# Patient Record
Sex: MALE | Race: WHITE | NOT HISPANIC OR LATINO | Employment: UNEMPLOYED | URBAN - METROPOLITAN AREA
[De-identification: names, ages, dates, MRNs, and addresses within clinical notes are randomized per-mention and may not be internally consistent; named-entity substitution may affect disease eponyms.]

---

## 2017-11-12 ENCOUNTER — HOSPITAL ENCOUNTER (INPATIENT)
Facility: MEDICAL CENTER | Age: 67
LOS: 1 days | DRG: 711 | End: 2017-11-13
Attending: INTERNAL MEDICINE | Admitting: INTERNAL MEDICINE
Payer: COMMERCIAL

## 2017-11-12 ENCOUNTER — HOSPITAL ENCOUNTER (OUTPATIENT)
Facility: MEDICAL CENTER | Age: 67
End: 2017-11-12

## 2017-11-12 ENCOUNTER — RESOLUTE PROFESSIONAL BILLING HOSPITAL PROF FEE (OUTPATIENT)
Dept: HOSPITALIST | Facility: MEDICAL CENTER | Age: 67
End: 2017-11-12
Payer: COMMERCIAL

## 2017-11-12 ENCOUNTER — APPOINTMENT (OUTPATIENT)
Dept: RADIOLOGY | Facility: MEDICAL CENTER | Age: 67
DRG: 711 | End: 2017-11-12
Attending: UROLOGY
Payer: COMMERCIAL

## 2017-11-12 DIAGNOSIS — N44.00 TESTICULAR TORSION: ICD-10-CM

## 2017-11-12 PROBLEM — N45.1 ACUTE EPIDIDYMITIS: Status: ACTIVE | Noted: 2017-11-12

## 2017-11-12 PROBLEM — E11.9 DM TYPE 2 (DIABETES MELLITUS, TYPE 2) (HCC): Status: ACTIVE | Noted: 2017-11-12

## 2017-11-12 PROBLEM — K50.90 CROHN DISEASE (HCC): Status: ACTIVE | Noted: 2017-11-12

## 2017-11-12 LAB
ABO GROUP BLD: NORMAL
ABO GROUP BLD: NORMAL
APPEARANCE UR: CLEAR
BACTERIA #/AREA URNS HPF: NEGATIVE /HPF
BILIRUB UR QL STRIP.AUTO: NEGATIVE
BLD GP AB SCN SERPL QL: NORMAL
COLOR UR: YELLOW
EPI CELLS #/AREA URNS HPF: ABNORMAL /HPF
GLUCOSE BLD-MCNC: 150 MG/DL (ref 65–99)
GLUCOSE BLD-MCNC: 206 MG/DL (ref 65–99)
GLUCOSE BLD-MCNC: 224 MG/DL (ref 65–99)
GLUCOSE UR STRIP.AUTO-MCNC: 100 MG/DL
HYALINE CASTS #/AREA URNS LPF: ABNORMAL /LPF
KETONES UR STRIP.AUTO-MCNC: ABNORMAL MG/DL
LEUKOCYTE ESTERASE UR QL STRIP.AUTO: NEGATIVE
MICRO URNS: ABNORMAL
NITRITE UR QL STRIP.AUTO: NEGATIVE
PH UR STRIP.AUTO: 5.5 [PH]
PROT UR QL STRIP: 30 MG/DL
RBC # URNS HPF: ABNORMAL /HPF
RBC UR QL AUTO: NEGATIVE
RH BLD: NORMAL
SP GR UR STRIP.AUTO: 1.02
UROBILINOGEN UR STRIP.AUTO-MCNC: 0.2 MG/DL
WBC #/AREA URNS HPF: ABNORMAL /HPF

## 2017-11-12 PROCEDURE — 88305 TISSUE EXAM BY PATHOLOGIST: CPT

## 2017-11-12 PROCEDURE — 160039 HCHG SURGERY MINUTES - EA ADDL 1 MIN LEVEL 3: Performed by: UROLOGY

## 2017-11-12 PROCEDURE — 82962 GLUCOSE BLOOD TEST: CPT | Mod: 91

## 2017-11-12 PROCEDURE — A6403 STERILE GAUZE>16 <= 48 SQ IN: HCPCS | Performed by: UROLOGY

## 2017-11-12 PROCEDURE — 0VBJ0ZZ EXCISION OF RIGHT EPIDIDYMIS, OPEN APPROACH: ICD-10-PCS | Performed by: UROLOGY

## 2017-11-12 PROCEDURE — 700111 HCHG RX REV CODE 636 W/ 250 OVERRIDE (IP): Performed by: STUDENT IN AN ORGANIZED HEALTH CARE EDUCATION/TRAINING PROGRAM

## 2017-11-12 PROCEDURE — A9270 NON-COVERED ITEM OR SERVICE: HCPCS

## 2017-11-12 PROCEDURE — 160009 HCHG ANES TIME/MIN: Performed by: UROLOGY

## 2017-11-12 PROCEDURE — 160036 HCHG PACU - EA ADDL 30 MINS PHASE I: Performed by: UROLOGY

## 2017-11-12 PROCEDURE — 700111 HCHG RX REV CODE 636 W/ 250 OVERRIDE (IP): Performed by: UROLOGY

## 2017-11-12 PROCEDURE — 86850 RBC ANTIBODY SCREEN: CPT

## 2017-11-12 PROCEDURE — 160002 HCHG RECOVERY MINUTES (STAT): Performed by: UROLOGY

## 2017-11-12 PROCEDURE — 501838 HCHG SUTURE GENERAL: Performed by: UROLOGY

## 2017-11-12 PROCEDURE — 86900 BLOOD TYPING SEROLOGIC ABO: CPT

## 2017-11-12 PROCEDURE — 0VTB0ZZ RESECTION OF LEFT TESTIS, OPEN APPROACH: ICD-10-PCS | Performed by: UROLOGY

## 2017-11-12 PROCEDURE — 700102 HCHG RX REV CODE 250 W/ 637 OVERRIDE(OP): Performed by: INTERNAL MEDICINE

## 2017-11-12 PROCEDURE — 76870 US EXAM SCROTUM: CPT

## 2017-11-12 PROCEDURE — A9270 NON-COVERED ITEM OR SERVICE: HCPCS | Performed by: STUDENT IN AN ORGANIZED HEALTH CARE EDUCATION/TRAINING PROGRAM

## 2017-11-12 PROCEDURE — 700102 HCHG RX REV CODE 250 W/ 637 OVERRIDE(OP): Performed by: STUDENT IN AN ORGANIZED HEALTH CARE EDUCATION/TRAINING PROGRAM

## 2017-11-12 PROCEDURE — 99223 1ST HOSP IP/OBS HIGH 75: CPT | Mod: GC | Performed by: INTERNAL MEDICINE

## 2017-11-12 PROCEDURE — 160035 HCHG PACU - 1ST 60 MINS PHASE I: Performed by: UROLOGY

## 2017-11-12 PROCEDURE — 700105 HCHG RX REV CODE 258: Performed by: INTERNAL MEDICINE

## 2017-11-12 PROCEDURE — 500380 HCHG DRAIN, PENROSE 1/4X12: Performed by: UROLOGY

## 2017-11-12 PROCEDURE — 700101 HCHG RX REV CODE 250

## 2017-11-12 PROCEDURE — 160048 HCHG OR STATISTICAL LEVEL 1-5: Performed by: UROLOGY

## 2017-11-12 PROCEDURE — 501445 HCHG STAPLER, SKIN DISP: Performed by: UROLOGY

## 2017-11-12 PROCEDURE — 770006 HCHG ROOM/CARE - MED/SURG/GYN SEMI*

## 2017-11-12 PROCEDURE — 86901 BLOOD TYPING SEROLOGIC RH(D): CPT

## 2017-11-12 PROCEDURE — 0VS90ZZ REPOSITION RIGHT TESTIS, OPEN APPROACH: ICD-10-PCS | Performed by: UROLOGY

## 2017-11-12 PROCEDURE — 700111 HCHG RX REV CODE 636 W/ 250 OVERRIDE (IP): Performed by: INTERNAL MEDICINE

## 2017-11-12 PROCEDURE — 81001 URINALYSIS AUTO W/SCOPE: CPT

## 2017-11-12 PROCEDURE — 700102 HCHG RX REV CODE 250 W/ 637 OVERRIDE(OP)

## 2017-11-12 PROCEDURE — 700111 HCHG RX REV CODE 636 W/ 250 OVERRIDE (IP)

## 2017-11-12 PROCEDURE — 36415 COLL VENOUS BLD VENIPUNCTURE: CPT

## 2017-11-12 PROCEDURE — 160028 HCHG SURGERY MINUTES - 1ST 30 MINS LEVEL 3: Performed by: UROLOGY

## 2017-11-12 RX ORDER — MESALAMINE 400 MG/1
400 CAPSULE, DELAYED RELEASE ORAL 3 TIMES DAILY
Status: DISCONTINUED | OUTPATIENT
Start: 2017-11-12 | End: 2017-11-13 | Stop reason: HOSPADM

## 2017-11-12 RX ORDER — DEXTROSE MONOHYDRATE 25 G/50ML
25 INJECTION, SOLUTION INTRAVENOUS
Status: DISCONTINUED | OUTPATIENT
Start: 2017-11-12 | End: 2017-11-13 | Stop reason: HOSPADM

## 2017-11-12 RX ORDER — OXYCODONE HYDROCHLORIDE 5 MG/1
5 TABLET ORAL EVERY 4 HOURS PRN
Status: DISCONTINUED | OUTPATIENT
Start: 2017-11-12 | End: 2017-11-13

## 2017-11-12 RX ORDER — MESALAMINE 1000 MG/1
1000 SUPPOSITORY RECTAL
Status: DISCONTINUED | OUTPATIENT
Start: 2017-11-12 | End: 2017-11-13 | Stop reason: HOSPADM

## 2017-11-12 RX ORDER — MORPHINE SULFATE 4 MG/ML
2 INJECTION, SOLUTION INTRAMUSCULAR; INTRAVENOUS
Status: DISCONTINUED | OUTPATIENT
Start: 2017-11-12 | End: 2017-11-13

## 2017-11-12 RX ORDER — AMOXICILLIN 250 MG
2 CAPSULE ORAL 2 TIMES DAILY
Status: DISCONTINUED | OUTPATIENT
Start: 2017-11-12 | End: 2017-11-13 | Stop reason: HOSPADM

## 2017-11-12 RX ORDER — POLYETHYLENE GLYCOL 3350 17 G/17G
1 POWDER, FOR SOLUTION ORAL
Status: DISCONTINUED | OUTPATIENT
Start: 2017-11-12 | End: 2017-11-13 | Stop reason: HOSPADM

## 2017-11-12 RX ORDER — TAMSULOSIN HYDROCHLORIDE 0.4 MG/1
0.4 CAPSULE ORAL
Status: DISCONTINUED | OUTPATIENT
Start: 2017-11-13 | End: 2017-11-13 | Stop reason: HOSPADM

## 2017-11-12 RX ORDER — LEVOFLOXACIN 5 MG/ML
750 INJECTION, SOLUTION INTRAVENOUS EVERY 24 HOURS
Status: DISCONTINUED | OUTPATIENT
Start: 2017-11-12 | End: 2017-11-13

## 2017-11-12 RX ORDER — OXYCODONE HYDROCHLORIDE 10 MG/1
10 TABLET ORAL EVERY 4 HOURS PRN
Status: DISCONTINUED | OUTPATIENT
Start: 2017-11-12 | End: 2017-11-13

## 2017-11-12 RX ORDER — ACETAMINOPHEN 500 MG
500 TABLET ORAL EVERY 6 HOURS PRN
Status: DISCONTINUED | OUTPATIENT
Start: 2017-11-12 | End: 2017-11-13 | Stop reason: HOSPADM

## 2017-11-12 RX ORDER — PSEUDOEPHEDRINE HYDROCHLORIDE 60 MG/1
60 TABLET, FILM COATED ORAL
Status: DISCONTINUED | OUTPATIENT
Start: 2017-11-12 | End: 2017-11-13 | Stop reason: HOSPADM

## 2017-11-12 RX ORDER — MERCAPTOPURINE 50 MG/1
50 TABLET ORAL DAILY
Status: DISCONTINUED | OUTPATIENT
Start: 2017-11-13 | End: 2017-11-13 | Stop reason: HOSPADM

## 2017-11-12 RX ORDER — MORPHINE SULFATE 4 MG/ML
2 INJECTION, SOLUTION INTRAMUSCULAR; INTRAVENOUS ONCE
Status: COMPLETED | OUTPATIENT
Start: 2017-11-12 | End: 2017-11-12

## 2017-11-12 RX ORDER — MORPHINE SULFATE 4 MG/ML
2 INJECTION, SOLUTION INTRAMUSCULAR; INTRAVENOUS EVERY 6 HOURS PRN
Status: DISCONTINUED | OUTPATIENT
Start: 2017-11-12 | End: 2017-11-12

## 2017-11-12 RX ORDER — BUPIVACAINE HYDROCHLORIDE 2.5 MG/ML
INJECTION, SOLUTION EPIDURAL; INFILTRATION; INTRACAUDAL
Status: DISCONTINUED | OUTPATIENT
Start: 2017-11-12 | End: 2017-11-12 | Stop reason: HOSPADM

## 2017-11-12 RX ORDER — SODIUM CHLORIDE 9 MG/ML
INJECTION, SOLUTION INTRAVENOUS CONTINUOUS
Status: DISCONTINUED | OUTPATIENT
Start: 2017-11-12 | End: 2017-11-13 | Stop reason: HOSPADM

## 2017-11-12 RX ORDER — BISACODYL 10 MG
10 SUPPOSITORY, RECTAL RECTAL
Status: DISCONTINUED | OUTPATIENT
Start: 2017-11-12 | End: 2017-11-13 | Stop reason: HOSPADM

## 2017-11-12 RX ORDER — DEXTROSE MONOHYDRATE 25 G/50ML
25 INJECTION, SOLUTION INTRAVENOUS
Status: DISCONTINUED | OUTPATIENT
Start: 2017-11-12 | End: 2017-11-12

## 2017-11-12 RX ORDER — CHOLECALCIFEROL (VITAMIN D3) 125 MCG
1000 CAPSULE ORAL DAILY
Status: DISCONTINUED | OUTPATIENT
Start: 2017-11-13 | End: 2017-11-13 | Stop reason: HOSPADM

## 2017-11-12 RX ORDER — MORPHINE SULFATE 4 MG/ML
2 INJECTION, SOLUTION INTRAMUSCULAR; INTRAVENOUS EVERY 4 HOURS PRN
Status: DISCONTINUED | OUTPATIENT
Start: 2017-11-12 | End: 2017-11-12

## 2017-11-12 RX ADMIN — SODIUM CHLORIDE: 9 INJECTION, SOLUTION INTRAVENOUS at 18:10

## 2017-11-12 RX ADMIN — OXYCODONE HYDROCHLORIDE 10 MG: 10 TABLET ORAL at 18:03

## 2017-11-12 RX ADMIN — MORPHINE SULFATE 2 MG: 4 INJECTION INTRAVENOUS at 20:32

## 2017-11-12 RX ADMIN — INSULIN HUMAN 2 UNITS: 100 INJECTION, SOLUTION PARENTERAL at 18:16

## 2017-11-12 RX ADMIN — MORPHINE SULFATE 2 MG: 4 INJECTION INTRAVENOUS at 19:30

## 2017-11-12 RX ADMIN — LEVOFLOXACIN 750 MG: 5 INJECTION, SOLUTION INTRAVENOUS at 18:10

## 2017-11-12 RX ADMIN — OXYCODONE HYDROCHLORIDE 10 MG: 10 TABLET ORAL at 22:03

## 2017-11-12 ASSESSMENT — PATIENT HEALTH QUESTIONNAIRE - PHQ9
SUM OF ALL RESPONSES TO PHQ9 QUESTIONS 1 AND 2: 0
SUM OF ALL RESPONSES TO PHQ QUESTIONS 1-9: 0
2. FEELING DOWN, DEPRESSED, IRRITABLE, OR HOPELESS: NOT AT ALL
1. LITTLE INTEREST OR PLEASURE IN DOING THINGS: NOT AT ALL
2. FEELING DOWN, DEPRESSED, IRRITABLE, OR HOPELESS: NOT AT ALL
SUM OF ALL RESPONSES TO PHQ QUESTIONS 1-9: 0
SUM OF ALL RESPONSES TO PHQ9 QUESTIONS 1 AND 2: 0
1. LITTLE INTEREST OR PLEASURE IN DOING THINGS: NOT AT ALL

## 2017-11-12 ASSESSMENT — COPD QUESTIONNAIRES
DO YOU EVER COUGH UP ANY MUCUS OR PHLEGM?: NO/ONLY WITH OCCASIONAL COLDS OR INFECTIONS
COPD SCREENING SCORE: 2
DURING THE PAST 4 WEEKS HOW MUCH DID YOU FEEL SHORT OF BREATH: NONE/LITTLE OF THE TIME
HAVE YOU SMOKED AT LEAST 100 CIGARETTES IN YOUR ENTIRE LIFE: NO/DON'T KNOW

## 2017-11-12 ASSESSMENT — LIFESTYLE VARIABLES
ALCOHOL_USE: NO
EVER_SMOKED: NEVER
DO YOU DRINK ALCOHOL: NO
EVER_SMOKED: NEVER

## 2017-11-12 ASSESSMENT — PAIN SCALES - GENERAL
PAINLEVEL_OUTOF10: 8
PAINLEVEL_OUTOF10: 3
PAINLEVEL_OUTOF10: 7
PAINLEVEL_OUTOF10: 3
PAINLEVEL_OUTOF10: 0
PAINLEVEL_OUTOF10: 5
PAINLEVEL_OUTOF10: 7

## 2017-11-12 ASSESSMENT — ENCOUNTER SYMPTOMS
HEARTBURN: 0
FEVER: 0
VOMITING: 0
CONSTIPATION: 1
DIARRHEA: 0
NAUSEA: 1
COUGH: 0
ABDOMINAL PAIN: 0
PALPITATIONS: 0
SHORTNESS OF BREATH: 0
HEADACHES: 0
DIZZINESS: 0
CHILLS: 0
MYALGIAS: 0
BLURRED VISION: 0
SORE THROAT: 0
DEPRESSION: 0

## 2017-11-12 NOTE — PROGRESS NOTES
Skin check complete. Scrotum red and swollen. Venous stasis ulcers noted to right ankle and right calf, [facility], present on admit. No s/s breakdown over bony prominences.

## 2017-11-12 NOTE — PROGRESS NOTES
Pt arrived to T411-1. VSS. Assessment complete. Urology surgeon at bedside, new orders received for testicular ultrasound. Possible surgery pending results, pt kept NPO since 0730 this AM.   Dr. Reynolds with [facility] team notified of pt's arrival and will round on patient.

## 2017-11-12 NOTE — PROGRESS NOTES
Patient is a direct admit from the [facility], patient of [facility]'s for a testicular torsion.  Dr. Reynolds and Dr. Gomez with [facility] are accepting the patient.  Telephone ADT order received and entered into epic on 11/12 at 1159.  Held orders in epic to be released upon patients arrival to unit.

## 2017-11-12 NOTE — PROGRESS NOTES
Urology Pre-op Note:  67 yo M transferred from [facility] with possible L testicular torsion.  He has been treated there for epididymitis for 2 days, but worsening pain and exam prompted repeat YELITZA which showed decreased flow to the L testicle and heterogeneous appearance of that testicle.    Exam shows normal R testis  severe scrotal edema on the L  - testis not palpable due to the scrotal edema    Repeat YELITZA here showed decreased flow and heterogeneous L testicle, normal R side.    Plan:  To OR for scrotal exploration, possible left orchiectomy, possible bilateral testicular fixation.

## 2017-11-13 VITALS
DIASTOLIC BLOOD PRESSURE: 71 MMHG | HEART RATE: 87 BPM | WEIGHT: 195.33 LBS | OXYGEN SATURATION: 97 % | TEMPERATURE: 98.9 F | SYSTOLIC BLOOD PRESSURE: 126 MMHG | RESPIRATION RATE: 18 BRPM

## 2017-11-13 PROBLEM — I26.99 RECURRENT PULMONARY EMBOLISM (HCC): Status: ACTIVE | Noted: 2017-11-13

## 2017-11-13 LAB
ALBUMIN SERPL BCP-MCNC: 2.8 G/DL (ref 3.2–4.9)
ALBUMIN/GLOB SERPL: 1 G/DL
ALP SERPL-CCNC: 56 U/L (ref 30–99)
ALT SERPL-CCNC: 12 U/L (ref 2–50)
ANION GAP SERPL CALC-SCNC: 3 MMOL/L (ref 0–11.9)
ANISOCYTOSIS BLD QL SMEAR: ABNORMAL
AST SERPL-CCNC: 13 U/L (ref 12–45)
BASOPHILS # BLD AUTO: 0.6 % (ref 0–1.8)
BASOPHILS # BLD: 0.03 K/UL (ref 0–0.12)
BILIRUB SERPL-MCNC: 1.1 MG/DL (ref 0.1–1.5)
BUN SERPL-MCNC: 8 MG/DL (ref 8–22)
CALCIUM SERPL-MCNC: 7.9 MG/DL (ref 8.5–10.5)
CHLORIDE SERPL-SCNC: 104 MMOL/L (ref 96–112)
CO2 SERPL-SCNC: 28 MMOL/L (ref 20–33)
COMMENT 1642: NORMAL
CREAT SERPL-MCNC: 0.46 MG/DL (ref 0.5–1.4)
EOSINOPHIL # BLD AUTO: 0.03 K/UL (ref 0–0.51)
EOSINOPHIL NFR BLD: 0.6 % (ref 0–6.9)
ERYTHROCYTE [DISTWIDTH] IN BLOOD BY AUTOMATED COUNT: 66.4 FL (ref 35.9–50)
FOLATE SERPL-MCNC: >23.2 NG/ML
GFR SERPL CREATININE-BSD FRML MDRD: >60 ML/MIN/1.73 M 2
GLOBULIN SER CALC-MCNC: 2.8 G/DL (ref 1.9–3.5)
GLUCOSE BLD-MCNC: 125 MG/DL (ref 65–99)
GLUCOSE BLD-MCNC: 149 MG/DL (ref 65–99)
GLUCOSE BLD-MCNC: 176 MG/DL (ref 65–99)
GLUCOSE SERPL-MCNC: 131 MG/DL (ref 65–99)
HCT VFR BLD AUTO: 32.3 % (ref 42–52)
HGB BLD-MCNC: 11 G/DL (ref 14–18)
IMM GRANULOCYTES # BLD AUTO: 0.03 K/UL (ref 0–0.11)
IMM GRANULOCYTES NFR BLD AUTO: 0.6 % (ref 0–0.9)
LYMPHOCYTES # BLD AUTO: 0.52 K/UL (ref 1–4.8)
LYMPHOCYTES NFR BLD: 10.8 % (ref 22–41)
MACROCYTES BLD QL SMEAR: ABNORMAL
MCH RBC QN AUTO: 36.3 PG (ref 27–33)
MCHC RBC AUTO-ENTMCNC: 34.1 G/DL (ref 33.7–35.3)
MCV RBC AUTO: 106.6 FL (ref 81.4–97.8)
MONOCYTES # BLD AUTO: 0.51 K/UL (ref 0–0.85)
MONOCYTES NFR BLD AUTO: 10.6 % (ref 0–13.4)
MORPHOLOGY BLD-IMP: NORMAL
NEUTROPHILS # BLD AUTO: 3.71 K/UL (ref 1.82–7.42)
NEUTROPHILS NFR BLD: 76.8 % (ref 44–72)
NRBC # BLD AUTO: 0 K/UL
NRBC BLD AUTO-RTO: 0 /100 WBC
OVALOCYTES BLD QL SMEAR: NORMAL
PLATELET # BLD AUTO: 139 K/UL (ref 164–446)
PLATELET BLD QL SMEAR: NORMAL
PMV BLD AUTO: 9.4 FL (ref 9–12.9)
POIKILOCYTOSIS BLD QL SMEAR: NORMAL
POTASSIUM SERPL-SCNC: 4 MMOL/L (ref 3.6–5.5)
PROT SERPL-MCNC: 5.6 G/DL (ref 6–8.2)
RBC # BLD AUTO: 3.03 M/UL (ref 4.7–6.1)
RBC BLD AUTO: PRESENT
SODIUM SERPL-SCNC: 135 MMOL/L (ref 135–145)
VIT B12 SERPL-MCNC: 464 PG/ML (ref 211–911)
WBC # BLD AUTO: 4.8 K/UL (ref 4.8–10.8)

## 2017-11-13 PROCEDURE — 700111 HCHG RX REV CODE 636 W/ 250 OVERRIDE (IP): Performed by: INTERNAL MEDICINE

## 2017-11-13 PROCEDURE — 36415 COLL VENOUS BLD VENIPUNCTURE: CPT

## 2017-11-13 PROCEDURE — 700102 HCHG RX REV CODE 250 W/ 637 OVERRIDE(OP): Performed by: INTERNAL MEDICINE

## 2017-11-13 PROCEDURE — 85025 COMPLETE CBC W/AUTO DIFF WBC: CPT

## 2017-11-13 PROCEDURE — 700102 HCHG RX REV CODE 250 W/ 637 OVERRIDE(OP): Performed by: STUDENT IN AN ORGANIZED HEALTH CARE EDUCATION/TRAINING PROGRAM

## 2017-11-13 PROCEDURE — 700111 HCHG RX REV CODE 636 W/ 250 OVERRIDE (IP): Performed by: UROLOGY

## 2017-11-13 PROCEDURE — 82746 ASSAY OF FOLIC ACID SERUM: CPT

## 2017-11-13 PROCEDURE — 82962 GLUCOSE BLOOD TEST: CPT | Mod: 91

## 2017-11-13 PROCEDURE — 99239 HOSP IP/OBS DSCHRG MGMT >30: CPT | Mod: GC | Performed by: INTERNAL MEDICINE

## 2017-11-13 PROCEDURE — 82607 VITAMIN B-12: CPT

## 2017-11-13 PROCEDURE — 80053 COMPREHEN METABOLIC PANEL: CPT

## 2017-11-13 PROCEDURE — 700105 HCHG RX REV CODE 258: Performed by: INTERNAL MEDICINE

## 2017-11-13 PROCEDURE — A9270 NON-COVERED ITEM OR SERVICE: HCPCS | Performed by: STUDENT IN AN ORGANIZED HEALTH CARE EDUCATION/TRAINING PROGRAM

## 2017-11-13 PROCEDURE — A9270 NON-COVERED ITEM OR SERVICE: HCPCS | Performed by: INTERNAL MEDICINE

## 2017-11-13 RX ORDER — OXYCODONE HYDROCHLORIDE 5 MG/1
5 TABLET ORAL EVERY 4 HOURS PRN
Qty: 20 TAB | Refills: 0
Start: 2017-11-13 | End: 2018-01-02

## 2017-11-13 RX ORDER — POLYETHYLENE GLYCOL 3350 17 G/17G
1 POWDER, FOR SOLUTION ORAL DAILY
Status: DISCONTINUED | OUTPATIENT
Start: 2017-11-13 | End: 2017-11-13 | Stop reason: HOSPADM

## 2017-11-13 RX ORDER — MESALAMINE 0.38 G/1
3 CAPSULE, EXTENDED RELEASE ORAL DAILY
Status: ON HOLD | COMMUNITY
End: 2018-01-27

## 2017-11-13 RX ORDER — DOXYCYCLINE HYCLATE 100 MG
100 TABLET ORAL 2 TIMES DAILY
Status: ON HOLD | COMMUNITY
End: 2017-11-13

## 2017-11-13 RX ORDER — LEVOFLOXACIN 500 MG/1
500 TABLET, FILM COATED ORAL DAILY
Status: DISCONTINUED | OUTPATIENT
Start: 2017-11-14 | End: 2017-11-13

## 2017-11-13 RX ORDER — GLIPIZIDE 5 MG/1
5 TABLET ORAL 2 TIMES DAILY
COMMUNITY
End: 2018-01-02

## 2017-11-13 RX ORDER — MERCAPTOPURINE 50 MG/1
150 TABLET ORAL DAILY
COMMUNITY

## 2017-11-13 RX ORDER — OXYCODONE HYDROCHLORIDE 10 MG/1
10 TABLET ORAL EVERY 4 HOURS PRN
Status: DISCONTINUED | OUTPATIENT
Start: 2017-11-13 | End: 2017-11-13 | Stop reason: HOSPADM

## 2017-11-13 RX ORDER — WARFARIN SODIUM 10 MG/1
5 TABLET ORAL DAILY
Qty: 30 TAB | Refills: 3 | Status: ON HOLD
Start: 2017-11-14 | End: 2017-12-29

## 2017-11-13 RX ORDER — OXYCODONE HYDROCHLORIDE 5 MG/1
5 TABLET ORAL EVERY 4 HOURS PRN
Status: DISCONTINUED | OUTPATIENT
Start: 2017-11-13 | End: 2017-11-13 | Stop reason: HOSPADM

## 2017-11-13 RX ORDER — MESALAMINE 1000 MG/1
1000 SUPPOSITORY RECTAL
Qty: 30 SUPPOSITORY | Refills: 0 | Status: ON HOLD
Start: 2017-11-13 | End: 2017-12-29

## 2017-11-13 RX ORDER — TAMSULOSIN HYDROCHLORIDE 0.4 MG/1
0.8 CAPSULE ORAL DAILY
COMMUNITY

## 2017-11-13 RX ORDER — WARFARIN SODIUM 10 MG/1
15 TABLET ORAL DAILY
Status: ON HOLD | COMMUNITY
End: 2017-11-13

## 2017-11-13 RX ADMIN — MAGNESIUM HYDROXIDE 30 ML: 400 SUSPENSION ORAL at 08:37

## 2017-11-13 RX ADMIN — MORPHINE SULFATE 2 MG: 4 INJECTION INTRAVENOUS at 08:36

## 2017-11-13 RX ADMIN — MESALAMINE 400 MG: 400 CAPSULE, DELAYED RELEASE ORAL at 14:05

## 2017-11-13 RX ADMIN — MESALAMINE 400 MG: 400 CAPSULE, DELAYED RELEASE ORAL at 08:40

## 2017-11-13 RX ADMIN — OXYCODONE HYDROCHLORIDE 10 MG: 10 TABLET ORAL at 17:59

## 2017-11-13 RX ADMIN — LEVOFLOXACIN 750 MG: 5 INJECTION, SOLUTION INTRAVENOUS at 08:40

## 2017-11-13 RX ADMIN — MORPHINE SULFATE 2 MG: 4 INJECTION INTRAVENOUS at 05:09

## 2017-11-13 RX ADMIN — OXYCODONE HYDROCHLORIDE 10 MG: 10 TABLET ORAL at 09:45

## 2017-11-13 RX ADMIN — MORPHINE SULFATE 2 MG: 4 INJECTION INTRAVENOUS at 01:22

## 2017-11-13 RX ADMIN — TAMSULOSIN HYDROCHLORIDE 0.4 MG: 0.4 CAPSULE ORAL at 08:38

## 2017-11-13 RX ADMIN — OXYCODONE HYDROCHLORIDE 10 MG: 10 TABLET ORAL at 14:05

## 2017-11-13 RX ADMIN — STANDARDIZED SENNA CONCENTRATE AND DOCUSATE SODIUM 2 TABLET: 8.6; 5 TABLET, FILM COATED ORAL at 08:38

## 2017-11-13 RX ADMIN — SODIUM CHLORIDE: 9 INJECTION, SOLUTION INTRAVENOUS at 04:27

## 2017-11-13 RX ADMIN — OXYCODONE HYDROCHLORIDE 10 MG: 10 TABLET ORAL at 04:25

## 2017-11-13 RX ADMIN — MERCAPTOPURINE 50 MG: 50 TABLET ORAL at 08:40

## 2017-11-13 RX ADMIN — POLYETHYLENE GLYCOL 3350 1 PACKET: 17 POWDER, FOR SOLUTION ORAL at 08:38

## 2017-11-13 RX ADMIN — CYANOCOBALAMIN TAB 500 MCG 1000 MCG: 500 TAB at 08:40

## 2017-11-13 ASSESSMENT — PAIN SCALES - GENERAL
PAINLEVEL_OUTOF10: 9
PAINLEVEL_OUTOF10: 7
PAINLEVEL_OUTOF10: 8
PAINLEVEL_OUTOF10: 7

## 2017-11-13 NOTE — PROGRESS NOTES
VSS, pain controlled with morphine and oxycodone, tolerating ambulation, kaur placed for retention, anxiety and agitation improved but endured most of the night, some edema to penis

## 2017-11-13 NOTE — PROGRESS NOTES
"Patient rang call light after sleeping for a few hours after receiving oxycodone and stated angrily \"I am loving the pain\". He has threaten that he will nicholas his MD since they did not place a catheter during surgery. He has been very agitated but is alert and oriented X4.   "

## 2017-11-13 NOTE — PROGRESS NOTES
Patient has been in a lot of pain since start of shift. Resident rounded in person on patient and prescribed morphine Q6. Dr. Mosqueda was then called about retained urine per bladder scan, pressure from retention, and prolonged erection. Dr. Mosqueda ordered a kaur catheter, sudafed if erection does not subside, increased frequency of morphine, and a one time dose of morphine.

## 2017-11-13 NOTE — SENIOR ADMIT NOTE
SENIOR ADMIT NOTE    HPI:   Pt is a 67y/o male with hx of crohn's disease, recurrent DVT,, PE,  on coumadin presents ,DM type 2  [facility] 1.5 weeks of testicular pain, Pt was initially given doxycycline to complete 5 day course which did not resolve his symptoms completely. USG srotum at that time showed adequate bllod flow to testis with no evidence of testicular torsion.  [facility] and as per rec's med management was initiated , initially with bactrim , later switched to ceftriaxone because of increase in  INR. As symptoms were not improving with ceftriaxone pt was switched to levofloxacin.  UA has been negative, UCX neg to date. A stat USG performed this morning because of persistent pain showed partial vs total testicular torsion.  As [facility] unaviable at [facility] over [facility] pt transferred emergently to [facility] for surgery.     urologist consulted who took the pt to OR immediately upon arrival after a Repeat Scrotal USG showed decreased flow and heterogeneous L testicle, normal R side    On exam;Genitourinary :scrotal edema,Indurated , swollen, tender testicles,left greater than right Erythema noted. Cresmasteric reflex absent on Left    Assessment/Plan  Acute epididymitis with testicular torsion  Afebrile,Labs reviewed from [facility], no leukocytosis  Received doxycycline, bactrim, ceftriaxone at [facility]  Currently on levofloxacin    Urology on board, was taken to surgery immediately upon arrival underwent Scrotal exploration, left orchiectomy, and right testicular fixation.     wiill follow urology rec's  IV fluids  Levofloxacin 750mg IV daily  Will resume home meds except coumadin

## 2017-11-13 NOTE — DISCHARGE INSTRUCTIONS
Discharge Instructions    Discharged to home by car with relative. Discharged via wheelchair, hospital escort: Yes.  Special equipment needed: Not Applicable    Be sure to schedule a follow-up appointment with your primary care doctor or any specialists as instructed.     Discharge Plan:   Diet Plan: Discussed  Activity Level: Discussed  Confirmed Follow up Appointment: Patient to Call and Schedule Appointment  Confirmed Symptoms Management: Discussed  Medication Reconciliation Updated: Yes  Influenza Vaccine Indication: Not indicated: Previously immunized this influenza season and > 8 years of age    I understand that a diet low in cholesterol, fat, and sodium is recommended for good health. Unless I have been given specific instructions below for another diet, I accept this instruction as my diet prescription.   Other diet: Regular    Special Instructions: None    · Is patient discharged on Warfarin / Coumadin?   No     · Is patient Post Blood Transfusion?  No    Depression / Suicide Risk    As you are discharged from this [facility] facility, it is important to learn how to keep safe from harming yourself.    Recognize the warning signs:  · Abrupt changes in personality, positive or negative- including increase in energy   · Giving away possessions  · Change in eating patterns- significant weight changes-  positive or negative  · Change in sleeping patterns- unable to sleep or sleeping all the time   · Unwillingness or inability to communicate  · Depression  · Unusual sadness, discouragement and loneliness  · Talk of wanting to die  · Neglect of personal appearance   · Rebelliousness- reckless behavior  · Withdrawal from people/activities they love  · Confusion- inability to concentrate     If you or a loved one observes any of these behaviors or has concerns about self-harm, here's what you can do:  · Talk about it- your feelings and reasons for harming yourself  · Remove any means that you might use to hurt  yourself (examples: pills, rope, extension cords, firearm)  · Get professional help from the community (Mental Health, Substance Abuse, psychological counseling)  · Do not be alone:Call your Safe Contact- someone whom you trust who will be there for you.  · Call your local CRISIS HOTLINE 941-7733 or 196-809-7343  · Call your local Children's Mobile Crisis Response Team [place] (497) 162-3910 or www.[employer]  · Call the toll free National Suicide Prevention Hotlines   · National Suicide Prevention Lifeline 823-772-FUUE (1515)  · [employer] Hope Line Network 800-SUICIDE (869-1931)        Testicular Torsion  Testicular torsion is a twisting of the spermatic cord, artery, and vein that go to the testicle. This twisting cuts off the blood supply to everything in the sac that contains the testes, blood vessels, and part of the spermatic cord (scrotum). Testicular torsion is most commonly seen in  and adolescent males. It can also occur before birth.  Testicular torsion requires emergency treatment. The testicle usually can be saved if the torsion is treated within 6 hours of onset. If the torsion is left untreated for too long, the testicle will die and have to be removed.   CAUSES   Torsion can be caused by a hit on the scrotum or by certain movements during exercise. In some males, testicular torsion is more common because the connection of their testicle to a specific tissue in their scrotum developed in the wrong place, allowing the testicle to rotate and the cord to get twisted.   SIGNS AND SYMPTOMS   The main symptom of testicular torsion is pain in your testicle. The scrotum may be swollen, red, hard, and very tender. There will be excess fluid in the tissue (edema). The testicle may be higher than normal in the scrotum. The skin of the scrotum may be stuck to the testicle. You may have nausea, vomiting, and a fever.  DIAGNOSIS   Often testicular torsion is diagnosed through a physical exam.  Sometimes imaging exams and tests to measure blood flow may be done.  TREATMENT   A manual untwisting of the testicle may be done when the testicle is still mobile and the maneuver is not too painful. However, surgery usually is necessary and should be done as soon as possible after torsion occurs. During surgery, the testicle is untwisted and evaluated and possibly removed.      This information is not intended to replace advice given to you by your health care provider. Make sure you discuss any questions you have with your health care provider.     Document Released: 12/18/2006 Document Revised: 12/23/2014 Document Reviewed: 06/09/2014  [employer] Interactive Patient Education ©2016 [employer]      Indwelling Urinary Catheter Care  You have been given a flexible tube (catheter) used to drain the bladder. Catheters are often used when a person has difficulty urinating due to blockage, bleeding, infection, or inability to control bladder or bowel movements (incontinence). A catheter requires daily care to prevent infection and blockage.  HOME CARE INSTRUCTIONS   Do the following to reduce the risk of infection. Antibiotic medicines cannot prevent infections.  Limit the number of bacteria entering your bladder  · Wash your hands for 2 minutes with soapy water before and after handling the catheter.  · Wash your bottom and the entire catheter twice daily, as well as after each bowel movement. Wash the tip of the penis or just above the vaginal opening with soap and warm water, rinse, and then wash the rectal area. Always wash from front to back.  · When changing from the leg bag to overnight bag or from the overnight bag to leg bag, thoroughly clean the end of the catheter where it connects to the tubing with an alcohol wipe.  · Clean the leg bag and overnight bag daily after use. Replace your drainage bags weekly.  · Always keep the tubing and bag below the level of your bladder. This allows your urine to drain  properly. Lifting the bag or tubing above the level of your bladder will cause dirty urine to flow back into your bladder. If you must briefly lift the bag higher than your bladder, pinch the catheter or tubing to prevent backflow.  · Drink enough water and fluids to keep your urine clear or pale yellow, or as directed by your caregiver. This will flush bacteria out of the bladder.  Protect tissues from injury  · Attach the catheter to your leg so there is no tension on the catheter. Use adhesive tape or a leg strap. If you are using adhesive tape, remove any sticky residue left behind by the previous tape you used.  · Place your leg bag on your lower leg. Fasten the straps securely and comfortably.  · Do not remove the catheter yourself unless you have been instructed how to do so.  Keep the urinary pathway open  · Check throughout the day to be sure your catheter is working and urine is draining freely. Make sure the tubing does not become kinked.  · Do not let the drainage bag overfill.  SEEK IMMEDIATE MEDICAL CARE IF:   · The catheter becomes blocked. Urine is not draining.  · Urine is leaking.  · You have any pain.  · You have a fever.  Document Released: 12/18/2006 Document Revised: 12/04/2013 Document Reviewed: 05/19/2011  [employer]® Patient Information ©2014 [employer].

## 2017-11-13 NOTE — ASSESSMENT & PLAN NOTE
-[facility] with Testicular Torsion  -US stat showed partial vs total testicular torsion.   -Dr. Mosqueda was consulted & as soon as pt arrived to [facility], pt was taken to ER & underwent Scrotal exploration, left orchiectomy, and right testicular fixation.  Plan:  - admitted to Sx floor  -s/p Scrotal exploration, left orchiectomy, and right testicular fixation on 11/12/2017  - IVF  -started levofloxacin

## 2017-11-13 NOTE — PROGRESS NOTES
"Assumed care of patient. Patient stating pain is a 9/10 in his penis. Medicated per MAR. Scrotum with gauze in sling. Gauze is clean, dry and intact. Kaur to down drain for urinary retention. Patient upset about kaur experience. Apologized and explained procedure for kaur catheters. Patient thanked this RN and stated nursing staff has been \"great\". No other needs at this time. Call light within reach.   "

## 2017-11-13 NOTE — OP REPORT
DATE OF SERVICE:  11/12/2017    PREOPERATIVE DIAGNOSIS:  Left testicular torsion.    POSTOPERATIVE DIAGNOSIS:  Left testicular torsion.    PROCEDURE PERFORMED:  Scrotal exploration, left orchiectomy, and right   testicular fixation.    SURGEON:  José Antonio Mosqueda MD    ANESTHESIOLOGIST:  Leo Mar DO    ANESTHESIA:  General.    INDICATIONS FOR PROCEDURE:  The patient is a 66-year-old male    transferred from the [facility] to [facility] for left testicular torsion.    After discussing treatment options, he is elected for a scrotal exploration   and possible left orchiectomy and possible bilateral testicular fixation if   the testicle is viable.    DESCRIPTION OF PROCEDURE:  After obtaining informed consent, the patient was   brought to the operating room.  After the induction of general anesthesia, he   was placed in supine position and his genitalia prepped and draped in sterile   fashion.  He was on antibiotics already for epididymitis and was adequately   covered for this procedure.  An incision was made along the midline raphae of   the scrotum approximately 4 cm in length, after which dissection was continued   down into the left hemiscrotum.  There was a very thick edematous scrotal   tissue on the left hand side.  I was able to get into the left hemiscrotum.    Moderate amount of dark bloody fluid drained from around the testicle and the   testicle was brought up through the incision.  The testicle was   black-appearing, definitely nonviable.  There were no definite twists in the   cord; however, the testicle and spermatic cords were about 4 cm in length, all   very dark purple nonviable-appearing.  The spermatic cord was then clamped   with 2 separate clamps and the cord divided between clamps.  The testicle was   then sent off as specimen.  A cord block was performed in the spermatic cord   above the area where it had been transected and the cord tied off in 3   separate locations with 0  Vicryl, after which the left hemiscrotum was   irrigated out copiously.  There was a moderate amount of scrotal edema, but   otherwise, the scrotum appeared normal.  The right hemiscrotum was then   entered and the testicle was examined, appeared healthy.  Appendix epididymis   and appendix testis were both excised using Bovie cautery, after which the   testicle was fixed to the scrotal wall laterally, medially, and inferiorly   using 3-0 Prolene.  The area was then irrigated out.  The dartos layer was   then closed using 3-0 Vicryl in running fashion, after which the skin was   closed using 3-0 chromic in running vertical mattress fashion.  Local was   injected into the skin and the area was then cleaned and dried.  Antibiotic   ointment, fluffs, and a scrotal support were placed.  Patient was then   awokened from anesthesia and taken to the recovery room in stable condition.    COMPLICATIONS:  None.    ESTIMATED BLOOD LOSS:  50 mL    SPECIMEN:  Left testicle.    DRAIN:  None.       ____________________________________     MD REAL Olivares / AVILA    DD:  11/12/2017 16:41:33  DT:  11/12/2017 17:16:35    D#:  [id number]  Job#:  [id number]

## 2017-11-13 NOTE — PROGRESS NOTES
[facility] rec complete [facility] and [facility]. Pharmacist aware. Allergies verified and updated.

## 2017-11-13 NOTE — PROGRESS NOTES
Urology Progress Note    Post op Day # 1    Overnight Events: None    S: No fevers, chills. Kaur catheter placed for urinary retention. Very agitated. Feels he should have been sedated for kaur catheter placement. Wants to leave.    O:   Blood pressure 126/71, pulse 87, temperature 37.2 °C (98.9 °F), resp. rate 18, weight 88.6 kg (195 lb 5.2 oz), SpO2 97 %.  Recent Labs      11/13/17 0224   SODIUM  135   POTASSIUM  4.0   CHLORIDE  104   CO2  28   GLUCOSE  131*   BUN  8   CREATININE  0.46*   CALCIUM  7.9*     Recent Labs      11/13/17 0224   WBC  4.8   RBC  3.03*   HEMOGLOBIN  11.0*   HEMATOCRIT  32.3*   MCV  106.6*   MCH  36.3*   MCHC  34.1   RDW  66.4*   PLATELETCT  139*   MPV  9.4         Intake/Output Summary (Last 24 hours) at 11/13/17 1051  Last data filed at 11/13/17 0827   Gross per 24 hour   Intake             2740 ml   Output             1875 ml   Net              865 ml       Exam:  Abdomen soft, benign.  Incisions clean, dry, intact. Minimal penile edema   Urine: clear      A/P:    Active Hospital Problems    Diagnosis   • Acute epididymitis [N45.1]     Priority: High   • Testicular torsion [N44.00]     Priority: High   • Crohn disease (CMS-[facility]) [K50.90]   • DM type 2 (diabetes mellitus, type 2) (CMS-[facility]) [E11.9]       Urology ok with DC home  [facility] check or with Dr Mosqueda

## 2017-11-13 NOTE — CONSULTS
DATE OF SERVICE:  11/12/2017    UROLOGY CONSULTATION    REQUESTING PHYSICIAN:  Dr. Gomez.    CONSULTING PHYSICIAN:  José Antonio Mosqueda MD    CONSULTING SERVICE:  Urology.    REASON FOR CONSULTATION:  Left testicular torsion.    HISTORY OF PRESENT ILLNESS:  The patient is a 66-year-old male  who has   been treated at the [facility] over last 2 days for possible left   epididymitis.  His pain and swelling have gotten worse.  A new ultrasound   today showed decreased blood flow in heterogeneous left testicle, very   suspicious for left testicular torsion.  [facility] due to inability to care [facility] the [facility] at the [facility] due to no   operating room is being available.  Patient denies any fevers, chills, chest   pain, shortness of breath, nausea, or vomiting, just pain in his left groin   and left testicle.  The patient denies ever having this kind of pain before   and denies any pain in the right testicle.    PAST MEDICAL HISTORY:  Significant for DVT, diabetes, Crohn's disease.    PAST SURGICAL HISTORY:  Bowel resection, IVC filter placement.    ALLERGIES:  COMPAZINE.    MEDICATIONS ON ADMISSION:  See list.    REVIEW OF SYSTEMS:  See HPI, otherwise complete review of systems is negative.    PHYSICAL EXAMINATION:  VITAL SIGNS:  Show a temperature of 38.6, pulse 80, blood pressure 106/55,   respiratory rate 21, and saturation is 98% on 4 liters nasal cannula.  GENERAL:  Patient is alert, somewhat uncomfortable appearing.  Breathing is   nonlabored.  Pulse is regular.  ABDOMEN:  Soft, nontender, nondistended.  GENITOURINARY:  Shows a circumcised phallus with no lesions.  Right testicle   feels normal.  Left testicle is not palpable due to a large amount of scrotal   edema and ecchymosis around the left hemiscrotum.  EXTREMITIES:  Patient moves all extremities normally.  NEUROLOGICAL:  Grossly intact.    IMAGING RESULTS:  The patient had a scrotal ultrasound repeated here  at   [facility], which showed normal right testicle and decreased blood flow and   heterogeneous tissue in the left testicle concerning for testicular torsion.    ASSESSMENT AND PLAN:  This is a 66-year-old male with likely left testicular   torsion based on imaging and physical exam.  Plan is to go to the operating   room for scrotal exploration and possible left orchiectomy and possible   testicular fixation.       ____________________________________     MD REAL Olivares / AVILA    DD:  11/12/2017 16:37:57  DT:  11/12/2017 18:41:20    D#:  [id number]  Job#:  [id number]

## 2017-11-13 NOTE — PROGRESS NOTES
Patient has edema to penis but erection subsided after catheter insertion earlier in the evening.

## 2017-11-13 NOTE — H&P
Internal Medicine Admitting History and Physical    Note Author: Anjelica Juan M.D.       Name Niels Lima     1950   Age/Sex 66 y.o. male   MRN [id number]   Code Status FULL      After 5PM or if no immediate response to page, please call for cross-coverage  Attending/Team: Dr. Gomez / Fco  See Patient List for primary contact information  Call [phone] to page    1st Call - Day Intern (R1):   Dr. Juan 2nd Call - Day Sr. Resident (R2/R3):   Dr. 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 [facility], d/c from ED with doxycycline for 5 days. [facility]17 with 10/10 testicular pain, urology ( Dr. Peter ) was consulted & treated with Bactrim & then switched to  Ceftriaxone & levofloxacin but no improvement. On 2017, pain was acutely worsened overnight. US stat showed partial vs total testicular torsion. Per urology recommendation, surgical procedure needed & [facility] not available on weekends [facility], pt was transferred to [facility]. Dr. Mosqueda was consulted & as soon as pt arrived to [facility], pt was taken to ER & underwent Scrotal exploration, left orchiectomy, and right testicular fixation.    No significant family h/o.   Social h/o: no smoking, no illicit drug use, ETOH occasionally.     Warfarin was hold due to surgery . Last INR was 1.9 on 2017.         Review of Systems   Constitutional: Negative for chills and fever.   HENT: Negative for sore throat.    Eyes: Negative for blurred vision.   Respiratory: Negative for cough and shortness of breath.    Cardiovascular: Negative for chest pain, palpitations and leg swelling.   Gastrointestinal: Positive for constipation and nausea. Negative for  "abdominal pain, diarrhea, heartburn and vomiting.   Genitourinary: Negative for dysuria.   Musculoskeletal: Negative for myalgias.   Neurological: Negative for dizziness and headaches.   Psychiatric/Behavioral: Negative for depression.             Past Medical History:   Past Medical History:   Diagnosis Date   • Crohn's disease (CMS-HCC) 01/01/2001   • Diabetes (CMS-HCC)     Type 2   • DVT (deep venous thrombosis) (CMS-HCC) 01/01/2009    IVC filter in place   • Presence of IVC filter 01/01/2009       Past Surgical History:  Past Surgical History:   Procedure Laterality Date   • BOWEL RESECTION  01/01/1986       Current Outpatient Medications:  Home Medications     Reviewed by Gloria Marvin R.N. (Registered Nurse) on 11/12/17 at 1437  Med List Status: Not Addressed   Medication Last Dose Status   bisacodyl (DULCOLAX) suppository 10 mg  Active   dextrose 50% (D50W) injection 25 mL  Active   glucose 4 g chewable tablet 16 g  Active   insulin regular (HUMULIN R) injection 1-6 Units  Active   levofloxacin in D5W (LEVAQUIN) IVPB 750 mg  Active   magnesium hydroxide (MILK OF MAGNESIA) suspension 30 mL  Active   NS infusion  Active   oxycodone immediate release (ROXICODONE) tablet 10 mg  Active   oxycodone immediate release (ROXICODONE) tablet 5 mg  Active   pneumococcal vaccine (PNEUMOVAX-23) injection 25 mcg  Active   polyethylene glycol/lytes (MIRALAX) PACKET 1 Packet  Active   Respiratory Care per Protocol  Active   senna-docusate (PERICOLACE or SENOKOT S) 8.6-50 MG per tablet 2 Tab  Active                Medication Allergy/Sensitivities:  Allergies   Allergen Reactions   • Compazine Unspecified     Pt states \"face froze up\"         Family History:  History reviewed. No pertinent family history.    Social History:  Social History     Social History   • Marital status: N/A     Spouse name: N/A   • Number of children: N/A   • Years of education: N/A     Occupational History   • Not on file.     Social History Main Topics "   • Smoking status: Never Smoker   • Smokeless tobacco: Never Used   • Alcohol use No   • Drug use: No   • Sexual activity: Not on file     Other Topics Concern   • Not on file     Social History Narrative   • No narrative on file     Living situation: Lives with wife  [facility] : [facility]      Physical Exam     Vitals:    11/12/17 1700 11/12/17 1715 11/12/17 1731 11/12/17 1800   BP:    128/71   Pulse: 91 92 93 89   Resp: 19 (!) 24 15 16   Temp:   37.8 °C (100.1 °F) 36.8 °C (98.2 °F)   SpO2: 99% 98% 94% 90%   Weight:         There is no height or weight on file to calculate BMI.  /71   Pulse 89   Temp 36.8 °C (98.2 °F)   Resp 16   Wt 88.6 kg (195 lb 5.2 oz)   SpO2 90%   O2 therapy: Pulse Oximetry: 90 %, O2 (LPM): 2, O2 Delivery: None (Room Air)    Physical Exam   Constitutional: He is oriented to person, place, and time and well-developed, well-nourished, and in no distress.   Pulmonary/Chest: Breath sounds normal. He is in respiratory distress.   Abdominal: Soft. Bowel sounds are normal.   Genitourinary:   Genitourinary Comments: Indurated , swollen, tender Left testicles, posterior > anterior. Erythema noted. Cresmasteric reflex absent on Left, No palpable inguinal hernia.    Musculoskeletal:   R leg ulcers on ankle & lower aspect of medial leg, 1 cm in size , no discharge/ non tender.   L leg posterior thigh nodules , 1 cm in size. Non tender, no erythema noted.    Neurological: He is alert and oriented to person, place, and time.   Skin: Skin is warm.   Psychiatric: Affect normal.             Data Review       Old Records Request:   Completed  Current Records review and summary: Completed    Lab Data Review:  Recent Results (from the past 24 hour(s))   ACCU-CHEK GLUCOSE    Collection Time: 11/12/17  3:02 PM   Result Value Ref Range    Glucose - Accu-Ck 150 (H) 65 - 99 mg/dL   COD (ADULT)    Collection Time: 11/12/17  3:27 PM   Result Value Ref Range    ABO Grouping Only A     Rh Grouping Only POS      Antibody Screen-Cod NEG    ABO CONFIRMATION    Collection Time: 11/12/17  5:57 PM   Result Value Ref Range    Second ABO Typing With Cod A    ACCU-CHEK GLUCOSE    Collection Time: 11/12/17  6:15 PM   Result Value Ref Range    Glucose - Accu-Ck 206 (H) 65 - 99 mg/dL       Imaging/Procedures Review:    ndependant Imaging Review: Completed  [id number]   Final Result      1.  Heterogeneous edematous appearance of the left testis with no demonstrable venous flow and minimal internal demonstrable arterial flow. This is consistent with incomplete torsion.      2.  No evidence of right testicular torsion.      3.  Normal appearance of each epididymis.      Dr. BURT FRAZIER was aware of these findings according to the ultrasound technologist on 11/12/2017 2:30 PM.                        Assessment/Plan     Testicular torsion   Assessment & Plan    [facility] with Testicular Torsion  -US stat showed partial vs total testicular torsion.   -Dr. Frazier was consulted & as soon as pt arrived to [facility], pt was taken to ER & underwent Scrotal exploration, left orchiectomy, and right testicular fixation.  Plan:  - admitted to  floor  -s/p Scrotal exploration, left orchiectomy, and right testicular fixation on 11/12/2017  - IVF  -started levofloxacin          Acute epididymitis   Assessment & Plan    -[facility] Epidydimitis with Testicular Torsion  -US stat showed partial vs total testicular torsion.   -Dr. Frazier was consulted & as soon as pt arrived to [facility], pt was taken to ER & underwent Scrotal exploration, left orchiectomy, and right testicular fixation.  Plan:  - admitted to Sx floor  -s/p Scrotal exploration, left orchiectomy, and right testicular fixation on 11/12/2017  - IVF  -started levofloxacin        DM type 2 (diabetes mellitus, type 2) (CMS-[facility])   Assessment & Plan    - d/c metformin & glipizide  - on sliding scale with POC glucose checks        Crohn  disease (CMS-HCC)   Assessment & Plan    -stable on home meds  -Continue home meds: mesalamine & 6MP  - dosage adjusted with pharmacy             Anticipated Hospital stay:  >2 midnights        Quality Measures    Reviewed items::  Labs reviewed, Radiology images reviewed, EKG reviewed and Medications reviewed  DVT prophylaxis - mechanical:  SCDs  Antibiotics:  Treating active infection/contamination beyond 24 hours perioperative coverage

## 2017-11-13 NOTE — OR SURGEON
Immediate Post OP Note    PreOp Diagnosis: L testicular torsion    PostOp Diagnosis: same    Procedure(s):  SCROTAL EXPLORATION -  left orchiectomy, right testicular fixation - Wound Class: Clean    Surgeon(s):  José Antonio Mosqueda M.D.    Anesthesiologist/Type of Anesthesia:  Anesthesiologist: Leo Mar D.O./General    Surgical Staff:  Circulator: Gloria Marvin R.N.  Scrub Person: Giovanna Patiño    Specimens:  L testicle    Estimated Blood Loss: 50ml    Findings: dead left testicle    Complications: none    Drain:  none    11/12/2017 4:32 PM José Antonio Mosqueda

## 2017-11-13 NOTE — CARE PLAN
Problem: Infection  Goal: Will remain free from infection  Outcome: MET Date Met: 11/13/17      Problem: Bowel/Gastric:  Goal: Normal bowel function is maintained or improved  Outcome: MET Date Met: 11/13/17

## 2017-11-14 NOTE — DISCHARGE PLANNING
[facility]amante [facility]ed [facility] at [phone]. SW given RX late in the day and [facility] closes at 4:00 P.M. Pt given hard copies of Rx. Lovenxo and Warfarin RX to start tomorrow 11/14. Pt PCP is Nurse Practitioner Law. N.P Law aware of pt needing outpatient anticoagulation clinic.Pt to see N.PWayne Law tomorrow 11/14 to get RX rewritten and [facility] to [facility]. Dr. Weaver notified and okay with DC plan for pt. Pt wife coming to  pt tonight. Pt reported what natasha pulled but to have pain meds given before. Bedside RN notified of DC plan and Charge notified of DC plan.

## 2017-11-14 NOTE — ASSESSMENT & PLAN NOTE
2002, 2003 and 2009, IVC placed 2009  On warfarin for anticoagulation  Held for [facility]raupetic [facility] at [facility]  Last INR 1.9 on 11/12/2017  Urology ok to restart anticoagulation from 11/14/2017  Prescribed lovenox 80mg bid with warfarin and social work spoke with [facility] for [facility] to be seen tmrw. Pres[facility] to [facility]

## 2017-11-14 NOTE — PROGRESS NOTES
Pacheco pulled per patient request. Patient has been very vocal and upset throughout the day. [facility] team aware and placed order. Patient to be discharged once urinated.

## 2017-11-14 NOTE — PROGRESS NOTES
Patient able to void 75 ml status post kaur removal. Wife at bedside who is an RN. Patient requesting to be discharged. Paperwork discussed and signed. All questions answered. Patient and wife verbalized understanding of worsening symptoms and to report to ER if unable to void. Patient escorted out in wheelchair by staff.

## 2017-11-14 NOTE — DISCHARGE SUMMARY
Internal Medicine Discharge Summary  Note Author: Nohemi Reynolds M.D.       Admit Date:  11/12/2017       Discharge Date:  11/13/2017    Service:   R Internal Medicine orange team Team  Attending Physician(s):        Senior Resident(s):    Geovani Resident(s):         Primary Diagnosis:   Testicular torsion    Secondary Diagnoses:                Active Problems:    Acute epididymitis POA: Unknown    Testicular torsion POA: yes    Crohn disease (CMS-HCC) POA: yes    DM type 2 (diabetes mellitus, type 2) (CMS-HCC) POA: yes        Hospital Summary (Brief Narrative):       Pt is a 65y/o male with hx of crohn's disease, recurrent DVT,, PE,  on coumadin presents ,DM type 2 [facility] with c/o 1.5 weeks of testicular pain, Pt was initially given doxycycline to complete 5 day course suspecting epididymitis which did not resolve his symptoms completely. USG scrotum at that time showed adequate blood flow to testis with no evidence of testicular torsion.[facility] at [facility] and as per rec's med management was initiated , initially with bactrim , later switched to ceftriaxone because of increase in  INR. As symptoms were not improving with ceftriaxone pt was switched to levofloxacin.  UA has been negative, UCX neg to date. A stat USG performed at [facility]  because of persistent pain showed partial vs total testicular torsion.As OR kana viable at [facility] over weekend pt transferred emergently to [facility] for surgery.     urologist consulted who took the pt to OR immediately with consent upon arrival after a Repeat Scrotal USG showed decreased flow and heterogeneous L testicle, normal R side,he underwent Scrotal exploration, left orchiectomy, and right testicular fixation.  Urology cleared pt for DC and advised to start anticoagulation for his HX of PE on 11/14/2017.   Pt had urinary retention s/p surgery and a kaur was placed.Pt was advised to leave the kaur in and f/u with PCP or urology  to have the catheter removed.Patient has been very vocal and upset throughout the day Kaur pulled per patient request.  Patient able to void 75 ml status post kaur removal. Wife at bedside who is an RN.Patient and wife verbalized understanding of worsening symptoms and to report to ER if unable to void.  Pt prescribed Lovenox warfarin bridge for his  Hx of PE.to start from 11/14/2017.  Sw faxed Lovenox and [facility]codone [facility] to [facility] at [phone].  Pt given hard copies of Rx. Lovenox and Warfarin RX to start tomorrow 11/14. Pt PCP is Nurse Practitioner Law. N.P Law aware of pt needing outpatient anticoagulation clinic.Pt to see N.P. Law tomorrow 11/14 to get RX rewritten and [facility] to take over.    Pt discharged home with his home med's and follow up scheduled with his PCP and advised follow up with [facility]rology or  in 2 weeks for wound check s recommended by urology.  Resumed all home meds on discharge.       Patient /Hospital Summary (Details -- Problem Oriented) :          Testicular torsion   Assessment & Plan    [facility] Epididymitis with Testicular Torsion  -US stat showed partial vs total testicular torsion.   -Dr. Mosqueda was consulted & as soon as pt arrived to [facility], pt was taken to ER & underwent Scrotal exploration, left orchiectomy, and right testicular fixation.  Urology cleared him for DC and advised to F[facility]U with the [facility] in 2 weeks for wound check or with Dr Mosqueda  Oxycodone 5mg q4 hrs prn for pain        Acute epididymitis   Assessment & Plan    USG shows no signs o epididymitis  - dcd abx        DM type 2 (diabetes mellitus, type 2) (CMS-HCC)   Assessment & Plan    -cont home metformin and glipizide      Crohn disease (CMS-HCC)   Assessment & Plan    -stable on home med's  -Continue home med's: mesalamine & 6MP  -        Recurrent Pulmonary emboli  2002, 2003 and 2009, IVC placed 2009  On warfarin for anticoagulation  Held for supra therapeutic INR at [facility]  Last INR 1.9 on  11/12/2017  Urology ok to restart anticoagulation from 11/14/2017  Prescribed Lovenox 80 mg bid with warfarin and social work spoke with PCP to set up anticoagulation clinic appt at [facility] for the pt to be seen tmrw. [facility] to [facility]  Consultants:     Urology    Procedures:        underwent Scrotal exploration, left orchiectomy, and right testicular fixation.      Imaging/ Testing:      [id number]   Final Result      1.  Heterogeneous edematous appearance of the left testis with no demonstrable venous flow and minimal internal demonstrable arterial flow. This is consistent with incomplete torsion.      2.  No evidence of right testicular torsion.      3.  Normal appearance of each epididymis.      Dr. BURT FRAZIER was aware of these findings according to the ultrasound technologist on 11/12/2017 2:30 PM.            Discharge Medications:         Medication Reconciliation: Completed  lovenox 80mg BID     Medication List      START taking these medications      Instructions   oxycodone immediate-release 5 MG Tabs  Commonly known as:  ROXICODONE   Take 1 Tab by mouth every four hours as needed.  Dose:  5 mg        CHANGE how you take these medications      Instructions   * Mesalamine 0.375 GM Cp24  What changed:  Another medication with the same name was added. Make sure you understand how and when to take each.   Take 1 Cap by mouth 3 times a day.  Dose:  1 Cap     * mesalamine 1000 MG Supp  What changed:  You were already taking a medication with the same name, and this prescription was added. Make sure you understand how and when to take each.  Commonly known as:  ROWASA   Doctor's comments:  Its a home med will resume  Insert 1 Suppository in rectum every bedtime.  Dose:  1000 mg     warfarin 10 MG Tabs  Start taking on:  11/14/2017  What changed:  how much to take  Commonly known as:  COUMADIN   Take 0.5 Tabs by mouth every day.  Dose:  5 mg        * This list has 2 medication(s) that are the same  as other medications prescribed for you. Read the directions carefully, and ask your doctor or other care provider to review them with you.            CONTINUE taking these medications      Instructions   glipiZIDE 5 MG Tabs  Commonly known as:  GLUCOTROL   Take 5 mg by mouth 2 times a day.  Dose:  5 mg     mercaptopurine 50 MG Tabs  Commonly known as:  PURINETHOL   Take 150 mg by mouth every day.  Dose:  150 mg     metformin 1000 MG tablet  Commonly known as:  GLUCOPHAGE   Take 1,000 mg by mouth 2 times a day, with meals.  Dose:  1000 mg     tamsulosin 0.4 MG capsule  Commonly known as:  FLOMAX   Take 0.4 mg by mouth every day.  Dose:  0.4 mg        STOP taking these medications    doxycycline 100 MG Tabs  Commonly known as:  VIBRAMYCIN            Can use .DISCHARGEMEDSLIST if going to another facility         Disposition: home  Diet:   Carbohydrate consistent diet    Activity:   As tolerated    Instructions:      F/u with PCP, anticoagulation clinic   F/u with urology in 2weeks  If urinary retention please return to ED  The patient was instructed to return to the ER in the event of worsening symptoms. I have counseled the patient on the importance of compliance and the patient has agreed to proceed with all medical recommendations and follow up plan indicated above.   The patient understands that all medications come with benefits and risks. Risks may include permanent injury or death and these risks can be minimized with close reassessment and monitoring.        Primary Care Provider:    Mónica Ambrosio  Discharge summary faxed to primary care provider:  Completed  Copy of discharge summary given to the patient: Completed      Follow up appointment details :      [facility]  [facility]  Urology fu in 2wks    Pending Studies:        Path pending    Time spent on discharge day patient visit, preparing discharge paperwork and arranging for patient follow up.    Summary of follow up issues:   Testicular  torsion s/p Scrotal exploration, left orchiectomy, and right testicular fixation.      Discharge Time (Minutes) :    60  Hospital Course Type: Inpatient Stay < 2 midnights, patient recovered more rapidly than anticipated      Condition on Discharge  stable  ______________________________________________________________________    Interval history/exam for day of discharge:    Pt was very upset and vocal through out the day out the day. Kaur placed for urinary retention.  Urology cleared for DC. Advised to leave kaur in and f/u with PCP or urology for voiding trial. Pt wanted the kaur removed, removed kaur prior to dc passed 75ml of urine.  OK to start anticoagulation from 11/14/2017 per urology, prescribed Lovenox coumadin bridge from 11/14/2017    Vitals:    11/12/17 1942 11/12/17 2300 11/13/17 0342 11/13/17 0827   BP: 130/75 111/65 116/58 126/71   Pulse: 89 72 69 87   Resp: 16 16 17 18   Temp: 36.8 °C (98.2 °F) 37.2 °C (99 °F) 36.9 °C (98.4 °F) 37.2 °C (98.9 °F)   SpO2: 90% 92% 92% 97%   Weight:         Weight/BMI: There is no height or weight on file to calculate BMI.  Pulse Oximetry: 97 %, O2 (LPM): 0, O2 Delivery: Silicone Nasal Cannula    General:NAD  CVS: RRR  PULM: CTAB  Abdominal: Soft. Bowel sounds are normal.   Genitourinary: Incisions clean, dry, intact. Minimal penile edema  Neurological: He is alert and oriented to person, place, and time.   Skin: Skin is warm.   Psychiatric: Affect normal.         Most Recent Labs:    Lab Results   Component Value Date/Time    WBC 4.8 11/13/2017 02:24 AM    RBC 3.03 (L) 11/13/2017 02:24 AM    HEMOGLOBIN 11.0 (L) 11/13/2017 02:24 AM    HEMATOCRIT 32.3 (L) 11/13/2017 02:24 AM    .6 (H) 11/13/2017 02:24 AM    MCH 36.3 (H) 11/13/2017 02:24 AM    MCHC 34.1 11/13/2017 02:24 AM    MPV 9.4 11/13/2017 02:24 AM    NEUTSPOLYS 76.80 (H) 11/13/2017 02:24 AM    LYMPHOCYTES 10.80 (L) 11/13/2017 02:24 AM    MONOCYTES 10.60 11/13/2017 02:24 AM    EOSINOPHILS 0.60 11/13/2017  02:24 AM    BASOPHILS 0.60 11/13/2017 02:24 AM    ANISOCYTOSIS 1+ 11/13/2017 02:24 AM      Lab Results   Component Value Date/Time    SODIUM 135 11/13/2017 02:24 AM    POTASSIUM 4.0 11/13/2017 02:24 AM    CHLORIDE 104 11/13/2017 02:24 AM    CO2 28 11/13/2017 02:24 AM    GLUCOSE 131 (H) 11/13/2017 02:24 AM    BUN 8 11/13/2017 02:24 AM    CREATININE 0.46 (L) 11/13/2017 02:24 AM      Lab Results   Component Value Date/Time    ALTSGPT 12 11/13/2017 02:24 AM    ASTSGOT 13 11/13/2017 02:24 AM    ALKPHOSPHAT 56 11/13/2017 02:24 AM    TBILIRUBIN 1.1 11/13/2017 02:24 AM    ALBUMIN 2.8 (L) 11/13/2017 02:24 AM    GLOBULIN 2.8 11/13/2017 02:24 AM    MACROCYTOSIS 1+ 11/13/2017 02:24 AM     No results found for: PROTHROMBTM, INR

## 2017-12-10 ENCOUNTER — APPOINTMENT (OUTPATIENT)
Dept: RADIOLOGY | Facility: MEDICAL CENTER | Age: 67
DRG: 668 | End: 2017-12-10
Attending: UROLOGY
Payer: COMMERCIAL

## 2017-12-10 ENCOUNTER — APPOINTMENT (OUTPATIENT)
Dept: RADIOLOGY | Facility: MEDICAL CENTER | Age: 67
DRG: 668 | End: 2017-12-10
Attending: HOSPITALIST
Payer: COMMERCIAL

## 2017-12-10 ENCOUNTER — HOSPITAL ENCOUNTER (OUTPATIENT)
Facility: MEDICAL CENTER | Age: 67
DRG: 668 | End: 2017-12-10
Payer: COMMERCIAL

## 2017-12-10 ENCOUNTER — HOSPITAL ENCOUNTER (INPATIENT)
Facility: MEDICAL CENTER | Age: 67
LOS: 19 days | DRG: 668 | End: 2017-12-29
Attending: HOSPITALIST | Admitting: INTERNAL MEDICINE
Payer: COMMERCIAL

## 2017-12-10 ENCOUNTER — APPOINTMENT (OUTPATIENT)
Dept: RADIOLOGY | Facility: MEDICAL CENTER | Age: 67
DRG: 668 | End: 2017-12-10
Attending: NURSE PRACTITIONER
Payer: COMMERCIAL

## 2017-12-10 ENCOUNTER — RESOLUTE PROFESSIONAL BILLING HOSPITAL PROF FEE (OUTPATIENT)
Dept: MEDSURG UNIT | Facility: MEDICAL CENTER | Age: 67
End: 2017-12-10
Payer: COMMERCIAL

## 2017-12-10 DIAGNOSIS — S71.102A OPEN WOUND OF LEFT THIGH, INITIAL ENCOUNTER: ICD-10-CM

## 2017-12-10 DIAGNOSIS — N32.89 EXTRAPERITONEAL RUPTURE OF BLADDER: ICD-10-CM

## 2017-12-10 DIAGNOSIS — Z93.6 NEPHROSTOMY STATUS (HCC): ICD-10-CM

## 2017-12-10 DIAGNOSIS — S81.802A WOUND OF LEFT LOWER EXTREMITY, INITIAL ENCOUNTER: ICD-10-CM

## 2017-12-10 DIAGNOSIS — F41.9 ANXIETY: ICD-10-CM

## 2017-12-10 DIAGNOSIS — N13.30 HYDRONEPHROSIS, UNSPECIFIED HYDRONEPHROSIS TYPE: ICD-10-CM

## 2017-12-10 PROBLEM — N17.9 ACUTE KIDNEY INJURY (HCC): Status: ACTIVE | Noted: 2017-12-10

## 2017-12-10 PROBLEM — Z86.718 HISTORY OF DVT (DEEP VEIN THROMBOSIS): Status: ACTIVE | Noted: 2017-12-10

## 2017-12-10 LAB
ABO GROUP BLD: NORMAL
ALBUMIN SERPL BCP-MCNC: 2.5 G/DL (ref 3.2–4.9)
ALBUMIN/GLOB SERPL: 0.9 G/DL
ALP SERPL-CCNC: 51 U/L (ref 30–99)
ALT SERPL-CCNC: 7 U/L (ref 2–50)
ANION GAP SERPL CALC-SCNC: 12 MMOL/L (ref 0–11.9)
APTT PPP: 33.6 SEC (ref 24.7–36)
AST SERPL-CCNC: 16 U/L (ref 12–45)
BASOPHILS # BLD AUTO: 0.1 % (ref 0–1.8)
BASOPHILS # BLD: 0.01 K/UL (ref 0–0.12)
BILIRUB SERPL-MCNC: 0.4 MG/DL (ref 0.1–1.5)
BLD GP AB SCN SERPL QL: NORMAL
BUN SERPL-MCNC: 40 MG/DL (ref 8–22)
CALCIUM SERPL-MCNC: 8.8 MG/DL (ref 8.5–10.5)
CFT BLD TEG: 4 MIN (ref 5–10)
CHLORIDE SERPL-SCNC: 106 MMOL/L (ref 96–112)
CLOT ANGLE BLD TEG: 67.9 DEGREES (ref 53–72)
CLOT LYSIS 30M P MA LENFR BLD TEG: 0 % (ref 0–8)
CO2 SERPL-SCNC: 19 MMOL/L (ref 20–33)
CREAT SERPL-MCNC: 3.27 MG/DL (ref 0.5–1.4)
CT.EXTRINSIC BLD ROTEM: 1.5 MIN (ref 1–3)
EOSINOPHIL # BLD AUTO: 0.04 K/UL (ref 0–0.51)
EOSINOPHIL NFR BLD: 0.5 % (ref 0–6.9)
ERYTHROCYTE [DISTWIDTH] IN BLOOD BY AUTOMATED COUNT: 59.7 FL (ref 35.9–50)
GFR SERPL CREATININE-BSD FRML MDRD: 19 ML/MIN/1.73 M 2
GLOBULIN SER CALC-MCNC: 2.7 G/DL (ref 1.9–3.5)
GLUCOSE SERPL-MCNC: 204 MG/DL (ref 65–99)
HCT VFR BLD AUTO: 27 % (ref 42–52)
HGB BLD-MCNC: 8.9 G/DL (ref 14–18)
IMM GRANULOCYTES # BLD AUTO: 0.05 K/UL (ref 0–0.11)
IMM GRANULOCYTES NFR BLD AUTO: 0.6 % (ref 0–0.9)
INR PPP: 1.31 (ref 0.87–1.13)
LACTATE BLD-SCNC: 1.2 MMOL/L (ref 0.5–2)
LYMPHOCYTES # BLD AUTO: 0.33 K/UL (ref 1–4.8)
LYMPHOCYTES NFR BLD: 4 % (ref 22–41)
MCF BLD TEG: 63.3 MM (ref 50–70)
MCH RBC QN AUTO: 34.5 PG (ref 27–33)
MCHC RBC AUTO-ENTMCNC: 33 G/DL (ref 33.7–35.3)
MCV RBC AUTO: 104.7 FL (ref 81.4–97.8)
MONOCYTES # BLD AUTO: 0.43 K/UL (ref 0–0.85)
MONOCYTES NFR BLD AUTO: 5.2 % (ref 0–13.4)
NEUTROPHILS # BLD AUTO: 7.35 K/UL (ref 1.82–7.42)
NEUTROPHILS NFR BLD: 89.6 % (ref 44–72)
NRBC # BLD AUTO: 0 K/UL
NRBC BLD AUTO-RTO: 0 /100 WBC
PLATELET # BLD AUTO: 142 K/UL (ref 164–446)
PMV BLD AUTO: 10 FL (ref 9–12.9)
POTASSIUM SERPL-SCNC: 4.6 MMOL/L (ref 3.6–5.5)
PROT SERPL-MCNC: 5.2 G/DL (ref 6–8.2)
PROTHROMBIN TIME: 16 SEC (ref 12–14.6)
RBC # BLD AUTO: 2.58 M/UL (ref 4.7–6.1)
RH BLD: NORMAL
SODIUM SERPL-SCNC: 137 MMOL/L (ref 135–145)
TEG ALGORITHM TGALG: ABNORMAL
WBC # BLD AUTO: 8.2 K/UL (ref 4.8–10.8)

## 2017-12-10 PROCEDURE — 700111 HCHG RX REV CODE 636 W/ 250 OVERRIDE (IP): Performed by: STUDENT IN AN ORGANIZED HEALTH CARE EDUCATION/TRAINING PROGRAM

## 2017-12-10 PROCEDURE — 0TBB8ZX EXCISION OF BLADDER, VIA NATURAL OR ARTIFICIAL OPENING ENDOSCOPIC, DIAGNOSTIC: ICD-10-PCS | Performed by: UROLOGY

## 2017-12-10 PROCEDURE — 76775 US EXAM ABDO BACK WALL LIM: CPT

## 2017-12-10 PROCEDURE — 160048 HCHG OR STATISTICAL LEVEL 1-5: Performed by: UROLOGY

## 2017-12-10 PROCEDURE — 700102 HCHG RX REV CODE 250 W/ 637 OVERRIDE(OP)

## 2017-12-10 PROCEDURE — A9270 NON-COVERED ITEM OR SERVICE: HCPCS | Performed by: STUDENT IN AN ORGANIZED HEALTH CARE EDUCATION/TRAINING PROGRAM

## 2017-12-10 PROCEDURE — 160039 HCHG SURGERY MINUTES - EA ADDL 1 MIN LEVEL 3: Performed by: UROLOGY

## 2017-12-10 PROCEDURE — 85347 COAGULATION TIME ACTIVATED: CPT

## 2017-12-10 PROCEDURE — 500698 HCHG HEMOCLIP, MEDIUM: Performed by: UROLOGY

## 2017-12-10 PROCEDURE — 85384 FIBRINOGEN ACTIVITY: CPT

## 2017-12-10 PROCEDURE — 501329 HCHG SET, CYSTO IRRIG Y TUR: Performed by: UROLOGY

## 2017-12-10 PROCEDURE — A9270 NON-COVERED ITEM OR SERVICE: HCPCS | Performed by: INTERNAL MEDICINE

## 2017-12-10 PROCEDURE — 500509 HCHG ELECTRODE, ROLLER: Performed by: UROLOGY

## 2017-12-10 PROCEDURE — 770006 HCHG ROOM/CARE - MED/SURG/GYN SEMI*

## 2017-12-10 PROCEDURE — 160009 HCHG ANES TIME/MIN: Performed by: UROLOGY

## 2017-12-10 PROCEDURE — 500042 HCHG BAG, URINARY DRAINAGE (CLOSED): Performed by: UROLOGY

## 2017-12-10 PROCEDURE — 86901 BLOOD TYPING SEROLOGIC RH(D): CPT

## 2017-12-10 PROCEDURE — 85610 PROTHROMBIN TIME: CPT

## 2017-12-10 PROCEDURE — 83605 ASSAY OF LACTIC ACID: CPT

## 2017-12-10 PROCEDURE — 86900 BLOOD TYPING SEROLOGIC ABO: CPT

## 2017-12-10 PROCEDURE — 85576 BLOOD PLATELET AGGREGATION: CPT

## 2017-12-10 PROCEDURE — A4346 CATH INDW FOLEY 3 WAY: HCPCS | Performed by: UROLOGY

## 2017-12-10 PROCEDURE — 700111 HCHG RX REV CODE 636 W/ 250 OVERRIDE (IP)

## 2017-12-10 PROCEDURE — 80053 COMPREHEN METABOLIC PANEL: CPT

## 2017-12-10 PROCEDURE — 0T5B8ZZ DESTRUCTION OF BLADDER, VIA NATURAL OR ARTIFICIAL OPENING ENDOSCOPIC: ICD-10-PCS | Performed by: UROLOGY

## 2017-12-10 PROCEDURE — 85025 COMPLETE CBC W/AUTO DIFF WBC: CPT

## 2017-12-10 PROCEDURE — 86850 RBC ANTIBODY SCREEN: CPT

## 2017-12-10 PROCEDURE — 700101 HCHG RX REV CODE 250

## 2017-12-10 PROCEDURE — 88305 TISSUE EXAM BY PATHOLOGIST: CPT

## 2017-12-10 PROCEDURE — 500879 HCHG PACK, CYSTO: Performed by: UROLOGY

## 2017-12-10 PROCEDURE — 160002 HCHG RECOVERY MINUTES (STAT): Performed by: UROLOGY

## 2017-12-10 PROCEDURE — 85730 THROMBOPLASTIN TIME PARTIAL: CPT

## 2017-12-10 PROCEDURE — 500697 HCHG HEMOCLIP, LARGE (ORANGE): Performed by: UROLOGY

## 2017-12-10 PROCEDURE — 700102 HCHG RX REV CODE 250 W/ 637 OVERRIDE(OP): Performed by: STUDENT IN AN ORGANIZED HEALTH CARE EDUCATION/TRAINING PROGRAM

## 2017-12-10 PROCEDURE — 501838 HCHG SUTURE GENERAL: Performed by: UROLOGY

## 2017-12-10 PROCEDURE — 700111 HCHG RX REV CODE 636 W/ 250 OVERRIDE (IP): Performed by: INTERNAL MEDICINE

## 2017-12-10 PROCEDURE — 700102 HCHG RX REV CODE 250 W/ 637 OVERRIDE(OP): Performed by: INTERNAL MEDICINE

## 2017-12-10 PROCEDURE — 0TCB8ZZ EXTIRPATION OF MATTER FROM BLADDER, VIA NATURAL OR ARTIFICIAL OPENING ENDOSCOPIC: ICD-10-PCS | Performed by: UROLOGY

## 2017-12-10 PROCEDURE — 36415 COLL VENOUS BLD VENIPUNCTURE: CPT

## 2017-12-10 PROCEDURE — 501445 HCHG STAPLER, SKIN DISP: Performed by: UROLOGY

## 2017-12-10 PROCEDURE — BT10ZZZ FLUOROSCOPY OF BLADDER: ICD-10-PCS | Performed by: UROLOGY

## 2017-12-10 PROCEDURE — C1769 GUIDE WIRE: HCPCS | Performed by: UROLOGY

## 2017-12-10 PROCEDURE — 700105 HCHG RX REV CODE 258: Performed by: STUDENT IN AN ORGANIZED HEALTH CARE EDUCATION/TRAINING PROGRAM

## 2017-12-10 PROCEDURE — 94760 N-INVAS EAR/PLS OXIMETRY 1: CPT

## 2017-12-10 PROCEDURE — 72192 CT PELVIS W/O DYE: CPT

## 2017-12-10 PROCEDURE — 160028 HCHG SURGERY MINUTES - 1ST 30 MINS LEVEL 3: Performed by: UROLOGY

## 2017-12-10 PROCEDURE — 160035 HCHG PACU - 1ST 60 MINS PHASE I: Performed by: UROLOGY

## 2017-12-10 PROCEDURE — 160036 HCHG PACU - EA ADDL 30 MINS PHASE I: Performed by: UROLOGY

## 2017-12-10 PROCEDURE — A9270 NON-COVERED ITEM OR SERVICE: HCPCS

## 2017-12-10 RX ORDER — ONDANSETRON 2 MG/ML
4 INJECTION INTRAMUSCULAR; INTRAVENOUS EVERY 4 HOURS PRN
Status: DISCONTINUED | OUTPATIENT
Start: 2017-12-10 | End: 2017-12-29 | Stop reason: HOSPADM

## 2017-12-10 RX ORDER — OXYCODONE AND ACETAMINOPHEN 10; 325 MG/1; MG/1
1 TABLET ORAL EVERY 6 HOURS PRN
Status: DISCONTINUED | OUTPATIENT
Start: 2017-12-10 | End: 2017-12-17

## 2017-12-10 RX ORDER — ONDANSETRON 4 MG/1
4 TABLET, ORALLY DISINTEGRATING ORAL EVERY 4 HOURS PRN
Status: DISCONTINUED | OUTPATIENT
Start: 2017-12-10 | End: 2017-12-29 | Stop reason: HOSPADM

## 2017-12-10 RX ORDER — OXYCODONE HYDROCHLORIDE 5 MG/1
5 TABLET ORAL EVERY 4 HOURS PRN
Status: DISCONTINUED | OUTPATIENT
Start: 2017-12-10 | End: 2017-12-17

## 2017-12-10 RX ORDER — HYDROMORPHONE HYDROCHLORIDE 2 MG/ML
INJECTION, SOLUTION INTRAMUSCULAR; INTRAVENOUS; SUBCUTANEOUS
Status: COMPLETED
Start: 2017-12-10 | End: 2017-12-10

## 2017-12-10 RX ORDER — ONDANSETRON 2 MG/ML
4 INJECTION INTRAMUSCULAR; INTRAVENOUS EVERY 4 HOURS PRN
Status: CANCELLED | OUTPATIENT
Start: 2017-12-10

## 2017-12-10 RX ORDER — MORPHINE SULFATE 4 MG/ML
2 INJECTION, SOLUTION INTRAMUSCULAR; INTRAVENOUS EVERY 4 HOURS PRN
Status: DISCONTINUED | OUTPATIENT
Start: 2017-12-10 | End: 2017-12-17

## 2017-12-10 RX ORDER — OXYCODONE HCL 5 MG/5 ML
SOLUTION, ORAL ORAL
Status: COMPLETED
Start: 2017-12-10 | End: 2017-12-10

## 2017-12-10 RX ORDER — ONDANSETRON 4 MG/1
4 TABLET, ORALLY DISINTEGRATING ORAL EVERY 4 HOURS PRN
Status: CANCELLED | OUTPATIENT
Start: 2017-12-10

## 2017-12-10 RX ORDER — ALBUTEROL SULFATE 90 UG/1
2 AEROSOL, METERED RESPIRATORY (INHALATION) EVERY 4 HOURS PRN
Status: DISCONTINUED | OUTPATIENT
Start: 2017-12-10 | End: 2017-12-21

## 2017-12-10 RX ORDER — MORPHINE SULFATE 4 MG/ML
2 INJECTION, SOLUTION INTRAMUSCULAR; INTRAVENOUS EVERY 6 HOURS PRN
Status: DISCONTINUED | OUTPATIENT
Start: 2017-12-10 | End: 2017-12-10

## 2017-12-10 RX ORDER — ACETAMINOPHEN 325 MG/1
650 TABLET ORAL EVERY 6 HOURS PRN
Status: DISCONTINUED | OUTPATIENT
Start: 2017-12-10 | End: 2017-12-29 | Stop reason: HOSPADM

## 2017-12-10 RX ORDER — SODIUM CHLORIDE 9 MG/ML
INJECTION, SOLUTION INTRAVENOUS CONTINUOUS
Status: DISCONTINUED | OUTPATIENT
Start: 2017-12-10 | End: 2017-12-14

## 2017-12-10 RX ORDER — IODIXANOL 270 MG/ML
INJECTION, SOLUTION INTRAVASCULAR
Status: DISCONTINUED | OUTPATIENT
Start: 2017-12-10 | End: 2017-12-10 | Stop reason: HOSPADM

## 2017-12-10 RX ADMIN — ATROPA BELLADONNA AND OPIUM 30 MG: 16.2; 3 SUPPOSITORY RECTAL at 22:00

## 2017-12-10 RX ADMIN — OXYCODONE HYDROCHLORIDE AND ACETAMINOPHEN 1 TABLET: 10; 325 TABLET ORAL at 16:52

## 2017-12-10 RX ADMIN — HYDROMORPHONE HYDROCHLORIDE 0.5 MG: 2 INJECTION INTRAMUSCULAR; INTRAVENOUS; SUBCUTANEOUS at 22:17

## 2017-12-10 RX ADMIN — SODIUM CHLORIDE: 9 INJECTION, SOLUTION INTRAVENOUS at 13:43

## 2017-12-10 RX ADMIN — OXYCODONE HYDROCHLORIDE 5 MG: 5 TABLET ORAL at 13:27

## 2017-12-10 RX ADMIN — OXYCODONE HYDROCHLORIDE 10 MG: 5 SOLUTION ORAL at 22:05

## 2017-12-10 RX ADMIN — FENTANYL CITRATE 50 MCG: 50 INJECTION, SOLUTION INTRAMUSCULAR; INTRAVENOUS at 21:45

## 2017-12-10 RX ADMIN — OXYCODONE HYDROCHLORIDE AND ACETAMINOPHEN 1 TABLET: 10; 325 TABLET ORAL at 23:48

## 2017-12-10 RX ADMIN — CEFTRIAXONE 2 G: 2 INJECTION, POWDER, FOR SOLUTION INTRAMUSCULAR; INTRAVENOUS at 15:50

## 2017-12-10 RX ADMIN — FENTANYL CITRATE 50 MCG: 50 INJECTION, SOLUTION INTRAMUSCULAR; INTRAVENOUS at 21:50

## 2017-12-10 RX ADMIN — MORPHINE SULFATE 2 MG: 4 INJECTION INTRAVENOUS at 15:29

## 2017-12-10 RX ADMIN — MORPHINE SULFATE 2 MG: 4 INJECTION INTRAVENOUS at 13:46

## 2017-12-10 RX ADMIN — MORPHINE SULFATE 2 MG: 4 INJECTION INTRAVENOUS at 19:29

## 2017-12-10 RX ADMIN — FENTANYL CITRATE 50 MCG: 50 INJECTION, SOLUTION INTRAMUSCULAR; INTRAVENOUS at 21:55

## 2017-12-10 RX ADMIN — OXYCODONE HYDROCHLORIDE 5 MG: 5 TABLET ORAL at 17:27

## 2017-12-10 RX ADMIN — FENTANYL CITRATE 50 MCG: 50 INJECTION, SOLUTION INTRAMUSCULAR; INTRAVENOUS at 22:00

## 2017-12-10 ASSESSMENT — LIFESTYLE VARIABLES
DO YOU DRINK ALCOHOL: NO
EVER_SMOKED: NEVER
DO YOU DRINK ALCOHOL: NO
SUBSTANCE_ABUSE: 0
EVER_SMOKED: NEVER
EVER_SMOKED: NEVER
DO YOU DRINK ALCOHOL: NO

## 2017-12-10 ASSESSMENT — ENCOUNTER SYMPTOMS
ABDOMINAL PAIN: 1
SENSORY CHANGE: 0
ORTHOPNEA: 0
CONSTIPATION: 0
HEMOPTYSIS: 0
DIARRHEA: 0
VOMITING: 1
DOUBLE VISION: 0
FEVER: 0
DIZZINESS: 0
SORE THROAT: 0
CHILLS: 0
BACK PAIN: 0
DEPRESSION: 0
BRUISES/BLEEDS EASILY: 0
HEADACHES: 0
HEARTBURN: 0
PND: 0
DIAPHORESIS: 1
NECK PAIN: 0
SPUTUM PRODUCTION: 0
STRIDOR: 0
SPEECH CHANGE: 0
COUGH: 0
NAUSEA: 1
FLANK PAIN: 1
HALLUCINATIONS: 0
PALPITATIONS: 0
BLURRED VISION: 0

## 2017-12-10 ASSESSMENT — PAIN SCALES - GENERAL
PAINLEVEL_OUTOF10: 7
PAINLEVEL_OUTOF10: 8
PAINLEVEL_OUTOF10: 7
PAINLEVEL_OUTOF10: 9
PAINLEVEL_OUTOF10: 9
PAINLEVEL_OUTOF10: 4
PAINLEVEL_OUTOF10: 7
PAINLEVEL_OUTOF10: 7
PAINLEVEL_OUTOF10: 6
PAINLEVEL_OUTOF10: 7
PAINLEVEL_OUTOF10: 6

## 2017-12-10 ASSESSMENT — COPD QUESTIONNAIRES
DURING THE PAST 4 WEEKS HOW MUCH DID YOU FEEL SHORT OF BREATH: NONE/LITTLE OF THE TIME
COPD SCREENING SCORE: 2
HAVE YOU SMOKED AT LEAST 100 CIGARETTES IN YOUR ENTIRE LIFE: NO/DON'T KNOW
DO YOU EVER COUGH UP ANY MUCUS OR PHLEGM?: NO/ONLY WITH OCCASIONAL COLDS OR INFECTIONS
HAVE YOU SMOKED AT LEAST 100 CIGARETTES IN YOUR ENTIRE LIFE: NO/DON'T KNOW
DO YOU EVER COUGH UP ANY MUCUS OR PHLEGM?: NO/ONLY WITH OCCASIONAL COLDS OR INFECTIONS
COPD SCREENING SCORE: 2
DURING THE PAST 4 WEEKS HOW MUCH DID YOU FEEL SHORT OF BREATH: NONE/LITTLE OF THE TIME

## 2017-12-10 ASSESSMENT — PATIENT HEALTH QUESTIONNAIRE - PHQ9
SUM OF ALL RESPONSES TO PHQ9 QUESTIONS 1 AND 2: 0
1. LITTLE INTEREST OR PLEASURE IN DOING THINGS: NOT AT ALL
2. FEELING DOWN, DEPRESSED, IRRITABLE, OR HOPELESS: NOT AT ALL
SUM OF ALL RESPONSES TO PHQ QUESTIONS 1-9: 0

## 2017-12-10 NOTE — PROGRESS NOTES
Direct admit from [facility]. Accepted by [facility] Dorsey / Zoey for renal failure, ruptured bladder.  ADT signed & held, needs to be released upon pt arrival.  No written orders received.  Pt coming by ground.

## 2017-12-10 NOTE — PROGRESS NOTES
Bladder scan done per Md, 8mL from bladder scan completed. APN from urology @ bedside to assist. Won notified through APN

## 2017-12-10 NOTE — ASSESSMENT & PLAN NOTE
S/p rigid cystoscopy with evacuation of 100 ml of blood clot, fulguration of bladder perforation sites and biopsy of posterior bladder wall on 12/11. Pathology shows necrotic tissue. No tumor  B/L nephrostomy tube placement on 12/13   Pacheco catheter in place- draining clear urine, no blood seen  Hb stable at 9.5  Plan:  Continue B&O suppository for bladder spasms  To f/u with urology in a week after discharge

## 2017-12-10 NOTE — ASSESSMENT & PLAN NOTE
Past h/o DVT- as per pt 3 episodes in the past, s/p IVC filter in 2009  Was on coumadin and [facility] H/o DVT 3 times in the past.  LE venous doppler 12/13  showed B/L extensive DVT in both extremities. Acute and chronic.  Pacheco and nephrostomy tubes draining clear urine  As per urology, ok to start dabigatran  Plan:  Started dabigatran 150mg bid- his home dose  No bleeding episodes  Continue home dose  To be monitored

## 2017-12-10 NOTE — ASSESSMENT & PLAN NOTE
Home meds include metformin and glipizide- conitnue after discharge  A1C at 7  POCs between 200s .  Plan:  Monitor POCs  To be f/u by PCP

## 2017-12-10 NOTE — SENIOR ADMIT NOTE
"Senior Admit Note    Niels Lima 67 y.o. male with PMHx of Crohn's disease, DVT, PE and recent left orchiectomy (11/12/17) transferred from the [facility] for further management. [facility] with complaint of hematuria, nausea and vomiting. He underwent CT scan and showed possible extra peritoneal bladder rupture and bilateral hydronephrosis urology was consulted and CT cystogram was done which showed tears/lacerations of the urinary bladder with extraperitoneal rupture but extravasated air and contrast contained close to the urinary bladder wall by surrounding hematoma. Today patient transferred to [facility] for further care due to worsening creatinine of 2.6 and it was 1.0 yesterday. [facility] which showed bilateral hydronephrosis.   On evaluation he reported mild abdominal pain and also worried that he does not want to be on hemodialysis.  Patient wife at the bedside.      Physical Exam:    General: Not in acute distress  HEENT: NCAT  Resp: Clear to auscultation B/L with no wheeze  CVS: Regular rate and rhythm   Abdomen: Distended, + generalized mild tenderness, hypoactive bowel sounds  Neuro: No focal deficit, CN II-XII grossly intact      Vitals:    12/10/17 1130 12/10/17 1154   BP: 141/71    Pulse: 84    Resp: 18    Temp: 37 °C (98.6 °F)    SpO2: 96%    Weight:  88.6 kg (195 lb 5.2 oz)   Height:  1.86 m (6' 1.23\")     US-RENAL    (Results Pending)         Assessment and plan:    Patient is here with worsening kidney functions and distended abdomen. [facility] and he reported that he dicussed patient condition with on call general surgeon (Dr. Machuca). I spoke with Dr. Machuca he expressed that patient does not required any intervention from Gen. surgery stand point. Consulted urology Dr. Upton and discussed patient condition. He recommended to obtain [facility] and also recommended U/S " renal. At this time U/S renal is pending. Will call urologist again and update when results are available.    Continue ceftriaxone for possible UTI.     Patient chem panel showed further worsening of his renal functions. H/H is above 8. He is NPO for possible surgical intervention. Morphine 2 mg IV PRN XQ4H for severe pain. Should be careful about pain medication due to possible ileus.  H/HX Q12H.    For DVT and PE patient was on anticoagulation and he is not no any anticoagulation since 12/08/17 and per patient and his wife he underwent IVC filter placement in 2008 and still he has filter.    For diabetes will hold insulin sliding scale as patient is NPO and will check Q12H finger stick.    Crohn's disease will held his medication at this time.    Code status: Full    DVT Prophylaxis: Will hold pharmacological DVT prophylaxis at this time due to possible hemorrhage.

## 2017-12-10 NOTE — PROGRESS NOTES
PT received to room #413 bed 2. Pt not in any acute distress, kaur in place; Bloody @ site, 10 cc of bloody drainage in bag, pt c/o of pain @ right flank & bladder. VSS, wife @ bedside, [employer] paged about pt with a return phone call. MD on the way to examine pt

## 2017-12-10 NOTE — H&P
Internal Medicine Admitting History and Physical    Note Author: Lilo Hernandes M.D.       Name Niels Lima     1950   Age/Sex 67 y.o. male   MRN [id number]   Code Status Full code     After 5PM or if no immediate response to page, please call for cross-coverage  Attending/Team: Dr. Gomez/ Fco See Patient List for primary contact information  Call [phone] to page    1st Call - Day Intern (R1):   Dr. Hernandes 2nd Call - Day Sr. Resident (R2/R3):   Dr. 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 H/H was stable at 12.4/36.4. UA showed > 100 RBCs, few WBCs and bacteria . He was given ceftriaxone and zosyn and 4L NS in the ED. CT abdomen without contrast showed possible extraperitoneal bladder rupture and bilateral hydronephrosis. Urology was consulted, CT cystogram was done which showed ' tears/ lacerations of the urinary bladder with extraperitoneal rupture but extravasated air and contrast contained close to the urinary bladder wall by surrounding hematoma'. Patient was admitted for observation, ceftriaxone continued, dabigatran was held . Pacheco was placed, but no CBI. Patient however continued to have abdominal pain . Kidney function began to worsen. Creatinine increased to 2.6 from 1.0 on the previous day, baseline creat 0.7. Ultrasound showed worsening hydronephrosis. Patient has also been developing worsening abdominal distension with right flank pain. Patient was transferred here for further management.     When  examined at bedside, patient is diaphoretic, about 20 ml of bright red urine is noted in the bag      Review of Systems   Constitutional: Positive for diaphoresis. Negative for chills and fever.   HENT: Negative for congestion, hearing loss and sore throat.    Eyes: Negative for blurred vision and double vision.   Respiratory: Negative for cough, hemoptysis, sputum production and stridor.    Cardiovascular: Negative for chest pain, palpitations, orthopnea and PND.   Gastrointestinal: Positive for abdominal pain, nausea and vomiting. Negative for constipation, diarrhea and heartburn.   Genitourinary: Positive for flank pain and hematuria.        Right flank pain   Musculoskeletal: Negative for back pain, joint pain and neck pain.   Skin: Positive for rash. Negative for itching.        Left posterior thigh redness   Neurological: Negative for dizziness, sensory change, speech change and headaches.   Endo/Heme/Allergies: Negative for environmental allergies. Does not bruise/bleed easily.   Psychiatric/Behavioral: Negative for depression, hallucinations and substance abuse.             Past Medical History:   Past Medical History:   Diagnosis Date   • Crohn's disease (CMS-HCC) 01/01/2001   • Diabetes (CMS-HCC)     Type 2   • DVT (deep venous thrombosis) (CMS-HCC) 01/01/2009    IVC filter in place   • Presence of IVC filter 01/01/2009       Past Surgical History:  Past Surgical History:   Procedure Laterality Date   • SCROTAL EXPLORATION Bilateral 11/12/2017    Procedure: SCROTAL EXPLORATION -  left orchiectomy, right testicular fixation;  Surgeon: José Antonio Mosqueda M.D.;  Location: SURGERY [facility];  Service: Urology   • BOWEL RESECTION  01/01/1986       Current Outpatient Medications:  Home Medications     Reviewed by Mauricio Hoffman R.N. (Registered Nurse) on 12/10/17 at 1155  Med List Status: <None>   Medication Last Dose Status   glipiZIDE (GLUCOTROL) 5 MG Tab 11/10/2017 Active   mercaptopurine (PURINETHOL)  "50 MG Tab 11/10/2017 Active   mesalamine (ROWASA) 1000 MG Suppos  Active   Mesalamine 0.375 GM CAPSULE SR 24 HR 11/10/2017 Active   metformin (GLUCOPHAGE) 1000 MG tablet 11/10/2017 Active   oxycodone immediate-release (ROXICODONE) 5 MG Tab  Active   tamsulosin (FLOMAX) 0.4 MG capsule 11/10/2017 Active   warfarin (COUMADIN) 10 MG Tab  Active                Medication Allergy/Sensitivities:  Allergies   Allergen Reactions   • Compazine Unspecified     Pt states \"face froze up\"         Family History:  History reviewed. No pertinent family history.    Social History:  Social History     Social History   • Marital status: Unknown     Spouse name: N/A   • Number of children: N/A   • Years of education: N/A     Occupational History   • Not on file.     Social History Main Topics   • Smoking status: Never Smoker   • Smokeless tobacco: Never Used   • Alcohol use No   • Drug use: No   • Sexual activity: Not on file     Other Topics Concern   • Not on file     Social History Narrative   • No narrative on file     Living situation: With wife  PCP : [facility]        Physical Exam     Vitals:    12/10/17 1130 12/10/17 1154   BP: 141/71    Pulse: 84    Resp: 18    Temp: 37 °C (98.6 °F)    SpO2: 96%    Weight:  88.6 kg (195 lb 5.2 oz)   Height:  1.86 m (6' 1.23\")     Body mass index is 25.61 kg/m².  /71   Pulse 84   Temp 37 °C (98.6 °F)   Resp 18   Ht 1.86 m (6' 1.23\")   Wt 88.6 kg (195 lb 5.2 oz)   SpO2 96%   BMI 25.61 kg/m²   O2 therapy: Pulse Oximetry: 96 %, O2 (LPM): 3, O2 Delivery: Silicone Nasal Cannula    Physical Exam   Constitutional: He is oriented to person, place, and time. He appears distressed.   Well developed, well nourished in moderate distress   HENT:   Head: Normocephalic and atraumatic.   Eyes: EOM are normal. Pupils are equal, round, and reactive to light. No scleral icterus.   Neck: Normal range of motion. Neck supple. No JVD present.   Cardiovascular: Normal rate, regular rhythm and normal heart " sounds.    No murmur heard.  Pulmonary/Chest: Effort normal and breath sounds normal. No respiratory distress. He has no wheezes.   Abdominal: He exhibits distension. There is tenderness.   Hypoactive bowel sounds   Genitourinary:   Genitourinary Comments: Pacheco catheter in place. About 20 ml bright red urine noted.   Musculoskeletal: He exhibits no edema.   Neurological: He is alert and oriented to person, place, and time. No cranial nerve deficit.   Skin: He is diaphoretic.   Left lateral thigh- About 15 x 10 cm ulcerated skin lesion   Psychiatric: Mood, memory, affect and judgment normal.             Data Review       Old Records Request:   Completed  Current Records review and summary: Completed    Lab Data Review:  Recent Results (from the past 24 hour(s))   CBC WITH DIFFERENTIAL    Collection Time: 12/10/17  1:28 PM   Result Value Ref Range    WBC 8.2 4.8 - 10.8 K/uL    RBC 2.58 (L) 4.70 - 6.10 M/uL    Hemoglobin 8.9 (L) 14.0 - 18.0 g/dL    Hematocrit 27.0 (L) 42.0 - 52.0 %    .7 (H) 81.4 - 97.8 fL    MCH 34.5 (H) 27.0 - 33.0 pg    MCHC 33.0 (L) 33.7 - 35.3 g/dL    RDW 59.7 (H) 35.9 - 50.0 fL    Platelet Count 142 (L) 164 - 446 K/uL    MPV 10.0 9.0 - 12.9 fL    Neutrophils-Polys 89.60 (H) 44.00 - 72.00 %    Lymphocytes 4.00 (L) 22.00 - 41.00 %    Monocytes 5.20 0.00 - 13.40 %    Eosinophils 0.50 0.00 - 6.90 %    Basophils 0.10 0.00 - 1.80 %    Immature Granulocytes 0.60 0.00 - 0.90 %    Nucleated RBC 0.00 /100 WBC    Neutrophils (Absolute) 7.35 1.82 - 7.42 K/uL    Lymphs (Absolute) 0.33 (L) 1.00 - 4.80 K/uL    Monos (Absolute) 0.43 0.00 - 0.85 K/uL    Eos (Absolute) 0.04 0.00 - 0.51 K/uL    Baso (Absolute) 0.01 0.00 - 0.12 K/uL    Immature Granulocytes (abs) 0.05 0.00 - 0.11 K/uL    NRBC (Absolute) 0.00 K/uL   APTT    Collection Time: 12/10/17  1:28 PM   Result Value Ref Range    APTT 33.6 24.7 - 36.0 sec   PROTHROMBIN TIME    Collection Time: 12/10/17  1:28 PM   Result Value Ref Range    PT 16.0 (H)  12.0 - 14.6 sec    INR 1.31 (H) 0.87 - 1.13   LACTIC ACID    Collection Time: 12/10/17  1:28 PM   Result Value Ref Range    Lactic Acid 1.2 0.5 - 2.0 mmol/L   COMP METABOLIC PANEL    Collection Time: 12/10/17  1:28 PM   Result Value Ref Range    Sodium 137 135 - 145 mmol/L    Potassium 4.6 3.6 - 5.5 mmol/L    Chloride 106 96 - 112 mmol/L    Co2 19 (L) 20 - 33 mmol/L    Anion Gap 12.0 (H) 0.0 - 11.9    Glucose 204 (H) 65 - 99 mg/dL    Bun 40 (H) 8 - 22 mg/dL    Creatinine 3.27 (H) 0.50 - 1.40 mg/dL    Calcium 8.8 8.5 - 10.5 mg/dL    AST(SGOT) 16 12 - 45 U/L    ALT(SGPT) 7 2 - 50 U/L    Alkaline Phosphatase 51 30 - 99 U/L    Total Bilirubin 0.4 0.1 - 1.5 mg/dL    Albumin 2.5 (L) 3.2 - 4.9 g/dL    Total Protein 5.2 (L) 6.0 - 8.2 g/dL    Globulin 2.7 1.9 - 3.5 g/dL    A-G Ratio 0.9 g/dL   ESTIMATED GFR    Collection Time: 12/10/17  1:28 PM   Result Value Ref Range    GFR If  23 (A) >60 mL/min/1.73 m 2    GFR If Non  19 (A) >60 mL/min/1.73 m 2       Imaging/Procedures Review:    ndependant Imaging Review: Deferred  No orders to display            EKG:   EKG Independant Review: Deferred      Records reviewed and summarized in current documentation             Assessment/Plan     * Extraperitoneal rupture of bladder   Assessment & Plan    CT abdomen without contrast on 12/8 showed suspected extraperitoneal bladder rupture. CT cystogram- tears/ lacerations of the urinary bladder with extraperitoneal rupture but extravasated air and contrast contained close to the urinary bladder wall by surrounding hematoma  - Patient has gross hematuria  - Hb/ Hct -8.9/27  Plan:  - NPO  - Urology consult  - continue kaur catheter  - Hb/hct Q12 H, transfuse for hb <7 for active bleeding  - Renal and bladder ultrasound  - Hold anticoagulation  - Continue ceftriaxone for possible UTI  - Morphine and roxicodone PRN for pain control          Hydronephrosis   Assessment & Plan    - CT abdomen/pelvis from 12/08  shows bilateral hydronephrosis R>L likely related to changes in the bladder. Bilateral nephrolithiasis and prostate enlargement.  - US prior to transfer showed worsening hydronephrosis  - BUN/ creat increased to 34/2.6 today from 1.0 yesterday , baseline creat 0.7  Plan:  - stat CMP, CBC, PT/INR  - Urology consult  - Renal ultrasound  - Continue kaur catheter        Acute kidney injury (CMS-HCC)   Assessment & Plan    Likely secondary to bilateral hydronephrosis  Baseline creat 0.7, creat today increased to 2.6 from 1.0 yesterday.  Continue kaur  Urology consult        History of DVT (deep vein thrombosis)   Assessment & Plan    H/o DVT 3 times in the past. Was on coumadin previously, currently on dabigatran. He also has an IVC filter in place from 2400-9515  - Patient had a recent admission for new left leg DVT on warfarin secondary to missed bridging with lovenox. He was discharged on dabigatran  - will hold anticoagulation            DM type 2 (diabetes mellitus, type 2) (CMS-HCC)- (present on admission)   Assessment & Plan    Patient on metformin and glipizide as home medication  - Hold home medication  - Patient is NPO currently.  - Will monitor            Anticipated Hospital stay:  >2 midnights        Quality Measures    Reviewed items::  Labs reviewed and Medications reviewed  Kaur catheter::  Gross Hematuria with Clots  DVT prophylaxis pharmacological::  Contraindicated - High bleeding risk  DVT prophylaxis - mechanical:  SCDs

## 2017-12-11 ENCOUNTER — APPOINTMENT (OUTPATIENT)
Dept: RADIOLOGY | Facility: MEDICAL CENTER | Age: 67
DRG: 668 | End: 2017-12-11
Attending: STUDENT IN AN ORGANIZED HEALTH CARE EDUCATION/TRAINING PROGRAM
Payer: COMMERCIAL

## 2017-12-11 PROBLEM — N32.89: Status: ACTIVE | Noted: 2017-12-11

## 2017-12-11 PROBLEM — N19 RENAL FAILURE: Status: ACTIVE | Noted: 2017-12-11

## 2017-12-11 PROBLEM — E87.5 HYPERKALEMIA: Status: ACTIVE | Noted: 2017-12-11

## 2017-12-11 LAB
ANION GAP SERPL CALC-SCNC: 10 MMOL/L (ref 0–11.9)
ANION GAP SERPL CALC-SCNC: 11 MMOL/L (ref 0–11.9)
ANION GAP SERPL CALC-SCNC: 12 MMOL/L (ref 0–11.9)
ANION GAP SERPL CALC-SCNC: 13 MMOL/L (ref 0–11.9)
ANION GAP SERPL CALC-SCNC: 13 MMOL/L (ref 0–11.9)
BASOPHILS # BLD AUTO: 0.1 % (ref 0–1.8)
BASOPHILS # BLD: 0.01 K/UL (ref 0–0.12)
BUN SERPL-MCNC: 57 MG/DL (ref 8–22)
BUN SERPL-MCNC: 57 MG/DL (ref 8–22)
BUN SERPL-MCNC: 58 MG/DL (ref 8–22)
BUN SERPL-MCNC: 58 MG/DL (ref 8–22)
BUN SERPL-MCNC: 63 MG/DL (ref 8–22)
CALCIUM SERPL-MCNC: 8.4 MG/DL (ref 8.5–10.5)
CALCIUM SERPL-MCNC: 8.7 MG/DL (ref 8.5–10.5)
CALCIUM SERPL-MCNC: 8.9 MG/DL (ref 8.5–10.5)
CHLORIDE SERPL-SCNC: 105 MMOL/L (ref 96–112)
CHLORIDE SERPL-SCNC: 105 MMOL/L (ref 96–112)
CHLORIDE SERPL-SCNC: 107 MMOL/L (ref 96–112)
CHLORIDE SERPL-SCNC: 107 MMOL/L (ref 96–112)
CHLORIDE SERPL-SCNC: 108 MMOL/L (ref 96–112)
CHLORIDE UR-SCNC: 31 MMOL/L
CO2 SERPL-SCNC: 18 MMOL/L (ref 20–33)
CO2 SERPL-SCNC: 19 MMOL/L (ref 20–33)
CO2 SERPL-SCNC: 20 MMOL/L (ref 20–33)
CO2 SERPL-SCNC: 21 MMOL/L (ref 20–33)
CO2 SERPL-SCNC: 21 MMOL/L (ref 20–33)
CREAT SERPL-MCNC: 4.39 MG/DL (ref 0.5–1.4)
CREAT SERPL-MCNC: 4.91 MG/DL (ref 0.5–1.4)
CREAT SERPL-MCNC: 5.07 MG/DL (ref 0.5–1.4)
CREAT SERPL-MCNC: 5.14 MG/DL (ref 0.5–1.4)
CREAT SERPL-MCNC: 5.15 MG/DL (ref 0.5–1.4)
CREAT UR-MCNC: 64.4 MG/DL
EOSINOPHIL # BLD AUTO: 0.01 K/UL (ref 0–0.51)
EOSINOPHIL NFR BLD: 0.1 % (ref 0–6.9)
ERYTHROCYTE [DISTWIDTH] IN BLOOD BY AUTOMATED COUNT: 59.7 FL (ref 35.9–50)
GFR SERPL CREATININE-BSD FRML MDRD: 11 ML/MIN/1.73 M 2
GFR SERPL CREATININE-BSD FRML MDRD: 11 ML/MIN/1.73 M 2
GFR SERPL CREATININE-BSD FRML MDRD: 12 ML/MIN/1.73 M 2
GFR SERPL CREATININE-BSD FRML MDRD: 12 ML/MIN/1.73 M 2
GFR SERPL CREATININE-BSD FRML MDRD: 14 ML/MIN/1.73 M 2
GLUCOSE BLD-MCNC: 212 MG/DL (ref 65–99)
GLUCOSE BLD-MCNC: 213 MG/DL (ref 65–99)
GLUCOSE BLD-MCNC: 219 MG/DL (ref 65–99)
GLUCOSE BLD-MCNC: 238 MG/DL (ref 65–99)
GLUCOSE SERPL-MCNC: 197 MG/DL (ref 65–99)
GLUCOSE SERPL-MCNC: 234 MG/DL (ref 65–99)
GLUCOSE SERPL-MCNC: 238 MG/DL (ref 65–99)
GLUCOSE SERPL-MCNC: 246 MG/DL (ref 65–99)
GLUCOSE SERPL-MCNC: 255 MG/DL (ref 65–99)
HCT VFR BLD AUTO: 26.2 % (ref 42–52)
HCT VFR BLD AUTO: 26.9 % (ref 42–52)
HGB BLD-MCNC: 8.6 G/DL (ref 14–18)
HGB BLD-MCNC: 8.7 G/DL (ref 14–18)
IMM GRANULOCYTES # BLD AUTO: 0.13 K/UL (ref 0–0.11)
IMM GRANULOCYTES NFR BLD AUTO: 1.5 % (ref 0–0.9)
LYMPHOCYTES # BLD AUTO: 0.19 K/UL (ref 1–4.8)
LYMPHOCYTES NFR BLD: 2.1 % (ref 22–41)
MAGNESIUM SERPL-MCNC: 2.2 MG/DL (ref 1.5–2.5)
MCH RBC QN AUTO: 34.1 PG (ref 27–33)
MCHC RBC AUTO-ENTMCNC: 32.3 G/DL (ref 33.7–35.3)
MCV RBC AUTO: 105.5 FL (ref 81.4–97.8)
MONOCYTES # BLD AUTO: 0.15 K/UL (ref 0–0.85)
MONOCYTES NFR BLD AUTO: 1.7 % (ref 0–13.4)
NEUTROPHILS # BLD AUTO: 8.35 K/UL (ref 1.82–7.42)
NEUTROPHILS NFR BLD: 94.5 % (ref 44–72)
NRBC # BLD AUTO: 0 K/UL
NRBC BLD AUTO-RTO: 0 /100 WBC
PHOSPHATE SERPL-MCNC: 8 MG/DL (ref 2.5–4.5)
PLATELET # BLD AUTO: 145 K/UL (ref 164–446)
PMV BLD AUTO: 9.3 FL (ref 9–12.9)
POTASSIUM SERPL-SCNC: 4.7 MMOL/L (ref 3.6–5.5)
POTASSIUM SERPL-SCNC: 5.4 MMOL/L (ref 3.6–5.5)
POTASSIUM SERPL-SCNC: 5.6 MMOL/L (ref 3.6–5.5)
POTASSIUM SERPL-SCNC: 5.6 MMOL/L (ref 3.6–5.5)
POTASSIUM SERPL-SCNC: 6 MMOL/L (ref 3.6–5.5)
POTASSIUM UR-SCNC: 19.5 MMOL/L
PROT UR-MCNC: 96.2 MG/DL (ref 0–15)
RBC # BLD AUTO: 2.55 M/UL (ref 4.7–6.1)
SODIUM SERPL-SCNC: 136 MMOL/L (ref 135–145)
SODIUM SERPL-SCNC: 137 MMOL/L (ref 135–145)
SODIUM SERPL-SCNC: 138 MMOL/L (ref 135–145)
SODIUM SERPL-SCNC: 138 MMOL/L (ref 135–145)
SODIUM SERPL-SCNC: 141 MMOL/L (ref 135–145)
SODIUM UR-SCNC: 44 MMOL/L
WBC # BLD AUTO: 8.8 K/UL (ref 4.8–10.8)

## 2017-12-11 PROCEDURE — 82962 GLUCOSE BLOOD TEST: CPT | Mod: 91

## 2017-12-11 PROCEDURE — A9270 NON-COVERED ITEM OR SERVICE: HCPCS | Performed by: INTERNAL MEDICINE

## 2017-12-11 PROCEDURE — 700111 HCHG RX REV CODE 636 W/ 250 OVERRIDE (IP): Performed by: STUDENT IN AN ORGANIZED HEALTH CARE EDUCATION/TRAINING PROGRAM

## 2017-12-11 PROCEDURE — 85014 HEMATOCRIT: CPT

## 2017-12-11 PROCEDURE — 84100 ASSAY OF PHOSPHORUS: CPT

## 2017-12-11 PROCEDURE — 700102 HCHG RX REV CODE 250 W/ 637 OVERRIDE(OP): Performed by: INTERNAL MEDICINE

## 2017-12-11 PROCEDURE — 85025 COMPLETE CBC W/AUTO DIFF WBC: CPT

## 2017-12-11 PROCEDURE — 84156 ASSAY OF PROTEIN URINE: CPT

## 2017-12-11 PROCEDURE — 82570 ASSAY OF URINE CREATININE: CPT

## 2017-12-11 PROCEDURE — 83735 ASSAY OF MAGNESIUM: CPT

## 2017-12-11 PROCEDURE — A9270 NON-COVERED ITEM OR SERVICE: HCPCS | Performed by: UROLOGY

## 2017-12-11 PROCEDURE — 36415 COLL VENOUS BLD VENIPUNCTURE: CPT

## 2017-12-11 PROCEDURE — 76705 ECHO EXAM OF ABDOMEN: CPT

## 2017-12-11 PROCEDURE — 84300 ASSAY OF URINE SODIUM: CPT

## 2017-12-11 PROCEDURE — 700101 HCHG RX REV CODE 250: Performed by: INTERNAL MEDICINE

## 2017-12-11 PROCEDURE — 700105 HCHG RX REV CODE 258: Performed by: UROLOGY

## 2017-12-11 PROCEDURE — 83550 IRON BINDING TEST: CPT

## 2017-12-11 PROCEDURE — 700111 HCHG RX REV CODE 636 W/ 250 OVERRIDE (IP): Performed by: INTERNAL MEDICINE

## 2017-12-11 PROCEDURE — 76882 US LMTD JT/FCL EVL NVASC XTR: CPT | Mod: LT

## 2017-12-11 PROCEDURE — 700102 HCHG RX REV CODE 250 W/ 637 OVERRIDE(OP): Performed by: STUDENT IN AN ORGANIZED HEALTH CARE EDUCATION/TRAINING PROGRAM

## 2017-12-11 PROCEDURE — 85046 RETICYTE/HGB CONCENTRATE: CPT

## 2017-12-11 PROCEDURE — 700105 HCHG RX REV CODE 258: Performed by: INTERNAL MEDICINE

## 2017-12-11 PROCEDURE — 83540 ASSAY OF IRON: CPT

## 2017-12-11 PROCEDURE — 85018 HEMOGLOBIN: CPT

## 2017-12-11 PROCEDURE — 700102 HCHG RX REV CODE 250 W/ 637 OVERRIDE(OP): Performed by: UROLOGY

## 2017-12-11 PROCEDURE — 84133 ASSAY OF URINE POTASSIUM: CPT

## 2017-12-11 PROCEDURE — 82436 ASSAY OF URINE CHLORIDE: CPT

## 2017-12-11 PROCEDURE — 83036 HEMOGLOBIN GLYCOSYLATED A1C: CPT

## 2017-12-11 PROCEDURE — 80048 BASIC METABOLIC PNL TOTAL CA: CPT

## 2017-12-11 PROCEDURE — 85027 COMPLETE CBC AUTOMATED: CPT

## 2017-12-11 PROCEDURE — A9270 NON-COVERED ITEM OR SERVICE: HCPCS | Performed by: STUDENT IN AN ORGANIZED HEALTH CARE EDUCATION/TRAINING PROGRAM

## 2017-12-11 PROCEDURE — 770020 HCHG ROOM/CARE - TELE (206)

## 2017-12-11 RX ORDER — BISACODYL 10 MG
10 SUPPOSITORY, RECTAL RECTAL DAILY
Status: DISCONTINUED | OUTPATIENT
Start: 2017-12-11 | End: 2017-12-12

## 2017-12-11 RX ORDER — CALCIUM CHLORIDE 100 MG/ML
1 INJECTION INTRAVENOUS; INTRAVENTRICULAR ONCE
Status: DISCONTINUED | OUTPATIENT
Start: 2017-12-11 | End: 2017-12-11

## 2017-12-11 RX ORDER — AMOXICILLIN 250 MG
2 CAPSULE ORAL 2 TIMES DAILY
Status: DISCONTINUED | OUTPATIENT
Start: 2017-12-11 | End: 2017-12-12

## 2017-12-11 RX ORDER — CALCIUM GLUCONATE 94 MG/ML
1 INJECTION, SOLUTION INTRAVENOUS ONCE
Status: DISCONTINUED | OUTPATIENT
Start: 2017-12-11 | End: 2017-12-11

## 2017-12-11 RX ORDER — POLYETHYLENE GLYCOL 3350 17 G/17G
1 POWDER, FOR SOLUTION ORAL
Status: DISCONTINUED | OUTPATIENT
Start: 2017-12-11 | End: 2017-12-18

## 2017-12-11 RX ORDER — DEXTROSE MONOHYDRATE 25 G/50ML
25 INJECTION, SOLUTION INTRAVENOUS
Status: DISCONTINUED | OUTPATIENT
Start: 2017-12-11 | End: 2017-12-29 | Stop reason: HOSPADM

## 2017-12-11 RX ORDER — DEXTROSE MONOHYDRATE 25 G/50ML
25 INJECTION, SOLUTION INTRAVENOUS ONCE
Status: COMPLETED | OUTPATIENT
Start: 2017-12-11 | End: 2017-12-11

## 2017-12-11 RX ORDER — SODIUM CHLORIDE 9 MG/ML
2000 INJECTION, SOLUTION INTRAVENOUS ONCE
Status: COMPLETED | OUTPATIENT
Start: 2017-12-11 | End: 2017-12-11

## 2017-12-11 RX ADMIN — CEFTRIAXONE 2 G: 2 INJECTION, POWDER, FOR SOLUTION INTRAMUSCULAR; INTRAVENOUS at 08:16

## 2017-12-11 RX ADMIN — ATROPA BELLADONNA AND OPIUM 30 MG: 16.2; 3 SUPPOSITORY RECTAL at 06:01

## 2017-12-11 RX ADMIN — OXYCODONE HYDROCHLORIDE 5 MG: 5 TABLET ORAL at 23:45

## 2017-12-11 RX ADMIN — OXYCODONE HYDROCHLORIDE AND ACETAMINOPHEN 1 TABLET: 10; 325 TABLET ORAL at 08:33

## 2017-12-11 RX ADMIN — ATROPA BELLADONNA AND OPIUM 30 MG: 16.2; 3 SUPPOSITORY RECTAL at 18:31

## 2017-12-11 RX ADMIN — OXYCODONE HYDROCHLORIDE AND ACETAMINOPHEN 1 TABLET: 10; 325 TABLET ORAL at 20:47

## 2017-12-11 RX ADMIN — STANDARDIZED SENNA CONCENTRATE AND DOCUSATE SODIUM 2 TABLET: 8.6; 5 TABLET, FILM COATED ORAL at 14:44

## 2017-12-11 RX ADMIN — INSULIN HUMAN 2 UNITS: 100 INJECTION, SOLUTION PARENTERAL at 17:04

## 2017-12-11 RX ADMIN — MORPHINE SULFATE 2 MG: 4 INJECTION INTRAVENOUS at 04:44

## 2017-12-11 RX ADMIN — CALCIUM GLUCONATE 1000 MG: 94 INJECTION, SOLUTION INTRAVENOUS at 12:48

## 2017-12-11 RX ADMIN — SODIUM CHLORIDE 2000 ML: 9 INJECTION, SOLUTION INTRAVENOUS at 14:51

## 2017-12-11 RX ADMIN — INSULIN HUMAN 10 UNITS: 100 INJECTION, SOLUTION PARENTERAL at 14:40

## 2017-12-11 RX ADMIN — SODIUM CHLORIDE: 9 INJECTION, SOLUTION INTRAVENOUS at 23:35

## 2017-12-11 RX ADMIN — DEXTROSE MONOHYDRATE 50 ML: 25 INJECTION, SOLUTION INTRAVENOUS at 14:42

## 2017-12-11 RX ADMIN — SODIUM CHLORIDE: 9 INJECTION, SOLUTION INTRAVENOUS at 03:06

## 2017-12-11 RX ADMIN — INSULIN HUMAN 2 UNITS: 100 INJECTION, SOLUTION PARENTERAL at 20:47

## 2017-12-11 RX ADMIN — SODIUM CHLORIDE: 9 INJECTION, SOLUTION INTRAVENOUS at 12:47

## 2017-12-11 RX ADMIN — MAGNESIUM HYDROXIDE 30 ML: 400 SUSPENSION ORAL at 23:45

## 2017-12-11 RX ADMIN — OXYCODONE HYDROCHLORIDE 5 MG: 5 TABLET ORAL at 01:33

## 2017-12-11 RX ADMIN — OXYCODONE HYDROCHLORIDE AND ACETAMINOPHEN 1 TABLET: 10; 325 TABLET ORAL at 14:44

## 2017-12-11 RX ADMIN — OXYCODONE HYDROCHLORIDE 5 MG: 5 TABLET ORAL at 17:02

## 2017-12-11 RX ADMIN — OXYCODONE HYDROCHLORIDE 5 MG: 5 TABLET ORAL at 10:58

## 2017-12-11 ASSESSMENT — ENCOUNTER SYMPTOMS
NAUSEA: 0
COUGH: 0
ORTHOPNEA: 0
SENSORY CHANGE: 0
WEAKNESS: 0
ABDOMINAL PAIN: 1
EYE DISCHARGE: 0
NECK PAIN: 0
HEADACHES: 0
BLURRED VISION: 0
BACK PAIN: 0
SINUS PAIN: 0
CONSTIPATION: 1
SPEECH CHANGE: 0
STRIDOR: 0
PALPITATIONS: 0
DEPRESSION: 0
CLAUDICATION: 0
DOUBLE VISION: 0
VOMITING: 0
EYE PAIN: 0
CHILLS: 0
DIZZINESS: 0
HEARTBURN: 0
SPUTUM PRODUCTION: 0
SORE THROAT: 0
BRUISES/BLEEDS EASILY: 1
MYALGIAS: 0
SHORTNESS OF BREATH: 0
FEVER: 0

## 2017-12-11 ASSESSMENT — PAIN SCALES - GENERAL
PAINLEVEL_OUTOF10: 8
PAINLEVEL_OUTOF10: 9
PAINLEVEL_OUTOF10: 7
PAINLEVEL_OUTOF10: 5
PAINLEVEL_OUTOF10: 4
PAINLEVEL_OUTOF10: 5
PAINLEVEL_OUTOF10: 5
PAINLEVEL_OUTOF10: 3
PAINLEVEL_OUTOF10: 7
PAINLEVEL_OUTOF10: 8

## 2017-12-11 ASSESSMENT — PATIENT HEALTH QUESTIONNAIRE - PHQ9
2. FEELING DOWN, DEPRESSED, IRRITABLE, OR HOPELESS: NOT AT ALL
SUM OF ALL RESPONSES TO PHQ9 QUESTIONS 1 AND 2: 0
1. LITTLE INTEREST OR PLEASURE IN DOING THINGS: NOT AT ALL
SUM OF ALL RESPONSES TO PHQ QUESTIONS 1-9: 0

## 2017-12-11 ASSESSMENT — COPD QUESTIONNAIRES
DURING THE PAST 4 WEEKS HOW MUCH DID YOU FEEL SHORT OF BREATH: NONE/LITTLE OF THE TIME
DO YOU EVER COUGH UP ANY MUCUS OR PHLEGM?: NO/ONLY WITH OCCASIONAL COLDS OR INFECTIONS
HAVE YOU SMOKED AT LEAST 100 CIGARETTES IN YOUR ENTIRE LIFE: NO/DON'T KNOW
COPD SCREENING SCORE: 2

## 2017-12-11 ASSESSMENT — LIFESTYLE VARIABLES
SUBSTANCE_ABUSE: 0
DO YOU DRINK ALCOHOL: NO

## 2017-12-11 NOTE — PROGRESS NOTES
Assumed care of patient from day RN. A&Ox 4. SBA. 2 L oxygen. Moves all extremities, pain with movement, denies numbness or tingling. Pacheco catheter in place draining to gravity minimal dark bloody urine, hypoactive BS, + flatus, LBM 12/8. Strict NPO diet, denies nausea/ vomiting. Wound(s): right toe ulcer, left posterior thigh hematoma. Patient reports 7 out of 10 pain exacerbated by movement, previously medicated per MAR. IV pole is on the same side as the bathroom, lower bedrails are down. Pt calls appropriatly. Discussed POC, all questions answered at this time. Bed is locked and in the lowest position, call light within reach, Q 2 hour rounding in place.

## 2017-12-11 NOTE — OR NURSING
Pt to the floor via bed, CBI bags marked prior to leaving PACU.  Side rails up x 2, pt on oxygen 3 lpm via n/c.  Pt awake, alert, and orietned.  Pt denies nausea, states spasms are decreasing, tolerable at this time.

## 2017-12-11 NOTE — PROGRESS NOTES
"Urology Progress Note    Post op Day # 1    Overnight Events: None    S: No fevers, chills, nausea or vomiting.  + flatus.  Feeling much better today. Creatinine slowly trending downward however it is still elevated.    O:   Blood pressure 131/68, pulse 77, temperature 36.4 °C (97.5 °F), resp. rate 20, height 1.86 m (6' 1.23\"), weight 88.6 kg (195 lb 5.2 oz), SpO2 97 %.  Recent Labs      12/10/17   1328  12/11/17   0317  12/11/17   0817   SODIUM  137  136  141   POTASSIUM  4.6  5.6*  6.0*   CHLORIDE  106  105  107   CO2  19*  18*  21   GLUCOSE  204*  246*  255*   BUN  40*  57*  57*   CREATININE  3.27*  5.15*  5.07*   CALCIUM  8.8  8.7  8.9     Recent Labs      12/10/17   1328  12/11/17   0317   WBC  8.2  8.8   RBC  2.58*  2.55*   HEMOGLOBIN  8.9*  8.7*   HEMATOCRIT  27.0*  26.9*   MCV  104.7*  105.5*   MCH  34.5*  34.1*   MCHC  33.0*  32.3*   RDW  59.7*  59.7*   PLATELETCT  142*  145*   MPV  10.0  9.3         Intake/Output Summary (Last 24 hours) at 12/11/17 0923  Last data filed at 12/11/17 0720   Gross per 24 hour   Intake             3500 ml   Output             1045 ml   Net             2455 ml       Exam:  Abdomen soft, benign.    Urine: clear.   Positive BS.    A/P:    Active Hospital Problems    Diagnosis   • Extraperitoneal rupture of bladder [N32.89]     Priority: High   • Hydronephrosis [N13.30]     Priority: High   • Renal failure [N19]   • Ruptured bladder [N32.89]   • History of DVT (deep vein thrombosis) [Z86.718]   • Acute kidney injury (CMS-HCC) [N17.9]   • DM type 2 (diabetes mellitus, type 2) (CMS-HCC) [E11.9]       Stable.   Ambulate, IS.  Advanced diet to clear  Wean off CBI  "

## 2017-12-11 NOTE — CONSULTS
UROLOGY Consult Note:    Sobeida Diane  Date & Time note created:    12/10/2017   4:29 PM     Referring MD:  Dr. Greer [facility]    Patient ID:   Name:             Niels Lima   YOB: 1950  Age:                 67 y.o.  male   MRN:               [id number]                                                             Reason for Consult:  Intraperitoneal bladder rupture VS extraperitoneal bladder rupture, renal and bladder calculi, hydronephrosis with worsening renal function    History of Present Illness:    Patient  67 year old male with a complex  medical history including crohn's disease, type 2 diabetes, recurrent DVTs (dabigatran)  crohn's disease, type 2 diabetes mellitus, BPH and a  recent orchiectomy ( 11/11/2017- [facility]) due to epididymitis and testicular torsion.  The pt also has a large black wound to his left lower hamstring.   [facility] complaints of silvia blood in the urine, difficulty urinating , abdominal pain, nausea and vomiting. H/H had been dsbhra02.4/36.4. Uranalysis  showed > 100 RBCs, with  few WBCs and bacteria . He was placed on ABX.  CT abdomen without contrast showed possible extraperitoneal bladder rupture and bilateral hydronephrosis. CT cystogram  showed ' tears/ lacerations of the urinary bladder with extraperitoneal rupture but extravasated air and contrast contained close to the urinary bladder wall by surrounding hematoma'. Patient was admitted anticoags held ABX continued and Pacheco was placed without CBI. Patient  continued to experience abdominal pain and his renal  function began to worsen. Creatinine increased climbing from 0.7 to 1.0 to today  2.6  Ultrasound showed worsening hydronephrosis. Patient has also been developing worsening abdominal distension with right flank pain. Patient was transferred here for further management.  [facility] team [place] consulted           Review of Systems:      Constitutional: + fevers and chills     Eyes: Denies changes in vision, no eye pain  Ears/Nose/Throat/Mouth: Denies nasal congestion or sore throat   Cardiovascular: no chest pain, no palpitations   Respiratory: no shortness of breath , Denies cough  Gastrointestinal/Hepatic: Diffuse abdominal pain, + nausea,  +vomiting,no GI bleeding   Genitourinary: + hematuria, dysuria. Abdominal pain scrotal pain  Musculoskeletal/Rheum: Denies  joint pain and swelling, no edema  Skin : large black wound approximately 12 X 9 cm to his left hamstring that pt states is from blister.   Neurological: Denies headache, confusion, memory loss or focal weakness/parasthesias  Psychiatric: denies mood disorder   Endocrine: Jennifer thyroid problems  Heme/Oncology/Lymph Nodes: Denies enlarged lymph nodes history of anticoags for recurrent DVT's,  All other systems were reviewed and are negative (AMA/CMS criteria)                Past Medical History:   Past Medical History:   Diagnosis Date   • Crohn's disease (CMS-HCC) 01/01/2001   • Diabetes (CMS-HCC)     Type 2   • DVT (deep venous thrombosis) (CMS-HCC) 01/01/2009    IVC filter in place   • Presence of IVC filter 01/01/2009     Active Hospital Problems    Diagnosis   • Extraperitoneal rupture of bladder [N32.89]     Priority: High   • Hydronephrosis [N13.30]     Priority: High   • History of DVT (deep vein thrombosis) [Z86.718]   • Acute kidney injury (CMS-HCC) [N17.9]   • DM type 2 (diabetes mellitus, type 2) (CMS-HCC) [E11.9]       Past Surgical History:  Past Surgical History:   Procedure Laterality Date   • SCROTAL EXPLORATION Bilateral 11/12/2017    Procedure: SCROTAL EXPLORATION -  left orchiectomy, right testicular fixation;  Surgeon: José Antonio Mosqueda M.D.;  Location: SURGERY [facility];  Service: Urology   • BOWEL RESECTION  01/01/1986       Hospital Medications:    Current Facility-Administered Medications:   •  Respiratory Care per Protocol, , Nebulization, Continuous RT, Lilo Hernandes M.D.  •  NS infusion, ,  "Intravenous, Continuous, Lilo Hernandes M.D., Last Rate: 75 mL/hr at 12/10/17 1343  •  ondansetron (ZOFRAN) syringe/vial injection 4 mg, 4 mg, Intravenous, Q4HRS PRN, Lilo Hernandes M.D.  •  ondansetron (ZOFRAN ODT) dispertab 4 mg, 4 mg, Oral, Q4HRS PRN, Lilo Hernandes M.D.  •  acetaminophen (TYLENOL) tablet 650 mg, 650 mg, Oral, Q6HRS PRN, Lilo Hernandes M.D.  •  oxycodone immediate-release (ROXICODONE) tablet 5 mg, 5 mg, Oral, Q4HRS PRN, Lilo Hernandes M.D., 5 mg at 12/10/17 1327  •  cefTRIAXone (ROCEPHIN) syringe 2 g, 2 g, Intravenous, Q24HRS, Lilo Hernandes M.D., 2 g at 12/10/17 1550  •  morphine (pf) 4 mg/ml injection 2 mg, 2 mg, Intravenous, Q4HRS PRN, Lilo Hernandes M.D., 2 mg at 12/10/17 1529  •  oxycodone-acetaminophen (PERCOCET-10)  MG per tablet 1 Tab, 1 Tab, Oral, Q6HRS PRN, Sandra Greer M.D.  •  albuterol inhaler 2 Puff, 2 Puff, Inhalation, Q4HRS PRN, Lilo Hernandes M.D.    Current Outpatient Medications:  Prescriptions Prior to Admission   Medication Sig Dispense Refill Last Dose   • Mesalamine 0.375 GM CAPSULE SR 24 HR Take 1 Cap by mouth 3 times a day.   11/10/2017 at 0900   • mercaptopurine (PURINETHOL) 50 MG Tab Take 150 mg by mouth every day.   11/10/2017 at Unknown time   • metformin (GLUCOPHAGE) 1000 MG tablet Take 1,000 mg by mouth 2 times a day, with meals.   11/10/2017 at 0900   • glipiZIDE (GLUCOTROL) 5 MG Tab Take 5 mg by mouth 2 times a day.   11/10/2017 at 0900   • tamsulosin (FLOMAX) 0.4 MG capsule Take 0.4 mg by mouth every day.   11/10/2017 at Unknown time   • oxycodone immediate-release (ROXICODONE) 5 MG Tab Take 1 Tab by mouth every four hours as needed. 20 Tab 0    • warfarin (COUMADIN) 10 MG Tab Take 0.5 Tabs by mouth every day. 30 Tab 3    • mesalamine (ROWASA) 1000 MG Suppos Insert 1 Suppository in rectum every bedtime. 30 Suppository 0        Medication Allergy:  Allergies   Allergen Reactions   • Compazine Unspecified     Pt states \"face froze up\" " "      Family History:  History reviewed. No pertinent family history.    Social History:  Social History     Social History   • Marital status: Unknown     Spouse name: N/A   • Number of children: N/A   • Years of education: N/A     Occupational History   • Not on file.     Social History Main Topics   • Smoking status: Never Smoker   • Smokeless tobacco: Never Used   • Alcohol use No   • Drug use: No   • Sexual activity: Not on file     Other Topics Concern   • Not on file     Social History Narrative   • No narrative on file         Physical Exam:  Vitals/ General Appearance:   Weight/BMI: Body mass index is 25.61 kg/m².  Blood pressure 135/76, pulse 95, temperature 36.8 °C (98.2 °F), resp. rate 18, height 1.86 m (6' 1.23\"), weight 88.6 kg (195 lb 5.2 oz), SpO2 96 %.  Vitals:    12/10/17 1130 12/10/17 1154 12/10/17 1500 12/10/17 1550   BP: 141/71   135/76   Pulse: 84  80 95   Resp: 18  18 18   Temp: 37 °C (98.6 °F)   36.8 °C (98.2 °F)   SpO2: 96%  95% 96%   Weight:  88.6 kg (195 lb 5.2 oz)     Height:  1.86 m (6' 1.23\")       Oxygen Therapy:  Pulse Oximetry: 96 %, O2 (LPM): 3, O2 Delivery: Silicone Nasal Cannula    Constitutional:   Well developed, Well nourished   HENMT:  Normocephalic, Atraumatic, Oropharynx moist mucous membranes, No oral exudates, Nose normal.  No thyromegaly.  Eyes:  EOMI, Conjunctiva normal, No discharge.  Neck:  Normal range of motion, No cervical tenderness,  no JVD.  Cardiovascular:  Normal heart rate, Normal rhythm, No murmurs, No rubs, No gallops.   Extremitites with intact distal pulses, no cyanosis, or edema.  Lungs:  Normal breath sounds, breath sounds clear to auscultation bilaterally,  no crackles, no wheezing, lung sounds decreased at bases bilat  Abdomen: Bowel sounds auscultated.  Abdomen distended without discoloration, +  guarding,  + tenderness upon palpation. No masses, .  : Scrotum painful to touch with pain radiating into lower abdomen. . single testicle palp. Bloody " drainage noted to kaur 20cc - Bladder scan 8cc  Skin: Warm, Dry, No erythema, No rash, no induration.  Neurologic: Alert & oriented x 3, No focal deficits noted, cranial nerves II through X are grossly intact.  Psychiatric: He is in pain, Judgment normal, Mood normal.      MDM (Data Review):     Records reviewed and summarized in current documentation    Lab Data Review:  Recent Results (from the past 24 hour(s))   COD (ADULT)    Collection Time: 12/10/17  1:17 PM   Result Value Ref Range    ABO Grouping Only A     Rh Grouping Only POS     Antibody Screen-Cod NEG    BASIC TEG    Collection Time: 12/10/17  1:28 PM   Result Value Ref Range    Reaction Time Initial-R 4.0 (L) 5.0 - 10.0 min    Clot Kinetics-K 1.5 1.0 - 3.0 min    Clot Angle-Angle 67.9 53.0 - 72.0 degrees    Maximum Clot Strength-MA 63.3 50.0 - 70.0 mm    Lysis 30 minutes-LY30 0.0 0.0 - 8.0 %    TEG Algorithm Link Algorithm    CBC WITH DIFFERENTIAL    Collection Time: 12/10/17  1:28 PM   Result Value Ref Range    WBC 8.2 4.8 - 10.8 K/uL    RBC 2.58 (L) 4.70 - 6.10 M/uL    Hemoglobin 8.9 (L) 14.0 - 18.0 g/dL    Hematocrit 27.0 (L) 42.0 - 52.0 %    .7 (H) 81.4 - 97.8 fL    MCH 34.5 (H) 27.0 - 33.0 pg    MCHC 33.0 (L) 33.7 - 35.3 g/dL    RDW 59.7 (H) 35.9 - 50.0 fL    Platelet Count 142 (L) 164 - 446 K/uL    MPV 10.0 9.0 - 12.9 fL    Neutrophils-Polys 89.60 (H) 44.00 - 72.00 %    Lymphocytes 4.00 (L) 22.00 - 41.00 %    Monocytes 5.20 0.00 - 13.40 %    Eosinophils 0.50 0.00 - 6.90 %    Basophils 0.10 0.00 - 1.80 %    Immature Granulocytes 0.60 0.00 - 0.90 %    Nucleated RBC 0.00 /100 WBC    Neutrophils (Absolute) 7.35 1.82 - 7.42 K/uL    Lymphs (Absolute) 0.33 (L) 1.00 - 4.80 K/uL    Monos (Absolute) 0.43 0.00 - 0.85 K/uL    Eos (Absolute) 0.04 0.00 - 0.51 K/uL    Baso (Absolute) 0.01 0.00 - 0.12 K/uL    Immature Granulocytes (abs) 0.05 0.00 - 0.11 K/uL    NRBC (Absolute) 0.00 K/uL   APTT    Collection Time: 12/10/17  1:28 PM   Result Value Ref Range     APTT 33.6 24.7 - 36.0 sec   PROTHROMBIN TIME    Collection Time: 12/10/17  1:28 PM   Result Value Ref Range    PT 16.0 (H) 12.0 - 14.6 sec    INR 1.31 (H) 0.87 - 1.13   LACTIC ACID    Collection Time: 12/10/17  1:28 PM   Result Value Ref Range    Lactic Acid 1.2 0.5 - 2.0 mmol/L   COMP METABOLIC PANEL    Collection Time: 12/10/17  1:28 PM   Result Value Ref Range    Sodium 137 135 - 145 mmol/L    Potassium 4.6 3.6 - 5.5 mmol/L    Chloride 106 96 - 112 mmol/L    Co2 19 (L) 20 - 33 mmol/L    Anion Gap 12.0 (H) 0.0 - 11.9    Glucose 204 (H) 65 - 99 mg/dL    Bun 40 (H) 8 - 22 mg/dL    Creatinine 3.27 (H) 0.50 - 1.40 mg/dL    Calcium 8.8 8.5 - 10.5 mg/dL    AST(SGOT) 16 12 - 45 U/L    ALT(SGPT) 7 2 - 50 U/L    Alkaline Phosphatase 51 30 - 99 U/L    Total Bilirubin 0.4 0.1 - 1.5 mg/dL    Albumin 2.5 (L) 3.2 - 4.9 g/dL    Total Protein 5.2 (L) 6.0 - 8.2 g/dL    Globulin 2.7 1.9 - 3.5 g/dL    A-G Ratio 0.9 g/dL   ESTIMATED GFR    Collection Time: 12/10/17  1:28 PM   Result Value Ref Range    GFR If  23 (A) >60 mL/min/1.73 m 2    GFR If Non  19 (A) >60 mL/min/1.73 m 2       Imaging/Procedures Review:    Reviewed    MDM (Assessment and Plan):     Active Hospital Problems    Diagnosis   • Extraperitoneal rupture of bladder [N32.89]     Priority: High   • Hydronephrosis [N13.30]     Priority: High   • History of DVT (deep vein thrombosis) [Z86.718]   • Acute kidney injury (CMS-HCC) [N17.9]   • DM type 2 (diabetes mellitus, type 2) (CMS-HCC) [E11.9]     Plan    Type and Cross for OR prep  CT pelvis abdomen ordered - radiology managing secondary to renal failure  Cystoscopy with evacuation of bladder clots. Exploratory lap. with closure of bladder perforation and placement of suprapubic catheter.

## 2017-12-11 NOTE — NON-PROVIDER
Internal Medicine Admitting History and Physical    Note Author: Niels Figueroa, Student       Name Niels Lima     1950   Age/Sex 67 y.o. male   MRN [id number]   Code Status Full     After 5PM or if no immediate response to page, please call for cross-coverage  Attending/Team: Patricia/Fco See Patient List for primary contact information  Call [phone] to page    1st Call - Day Intern (R1):   Cecilio 2nd Call - Day Sr. Resident (R2/R3):   Zoey       Chief [facility]  [facility] and bilateral hydronephrosis    HPI:   Patient is a 67 year old male with a PMH of Crohn's disease, DMII, DVT s/p IVC filter in  and a PSH of orchiectomy (17) and bowel resection who presented to the [facility] on  with hematuria, abdominal pain, nausea, and vomiting. [facility] that showed possible extraperitoneal bladder rupture and an US showed bilateral hydronephrosis. Urology was consulted and a CT cystogram ws done which showed tears of the urinary bladder with extraperitoneal rupture but extravasated air and contrast contained close to the urinary bladder wall by surrounding hematoma.    The patient was transferred to [facility] on 12.10.17 for further management of worsening JENNIFFER . The patient had diffuse abdominal pain and was distressed; he does not want to be on dialysis.   This morning, I saw the patient at the bedside. Per nurse, he was in significantly less pain this morning than yesterday, and his kaur catheter was not clogged; he is making urine. The patient did state he was still getting bladder spasms, and wanted to know if we could increase the frequency of his opium suppository. He was NPO this morning, but his diet was advanced to clear liquids; he has not had a BM since being in this hospital.   He has a very large hematoma on his left leg that resembles an eschar, there is no erythema or pus, however his leg is tender to palpation  around the lesion.   Patient was transferred to telemetry this morning for hyperkalemia, given insulin, dextrose, and NS.     ROS   General: no unexplained weight loss, fever, or chills  HEENT: no headaches, vision changes, hearing changes, URI symptoms  RESP: no SOB or cough  CV: no palpitations or leg swelling  GI: positive for constipation, negative for diarrhea, hematochezia, melena  : negative for hematuria, dysuria   Neuro: negative for dizziness, vertigo, or stroke like symptoms            Past Medical History:   Past Medical History:   Diagnosis Date   • Crohn's disease (CMS-HCC) 01/01/2001   • Diabetes (CMS-HCC)     Type 2   • DVT (deep venous thrombosis) (CMS-HCC) 01/01/2009    IVC filter in place   • Presence of IVC filter 01/01/2009       Past Surgical History:  Past Surgical History:   Procedure Laterality Date   • CYSTOSCOPY  12/10/2017    Procedure: cystoscopy, evacuation of clots, transurethral biopsy, ffulguration of perforation, cystogram;  Surgeon: Amador Upton M.D.;  Location: SURGERY [facility];  Service: Urology   • SCROTAL EXPLORATION Bilateral 11/12/2017    Procedure: SCROTAL EXPLORATION -  left orchiectomy, right testicular fixation;  Surgeon: José Antonio Mosqueda M.D.;  Location: SURGERY [facility];  Service: Urology   • BOWEL RESECTION  01/01/1986       Current Outpatient Medications:  Home Medications     Reviewed by Mauricio Hoffman R.N. (Registered Nurse) on 12/10/17 at 1155  Med List Status: <None>   Medication Last Dose Status   glipiZIDE (GLUCOTROL) 5 MG Tab 11/10/2017 Active   mercaptopurine (PURINETHOL) 50 MG Tab 11/10/2017 Active   mesalamine (ROWASA) 1000 MG Suppos  Active   Mesalamine 0.375 GM CAPSULE SR 24 HR 11/10/2017 Active   metformin (GLUCOPHAGE) 1000 MG tablet 11/10/2017 Active   oxycodone immediate-release (ROXICODONE) 5 MG Tab  Active   tamsulosin (FLOMAX) 0.4 MG capsule 11/10/2017 Active   warfarin (COUMADIN) 10 MG Tab  Active                Medication  "Allergy/Sensitivities:  Allergies   Allergen Reactions   • Compazine Unspecified     Pt states \"face froze up\"         Family History:  History reviewed. No pertinent family history.    Social History:  Social History     Social History   • Marital status: Unknown     Spouse name: N/A   • Number of children: N/A   • Years of education: N/A     Occupational History   • Not on file.     Social History Main Topics   • Smoking status: Never Smoker   • Smokeless tobacco: Never Used   • Alcohol use No   • Drug use: No   • Sexual activity: Not on file     Other Topics Concern   • Not on file     Social History Narrative   • No narrative on file     Living situation: Lives at home with wife, who is a nurse  PCP : Pcp Not In Computer[facility]        Physical Exam     Vitals:    12/11/17 0720 12/11/17 0738 12/11/17 1028 12/11/17 1117   BP: 131/68  137/74 139/69   Pulse: 77  81 85   Resp: 20  18 18   Temp: 36.4 °C (97.5 °F)  36.9 °C (98.4 °F) 37.4 °C (99.3 °F)   SpO2: 96% 97% 95% 97%   Weight:   93.4 kg (205 lb 14.6 oz)    Height:   1.854 m (6' 1\")      Body mass index is 27.17 kg/m².  /69   Pulse 85   Temp 37.4 °C (99.3 °F)   Resp 18   Ht 1.854 m (6' 1\")   Wt 93.4 kg (205 lb 14.6 oz)   SpO2 97%   BMI 27.17 kg/m²   O2 therapy: Pulse Oximetry: 97 %, O2 (LPM): 1.5, O2 Delivery: Silicone Nasal Cannula    Physical Exam  General: Not in acute distress, hungry  HEENT: NCAT, EOMI  Resp: Clear to auscultation B/L with no wheeze, rhonchi, rales. Dorsal pedal pulses are 2+ and symmetric bilaterally, radial pulses are 2+ and symmetric bilaterally  CVS: Regular rate and rhythm, no murmurs, rubs or gallops  Abdomen: Distended, bowel sounds normoactive, no TTP, HSM, pulsatile masses or abdominal bruits  Neuro: No focal deficits  Skin: large hematoma on left, posterior thigh. No erythema, pus, but warm, hard,  and tender to palpation.        Data Review       Old Records Request:   Completed  Current Records review and " summary: Completed    Lab Data Review:  Recent Results (from the past 24 hour(s))   CBC WITH DIFFERENTIAL    Collection Time: 12/11/17  3:17 AM   Result Value Ref Range    WBC 8.8 4.8 - 10.8 K/uL    RBC 2.55 (L) 4.70 - 6.10 M/uL    Hemoglobin 8.7 (L) 14.0 - 18.0 g/dL    Hematocrit 26.9 (L) 42.0 - 52.0 %    .5 (H) 81.4 - 97.8 fL    MCH 34.1 (H) 27.0 - 33.0 pg    MCHC 32.3 (L) 33.7 - 35.3 g/dL    RDW 59.7 (H) 35.9 - 50.0 fL    Platelet Count 145 (L) 164 - 446 K/uL    MPV 9.3 9.0 - 12.9 fL    Neutrophils-Polys 94.50 (H) 44.00 - 72.00 %    Lymphocytes 2.10 (L) 22.00 - 41.00 %    Monocytes 1.70 0.00 - 13.40 %    Eosinophils 0.10 0.00 - 6.90 %    Basophils 0.10 0.00 - 1.80 %    Immature Granulocytes 1.50 (H) 0.00 - 0.90 %    Nucleated RBC 0.00 /100 WBC    Neutrophils (Absolute) 8.35 (H) 1.82 - 7.42 K/uL    Lymphs (Absolute) 0.19 (L) 1.00 - 4.80 K/uL    Monos (Absolute) 0.15 0.00 - 0.85 K/uL    Eos (Absolute) 0.01 0.00 - 0.51 K/uL    Baso (Absolute) 0.01 0.00 - 0.12 K/uL    Immature Granulocytes (abs) 0.13 (H) 0.00 - 0.11 K/uL    NRBC (Absolute) 0.00 K/uL   BASIC METABOLIC PANEL    Collection Time: 12/11/17  3:17 AM   Result Value Ref Range    Sodium 136 135 - 145 mmol/L    Potassium 5.6 (H) 3.6 - 5.5 mmol/L    Chloride 105 96 - 112 mmol/L    Co2 18 (L) 20 - 33 mmol/L    Glucose 246 (H) 65 - 99 mg/dL    Bun 57 (H) 8 - 22 mg/dL    Creatinine 5.15 (HH) 0.50 - 1.40 mg/dL    Calcium 8.7 8.5 - 10.5 mg/dL    Anion Gap 13.0 (H) 0.0 - 11.9   ESTIMATED GFR    Collection Time: 12/11/17  3:17 AM   Result Value Ref Range    GFR If  14 (A) >60 mL/min/1.73 m 2    GFR If Non African American 11 (A) >60 mL/min/1.73 m 2   BASIC METABOLIC PANEL    Collection Time: 12/11/17  8:17 AM   Result Value Ref Range    Sodium 141 135 - 145 mmol/L    Potassium 6.0 (H) 3.6 - 5.5 mmol/L    Chloride 107 96 - 112 mmol/L    Co2 21 20 - 33 mmol/L    Glucose 255 (H) 65 - 99 mg/dL    Bun 57 (H) 8 - 22 mg/dL    Creatinine 5.07 (H) 0.50 -  1.40 mg/dL    Calcium 8.9 8.5 - 10.5 mg/dL    Anion Gap 13.0 (H) 0.0 - 11.9   ESTIMATED GFR    Collection Time: 12/11/17  8:17 AM   Result Value Ref Range    GFR If  14 (A) >60 mL/min/1.73 m 2    GFR If Non  12 (A) >60 mL/min/1.73 m 2   BASIC METABOLIC PANEL    Collection Time: 12/11/17 10:13 AM   Result Value Ref Range    Sodium 138 135 - 145 mmol/L    Potassium 5.6 (H) 3.6 - 5.5 mmol/L    Chloride 107 96 - 112 mmol/L    Co2 21 20 - 33 mmol/L    Glucose 234 (H) 65 - 99 mg/dL    Bun 58 (H) 8 - 22 mg/dL    Creatinine 4.91 (H) 0.50 - 1.40 mg/dL    Calcium 8.7 8.5 - 10.5 mg/dL    Anion Gap 10.0 0.0 - 11.9   MAGNESIUM    Collection Time: 12/11/17 10:13 AM   Result Value Ref Range    Magnesium 2.2 1.5 - 2.5 mg/dL   PHOSPHORUS    Collection Time: 12/11/17 10:13 AM   Result Value Ref Range    Phosphorus 8.0 (H) 2.5 - 4.5 mg/dL   ESTIMATED GFR    Collection Time: 12/11/17 10:13 AM   Result Value Ref Range    GFR If  14 (A) >60 mL/min/1.73 m 2    GFR If Non  12 (A) >60 mL/min/1.73 m 2   ACCU-CHEK GLUCOSE    Collection Time: 12/11/17 10:14 AM   Result Value Ref Range    Glucose - Accu-Ck 238 (H) 65 - 99 mg/dL   ACCU-CHEK GLUCOSE    Collection Time: 12/11/17 12:12 PM   Result Value Ref Range    Glucose - Accu-Ck 212 (H) 65 - 99 mg/dL   URINE SODIUM RANDOM    Collection Time: 12/11/17  1:34 PM   Result Value Ref Range    Sodium, Urine -per volume 44 mmol/L   URINE POTASSIUM RANDOM    Collection Time: 12/11/17  1:34 PM   Result Value Ref Range    Potassium 19.5 mmol/L   URINE CHLORIDE RANDOM    Collection Time: 12/11/17  1:34 PM   Result Value Ref Range    Chloride, Urine-per volume 31 mmol/L   URINE CREATININE RANDOM    Collection Time: 12/11/17  1:34 PM   Result Value Ref Range    Creatinine, Random Urine 64.40 mg/dL   URINE PROTEIN RANDOM    Collection Time: 12/11/17  1:34 PM   Result Value Ref Range    Total Protein, Urine 96.2 (H) 0.0 - 15.0 mg/dL   HEMOGLOBIN  AND HEMATOCRIT    Collection Time: 12/11/17  2:33 PM   Result Value Ref Range    Hemoglobin 8.6 (L) 14.0 - 18.0 g/dL    Hematocrit 26.2 (L) 42.0 - 52.0 %       Imaging/Procedures Review:    ndependant Imaging Review: Completed  DX-CYSTO FLUORO > 1 HOUR   Final Result         Portable cystoscopy utilized for 1.4 minutes.      CT-PELVIS W/O   Final Result      1.  Limited CT cystogram because of patient tolerance factors. Only approximately 5 mL of contrast was injected. There is extraperitoneal abnormal density indicating there is some extra peritoneal spread      2.  There is also abnormal density layering in the pelvis but no contrast extravasates here. Because the contrast challenge was not robust, false-negative for extravasation could not be excluded. This density in the pelvis is most likely not from    intraperitoneal extravasation which is further supported by lack of free air and some abnormal contour to the bladder anterosuperiorly which would support extraperitoneal primary injury      3.  Right nephrolith and cyst      US-RENAL   Final Result      Moderate right, mild left hydronephrosis      9 mm left nonobstructive nephrolith      Echogenic bladder material is compatible with hemorrhage or infectious exudate.      US-ABDOMEN LIMITED    (Results Pending)   US-EXTREMITY NON VASCULAR UNILATERAL LEFT    (Results Pending)            EKG:   EKG Independant Review: Deferred  QTc:, HR:, Normal Sinus Rhythm, no ST/T changes     Records reviewed and summarized in current documentation             Assessment/Plan     Assessment & plan notes cannot be loaded without a specified hospital service.    # Extraperitoneal bladder rupture   Stable, urology performed cystoscopy and bladder repair yesterday, and removed hematoma   Patient is making clear urine, kaur is not clogged, no dysuria or hematuria at this time.  Plan   -Hb/Hct q 12 hours  -Continue kaur catheter  -H/H q 12 hours; transfuse if hb <7 for active  bleeding  -Continue C3  -Repeat UA  -Anemia workup to set baseline to monitor blood loss      #Hydronephrosis  -BUN/CR 63/5.14 from 40/3.27 yesterday  -Continue kaur  -Give NS fluid bolus 2L    #Hyperkalemia  -K of 6 this morning  -Patient transferred to telemetry  -BMP q 4 hours with Mg  -CMP, Mg, Phos q day  -Insulin, dextrose, and fluids given  -urine electrolytes    #DMII  -patient on metformin and glipizide as home medication  -hold home medications  -Sliding scale insulin with hypoglycemia protocol  -repeat HbA1c    #DVT Hx  - History of DVT three times in past; was on warfarin previously, currently on dabigatran; holding anticoagulation for now because of bleeding risk.  - IVC filter in place from 1433-4702  - US of thorax IVC to look for clots around filter--will call radiology today to order appropriate US  Anticipated Hospital stay:  >2 midnights        Quality Measures  Quality-Core Measures

## 2017-12-11 NOTE — OR SURGEON
Immediate Post OP Note    PreOp Diagnosis: Bladder Perforation                                 Acute Renal Failure                                 Hematuria    PostOp Diagnosis: Extraperitoneal Bladder Rupture                                  Acute Renal Failure                                  Clot retention    Procedure(s):  Rigid cystoscopy, evacuation of blood clots, transurethral biopsy of posterior bladder wall, fulguration of bladder perforation sites with cystogram and drainage films Wound Class: Clean Contaminated    Surgeon(s):  Amador Upton M.D.    Anesthesiologist/Type of Anesthesia:  Anesthesiologist: Gabbi rCuz M.D./General ETT    Surgical Staff:  Circulator: Odette Sanches R.N.  Scrub Person: Leopold von C Garcia  Count [place]: Booker Pearce R.N.    Specimens:A: Bladder wall biopsy    Estimated Blood Loss: 25-50 cc    Findings: 100 cc of old clot removed.  Very trabeculated bladder with posterior bladder wall bleeding.  No evidence of intraperitoneal bladder perforation with 350ml of contrast     Complications: None  Drains: [address] kaur with CBI connected but not flowing        12/10/2017 9:35 PM Amador Upton

## 2017-12-11 NOTE — OR NURSING
Pt arrived to PACU 3B via bed.  Patient connected to monitors, oxygen 3 lpm via n/c.  Pt restless and in pain, moaning, medicated per anesthesia orders.  Bedside report and care received from Dr. Cruz. Not from [address] [facility], on arrival to PACU. CBI not running at this time, no order, will wait for Dr. Upton's order.  Urine clear. Pink tinged.

## 2017-12-11 NOTE — CARE PLAN
Problem: Safety  Goal: Will remain free from falls  Outcome: PROGRESSING AS EXPECTED  Elaine Lindsay Fall Risk Assessment:     Last Known Fall: No falls  Mobility: Immobilized/requires assist of one person  Medications: Cardiovascular or central nervous system meds  Mental Status/LOC/Awareness: Awake, alert, and oriented to date, place, and person  Toileting Needs: Use of catheters or diversion devices  Volume/Electrolyte Status: Low blood sugar/electrolyte imbalances  Communication/Sensory: Visual (Glasses)/hearing deficit  Behavior: Appropriate behavior  Elaine Lindsay Fall Risk Total: 12  Fall Risk Level: MODERATE RISK    Universal Fall Precautions:  call light/belongings in reach, bed in low position and locked, wheelchairs and assistive devices out of sight, siderails up x 2, adequate lighting, clutter free and spill free environment, educate on level of risk, educate to call for assistance    Fall Risk Level Interventions:    TRIAL (TELE 8, NEURO, MED DANYELL 5) Moderate Fall Risk Interventions  Place yellow fall risk ID band on patient: refused  Provide patient/family education based on risk assessment : verified  Educate patient/family to call staff for assistance when getting out of bed: verified  Place fall precaution signage outside patient door: verified  Utilize bed/chair fall alarm: verified     Patient Specific Interventions:     Medication: review medications with patient and family, assess for medications that can be discontinued or dosage decreased and limit combination of prn medications  Mental Status/LOC/Awareness: reinforce falls education, encourage family to stay with patient, check on patient hourly, reinforce the use of call light and provide activity  Toileting: provide frquent toileting, monitor intake and output/use of appropriate interventions, instruct male patients prone to dizziness to void while sitting, instruct patient/family on the use of grab bars and instruct patient/family on the  need to call for assistance when toileting  Volume/Electrolyte Status: ensure patient remains hydrated, advance diet as tolerated, administer medications as ordered for nausea and vomiting and monitor abnormal lab values  Communication/Sensory: update plan of care on whiteboard and ensure proper positioning when transferrng/ambulating  Behavioral: encourage patient to voice feelings, engage patient in daily activities, administer medication as ordered and instruct/reinforce fall program rationale  Mobility: provide comfort measures during transport, dangle prior to standing, utilize bed/chair fall alarm, ensure bed is locked and in lowest position, provide appropriate assistive device and instruct patient to exit bed on their strongest side    Problem: Knowledge Deficit  Goal: Knowledge of disease process/condition, treatment plan, diagnostic tests, and medications will improve  Outcome: PROGRESSING AS EXPECTED  Discussed POC and daily goals with pt. Pt verbalized understanding.

## 2017-12-11 NOTE — PROGRESS NOTES
Discussed with Mr. Lima in the presence of his wife the concern of both and intraperitoneal and extraperitoneal perforation.  He had rebound tenderness in the distribution of the umbilicus with CT cystogram confirming an extraperitoneal perforation but no obvious extravasation around the bowel.    There is fluid that may be urine in the pelvis and I recommended emergent cystoscopy with clot evacuation, possible fulguration of bleeding vessels and a cystogram with exploratory laparotomy if evidence of intraperitoneal bladder perforation at cystoscopy or on the cystogram.    He is seriously ill with acute renal failure with moderate bilateral hydronephrosis due to a kaur which has not been draining likely due to clot retention.    We discussed the risks of the procedure including, but not limited to, wound infection, risk of bleeding with the high likelihood of transfusion and the risk associated with transfusion including hepatitis B,C or HIV, the possible risk of injury to the bowel as he has had prior abdominal surgery due to Crohn's disease, the risk of need for reoperation, risk of abscess in the pelvis or abdomen and the possible need for addition surgery. He is aware that a suprapubic catheter will be placed as well.    We also discussed the perioperative risk of DVT, PE, aspiration pneumonia, MI,CVA and death.    Informed consent has been given to me to proceed by Mr. Lima.

## 2017-12-11 NOTE — OR NURSING
Dr. pina here to see patient, catheter secured to left leg.  Dr. Pina irrigating bladder with CBI.  States he wants it at a slow trickle to keep urine clear.  Pt having bladder spasms, okay per Dr. Pina to give floor order B/O suppository now.  Pt still c/o 7/10 pain, medicated per anesthesia order.

## 2017-12-11 NOTE — PROGRESS NOTES
2 RN skin check done with KATHY Barrett  Heels dry and calloused   Scab on R inner ankle  Hematoma on left posterior thigh, picture taken and uploaded.  All other skin is intact

## 2017-12-11 NOTE — PROGRESS NOTES
Pt transferred to room T831 bed #1, report called to RN with all questions / concerns addressed. CBI had 1000mL output with 1100 input. Transferred for pt K+ level @ 6.0 after recheck, requires monitoring.

## 2017-12-11 NOTE — PROGRESS NOTES
Comfort, wife, called about pt moving to telemetry unit; Wife did not , message was left to call with any questions. Called @ 1006. Wife is a nurse @ [employer] & works night shift

## 2017-12-11 NOTE — PROGRESS NOTES
Pt @ CT now, awaiting results pending OR with Hald in urology. Pt in extreme pain, especially to touch. PRN pain medication increased throughout day. No new urine output from kaur, the previous 10-30mL of bloody urine present. Md aware of condition

## 2017-12-11 NOTE — OP REPORT
DATE OF SERVICE:  12/10/2017    PREOPERATIVE DIAGNOSES:  1.  Bladder perforation.  2.  Acute renal failure.  3.  Gross hematuria.    OPERATION AND PROCEDURE PERFORMED:  1.  Rigid cystourethroscopy.  2.  Evacuation of old blood clots.  3.  Transurethral biopsy of posterior bladder wall.  4.  Fulguration of bladder perforation, on posterior bladder floor.  5.  Cystogram with drainage films.    SURGEON:  Amador Upton MD.    ANESTHESIA:  General endotracheal.    ANESTHESIOLOGIST:  Gabbi Cruz MD    POSTOPERATIVE DIAGNOSES:  1.  Extraperitoneal bladder rupture.  2.  Acute renal failure.  3.  Clot retention.    SPECIMENS:  Bladder wall biopsy to pathology for permanent section.    ESTIMATED BLOOD LOSS:  50 mL    INTRAOPERATIVE FINDINGS:  Patient had approximately 100 mL of old clot removed   from the bladder.  He has a very trabeculated bladder with cellules and small   diverticula noted at the dome.  There was no evidence of extravasation of the   fluid during cystoscopy and the cystogram revealed a trabeculated bladder   with containment of all of the fluid with 350 mL of contrast instilled into   the bladder with no evidence of intraperitoneal bladder rupture.    DRAINS:  A 24-Burkinan 3-way Pacheco to continuous bladder irrigation, but not   following.    COMPLICATIONS:  None.    INDICATIONS:  The patient is a 67-year-old gentleman with a history of   diabetes.  He has a history of urinary retention and recently developed   urgency, frequency.  He was seen in the emergency room at the [facility] on Friday, December 8th, at which time he was discharged subsequently   about 3 hours later developed acute hematuria, presented back to the [facility] where he is admitted, subsequent workup revealed an   extraperitoneal bladder rupture, catheter was positioned, and irrigation was   not instituted due to the patient being on anticoagulation therapy and the   bladder perforation.  Unfortunately, the patient  developed oliguria and then   anuria with a rising creatinine of 3.6 and is transferred to [facility] to the medical service where urology then is consulted.  In   consultation, the patient clearly has bilateral mild-to-moderate   hydronephrosis, acute renal failure with hematuria and an ultrasound was   performed confirming debris in the bladder.  The patient really did not have   any urine output and had anemia with hemoglobin of 8.9.  Subsequently, I   recommended a CT cystogram which was performed and unfortunately the patient   can tolerate filling the bladder, there was evidence of extravasation in the   extraperitoneal space, but none in the intraperitoneal space, although the CAT   scan shows evidence of some fluid in this region.    At this point in time, I explained to the patient and his wife that I would   recommend surgical cystoscopy, evacuation of clots, fulguration of bleeding   vessels, cystogram under anesthesia with high pressure and if there is an   intraperitoneal bladder rupture, this would need to be explored with an   exploratory laparotomy, closure of the bladder, and suprapubic tube.  All   risks and benefits were discussed and documented in my initial consultation.    DESCRIPTION IN DETAIL:  Informed consent was obtained by the patient and given   to Dr. Upton, he was brought to the operating room and placed in supine.    Bilateral sequential compression devices are in place and operational general   endotracheal anesthetic was administered in balanced fashion, received   intravenous Ancef weight based and then is positioned in modified lithotomy.    The operative area was Betadine prepped and draped in usual sterile fashion.    Surgical time-out was called and all members of the operative team agree as to   the patient's name, procedure to be performed with no objections, attention   was directed to the procedure.    I began the procedure by passing a 25-[place]ian  generously lubricated cystoscope   with 30-degree lens per urethra.  Anterior urethra is normal.  The prosthetic   fossa measured 4.5-5 cm in length.  He has bilobar inclusive hypertrophy with   elevated median bar.  Upon entering the bladder, the patient has significant   amount of old clot, it is difficult to see the trigone.  I evacuated the clot   and it was clear that some of this was organized so I withdrew the cystoscope   and passed a resectoscope 25.6 Tongan with the visualizing obturator and then   resected the clot, was able to mobilize about 100 mL of old clot.  Once the   clot was removed, I could identify the lateral bladder walls and I could angle   the bladder up anteriorly and was able to see the dome of the bladder where   there was an air bubble, he had 2 diverticula, but there is no bladder   perforation on dome visualized; however, the posterior floor, had significant   areas of bleeding consistent with an extraperitoneal bladder rupture.  I   subsequently biopsied the bladder where there was one area of erythema to make   sure this is not malignancy.  The biopsy was obtained in the base was   fulgurated, I then switched to rollerball and ____ the edges of the bleeding   sites where the bladder had perforated posteriorly.  After getting control of   the bleeding and hemostasis now being much improved, I withdrew the scope to   get a good strong stream of the normal saline and I passed a 24-Tongan 3-way   Pacheco, inflated the balloon to 15 mL and then irrigated him with irrigation   and was able to put 250 mL in fairly easily, it drained clear.  At this   juncture in time, I performed a gravity cystogram using half dilute   Cysto-Conray 250 mL were initially instilled.  This showed no extravasation   during filling.  The patient had a highly trabeculated bladder with   diverticula noted at the dome, but no obvious perforation.  I subsequently   injected another 100 mL of saline to over compress  and distend the bladder.    Again, no extravasation was seen at this point, then I did a drainage films.    The drainage films once they were performed showed evidence of slight   extravasation perhaps posteriorly, but there was no intraperitoneal   perforation or extravasation.  At this point in time, I left the Pacheco in   place, I placed another 10 mL of sterile water in to the balloon and then with   25 mL in the balloon, left this to gravity drainage.  I did connect to [facility],   not running to observe this.  At the end of the case, the patient has   tolerated the procedure and went well without complication, was extubated in   the operating room and transferred to recovery room where he arrived in stable   condition.       ____________________________________     MD CAMILLA SCHNEIDER / AVILA    DD:  12/10/2017 22:00:06  DT:  12/10/2017 22:35:34    D#:  [id number]  Job#:  [id number]

## 2017-12-12 ENCOUNTER — APPOINTMENT (OUTPATIENT)
Dept: RADIOLOGY | Facility: MEDICAL CENTER | Age: 67
DRG: 668 | End: 2017-12-12
Attending: STUDENT IN AN ORGANIZED HEALTH CARE EDUCATION/TRAINING PROGRAM
Payer: COMMERCIAL

## 2017-12-12 ENCOUNTER — APPOINTMENT (OUTPATIENT)
Dept: RADIOLOGY | Facility: MEDICAL CENTER | Age: 67
DRG: 668 | End: 2017-12-12
Attending: INTERNAL MEDICINE
Payer: COMMERCIAL

## 2017-12-12 ENCOUNTER — APPOINTMENT (OUTPATIENT)
Dept: RADIOLOGY | Facility: MEDICAL CENTER | Age: 67
DRG: 668 | End: 2017-12-12
Attending: HOSPITALIST
Payer: COMMERCIAL

## 2017-12-12 PROBLEM — R14.0 ABDOMINAL DISTENSION: Status: ACTIVE | Noted: 2017-12-12

## 2017-12-12 PROBLEM — D64.9 ANEMIA: Status: ACTIVE | Noted: 2017-12-12

## 2017-12-12 LAB
ALBUMIN SERPL BCP-MCNC: 2.4 G/DL (ref 3.2–4.9)
ALBUMIN/GLOB SERPL: 0.8 G/DL
ALP SERPL-CCNC: 57 U/L (ref 30–99)
ALT SERPL-CCNC: 6 U/L (ref 2–50)
ANION GAP SERPL CALC-SCNC: 11 MMOL/L (ref 0–11.9)
ANION GAP SERPL CALC-SCNC: 12 MMOL/L (ref 0–11.9)
ANION GAP SERPL CALC-SCNC: 14 MMOL/L (ref 0–11.9)
APPEARANCE UR: ABNORMAL
AST SERPL-CCNC: 9 U/L (ref 12–45)
BACTERIA #/AREA URNS HPF: NEGATIVE /HPF
BILIRUB SERPL-MCNC: 0.4 MG/DL (ref 0.1–1.5)
BILIRUB UR QL STRIP.AUTO: NEGATIVE
BUN SERPL-MCNC: 58 MG/DL (ref 8–22)
BUN SERPL-MCNC: 62 MG/DL (ref 8–22)
BUN SERPL-MCNC: 71 MG/DL (ref 8–22)
CALCIUM SERPL-MCNC: 8.2 MG/DL (ref 8.5–10.5)
CALCIUM SERPL-MCNC: 8.6 MG/DL (ref 8.5–10.5)
CALCIUM SERPL-MCNC: 8.6 MG/DL (ref 8.5–10.5)
CHLORIDE SERPL-SCNC: 107 MMOL/L (ref 96–112)
CHLORIDE SERPL-SCNC: 108 MMOL/L (ref 96–112)
CHLORIDE SERPL-SCNC: 110 MMOL/L (ref 96–112)
CO2 SERPL-SCNC: 18 MMOL/L (ref 20–33)
CO2 SERPL-SCNC: 18 MMOL/L (ref 20–33)
CO2 SERPL-SCNC: 19 MMOL/L (ref 20–33)
COLOR UR: YELLOW
CREAT SERPL-MCNC: 4.59 MG/DL (ref 0.5–1.4)
CREAT SERPL-MCNC: 5.13 MG/DL (ref 0.5–1.4)
CREAT SERPL-MCNC: 5.77 MG/DL (ref 0.5–1.4)
EPI CELLS #/AREA URNS HPF: NEGATIVE /HPF
ERYTHROCYTE [DISTWIDTH] IN BLOOD BY AUTOMATED COUNT: 60.1 FL (ref 35.9–50)
EST. AVERAGE GLUCOSE BLD GHB EST-MCNC: 154 MG/DL
FOLATE SERPL-MCNC: 19.7 NG/ML
GFR SERPL CREATININE-BSD FRML MDRD: 10 ML/MIN/1.73 M 2
GFR SERPL CREATININE-BSD FRML MDRD: 11 ML/MIN/1.73 M 2
GFR SERPL CREATININE-BSD FRML MDRD: 13 ML/MIN/1.73 M 2
GLOBULIN SER CALC-MCNC: 3.1 G/DL (ref 1.9–3.5)
GLUCOSE BLD-MCNC: 202 MG/DL (ref 65–99)
GLUCOSE BLD-MCNC: 218 MG/DL (ref 65–99)
GLUCOSE BLD-MCNC: 226 MG/DL (ref 65–99)
GLUCOSE BLD-MCNC: 234 MG/DL (ref 65–99)
GLUCOSE SERPL-MCNC: 193 MG/DL (ref 65–99)
GLUCOSE SERPL-MCNC: 214 MG/DL (ref 65–99)
GLUCOSE SERPL-MCNC: 215 MG/DL (ref 65–99)
GLUCOSE UR STRIP.AUTO-MCNC: NEGATIVE MG/DL
HBA1C MFR BLD: 7 % (ref 0–5.6)
HCT VFR BLD AUTO: 26.3 % (ref 42–52)
HCT VFR BLD AUTO: 30 % (ref 42–52)
HGB BLD-MCNC: 8.6 G/DL (ref 14–18)
HGB BLD-MCNC: 9.9 G/DL (ref 14–18)
HGB RETIC QN AUTO: 31.3 PG/CELL (ref 29–35)
HYALINE CASTS #/AREA URNS LPF: ABNORMAL /LPF
IMM RETICS NFR: 20.9 % (ref 9.3–17.4)
INR PPP: 1.44 (ref 0.87–1.13)
IRON SATN MFR SERPL: ABNORMAL % (ref 15–55)
IRON SERPL-MCNC: <10 UG/DL (ref 50–180)
KETONES UR STRIP.AUTO-MCNC: NEGATIVE MG/DL
LACTATE BLD-SCNC: 1.7 MMOL/L (ref 0.5–2)
LEUKOCYTE ESTERASE UR QL STRIP.AUTO: ABNORMAL
MAGNESIUM SERPL-MCNC: 2.1 MG/DL (ref 1.5–2.5)
MCH RBC QN AUTO: 34.1 PG (ref 27–33)
MCHC RBC AUTO-ENTMCNC: 32.7 G/DL (ref 33.7–35.3)
MCV RBC AUTO: 104.4 FL (ref 81.4–97.8)
MICRO URNS: ABNORMAL
NITRITE UR QL STRIP.AUTO: NEGATIVE
PH UR STRIP.AUTO: 5 [PH]
PHOSPHATE SERPL-MCNC: 6 MG/DL (ref 2.5–4.5)
PLATELET # BLD AUTO: 166 K/UL (ref 164–446)
PMV BLD AUTO: 9.7 FL (ref 9–12.9)
POTASSIUM SERPL-SCNC: 4.6 MMOL/L (ref 3.6–5.5)
POTASSIUM SERPL-SCNC: 4.8 MMOL/L (ref 3.6–5.5)
POTASSIUM SERPL-SCNC: 4.9 MMOL/L (ref 3.6–5.5)
PROT SERPL-MCNC: 5.5 G/DL (ref 6–8.2)
PROT UR QL STRIP: 30 MG/DL
PROTHROMBIN TIME: 17.2 SEC (ref 12–14.6)
RBC # BLD AUTO: 2.52 M/UL (ref 4.7–6.1)
RBC # URNS HPF: ABNORMAL /HPF
RBC UR QL AUTO: ABNORMAL
RETICS # AUTO: 0.06 M/UL (ref 0.04–0.06)
RETICS/RBC NFR: 2.5 % (ref 0.8–2.1)
SODIUM SERPL-SCNC: 136 MMOL/L (ref 135–145)
SODIUM SERPL-SCNC: 139 MMOL/L (ref 135–145)
SODIUM SERPL-SCNC: 142 MMOL/L (ref 135–145)
SP GR UR STRIP.AUTO: 1.01
TIBC SERPL-MCNC: 227 UG/DL (ref 250–450)
UROBILINOGEN UR STRIP.AUTO-MCNC: 0.2 MG/DL
VIT B12 SERPL-MCNC: 331 PG/ML (ref 211–911)
WBC # BLD AUTO: 8 K/UL (ref 4.8–10.8)
WBC #/AREA URNS HPF: ABNORMAL /HPF

## 2017-12-12 PROCEDURE — A9270 NON-COVERED ITEM OR SERVICE: HCPCS | Performed by: INTERNAL MEDICINE

## 2017-12-12 PROCEDURE — 81001 URINALYSIS AUTO W/SCOPE: CPT

## 2017-12-12 PROCEDURE — A9270 NON-COVERED ITEM OR SERVICE: HCPCS | Performed by: UROLOGY

## 2017-12-12 PROCEDURE — 84100 ASSAY OF PHOSPHORUS: CPT | Mod: 91

## 2017-12-12 PROCEDURE — 85576 BLOOD PLATELET AGGREGATION: CPT

## 2017-12-12 PROCEDURE — 80053 COMPREHEN METABOLIC PANEL: CPT | Mod: 91

## 2017-12-12 PROCEDURE — C9113 INJ PANTOPRAZOLE SODIUM, VIA: HCPCS | Performed by: INTERNAL MEDICINE

## 2017-12-12 PROCEDURE — 85018 HEMOGLOBIN: CPT

## 2017-12-12 PROCEDURE — 85610 PROTHROMBIN TIME: CPT | Mod: 91

## 2017-12-12 PROCEDURE — 83605 ASSAY OF LACTIC ACID: CPT

## 2017-12-12 PROCEDURE — 770020 HCHG ROOM/CARE - TELE (206)

## 2017-12-12 PROCEDURE — 700111 HCHG RX REV CODE 636 W/ 250 OVERRIDE (IP): Performed by: INTERNAL MEDICINE

## 2017-12-12 PROCEDURE — 80048 BASIC METABOLIC PNL TOTAL CA: CPT

## 2017-12-12 PROCEDURE — 700102 HCHG RX REV CODE 250 W/ 637 OVERRIDE(OP): Performed by: STUDENT IN AN ORGANIZED HEALTH CARE EDUCATION/TRAINING PROGRAM

## 2017-12-12 PROCEDURE — 82746 ASSAY OF FOLIC ACID SERUM: CPT

## 2017-12-12 PROCEDURE — 700111 HCHG RX REV CODE 636 W/ 250 OVERRIDE (IP): Performed by: STUDENT IN AN ORGANIZED HEALTH CARE EDUCATION/TRAINING PROGRAM

## 2017-12-12 PROCEDURE — 84439 ASSAY OF FREE THYROXINE: CPT

## 2017-12-12 PROCEDURE — 700102 HCHG RX REV CODE 250 W/ 637 OVERRIDE(OP): Performed by: UROLOGY

## 2017-12-12 PROCEDURE — 82962 GLUCOSE BLOOD TEST: CPT | Mod: 91

## 2017-12-12 PROCEDURE — 84443 ASSAY THYROID STIM HORMONE: CPT

## 2017-12-12 PROCEDURE — 82607 VITAMIN B-12: CPT

## 2017-12-12 PROCEDURE — 83735 ASSAY OF MAGNESIUM: CPT | Mod: 91

## 2017-12-12 PROCEDURE — 85347 COAGULATION TIME ACTIVATED: CPT

## 2017-12-12 PROCEDURE — 74000 DX-ABDOMEN-1 VIEW: CPT

## 2017-12-12 PROCEDURE — 700105 HCHG RX REV CODE 258: Performed by: UROLOGY

## 2017-12-12 PROCEDURE — 82728 ASSAY OF FERRITIN: CPT

## 2017-12-12 PROCEDURE — 99233 SBSQ HOSP IP/OBS HIGH 50: CPT | Mod: GC | Performed by: INTERNAL MEDICINE

## 2017-12-12 PROCEDURE — A9270 NON-COVERED ITEM OR SERVICE: HCPCS | Performed by: STUDENT IN AN ORGANIZED HEALTH CARE EDUCATION/TRAINING PROGRAM

## 2017-12-12 PROCEDURE — 36415 COLL VENOUS BLD VENIPUNCTURE: CPT

## 2017-12-12 PROCEDURE — 74176 CT ABD & PELVIS W/O CONTRAST: CPT

## 2017-12-12 PROCEDURE — 700102 HCHG RX REV CODE 250 W/ 637 OVERRIDE(OP): Performed by: INTERNAL MEDICINE

## 2017-12-12 PROCEDURE — 85025 COMPLETE CBC W/AUTO DIFF WBC: CPT

## 2017-12-12 PROCEDURE — 700105 HCHG RX REV CODE 258: Performed by: INTERNAL MEDICINE

## 2017-12-12 PROCEDURE — 85014 HEMATOCRIT: CPT

## 2017-12-12 PROCEDURE — 85384 FIBRINOGEN ACTIVITY: CPT

## 2017-12-12 RX ORDER — PANTOPRAZOLE SODIUM 40 MG/10ML
40 INJECTION, POWDER, LYOPHILIZED, FOR SOLUTION INTRAVENOUS ONCE
Status: DISCONTINUED | OUTPATIENT
Start: 2017-12-12 | End: 2017-12-12

## 2017-12-12 RX ORDER — CYANOCOBALAMIN 1000 UG/ML
1000 INJECTION, SOLUTION INTRAMUSCULAR; SUBCUTANEOUS
Status: DISCONTINUED | OUTPATIENT
Start: 2017-12-12 | End: 2017-12-16

## 2017-12-12 RX ORDER — SODIUM CHLORIDE 9 MG/ML
2000 INJECTION, SOLUTION INTRAVENOUS ONCE
Status: COMPLETED | OUTPATIENT
Start: 2017-12-12 | End: 2017-12-12

## 2017-12-12 RX ORDER — HYDRALAZINE HYDROCHLORIDE 20 MG/ML
10 INJECTION INTRAMUSCULAR; INTRAVENOUS EVERY 6 HOURS PRN
Status: DISCONTINUED | OUTPATIENT
Start: 2017-12-12 | End: 2017-12-29 | Stop reason: HOSPADM

## 2017-12-12 RX ORDER — INSULIN GLARGINE 100 [IU]/ML
5 INJECTION, SOLUTION SUBCUTANEOUS EVERY EVENING
Status: DISCONTINUED | OUTPATIENT
Start: 2017-12-12 | End: 2017-12-12

## 2017-12-12 RX ORDER — PANTOPRAZOLE SODIUM 40 MG/10ML
40 INJECTION, POWDER, LYOPHILIZED, FOR SOLUTION INTRAVENOUS 2 TIMES DAILY
Status: DISCONTINUED | OUTPATIENT
Start: 2017-12-12 | End: 2017-12-13

## 2017-12-12 RX ORDER — CYANOCOBALAMIN 1000 UG/ML
1000 INJECTION, SOLUTION INTRAMUSCULAR; SUBCUTANEOUS ONCE
Status: CANCELLED | OUTPATIENT
Start: 2017-12-12 | End: 2017-12-12

## 2017-12-12 RX ORDER — OMEPRAZOLE 20 MG/1
20 CAPSULE, DELAYED RELEASE ORAL DAILY
Status: DISCONTINUED | OUTPATIENT
Start: 2017-12-12 | End: 2017-12-12

## 2017-12-12 RX ADMIN — ATROPA BELLADONNA AND OPIUM 30 MG: 16.2; 3 SUPPOSITORY RECTAL at 14:32

## 2017-12-12 RX ADMIN — INSULIN HUMAN 2 UNITS: 100 INJECTION, SOLUTION PARENTERAL at 21:00

## 2017-12-12 RX ADMIN — OXYCODONE HYDROCHLORIDE AND ACETAMINOPHEN 1 TABLET: 10; 325 TABLET ORAL at 03:12

## 2017-12-12 RX ADMIN — ONDANSETRON 4 MG: 2 INJECTION INTRAMUSCULAR; INTRAVENOUS at 16:40

## 2017-12-12 RX ADMIN — PANTOPRAZOLE SODIUM 40 MG: 40 INJECTION, POWDER, FOR SOLUTION INTRAVENOUS at 18:08

## 2017-12-12 RX ADMIN — INSULIN GLARGINE 5 UNITS: 100 INJECTION, SOLUTION SUBCUTANEOUS at 21:22

## 2017-12-12 RX ADMIN — INSULIN HUMAN 2 UNITS: 100 INJECTION, SOLUTION PARENTERAL at 05:54

## 2017-12-12 RX ADMIN — OXYCODONE HYDROCHLORIDE 5 MG: 5 TABLET ORAL at 22:26

## 2017-12-12 RX ADMIN — CYANOCOBALAMIN 1000 MCG: 1000 INJECTION, SOLUTION INTRAMUSCULAR; SUBCUTANEOUS at 12:18

## 2017-12-12 RX ADMIN — ATROPA BELLADONNA AND OPIUM 30 MG: 16.2; 3 SUPPOSITORY RECTAL at 23:31

## 2017-12-12 RX ADMIN — CEFTRIAXONE 2 G: 2 INJECTION, POWDER, FOR SOLUTION INTRAMUSCULAR; INTRAVENOUS at 08:36

## 2017-12-12 RX ADMIN — SODIUM CHLORIDE 2000 ML: 9 INJECTION, SOLUTION INTRAVENOUS at 09:49

## 2017-12-12 RX ADMIN — SODIUM CHLORIDE: 9 INJECTION, SOLUTION INTRAVENOUS at 05:51

## 2017-12-12 RX ADMIN — BENZOCAINE: 200 SPRAY DENTAL; ORAL; PERIODONTAL at 18:08

## 2017-12-12 RX ADMIN — SODIUM CHLORIDE: 9 INJECTION, SOLUTION INTRAVENOUS at 23:55

## 2017-12-12 RX ADMIN — SODIUM CHLORIDE: 9 INJECTION, SOLUTION INTRAVENOUS at 16:41

## 2017-12-12 RX ADMIN — STANDARDIZED SENNA CONCENTRATE AND DOCUSATE SODIUM 2 TABLET: 8.6; 5 TABLET, FILM COATED ORAL at 08:38

## 2017-12-12 RX ADMIN — OXYCODONE HYDROCHLORIDE AND ACETAMINOPHEN 1 TABLET: 10; 325 TABLET ORAL at 17:16

## 2017-12-12 RX ADMIN — OXYCODONE HYDROCHLORIDE AND ACETAMINOPHEN 1 TABLET: 10; 325 TABLET ORAL at 09:49

## 2017-12-12 ASSESSMENT — ENCOUNTER SYMPTOMS
ABDOMINAL PAIN: 1
DIZZINESS: 0
SHORTNESS OF BREATH: 0
DEPRESSION: 0
BRUISES/BLEEDS EASILY: 1
SENSORY CHANGE: 0
SPEECH CHANGE: 0
WEAKNESS: 0
STRIDOR: 0
NECK PAIN: 0
NAUSEA: 1
PALPITATIONS: 0
FEVER: 0
SPUTUM PRODUCTION: 0
EYE PAIN: 0
BLURRED VISION: 0
SORE THROAT: 0
ORTHOPNEA: 0
EYE DISCHARGE: 0
HEARTBURN: 0
CONSTIPATION: 1
DOUBLE VISION: 0
VOMITING: 1
SINUS PAIN: 0
ROS GI COMMENTS: ABDOMINAL DISTENSION
CHILLS: 0
BACK PAIN: 0
HEADACHES: 0
CLAUDICATION: 0
COUGH: 0
MYALGIAS: 0

## 2017-12-12 ASSESSMENT — LIFESTYLE VARIABLES: SUBSTANCE_ABUSE: 0

## 2017-12-12 ASSESSMENT — PAIN SCALES - GENERAL
PAINLEVEL_OUTOF10: 8
PAINLEVEL_OUTOF10: 8
PAINLEVEL_OUTOF10: 2
PAINLEVEL_OUTOF10: 0

## 2017-12-12 NOTE — DISCHARGE PLANNING
NARINDER received pt's spouse's FMLA paperwork from RN. NARINDER faxed paperwork to Geovanna ([id number]).

## 2017-12-12 NOTE — CARE PLAN
Problem: Safety  Goal: Will remain free from falls  Outcome: PROGRESSING AS EXPECTED  Elaine Lindsay Fall Risk Assessment:     Last Known Fall: No falls  Mobility: Immobilized/requires assist of one person  Medications: Cardiovascular or central nervous system meds  Mental Status/LOC/Awareness: Awake, alert, and oriented to date, place, and person  Toileting Needs: Use of catheters or diversion devices  Volume/Electrolyte Status: Use of IV fluids/tube feeds  Communication/Sensory: Visual (Glasses)/hearing deficit  Behavior: Appropriate behavior  Elaine Lindsay Fall Risk Total: 10  Fall Risk Level: LOW RISK    Universal Fall Precautions:  call light/belongings in reach, bed in low position and locked, adequate lighting, educate on level of risk, clutter free and spill free environment, educate to call for assistance    Fall Risk Level Interventions:   TRIAL (Proenza Schouer, NEURO, MED DANYELL 5) Low Fall Risk Interventions  Place yellow fall risk ID band on patient: verified  Provide patient/family education based on risk assessment: verified  Educate patient/family to call staff for assistance when getting out of bed: verified  Place fall precaution signage outside patient door: verifiedTRIAL (Proenza Schouer, NEURO, MED DANYELL 5) Moderate Fall Risk Interventions  Place yellow fall risk ID band on patient: refused  Provide patient/family education based on risk assessment : verified  Educate patient/family to call staff for assistance when getting out of bed: verified  Place fall precaution signage outside patient door: verified  Utilize bed/chair fall alarm: verified     Patient Specific Interventions:     Medication: review medications with patient and family, assess for medications that can be discontinued or dosage decreased and limit combination of prn medications  Mental Status/LOC/Awareness: reinforce falls education, encourage family to stay with patient, check on patient hourly, utilize bed/chair fall alarm and reinforce the use  of call light  Toileting: provide frquent toileting and monitor intake and output/use of appropriate interventions  Volume/Electrolyte Status: ensure patient remains hydrated, monitor blood sugars and maintain appropriate blood sugar levels if diabetic, advance diet as tolerated, administer medications as ordered for nausea and vomiting, monitor abnormal lab values and ensure IV fluids are appropriate  Communication/Sensory: update plan of care on whiteboard, ensure proper positioning when transferrng/ambulating, ensure patient has glasses/contacts and hearing aids/dentures and for visually impaired patients orient to their room surrounding and do not change their surroundings  Behavioral: encourage patient to voice feelings, engage patient in daily activities, administer medication as ordered and instruct/reinforce fall program rationale  Mobility: schedule physical activity throughout the day, dangle prior to standing, utilize bed/chair fall alarm, ensure bed is locked and in lowest position, provide appropriate assistive device and instruct patient to exit bed on their strongest side    Problem: Venous Thromboembolism (VTW)/Deep Vein Thrombosis (DVT) Prevention:  Goal: Patient will participate in Venous Thrombosis (VTE)/Deep Vein Thrombosis (DVT)Prevention Measures  Outcome: PROGRESSING AS EXPECTED  Discussed SCDs, pt is participating in VTE prevention.

## 2017-12-12 NOTE — PROGRESS NOTES
"Urology Progress Note    Post op Day # 2    Overnight Events: None    S: Resting. Wife concerned about edema. Repeat CT scan continues to show hydronephrosis. Some improvement with creatinine level. Good UOP.    O:   Blood pressure (!) 161/86, pulse 85, temperature 37.2 °C (99 °F), resp. rate 19, height 1.854 m (6' 1\"), weight 92.5 kg (203 lb 14.8 oz), SpO2 95 %.  Recent Labs      12/11/17   2002  12/11/17   2359  12/12/17   0338   SODIUM  138  136  139   POTASSIUM  4.7  4.6  4.8   CHLORIDE  108  107  108   CO2  19*  18*  19*   GLUCOSE  197*  193*  215*   BUN  58*  62*  58*   CREATININE  4.39*  5.13*  4.59*   CALCIUM  8.4*  8.6  8.2*     Recent Labs      12/10/17   1328  12/11/17   0317  12/11/17   1433  12/11/17   2359   WBC  8.2  8.8   --   8.0   RBC  2.58*  2.55*   --   2.52*   HEMOGLOBIN  8.9*  8.7*  8.6*  8.6*   HEMATOCRIT  27.0*  26.9*  26.2*  26.3*   MCV  104.7*  105.5*   --   104.4*   MCH  34.5*  34.1*   --   34.1*   MCHC  33.0*  32.3*   --   32.7*   RDW  59.7*  59.7*   --   60.1*   PLATELETCT  142*  145*   --   166   MPV  10.0  9.3   --   9.7         Intake/Output Summary (Last 24 hours) at 12/12/17 1321  Last data filed at 12/12/17 1319   Gross per 24 hour   Intake             6800 ml   Output            36137 ml   Net            -8375 ml       Exam:  Abdomen distended   Urine: clear      A/P:    Active Hospital Problems    Diagnosis   • Extraperitoneal rupture of bladder [N32.89]     Priority: High   • Hydronephrosis [N13.30]     Priority: High   • Renal failure [N19]   • Ruptured bladder [N32.89]   • Hyperkalemia [E87.5]   • History of DVT (deep vein thrombosis) [Z86.718]   • Acute kidney injury (CMS-HCC) [N17.9]   • DM type 2 (diabetes mellitus, type 2) (CMS-HCC) [E11.9]       DC CBI  Check creatinine in AM  If not decreasing will order bilateral nephrostomy tubes  "

## 2017-12-12 NOTE — DOCUMENTATION QUERY
DOCUMENTATION QUERY    PROVIDERS: Please select “Cosign w/ note”to reply to query.    To better represent the severity of illness of your patient, please review the following information and exercise your independent professional judgment in responding to this query.     Anemia with hemoglobin of 8.9  is documented in the Operative Report. Based upon the clinical findings, risk factors, and treatment, can this diagnosis of anemia be further specified?    • Acute blood loss anemia  • Chronic blood loss anemia  • Other type of anemia (please specify type)  • Other explanation of clinical findings  • Unable to determine     The medical record reflects the following:   Clinical Findings Bladder rupture;   gross hematuria;  100 ml of  blood clots removed during cystourethroscopy;  H&H-  11/13- 11.0 & 32.3;  12/10- 8.9 & 27.0   Treatment s/p bladder fulguration; removal of blood clots via cystourethroscopy; CBI; serial H&H;  order to transfuse if hemoglobin drops below 7; complete anemia workup    Risk Factors Coumadin, bladder rupture   Location within medical record History and Physical, Progress Notes and Lab Results      Thank you,  Emani Salazar RN, CCDS   Sr. Clinical    ph- [phone]

## 2017-12-12 NOTE — PROGRESS NOTES
Internal Medicine Interval Note  Note Author: Lilo Hernandes M.D.     Name Niels Lima     1950   Age/Sex 67 y.o. male   MRN [id number]   Code Status Full code     After 5PM or if no immediate response to page, please call for cross-coverage  Attending/Team: Dr. Gomez/ Fco See Patient List for primary contact information  Call [phone] to page    1st Call - Day Intern (R1):   Dr. Hernandes 2nd Call - Day Sr. Resident (R2/R3):   Dr. Greer         Reason for interval visit  (Principal Problem)   Extraperitoneal rupture of bladder    Interval Problem Daily Status Update  (24 hours)   Patient underwent cystoscopy with evacuation of blood clots yesterday. He also had fulguration of bladder perforation on posterior bladder floor.  Pacheco catheter in place, now draining dylan urine. Wean off CBI as per urology  Hyperkalemia- Potassium of 6 this morning. Insulin with dextrose administered. Follow up potassium-5.4. Will monitor BMP Q4H  Worsening JENNIFFER in spite of patient making urine now. 2 L bolus NS ordered and will continue maintenance fluids      Review of Systems   Constitutional: Negative for chills, fever and malaise/fatigue.   HENT: Negative for sinus pain and sore throat.    Eyes: Negative for blurred vision, double vision, pain and discharge.   Respiratory: Negative for cough, sputum production, shortness of breath and stridor.    Cardiovascular: Negative for chest pain, palpitations, orthopnea, claudication and leg swelling.   Gastrointestinal: Positive for abdominal pain and constipation. Negative for heartburn, nausea and vomiting.   Genitourinary: Negative for dysuria, frequency, hematuria and urgency.   Musculoskeletal: Negative for back pain, myalgias and neck pain.   Skin: Negative for itching and rash.   Neurological: Negative for dizziness, sensory change, speech change, weakness and headaches.   Endo/Heme/Allergies: Bruises/bleeds easily.        Reports hematoma left thigh few weeks  "back   Psychiatric/Behavioral: Negative for depression, substance abuse and suicidal ideas.       Consultants/Specialty  Urology    Disposition  inpatient    Quality Measures    Reviewed items::  Labs reviewed and Medications reviewed  Pacheco catheter::  Urinary Tract Retention or Urinary Tract Obstruction  DVT prophylaxis pharmacological::  Contraindicated - High bleeding risk  DVT prophylaxis - mechanical:  SCDs  Ulcer Prophylaxis::  Not indicated          Physical Exam       Vitals:    12/11/17 0738 12/11/17 1028 12/11/17 1117 12/11/17 1550   BP:  137/74 139/69 138/69   Pulse:  81 85 79   Resp:  18 18 18   Temp:  36.9 °C (98.4 °F) 37.4 °C (99.3 °F) 37.3 °C (99.2 °F)   SpO2: 97% 95% 97% 96%   Weight:  93.4 kg (205 lb 14.6 oz)     Height:  1.854 m (6' 1\")       Body mass index is 27.17 kg/m². Weight: 93.4 kg (205 lb 14.6 oz)  Oxygen Therapy:  Pulse Oximetry: 96 %, O2 (LPM): 1.5, O2 Delivery: Silicone Nasal Cannula    Physical Exam   Constitutional: He is oriented to person, place, and time and well-developed, well-nourished, and in no distress. No distress.   HENT:   Head: Normocephalic and atraumatic.   Mouth/Throat: No oropharyngeal exudate.   Eyes: EOM are normal. Pupils are equal, round, and reactive to light. No scleral icterus.   Neck: Normal range of motion. Neck supple. No JVD present.   Cardiovascular: Normal rate and regular rhythm.    No murmur heard.  Pulmonary/Chest: Effort normal and breath sounds normal. No respiratory distress. He has no wheezes.   Abdominal: He exhibits distension. There is tenderness.   Hypoactive bowel sounds   Genitourinary:   Genitourinary Comments: Pacheco catheter present   Musculoskeletal: He exhibits no edema.   Neurological: He is alert and oriented to person, place, and time. No cranial nerve deficit.   Skin:   Left posterolateral thigh- 15 x 10cm blackish red discoloration noted. Tenderness present   Psychiatric: Mood, memory, affect and judgment normal.         Lab Data " Review:         12/11/2017  5:55 PM    Recent Labs      12/11/17   0817  12/11/17   1013  12/11/17   1433   SODIUM  141  138  137   POTASSIUM  6.0*  5.6*  5.4   CHLORIDE  107  107  105   CO2  21  21  20   BUN  57*  58*  63*   CREATININE  5.07*  4.91*  5.14*   MAGNESIUM   --   2.2   --    PHOSPHORUS   --   8.0*   --    CALCIUM  8.9  8.7  8.7       Recent Labs      12/10/17   1328   12/11/17   0817  12/11/17   1013  12/11/17   1433   ALTSGPT  7   --    --    --    --    ASTSGOT  16   --    --    --    --    ALKPHOSPHAT  51   --    --    --    --    TBILIRUBIN  0.4   --    --    --    --    GLUCOSE  204*   < >  255*  234*  238*    < > = values in this interval not displayed.       Recent Labs      12/10/17   1328  12/11/17   0317  12/11/17   1433   RBC  2.58*  2.55*   --    HEMOGLOBIN  8.9*  8.7*  8.6*   HEMATOCRIT  27.0*  26.9*  26.2*   PLATELETCT  142*  145*   --    PROTHROMBTM  16.0*   --    --    APTT  33.6   --    --    INR  1.31*   --    --        Recent Labs      12/10/17   1328  12/11/17   0317   WBC  8.2  8.8   NEUTSPOLYS  89.60*  94.50*   LYMPHOCYTES  4.00*  2.10*   MONOCYTES  5.20  1.70   EOSINOPHILS  0.50  0.10   BASOPHILS  0.10  0.10   ASTSGOT  16   --    ALTSGPT  7   --    ALKPHOSPHAT  51   --    TBILIRUBIN  0.4   --            Assessment/Plan     * Extraperitoneal rupture of bladder   Assessment & Plan    [facility] for suspected extra[facility] bladder rupture and bilateral hydronephrosis.   - Urology on board  - CT cystogram on 12/10 showed some extraperitoneal spread however the study was limited  - He underwent rigid cystoscopy with evacuation of 100 ml of blood clot, fulguration of bladder perforation sites and biopsy of posterior bladder wall.  - He has a kaur catheter in place with CBI. Lynnette colored urine noted in the bag.  - Hemoglobin has remained stable   Plan:  - Advance diet as tolerated  - Urology following, appreciate recommendations  - Wean off CBI as per urology  -  continue to hold anticoagulation  - B&O suppository for bladder spasms        Hydronephrosis   Assessment & Plan    - CT abdomen/pelvis from 12/08 shows bilateral hydronephrosis R>L likely related to changes in the bladder. Bilateral nephrolithiasis and prostate enlargement.  - US prior to transfer showed worsening hydronephrosis  - Most likely secondary to obstruction from clot  - Baseline creat 0.7. Increased to 3.27 on admission yesterday. Continues to be elevated today-5.1  Plan:  - Continue kaur cather  - Will monitor        Hyperkalemia   Assessment & Plan    - potassium of 6 this morning  - Likely secondary to JENNIFFER   - Transferred to telemetry  - insulin with dextrose given  - Repeat potassium-5.4  - Will monitor BMP Q4H        Acute kidney injury (CMS-HCC)   Assessment & Plan    Likely secondary to bilateral hydronephrosis  Baseline creat 0.7, increased to 3.27 on admission  Continues to increase- 5.1 today .Patient draining urine through kaur catheter  Patient given 2 L bolus for pre renal JENNIFFER  Avoid nephrotoxins        History of DVT (deep vein thrombosis)   Assessment & Plan    H/o DVT 3 times in the past. Was on coumadin previously, currently on dabigatran. He also has an IVC filter in place from [id number]  - Patient had a recent admission for new left leg DVT on warfarin secondary to missed bridging with lovenox. He was on heaprin drip for 7 days and discharged on dabigatran  - ultrasound done to evaluate for thrombus in the filter- no thrombus was seen, however exam was limited  - will hold anticoagulation for now . Patient on SCDs            DM type 2 (diabetes mellitus, type 2) (CMS-HCC)- (present on admission)   Assessment & Plan    Patient on metformin and glipizide as home medication  - Hold home medication  - Patient started on SSI and hypoglycemia protocol  - HbA1c pending

## 2017-12-12 NOTE — CONSULTS
Full note dictated.  JENNIFFER with persistence B/L hydronephrosis, await  input, consider B/L nephrostomy tubes.  Gentle IVF

## 2017-12-12 NOTE — NON-PROVIDER
Internal Medicine Interval Note  Note Author: Niels Figueroa, Student     Name Niels Lima     1950   Age/Sex 67 y.o. male   MRN [id number]   Code Status Full     After 5PM or if no immediate response to page, please call for cross-coverage  Attending/Team: Patricia/Fco See Patient List for primary contact information  Call [phone] to page    1st Call - Day Intern (R1):   Cecilio 2nd Call - Day Sr. Resident (R2/R3):   Zoey         Reason for interval visit  (Principal Problem)   Extraperitoneal rupture of bladder    Interval Problem Daily Status Update  (24 hours)   Patient became nauseous overnight. He is eating ok, voiding well, and had a bowel movement last night that was loose. He states his abdomen is much more distended this morning than yesterday. His bladder spasms are less frequent and less severe    ROS  General: no unexplained weight loss, fever, or chills  HEENT: no headaches, vision changes, hearing changes, URI symptoms  RESP: no SOB or cough  CV: no palpitations or leg swelling  GI: positive for constipation and abdominal distension, negative for diarrhea, hematochezia, melena  : negative for hematuria, dysuria   Neuro: negative for dizziness, vertigo, or stroke like symptoms        Consultants/Specialty  Najjar/Neurology    Disposition  Inpatient    Quality Measures    Reviewed items::  Labs reviewed, Medications reviewed and Radiology images reviewed  Pacheco catheter::  Gross Hematuria with Clots and One or Two Days Post Surgery (Day of Surgery being Day 0)  Central line in place:  Need for access  DVT prophylaxis - mechanical:  SCDs  Ulcer Prophylaxis::  No          Physical Exam       Vitals:    17 0451 17 0800 17 0939 17 1200   BP: 142/73 (!) 171/86 (!) 165/84 (!) 161/86   Pulse: 89 87  85   Resp: 20 19  19   Temp: 37.3 °C (99.2 °F) 36.9 °C (98.4 °F)  37.2 °C (99 °F)   SpO2: 90% 95%  95%   Weight:       Height:         Body mass index is 26.9  kg/m². Weight: 92.5 kg (203 lb 14.8 oz)  Oxygen Therapy:  Pulse Oximetry: 95 %, O2 (LPM): 2, O2 Delivery: Silicone Nasal Cannula    Physical Exam  General: Not in acute distress  HEENT: NCAT, EOMI  Resp: Clear to auscultation B/L with no wheeze, rhonchi, rales. Dorsal pedal pulses are 2+ and symmetric bilaterally, radial pulses are 2+ and symmetric bilaterally  CVS: Regular rate and rhythm, no murmurs, rubs or gallops  Abdomen: Distended, bowel sounds hyperactive, right sided TTP with rebound; no HSM, pulsatile masses, shifting dullness, tympanic percussion, fluid waves or abdominal bruits  Neuro: No focal deficits  Skin: large eschar like lesion left, posterior thigh. No erythema, pus, but warm, hard, and tender to palpation.    Lab Data Review:         12/12/2017  2:32 PM    Recent Labs      12/11/17   1013   12/11/17 2002 12/11/17 2359 12/12/17   0338   SODIUM  138   < >  138  136  139   POTASSIUM  5.6*   < >  4.7  4.6  4.8   CHLORIDE  107   < >  108  107  108   CO2  21   < >  19*  18*  19*   BUN  58*   < >  58*  62*  58*   CREATININE  4.91*   < >  4.39*  5.13*  4.59*   MAGNESIUM  2.2   --    --    --   2.1   PHOSPHORUS  8.0*   --    --    --   6.0*   CALCIUM  8.7   < >  8.4*  8.6  8.2*    < > = values in this interval not displayed.       Recent Labs      12/10/17   1328   12/11/17 2002 12/11/17 2359 12/12/17   0338   ALTSGPT  7   --    --    --    --    ASTSGOT  16   --    --    --    --    ALKPHOSPHAT  51   --    --    --    --    TBILIRUBIN  0.4   --    --    --    --    GLUCOSE  204*   < >  197*  193*  215*    < > = values in this interval not displayed.       Recent Labs      12/10/17   1328  12/11/17   0317  12/11/17   1433  12/11/17 2359 12/12/17   0000   RBC  2.58*  2.55*   --   2.52*   --    HEMOGLOBIN  8.9*  8.7*  8.6*  8.6*   --    HEMATOCRIT  27.0*  26.9*  26.2*  26.3*   --    PLATELETCT  142*  145*   --   166   --    PROTHROMBTM  16.0*   --    --    --   17.2*   APTT  33.6   --    --     --    --    INR  1.31*   --    --    --   1.44*   IRON   --    --    --   <10*   --    TOTIRONBC   --    --    --   227*   --        Recent Labs      12/10/17   1328  12/11/17   0317  12/11/17   2359   WBC  8.2  8.8  8.0   NEUTSPOLYS  89.60*  94.50*   --    LYMPHOCYTES  4.00*  2.10*   --    MONOCYTES  5.20  1.70   --    EOSINOPHILS  0.50  0.10   --    BASOPHILS  0.10  0.10   --    ASTSGOT  16   --    --    ALTSGPT  7   --    --    ALKPHOSPHAT  51   --    --    TBILIRUBIN  0.4   --    --      CT-ABDOMEN-PELVIS W/O   Final Result      1.  Increased small bowel and colonic gas, most suggestive of ileus.  Developing small bowel obstruction is not excluded.  Clinical follow-up recommended.   2.  No evidence for bowel perforation.   3.  Small volume ascites present.   4.  Distended gallbladder, of uncertain clinical significance.   5.  Bilateral moderate hydronephrosis suggesting urinary retention.   6.  Collapsed bladder with Pacheco catheter present.  Potential bladder wall appears thickened which may indicate diverticulum.   7.  Dependent hyperdense fluid in the pelvis may be secondary to prior CT cystogram and is again concerning for bladder rupture.      [id number] VIEW   Final Result         Diffuse gaseous distention of the small and large bowel, concerning for ileus.      US-EXTREMITY NON VASCULAR UNILATERAL LEFT   Final Result      No fluid collection demonstrated within the posterior LEFT thigh at site of discoloration to indicate hematoma.      US-ABDOMEN LIMITED   Final Result      No thrombus identified in the visualized IVC. However, the IVC is not evaluated in its entirety, limiting evaluation. Further evaluation can be performed with contrast-enhanced CT.      DX-CYSTO FLUORO > 1 HOUR   Final Result         Portable cystoscopy utilized for 1.4 minutes.      CT-PELVIS W/O   Final Result      1.  Limited CT cystogram because of patient tolerance factors. Only approximately 5 mL of contrast was injected.  There is extraperitoneal abnormal density indicating there is some extra peritoneal spread      2.  There is also abnormal density layering in the pelvis but no contrast extravasates here. Because the contrast challenge was not robust, false-negative for extravasation could not be excluded. This density in the pelvis is most likely not from    intraperitoneal extravasation which is further supported by lack of free air and some abnormal contour to the bladder anterosuperiorly which would support extraperitoneal primary injury      3.  Right nephrolith and cyst      US-RENAL   Final Result      Moderate right, mild left hydronephrosis      9 mm left nonobstructive nephrolith      Echogenic bladder material is compatible with hemorrhage or infectious exudate.      LE VENOUS DUPLEX (DVT)    (Results Pending)           Assessment/Plan     Assessment & plan notes cannot be loaded without a specified hospital service.  # Extraperitoneal bladder rupture              Stable, urology performed cystoscopy and bladder repair yesterday, and removed hematoma              Patient is making clear urine, kaur is not clogged, no dysuria or hematuria at this time.  Plan       -Hb/Hct q 12 hours  -Continue kaur catheter  -H/H q 12 hours; transfuse if hb <7 for active bleeding  -Continue C3  -Repeat UA  -Anemia workup to set baseline to monitor blood loss; ferritin pending; B12 is low as it is at an inappropriately normal level, should be elevated since it's an acute phase reactant.  -give B12        #Hydronephrosis  -BUN/CR 63/5.14 from 40/3.27 yesterday  -Continue kaur  -Give NS fluid bolus 2L  -nephrology consulted: awaiting  results, considering bilateral nephrostomy tubes     #Hyperkalemia- resolved  -K of 4.8 this morning  -Mg 2.1, Phos 6    Plan  -Patient transferred to telemetry  -BMP q 4 hours with Mg  -CMP, Mg, Phos q day  -Insulin, dextrose, and fluids given  -urine electrolytes pending, CIB stopped today.     #DM  II  -patient on metformin and glipizide as home medication  -hold home medications  -Sliding scale insulin with hypoglycemia protocol  -repeat HbA1c     #DVT Hx  - History of DVT three times in past; was on warfarin previously, currently on dabigatran; holding anticoagulation for now because of bleeding risk.  - IVC filter in place from [id number]  - US of thorax IVC did not show IVC filter in place, also did not show clots  -CT abdomen on 12.12 showed IVC filter in place      Anticipated Hospital stay:  >2 midnights

## 2017-12-13 ENCOUNTER — APPOINTMENT (OUTPATIENT)
Dept: RADIOLOGY | Facility: MEDICAL CENTER | Age: 67
DRG: 668 | End: 2017-12-13
Attending: STUDENT IN AN ORGANIZED HEALTH CARE EDUCATION/TRAINING PROGRAM
Payer: COMMERCIAL

## 2017-12-13 ENCOUNTER — APPOINTMENT (OUTPATIENT)
Dept: RADIOLOGY | Facility: MEDICAL CENTER | Age: 67
DRG: 668 | End: 2017-12-13
Attending: HOSPITALIST
Payer: COMMERCIAL

## 2017-12-13 PROBLEM — K56.7 ILEUS (HCC): Status: ACTIVE | Noted: 2017-12-13

## 2017-12-13 LAB
ALBUMIN SERPL BCP-MCNC: 2.4 G/DL (ref 3.2–4.9)
ALBUMIN/GLOB SERPL: 0.8 G/DL
ALP SERPL-CCNC: 55 U/L (ref 30–99)
ALT SERPL-CCNC: 6 U/L (ref 2–50)
ANION GAP SERPL CALC-SCNC: 14 MMOL/L (ref 0–11.9)
APPEARANCE FLD: CLEAR
APPEARANCE FLD: CLEAR
AST SERPL-CCNC: 10 U/L (ref 12–45)
BASOPHILS # BLD AUTO: 0.1 % (ref 0–1.8)
BASOPHILS # BLD: 0.01 K/UL (ref 0–0.12)
BILIRUB SERPL-MCNC: 0.3 MG/DL (ref 0.1–1.5)
BODY FLD TYPE: NORMAL
BODY FLD TYPE: NORMAL
BUN SERPL-MCNC: 77 MG/DL (ref 8–22)
CALCIUM SERPL-MCNC: 8.6 MG/DL (ref 8.5–10.5)
CFT BLD TEG: 5.5 MIN (ref 5–10)
CHLORIDE SERPL-SCNC: 109 MMOL/L (ref 96–112)
CLOT ANGLE BLD TEG: 68.9 DEGREES (ref 53–72)
CLOT LYSIS 30M P MA LENFR BLD TEG: 0.3 % (ref 0–8)
CO2 SERPL-SCNC: 18 MMOL/L (ref 20–33)
COLOR FLD: YELLOW
COLOR FLD: YELLOW
CREAT SERPL-MCNC: 6.53 MG/DL (ref 0.5–1.4)
CSF COMMENTS 1658: NORMAL
CSF COMMENTS 1658: NORMAL
CT.EXTRINSIC BLD ROTEM: 1.5 MIN (ref 1–3)
EOSINOPHIL # BLD AUTO: 0.08 K/UL (ref 0–0.51)
EOSINOPHIL NFR BLD: 1.1 % (ref 0–6.9)
ERYTHROCYTE [DISTWIDTH] IN BLOOD BY AUTOMATED COUNT: 59.5 FL (ref 35.9–50)
FERRITIN SERPL-MCNC: 143.3 NG/ML (ref 22–322)
GFR SERPL CREATININE-BSD FRML MDRD: 9 ML/MIN/1.73 M 2
GLOBULIN SER CALC-MCNC: 2.9 G/DL (ref 1.9–3.5)
GLUCOSE BLD-MCNC: 196 MG/DL (ref 65–99)
GLUCOSE BLD-MCNC: 218 MG/DL (ref 65–99)
GLUCOSE BLD-MCNC: 221 MG/DL (ref 65–99)
GLUCOSE SERPL-MCNC: 221 MG/DL (ref 65–99)
GRAM STN SPEC: NORMAL
GRAM STN SPEC: NORMAL
HCT VFR BLD AUTO: 26.3 % (ref 42–52)
HCT VFR BLD AUTO: 26.8 % (ref 42–52)
HCT VFR BLD AUTO: 27.9 % (ref 42–52)
HCT VFR BLD AUTO: 27.9 % (ref 42–52)
HGB BLD-MCNC: 8.5 G/DL (ref 14–18)
HGB BLD-MCNC: 8.8 G/DL (ref 14–18)
HGB BLD-MCNC: 9 G/DL (ref 14–18)
HGB BLD-MCNC: 9.1 G/DL (ref 14–18)
IMM GRANULOCYTES # BLD AUTO: 0.03 K/UL (ref 0–0.11)
IMM GRANULOCYTES NFR BLD AUTO: 0.4 % (ref 0–0.9)
INR PPP: 1.69 (ref 0.87–1.13)
LACTATE BLD-SCNC: 1.4 MMOL/L (ref 0.5–2)
LYMPHOCYTES # BLD AUTO: 0.3 K/UL (ref 1–4.8)
LYMPHOCYTES NFR BLD: 4 % (ref 22–41)
MAGNESIUM SERPL-MCNC: 2.4 MG/DL (ref 1.5–2.5)
MCF BLD TEG: 65 MM (ref 50–70)
MCH RBC QN AUTO: 34 PG (ref 27–33)
MCHC RBC AUTO-ENTMCNC: 32.8 G/DL (ref 33.7–35.3)
MCV RBC AUTO: 103.5 FL (ref 81.4–97.8)
MONOCYTES # BLD AUTO: 0.48 K/UL (ref 0–0.85)
MONOCYTES NFR BLD AUTO: 6.5 % (ref 0–13.4)
NEUTROPHILS # BLD AUTO: 6.52 K/UL (ref 1.82–7.42)
NEUTROPHILS NFR BLD: 87.9 % (ref 44–72)
NRBC # BLD AUTO: 0 K/UL
NRBC BLD AUTO-RTO: 0 /100 WBC
PHOSPHATE SERPL-MCNC: 6.8 MG/DL (ref 2.5–4.5)
PLATELET # BLD AUTO: 201 K/UL (ref 164–446)
PMV BLD AUTO: 9.6 FL (ref 9–12.9)
POTASSIUM SERPL-SCNC: 5 MMOL/L (ref 3.6–5.5)
PROT SERPL-MCNC: 5.3 G/DL (ref 6–8.2)
PROTHROMBIN TIME: 19.6 SEC (ref 12–14.6)
RBC # BLD AUTO: 2.59 M/UL (ref 4.7–6.1)
RBC # FLD: 544 CELLS/UL
RBC # FLD: NORMAL CELLS/UL
SIGNIFICANT IND 70042: NORMAL
SIGNIFICANT IND 70042: NORMAL
SITE SITE: NORMAL
SITE SITE: NORMAL
SODIUM SERPL-SCNC: 141 MMOL/L (ref 135–145)
SOURCE SOURCE: NORMAL
SOURCE SOURCE: NORMAL
T4 FREE SERPL-MCNC: 1.38 NG/DL (ref 0.53–1.43)
TEG ALGORITHM TGALG: NORMAL
TSH SERPL DL<=0.005 MIU/L-ACNC: 0.2 UIU/ML (ref 0.3–3.7)
WBC # BLD AUTO: 7.4 K/UL (ref 4.8–10.8)
WBC # FLD: 39 CELLS/UL
WBC # FLD: NORMAL CELLS/UL

## 2017-12-13 PROCEDURE — A9270 NON-COVERED ITEM OR SERVICE: HCPCS | Performed by: UROLOGY

## 2017-12-13 PROCEDURE — 82962 GLUCOSE BLOOD TEST: CPT | Mod: 91

## 2017-12-13 PROCEDURE — A9270 NON-COVERED ITEM OR SERVICE: HCPCS | Performed by: INTERNAL MEDICINE

## 2017-12-13 PROCEDURE — A9270 NON-COVERED ITEM OR SERVICE: HCPCS | Performed by: STUDENT IN AN ORGANIZED HEALTH CARE EDUCATION/TRAINING PROGRAM

## 2017-12-13 PROCEDURE — 700102 HCHG RX REV CODE 250 W/ 637 OVERRIDE(OP): Performed by: UROLOGY

## 2017-12-13 PROCEDURE — 85018 HEMOGLOBIN: CPT | Mod: 91

## 2017-12-13 PROCEDURE — 700111 HCHG RX REV CODE 636 W/ 250 OVERRIDE (IP): Performed by: STUDENT IN AN ORGANIZED HEALTH CARE EDUCATION/TRAINING PROGRAM

## 2017-12-13 PROCEDURE — 0T9330Z DRAINAGE OF RIGHT KIDNEY PELVIS WITH DRAINAGE DEVICE, PERCUTANEOUS APPROACH: ICD-10-PCS | Performed by: RADIOLOGY

## 2017-12-13 PROCEDURE — 700105 HCHG RX REV CODE 258: Performed by: UROLOGY

## 2017-12-13 PROCEDURE — 85014 HEMATOCRIT: CPT | Mod: 91

## 2017-12-13 PROCEDURE — 700111 HCHG RX REV CODE 636 W/ 250 OVERRIDE (IP): Performed by: INTERNAL MEDICINE

## 2017-12-13 PROCEDURE — 0T9430Z DRAINAGE OF LEFT KIDNEY PELVIS WITH DRAINAGE DEVICE, PERCUTANEOUS APPROACH: ICD-10-PCS | Performed by: RADIOLOGY

## 2017-12-13 PROCEDURE — 700102 HCHG RX REV CODE 250 W/ 637 OVERRIDE(OP): Performed by: STUDENT IN AN ORGANIZED HEALTH CARE EDUCATION/TRAINING PROGRAM

## 2017-12-13 PROCEDURE — 83605 ASSAY OF LACTIC ACID: CPT

## 2017-12-13 PROCEDURE — 76705 ECHO EXAM OF ABDOMEN: CPT

## 2017-12-13 PROCEDURE — 700102 HCHG RX REV CODE 250 W/ 637 OVERRIDE(OP): Performed by: INTERNAL MEDICINE

## 2017-12-13 PROCEDURE — 99153 MOD SED SAME PHYS/QHP EA: CPT

## 2017-12-13 PROCEDURE — 87070 CULTURE OTHR SPECIMN AEROBIC: CPT | Mod: 91

## 2017-12-13 PROCEDURE — 770020 HCHG ROOM/CARE - TELE (206)

## 2017-12-13 PROCEDURE — 89051 BODY FLUID CELL COUNT: CPT | Mod: 91

## 2017-12-13 PROCEDURE — 700111 HCHG RX REV CODE 636 W/ 250 OVERRIDE (IP)

## 2017-12-13 PROCEDURE — 87205 SMEAR GRAM STAIN: CPT | Mod: 91

## 2017-12-13 PROCEDURE — C9113 INJ PANTOPRAZOLE SODIUM, VIA: HCPCS | Performed by: INTERNAL MEDICINE

## 2017-12-13 PROCEDURE — 36415 COLL VENOUS BLD VENIPUNCTURE: CPT

## 2017-12-13 PROCEDURE — 700111 HCHG RX REV CODE 636 W/ 250 OVERRIDE (IP): Performed by: RADIOLOGY

## 2017-12-13 PROCEDURE — 93970 EXTREMITY STUDY: CPT | Mod: 26 | Performed by: SURGERY

## 2017-12-13 PROCEDURE — 99233 SBSQ HOSP IP/OBS HIGH 50: CPT | Mod: GC | Performed by: INTERNAL MEDICINE

## 2017-12-13 PROCEDURE — 93970 EXTREMITY STUDY: CPT

## 2017-12-13 RX ORDER — MIDAZOLAM HYDROCHLORIDE 1 MG/ML
.5-2 INJECTION INTRAMUSCULAR; INTRAVENOUS PRN
Status: ACTIVE | OUTPATIENT
Start: 2017-12-13 | End: 2017-12-13

## 2017-12-13 RX ORDER — SODIUM CHLORIDE 9 MG/ML
500 INJECTION, SOLUTION INTRAVENOUS
Status: ACTIVE | OUTPATIENT
Start: 2017-12-13 | End: 2017-12-13

## 2017-12-13 RX ORDER — PANTOPRAZOLE SODIUM 40 MG/10ML
40 INJECTION, POWDER, LYOPHILIZED, FOR SOLUTION INTRAVENOUS DAILY
Status: DISCONTINUED | OUTPATIENT
Start: 2017-12-14 | End: 2017-12-14

## 2017-12-13 RX ORDER — MIDAZOLAM HYDROCHLORIDE 1 MG/ML
INJECTION INTRAMUSCULAR; INTRAVENOUS
Status: COMPLETED
Start: 2017-12-13 | End: 2017-12-13

## 2017-12-13 RX ADMIN — FENTANYL CITRATE 50 MCG: 50 INJECTION, SOLUTION INTRAMUSCULAR; INTRAVENOUS at 11:46

## 2017-12-13 RX ADMIN — SODIUM CHLORIDE: 9 INJECTION, SOLUTION INTRAVENOUS at 18:22

## 2017-12-13 RX ADMIN — OXYCODONE HYDROCHLORIDE 5 MG: 5 TABLET ORAL at 04:01

## 2017-12-13 RX ADMIN — FENTANYL CITRATE 50 MCG: 50 INJECTION, SOLUTION INTRAMUSCULAR; INTRAVENOUS at 11:38

## 2017-12-13 RX ADMIN — MIDAZOLAM 1 MG: 1 INJECTION INTRAMUSCULAR; INTRAVENOUS at 11:46

## 2017-12-13 RX ADMIN — OXYCODONE HYDROCHLORIDE AND ACETAMINOPHEN 1 TABLET: 10; 325 TABLET ORAL at 09:25

## 2017-12-13 RX ADMIN — ATROPA BELLADONNA AND OPIUM 30 MG: 16.2; 3 SUPPOSITORY RECTAL at 08:17

## 2017-12-13 RX ADMIN — SODIUM CHLORIDE: 9 INJECTION, SOLUTION INTRAVENOUS at 09:32

## 2017-12-13 RX ADMIN — PANTOPRAZOLE SODIUM 40 MG: 40 INJECTION, POWDER, FOR SOLUTION INTRAVENOUS at 10:17

## 2017-12-13 RX ADMIN — FENTANYL CITRATE 25 MCG: 50 INJECTION, SOLUTION INTRAMUSCULAR; INTRAVENOUS at 12:05

## 2017-12-13 RX ADMIN — OXYCODONE HYDROCHLORIDE AND ACETAMINOPHEN 1 TABLET: 10; 325 TABLET ORAL at 01:55

## 2017-12-13 RX ADMIN — INSULIN HUMAN 2 UNITS: 100 INJECTION, SOLUTION PARENTERAL at 06:37

## 2017-12-13 RX ADMIN — MIDAZOLAM 1 MG: 1 INJECTION INTRAMUSCULAR; INTRAVENOUS at 12:05

## 2017-12-13 RX ADMIN — INSULIN HUMAN 2 UNITS: 100 INJECTION, SOLUTION PARENTERAL at 21:55

## 2017-12-13 RX ADMIN — INSULIN HUMAN 1 UNITS: 100 INJECTION, SOLUTION PARENTERAL at 16:43

## 2017-12-13 RX ADMIN — OXYCODONE HYDROCHLORIDE 5 MG: 5 TABLET ORAL at 23:36

## 2017-12-13 RX ADMIN — CEFTRIAXONE 2 G: 2 INJECTION, POWDER, FOR SOLUTION INTRAMUSCULAR; INTRAVENOUS at 08:17

## 2017-12-13 RX ADMIN — MIDAZOLAM 2 MG: 1 INJECTION INTRAMUSCULAR; INTRAVENOUS at 11:38

## 2017-12-13 RX ADMIN — MIDAZOLAM 1 MG: 1 INJECTION INTRAMUSCULAR; INTRAVENOUS at 11:53

## 2017-12-13 RX ADMIN — OXYCODONE HYDROCHLORIDE AND ACETAMINOPHEN 1 TABLET: 10; 325 TABLET ORAL at 20:40

## 2017-12-13 RX ADMIN — OXYCODONE HYDROCHLORIDE 5 MG: 5 TABLET ORAL at 16:05

## 2017-12-13 RX ADMIN — ATROPA BELLADONNA AND OPIUM 30 MG: 16.2; 3 SUPPOSITORY RECTAL at 16:44

## 2017-12-13 RX ADMIN — FENTANYL CITRATE 25 MCG: 50 INJECTION, SOLUTION INTRAMUSCULAR; INTRAVENOUS at 11:54

## 2017-12-13 RX ADMIN — SODIUM CHLORIDE: 9 INJECTION, SOLUTION INTRAVENOUS at 23:40

## 2017-12-13 ASSESSMENT — ENCOUNTER SYMPTOMS
HEADACHES: 0
SORE THROAT: 0
COUGH: 0
NAUSEA: 1
NECK PAIN: 0
ABDOMINAL PAIN: 1
CONSTIPATION: 1
WEAKNESS: 1
PALPITATIONS: 0
DIZZINESS: 0
BRUISES/BLEEDS EASILY: 1
DOUBLE VISION: 0
BACK PAIN: 0
ORTHOPNEA: 0
HEARTBURN: 0
SPUTUM PRODUCTION: 0
CLAUDICATION: 0
MYALGIAS: 0
SPEECH CHANGE: 0
BLURRED VISION: 0
NERVOUS/ANXIOUS: 1
ROS GI COMMENTS: ABDOMINAL DISTENSION
FEVER: 0
EYE PAIN: 0
WEAKNESS: 0
VOMITING: 1
DEPRESSION: 0
SENSORY CHANGE: 0
SINUS PAIN: 0
EYE DISCHARGE: 0
SHORTNESS OF BREATH: 0
STRIDOR: 0
CHILLS: 0

## 2017-12-13 ASSESSMENT — PAIN SCALES - GENERAL
PAINLEVEL_OUTOF10: 9
PAINLEVEL_OUTOF10: 10
PAINLEVEL_OUTOF10: 0
PAINLEVEL_OUTOF10: 9
PAINLEVEL_OUTOF10: 8
PAINLEVEL_OUTOF10: 9

## 2017-12-13 ASSESSMENT — LIFESTYLE VARIABLES: SUBSTANCE_ABUSE: 0

## 2017-12-13 NOTE — CARE PLAN
Problem: Safety  Goal: Will remain free from falls  Outcome: PROGRESSING AS EXPECTED  Elaine Lindsay Fall Risk Assessment:     Last Known Fall: No falls  Mobility: Immobilized/requires assist of one person  Medications: Cardiovascular or central nervous system meds  Mental Status/LOC/Awareness: Awake, alert, and oriented to date, place, and person  Toileting Needs: Use of catheters or diversion devices  Volume/Electrolyte Status: Use of IV fluids/tube feeds  Communication/Sensory: Visual (Glasses)/hearing deficit  Behavior: Depression/anxiety  Elaine Lindsay Fall Risk Total: 11  Fall Risk Level: MODERATE RISK    Universal Fall Precautions:  call light/belongings in reach, bed in low position and locked, wheelchairs and assistive devices out of sight, siderails up x 2, use non-slip footwear, adequate lighting, educate to call for assistance, clutter free and spill free environment, educate on level of risk    Fall Risk Level Interventions:   TRIAL (TELE 8, NEURO, MED DANYELL 5) Low Fall Risk Interventions  Place yellow fall risk ID band on patient: verified  Provide patient/family education based on risk assessment: verified  Educate patient/family to call staff for assistance when getting out of bed: verified  Place fall precaution signage outside patient door: verifiedTRIAL (TELE 8, NEURO, MED DANYELL 5) Moderate Fall Risk Interventions  Place yellow fall risk ID band on patient: refused  Provide patient/family education based on risk assessment : verified  Educate patient/family to call staff for assistance when getting out of bed: verified  Place fall precaution signage outside patient door: verified  Utilize bed/chair fall alarm: verified     Patient Specific Interventions:     Medication: review medications with patient and family, assess for medications that can be discontinued or dosage decreased and limit combination of prn medications  Mental Status/LOC/Awareness: reinforce falls education, encourage family to  stay with patient, check on patient hourly, utilize bed/chair fall alarm and reinforce the use of call light  Toileting: consider obtaining elevated toilet seat or bedside commode (BSC), provide frquent toileting, monitor intake and output/use of appropriate interventions, instruct male patients prone to dizziness to void while sitting, instruct patient/family on the use of grab bars and instruct patient/family on the need to call for assistance when toileting  Volume/Electrolyte Status: ensure patient remains hydrated, monitor blood sugars and maintain appropriate blood sugar levels if diabetic, administer medications as ordered for nausea and vomiting and monitor abnormal lab values  Communication/Sensory: update plan of care on whiteboard, ensure proper positioning when transferrng/ambulating, ensure patient has glasses/contacts and hearing aids/dentures and for visually impaired patients orient to their room surrounding and do not change their surroundings  Behavioral: encourage patient to voice feelings, engage patient in daily activities, administer medication as ordered and instruct/reinforce fall program rationale  Mobility: provide comfort measures during transport, utilize bed/chair fall alarm, ensure bed is locked and in lowest position, provide appropriate assistive device and instruct patient to exit bed on their strongest side    Problem: Venous Thromboembolism (VTW)/Deep Vein Thrombosis (DVT) Prevention:  Goal: Patient will participate in Venous Thrombosis (VTE)/Deep Vein Thrombosis (DVT)Prevention Measures  Outcome: PROGRESSING AS EXPECTED  Discussed SCDs with pt, pt is participating in VTE prevention.

## 2017-12-13 NOTE — OR SURGEON
Immediate Post- Operative Note    PostOp Diagnosis: BILAT HYDRONEPHROSIS, ARF, CR 6.5      Procedure(s): BILATERAL PERCUTANEOUS NEPHROSTOMY PLACEMENT AND NEPHROSTOGRAM WITH U/S AND FLUOROSCOPIC GUIDANCE    BILATERAL 10F LOCKING PERC NEPH CATHETERS PLACED, GRAVITY DRAINAGE.       Estimated Blood Loss: <10CC        Complications: NONE          12/13/2017     12:48 PM     Tin Rose

## 2017-12-13 NOTE — PROGRESS NOTES
"IR Procedure Note:    Dr. Rose performed bilateral nephrostomy tube placement with aspirate from right and left tubes to microbiology.   The pt tolerated the procedure well; ETCo2 baseline 32, with consistent waveform during the procedure.  Sutures, metapore tape and gauze applied to right and left nephrostomy sites on back, CDI and soft. Pt alert, anxious and verbally appropriate  Demanding staff \"move me up!\" in the bed post procedure with complaints of pain between naps. Vital signs stable during procedure and transport, see flow sheet for vital signs.  Report given to KATHY Ambrosio.  RN transported pt to T834 with Sao2 monitor = 97 %.    "

## 2017-12-13 NOTE — ASSESSMENT & PLAN NOTE
Iron deficiency anemia  2/2 blood loss from hematuria  Low iron and TIBC, Ferritin wnl  Vitamin B12 low, got 5 does of IM B12 injections  Plan:  Continue oral iron  Continue IM b12 weekly for 1 month

## 2017-12-13 NOTE — PROGRESS NOTES
Hospital Medicine Progress Note, Adult, Complex               Author: Fadi Najjar Date & Time created: 12/13/2017  1:24 PM     Interval History:  Pt presented with ruptured bladder, JENNIFFER, developed ileus, had hematemesis yesterday, plan for B/L nephrostomy tubes today.    Review of Systems:  Review of Systems   Constitutional: Positive for malaise/fatigue. Negative for chills and fever.   Respiratory: Negative for shortness of breath.    Cardiovascular: Negative for chest pain and leg swelling.   Gastrointestinal: Positive for abdominal pain, nausea and vomiting.   Genitourinary: Negative for dysuria, frequency and urgency.   Neurological: Positive for weakness.   Psychiatric/Behavioral: The patient is nervous/anxious.    All other systems reviewed and are negative.      Physical Exam:  Physical Exam   Constitutional: He is oriented to person, place, and time. He appears cachectic. He appears ill.   HENT:   Head: Normocephalic and atraumatic.   Right Ear: External ear normal.   Left Ear: External ear normal.   Nose: Nose normal.   Mouth/Throat: No oropharyngeal exudate.   Eyes: Conjunctivae are normal. Right eye exhibits no discharge. Left eye exhibits no discharge. No scleral icterus.   Neck: No JVD present. No tracheal deviation present. No thyromegaly present.   Cardiovascular: Normal rate, regular rhythm and normal heart sounds.    Pulmonary/Chest: Effort normal and breath sounds normal. No respiratory distress. He has no wheezes. He has no rales.   Abdominal: He exhibits distension. There is no rebound and no guarding.   Musculoskeletal: He exhibits edema.   Neurological: He is alert and oriented to person, place, and time.   Skin: Skin is warm and dry.   Psychiatric: His mood appears anxious.   Nursing note and vitals reviewed.      Labs:        Invalid input(s): [id number]      Recent Labs      12/11/17   1013   12/12/17   0338  12/12/17   1645  12/12/17   2352   SODIUM  138   < >  139  142  141    POTASSIUM  5.6*   < >  4.8  4.9  5.0   CHLORIDE  107   < >  108  110  109   CO2  21   < >  19*  18*  18*   BUN  58*   < >  58*  71*  77*   CREATININE  4.91*   < >  4.59*  5.77*  6.53*   MAGNESIUM  2.2   --   2.1   --   2.4   PHOSPHORUS  8.0*   --   6.0*   --   6.8*   CALCIUM  8.7   < >  8.2*  8.6  8.6    < > = values in this interval not displayed.     Recent Labs      12/10/17   1328   12/12/17   0338  12/12/17   1645  12/12/17   2352   ALTSGPT  7   --    --   6  6   ASTSGOT  16   --    --   9*  10*   ALKPHOSPHAT  51   --    --   57  55   TBILIRUBIN  0.4   --    --   0.4  0.3   GLUCOSE  204*   < >  215*  214*  221*    < > = values in this interval not displayed.     Recent Labs      12/10/17   1328  12/11/17   0317   12/11/17   2359  12/12/17   0000   12/12/17   2351  12/12/17   2352  12/13/17   0449  12/13/17   1012   RBC  2.58*  2.55*   --   2.52*   --    --   2.59*   --    --    --    HEMOGLOBIN  8.9*  8.7*   < >  8.6*   --    < >  8.8*   --   8.5*  9.0*   HEMATOCRIT  27.0*  26.9*   < >  26.3*   --    < >  26.8*   --   26.3*  27.9*   PLATELETCT  142*  145*   --   166   --    --   201   --    --    --    PROTHROMBTM  16.0*   --    --    --   17.2*   --    --   19.6*   --    --    APTT  33.6   --    --    --    --    --    --    --    --    --    INR  1.31*   --    --    --   1.44*   --    --   1.69*   --    --    IRON   --    --    --   <10*   --    --    --    --    --    --    FERRITIN   --    --    --    --    --    --    --   143.3   --    --    TOTIRONBC   --    --    --   227*   --    --    --    --    --    --     < > = values in this interval not displayed.     Recent Labs      12/10/17   1328  12/11/17   0317  12/11/17   2359  12/12/17   1645  12/12/17   2351  12/12/17   2352   WBC  8.2  8.8  8.0   --   7.4   --    NEUTSPOLYS  89.60*  94.50*   --    --   87.90*   --    LYMPHOCYTES  4.00*  2.10*   --    --   4.00*   --    MONOCYTES  5.20  1.70   --    --   6.50   --    EOSINOPHILS  0.50  0.10   --    --    1.10   --    BASOPHILS  0.10  0.10   --    --   0.10   --    ASTSGOT  16   --    --   9*   --   10*   ALTSGPT  7   --    --   6   --   6   ALKPHOSPHAT  51   --    --   57   --   55   TBILIRUBIN  0.4   --    --   0.4   --   0.3           Hemodynamics:  Temp (24hrs), Av.7 °C (98.1 °F), Min:36.1 °C (96.9 °F), Max:37.1 °C (98.8 °F)  Temperature: 36.1 °C (96.9 °F)  Pulse  Av.8  Min: 77  Max: 103Heart Rate (Monitored): 99  Blood Pressure : (!) 170/89, NIBP: 159/75     Respiratory:    Respiration: 16, Pulse Oximetry: 95 %           Fluids:    Intake/Output Summary (Last 24 hours) at 17 1324  Last data filed at 17 1254   Gross per 24 hour   Intake             1400 ml   Output             2400 ml   Net            -1000 ml        GI/Nutrition:  Orders Placed This Encounter   Procedures   • DIET NPO     Standing Status:   Standing     Number of Occurrences:   1     Order Specific Question:   Restrict to:     Answer:   Sips with Medications [3]     Medical Decision Making, by Problem:  Active Hospital Problems    Diagnosis   • *Hydronephrosis [N13.30]   • Abdominal distension [R14.0]   • Extraperitoneal rupture of bladder [N32.89]   • Anemia [D64.9]   • Renal failure [N19]   • Ruptured bladder [N32.89]   • Hyperkalemia [E87.5]   • History of DVT (deep vein thrombosis) [Z86.718]   • Acute kidney injury (CMS-HCC) [N17.9]   • DM type 2 (diabetes mellitus, type 2) (CMS-HCC) [E11.9]         Reviewed items::  Labs reviewed, Medications reviewed and Radiology images reviewed  Pacheco catheter::  Urinary Tract Retention or Urinary Tract Obstruction    1 JENNIFFER: multifactorial, sec to obstructive uropathy, possible ATN  2 Ilues  3 B/L hydronephrosis  4 Anemia  5 metabolic acidosis  6 dilated gallbladder  Plan  no acute need for HD today  Plan for B/L nephrostomy tubes  Consider surgical evaluation  Daily labs  If no improvement in renal function in 24-48 h, we will consider HD  Renal dose all meds  Avoid  nephrotoxins  Prognosis guarded.  D/W Dr Greer

## 2017-12-13 NOTE — NON-PROVIDER
Internal Medicine Interval Note  Note Author: Niels Figueroa, Student     Name Niels Lima     1950   Age/Sex 67 y.o. male   MRN [id number]   Code Status Full     After 5PM or if no immediate response to page, please call for cross-coverage  Attending/Team: Patricia/Fco See Patient List for primary contact information  Call [phone] to page    1st Call - Day Intern (R1):   Cecilio 2nd Call - Day Sr. Resident (R2/R3):   Zoey         Reason for interval visit  (Principal Problem)   Hydronephrosis    Interval Problem Daily Status Update  (24 hours)   Patient is in a lot of pain this morning, and wants to drink but is NPO for nephrostomy tube placement today. Abdomen is still distended, but should be improved with nephrostomy tube placement. Patient overnight had a bout of emesis as well, but we reassured him that we are monitoring his labs appropriately and that his symptoms should improve post nephrostomy tube placement.    ROS  General: no unexplained weight loss, fever, or chills  HEENT: no headaches, vision changes, hearing changes, URI symptoms  RESP: no SOB or cough  CV: no palpitations or leg swelling  GI: positive for constipation and abdominal distension, negative for diarrhea, hematochezia, melena  : negative for hematuria, dysuria   Neuro: negative for dizziness, vertigo, or stroke like symptoms    Consultants/Specialty  Najjar/Nephrology    Disposition  Inpatient    Quality Measures    Reviewed items::  Labs reviewed, Medications reviewed and Radiology images reviewed  Pacheco catheter::  Urinary Tract Retention or Urinary Tract Obstruction and Urologic Surgery or Other Surgery on Contiguous Structures of the Genitourinary Tract or Studies  Ulcer Prophylaxis::  Yes          Physical Exam       Vitals:    17 1208 17 1213 17 1218 17 1223   BP:       Pulse: 98      Resp: 16 16 16 16   Temp:       SpO2: 96% 97% 96% 95%   Weight:       Height:         Body mass  index is 26.9 kg/m².    Oxygen Therapy:  Pulse Oximetry: 95 %, O2 (LPM): 4, O2 Delivery: Nasal Cannula    Physical Exam   General: IN pain, distressed  HEENT: NCAT, EOMI  Resp: Clear to auscultation B/L with no wheeze, rhonchi, rales. Dorsal pedal pulses are 2+ and symmetric bilaterally, radial pulses are 2+ and symmetric bilaterally  CVS: Regular rate and rhythm, no murmurs, rubs or gallops  Abdomen: Distended, diaphoretic, bowel sounds hyperactive but distant to auscultation, right sided TTP with rebound; no HSM, pulsatile masses, shifting dullness, tympanic percussion, fluid waves or abdominal bruits  Neuro: No focal deficits  Skin: large eschar like lesion left, posterior thigh. No erythema, pus, but warm, hard, and tender to palpation. No edema.    Lab Data Review:         12/13/2017  2:05 PM    Recent Labs      12/11/17   1013   12/12/17   0338  12/12/17   1645  12/12/17   2352   SODIUM  138   < >  139  142  141   POTASSIUM  5.6*   < >  4.8  4.9  5.0   CHLORIDE  107   < >  108  110  109   CO2  21   < >  19*  18*  18*   BUN  58*   < >  58*  71*  77*   CREATININE  4.91*   < >  4.59*  5.77*  6.53*   MAGNESIUM  2.2   --   2.1   --   2.4   PHOSPHORUS  8.0*   --   6.0*   --   6.8*   CALCIUM  8.7   < >  8.2*  8.6  8.6    < > = values in this interval not displayed.       Recent Labs      12/12/17   0338  12/12/17   1645  12/12/17   2352   ALTSGPT   --   6  6   ASTSGOT   --   9*  10*   ALKPHOSPHAT   --   57  55   TBILIRUBIN   --   0.4  0.3   GLUCOSE  215*  214*  221*       Recent Labs      12/11/17   0317   12/11/17   2359  12/12/17   0000   12/12/17   2351  12/12/17   2352  12/13/17   0449  12/13/17   1012   RBC  2.55*   --   2.52*   --    --   2.59*   --    --    --    HEMOGLOBIN  8.7*   < >  8.6*   --    < >  8.8*   --   8.5*  9.0*   HEMATOCRIT  26.9*   < >  26.3*   --    < >  26.8*   --   26.3*  27.9*   PLATELETCT  145*   --   166   --    --   201   --    --    --    PROTHROMBTM   --    --    --   17.2*   --    --    19.6*   --    --    INR   --    --    --   1.44*   --    --   1.69*   --    --    IRON   --    --   <10*   --    --    --    --    --    --    FERRITIN   --    --    --    --    --    --   143.3   --    --    TOTIRONBC   --    --   227*   --    --    --    --    --    --     < > = values in this interval not displayed.       Recent Labs      12/11/17   0317  12/11/17 2359 12/12/17   1645  12/12/17 2351 12/12/17 2352   WBC  8.8  8.0   --   7.4   --    NEUTSPOLYS  94.50*   --    --   87.90*   --    LYMPHOCYTES  2.10*   --    --   4.00*   --    MONOCYTES  1.70   --    --   6.50   --    EOSINOPHILS  0.10   --    --   1.10   --    BASOPHILS  0.10   --    --   0.10   --    ASTSGOT   --    --   9*   --   10*   ALTSGPT   --    --   6   --   6   ALKPHOSPHAT   --    --   57   --   55   TBILIRUBIN   --    --   0.4   --   0.3     IR-NEPHROSTOGRAM W/ NEW TUBE PLACEMENT (ALL RADIOLOGY) RIGHT   Final Result      1. ULTRASOUND AND FLUOROSCOPIC GUIDED PLACEMENT OF A LEFT 10 [place]  PIGTAIL LOCKING LOOP PERCUTANEOUS NEPHROSTOMY CATHETER.      2. ULTRASOUND AND FLUOROSCOPIC GUIDED PLACEMENT OF A RIGHT 10 [place]  PIGTAIL LOCKING LOOP PERCUTANEOUS NEPHROSTOMY CATHETER.      2. NEPHROSTOGRAMS SHOWING SATISFACTORY CATHETER POSITION WITHOUT EXTRAVASATION.      US-GALLBLADDER   Final Result      Cholelithiasis      Dilated gallbladder without a positive sonographic Montoya's sign or biliary dilatation      Stable moderate right hydronephrosis      10 cm right renal cyst      Small right pleural effusion and small perihepatic fluid      Mild hepatomegaly      LE VENOUS DUPLEX (DVT)         DX-ABDOMEN FOR TUBE PLACEMENT   Final Result         1.  Air-filled distended small bowel, consider ileus or evolving obstructive change.   2.  Nasogastric tube tip terminates overlying the expected location of the gastric body with side-port near the gastroesophageal junction, could be advanced 4-8 cm.      DX-ABDOMEN FOR TUBE PLACEMENT   Final  Result      The enteric tube is looped in the proximal esophagus.      CT-ABDOMEN-PELVIS W/O   Final Result      1.  Increased small bowel and colonic gas, most suggestive of ileus.  Developing small bowel obstruction is not excluded.  Clinical follow-up recommended.   2.  No evidence for bowel perforation.   3.  Small volume ascites present.   4.  Distended gallbladder, of uncertain clinical significance.   5.  Bilateral moderate hydronephrosis suggesting urinary retention.   6.  Collapsed bladder with Pacheco catheter present.  Potential bladder wall appears thickened which may indicate diverticulum.   7.  Dependent hyperdense fluid in the pelvis may be secondary to prior CT cystogram and is again concerning for bladder rupture.      [id number] VIEW   Final Result         Diffuse gaseous distention of the small and large bowel, concerning for ileus.      US-EXTREMITY NON VASCULAR UNILATERAL LEFT   Final Result      No fluid collection demonstrated within the posterior LEFT thigh at site of discoloration to indicate hematoma.      US-ABDOMEN LIMITED   Final Result      No thrombus identified in the visualized IVC. However, the IVC is not evaluated in its entirety, limiting evaluation. Further evaluation can be performed with contrast-enhanced CT.      DX-CYSTO FLUORO > 1 HOUR   Final Result         Portable cystoscopy utilized for 1.4 minutes.      CT-PELVIS W/O   Final Result      1.  Limited CT cystogram because of patient tolerance factors. Only approximately 5 mL of contrast was injected. There is extraperitoneal abnormal density indicating there is some extra peritoneal spread      2.  There is also abnormal density layering in the pelvis but no contrast extravasates here. Because the contrast challenge was not robust, false-negative for extravasation could not be excluded. This density in the pelvis is most likely not from    intraperitoneal extravasation which is further supported by lack of free air and  some abnormal contour to the bladder anterosuperiorly which would support extraperitoneal primary injury      3.  Right nephrolith and cyst      US-RENAL   Final Result      Moderate right, mild left hydronephrosis      9 mm left nonobstructive nephrolith      Echogenic bladder material is compatible with hemorrhage or infectious exudate.              Assessment/Plan     Assessment & plan notes cannot be loaded without a specified hospital service.    # Extraperitoneal bladder rupture              Stable, urology performed cystoscopy and bladder repair yesterday, and removed hematoma              Patient is making clear urine, kaur is not clogged, no dysuria or hematuria at this time.  Plan       -Hb/Hct q 12 hours  -Continue kaur catheter  -H/H q 12 hours; transfuse if hb <7 for active bleeding  -Continue C3  -Repeat UA  -Anemia workup to set baseline to monitor blood loss; ferritin pending; B12 is low as it is at an inappropriately normal level, should be elevated since it's an acute phase reactant.  -give B12         #Hydronephrosis  -BUN/CR 63/5.14 from 40/3.27 yesterday  -Continue kaur  -Give NS fluid bolus 2L  - b/l nephrostomy tube placed today     #Hyperkalemia  -K of 5.0 this morning  -Mg 2.4, Phos 6.8        Plan  -Patient transferred to telemetry  -BMP q 4 hours with Mg  -CMP, Mg, Phos q day  -Insulin, dextrose, and fluids given  -urine electrolytes pending, CIB stopped today.  - b/l nephrostomy tube placed today     #DM II-well managed  -patient on metformin and glipizide as home medication  -hold home medications  -Sliding scale insulin with hypoglycemia protocol  -HbA1c 7.0     #DVT Hx  - History of DVT three times in past; was on warfarin previously, currently on dabigatran; holding anticoagulation for now because of bleeding risk.  - IVC filter in place from [id number]  - US of thorax IVC did not show IVC filter in place, also did not show clots  -CT abdomen on 12.12 showed IVC filter in place

## 2017-12-13 NOTE — PROGRESS NOTES
Internal Medicine Interval Note  Note Author: Lilo Hernandes M.D.     Name Niels Lima     1950   Age/Sex 67 y.o. male   MRN [id number]   Code Status Full code     After 5PM or if no immediate response to page, please call for cross-coverage  Attending/Team: Dr. Gomez/ Fco See Patient List for primary contact information  Call [phone] to page    1st Call - Day Intern (R1):   Dr. Hernandes 2nd Call - Day Sr. Resident (R2/R3):   Dr. Greer         Reason for interval visit  (Principal Problem)   Hydronephrosis    Interval Problem Daily Status Update  (24 hours)   - Patient with worsening abdominal distension today with nausea / vomiting. CT abdomen shows ileus and persistent bilateral hydronephrosis. Will keep patient NPO  - Creatinine continues to be elevated. Urology and nephrology on board. If there is no improvement in creat tomorrow, nephrostomy tubes will be orderd  - Pacheco catheter in place, CBI discontinued  - 5 pm : Patient had 500 ml of dark brown emesis. Vitals stable. Stat H &H ordered-  9.9/30.0. Pantoprazole drip started. Will monitor hb/hct Q6H          Review of Systems   Constitutional: Positive for malaise/fatigue. Negative for chills and fever.   HENT: Negative for sinus pain and sore throat.    Eyes: Negative for blurred vision, double vision, pain and discharge.   Respiratory: Negative for cough, sputum production, shortness of breath and stridor.    Cardiovascular: Negative for chest pain, palpitations, orthopnea, claudication and leg swelling.   Gastrointestinal: Positive for abdominal pain, constipation, nausea and vomiting. Negative for heartburn.        Abdominal distension   Genitourinary: Negative for dysuria, frequency, hematuria and urgency.   Musculoskeletal: Negative for back pain, myalgias and neck pain.   Skin: Negative for itching and rash.   Neurological: Negative for dizziness, sensory change, speech change, weakness and headaches.   Endo/Heme/Allergies:  Bruises/bleeds easily.        Reports hematoma left thigh few weeks back   Psychiatric/Behavioral: Negative for depression, substance abuse and suicidal ideas.       Consultants/Specialty  Urology  Nephrology    Disposition  inpatient    Quality Measures    Reviewed items::  Labs reviewed and Medications reviewed  Pacheco catheter::  Urinary Tract Retention or Urinary Tract Obstruction  DVT prophylaxis pharmacological::  Contraindicated - High bleeding risk  DVT prophylaxis - mechanical:  SCDs  Ulcer Prophylaxis::  Not indicated          Physical Exam       Vitals:    12/12/17 0800 12/12/17 0939 12/12/17 1200 12/12/17 1601   BP: (!) 171/86 (!) 165/84 (!) 161/86 (!) 179/90   Pulse: 87  85 92   Resp: 19 19 19   Temp: 36.9 °C (98.4 °F)  37.2 °C (99 °F) 37.1 °C (98.8 °F)   SpO2: 95%  95% 95%   Weight:       Height:         Body mass index is 26.9 kg/m². Weight: 92.5 kg (203 lb 14.8 oz)  Oxygen Therapy:  Pulse Oximetry: 95 %, O2 (LPM): 2, O2 Delivery: Silicone Nasal Cannula    Physical Exam   Constitutional: He is oriented to person, place, and time and well-developed, well-nourished, and in no distress. No distress.   HENT:   Head: Normocephalic and atraumatic.   Mouth/Throat: No oropharyngeal exudate.   Eyes: EOM are normal. Pupils are equal, round, and reactive to light. No scleral icterus.   Neck: Normal range of motion. Neck supple. No JVD present.   Cardiovascular: Normal rate and regular rhythm.    No murmur heard.  Pulmonary/Chest: Effort normal and breath sounds normal. No respiratory distress. He has no wheezes.   Abdominal: He exhibits distension. There is tenderness.   Hyperactive bowel sounds   Genitourinary:   Genitourinary Comments: Pacheco catheter present   Musculoskeletal: He exhibits no edema.   Neurological: He is alert and oriented to person, place, and time. No cranial nerve deficit.   Skin:   Left posterolateral thigh- 15 x 10cm blackish red discoloration noted. Tenderness present   Psychiatric: Mood,  memory, affect and judgment normal.         Lab Data Review:         12/11/2017  5:55 PM    Recent Labs      12/11/17   1013   12/11/17 2002 12/11/17 2359 12/12/17 0338   SODIUM  138   < >  138  136  139   POTASSIUM  5.6*   < >  4.7  4.6  4.8   CHLORIDE  107   < >  108  107  108   CO2  21   < >  19*  18*  19*   BUN  58*   < >  58*  62*  58*   CREATININE  4.91*   < >  4.39*  5.13*  4.59*   MAGNESIUM  2.2   --    --    --   2.1   PHOSPHORUS  8.0*   --    --    --   6.0*   CALCIUM  8.7   < >  8.4*  8.6  8.2*    < > = values in this interval not displayed.       Recent Labs      12/10/17   1328   12/11/17   2002  12/11/17   2359  12/12/17   0338   ALTSGPT  7   --    --    --    --    ASTSGOT  16   --    --    --    --    ALKPHOSPHAT  51   --    --    --    --    TBILIRUBIN  0.4   --    --    --    --    GLUCOSE  204*   < >  197*  193*  215*    < > = values in this interval not displayed.       Recent Labs      12/10/17   1328  12/11/17   0317  12/11/17   1433  12/11/17 2359 12/12/17   0000   RBC  2.58*  2.55*   --   2.52*   --    HEMOGLOBIN  8.9*  8.7*  8.6*  8.6*   --    HEMATOCRIT  27.0*  26.9*  26.2*  26.3*   --    PLATELETCT  142*  145*   --   166   --    PROTHROMBTM  16.0*   --    --    --   17.2*   APTT  33.6   --    --    --    --    INR  1.31*   --    --    --   1.44*   IRON   --    --    --   <10*   --    TOTIRONBC   --    --    --   227*   --        Recent Labs      12/10/17   1328  12/11/17   0317  12/11/17   2359   WBC  8.2  8.8  8.0   NEUTSPOLYS  89.60*  94.50*   --    LYMPHOCYTES  4.00*  2.10*   --    MONOCYTES  5.20  1.70   --    EOSINOPHILS  0.50  0.10   --    BASOPHILS  0.10  0.10   --    ASTSGOT  16   --    --    ALTSGPT  7   --    --    ALKPHOSPHAT  51   --    --    TBILIRUBIN  0.4   --    --            Assessment/Plan     * Hydronephrosis   Assessment & Plan    - CT abdomen/pelvis from 12/08 shows bilateral hydronephrosis R>L likely related to changes in the bladder. Bilateral  nephrolithiasis and prostate enlargement. Patient had rigid cystoscopy with removal of 100 ml of clot which was thought to be the cause for obstruction and resulting hydronephrosis.  Patient with worsening abdominal distension today with nausea/vomiting. CT abdomen/pelvis continues to show moderate hydronephrosis. Creatinine shows some improvement from yesterday but still elevated. ( 5.14-->4.59)  - Baseline creat 0.7  Plan:  - As per urology, if there is no improvement in creat tomorrow, bilateral nephrostomy tubes will be placed.  - Continue kaur cather, CBI discontinued          Abdominal distension   Assessment & Plan    Patient with worsening abdominal distension along with nausea/vomiting. Last bowel movement was yesterday night.  Ct abdomen with pelvis showed findings suggestive of ileus, no bowel perforation, small ascites, moderate hydronephrosis and hyperdense fluid in the pelvis  Urology on board  Clear liquid diet  Anti emetics as needed  IV fluids              Extraperitoneal rupture of bladder   Assessment & Plan    [facility] for [facility] bladder rupture and bilateral hydronephrosis.   - Urology on board  - CT cystogram on 12/10 showed some extraperitoneal spread however the study was limited  - 12/11: He underwent rigid cystoscopy with evacuation of 100 ml of blood clot, fulguration of bladder perforation sites and biopsy of posterior bladder wall.  - Kaur catheter in place. CBI discontinued today  Plan:  - Urology following, appreciate recommendations  - continue to hold anticoagulation  - B&O suppository for bladder spasms        Anemia   Assessment & Plan    Likely secondary to acute blood loss from hematuria  Decreased iron, TIBC. Ferritin pending.   Vitamin B12 lower limits of normal  Plan:  Vitamin B12 IM  If ferritin is low, will start oral iron        Hyperkalemia   Assessment & Plan    - Resolved  - Likely secondary to JENNIFFER   - Will continue to  monitor        Acute kidney injury (CMS-HCC)   Assessment & Plan    Baseline creat 0.7. Creatinine continues to be elevated today- 4.59  Patient has persistent bilateral hydronephrosis. As per urology, if there is no improvement in creatinine tomorrow, bilateral nephrostomy tubes will be ordered.  Nephrology on board- Gentle IVF   Avoid nephrotoxins        History of DVT (deep vein thrombosis)   Assessment & Plan    H/o DVT 3 times in the past. Was on coumadin previously, currently on dabigatran. He also has an IVC filter in place from 2009  - Patient had a recent admission for new left leg DVT on warfarin secondary to missed bridging with lovenox. He was on heaprin drip for 7 days and discharged on dabigatran  - ultrasound done to evaluate for thrombus in the filter- no thrombus was seen, however exam was limited  - will hold anticoagulation for now . Patient on SCDs  - LE venous doppler ordered            DM type 2 (diabetes mellitus, type 2) (CMS-HCC)- (present on admission)   Assessment & Plan    Patient on metformin and glipizide as home medication  - HbA1c 7  - Hold home medication  - Patient started on SSI and hypoglycemia protocol  - Insulin glargine 5 units started

## 2017-12-13 NOTE — PROGRESS NOTES
"Urology Progress Note    Post op Day # 3    Overnight Events: None    S: Now has NG tube. Creatinine level increasing. Wife at bedside.     O:   Blood pressure (!) 170/89, pulse 84, temperature 36.1 °C (96.9 °F), resp. rate 18, height 1.854 m (6' 1\"), weight 92.5 kg (203 lb 14.8 oz), SpO2 94 %.  Recent Labs      12/12/17   0338  12/12/17   1645  12/12/17   2352   SODIUM  139  142  141   POTASSIUM  4.8  4.9  5.0   CHLORIDE  108  110  109   CO2  19*  18*  18*   GLUCOSE  215*  214*  221*   BUN  58*  71*  77*   CREATININE  4.59*  5.77*  6.53*   CALCIUM  8.2*  8.6  8.6     Recent Labs      12/11/17   0317   12/11/17   2359   12/12/17   2351  12/13/17   0449  12/13/17   1012   WBC  8.8   --   8.0   --   7.4   --    --    RBC  2.55*   --   2.52*   --   2.59*   --    --    HEMOGLOBIN  8.7*   < >  8.6*   < >  8.8*  8.5*  9.0*   HEMATOCRIT  26.9*   < >  26.3*   < >  26.8*  26.3*  27.9*   MCV  105.5*   --   104.4*   --   103.5*   --    --    MCH  34.1*   --   34.1*   --   34.0*   --    --    MCHC  32.3*   --   32.7*   --   32.8*   --    --    RDW  59.7*   --   60.1*   --   59.5*   --    --    PLATELETCT  145*   --   166   --   201   --    --    MPV  9.3   --   9.7   --   9.6   --    --     < > = values in this interval not displayed.         Intake/Output Summary (Last 24 hours) at 12/13/17 1043  Last data filed at 12/13/17 0600   Gross per 24 hour   Intake             1400 ml   Output             4100 ml   Net            -2700 ml       Exam:  Abdomen distended   Urine: clear      A/P:    Active Hospital Problems    Diagnosis   • Abdominal distension [R14.0]     Priority: High   • Hydronephrosis [N13.30]     Priority: High   • Extraperitoneal rupture of bladder [N32.89]     Priority: Medium   • Anemia [D64.9]   • Renal failure [N19]   • Ruptured bladder [N32.89]   • Hyperkalemia [E87.5]   • History of DVT (deep vein thrombosis) [Z86.718]   • Acute kidney injury (CMS-HCC) [N17.9]   • DM type 2 (diabetes mellitus, type 2) " (CMS-HCC) [E11.9]       Creatinine has increased to 6.53.  Discuss with Dr Upton if we should have IR place bilateral NT's

## 2017-12-13 NOTE — ASSESSMENT & PLAN NOTE
Resolved  Likely 2/2 ileus  Abdomen soft, non distended with normal bowel sounds.  Pt did have a BM  Will CTM

## 2017-12-13 NOTE — PROGRESS NOTES
Received report from day RN assumed care at 1915. Pt A&Ox 4 . Pt states 2 /10 pain at this time. Plan of care discussed with Pt, verbalized understanding.  family present at bedside. Assessment completed. All Pt needs met at this time. Call light within reach, bed alarm on , bed locked and in low position. Will continue to monitor.

## 2017-12-13 NOTE — CONSULTS
DATE OF SERVICE:  12/12/2017    REQUESTING PHYSICIAN:  Dr. Gomez from the [facility] team.    REASON FOR CONSULTATION:  Acute kidney injury.    The patient seen and examined, medical records were reviewed.    HISTORY OF PRESENT ILLNESS:  The patient is a 67-year-old gentleman, who was   recently transferred from the [facility] to [facility].  He presented to the [facility] with abdominal pain and was diagnosed with extraperitoneal bladder   rupture and bilateral hydronephrosis.  Urology has been consulted.  He was   transferred to [facility] where he had rigid cystourethroscopy and had the Pacheco   catheter placed with continuous bladder irrigation, however, his   hospitalization has been complicated by acute kidney injury with the   creatinine continued to be elevated today at 4.59, in the last 24-hour patient   developed abdominal distention with possible ileus.  He has been kept n.p.o.    He does feel tired with mild abdominal discomfort.  No fever, no chills, no   hematuria, no dysuria, no recent use of NSAIDs or IV contrast.    PAST MEDICAL HISTORY:  Significant for:  1.  Recurrent DVT.  2.  Crohn's disease.  3.  BPH status post orchiectomy.  4.  Diabetes mellitus.    ALLERGIES:  The list was reviewed.    MEDICATIONS:  Reviewed.    SOCIAL HISTORY:  No smoking or alcohol use.  Patient works as a contractor.    FAMILY HISTORY:  His father was on dialysis.    REVIEW OF SYSTEMS:  Again, patient is very weak, decreased appetite, mild   abdominal discomfort.  All other review of systems, total of 10 were negative   except outlined in the history of present illness.    PHYSICAL EXAMINATION:  GENERAL:  Patient is in no apparent distress.  VITAL SIGNS:  Showed blood pressure of 160/80, heart rate was 85.  HEENT:  Normocephalic, atraumatic.  Sclerae anicteric.  NECK:  No lymphadenopathy.  CHEST:  Normal.  LUNGS:  Clear to auscultation.  HEART:  S1, S2.  ABDOMEN:  Soft, nontender.  EXTREMITIES:  There is trace lower extremity  edema.  SKIN:  No skin rashes.  NEUROLOGIC:  Patient is alert and oriented x3.    LABORATORY DATA:  His recent labs were reviewed.  Also, I reviewed abdominal   CT scan image myself.  The patient continued to have hydronephrosis.    ASSESSMENT:  1.  Acute kidney injury.  Etiology is most likely secondary to obstructive   uropathy.  Urology service is scheduled to see the patient later on today.  We   will await their input; however, we might need to consider bilateral   nephrostomy tube.  2.  There is also possibility of acute tubular necrosis.  3.  Patient has no acute need for dialysis.  However, upon discussing the   subject, the patient seemed to refuse dialysis altogether because he said his   father had a bad experience with dialysis.  I did explain to the patient that   at this moment, there is no acute need for dialysis and will revisit the issue   if dialysis will become medically necessary.  4.  Anemia with low iron panel.  5.  Obstructive uropathy.  6.  Uncontrolled hypertension.    PLAN:  1.  There is no acute need for renal replacement therapy.  2.  Consider bilateral nephrostomy tube.  3.  Await urology input.  4.  Gentle IV fluid.  5.  Renally dose all medications.  6.  Avoid nephrotoxins.  7.  Prognosis is guarded.    Plan discussed in detail with the primary team.       ____________________________________     FADI NAJJAR, MD FN / AVILA    DD:  12/12/2017 15:21:56  DT:  12/12/2017 16:44:13    D#:  [id number]  Job#:  [id number]

## 2017-12-13 NOTE — PROGRESS NOTES
Internal Medicine Interval Note  Note Author: Sandra Greer M.D.     Name Niels Lima     1950   Age/Sex 67 y.o. male   MRN [id number]   Code Status Full code     After 5PM or if no immediate response to page, please call for cross-coverage  Attending/Team: Dr. Gomez/ Fco See Patient List for primary contact information  Call [phone] to page    1st Call - Day Intern (R1):   Dr. Hernandes 2nd Call - Day Sr. Resident (R2/R3):   Dr. Greer         Reason for interval visit  (Principal Problem)   Hydronephrosis    Interval Problem Daily Status Update  (24 hours)     -Saw this patient at bedside during internal medicine rounds. He expressed that he is not feeling well and his abdominal pain is worsening. Discussed with him and ordered nephrostomy tube B/L.  -Evaluated him again after placement of nephrostomy tube and he expressed that he is feeling significantly better and his wife also reported that his abdominal distention has significantly improved.  -Explained patient that he needs to be NPO and he can have sips of water due to ileus.         Review of Systems   Constitutional: Positive for malaise/fatigue. Negative for chills and fever.   HENT: Negative for sinus pain and sore throat.    Eyes: Negative for blurred vision, double vision, pain and discharge.   Respiratory: Negative for cough, sputum production, shortness of breath and stridor.    Cardiovascular: Negative for chest pain, palpitations, orthopnea, claudication and leg swelling.   Gastrointestinal: Positive for abdominal pain, constipation, nausea and vomiting. Negative for heartburn.        Abdominal distension   Genitourinary: Negative for dysuria, frequency, hematuria and urgency.   Musculoskeletal: Negative for back pain, myalgias and neck pain.   Skin: Negative for itching and rash.   Neurological: Negative for dizziness, sensory change, speech change, weakness and headaches.   Endo/Heme/Allergies: Bruises/bleeds easily.         Reports hematoma left thigh few weeks back   Psychiatric/Behavioral: Negative for depression, substance abuse and suicidal ideas.       Consultants/Specialty  Urology  Nephrology  IR    Disposition  inpatient    Quality Measures    Reviewed items::  Labs reviewed and Medications reviewed  Pacheco catheter::  Urinary Tract Retention or Urinary Tract Obstruction  DVT prophylaxis pharmacological::  Contraindicated - High bleeding risk  DVT prophylaxis - mechanical:  SCDs  Ulcer Prophylaxis::  Not indicated          Physical Exam       Vitals:    12/13/17 1208 12/13/17 1213 12/13/17 1218 12/13/17 1223   BP:       Pulse: 98      Resp: 16 16 16 16   Temp:       SpO2: 96% 97% 96% 95%   Weight:       Height:         Body mass index is 26.9 kg/m².    Oxygen Therapy:  Pulse Oximetry: 95 %, O2 (LPM): 4, O2 Delivery: Nasal Cannula    Physical Exam   Constitutional: He is oriented to person, place, and time and well-developed, well-nourished, and in no distress. No distress.   HENT:   Head: Normocephalic and atraumatic.   Mouth/Throat: No oropharyngeal exudate.   NG present   Eyes: EOM are normal. Pupils are equal, round, and reactive to light. No scleral icterus.   Neck: Normal range of motion. Neck supple. No JVD present.   Cardiovascular: Normal rate and regular rhythm.    No murmur heard.  Pulmonary/Chest: Effort normal and breath sounds normal. No respiratory distress. He has no wheezes.   Abdominal: He exhibits distension. There is tenderness.   Hyperactive bowel sounds    B/L nephrostomy tube present.   Genitourinary:   Genitourinary Comments: Pacheco catheter present   Musculoskeletal: He exhibits no edema.   Neurological: He is alert and oriented to person, place, and time. No cranial nerve deficit.   Skin:   Left posterolateral thigh- 15 x 10cm blackish red discoloration noted. Tenderness present   Psychiatric: Mood, memory, affect and judgment normal.         Lab Data Review:         12/11/2017  5:55 PM    Recent  Labs      12/11/17   1013   12/12/17 0338  12/12/17 1645 12/12/17 2352   SODIUM  138   < >  139  142  141   POTASSIUM  5.6*   < >  4.8  4.9  5.0   CHLORIDE  107   < >  108  110  109   CO2  21   < >  19*  18*  18*   BUN  58*   < >  58*  71*  77*   CREATININE  4.91*   < >  4.59*  5.77*  6.53*   MAGNESIUM  2.2   --   2.1   --   2.4   PHOSPHORUS  8.0*   --   6.0*   --   6.8*   CALCIUM  8.7   < >  8.2*  8.6  8.6    < > = values in this interval not displayed.       Recent Labs      12/12/17 0338 12/12/17 1645 12/12/17 2352   ALTSGPT   --   6  6   ASTSGOT   --   9*  10*   ALKPHOSPHAT   --   57  55   TBILIRUBIN   --   0.4  0.3   GLUCOSE  215*  214*  221*       Recent Labs      12/11/17 0317 12/11/17 2359 12/12/17   0000   12/12/17 2351 12/12/17 2352 12/13/17   0449  12/13/17   1012   RBC  2.55*   --   2.52*   --    --   2.59*   --    --    --    HEMOGLOBIN  8.7*   < >  8.6*   --    < >  8.8*   --   8.5*  9.0*   HEMATOCRIT  26.9*   < >  26.3*   --    < >  26.8*   --   26.3*  27.9*   PLATELETCT  145*   --   166   --    --   201   --    --    --    PROTHROMBTM   --    --    --   17.2*   --    --   19.6*   --    --    INR   --    --    --   1.44*   --    --   1.69*   --    --    IRON   --    --   <10*   --    --    --    --    --    --    FERRITIN   --    --    --    --    --    --   143.3   --    --    TOTIRONBC   --    --   227*   --    --    --    --    --    --     < > = values in this interval not displayed.       Recent Labs      12/11/17 0317 12/11/17 2359 12/12/17   1645  12/12/17   2351 12/12/17 2352   WBC  8.8  8.0   --   7.4   --    NEUTSPOLYS  94.50*   --    --   87.90*   --    LYMPHOCYTES  2.10*   --    --   4.00*   --    MONOCYTES  1.70   --    --   6.50   --    EOSINOPHILS  0.10   --    --   1.10   --    BASOPHILS  0.10   --    --   0.10   --    ASTSGOT   --    --   9*   --   10*   ALTSGPT   --    --   6   --   6   ALKPHOSPHAT   --    --   57   --   55   TBILIRUBIN   --    --    0.4   --   0.3           Assessment/Plan     * Hydronephrosis   Assessment & Plan    - CT abdomen/pelvis from 12/08 shows bilateral hydronephrosis R>L likely related to changes in the bladder. Bilateral nephrolithiasis and prostate enlargement. Patient had rigid cystoscopy with removal of 100 ml of clot which was thought to be the cause for obstruction and resulting hydronephrosis.  Patient with worsening abdominal distension twith nausea/vomiting. CT abdomen/pelvis continues to show moderate hydronephrosis.   - Baseline creat 0.7  -Creatinine continue to getting worse  Plan:  He underwent B/L nephrostomy tube placement today.  Abdominal distension improved and he started felling better.  Approx 1 litre drained from each side of nephrostomy tube.         Abdominal distension   Assessment & Plan    Patient with worsening abdominal distension along with nausea/vomiting. Last bowel movement was 2 days ago. Most likely duet to electrolytes disturbances due to worsening kidney functions.   Ct abdomen with pelvis showed findings suggestive of ileus, no bowel perforation, small ascites, moderate hydronephrosis and hyperdense fluid in the pelvis.  He underwent U/S RUQ and it showed chololithiasis but no RUQ tenderness or biliary dilatation.   Plan:  NPO  Continue NG tube.  Anti emetics as needed  IV fluids              Extraperitoneal rupture of bladder   Assessment & Plan    [facility] for suspected extra[facility] bladder rupture and bilateral hydronephrosis.   - Urology on board  - CT cystogram on 12/10 showed some extraperitoneal spread however the study was limited  - 12/11: He underwent rigid cystoscopy with evacuation of 100 ml of blood clot, fulguration of bladder perforation sites and biopsy of posterior bladder wall.  - Pacheco catheter in place. CBI discontinued and he underwent B/L nephrostomy tube placment.  -Appreciate urology recommendations.  - continue to hold anticoagulation  - B&O  suppository for bladder spasms        Anemia   Assessment & Plan    Likely secondary to acute blood loss from hematuria  Decreased iron, TIBC. Ferritin pending.   Vitamin B12 lower limits of normal  Plan:  Vitamin B12 IM  Will start him on iron replacement after NG is out.        Hyperkalemia   Assessment & Plan    - Resolved  - Likely secondary to JENNIFFER   - Will continue to monitor        Acute kidney injury (CMS-HCC)   Assessment & Plan    Baseline creat 0.7. Creatinine continues to be elevated today- 4.59  Patient has persistent bilateral hydronephrosis. As per urology, if there is no improvement in creatinine tomorrow, bilateral nephrostomy tubes will be ordered.  Nephrology on board- Gentle IVF   Avoid nephrotoxins        History of DVT (deep vein thrombosis)   Assessment & Plan    H/o DVT 3 times in the past. Was on coumadin previously, currently on dabigatran. He also has an IVC filter in place from 2009  - Patient had a recent admission for new left leg DVT on warfarin secondary to missed bridging with lovenox. He was on heaprin drip for 7 days and discharged on dabigatran  - ultrasound done to evaluate for thrombus in the filter- no thrombus was seen, however exam was limited  - will hold anticoagulation for now . Patient on SCDs  - LE venous doppler ordered and it showed B/L extensive DVT in both extremities. Acute and            DM type 2 (diabetes mellitus, type 2) (CMS-HCC)- (present on admission)   Assessment & Plan    Patient on metformin and glipizide as home medication  - HbA1c 7  - Hold home medication  - Patient started on SSI and hypoglycemia protocol  - Insulin glargine 5 units started

## 2017-12-14 PROBLEM — I82.409 ACUTE DVT (DEEP VENOUS THROMBOSIS) (HCC): Status: ACTIVE | Noted: 2017-12-10

## 2017-12-14 LAB
ALBUMIN SERPL BCP-MCNC: 2.3 G/DL (ref 3.2–4.9)
ALBUMIN/GLOB SERPL: 0.8 G/DL
ALP SERPL-CCNC: 52 U/L (ref 30–99)
ALT SERPL-CCNC: <5 U/L (ref 2–50)
ANION GAP SERPL CALC-SCNC: 8 MMOL/L (ref 0–11.9)
APPEARANCE UR: ABNORMAL
APPEARANCE UR: ABNORMAL
APTT PPP: 44.6 SEC (ref 24.7–36)
AST SERPL-CCNC: 8 U/L (ref 12–45)
BACTERIA #/AREA URNS HPF: NEGATIVE /HPF
BACTERIA #/AREA URNS HPF: NEGATIVE /HPF
BASOPHILS # BLD AUTO: 0.2 % (ref 0–1.8)
BASOPHILS # BLD: 0.01 K/UL (ref 0–0.12)
BILIRUB SERPL-MCNC: 0.5 MG/DL (ref 0.1–1.5)
BILIRUB UR QL STRIP.AUTO: NEGATIVE
BILIRUB UR QL STRIP.AUTO: NEGATIVE
BUN SERPL-MCNC: 27 MG/DL (ref 8–22)
CALCIUM SERPL-MCNC: 8.4 MG/DL (ref 8.5–10.5)
CHLORIDE SERPL-SCNC: 114 MMOL/L (ref 96–112)
CO2 SERPL-SCNC: 24 MMOL/L (ref 20–33)
COLOR UR: ABNORMAL
COLOR UR: YELLOW
CREAT SERPL-MCNC: 0.78 MG/DL (ref 0.5–1.4)
EOSINOPHIL # BLD AUTO: 0.05 K/UL (ref 0–0.51)
EOSINOPHIL NFR BLD: 0.9 % (ref 0–6.9)
EPI CELLS #/AREA URNS HPF: ABNORMAL /HPF
EPI CELLS #/AREA URNS HPF: NEGATIVE /HPF
ERYTHROCYTE [DISTWIDTH] IN BLOOD BY AUTOMATED COUNT: 58.9 FL (ref 35.9–50)
GFR SERPL CREATININE-BSD FRML MDRD: >60 ML/MIN/1.73 M 2
GLOBULIN SER CALC-MCNC: 2.8 G/DL (ref 1.9–3.5)
GLUCOSE BLD-MCNC: 228 MG/DL (ref 65–99)
GLUCOSE BLD-MCNC: 245 MG/DL (ref 65–99)
GLUCOSE BLD-MCNC: 271 MG/DL (ref 65–99)
GLUCOSE BLD-MCNC: 384 MG/DL (ref 65–99)
GLUCOSE SERPL-MCNC: 256 MG/DL (ref 65–99)
GLUCOSE UR STRIP.AUTO-MCNC: 500 MG/DL
GLUCOSE UR STRIP.AUTO-MCNC: >=1000 MG/DL
HCT VFR BLD AUTO: 25.4 % (ref 42–52)
HCT VFR BLD AUTO: 25.6 % (ref 42–52)
HGB BLD-MCNC: 8.2 G/DL (ref 14–18)
HGB BLD-MCNC: 8.3 G/DL (ref 14–18)
HYALINE CASTS #/AREA URNS LPF: ABNORMAL /LPF
HYALINE CASTS #/AREA URNS LPF: ABNORMAL /LPF
IMM GRANULOCYTES # BLD AUTO: 0.02 K/UL (ref 0–0.11)
IMM GRANULOCYTES NFR BLD AUTO: 0.3 % (ref 0–0.9)
KETONES UR STRIP.AUTO-MCNC: ABNORMAL MG/DL
KETONES UR STRIP.AUTO-MCNC: NEGATIVE MG/DL
LEUKOCYTE ESTERASE UR QL STRIP.AUTO: ABNORMAL
LEUKOCYTE ESTERASE UR QL STRIP.AUTO: ABNORMAL
LYMPHOCYTES # BLD AUTO: 0.23 K/UL (ref 1–4.8)
LYMPHOCYTES NFR BLD: 4 % (ref 22–41)
MAGNESIUM SERPL-MCNC: 1.6 MG/DL (ref 1.5–2.5)
MCH RBC QN AUTO: 33.5 PG (ref 27–33)
MCHC RBC AUTO-ENTMCNC: 32.4 G/DL (ref 33.7–35.3)
MCV RBC AUTO: 103.2 FL (ref 81.4–97.8)
MICRO URNS: ABNORMAL
MICRO URNS: ABNORMAL
MONOCYTES # BLD AUTO: 0.36 K/UL (ref 0–0.85)
MONOCYTES NFR BLD AUTO: 6.3 % (ref 0–13.4)
NEUTROPHILS # BLD AUTO: 5.08 K/UL (ref 1.82–7.42)
NEUTROPHILS NFR BLD: 88.3 % (ref 44–72)
NITRITE UR QL STRIP.AUTO: NEGATIVE
NITRITE UR QL STRIP.AUTO: NEGATIVE
NRBC # BLD AUTO: 0 K/UL
NRBC BLD AUTO-RTO: 0 /100 WBC
PH UR STRIP.AUTO: 5 [PH]
PH UR STRIP.AUTO: 5 [PH]
PHOSPHATE SERPL-MCNC: 2.2 MG/DL (ref 2.5–4.5)
PLATELET # BLD AUTO: 204 K/UL (ref 164–446)
PMV BLD AUTO: 9.2 FL (ref 9–12.9)
POTASSIUM SERPL-SCNC: 4 MMOL/L (ref 3.6–5.5)
PROT SERPL-MCNC: 5.1 G/DL (ref 6–8.2)
PROT UR QL STRIP: 30 MG/DL
PROT UR QL STRIP: 30 MG/DL
RBC # BLD AUTO: 2.48 M/UL (ref 4.7–6.1)
RBC # URNS HPF: >150 /HPF
RBC # URNS HPF: >150 /HPF
RBC UR QL AUTO: ABNORMAL
RBC UR QL AUTO: ABNORMAL
RENAL EPI CELLS #/AREA URNS HPF: ABNORMAL /HPF
SODIUM SERPL-SCNC: 146 MMOL/L (ref 135–145)
SP GR UR STRIP.AUTO: 1.01
SP GR UR STRIP.AUTO: 1.02
UROBILINOGEN UR STRIP.AUTO-MCNC: 0.2 MG/DL
UROBILINOGEN UR STRIP.AUTO-MCNC: 0.2 MG/DL
WBC # BLD AUTO: 5.8 K/UL (ref 4.8–10.8)
WBC #/AREA URNS HPF: ABNORMAL /HPF
WBC #/AREA URNS HPF: ABNORMAL /HPF

## 2017-12-14 PROCEDURE — 85018 HEMOGLOBIN: CPT

## 2017-12-14 PROCEDURE — 82962 GLUCOSE BLOOD TEST: CPT | Mod: 91

## 2017-12-14 PROCEDURE — 83735 ASSAY OF MAGNESIUM: CPT

## 2017-12-14 PROCEDURE — A9270 NON-COVERED ITEM OR SERVICE: HCPCS | Performed by: INTERNAL MEDICINE

## 2017-12-14 PROCEDURE — 81001 URINALYSIS AUTO W/SCOPE: CPT

## 2017-12-14 PROCEDURE — 700102 HCHG RX REV CODE 250 W/ 637 OVERRIDE(OP): Performed by: STUDENT IN AN ORGANIZED HEALTH CARE EDUCATION/TRAINING PROGRAM

## 2017-12-14 PROCEDURE — 85025 COMPLETE CBC W/AUTO DIFF WBC: CPT

## 2017-12-14 PROCEDURE — 99233 SBSQ HOSP IP/OBS HIGH 50: CPT | Mod: GC | Performed by: INTERNAL MEDICINE

## 2017-12-14 PROCEDURE — 36415 COLL VENOUS BLD VENIPUNCTURE: CPT

## 2017-12-14 PROCEDURE — 700111 HCHG RX REV CODE 636 W/ 250 OVERRIDE (IP): Performed by: INTERNAL MEDICINE

## 2017-12-14 PROCEDURE — 700105 HCHG RX REV CODE 258: Performed by: UROLOGY

## 2017-12-14 PROCEDURE — A9270 NON-COVERED ITEM OR SERVICE: HCPCS | Performed by: STUDENT IN AN ORGANIZED HEALTH CARE EDUCATION/TRAINING PROGRAM

## 2017-12-14 PROCEDURE — 700105 HCHG RX REV CODE 258: Performed by: INTERNAL MEDICINE

## 2017-12-14 PROCEDURE — 700102 HCHG RX REV CODE 250 W/ 637 OVERRIDE(OP): Performed by: INTERNAL MEDICINE

## 2017-12-14 PROCEDURE — 80053 COMPREHEN METABOLIC PANEL: CPT

## 2017-12-14 PROCEDURE — 770020 HCHG ROOM/CARE - TELE (206)

## 2017-12-14 PROCEDURE — 84100 ASSAY OF PHOSPHORUS: CPT

## 2017-12-14 PROCEDURE — 85730 THROMBOPLASTIN TIME PARTIAL: CPT

## 2017-12-14 PROCEDURE — 85014 HEMATOCRIT: CPT

## 2017-12-14 PROCEDURE — 700111 HCHG RX REV CODE 636 W/ 250 OVERRIDE (IP): Performed by: STUDENT IN AN ORGANIZED HEALTH CARE EDUCATION/TRAINING PROGRAM

## 2017-12-14 RX ORDER — HEPARIN SODIUM 1000 [USP'U]/ML
3800 INJECTION, SOLUTION INTRAVENOUS; SUBCUTANEOUS PRN
Status: DISCONTINUED | OUTPATIENT
Start: 2017-12-14 | End: 2017-12-14

## 2017-12-14 RX ORDER — MAGNESIUM SULFATE HEPTAHYDRATE 40 MG/ML
2 INJECTION, SOLUTION INTRAVENOUS ONCE
Status: COMPLETED | OUTPATIENT
Start: 2017-12-14 | End: 2017-12-14

## 2017-12-14 RX ORDER — SODIUM CHLORIDE 450 MG/100ML
INJECTION, SOLUTION INTRAVENOUS CONTINUOUS
Status: DISCONTINUED | OUTPATIENT
Start: 2017-12-14 | End: 2017-12-18

## 2017-12-14 RX ORDER — INSULIN GLARGINE 100 [IU]/ML
5 INJECTION, SOLUTION SUBCUTANEOUS EVERY EVENING
Status: DISCONTINUED | OUTPATIENT
Start: 2017-12-14 | End: 2017-12-15

## 2017-12-14 RX ADMIN — OXYCODONE HYDROCHLORIDE AND ACETAMINOPHEN 1 TABLET: 10; 325 TABLET ORAL at 12:53

## 2017-12-14 RX ADMIN — SODIUM CHLORIDE: 4.5 INJECTION, SOLUTION INTRAVENOUS at 09:00

## 2017-12-14 RX ADMIN — OXYCODONE HYDROCHLORIDE AND ACETAMINOPHEN 1 TABLET: 10; 325 TABLET ORAL at 20:00

## 2017-12-14 RX ADMIN — HEPARIN SODIUM 1450 UNITS/HR: 5000 INJECTION, SOLUTION INTRAVENOUS at 10:56

## 2017-12-14 RX ADMIN — ATROPA BELLADONNA AND OPIUM 30 MG: 16.2; 3 SUPPOSITORY RECTAL at 13:01

## 2017-12-14 RX ADMIN — SODIUM CHLORIDE: 4.5 INJECTION, SOLUTION INTRAVENOUS at 18:00

## 2017-12-14 RX ADMIN — OXYCODONE HYDROCHLORIDE 5 MG: 5 TABLET ORAL at 06:18

## 2017-12-14 RX ADMIN — INSULIN GLARGINE 5 UNITS: 100 INJECTION, SOLUTION SUBCUTANEOUS at 20:02

## 2017-12-14 RX ADMIN — ONDANSETRON 4 MG: 2 INJECTION INTRAMUSCULAR; INTRAVENOUS at 17:56

## 2017-12-14 RX ADMIN — INSULIN HUMAN 2 UNITS: 100 INJECTION, SOLUTION PARENTERAL at 05:10

## 2017-12-14 RX ADMIN — MAGNESIUM SULFATE IN WATER 2 G: 40 INJECTION, SOLUTION INTRAVENOUS at 09:09

## 2017-12-14 RX ADMIN — ATROPA BELLADONNA AND OPIUM 30 MG: 16.2; 3 SUPPOSITORY RECTAL at 21:37

## 2017-12-14 RX ADMIN — INSULIN HUMAN 3 UNITS: 100 INJECTION, SOLUTION PARENTERAL at 20:01

## 2017-12-14 RX ADMIN — SODIUM CHLORIDE: 9 INJECTION, SOLUTION INTRAVENOUS at 07:26

## 2017-12-14 RX ADMIN — ATROPA BELLADONNA AND OPIUM 30 MG: 16.2; 3 SUPPOSITORY RECTAL at 00:36

## 2017-12-14 RX ADMIN — OXYCODONE HYDROCHLORIDE 5 MG: 5 TABLET ORAL at 14:51

## 2017-12-14 RX ADMIN — INSULIN HUMAN 5 UNITS: 100 INJECTION, SOLUTION PARENTERAL at 11:01

## 2017-12-14 RX ADMIN — OXYCODONE HYDROCHLORIDE AND ACETAMINOPHEN 1 TABLET: 10; 325 TABLET ORAL at 02:59

## 2017-12-14 RX ADMIN — CEFTRIAXONE 2 G: 2 INJECTION, POWDER, FOR SOLUTION INTRAMUSCULAR; INTRAVENOUS at 09:09

## 2017-12-14 RX ADMIN — INSULIN HUMAN 2 UNITS: 100 INJECTION, SOLUTION PARENTERAL at 17:17

## 2017-12-14 ASSESSMENT — ENCOUNTER SYMPTOMS
SINUS PAIN: 0
ROS GI COMMENTS: ABDOMINAL DISTENSION
ORTHOPNEA: 0
CHILLS: 0
CLAUDICATION: 0
SENSORY CHANGE: 0
EYE DISCHARGE: 0
HEADACHES: 0
WEAKNESS: 0
BACK PAIN: 0
CONSTIPATION: 1
DIZZINESS: 0
SPEECH CHANGE: 0
SHORTNESS OF BREATH: 0
SORE THROAT: 0
NERVOUS/ANXIOUS: 1
COUGH: 0
NECK PAIN: 0
FEVER: 0
STRIDOR: 0
ABDOMINAL PAIN: 1
BLURRED VISION: 0
NAUSEA: 0
VOMITING: 0
MYALGIAS: 0
DOUBLE VISION: 0
EYE PAIN: 0
WEAKNESS: 1
SPUTUM PRODUCTION: 0
HEARTBURN: 0
PALPITATIONS: 0
DEPRESSION: 0
BRUISES/BLEEDS EASILY: 1

## 2017-12-14 ASSESSMENT — PAIN SCALES - GENERAL
PAINLEVEL_OUTOF10: 3
PAINLEVEL_OUTOF10: 9
PAINLEVEL_OUTOF10: 9
PAINLEVEL_OUTOF10: 7
PAINLEVEL_OUTOF10: 7

## 2017-12-14 ASSESSMENT — LIFESTYLE VARIABLES: SUBSTANCE_ABUSE: 0

## 2017-12-14 NOTE — PROGRESS NOTES
Assumed care at 1915. Bedside report received from Day KATHY Ambrosio. Patient's chart and MAR reviewed. 12 hour chart check complete.  Patient was updated on plan of care for the night. Questions answered and concerns addressed.  Pt denies any additional needs at this time. White board updated. Call light, phone and personal belongings within reach. Vital signs stable

## 2017-12-14 NOTE — CARE PLAN
Problem: Pain Management  Goal: Pain level will decrease to patient's comfort goal  Outcome: PROGRESSING AS EXPECTED  Pt assessed for pain Q4h and medicated PRN. Pt instructed to notify RN of any new or increasing pain to prevent it from becoming intolerable. Verbalized understanding.

## 2017-12-14 NOTE — PROGRESS NOTES
Elaine Lindsay Fall Risk Assessment:     Last Known Fall: No falls  Mobility: Immobilized/requires assist of one person  Medications: Cardiovascular or central nervous system meds  Mental Status/LOC/Awareness: Awake, alert, and oriented to date, place, and person  Toileting Needs: Use of catheters or diversion devices  Volume/Electrolyte Status: Use of IV fluids/tube feeds  Communication/Sensory: Visual (Glasses)/hearing deficit  Behavior: Depression/anxiety  Elaine Lindsay Fall Risk Total: 11  Fall Risk Level: MODERATE RISK    Universal Fall Precautions:  call light/belongings in reach, bed in low position and locked, wheelchairs and assistive devices out of sight, siderails up x 2, adequate lighting, use non-slip footwear, clutter free and spill free environment, educate on level of risk, educate to call for assistance    Fall Risk Level Interventions:   TRIAL ([employer], NEURO, MED DANYELL 5) Low Fall Risk Interventions  Place yellow fall risk ID band on patient: verified  Provide patient/family education based on risk assessment: verified  Educate patient/family to call staff for assistance when getting out of bed: verified  Place fall precaution signage outside patient door: verifiedTRIAL ([employer], NEURO, MED DANYELL 5) Moderate Fall Risk Interventions  Place yellow fall risk ID band on patient: refused  Provide patient/family education based on risk assessment : completed  Educate patient/family to call staff for assistance when getting out of bed: completed  Place fall precaution signage outside patient door: verified  Utilize bed/chair fall alarm: refused     Patient Specific Interventions:     Medication: review medications with patient and family and limit combination of prn medications  Mental Status/LOC/Awareness: reinforce falls education, check on patient hourly and reinforce the use of call light  Toileting: monitor intake and output/use of appropriate interventions  Volume/Electrolyte Status: monitor blood  sugars and maintain appropriate blood sugar levels if diabetic, monitor abnormal lab values and ensure IV fluids are appropriate  Communication/Sensory: update plan of care on whiteboard  Behavioral: encourage patient to voice feelings  Mobility: ensure bed is locked and in lowest position and provide appropriate assistive device   0

## 2017-12-14 NOTE — PROGRESS NOTES
Elaine Lindsay Fall Risk Assessment:     Last Known Fall: No falls  Mobility: Use of assistive device/requires assist of two people  Medications: Cardiovascular or central nervous system meds  Mental Status/LOC/Awareness: Awake, alert, and oriented to date, place, and person  Toileting Needs: Use of assistive device (Bedside commode, bedpan, urinal)  Volume/Electrolyte Status: Use of IV fluids/tube feeds  Communication/Sensory: Visual (Glasses)/hearing deficit  Behavior: Depression/anxiety  Elaine Lindsay Fall Risk Total: 13  Fall Risk Level: MODERATE RISK    Universal Fall Precautions:  call light/belongings in reach, bed in low position and locked, siderails up x 2, use non-slip footwear, adequate lighting, clutter free and spill free environment, educate on level of risk, educate to call for assistance    Fall Risk Level Interventions:   TRIAL ([employer], NEURO, MED DANYELL 5) Low Fall Risk Interventions  Place yellow fall risk ID band on patient: verified  Provide patient/family education based on risk assessment: verified  Educate patient/family to call staff for assistance when getting out of bed: verified  Place fall precaution signage outside patient door: verifiedTRIAL ([employer] 8, NEURO, MED DANYELL 5) Moderate Fall Risk Interventions  Place yellow fall risk ID band on patient: refused  Provide patient/family education based on risk assessment : completed  Educate patient/family to call staff for assistance when getting out of bed: completed  Place fall precaution signage outside patient door: verified  Utilize bed/chair fall alarm: verified     Patient Specific Interventions:     Medication: review medications with patient and family and limit combination of prn medications  Mental Status/LOC/Awareness: check on patient hourly, utilize bed/chair fall alarm and reinforce the use of call light  Toileting: provide frquent toileting  Volume/Electrolyte Status: ensure patient remains hydrated, monitor abnormal lab values  and ensure IV fluids are appropriate  Communication/Sensory: update plan of care on whiteboard  Behavioral: not applicable  Mobility: utilize bed/chair fall alarm, ensure bed is locked and in lowest position and provide appropriate assistive device

## 2017-12-14 NOTE — PROGRESS NOTES
Bedside report received from Ling CHAVEZ. Patient's chart and MAR reviewed. 12 hour chart check complete. Pt is awake in bed. Pt is AOx4. Patient was updated on plan of care for the day. Questions answered and concerns addressed.  Pt denies any additional needs at this time. White board updated. Call light and personal belongings within reach, bed in lowest position.

## 2017-12-14 NOTE — PROGRESS NOTES
Internal Medicine Interval Note  Note Author: Lilo Hernandes M.D.     Name Niels Lima     1950   Age/Sex 67 y.o. male   MRN [id number]   Code Status Full code     After 5PM or if no immediate response to page, please call for cross-coverage  Attending/Team: Dr. Gomez/ Fco See Patient List for primary contact information  Call [phone] to page    1st Call - Day Intern (R1):   Dr. Hernandes 2nd Call - Day Sr. Resident (R2/R3):   Dr. Greer         Reason for interval visit  (Principal Problem)   Hydronephrosis    Interval Problem Daily Status Update  (24 hours)   - Acute DVT in bilateral lower extremities. Heparin drip started  - Patient had bilateral nephrostomy tubes placed yesterday. Creatinine back to baseline today 6.53-->0.78  - Improving abdominal distension. No nausea/vomiting. He has not had a bowel movement yet, but passing gas. NG tube suction discontinued. Diet advanced to clear.   - Pacheco catheter in place. Dark urine noted in the bag      Review of Systems   Constitutional: Negative for chills, fever and malaise/fatigue.   HENT: Negative for sinus pain and sore throat.    Eyes: Negative for blurred vision, double vision, pain and discharge.   Respiratory: Negative for cough, sputum production, shortness of breath and stridor.    Cardiovascular: Negative for chest pain, palpitations, orthopnea, claudication and leg swelling.   Gastrointestinal: Positive for abdominal pain and constipation. Negative for heartburn, nausea and vomiting.        Abdominal distension   Genitourinary: Negative for dysuria, frequency, hematuria and urgency.        Bladder spasms   Musculoskeletal: Negative for back pain, myalgias and neck pain.   Skin: Negative for itching and rash.   Neurological: Negative for dizziness, sensory change, speech change, weakness and headaches.   Endo/Heme/Allergies: Bruises/bleeds easily.        Reports hematoma left thigh few weeks back   Psychiatric/Behavioral: Negative for  depression, substance abuse and suicidal ideas.       Consultants/Specialty  Urology  Nephrology  IR    Disposition  inpatient    Quality Measures    Reviewed items::  Labs reviewed and Medications reviewed  Pacheco catheter::  Urinary Tract Retention or Urinary Tract Obstruction  DVT prophylaxis pharmacological::  Heparin (Heparin drip for DVT)  DVT prophylaxis - mechanical:  SCDs  Ulcer Prophylaxis::  Not indicated          Physical Exam       Vitals:    12/13/17 2005 12/13/17 2349 12/14/17 0335 12/14/17 0744   BP: (!) 169/84 (!) 171/85 156/87 155/85   Pulse: 97 87 91 88   Resp: 20 16 16 16   Temp: 36.8 °C (98.3 °F) 37 °C (98.6 °F) 36.7 °C (98 °F) 36.9 °C (98.5 °F)   SpO2: 93% 98% 97% 99%   Weight: 96.6 kg (212 lb 15.4 oz)      Height:         Body mass index is 28.1 kg/m². Weight: 96.6 kg (212 lb 15.4 oz)  Oxygen Therapy:  Pulse Oximetry: 99 %, O2 (LPM): 4, O2 Delivery: Nasal Cannula    Physical Exam   Constitutional: He is oriented to person, place, and time and well-developed, well-nourished, and in no distress. No distress.   HENT:   Head: Normocephalic and atraumatic.   Mouth/Throat: No oropharyngeal exudate.   NG present   Eyes: EOM are normal. Pupils are equal, round, and reactive to light. No scleral icterus.   Neck: Normal range of motion. Neck supple. No JVD present.   Cardiovascular: Normal rate and regular rhythm.    No murmur heard.  Pulmonary/Chest: Effort normal and breath sounds normal. No respiratory distress. He has no wheezes.   Abdominal: He exhibits distension. There is tenderness.   B/L nephrostomy tube present.   Genitourinary:   Genitourinary Comments: Pacheco catheter present   Musculoskeletal: He exhibits no edema.   Neurological: He is alert and oriented to person, place, and time. No cranial nerve deficit.   Skin:   Left posterolateral thigh- 15 x 10cm blackish red discoloration noted. Tenderness present   Psychiatric: Mood, memory, affect and judgment normal.         Lab Data Review:          12/11/2017  5:55 PM    Recent Labs      12/12/17   0338  12/12/17   1645  12/12/17   2352  12/14/17   0209   SODIUM  139  142  141  146*   POTASSIUM  4.8  4.9  5.0  4.0   CHLORIDE  108  110  109  114*   CO2  19*  18*  18*  24   BUN  58*  71*  77*  27*   CREATININE  4.59*  5.77*  6.53*  0.78   MAGNESIUM  2.1   --   2.4  1.6   PHOSPHORUS  6.0*   --   6.8*  2.2*   CALCIUM  8.2*  8.6  8.6  8.4*       Recent Labs      12/12/17   1645  12/12/17   2352  12/14/17   0209   ALTSGPT  6  6  <5   ASTSGOT  9*  10*  8*   ALKPHOSPHAT  57  55  52   TBILIRUBIN  0.4  0.3  0.5   GLUCOSE  214*  221*  256*       Recent Labs      12/11/17   2359 12/12/17   0000   12/12/17   2351  12/12/17   2352   12/13/17   1012  12/13/17   1711  12/14/17   0209  12/14/17   1000   RBC  2.52*   --    --   2.59*   --    --    --    --   2.48*   --    HEMOGLOBIN  8.6*   --    < >  8.8*   --    < >  9.0*  9.1*  8.3*   --    HEMATOCRIT  26.3*   --    < >  26.8*   --    < >  27.9*  27.9*  25.6*   --    PLATELETCT  166   --    --   201   --    --    --    --   204   --    PROTHROMBTM   --   17.2*   --    --   19.6*   --    --    --    --    --    APTT   --    --    --    --    --    --    --    --    --   44.6*   INR   --   1.44*   --    --   1.69*   --    --    --    --    --    IRON  <10*   --    --    --    --    --    --    --    --    --    FERRITIN   --    --    --    --   143.3   --    --    --    --    --    TOTIRONBC  227*   --    --    --    --    --    --    --    --    --     < > = values in this interval not displayed.       Recent Labs      12/11/17 2359 12/12/17   1645  12/12/17 2351 12/12/17 2352 12/14/17   0209   WBC  8.0   --   7.4   --   5.8   NEUTSPOLYS   --    --   87.90*   --   88.30*   LYMPHOCYTES   --    --   4.00*   --   4.00*   MONOCYTES   --    --   6.50   --   6.30   EOSINOPHILS   --    --   1.10   --   0.90   BASOPHILS   --    --   0.10   --   0.20   ASTSGOT   --   9*   --   10*  8*   ALTSGPT   --   6   --   6  <5    ALKPHOSPHAT   --   57   --   55  52   TBILIRUBIN   --   0.4   --   0.3  0.5           Assessment/Plan     * Hydronephrosis   Assessment & Plan    - CT abdomen/pelvis from 12/08 shows bilateral hydronephrosis R>L likely related to changes in the bladder. Bilateral nephrolithiasis and prostate enlargement. Patient had rigid cystoscopy with removal of 100 ml of clot on 12/10 which was thought to be the cause for obstruction and resulting hydronephrosis.  Patient had worsening abdominal distension with nausea/vomiting on 12/12 and CT abdomen/pelvis showed persistent moderate hydronephrosis. Bilateral nephrostomy tubes placed yesterday.  - Patient's creatinine at baseline today (0.78). Nephrostomy tubes draining well  - improvement in abdominal distension  - will monitor        Abdominal distension   Assessment & Plan    - Improving  - No nausea/vomiting. Patient reports abdominal pain only during bladder spasms.   - NG tube suction discontinued  - Diet advanced to clear  - Continue IV fluids-1/2 NS              Acute DVT (deep venous thrombosis) (CMS-[facility])   Assessment & Plan    H/o DVT 3 times in the past. Was on coumadin previously, currently on dabigatran. He also has an IVC filter in place from 2009  - Patient had a recent [facility] for new left leg DVT on warfarin secondary to missed bridging with lovenox. He was on heaprin drip for 7 days and discharged on dabigatran  - LE venous doppler ordered and it showed B/L extensive DVT in both extremities. Acute and chronic  - Hemoglobin has stayed stable at 8-9  - Heparin drip started  - Monitor for any bleeding            Extraperitoneal rupture of bladder   Assessment & Plan    Patient was transferred from [facility] for suspected extraperitoneal bladder rupture and bilateral hydronephrosis.   - Urology on board  - 12/11: He underwent rigid cystoscopy with evacuation of 100 ml of blood clot, fulguration of bladder perforation sites and biopsy of posterior bladder  wall.  - Pacheco catheter in place. CBI discontinued and he underwent B/L nephrostomy tube placment.  -Appreciate urology recommendations.  - B&O suppository for bladder spasms        Anemia   Assessment & Plan    Likely secondary to acute blood loss from hematuria  Decreased iron, TIBC. Ferritin pending.   Vitamin B12 lower limits of normal- one dose of vitamin B12 IM given  Will start him on iron replacement after NG is out.        Hyperkalemia   Assessment & Plan    - Resolved  - Likely secondary to JENNIFFER  - Will continue to monitor        Acute kidney injury (CMS-HCC)   Assessment & Plan    Baseline creat 0.7.   Nephrology on board  Bilateral nephrostomy tubes placed yesterday for hydronephrosis  Creatinine at baseline today-0.78  Will monitor        DM type 2 (diabetes mellitus, type 2) (CMS-HCC)- (present on admission)   Assessment & Plan    Patient on metformin and glipizide as home medication  - HbA1c 7  - Hold home medication  - Patient started on SSI and hypoglycemia protocol  - Insulin glargine 5 units

## 2017-12-14 NOTE — CARE PLAN
Problem: Venous Thromboembolism (VTW)/Deep Vein Thrombosis (DVT) Prevention:  Goal: Patient will participate in Venous Thrombosis (VTE)/Deep Vein Thrombosis (DVT)Prevention Measures  Outcome: PROGRESSING AS EXPECTED  Heparin drip started per protocol for active DVT    Problem: Urinary Elimination:  Goal: Ability to reestablish a normal urinary elimination pattern will improve  Outcome: PROGRESSING AS EXPECTED  Bilateral nephrectomy tubes, adequate drainage Pacheco in place

## 2017-12-14 NOTE — PROGRESS NOTES
"Urology Progress Note      Overnight Events: None    S: No fevers, chills, nausea or vomiting.  Passing  Flatus. Bilateral nephrostomy tubes placed 12/13/17. Creatinine down to baseline. Feeling much better.     O:   Blood pressure 153/74, pulse 79, temperature 36.9 °C (98.5 °F), resp. rate 16, height 1.854 m (6' 1\"), weight 96.6 kg (212 lb 15.4 oz), SpO2 95 %.  Recent Labs      12/12/17   1645  12/12/17   2352  12/14/17   0209   SODIUM  142  141  146*   POTASSIUM  4.9  5.0  4.0   CHLORIDE  110  109  114*   CO2  18*  18*  24   GLUCOSE  214*  221*  256*   BUN  71*  77*  27*   CREATININE  5.77*  6.53*  0.78   CALCIUM  8.6  8.6  8.4*     Recent Labs      12/11/17   2359   12/12/17   2351   12/13/17   1012  12/13/17   1711  12/14/17   0209   WBC  8.0   --   7.4   --    --    --   5.8   RBC  2.52*   --   2.59*   --    --    --   2.48*   HEMOGLOBIN  8.6*   < >  8.8*   < >  9.0*  9.1*  8.3*   HEMATOCRIT  26.3*   < >  26.8*   < >  27.9*  27.9*  25.6*   MCV  104.4*   --   103.5*   --    --    --   103.2*   MCH  34.1*   --   34.0*   --    --    --   33.5*   MCHC  32.7*   --   32.8*   --    --    --   32.4*   RDW  60.1*   --   59.5*   --    --    --   58.9*   PLATELETCT  166   --   201   --    --    --   204   MPV  9.7   --   9.6   --    --    --   9.2    < > = values in this interval not displayed.         Intake/Output Summary (Last 24 hours) at 12/14/17 1406  Last data filed at 12/14/17 1000   Gross per 24 hour   Intake                0 ml   Output            73913 ml   Net           -48894 ml       Exam:  Abdomen soft, benign.  Bilateral NT's patient.   Urine: dark dylan      A/P:    Active Hospital Problems    Diagnosis   • Abdominal distension [R14.0]     Priority: High   • Acute DVT (deep venous thrombosis) (CMS-[facility]) [I82.409]     Priority: High   • Hydronephrosis [N13.30]     Priority: High   • Extraperitoneal rupture of bladder [N32.89]     Priority: Medium   • Ileus (CMS-[facility]) [K56.7]   • Anemia [D64.9]   • Renal " failure [N19]   • Ruptured bladder [N32.89]   • Hyperkalemia [E87.5]   • Acute kidney injury (CMS-HCC) [N17.9]   • DM type 2 (diabetes mellitus, type 2) (CMS-HCC) [E11.9]       Improved  Leave kaur catheter and nephrostomy tubes  Urology will continue to monitor

## 2017-12-15 PROBLEM — E87.8 ELECTROLYTE ABNORMALITY: Status: ACTIVE | Noted: 2017-12-15

## 2017-12-15 PROBLEM — E46 PROTEIN CALORIE MALNUTRITION (HCC): Status: ACTIVE | Noted: 2017-12-15

## 2017-12-15 LAB
ALBUMIN SERPL BCP-MCNC: 2.1 G/DL (ref 3.2–4.9)
ALBUMIN/GLOB SERPL: 0.8 G/DL
ALP SERPL-CCNC: 53 U/L (ref 30–99)
ALT SERPL-CCNC: 5 U/L (ref 2–50)
ANION GAP SERPL CALC-SCNC: 6 MMOL/L (ref 0–11.9)
AST SERPL-CCNC: 10 U/L (ref 12–45)
BASOPHILS # BLD AUTO: 0 % (ref 0–1.8)
BASOPHILS # BLD: 0 K/UL (ref 0–0.12)
BILIRUB SERPL-MCNC: 0.3 MG/DL (ref 0.1–1.5)
BUN SERPL-MCNC: 10 MG/DL (ref 8–22)
CALCIUM SERPL-MCNC: 7.8 MG/DL (ref 8.5–10.5)
CHLORIDE SERPL-SCNC: 108 MMOL/L (ref 96–112)
CO2 SERPL-SCNC: 29 MMOL/L (ref 20–33)
CREAT SERPL-MCNC: 0.39 MG/DL (ref 0.5–1.4)
EOSINOPHIL # BLD AUTO: 0.22 K/UL (ref 0–0.51)
EOSINOPHIL NFR BLD: 3.7 % (ref 0–6.9)
ERYTHROCYTE [DISTWIDTH] IN BLOOD BY AUTOMATED COUNT: 58.8 FL (ref 35.9–50)
GFR SERPL CREATININE-BSD FRML MDRD: >60 ML/MIN/1.73 M 2
GLOBULIN SER CALC-MCNC: 2.8 G/DL (ref 1.9–3.5)
GLUCOSE BLD-MCNC: 225 MG/DL (ref 65–99)
GLUCOSE BLD-MCNC: 231 MG/DL (ref 65–99)
GLUCOSE BLD-MCNC: 246 MG/DL (ref 65–99)
GLUCOSE BLD-MCNC: 258 MG/DL (ref 65–99)
GLUCOSE SERPL-MCNC: 224 MG/DL (ref 65–99)
HCT VFR BLD AUTO: 25.2 % (ref 42–52)
HGB BLD-MCNC: 8.3 G/DL (ref 14–18)
IMM GRANULOCYTES # BLD AUTO: 0.04 K/UL (ref 0–0.11)
IMM GRANULOCYTES NFR BLD AUTO: 0.7 % (ref 0–0.9)
LYMPHOCYTES # BLD AUTO: 0.47 K/UL (ref 1–4.8)
LYMPHOCYTES NFR BLD: 7.8 % (ref 22–41)
MAGNESIUM SERPL-MCNC: 1.3 MG/DL (ref 1.5–2.5)
MCH RBC QN AUTO: 34 PG (ref 27–33)
MCHC RBC AUTO-ENTMCNC: 32.9 G/DL (ref 33.7–35.3)
MCV RBC AUTO: 103.3 FL (ref 81.4–97.8)
MONOCYTES # BLD AUTO: 0.46 K/UL (ref 0–0.85)
MONOCYTES NFR BLD AUTO: 7.7 % (ref 0–13.4)
NEUTROPHILS # BLD AUTO: 4.8 K/UL (ref 1.82–7.42)
NEUTROPHILS NFR BLD: 80.1 % (ref 44–72)
NRBC # BLD AUTO: 0 K/UL
NRBC BLD AUTO-RTO: 0 /100 WBC
PHOSPHATE SERPL-MCNC: 1.6 MG/DL (ref 2.5–4.5)
PLATELET # BLD AUTO: 204 K/UL (ref 164–446)
PMV BLD AUTO: 9.3 FL (ref 9–12.9)
POTASSIUM SERPL-SCNC: 3.4 MMOL/L (ref 3.6–5.5)
PREALB SERPL-MCNC: 3 MG/DL (ref 18–38)
PROT SERPL-MCNC: 4.9 G/DL (ref 6–8.2)
RBC # BLD AUTO: 2.44 M/UL (ref 4.7–6.1)
SODIUM SERPL-SCNC: 143 MMOL/L (ref 135–145)
WBC # BLD AUTO: 6 K/UL (ref 4.8–10.8)

## 2017-12-15 PROCEDURE — 83735 ASSAY OF MAGNESIUM: CPT

## 2017-12-15 PROCEDURE — 84100 ASSAY OF PHOSPHORUS: CPT

## 2017-12-15 PROCEDURE — 700111 HCHG RX REV CODE 636 W/ 250 OVERRIDE (IP): Performed by: INTERNAL MEDICINE

## 2017-12-15 PROCEDURE — 700102 HCHG RX REV CODE 250 W/ 637 OVERRIDE(OP): Performed by: STUDENT IN AN ORGANIZED HEALTH CARE EDUCATION/TRAINING PROGRAM

## 2017-12-15 PROCEDURE — A9270 NON-COVERED ITEM OR SERVICE: HCPCS | Performed by: INTERNAL MEDICINE

## 2017-12-15 PROCEDURE — 84134 ASSAY OF PREALBUMIN: CPT

## 2017-12-15 PROCEDURE — 85025 COMPLETE CBC W/AUTO DIFF WBC: CPT

## 2017-12-15 PROCEDURE — 82962 GLUCOSE BLOOD TEST: CPT | Mod: 91

## 2017-12-15 PROCEDURE — A9270 NON-COVERED ITEM OR SERVICE: HCPCS | Performed by: STUDENT IN AN ORGANIZED HEALTH CARE EDUCATION/TRAINING PROGRAM

## 2017-12-15 PROCEDURE — 80053 COMPREHEN METABOLIC PANEL: CPT

## 2017-12-15 PROCEDURE — 700102 HCHG RX REV CODE 250 W/ 637 OVERRIDE(OP): Performed by: INTERNAL MEDICINE

## 2017-12-15 PROCEDURE — 700111 HCHG RX REV CODE 636 W/ 250 OVERRIDE (IP): Performed by: STUDENT IN AN ORGANIZED HEALTH CARE EDUCATION/TRAINING PROGRAM

## 2017-12-15 PROCEDURE — 99232 SBSQ HOSP IP/OBS MODERATE 35: CPT | Mod: GC | Performed by: INTERNAL MEDICINE

## 2017-12-15 PROCEDURE — 36415 COLL VENOUS BLD VENIPUNCTURE: CPT

## 2017-12-15 PROCEDURE — 700105 HCHG RX REV CODE 258: Performed by: INTERNAL MEDICINE

## 2017-12-15 PROCEDURE — 770020 HCHG ROOM/CARE - TELE (206)

## 2017-12-15 RX ORDER — MAGNESIUM SULFATE HEPTAHYDRATE 40 MG/ML
2 INJECTION, SOLUTION INTRAVENOUS ONCE
Status: COMPLETED | OUTPATIENT
Start: 2017-12-15 | End: 2017-12-15

## 2017-12-15 RX ORDER — POTASSIUM CHLORIDE 20 MEQ/1
40 TABLET, EXTENDED RELEASE ORAL ONCE
Status: COMPLETED | OUTPATIENT
Start: 2017-12-15 | End: 2017-12-15

## 2017-12-15 RX ORDER — INSULIN GLARGINE 100 [IU]/ML
7 INJECTION, SOLUTION SUBCUTANEOUS EVERY EVENING
Status: DISCONTINUED | OUTPATIENT
Start: 2017-12-15 | End: 2017-12-17

## 2017-12-15 RX ADMIN — ATROPA BELLADONNA AND OPIUM 30 MG: 16.2; 3 SUPPOSITORY RECTAL at 17:20

## 2017-12-15 RX ADMIN — OXYCODONE HYDROCHLORIDE AND ACETAMINOPHEN 1 TABLET: 10; 325 TABLET ORAL at 05:32

## 2017-12-15 RX ADMIN — INSULIN HUMAN 2 UNITS: 100 INJECTION, SOLUTION PARENTERAL at 11:41

## 2017-12-15 RX ADMIN — ATROPA BELLADONNA AND OPIUM 30 MG: 16.2; 3 SUPPOSITORY RECTAL at 09:05

## 2017-12-15 RX ADMIN — OXYCODONE HYDROCHLORIDE AND ACETAMINOPHEN 1 TABLET: 10; 325 TABLET ORAL at 20:25

## 2017-12-15 RX ADMIN — MAGNESIUM SULFATE IN WATER 2 G: 40 INJECTION, SOLUTION INTRAVENOUS at 10:16

## 2017-12-15 RX ADMIN — OXYCODONE HYDROCHLORIDE AND ACETAMINOPHEN 1 TABLET: 10; 325 TABLET ORAL at 12:41

## 2017-12-15 RX ADMIN — MORPHINE SULFATE 2 MG: 4 INJECTION INTRAVENOUS at 19:49

## 2017-12-15 RX ADMIN — SODIUM CHLORIDE: 4.5 INJECTION, SOLUTION INTRAVENOUS at 01:40

## 2017-12-15 RX ADMIN — INSULIN HUMAN 2 UNITS: 100 INJECTION, SOLUTION PARENTERAL at 05:58

## 2017-12-15 RX ADMIN — POTASSIUM CHLORIDE 40 MEQ: 1500 TABLET, EXTENDED RELEASE ORAL at 07:26

## 2017-12-15 RX ADMIN — INSULIN GLARGINE 7 UNITS: 100 INJECTION, SOLUTION SUBCUTANEOUS at 19:52

## 2017-12-15 RX ADMIN — SODIUM CHLORIDE: 4.5 INJECTION, SOLUTION INTRAVENOUS at 08:12

## 2017-12-15 RX ADMIN — SODIUM CHLORIDE: 4.5 INJECTION, SOLUTION INTRAVENOUS at 21:58

## 2017-12-15 RX ADMIN — DIBASIC SODIUM PHOSPHATE, MONOBASIC POTASSIUM PHOSPHATE AND MONOBASIC SODIUM PHOSPHATE 1 TABLET: 852; 155; 130 TABLET ORAL at 11:41

## 2017-12-15 RX ADMIN — DIBASIC SODIUM PHOSPHATE, MONOBASIC POTASSIUM PHOSPHATE AND MONOBASIC SODIUM PHOSPHATE 1 TABLET: 852; 155; 130 TABLET ORAL at 17:20

## 2017-12-15 RX ADMIN — INSULIN HUMAN 2 UNITS: 100 INJECTION, SOLUTION PARENTERAL at 17:20

## 2017-12-15 RX ADMIN — MORPHINE SULFATE 2 MG: 4 INJECTION INTRAVENOUS at 01:37

## 2017-12-15 RX ADMIN — INSULIN HUMAN 3 UNITS: 100 INJECTION, SOLUTION PARENTERAL at 19:53

## 2017-12-15 RX ADMIN — MAGNESIUM SULFATE IN WATER 2 G: 40 INJECTION, SOLUTION INTRAVENOUS at 08:12

## 2017-12-15 ASSESSMENT — ENCOUNTER SYMPTOMS
HEARTBURN: 0
HEADACHES: 0
ABDOMINAL PAIN: 1
SPEECH CHANGE: 0
CHILLS: 0
WEAKNESS: 0
STRIDOR: 0
WEAKNESS: 1
DOUBLE VISION: 0
DIZZINESS: 0
EYE PAIN: 0
SPUTUM PRODUCTION: 0
SORE THROAT: 0
BACK PAIN: 0
COUGH: 0
VOMITING: 0
ABDOMINAL PAIN: 0
CONSTIPATION: 1
EYE DISCHARGE: 0
ROS GI COMMENTS: ABDOMINAL DISTENSION
SHORTNESS OF BREATH: 0
NECK PAIN: 0
MYALGIAS: 0
SINUS PAIN: 0
ORTHOPNEA: 0
BLURRED VISION: 0
PALPITATIONS: 0
FEVER: 0
BRUISES/BLEEDS EASILY: 1
SENSORY CHANGE: 0
NERVOUS/ANXIOUS: 0
DEPRESSION: 0
NAUSEA: 0
CLAUDICATION: 0

## 2017-12-15 ASSESSMENT — LIFESTYLE VARIABLES: SUBSTANCE_ABUSE: 0

## 2017-12-15 ASSESSMENT — PAIN SCALES - GENERAL: PAINLEVEL_OUTOF10: 6

## 2017-12-15 NOTE — PROGRESS NOTES
Elaine Lindsay Fall Risk Assessment:     Last Known Fall: No falls  Mobility: Use of assistive device/requires assist of two people  Medications: Cardiovascular or central nervous system meds  Mental Status/LOC/Awareness: Awake, alert, and oriented to date, place, and person  Toileting Needs: Use of assistive device (Bedside commode, bedpan, urinal)  Volume/Electrolyte Status: Use of IV fluids/tube feeds  Communication/Sensory: Visual (Glasses)/hearing deficit  Behavior: Depression/anxiety  Elaine Lindsay Fall Risk Total: 13  Fall Risk Level: MODERATE RISK    Universal Fall Precautions:  call light/belongings in reach, bed in low position and locked, siderails up x 2, use non-slip footwear, adequate lighting, clutter free and spill free environment, educate on level of risk, educate to call for assistance    Fall Risk Level Interventions:   TRIAL (eASIC, NEURO, MED DANYELL 5) Low Fall Risk Interventions  Place yellow fall risk ID band on patient: verified  Provide patient/family education based on risk assessment: verified  Educate patient/family to call staff for assistance when getting out of bed: verified  Place fall precaution signage outside patient door: verifiedTRIAL ([employer] 8, NEURO, MED DANYELL 5) Moderate Fall Risk Interventions  Place yellow fall risk ID band on patient: refused  Provide patient/family education based on risk assessment : completed  Educate patient/family to call staff for assistance when getting out of bed: completed  Place fall precaution signage outside patient door: verified  Utilize bed/chair fall alarm: verified     Patient Specific Interventions:     Medication: limit combination of prn medications  Mental Status/LOC/Awareness: reinforce the use of call light  Toileting: instruct male patients prone to dizziness to void while sitting  Volume/Electrolyte Status: monitor abnormal lab values  Communication/Sensory: ensure proper positioning when transferrng/ambulating  Behavioral: administer  medication as ordered  Mobility: utilize bed/chair fall alarm

## 2017-12-15 NOTE — PROGRESS NOTES
Hospital Medicine Progress Note, Adult, Complex               Author: Fadi Najjar Date & Time created: 12/15/2017  1:24 PM     Interval History:  Pt presented with ruptured bladder, JENNIFFER, developed ileus, had hematemesis yesterday, plan for had B/L nephrostomy tubes yesterday.  Doing better  No new events  Review of Systems:  Review of Systems   Constitutional: Positive for malaise/fatigue. Negative for chills and fever.   Respiratory: Negative for shortness of breath.    Cardiovascular: Negative for chest pain and leg swelling.   Gastrointestinal: Negative for abdominal pain, nausea and vomiting.   Genitourinary: Negative for dysuria, frequency and urgency.   Neurological: Positive for weakness.   Psychiatric/Behavioral: The patient is not nervous/anxious.        Physical Exam:  Physical Exam   Constitutional: He is oriented to person, place, and time. He appears cachectic. He appears ill.   HENT:   Nose: Nose normal.   Mouth/Throat: No oropharyngeal exudate.   Eyes: No scleral icterus.   Cardiovascular: Normal rate and regular rhythm.    Pulmonary/Chest: Effort normal and breath sounds normal. No respiratory distress. He has no wheezes. He has no rales.   Abdominal: He exhibits distension.   Musculoskeletal: He exhibits edema.   Neurological: He is alert and oriented to person, place, and time.   Psychiatric: His mood appears anxious.   Nursing note and vitals reviewed.      Labs:        Invalid input(s): [id number]      Recent Labs      12/12/17   2352  12/14/17   0209  12/15/17   0415   SODIUM  141  146*  143   POTASSIUM  5.0  4.0  3.4*   CHLORIDE  109  114*  108   CO2  18*  24  29   BUN  77*  27*  10   CREATININE  6.53*  0.78  0.39*   MAGNESIUM  2.4  1.6  1.3*   PHOSPHORUS  6.8*  2.2*  1.6*   CALCIUM  8.6  8.4*  7.8*     Recent Labs      12/12/17   2352  12/14/17   0209  12/15/17   0415   ALTSGPT  6  <5  5   ASTSGOT  10*  8*  10*   ALKPHOSPHAT  55  52  53   TBILIRUBIN  0.3  0.5  0.3   PREALBUMIN   --    --    3.0*   GLUCOSE  221*  256*  224*     Recent Labs      179  17   1000  17   1616  12/15/17   0415   RBC  2.59*   --    --   2.48*   --    --   2.44*   HEMOGLOBIN  8.8*   --    < >  8.3*   --   8.2*  8.3*   HEMATOCRIT  26.8*   --    < >  25.6*   --   25.4*  25.2*   PLATELETCT  201   --    --   204   --    --   204   PROTHROMBTM   --   19.6*   --    --    --    --    --    APTT   --    --    --    --   44.6*   --    --    INR   --   1.69*   --    --    --    --    --    FERRITIN   --   143.3   --    --    --    --    --     < > = values in this interval not displayed.     Recent Labs      17   0209  12/15/17   0415   WBC  7.4   --   5.8  6.0   NEUTSPOLYS  87.90*   --   88.30*  80.10*   LYMPHOCYTES  4.00*   --   4.00*  7.80*   MONOCYTES  6.50   --   6.30  7.70   EOSINOPHILS  1.10   --   0.90  3.70   BASOPHILS  0.10   --   0.20  0.00   ASTSGOT   --   10*  8*  10*   ALTSGPT   --   6  <5  5   ALKPHOSPHAT   --   55  52  53   TBILIRUBIN   --   0.3  0.5  0.3           Hemodynamics:  Temp (24hrs), Av.7 °C (98.1 °F), Min:36.4 °C (97.6 °F), Max:36.9 °C (98.5 °F)  Temperature: 36.6 °C (97.8 °F)  Pulse  Av.2  Min: 76  Max: 103   Blood Pressure : 154/76     Respiratory:    Respiration: 18, Pulse Oximetry: 90 %           Fluids:    Intake/Output Summary (Last 24 hours) at 12/15/17 1324  Last data filed at 12/15/17 1200   Gross per 24 hour   Intake                0 ml   Output             2725 ml   Net            -2725 ml     Weight: 96.8 kg (213 lb 6.5 oz)  GI/Nutrition:  Orders Placed This Encounter   Procedures   • DIET ORDER     Standing Status:   Standing     Number of Occurrences:   1     Order Specific Question:   Diet:     Answer:   Clear Liquid [10]     Order Specific Question:   Diet:     Answer:   Low Fiber(GI Soft) [2]     Medical Decision Making, by Problem:  Active Hospital Problems    Diagnosis   •  *Hydronephrosis [N13.30]   • Abdominal distension [R14.0]   • Extraperitoneal rupture of bladder [N32.89]   • Anemia [D64.9]   • Renal failure [N19]   • Ruptured bladder [N32.89]   • Hyperkalemia [E87.5]   • History of DVT (deep vein thrombosis) [Z86.718]   • Acute kidney injury (CMS-HCC) [N17.9]   • DM type 2 (diabetes mellitus, type 2) (CMS-HCC) [E11.9]         Reviewed items::  Labs reviewed  Pacheco catheter::  Urinary Tract Retention or Urinary Tract Obstruction    1 JENNIFFER: multifactorial, sec to obstructive uropathy, possible ATN, significant improvment in cr after nephrostomy tubes  2 Ilues  3 B/L hydronephrosis  4 Anemia  5 metabolic acidosis  6 dilated gallbladder  Plan  no acute need for HD today  Daily labs  Renal dose all meds  Avoid nephrotoxins  Prognosis guarded.  We will sign off the case, please call if needed

## 2017-12-15 NOTE — PROGRESS NOTES
"Urology Progress Note      Overnight Events: None    S: No fevers, chills. Creatinine continues to decline. Bilateral NTs present. Feeling better but tired.    O:   Blood pressure 147/73, pulse 76, temperature 36.4 °C (97.6 °F), resp. rate 18, height 1.854 m (6' 1\"), weight 96.8 kg (213 lb 6.5 oz), SpO2 90 %.  Recent Labs      12/12/17   2352  12/14/17   0209  12/15/17   0415   SODIUM  141  146*  143   POTASSIUM  5.0  4.0  3.4*   CHLORIDE  109  114*  108   CO2  18*  24  29   GLUCOSE  221*  256*  224*   BUN  77*  27*  10   CREATININE  6.53*  0.78  0.39*   CALCIUM  8.6  8.4*  7.8*     Recent Labs      12/12/17   2351   12/14/17   0209  12/14/17   1616  12/15/17   0415   WBC  7.4   --   5.8   --   6.0   RBC  2.59*   --   2.48*   --   2.44*   HEMOGLOBIN  8.8*   < >  8.3*  8.2*  8.3*   HEMATOCRIT  26.8*   < >  25.6*  25.4*  25.2*   MCV  103.5*   --   103.2*   --   103.3*   MCH  34.0*   --   33.5*   --   34.0*   MCHC  32.8*   --   32.4*   --   32.9*   RDW  59.5*   --   58.9*   --   58.8*   PLATELETCT  201   --   204   --   204   MPV  9.6   --   9.2   --   9.3    < > = values in this interval not displayed.         Intake/Output Summary (Last 24 hours) at 12/15/17 1110  Last data filed at 12/15/17 0500   Gross per 24 hour   Intake                0 ml   Output             2100 ml   Net            -2100 ml       Exam:  Abdomen soft, benign.    Urine: clear via kaur and NTs      A/P:    Active Hospital Problems    Diagnosis   • Abdominal distension [R14.0]     Priority: High   • Acute DVT (deep venous thrombosis) (CMS-HCC) [I82.409]     Priority: High   • Hydronephrosis [N13.30]     Priority: High   • Extraperitoneal rupture of bladder [N32.89]     Priority: Medium   • Ileus (CMS-HCC) [K56.7]   • Anemia [D64.9]   • Renal failure [N19]   • Ruptured bladder [N32.89]   • Hyperkalemia [E87.5]   • Acute kidney injury (CMS-HCC) [N17.9]   • DM type 2 (diabetes mellitus, type 2) (CMS-HCC) [E11.9]       Creatinine improved  Leave " bilateral nephrostomy tubes and kaur catheter

## 2017-12-15 NOTE — PROGRESS NOTES
Hospital Medicine Progress Note, Adult, Complex               Author: Fadi Najjar Date & Time created: 12/14/2017  4:31 PM     Interval History:  Pt presented with ruptured bladder, JENNIFFER, developed ileus, had hematemesis yesterday, plan for had B/L nephrostomy tubes yesterday.  Doing better  Review of Systems:  Review of Systems   Constitutional: Positive for malaise/fatigue. Negative for chills and fever.   Respiratory: Negative for shortness of breath.    Cardiovascular: Negative for chest pain and leg swelling.   Gastrointestinal: Positive for abdominal pain. Negative for nausea and vomiting.   Genitourinary: Negative for dysuria, frequency and urgency.   Neurological: Positive for weakness.   Psychiatric/Behavioral: The patient is nervous/anxious.        Physical Exam:  Physical Exam   Constitutional: He is oriented to person, place, and time. He appears cachectic. He appears ill.   HENT:   Nose: Nose normal.   Mouth/Throat: No oropharyngeal exudate.   Eyes: No scleral icterus.   Cardiovascular: Normal rate and regular rhythm.    Pulmonary/Chest: Effort normal and breath sounds normal. No respiratory distress. He has no wheezes. He has no rales.   Abdominal: He exhibits distension.   Musculoskeletal: He exhibits edema.   Neurological: He is alert and oriented to person, place, and time.   Psychiatric: His mood appears anxious.   Nursing note and vitals reviewed.      Labs:        Invalid input(s): [id number]      Recent Labs      12/12/17   0338  12/12/17   1645  12/12/17   2352  12/14/17   0209   SODIUM  139  142  141  146*   POTASSIUM  4.8  4.9  5.0  4.0   CHLORIDE  108  110  109  114*   CO2  19*  18*  18*  24   BUN  58*  71*  77*  27*   CREATININE  4.59*  5.77*  6.53*  0.78   MAGNESIUM  2.1   --   2.4  1.6   PHOSPHORUS  6.0*   --   6.8*  2.2*   CALCIUM  8.2*  8.6  8.6  8.4*     Recent Labs      12/12/17   1645  12/12/17   2352  12/14/17   0209   ALTSGPT  6  6  <5   ASTSGOT  9*  10*  8*   ALKPHOSPHAT  57   55  52   TBILIRUBIN  0.4  0.3  0.5   GLUCOSE  214*  221*  256*     Recent Labs      17   23517   0000   17   1012  17   1711  17   0209  17   1000   RBC  2.52*   --    --   2.59*   --    --    --    --   2.48*   --    HEMOGLOBIN  8.6*   --    < >  8.8*   --    < >  9.0*  9.1*  8.3*   --    HEMATOCRIT  26.3*   --    < >  26.8*   --    < >  27.9*  27.9*  25.6*   --    PLATELETCT  166   --    --   201   --    --    --    --   204   --    PROTHROMBTM   --   17.2*   --    --   19.6*   --    --    --    --    --    APTT   --    --    --    --    --    --    --    --    --   44.6*   INR   --   1.44*   --    --   1.69*   --    --    --    --    --    IRON  <10*   --    --    --    --    --    --    --    --    --    FERRITIN   --    --    --    --   143.3   --    --    --    --    --    TOTIRONBC  227*   --    --    --    --    --    --    --    --    --     < > = values in this interval not displayed.     Recent Labs      17   1645  17   23517   23517   0209   WBC  8.0   --   7.4   --   5.8   NEUTSPOLYS   --    --   87.90*   --   88.30*   LYMPHOCYTES   --    --   4.00*   --   4.00*   MONOCYTES   --    --   6.50   --   6.30   EOSINOPHILS   --    --   1.10   --   0.90   BASOPHILS   --    --   0.10   --   0.20   ASTSGOT   --   9*   --   10*  8*   ALTSGPT   --   6   --   6  <5   ALKPHOSPHAT   --   57   --   55  52   TBILIRUBIN   --   0.4   --   0.3  0.5           Hemodynamics:  Temp (24hrs), Av.9 °C (98.4 °F), Min:36.7 °C (98 °F), Max:37 °C (98.6 °F)  Temperature: 36.9 °C (98.5 °F)  Pulse  Av.5  Min: 77  Max: 103   Blood Pressure : 153/74     Respiratory:    Respiration: 16, Pulse Oximetry: 95 %           Fluids:    Intake/Output Summary (Last 24 hours) at 17 1631  Last data filed at 17 1000   Gross per 24 hour   Intake                0 ml   Output             9635 ml   Net             -9635 ml     Weight: 96.6 kg (212 lb 15.4 oz)  GI/Nutrition:  Orders Placed This Encounter   Procedures   • DIET ORDER     Standing Status:   Standing     Number of Occurrences:   1     Order Specific Question:   Diet:     Answer:   Clear Liquid [10]     Medical Decision Making, by Problem:  Active Hospital Problems    Diagnosis   • *Hydronephrosis [N13.30]   • Abdominal distension [R14.0]   • Extraperitoneal rupture of bladder [N32.89]   • Anemia [D64.9]   • Renal failure [N19]   • Ruptured bladder [N32.89]   • Hyperkalemia [E87.5]   • History of DVT (deep vein thrombosis) [Z86.718]   • Acute kidney injury (CMS-HCC) [N17.9]   • DM type 2 (diabetes mellitus, type 2) (CMS-HCC) [E11.9]         Reviewed items::  Labs reviewed  Pacheco catheter::  Urinary Tract Retention or Urinary Tract Obstruction    1 JENNIFFER: multifactorial, sec to obstructive uropathy, possible ATN, significant improvment in cr after nephrostomy tubes (I am not sure it is accurate)  2 Ilues  3 B/L hydronephrosis  4 Anemia  5 metabolic acidosis  6 dilated gallbladder  Plan  no acute need for HD today  Daily labs  Renal dose all meds  Avoid nephrotoxins  Prognosis guarded.  D/W  service

## 2017-12-15 NOTE — PROGRESS NOTES
Internal Medicine Interval Note  Note Author: Lilo Hernandes M.D.     Name Niels Lima     1950   Age/Sex 67 y.o. male   MRN [id number]   Code Status Full code     After 5PM or if no immediate response to page, please call for cross-coverage  Attending/Team: Dr. Gomez/ Fco See Patient List for primary contact information  Call [phone] to page    1st Call - Day Intern (R1):   Dr. Hernandes 2nd Call - Day Sr. Resident (R2/R3):   Dr. Greer         Reason for interval visit  (Principal Problem)   Hydronephrosis    Interval Problem Daily Status Update  (24 hours)   - Ileus: Patient tolerating clear liquids. No bowel movement yet, but passing gas. Will advance diet as tolerated. Electrolytes replaced.  - Bilateral nephrostomy tubes and kaur catheter in place. Creatinine normal.   - Acute DVT in bilateral lower extremities. Heparin drip discontinued as patient has >150 RBCs in urinalysis. He has an IVC filter in place.        Review of Systems   Constitutional: Negative for chills, fever and malaise/fatigue.   HENT: Negative for sinus pain and sore throat.    Eyes: Negative for blurred vision, double vision, pain and discharge.   Respiratory: Negative for cough, sputum production, shortness of breath and stridor.    Cardiovascular: Negative for chest pain, palpitations, orthopnea, claudication and leg swelling.   Gastrointestinal: Positive for abdominal pain and constipation. Negative for heartburn, nausea and vomiting.        Abdominal distension   Genitourinary: Negative for dysuria, frequency, hematuria and urgency.        Bladder spasms   Musculoskeletal: Negative for back pain, myalgias and neck pain.   Skin: Negative for itching and rash.   Neurological: Negative for dizziness, sensory change, speech change, weakness and headaches.   Endo/Heme/Allergies: Bruises/bleeds easily.        Reports hematoma left thigh few weeks back   Psychiatric/Behavioral: Negative for depression, substance abuse  and suicidal ideas.       Consultants/Specialty  Urology  Nephrology  IR    Disposition  inpatient    Quality Measures    Reviewed items::  Labs reviewed and Medications reviewed  Pacheco catheter::  Urinary Tract Retention or Urinary Tract Obstruction  DVT prophylaxis pharmacological::  Contraindicated - High bleeding risk  DVT prophylaxis - mechanical:  SCDs  Ulcer Prophylaxis::  Not indicated          Physical Exam       Vitals:    12/14/17 2058 12/15/17 0034 12/15/17 0338 12/15/17 0755   BP: 144/78 146/71 153/76 147/73   Pulse: 78 80 83 76   Resp: 20 12 12 18   Temp: 36.9 °C (98.4 °F) 36.9 °C (98.5 °F) 36.6 °C (97.8 °F) 36.4 °C (97.6 °F)   SpO2: 90% 93% 90% 90%   Weight: 96.8 kg (213 lb 6.5 oz)      Height:         Body mass index is 28.16 kg/m². Weight: 96.8 kg (213 lb 6.5 oz)  Oxygen Therapy:  Pulse Oximetry: 90 %, O2 (LPM): 0, O2 Delivery: None (Room Air)    Physical Exam   Constitutional: He is oriented to person, place, and time and well-developed, well-nourished, and in no distress. No distress.   HENT:   Head: Normocephalic and atraumatic.   Mouth/Throat: No oropharyngeal exudate.   NG present   Eyes: EOM are normal. Pupils are equal, round, and reactive to light. No scleral icterus.   Neck: Normal range of motion. Neck supple. No JVD present.   Cardiovascular: Normal rate and regular rhythm.    No murmur heard.  Pulmonary/Chest: Effort normal and breath sounds normal. No respiratory distress. He has no wheezes.   Abdominal: He exhibits distension. There is tenderness.   B/L nephrostomy tube present.   Genitourinary:   Genitourinary Comments: Pacheco catheter present   Musculoskeletal: He exhibits no edema.   Neurological: He is alert and oriented to person, place, and time. No cranial nerve deficit.   Skin:   Left posterolateral thigh- 15 x 10cm blackish red discoloration noted. Tenderness present   Psychiatric: Mood, memory, affect and judgment normal.         Lab Data Review:         12/11/2017  5:55  PM    Recent Labs      12/12/17   2352  12/14/17   0209  12/15/17   0415   SODIUM  141  146*  143   POTASSIUM  5.0  4.0  3.4*   CHLORIDE  109  114*  108   CO2  18*  24  29   BUN  77*  27*  10   CREATININE  6.53*  0.78  0.39*   MAGNESIUM  2.4  1.6  1.3*   PHOSPHORUS  6.8*  2.2*  1.6*   CALCIUM  8.6  8.4*  7.8*       Recent Labs      12/12/17   2352  12/14/17   0209  12/15/17   0415   ALTSGPT  6  <5  5   ASTSGOT  10*  8*  10*   ALKPHOSPHAT  55  52  53   TBILIRUBIN  0.3  0.5  0.3   PREALBUMIN   --    --   3.0*   GLUCOSE  221*  256*  224*       Recent Labs      12/12/17 2351 12/12/17 2352 12/14/17 0209 12/14/17   1000  12/14/17   1616  12/15/17   0415   RBC  2.59*   --    --   2.48*   --    --   2.44*   HEMOGLOBIN  8.8*   --    < >  8.3*   --   8.2*  8.3*   HEMATOCRIT  26.8*   --    < >  25.6*   --   25.4*  25.2*   PLATELETCT  201   --    --   204   --    --   204   PROTHROMBTM   --   19.6*   --    --    --    --    --    APTT   --    --    --    --   44.6*   --    --    INR   --   1.69*   --    --    --    --    --    FERRITIN   --   143.3   --    --    --    --    --     < > = values in this interval not displayed.       Recent Labs      12/12/17   2351  12/12/17   2352  12/14/17   0209  12/15/17   0415   WBC  7.4   --   5.8  6.0   NEUTSPOLYS  87.90*   --   88.30*  80.10*   LYMPHOCYTES  4.00*   --   4.00*  7.80*   MONOCYTES  6.50   --   6.30  7.70   EOSINOPHILS  1.10   --   0.90  3.70   BASOPHILS  0.10   --   0.20  0.00   ASTSGOT   --   10*  8*  10*   ALTSGPT   --   6  <5  5   ALKPHOSPHAT   --   55  52  53   TBILIRUBIN   --   0.3  0.5  0.3           Assessment/Plan     * Hydronephrosis   Assessment & Plan    - CT abdomen/pelvis from 12/08 showed bilateral hydronephrosis R>L likely related to changes in the bladder. Bilateral nephrolithiasis and prostate enlargement. Patient had rigid cystoscopy with removal of 100 ml of clot on 12/10 which was thought to be the cause for obstruction and resulting  hydronephrosis.  Patient had worsening abdominal distension with nausea/vomiting on 12/12 and CT abdomen/pelvis showed persistent moderate hydronephrosis. Bilateral nephrostomy tubes placed on 12/13. Patient's creatinine returned to baseline (0.7) following procedure.  - creatinine today 0.39. Nephrostomy tubes draining well  - Urology on board  - will continue to monitor        Abdominal distension   Assessment & Plan    - Most likely secondary to ileus  - Improving  - No nausea/vomiting. Patient has not had a bowel movement yet, but passing gas  - Patient reports abdominal pain only during bladder spasms.   - NG tube suction discontinued  - Advance diet as tolerated  - Continue IV fluids-1/2 NS  - Electrolytes replenished              Acute DVT (deep venous thrombosis) (CMS-[facility])   Assessment & Plan    H/o DVT 3 times in the past. Was on coumadin previously, currently on dabigatran. He also has an IVC filter in place from 2009  - [facility] on warfarin secondary to missed bridging with lovenox. He was on heaprin drip for 7 days and discharged on dabigatran  - LE venous doppler ordered and it showed B/L extensive DVT in both extremities. Acute and chronic.  - UA from nephrostomy tubes shows >150 RBCs.  - Will hold off anticoagulation , patient has an IVC filter in place.            Extraperitoneal rupture of bladder   Assessment & Plan    [facility] for [facility] bladder rupture and bilateral hydronephrosis.   - 12/11: He underwent rigid cystoscopy with evacuation of 100 ml of blood clot, fulguration of bladder perforation sites and biopsy of posterior bladder wall.  - Pacheco catheter in place. CBI discontinued and he underwent B/L nephrostomy tube placement on 12/13  -Appreciate urology recommendations.  - B&O suppository for bladder spasms        Electrolyte abnormality   Assessment & Plan    Hypokalemia, hypomagnesemia and  hypophosphatemia- likely contributing to ileus  Will monitor and replace as needed        Anemia   Assessment & Plan    Likely secondary to acute blood loss from hematuria  Decreased iron, TIBC. Ferritin normal.   Vitamin B12 lower limits of normal- one dose of vitamin B12 IM given  Will start him on iron replacement after patient starts to have a bowel movement        Hyperkalemia   Assessment & Plan    - Resolved  - Likely secondary to JENNIFFER  - Will continue to monitor        Acute kidney injury (CMS-HCC)   Assessment & Plan    Baseline creat 0.7.   Nephrology on board  Bilateral nephrostomy tubes placed on 12/13 for hydronephrosis  Creatinine now at baseline   Will monitor        DM type 2 (diabetes mellitus, type 2) (CMS-HCC)- (present on admission)   Assessment & Plan    Patient on metformin and glipizide as home medication  - HbA1c 7  - Hold home medication  - Patient started on SSI and hypoglycemia protocol  - Glargine increased to 7 units

## 2017-12-15 NOTE — DISCHARGE PLANNING
NARINDER spoke with Geovanna. Per Geovanna, only work with Hospitalists patients and can't help with FMLA paperwork. Geovanna faxed FMLA paperwork back to SW.    NARINDER gave FMLA paperwork back to pt's spouse. SW stated to pt's spouse that Geovanna and NARINDER cannot complete FMLA paperwork and she will have to take to pt's PCP. Per pt's spouse, aware SW does not complete FMLA and [facility], but [facility]'t [facility]. Pt's spouse went on to say she will request [employer] MD to complete paperwork.

## 2017-12-16 ENCOUNTER — APPOINTMENT (OUTPATIENT)
Dept: RADIOLOGY | Facility: MEDICAL CENTER | Age: 67
DRG: 668 | End: 2017-12-16
Attending: STUDENT IN AN ORGANIZED HEALTH CARE EDUCATION/TRAINING PROGRAM
Payer: COMMERCIAL

## 2017-12-16 LAB
ALBUMIN SERPL BCP-MCNC: 2.1 G/DL (ref 3.2–4.9)
ALBUMIN/GLOB SERPL: 0.8 G/DL
ALP SERPL-CCNC: 53 U/L (ref 30–99)
ALT SERPL-CCNC: 6 U/L (ref 2–50)
ANION GAP SERPL CALC-SCNC: 7 MMOL/L (ref 0–11.9)
ANISOCYTOSIS BLD QL SMEAR: ABNORMAL
AST SERPL-CCNC: 10 U/L (ref 12–45)
BACTERIA FLD AEROBE CULT: NORMAL
BACTERIA FLD AEROBE CULT: NORMAL
BASOPHILS # BLD AUTO: 0.2 % (ref 0–1.8)
BASOPHILS # BLD: 0.01 K/UL (ref 0–0.12)
BILIRUB SERPL-MCNC: 0.6 MG/DL (ref 0.1–1.5)
BUN SERPL-MCNC: 7 MG/DL (ref 8–22)
CALCIUM SERPL-MCNC: 7.7 MG/DL (ref 8.5–10.5)
CHLORIDE SERPL-SCNC: 103 MMOL/L (ref 96–112)
CO2 SERPL-SCNC: 31 MMOL/L (ref 20–33)
COMMENT 1642: NORMAL
CREAT SERPL-MCNC: 0.26 MG/DL (ref 0.5–1.4)
EOSINOPHIL # BLD AUTO: 0.41 K/UL (ref 0–0.51)
EOSINOPHIL NFR BLD: 7.4 % (ref 0–6.9)
ERYTHROCYTE [DISTWIDTH] IN BLOOD BY AUTOMATED COUNT: 57.7 FL (ref 35.9–50)
GFR SERPL CREATININE-BSD FRML MDRD: >60 ML/MIN/1.73 M 2
GLOBULIN SER CALC-MCNC: 2.8 G/DL (ref 1.9–3.5)
GLUCOSE BLD-MCNC: 202 MG/DL (ref 65–99)
GLUCOSE BLD-MCNC: 266 MG/DL (ref 65–99)
GLUCOSE BLD-MCNC: 272 MG/DL (ref 65–99)
GLUCOSE BLD-MCNC: 282 MG/DL (ref 65–99)
GLUCOSE SERPL-MCNC: 186 MG/DL (ref 65–99)
GRAM STN SPEC: NORMAL
GRAM STN SPEC: NORMAL
HCT VFR BLD AUTO: 25.9 % (ref 42–52)
HGB BLD-MCNC: 8.4 G/DL (ref 14–18)
IMM GRANULOCYTES # BLD AUTO: 0.03 K/UL (ref 0–0.11)
IMM GRANULOCYTES NFR BLD AUTO: 0.5 % (ref 0–0.9)
LYMPHOCYTES # BLD AUTO: 0.59 K/UL (ref 1–4.8)
LYMPHOCYTES NFR BLD: 10.7 % (ref 22–41)
MAGNESIUM SERPL-MCNC: 1.4 MG/DL (ref 1.5–2.5)
MCH RBC QN AUTO: 32.9 PG (ref 27–33)
MCHC RBC AUTO-ENTMCNC: 32.4 G/DL (ref 33.7–35.3)
MCV RBC AUTO: 101.6 FL (ref 81.4–97.8)
MONOCYTES # BLD AUTO: 0.42 K/UL (ref 0–0.85)
MONOCYTES NFR BLD AUTO: 7.6 % (ref 0–13.4)
MORPHOLOGY BLD-IMP: NORMAL
NEUTROPHILS # BLD AUTO: 4.06 K/UL (ref 1.82–7.42)
NEUTROPHILS NFR BLD: 73.6 % (ref 44–72)
NRBC # BLD AUTO: 0 K/UL
NRBC BLD AUTO-RTO: 0 /100 WBC
PHOSPHATE SERPL-MCNC: 2.4 MG/DL (ref 2.5–4.5)
PLATELET # BLD AUTO: 217 K/UL (ref 164–446)
PLATELET BLD QL SMEAR: NORMAL
PMV BLD AUTO: 9.8 FL (ref 9–12.9)
POTASSIUM SERPL-SCNC: 3.3 MMOL/L (ref 3.6–5.5)
PROT SERPL-MCNC: 4.9 G/DL (ref 6–8.2)
RBC # BLD AUTO: 2.55 M/UL (ref 4.7–6.1)
RBC BLD AUTO: PRESENT
SIGNIFICANT IND 70042: NORMAL
SIGNIFICANT IND 70042: NORMAL
SITE SITE: NORMAL
SITE SITE: NORMAL
SODIUM SERPL-SCNC: 141 MMOL/L (ref 135–145)
SOURCE SOURCE: NORMAL
SOURCE SOURCE: NORMAL
WBC # BLD AUTO: 5.5 K/UL (ref 4.8–10.8)

## 2017-12-16 PROCEDURE — 700102 HCHG RX REV CODE 250 W/ 637 OVERRIDE(OP): Performed by: INTERNAL MEDICINE

## 2017-12-16 PROCEDURE — 85025 COMPLETE CBC W/AUTO DIFF WBC: CPT

## 2017-12-16 PROCEDURE — 700105 HCHG RX REV CODE 258: Performed by: INTERNAL MEDICINE

## 2017-12-16 PROCEDURE — 82962 GLUCOSE BLOOD TEST: CPT | Mod: 91

## 2017-12-16 PROCEDURE — 83735 ASSAY OF MAGNESIUM: CPT

## 2017-12-16 PROCEDURE — 84100 ASSAY OF PHOSPHORUS: CPT

## 2017-12-16 PROCEDURE — A9270 NON-COVERED ITEM OR SERVICE: HCPCS | Performed by: INTERNAL MEDICINE

## 2017-12-16 PROCEDURE — 700111 HCHG RX REV CODE 636 W/ 250 OVERRIDE (IP): Performed by: STUDENT IN AN ORGANIZED HEALTH CARE EDUCATION/TRAINING PROGRAM

## 2017-12-16 PROCEDURE — 700102 HCHG RX REV CODE 250 W/ 637 OVERRIDE(OP): Performed by: STUDENT IN AN ORGANIZED HEALTH CARE EDUCATION/TRAINING PROGRAM

## 2017-12-16 PROCEDURE — 36415 COLL VENOUS BLD VENIPUNCTURE: CPT

## 2017-12-16 PROCEDURE — A9270 NON-COVERED ITEM OR SERVICE: HCPCS | Performed by: STUDENT IN AN ORGANIZED HEALTH CARE EDUCATION/TRAINING PROGRAM

## 2017-12-16 PROCEDURE — 80053 COMPREHEN METABOLIC PANEL: CPT

## 2017-12-16 PROCEDURE — 99232 SBSQ HOSP IP/OBS MODERATE 35: CPT | Mod: GC | Performed by: INTERNAL MEDICINE

## 2017-12-16 PROCEDURE — 74000 DX-ABDOMEN-1 VIEW: CPT

## 2017-12-16 PROCEDURE — 770020 HCHG ROOM/CARE - TELE (206)

## 2017-12-16 RX ORDER — CYANOCOBALAMIN 1000 UG/ML
1000 INJECTION, SOLUTION INTRAMUSCULAR; SUBCUTANEOUS DAILY
Status: COMPLETED | OUTPATIENT
Start: 2017-12-17 | End: 2017-12-20

## 2017-12-16 RX ORDER — CYANOCOBALAMIN 1000 UG/ML
1000 INJECTION, SOLUTION INTRAMUSCULAR; SUBCUTANEOUS
Status: DISCONTINUED | OUTPATIENT
Start: 2017-12-16 | End: 2017-12-16

## 2017-12-16 RX ORDER — POTASSIUM CHLORIDE 20 MEQ/1
40 TABLET, EXTENDED RELEASE ORAL 2 TIMES DAILY
Status: COMPLETED | OUTPATIENT
Start: 2017-12-16 | End: 2017-12-16

## 2017-12-16 RX ORDER — POTASSIUM CHLORIDE 20 MEQ/1
40 TABLET, EXTENDED RELEASE ORAL ONCE
Status: COMPLETED | OUTPATIENT
Start: 2017-12-16 | End: 2017-12-16

## 2017-12-16 RX ORDER — MAGNESIUM SULFATE HEPTAHYDRATE 40 MG/ML
4 INJECTION, SOLUTION INTRAVENOUS ONCE
Status: COMPLETED | OUTPATIENT
Start: 2017-12-16 | End: 2017-12-16

## 2017-12-16 RX ADMIN — SODIUM CHLORIDE: 4.5 INJECTION, SOLUTION INTRAVENOUS at 17:42

## 2017-12-16 RX ADMIN — INSULIN HUMAN 3 UNITS: 100 INJECTION, SOLUTION PARENTERAL at 17:25

## 2017-12-16 RX ADMIN — INSULIN HUMAN 2 UNITS: 100 INJECTION, SOLUTION PARENTERAL at 05:54

## 2017-12-16 RX ADMIN — INSULIN HUMAN 3 UNITS: 100 INJECTION, SOLUTION PARENTERAL at 20:58

## 2017-12-16 RX ADMIN — MAGNESIUM SULFATE IN WATER 4 G: 40 INJECTION, SOLUTION INTRAVENOUS at 06:39

## 2017-12-16 RX ADMIN — POTASSIUM CHLORIDE 40 MEQ: 1500 TABLET, EXTENDED RELEASE ORAL at 10:40

## 2017-12-16 RX ADMIN — OXYCODONE HYDROCHLORIDE 5 MG: 5 TABLET ORAL at 12:36

## 2017-12-16 RX ADMIN — OXYCODONE HYDROCHLORIDE AND ACETAMINOPHEN 1 TABLET: 10; 325 TABLET ORAL at 17:29

## 2017-12-16 RX ADMIN — MORPHINE SULFATE 2 MG: 4 INJECTION INTRAVENOUS at 20:53

## 2017-12-16 RX ADMIN — ATROPA BELLADONNA AND OPIUM 30 MG: 16.2; 3 SUPPOSITORY RECTAL at 22:22

## 2017-12-16 RX ADMIN — POTASSIUM CHLORIDE 40 MEQ: 1500 TABLET, EXTENDED RELEASE ORAL at 07:10

## 2017-12-16 RX ADMIN — SODIUM CHLORIDE: 4.5 INJECTION, SOLUTION INTRAVENOUS at 06:10

## 2017-12-16 RX ADMIN — POTASSIUM CHLORIDE 40 MEQ: 1500 TABLET, EXTENDED RELEASE ORAL at 20:58

## 2017-12-16 RX ADMIN — ATROPA BELLADONNA AND OPIUM 30 MG: 16.2; 3 SUPPOSITORY RECTAL at 05:46

## 2017-12-16 RX ADMIN — DIBASIC SODIUM PHOSPHATE, MONOBASIC POTASSIUM PHOSPHATE AND MONOBASIC SODIUM PHOSPHATE 1 TABLET: 852; 155; 130 TABLET ORAL at 01:44

## 2017-12-16 RX ADMIN — INSULIN GLARGINE 7 UNITS: 100 INJECTION, SOLUTION SUBCUTANEOUS at 20:59

## 2017-12-16 RX ADMIN — INSULIN HUMAN 3 UNITS: 100 INJECTION, SOLUTION PARENTERAL at 10:40

## 2017-12-16 RX ADMIN — OXYCODONE HYDROCHLORIDE AND ACETAMINOPHEN 1 TABLET: 10; 325 TABLET ORAL at 05:46

## 2017-12-16 RX ADMIN — MORPHINE SULFATE 2 MG: 4 INJECTION INTRAVENOUS at 01:43

## 2017-12-16 ASSESSMENT — ENCOUNTER SYMPTOMS
SENSORY CHANGE: 0
CHILLS: 0
SPEECH CHANGE: 0
PALPITATIONS: 0
DEPRESSION: 0
CONSTIPATION: 1
ABDOMINAL PAIN: 1
DIZZINESS: 0
BLURRED VISION: 0
STRIDOR: 0
WEAKNESS: 0
COUGH: 0
SHORTNESS OF BREATH: 0
NECK PAIN: 0
DOUBLE VISION: 0
CLAUDICATION: 0
SINUS PAIN: 0
SORE THROAT: 0
ROS GI COMMENTS: ABDOMINAL DISTENSION
EYE DISCHARGE: 0
SPUTUM PRODUCTION: 0
FEVER: 0
NAUSEA: 0
EYE PAIN: 0
HEADACHES: 0
MYALGIAS: 0
VOMITING: 0
BRUISES/BLEEDS EASILY: 1
HEARTBURN: 0
ORTHOPNEA: 0
BACK PAIN: 0

## 2017-12-16 ASSESSMENT — PAIN SCALES - GENERAL
PAINLEVEL_OUTOF10: 6
PAINLEVEL_OUTOF10: 7
PAINLEVEL_OUTOF10: 9

## 2017-12-16 ASSESSMENT — LIFESTYLE VARIABLES: SUBSTANCE_ABUSE: 0

## 2017-12-16 NOTE — DIETARY
Nutrition Services:  Consult received for Pre-Albumin of 3.0    Received consult for pre-albumin of 3.0  Pre-albumin reflects only the past three days of nutritional status (half life of 3.5 days) and pt has been NPO/Clears x 5 days.  Per MD note, pt with ileus.  If PO diet advancement not appropriate, or PO diet not tolerated, consider nutrition support if within pt's POC.    RD following.

## 2017-12-16 NOTE — PROGRESS NOTES
Internal Medicine Interval Note  Note Author: Lilo Hernandes M.D.     Name Niels Lima     1950   Age/Sex 67 y.o. male   MRN [id number]   Code Status Full code     After 5PM or if no immediate response to page, please call for cross-coverage  Attending/Team: Dr. Gomez/ Fco See Patient List for primary contact information  Call [phone] to page    1st Call - Day Intern (R1):   Dr. Hernandes 2nd Call - Day Sr. Resident (R2/R3):   Dr. Greer         Reason for interval visit  (Principal Problem)   Hydronephrosis    Interval Problem Daily Status Update  (24 hours)   - Ileus: Patient currently on full liquid diet- tolerating it well. No bowel movement yet, passing gas. Follow up X ray ordered  - Bilateral nephrostomy tubes and kaur catheter in place. Creatinine normal. As per urology, patient will leave with kaur and nephrostomy tubes on discharge  - Acute DVT in bilateral lower extremities. Heparin drip discontinued as patient has >150 RBCs in urinalysis. He has an IVC filter in place. Patient will eventually need to resume anticoagulation  - Evaluation by PT/ OT        Review of Systems   Constitutional: Negative for chills, fever and malaise/fatigue.   HENT: Negative for sinus pain and sore throat.    Eyes: Negative for blurred vision, double vision, pain and discharge.   Respiratory: Negative for cough, sputum production, shortness of breath and stridor.    Cardiovascular: Negative for chest pain, palpitations, orthopnea, claudication and leg swelling.   Gastrointestinal: Positive for abdominal pain and constipation. Negative for heartburn, nausea and vomiting.        Abdominal distension   Genitourinary: Negative for dysuria, frequency, hematuria and urgency.        Bladder spasms   Musculoskeletal: Negative for back pain, myalgias and neck pain.   Skin: Negative for itching and rash.   Neurological: Negative for dizziness, sensory change, speech change, weakness and headaches.    Endo/Heme/Allergies: Bruises/bleeds easily.        Reports hematoma left thigh few weeks back   Psychiatric/Behavioral: Negative for depression, substance abuse and suicidal ideas.       Consultants/Specialty  Urology  Nephrology  IR    Disposition  inpatient    Quality Measures    Reviewed items::  Labs reviewed and Medications reviewed  Pacheco catheter::  Urinary Tract Retention or Urinary Tract Obstruction  DVT prophylaxis pharmacological::  Contraindicated - High bleeding risk  DVT prophylaxis - mechanical:  SCDs  Ulcer Prophylaxis::  Not indicated          Physical Exam       Vitals:    12/15/17 2153 12/16/17 0039 12/16/17 0404 12/16/17 0726   BP: 133/79 148/85 151/79 152/88   Pulse:  75 72 72   Resp:  12 12 16   Temp: 37.1 °C (98.7 °F) 37.1 °C (98.7 °F) 36.3 °C (97.3 °F) 36.5 °C (97.7 °F)   SpO2:  94% 93% 97%   Weight:       Height:         Body mass index is 27.34 kg/m². Weight: 94 kg (207 lb 3.7 oz)  Oxygen Therapy:  Pulse Oximetry: 97 %, O2 (LPM): 3.4, O2 Delivery: Silicone Nasal Cannula    Physical Exam   Constitutional: He is oriented to person, place, and time and well-developed, well-nourished, and in no distress. No distress.   HENT:   Head: Normocephalic and atraumatic.   Mouth/Throat: No oropharyngeal exudate.   NG present   Eyes: EOM are normal. Pupils are equal, round, and reactive to light. No scleral icterus.   Neck: Normal range of motion. Neck supple. No JVD present.   Cardiovascular: Normal rate and regular rhythm.    No murmur heard.  Pulmonary/Chest: Effort normal and breath sounds normal. No respiratory distress. He has no wheezes.   Abdominal: He exhibits distension. There is tenderness.   B/L nephrostomy tube present.   Genitourinary:   Genitourinary Comments: Pacheco catheter present   Musculoskeletal: He exhibits no edema.   Neurological: He is alert and oriented to person, place, and time. No cranial nerve deficit.   Skin:   Left posterolateral thigh- 15 x 10cm blackish red  discoloration noted. Tenderness present   Psychiatric: Mood, memory, affect and judgment normal.         Lab Data Review:         12/11/2017  5:55 PM    Recent Labs      12/14/17   0209  12/15/17   0415  12/16/17   0329   SODIUM  146*  143  141   POTASSIUM  4.0  3.4*  3.3*   CHLORIDE  114*  108  103   CO2  24  29  31   BUN  27*  10  7*   CREATININE  0.78  0.39*  0.26*   MAGNESIUM  1.6  1.3*  1.4*   PHOSPHORUS  2.2*  1.6*  2.4*   CALCIUM  8.4*  7.8*  7.7*       Recent Labs      12/14/17   0209  12/15/17   0415  12/16/17   0329   ALTSGPT  <5  5  6   ASTSGOT  8*  10*  10*   ALKPHOSPHAT  52  53  53   TBILIRUBIN  0.5  0.3  0.6   PREALBUMIN   --   3.0*   --    GLUCOSE  256*  224*  186*       Recent Labs      12/14/17 0209 12/14/17   1000  12/14/17   1616  12/15/17   0415  12/16/17   0329   RBC  2.48*   --    --   2.44*  2.55*   HEMOGLOBIN  8.3*   --   8.2*  8.3*  8.4*   HEMATOCRIT  25.6*   --   25.4*  25.2*  25.9*   PLATELETCT  204   --    --   204  217   APTT   --   44.6*   --    --    --        Recent Labs      12/14/17   0209  12/15/17   0415  12/16/17   0329   WBC  5.8  6.0  5.5   NEUTSPOLYS  88.30*  80.10*  73.60*   LYMPHOCYTES  4.00*  7.80*  10.70*   MONOCYTES  6.30  7.70  7.60   EOSINOPHILS  0.90  3.70  7.40*   BASOPHILS  0.20  0.00  0.20   ASTSGOT  8*  10*  10*   ALTSGPT  <5  5  6   ALKPHOSPHAT  52  53  53   TBILIRUBIN  0.5  0.3  0.6           Assessment/Plan     * Hydronephrosis   Assessment & Plan    - CT abdomen/pelvis from 12/08 showed bilateral hydronephrosis R>L, bilateral nephrolithiasis and prostate enlargement. Patient had rigid cystoscopy with removal of 100 ml of clot on 12/10 which was thought to be the cause for obstruction and resulting hydronephrosis.  Patient had worsening abdominal distension with nausea/vomiting on 12/12 and CT abdomen/pelvis showed persistent moderate hydronephrosis. Bilateral nephrostomy tubes placed on 12/13. Patient's creatinine returned to baseline (0.7) following  procedure.  - creatinine normal (0.26). Nephrostomy tubes draining well.   - As per urology, on discharge, patient will leave with kaur and nephrostomy tubes.   - will continue to monitor        Abdominal distension   Assessment & Plan    - Most likely secondary to ileus  - Improving  - No nausea/vomiting. Patient has not had a bowel movement yet, but passing gas  - NG tube suction discontinued  - Advance diet as tolerated  - Continue IV fluids-1/2 NS  - Will monitor electrolytes and replace as needed  - Abdominal X ray today              Acute DVT (deep venous thrombosis) (CMS-HCC)   Assessment & Plan    H/o DVT 3 times in the past. Was on coumadin previously, currently on dabigatran. He also has an IVC filter in place from 2009  - [facility] on warfarin secondary to missed bridging with lovenox. He was on heaprin drip for 7 days and discharged on dabigatran  - LE venous doppler ordered and it showed B/L extensive DVT in both extremities. Acute and chronic.  - UA from nephrostomy tubes shows >150 RBCs.  - Will hold off anticoagulation for now , patient has an IVC filter in place.            Extraperitoneal rupture of bladder   Assessment & Plan    [facility] for [facility] rupture and bilateral hydronephrosis.   - 12/11: He underwent rigid cystoscopy with evacuation of 100 ml of blood clot, fulguration of bladder perforation sites and biopsy of posterior bladder wall.  - Kaur catheter in place. He underwent B/L nephrostomy tube placement on 12/13  - B&O suppository for bladder spasms        Protein calorie malnutrition (CMS-HCC)   Assessment & Plan    Pre albumin of 3  Nutrition consulted        Electrolyte abnormality   Assessment & Plan    Hypokalemia, hypomagnesemia and hypophosphatemia- likely contributing to ileus  Will monitor and replace as needed        Anemia   Assessment & Plan    Likely secondary to acute blood  loss from hematuria  Decreased iron, TIBC. Ferritin normal.   Vitamin B12 lower limits of normal- Vitamin B12 IM daily for 1 week  Will start him on iron replacement after he starts to have a bowel movement        Hyperkalemia   Assessment & Plan    - Resolved  - Likely secondary to JENNIFFER  - Will continue to monitor        Acute kidney injury (CMS-HCC)   Assessment & Plan    Baseline creat 0.7.   Bilateral nephrostomy tubes placed on 12/13 for hydronephrosis with improvement in creatinine  Creatinine now at baseline   Will monitor        DM type 2 (diabetes mellitus, type 2) (CMS-HCC)- (present on admission)   Assessment & Plan    Patient on metformin and glipizide as home medication  - HbA1c 7  - Hold home medication  - Patient started on SSI and hypoglycemia protocol  - Continue glargine 7 units.

## 2017-12-16 NOTE — PROGRESS NOTES
Elaine Lindsay Fall Risk Assessment:     Last Known Fall: No falls  Mobility: Use of assistive device/requires assist of two people  Medications: Cardiovascular or central nervous system meds  Mental Status/LOC/Awareness: Awake, alert, and oriented to date, place, and person  Toileting Needs: Use of assistive device (Bedside commode, bedpan, urinal)  Volume/Electrolyte Status: Use of IV fluids/tube feeds  Communication/Sensory: Visual (Glasses)/hearing deficit  Behavior: Depression/anxiety  Elaine Lindsay Fall Risk Total: 13  Fall Risk Level: MODERATE RISK    Universal Fall Precautions:  call light/belongings in reach, bed in low position and locked, siderails up x 2, use non-slip footwear, adequate lighting, clutter free and spill free environment, educate on level of risk, educate to call for assistance    Fall Risk Level Interventions:   TRIAL ([employer], NEURO, MED DANYELL 5) Low Fall Risk Interventions  Place yellow fall risk ID band on patient: verified  Provide patient/family education based on risk assessment: verified  Educate patient/family to call staff for assistance when getting out of bed: verified  Place fall precaution signage outside patient door: verifiedTRIAL ([employer], NEURO, MED DANYELL 5) Moderate Fall Risk Interventions  Place yellow fall risk ID band on patient: refused  Provide patient/family education based on risk assessment : completed  Educate patient/family to call staff for assistance when getting out of bed: completed  Place fall precaution signage outside patient door: verified  Utilize bed/chair fall alarm: verified     Patient Specific Interventions:     Medication: limit combination of prn medications  Mental Status/LOC/Awareness: check on patient hourly  Toileting: instruct patient/family on the need to call for assistance when toileting  Volume/Electrolyte Status: monitor abnormal lab values  Communication/Sensory: ensure proper positioning when transferrng/ambulating  Behavioral:  administer medication as ordered  Mobility: utilize bed/chair fall alarm

## 2017-12-16 NOTE — PROGRESS NOTES
"Urology Progress Note      Overnight Events: None    S: Very anxious to go home. Good output from nephrostomy tubes. Creatinine continues to decrease.    O:   Blood pressure 152/88, pulse 72, temperature 36.5 °C (97.7 °F), resp. rate 16, height 1.854 m (6' 1\"), weight 94 kg (207 lb 3.7 oz), SpO2 97 %.  Recent Labs      12/14/17   0209  12/15/17   0415  12/16/17 0329   SODIUM  146*  143  141   POTASSIUM  4.0  3.4*  3.3*   CHLORIDE  114*  108  103   CO2  24  29  31   GLUCOSE  256*  224*  186*   BUN  27*  10  7*   CREATININE  0.78  0.39*  0.26*   CALCIUM  8.4*  7.8*  7.7*     Recent Labs      12/14/17   0209  12/14/17   1616  12/15/17   0415  12/16/17   0329   WBC  5.8   --   6.0  5.5   RBC  2.48*   --   2.44*  2.55*   HEMOGLOBIN  8.3*  8.2*  8.3*  8.4*   HEMATOCRIT  25.6*  25.4*  25.2*  25.9*   MCV  103.2*   --   103.3*  101.6*   MCH  33.5*   --   34.0*  32.9   MCHC  32.4*   --   32.9*  32.4*   RDW  58.9*   --   58.8*  57.7*   PLATELETCT  204   --   204  217   MPV  9.2   --   9.3  9.8         Intake/Output Summary (Last 24 hours) at 12/16/17 0858  Last data filed at 12/16/17 0700   Gross per 24 hour   Intake                0 ml   Output             3150 ml   Net            -3150 ml       Exam:  Abdomen distended. Bilateral NT's patent   Urine: clear dylan      A/P:    Active Hospital Problems    Diagnosis   • Abdominal distension [R14.0]     Priority: High   • Hydronephrosis [N13.30]     Priority: High   • Extraperitoneal rupture of bladder [N32.89]     Priority: Medium   • Acute DVT (deep venous thrombosis) (CMS-HCC) [I82.409]     Priority: Medium   • Electrolyte abnormality [E87.8]   • Protein calorie malnutrition (CMS-HCC) [E46]   • Ileus (CMS-HCC) [K56.7]   • Anemia [D64.9]   • Renal failure [N19]   • Ruptured bladder [N32.89]   • Hyperkalemia [E87.5]   • Acute kidney injury (CMS-HCC) [N17.9]   • DM type 2 (diabetes mellitus, type 2) (CMS-HCC) [E11.9]       Urology OK with DC home when cleared by medicine  Will " go home with kaur catheter and nephrostomy tubes  F/U in office with Dr Upton one week after discharge

## 2017-12-17 PROBLEM — S71.102A OPEN WOUND OF LEFT THIGH: Status: ACTIVE | Noted: 2017-12-17

## 2017-12-17 PROBLEM — R23.4 SKIN ESCHAR: Status: ACTIVE | Noted: 2017-12-17

## 2017-12-17 LAB
ANION GAP SERPL CALC-SCNC: 9 MMOL/L (ref 0–11.9)
BASOPHILS # BLD AUTO: 0.2 % (ref 0–1.8)
BASOPHILS # BLD: 0.01 K/UL (ref 0–0.12)
BUN SERPL-MCNC: 6 MG/DL (ref 8–22)
CALCIUM SERPL-MCNC: 7.7 MG/DL (ref 8.5–10.5)
CHLORIDE SERPL-SCNC: 102 MMOL/L (ref 96–112)
CO2 SERPL-SCNC: 30 MMOL/L (ref 20–33)
CREAT SERPL-MCNC: 0.29 MG/DL (ref 0.5–1.4)
EOSINOPHIL # BLD AUTO: 0.39 K/UL (ref 0–0.51)
EOSINOPHIL NFR BLD: 6.1 % (ref 0–6.9)
ERYTHROCYTE [DISTWIDTH] IN BLOOD BY AUTOMATED COUNT: 57.3 FL (ref 35.9–50)
GFR SERPL CREATININE-BSD FRML MDRD: >60 ML/MIN/1.73 M 2
GLUCOSE BLD-MCNC: 211 MG/DL (ref 65–99)
GLUCOSE BLD-MCNC: 225 MG/DL (ref 65–99)
GLUCOSE BLD-MCNC: 269 MG/DL (ref 65–99)
GLUCOSE BLD-MCNC: 282 MG/DL (ref 65–99)
GLUCOSE SERPL-MCNC: 167 MG/DL (ref 65–99)
HCT VFR BLD AUTO: 26.9 % (ref 42–52)
HGB BLD-MCNC: 8.9 G/DL (ref 14–18)
IMM GRANULOCYTES # BLD AUTO: 0.03 K/UL (ref 0–0.11)
IMM GRANULOCYTES NFR BLD AUTO: 0.5 % (ref 0–0.9)
LYMPHOCYTES # BLD AUTO: 0.6 K/UL (ref 1–4.8)
LYMPHOCYTES NFR BLD: 9.4 % (ref 22–41)
MAGNESIUM SERPL-MCNC: 1.4 MG/DL (ref 1.5–2.5)
MCH RBC QN AUTO: 33.2 PG (ref 27–33)
MCHC RBC AUTO-ENTMCNC: 33.1 G/DL (ref 33.7–35.3)
MCV RBC AUTO: 100.4 FL (ref 81.4–97.8)
MONOCYTES # BLD AUTO: 0.41 K/UL (ref 0–0.85)
MONOCYTES NFR BLD AUTO: 6.4 % (ref 0–13.4)
NEUTROPHILS # BLD AUTO: 4.92 K/UL (ref 1.82–7.42)
NEUTROPHILS NFR BLD: 77.4 % (ref 44–72)
NRBC # BLD AUTO: 0 K/UL
NRBC BLD AUTO-RTO: 0 /100 WBC
PHOSPHATE SERPL-MCNC: 2.2 MG/DL (ref 2.5–4.5)
PLATELET # BLD AUTO: 203 K/UL (ref 164–446)
PMV BLD AUTO: 9.4 FL (ref 9–12.9)
POTASSIUM SERPL-SCNC: 3.7 MMOL/L (ref 3.6–5.5)
RBC # BLD AUTO: 2.68 M/UL (ref 4.7–6.1)
SODIUM SERPL-SCNC: 141 MMOL/L (ref 135–145)
WBC # BLD AUTO: 6.4 K/UL (ref 4.8–10.8)

## 2017-12-17 PROCEDURE — 700111 HCHG RX REV CODE 636 W/ 250 OVERRIDE (IP)

## 2017-12-17 PROCEDURE — 700102 HCHG RX REV CODE 250 W/ 637 OVERRIDE(OP)

## 2017-12-17 PROCEDURE — 700111 HCHG RX REV CODE 636 W/ 250 OVERRIDE (IP): Performed by: STUDENT IN AN ORGANIZED HEALTH CARE EDUCATION/TRAINING PROGRAM

## 2017-12-17 PROCEDURE — 500440 HCHG DRESSING, STERILE ROLL (KERLIX): Performed by: SURGERY

## 2017-12-17 PROCEDURE — 87077 CULTURE AEROBIC IDENTIFY: CPT

## 2017-12-17 PROCEDURE — 99233 SBSQ HOSP IP/OBS HIGH 50: CPT | Mod: GC | Performed by: INTERNAL MEDICINE

## 2017-12-17 PROCEDURE — 700102 HCHG RX REV CODE 250 W/ 637 OVERRIDE(OP): Performed by: STUDENT IN AN ORGANIZED HEALTH CARE EDUCATION/TRAINING PROGRAM

## 2017-12-17 PROCEDURE — 700102 HCHG RX REV CODE 250 W/ 637 OVERRIDE(OP): Performed by: INTERNAL MEDICINE

## 2017-12-17 PROCEDURE — 700111 HCHG RX REV CODE 636 W/ 250 OVERRIDE (IP): Performed by: SURGERY

## 2017-12-17 PROCEDURE — A9270 NON-COVERED ITEM OR SERVICE: HCPCS

## 2017-12-17 PROCEDURE — 87070 CULTURE OTHR SPECIMN AEROBIC: CPT

## 2017-12-17 PROCEDURE — 36415 COLL VENOUS BLD VENIPUNCTURE: CPT

## 2017-12-17 PROCEDURE — 84100 ASSAY OF PHOSPHORUS: CPT

## 2017-12-17 PROCEDURE — 160035 HCHG PACU - 1ST 60 MINS PHASE I: Performed by: SURGERY

## 2017-12-17 PROCEDURE — 87186 SC STD MICRODIL/AGAR DIL: CPT

## 2017-12-17 PROCEDURE — 87075 CULTR BACTERIA EXCEPT BLOOD: CPT

## 2017-12-17 PROCEDURE — 160009 HCHG ANES TIME/MIN: Performed by: SURGERY

## 2017-12-17 PROCEDURE — 700101 HCHG RX REV CODE 250

## 2017-12-17 PROCEDURE — 87205 SMEAR GRAM STAIN: CPT

## 2017-12-17 PROCEDURE — A9270 NON-COVERED ITEM OR SERVICE: HCPCS | Performed by: INTERNAL MEDICINE

## 2017-12-17 PROCEDURE — A9270 NON-COVERED ITEM OR SERVICE: HCPCS | Performed by: SURGERY

## 2017-12-17 PROCEDURE — 0KBR0ZZ EXCISION OF LEFT UPPER LEG MUSCLE, OPEN APPROACH: ICD-10-PCS | Performed by: SURGERY

## 2017-12-17 PROCEDURE — 160039 HCHG SURGERY MINUTES - EA ADDL 1 MIN LEVEL 3: Performed by: SURGERY

## 2017-12-17 PROCEDURE — 85025 COMPLETE CBC W/AUTO DIFF WBC: CPT

## 2017-12-17 PROCEDURE — A6209 FOAM DRSG <=16 SQ IN W/O BDR: HCPCS | Performed by: INTERNAL MEDICINE

## 2017-12-17 PROCEDURE — 501838 HCHG SUTURE GENERAL: Performed by: SURGERY

## 2017-12-17 PROCEDURE — 700105 HCHG RX REV CODE 258: Performed by: INTERNAL MEDICINE

## 2017-12-17 PROCEDURE — 501445 HCHG STAPLER, SKIN DISP: Performed by: SURGERY

## 2017-12-17 PROCEDURE — 770020 HCHG ROOM/CARE - TELE (206)

## 2017-12-17 PROCEDURE — 82962 GLUCOSE BLOOD TEST: CPT

## 2017-12-17 PROCEDURE — 160028 HCHG SURGERY MINUTES - 1ST 30 MINS LEVEL 3: Performed by: SURGERY

## 2017-12-17 PROCEDURE — A9270 NON-COVERED ITEM OR SERVICE: HCPCS | Performed by: STUDENT IN AN ORGANIZED HEALTH CARE EDUCATION/TRAINING PROGRAM

## 2017-12-17 PROCEDURE — 80048 BASIC METABOLIC PNL TOTAL CA: CPT

## 2017-12-17 PROCEDURE — 160002 HCHG RECOVERY MINUTES (STAT): Performed by: SURGERY

## 2017-12-17 PROCEDURE — 160048 HCHG OR STATISTICAL LEVEL 1-5: Performed by: SURGERY

## 2017-12-17 PROCEDURE — 700105 HCHG RX REV CODE 258: Performed by: SURGERY

## 2017-12-17 PROCEDURE — 700102 HCHG RX REV CODE 250 W/ 637 OVERRIDE(OP): Performed by: SURGERY

## 2017-12-17 PROCEDURE — 87015 SPECIMEN INFECT AGNT CONCNTJ: CPT

## 2017-12-17 PROCEDURE — 83735 ASSAY OF MAGNESIUM: CPT

## 2017-12-17 RX ORDER — OXYCODONE HCL 5 MG/5 ML
SOLUTION, ORAL ORAL
Status: COMPLETED
Start: 2017-12-17 | End: 2017-12-17

## 2017-12-17 RX ORDER — MAGNESIUM SULFATE HEPTAHYDRATE 40 MG/ML
4 INJECTION, SOLUTION INTRAVENOUS ONCE
Status: COMPLETED | OUTPATIENT
Start: 2017-12-17 | End: 2017-12-17

## 2017-12-17 RX ORDER — KETOROLAC TROMETHAMINE 30 MG/ML
15 INJECTION, SOLUTION INTRAMUSCULAR; INTRAVENOUS EVERY 6 HOURS
Status: COMPLETED | OUTPATIENT
Start: 2017-12-17 | End: 2017-12-20

## 2017-12-17 RX ORDER — OXYCODONE HYDROCHLORIDE 10 MG/1
10 TABLET ORAL
Status: DISCONTINUED | OUTPATIENT
Start: 2017-12-17 | End: 2017-12-29 | Stop reason: HOSPADM

## 2017-12-17 RX ORDER — KETOROLAC TROMETHAMINE 30 MG/ML
INJECTION, SOLUTION INTRAMUSCULAR; INTRAVENOUS
Status: COMPLETED
Start: 2017-12-17 | End: 2017-12-17

## 2017-12-17 RX ORDER — POTASSIUM CHLORIDE 20 MEQ/1
40 TABLET, EXTENDED RELEASE ORAL 2 TIMES DAILY
Status: COMPLETED | OUTPATIENT
Start: 2017-12-17 | End: 2017-12-17

## 2017-12-17 RX ORDER — BUPIVACAINE HYDROCHLORIDE AND EPINEPHRINE 5; 5 MG/ML; UG/ML
INJECTION, SOLUTION EPIDURAL; INTRACAUDAL; PERINEURAL
Status: DISCONTINUED | OUTPATIENT
Start: 2017-12-17 | End: 2017-12-17 | Stop reason: HOSPADM

## 2017-12-17 RX ORDER — ACETAMINOPHEN 500 MG
1000 TABLET ORAL EVERY 6 HOURS
Status: DISCONTINUED | OUTPATIENT
Start: 2017-12-17 | End: 2017-12-22

## 2017-12-17 RX ORDER — OXYCODONE HYDROCHLORIDE 5 MG/1
5 TABLET ORAL
Status: DISCONTINUED | OUTPATIENT
Start: 2017-12-17 | End: 2017-12-29 | Stop reason: HOSPADM

## 2017-12-17 RX ORDER — INSULIN GLARGINE 100 [IU]/ML
10 INJECTION, SOLUTION SUBCUTANEOUS EVERY EVENING
Status: DISCONTINUED | OUTPATIENT
Start: 2017-12-17 | End: 2017-12-19

## 2017-12-17 RX ORDER — HYDROMORPHONE HYDROCHLORIDE 2 MG/ML
0.5 INJECTION, SOLUTION INTRAMUSCULAR; INTRAVENOUS; SUBCUTANEOUS
Status: DISCONTINUED | OUTPATIENT
Start: 2017-12-17 | End: 2017-12-20

## 2017-12-17 RX ORDER — MAGNESIUM SULFATE HEPTAHYDRATE 40 MG/ML
2 INJECTION, SOLUTION INTRAVENOUS ONCE
Status: COMPLETED | OUTPATIENT
Start: 2017-12-17 | End: 2017-12-17

## 2017-12-17 RX ORDER — ACETAMINOPHEN 500 MG
TABLET ORAL
Status: COMPLETED
Start: 2017-12-17 | End: 2017-12-17

## 2017-12-17 RX ORDER — INSULIN GLARGINE 100 [IU]/ML
8 INJECTION, SOLUTION SUBCUTANEOUS EVERY EVENING
Status: DISCONTINUED | OUTPATIENT
Start: 2017-12-17 | End: 2017-12-17

## 2017-12-17 RX ADMIN — SODIUM CHLORIDE: 4.5 INJECTION, SOLUTION INTRAVENOUS at 15:35

## 2017-12-17 RX ADMIN — INSULIN HUMAN 3 UNITS: 100 INJECTION, SOLUTION PARENTERAL at 11:25

## 2017-12-17 RX ADMIN — KETOROLAC TROMETHAMINE 15 MG: 30 INJECTION, SOLUTION INTRAMUSCULAR at 18:54

## 2017-12-17 RX ADMIN — OXYCODONE HYDROCHLORIDE AND ACETAMINOPHEN 1 TABLET: 10; 325 TABLET ORAL at 04:52

## 2017-12-17 RX ADMIN — ATROPA BELLADONNA AND OPIUM 30 MG: 16.2; 3 SUPPOSITORY RECTAL at 14:51

## 2017-12-17 RX ADMIN — MAGNESIUM SULFATE IN WATER 4 G: 40 INJECTION, SOLUTION INTRAVENOUS at 07:42

## 2017-12-17 RX ADMIN — POTASSIUM CHLORIDE 40 MEQ: 1500 TABLET, EXTENDED RELEASE ORAL at 08:40

## 2017-12-17 RX ADMIN — TAZOBACTAM SODIUM AND PIPERACILLIN SODIUM 3.38 G: 375; 3 INJECTION, SOLUTION INTRAVENOUS at 21:19

## 2017-12-17 RX ADMIN — SODIUM CHLORIDE: 9 INJECTION, SOLUTION INTRAVENOUS at 21:57

## 2017-12-17 RX ADMIN — FENTANYL CITRATE 50 MCG: 50 INJECTION, SOLUTION INTRAMUSCULAR; INTRAVENOUS at 18:53

## 2017-12-17 RX ADMIN — OXYCODONE HYDROCHLORIDE 5 MG: 5 SOLUTION ORAL at 18:42

## 2017-12-17 RX ADMIN — DIBASIC SODIUM PHOSPHATE, MONOBASIC POTASSIUM PHOSPHATE AND MONOBASIC SODIUM PHOSPHATE 1 TABLET: 852; 155; 130 TABLET ORAL at 14:10

## 2017-12-17 RX ADMIN — MAGNESIUM SULFATE IN WATER 2 G: 40 INJECTION, SOLUTION INTRAVENOUS at 15:59

## 2017-12-17 RX ADMIN — MORPHINE SULFATE 2 MG: 4 INJECTION INTRAVENOUS at 05:59

## 2017-12-17 RX ADMIN — CYANOCOBALAMIN 1000 MCG: 1000 INJECTION, SOLUTION INTRAMUSCULAR; SUBCUTANEOUS at 08:41

## 2017-12-17 RX ADMIN — SODIUM CHLORIDE: 4.5 INJECTION, SOLUTION INTRAVENOUS at 02:44

## 2017-12-17 RX ADMIN — INSULIN HUMAN 2 UNITS: 100 INJECTION, SOLUTION PARENTERAL at 06:08

## 2017-12-17 RX ADMIN — MORPHINE SULFATE 2 MG: 4 INJECTION INTRAVENOUS at 09:58

## 2017-12-17 RX ADMIN — INSULIN HUMAN 2 UNITS: 100 INJECTION, SOLUTION PARENTERAL at 16:31

## 2017-12-17 RX ADMIN — POTASSIUM CHLORIDE 40 MEQ: 1500 TABLET, EXTENDED RELEASE ORAL at 21:19

## 2017-12-17 RX ADMIN — INSULIN HUMAN 3 UNITS: 100 INJECTION, SOLUTION PARENTERAL at 21:37

## 2017-12-17 RX ADMIN — INSULIN GLARGINE 10 UNITS: 100 INJECTION, SOLUTION SUBCUTANEOUS at 21:36

## 2017-12-17 RX ADMIN — MORPHINE SULFATE 2 MG: 4 INJECTION INTRAVENOUS at 01:39

## 2017-12-17 RX ADMIN — ACETAMINOPHEN 1000 MG: 500 TABLET, FILM COATED ORAL at 18:54

## 2017-12-17 ASSESSMENT — ENCOUNTER SYMPTOMS
ROS GI COMMENTS: ABDOMINAL DISTENSION
DOUBLE VISION: 0
SENSORY CHANGE: 0
SINUS PAIN: 0
BLURRED VISION: 0
EYE PAIN: 0
COUGH: 0
ABDOMINAL PAIN: 0
NAUSEA: 0
ORTHOPNEA: 0
HEARTBURN: 0
HEADACHES: 0
MYALGIAS: 0
PALPITATIONS: 0
SPUTUM PRODUCTION: 0
BRUISES/BLEEDS EASILY: 1
SPEECH CHANGE: 0
CONSTIPATION: 1
DIZZINESS: 0
VOMITING: 0
BACK PAIN: 0
SORE THROAT: 0
STRIDOR: 0
EYE DISCHARGE: 0
CHILLS: 0
CLAUDICATION: 0
DEPRESSION: 0
NECK PAIN: 0
SHORTNESS OF BREATH: 0
WEAKNESS: 0
FEVER: 0

## 2017-12-17 ASSESSMENT — PAIN SCALES - GENERAL
PAINLEVEL_OUTOF10: 0
PAINLEVEL_OUTOF10: 8
PAINLEVEL_OUTOF10: 5
PAINLEVEL_OUTOF10: 9
PAINLEVEL_OUTOF10: 8
PAINLEVEL_OUTOF10: 7
PAINLEVEL_OUTOF10: 8
PAINLEVEL_OUTOF10: 5
PAINLEVEL_OUTOF10: 8
PAINLEVEL_OUTOF10: 8
PAINLEVEL_OUTOF10: 6
PAINLEVEL_OUTOF10: 8

## 2017-12-17 ASSESSMENT — LIFESTYLE VARIABLES: SUBSTANCE_ABUSE: 0

## 2017-12-17 NOTE — ASSESSMENT & PLAN NOTE
Patient had a large eschar on his left posterior thigh from previous subcutaneous hemorrhage as a result of supra therapeutic INR while on coumadin.   s/p debridement of 20x25 cm eschar of black dead skin on 12/17.   Wound vac in place.   Wound culture positive for enterococcus fecalis, citrobacter and bacteroids.   Dr. Kearney on board- recommended zosyn  Plan:  Continue zosyn- day 12 today  Continue wound care per protocol  As per Dr. Kearney,  patient will need 2 weeks of zosyn with start date 12/17  Pt will need to f/u with wound clinic after discharge- orders in place

## 2017-12-17 NOTE — PROGRESS NOTES
Assumed care at 0700, bedside report received from night shift RN. Patient is AOx4 with no signs of distress. Pt. Is SR 70 on the monitor. Initial assessment completed, orders reviewed, call light within reach, and hourly rounding in place. POC addressed with patient, no additional questions at this time.

## 2017-12-17 NOTE — PROGRESS NOTES
Internal Medicine Interval Note  Note Author: Lilo Hernandes M.D.     Name Niels Lima     1950   Age/Sex 67 y.o. male   MRN [id number]   Code Status Full code     After 5PM or if no immediate response to page, please call for cross-coverage  Attending/Team: Dr. Gomez/ Fco See Patient List for primary contact information  Call [phone] to page    1st Call - Day Intern (R1):   Dr. Hernandes 2nd Call - Day Sr. Resident (R2/R3):   Dr. Greer         Reason for interval visit  (Principal Problem)   Hydronephrosis    Interval Problem Daily Status Update  (24 hours)   - Skin eschar : Malodorous with separation from skin proximally. Evaluated by surgery. Patient will have debridement this evening. Patient will be NPO.  - Ileus: Patient had a bowel movement this morning. NG tube discontinued  - Bilateral nephrostomy tubes and kaur catheter in place. Creatinine normal. As per urology, patient will leave with kaur and nephrostomy tubes on discharge  - Acute DVT in bilateral lower extremities. Heparin drip discontinued as patient has >150 RBCs in urinalysis. He has an IVC filter in place. Patient will eventually need to resume anticoagulation          Review of Systems   Constitutional: Negative for chills, fever and malaise/fatigue.   HENT: Negative for sinus pain and sore throat.    Eyes: Negative for blurred vision, double vision, pain and discharge.   Respiratory: Negative for cough, sputum production, shortness of breath and stridor.    Cardiovascular: Negative for chest pain, palpitations, orthopnea, claudication and leg swelling.   Gastrointestinal: Positive for constipation. Negative for abdominal pain, heartburn, nausea and vomiting.        Abdominal distension   Genitourinary: Negative for dysuria, frequency, hematuria and urgency.        Bladder spasms   Musculoskeletal: Negative for back pain, myalgias and neck pain.   Skin: Negative for itching and rash.   Neurological: Negative for  dizziness, sensory change, speech change, weakness and headaches.   Endo/Heme/Allergies: Bruises/bleeds easily.        Reports hematoma left thigh few weeks back   Psychiatric/Behavioral: Negative for depression, substance abuse and suicidal ideas.       Consultants/Specialty  Urology  Nephrology  IR    Disposition  inpatient    Quality Measures    Reviewed items::  Labs reviewed and Medications reviewed  Pacheco catheter::  Urinary Tract Retention or Urinary Tract Obstruction  DVT prophylaxis pharmacological::  Contraindicated - High bleeding risk  DVT prophylaxis - mechanical:  SCDs  Ulcer Prophylaxis::  Not indicated          Physical Exam       Vitals:    12/16/17 2336 12/17/17 0353 12/17/17 0740 12/17/17 1145   BP: 147/81 155/84 144/80 147/82   Pulse: 76 78 78 83   Resp: 17 16 18 18   Temp: 36.3 °C (97.4 °F) 36.6 °C (97.8 °F) 36.6 °C (97.8 °F) 37.5 °C (99.5 °F)   SpO2: 91% 91% 93% 92%   Weight:       Height:         Body mass index is 27.34 kg/m².    Oxygen Therapy:  Pulse Oximetry: 92 %, O2 (LPM): 0, O2 Delivery: None (Room Air)    Physical Exam   Constitutional: He is oriented to person, place, and time and well-developed, well-nourished, and in no distress. No distress.   HENT:   Head: Normocephalic and atraumatic.   Mouth/Throat: No oropharyngeal exudate.   NG present   Eyes: EOM are normal. Pupils are equal, round, and reactive to light. No scleral icterus.   Neck: Normal range of motion. Neck supple. No JVD present.   Cardiovascular: Normal rate and regular rhythm.    No murmur heard.  Pulmonary/Chest: Effort normal and breath sounds normal. No respiratory distress. He has no wheezes.   Abdominal: He exhibits distension. There is tenderness.   B/L nephrostomy tube present.   Genitourinary:   Genitourinary Comments: Pacheco catheter present   Musculoskeletal: He exhibits no edema.   Neurological: He is alert and oriented to person, place, and time. No cranial nerve deficit.   Skin:   Left posterolateral thigh-  15 x 10cm blackish red discoloration noted.  from skin proximally. Yellowish discharge noted at the site of separation. Tenderness present   Psychiatric: Mood, memory, affect and judgment normal.         Lab Data Review:         12/11/2017  5:55 PM    Recent Labs      12/15/17   0415  12/16/17   0329  12/17/17   0343   SODIUM  143  141  141   POTASSIUM  3.4*  3.3*  3.7   CHLORIDE  108  103  102   CO2  29  31  30   BUN  10  7*  6*   CREATININE  0.39*  0.26*  0.29*   MAGNESIUM  1.3*  1.4*  1.4*   PHOSPHORUS  1.6*  2.4*  2.2*   CALCIUM  7.8*  7.7*  7.7*       Recent Labs      12/15/17   0415  12/16/17   0329  12/17/17   0343   ALTSGPT  5  6   --    ASTSGOT  10*  10*   --    ALKPHOSPHAT  53  53   --    TBILIRUBIN  0.3  0.6   --    PREALBUMIN  3.0*   --    --    GLUCOSE  224*  186*  167*       Recent Labs      12/15/17   0415  12/16/17   0329  12/17/17   0343   RBC  2.44*  2.55*  2.68*   HEMOGLOBIN  8.3*  8.4*  8.9*   HEMATOCRIT  25.2*  25.9*  26.9*   PLATELETCT  204  217  203       Recent Labs      12/15/17   0415  12/16/17   0329  12/17/17   0343   WBC  6.0  5.5  6.4   NEUTSPOLYS  80.10*  73.60*  77.40*   LYMPHOCYTES  7.80*  10.70*  9.40*   MONOCYTES  7.70  7.60  6.40   EOSINOPHILS  3.70  7.40*  6.10   BASOPHILS  0.00  0.20  0.20   ASTSGOT  10*  10*   --    ALTSGPT  5  6   --    ALKPHOSPHAT  53  53   --    TBILIRUBIN  0.3  0.6   --            Assessment/Plan     * Hydronephrosis   Assessment & Plan    - CT abdomen/pelvis from 12/08 (prior to transfer) showed bilateral hydronephrosis R>L, bilateral nephrolithiasis and prostate enlargement. Patient had rigid cystoscopy with removal of 100 ml of clot on 12/10 which was thought to be the cause for obstruction and resulting hydronephrosis.  Patient had worsening abdominal distension with nausea/vomiting on 12/12 and CT abdomen/pelvis showed persistent moderate hydronephrosis. Bilateral nephrostomy tubes placed on 12/13. Patient's creatinine returned to baseline  (0.7) following procedure.  - creatinine normal (0.29). Nephrostomy tubes draining well.   - As per urology, on discharge, patient will leave with kaur and nephrostomy tubes.   - will continue to monitor        Abdominal distension   Assessment & Plan    - Most likely secondary to ileus  - Improving. Abdomen feels soft, bowel sounds present.  - No nausea/vomiting. Patient had a bowel movement this morning  - Will discontinue NG tube  - Advance diet as tolerated  - Continue IV fluids-1/2 NS  - Will monitor electrolytes and replace as needed                Acute DVT (deep venous thrombosis) (CMS-[facility])   Assessment & Plan    H/o DVT 3 times in the past. Was on coumadin previously which was changed to dabigatran recently. He also has an IVC filter in place from 2009  - [facility] secondary to missed bridging with lovenox. He was on heparin drip for 7 days and discharged on dabigatran  - LE venous doppler ordered and it showed B/L extensive DVT in both extremities. Acute and chronic.  - UA from nephrostomy tubes shows >150 RBCs.  - Will hold off anticoagulation for now , patient has an IVC filter in place.            Extraperitoneal rupture of bladder   Assessment & Plan    [facility] and bilateral hydronephrosis.   - 12/11: He underwent rigid cystoscopy with evacuation of 100 ml of blood clot, fulguration of bladder perforation sites and biopsy of posterior bladder wall.  - Kaur catheter in place. He underwent B/L nephrostomy tube placement on 12/13  - B&O suppository for bladder spasms  - Urology following        Skin eschar   Assessment & Plan    Patient has a large eschar (15 x 10 cm ) on his left posterior thigh from previous subcutaneous hemorrhage as a result of supra therapeutic INR while on coumadin  Followed by wound care  Wound now appears to be having some serosanguinous discharge and  is malodorous with separation from skin proximally  Surgery consulted  Patient will have debridement this evening.  Will monitor blood glucose adjust insulin accordingly for better control        Protein calorie malnutrition (CMS-HCC)   Assessment & Plan    Pre albumin of 3  Nutrition consulted        Electrolyte abnormality   Assessment & Plan    Hypokalemia, hypomagnesemia and hypophosphatemia- likely contributing to ileus  Will monitor and replace as needed        Anemia   Assessment & Plan    Likely secondary to acute blood loss from hematuria  Decreased iron, TIBC. Ferritin normal.   Vitamin B12 lower limits of normal- Vitamin B12 IM daily for 1 week  Patient will need oral iron once the bowel movement normalizes        Hyperkalemia   Assessment & Plan    - Resolved  - Likely secondary to JENNIFFER  - Will continue to monitor        Acute kidney injury (CMS-HCC)   Assessment & Plan    Baseline creat 0.7.   Bilateral nephrostomy tubes placed on 12/13 for hydronephrosis with improvement in creatinine  Creatinine now at baseline   Will monitor        DM type 2 (diabetes mellitus, type 2) (CMS-HCC)- (present on admission)   Assessment & Plan    Patient on metformin and glipizide as home medication  - HbA1c 7  - Hold home medication  - Patient started on SSI and hypoglycemia protocol  - Glargine increased to 8 units

## 2017-12-17 NOTE — WOUND TEAM
"[facility]  Inpatient Services  Initial Wound and Skin Care Evaluation    Admission Date:   12/10/2017.  HPI, PMH, SH: Reviewed  Unit where seen by Wound Team: T8.    WOUND CONSULT RELATED TO: Right medial ankle, left posterior thigh.    SUBJECTIVE:  \"[facility] and not [facility] for it.\"    Self Report / Pain Level: 7/10.    OBJECTIVE: Wound open to air and draining on bed.    WOUND TYPE, LOCATION, CHARACTERISTICS (Pressure ulcers: location, stage, POA or date identified)    Location and type of wound: Left posterior thigh: Large area of eschar that patient states was a large hematoma.        Periwound:     Erythema proximally, everywhere else intact.  Drainage:     Moderate, malodorous, serosanguinous from proximal portion closer to buttock.  Tissue Type and %:    95% black eschar, 5% red.  Wound Edges:    Attached except for proximally,  there.  Odor:      Moderately malodorous.   Exposed structure(s):  None.  S&S of Infection:   Malodor, drainage, erythema, pain.       Measurements:   12/16/2017.  Length:     16.4 cm  Width:      12.6 cm  Depth:     VERÓNICA      INTERVENTIONS BY WOUND TEAM: Assessed the right ankle, small scab came off, dry, hemosiderin staining, no open wounds, left open to air. Assessed left posterior thigh wound. It looks and smells like it is infected. Called [facility] and requested a surgical consult. Cleaned open area with NS. Covered open area with hydrofera blue, entire wound with telfa, abdominal pad placed over draining portion, secured with roll gauze. Sent a stockinet to RN to place over dressing to help keep it in place. Patient requested a  visit, placed that consult.   Nursing to change dressing.      Interdisciplinary consultation: Nursing, BRUNA Eagle, patient.    EVALUATION: Hydrofera blue provides absorption of exudate, antimicrobial treatment.     Factors affecting wound healing: On anticoagulants. Bladder rupture, " diabetes.   Goals: Surgical debridement.    NURSING PLAN OF CARE ORDERS (X):    Dressing changes: See Dressing Care orders: X  Skin care: See Skin Care orders:   Rectal tube care: See Rectal Tube Care orders:   Other orders:    RSKIN: CURRENT (X) ORDERED (O)  Q shift [place]:  X  Q shift pressure point assessments:  X  Pressure redistribution mattress      X  YAJAIRA      Bariatric YAJAIRA      Bariatric foam        Heel float boots       Heels floated on pillows    X  Barrier wipes      Barrier Cream      Barrier paste      Sacral silicone dressing      Silicone O2 tubing  X    Anchorfast      Trach with Optifoam split foam       Waffle cushion      Rectal tube or BMS      Antifungal tx    Turn q 2 hours   X  Up to chair     Ambulate   PT/OT     Dietician      PO   X  TF   TPN     PVN    NPO   # days   Other       WOUND TEAM PLAN OF CARE (X):   NPWT change 3 x week:        Dressing changes by wound team:       Follow up as needed:    X monitor for surgery    Other (explain):    Anticipated discharge plans (X): To be determined.  SNF:           Home Care:           Outpatient Wound Center:            Self Care:            Other:

## 2017-12-17 NOTE — CONSULTS
12/17  ATSP by Dr Hernandes for Thigh Wound    HPI: 67y M here with a previous hematoma/wound on the posterior of his left thigh.  [facility] which [facility] a hematoma but has since partially drained and partially formed a dark hard eschar over the wound.  He is being seen by wound care whom have recommended debridement.  He is not currently on any antibiotics but there is some concern for infection.   He was admitted with an extraperitoneal bladder rupture which has been evaluated and already treated by Urology.  He currently has bilateral nephrostomy tubes in place.     He at a full breakfast at 08:30 this am.     PMHx: T2DM, Ruptured Bladder, Renal Failure, Anemia, Hx of DVT, Crohn's Disease    PSHx: IVC Filter Placement; Bowel resection 1986; Left Orchiectomy, right fixation 2017; Cystoscopy for treatment of rupture; Bilateral Nephrostomy tubes    Meds: see Med Rec, no anticoagulation    NKDA    FamHx: no colon/rectal cancers, no other pertinent family history    SocHx: No Tob/Drugs, occasional EtOH      ROS: negative except as above    Consitutional- above  HEENT- no visual changes, no sneezing or runny nose  Skin- above  Cardiovascular- no chest pain or palpatations  Respiratory- no SOB or cough  GI- no nausea/emesis or diarrhea  - above  Neuro- no weakness or syncope  Musculoskeletal- no muscle or joint pain  Heme- no bleeding or bruising  Lymphatic- no enlarged nodes or previous splenectomy  Endocrine- No sweating or heat/cold intolerance  Allergy- No asthma or hives  Psychiatric- no depression or anxiety        Physical Exam:   AFVSS  A@O x3, NAD  NCAT, no scleral icterus  Neck nontender, no lymphadenopathy  Normal respiratory effort, no chest wall masses  RRR, 2+ pulses  Abdomen soft, no peritonitis, no masses  Extremities warm and well perfused  There is a large black fixed eschar over the left posterior thigh wound preventing staging and adequate dressing  application at this time    Labs: WBC 6.4, Hct 27, Plt 203, Mg 1.4, Glucose 200s, otherwise BMP wnl    Radiology: n/a    A/P: 67y M here with thigh wound which is going to require debridement.  He is not NPO so will have to wait until this evening for surgery.  Explained procedure to patient, debridement should control any infection that may be present and will allow for adequate wound care.    He is also going to need better diabetic control in order to heal, properly.  Pt is NPO and gives consent for planned procedure

## 2017-12-17 NOTE — CARE PLAN
Problem: Mobility  Goal: Risk for activity intolerance will decrease  Outcome: PROGRESSING AS EXPECTED  Pt. Was able to ambulate to the bathroom stand by assist without equipment. He also sat up in the chair for breakfast. Pt. Calls for assistance before attempting ambulation. Pt is steady on feet.

## 2017-12-17 NOTE — PROGRESS NOTES
Elaine Lindsay Fall Risk Assessment:     Last Known Fall: No falls  Mobility: Use of assistive device/requires assist of two people  Medications: Cardiovascular or central nervous system meds  Mental Status/LOC/Awareness: Awake, alert, and oriented to date, place, and person  Toileting Needs: Use of catheters or diversion devices  Volume/Electrolyte Status: Use of IV fluids/tube feeds  Communication/Sensory: Visual (Glasses)/hearing deficit  Behavior: Appropriate behavior  Elaine Lindsay Fall Risk Total: 11  Fall Risk Level: MODERATE RISK    Universal Fall Precautions:  call light/belongings in reach, bed in low position and locked, siderails up x 2, use non-slip footwear, adequate lighting, clutter free and spill free environment, educate on level of risk, educate to call for assistance    Fall Risk Level Interventions:   TRIAL ([employer], NEURO, MED DANYELL 5) Low Fall Risk Interventions  Place yellow fall risk ID band on patient: verified  Provide patient/family education based on risk assessment: verified  Educate patient/family to call staff for assistance when getting out of bed: verified  Place fall precaution signage outside patient door: verifiedTRIAL ([employer], NEURO, MED DANYELL 5) Moderate Fall Risk Interventions  Place yellow fall risk ID band on patient: refused  Provide patient/family education based on risk assessment : completed  Educate patient/family to call staff for assistance when getting out of bed: completed  Place fall precaution signage outside patient door: verified  Utilize bed/chair fall alarm: verified     Patient Specific Interventions:     Medication: review medications with patient and family and limit combination of prn medications  Mental Status/LOC/Awareness: reinforce falls education, check on patient hourly, utilize bed/chair fall alarm and reinforce the use of call light  Toileting: not applicable  Volume/Electrolyte Status: ensure patient remains hydrated, monitor blood sugars and  maintain appropriate blood sugar levels if diabetic and ensure IV fluids are appropriate  Communication/Sensory: update plan of care on whiteboard  Behavioral: administer medication as ordered and instruct/reinforce fall program rationale  Mobility: utilize bed/chair fall alarm and ensure bed is locked and in lowest position

## 2017-12-17 NOTE — CARE PLAN
Problem: Skin Integrity  Goal: Risk for impaired skin integrity will decrease  Outcome: PROGRESSING SLOWER THAN EXPECTED  Patient wound on posterior left thigh appears to be getting worse. It is black and open toward the buttocks. The opening appears to be larger compared to previous pictures. Wound cleaned and dressing changed. MD aware.

## 2017-12-17 NOTE — PROGRESS NOTES
Elaine Lindsay Fall Risk Assessment:     Last Known Fall: No falls  Mobility: Dizziness/generalized weakness  Medications: Cardiovascular or central nervous system meds  Mental Status/LOC/Awareness: Awake, alert, and oriented to date, place, and person  Toileting Needs: Use of catheters or diversion devices  Volume/Electrolyte Status: Use of IV fluids/tube feeds  Communication/Sensory: Visual (Glasses)/hearing deficit  Behavior: Appropriate behavior  Elaine Lindsay Fall Risk Total: 9  Fall Risk Level: LOW RISK    Universal Fall Precautions:  call light/belongings in reach, wheelchairs and assistive devices out of sight, siderails up x 2, use non-slip footwear, adequate lighting, educate to call for assistance, educate on level of risk, clutter free and spill free environment, bed in low position and locked    Fall Risk Level Interventions:   TRIAL ([employer], NEURO, MED DANYELL 5) Low Fall Risk Interventions  Place yellow fall risk ID band on patient: verified  Provide patient/family education based on risk assessment: completed  Educate patient/family to call staff for assistance when getting out of bed: completed  Place fall precaution signage outside patient door: verifiedTRIAL ([employer] [facility], NEURO, MED DANYELL 5) Moderate Fall Risk Interventions  Place yellow fall risk ID band on patient: refused  Provide patient/family education based on risk assessment : completed  Educate patient/family to call staff for assistance when getting out of bed: completed  Place fall precaution signage outside patient door: verified  Utilize bed/chair fall alarm: verified     Patient Specific Interventions:     Medication: review medications with patient and family, assess for medications that can be discontinued or dosage decreased and limit combination of prn medications  Mental Status/LOC/Awareness: reinforce falls education, check on patient hourly, reinforce the use of call light and provide activity  Toileting: provide frquent toileting,  instruct male patients prone to dizziness to void while sitting, instruct patient/family on the use of grab bars, instruct patient/family on the need to call for assistance when toileting and do not leave patient unattended in bathroom/refer to toileting scripting  Volume/Electrolyte Status: ensure patient remains hydrated, advance diet as tolerated, monitor abnormal lab values and ensure IV fluids are appropriate  Communication/Sensory: update plan of care on whiteboard, ensure proper positioning when transferrng/ambulating and ensure patient has glasses/contacts and hearing aids/dentures  Behavioral: encourage patient to voice feelings, engage patient in daily activities, administer medication as ordered and instruct/reinforce fall program rationale  Mobility: provide comfort measures during transport, dangle prior to standing, ensure bed is locked and in lowest position, provide appropriate assistive device, instruct patient to exit bed on their strongest side and collaborate with doctor for possible PT/OT consult

## 2017-12-17 NOTE — CARE PLAN
Problem: Bowel/Gastric:  Goal: Normal bowel function is maintained or improved  Pt refused Miralax, but states he has been passing flatus. Will continue to monitor for a bowel movement.    Problem: Pain Management  Goal: Pain level will decrease to patient's comfort goal    Intervention: Follow pain managment plan developed in collaboration with patient and Interdisciplinary Team  Pt stated urinary spasms treated effectively with B&O suppository (see MAR).

## 2017-12-18 ENCOUNTER — APPOINTMENT (OUTPATIENT)
Dept: RADIOLOGY | Facility: MEDICAL CENTER | Age: 67
DRG: 668 | End: 2017-12-18
Attending: STUDENT IN AN ORGANIZED HEALTH CARE EDUCATION/TRAINING PROGRAM
Payer: COMMERCIAL

## 2017-12-18 PROBLEM — S81.802A WOUND OF LEFT LOWER EXTREMITY: Status: ACTIVE | Noted: 2017-12-17

## 2017-12-18 LAB
ALBUMIN SERPL BCP-MCNC: 2.1 G/DL (ref 3.2–4.9)
ALBUMIN/GLOB SERPL: 0.8 G/DL
ALP SERPL-CCNC: 55 U/L (ref 30–99)
ALT SERPL-CCNC: 7 U/L (ref 2–50)
ANION GAP SERPL CALC-SCNC: 5 MMOL/L (ref 0–11.9)
ANISOCYTOSIS BLD QL SMEAR: ABNORMAL
AST SERPL-CCNC: 11 U/L (ref 12–45)
BASOPHILS # BLD AUTO: 0 % (ref 0–1.8)
BASOPHILS # BLD: 0 K/UL (ref 0–0.12)
BILIRUB SERPL-MCNC: 0.5 MG/DL (ref 0.1–1.5)
BUN SERPL-MCNC: 7 MG/DL (ref 8–22)
CALCIUM SERPL-MCNC: 7.5 MG/DL (ref 8.5–10.5)
CHLORIDE SERPL-SCNC: 106 MMOL/L (ref 96–112)
CO2 SERPL-SCNC: 31 MMOL/L (ref 20–33)
CREAT SERPL-MCNC: 0.37 MG/DL (ref 0.5–1.4)
EOSINOPHIL # BLD AUTO: 0.3 K/UL (ref 0–0.51)
EOSINOPHIL NFR BLD: 6.2 % (ref 0–6.9)
ERYTHROCYTE [DISTWIDTH] IN BLOOD BY AUTOMATED COUNT: 59.8 FL (ref 35.9–50)
GFR SERPL CREATININE-BSD FRML MDRD: >60 ML/MIN/1.73 M 2
GLOBULIN SER CALC-MCNC: 2.5 G/DL (ref 1.9–3.5)
GLUCOSE BLD-MCNC: 162 MG/DL (ref 65–99)
GLUCOSE BLD-MCNC: 305 MG/DL (ref 65–99)
GLUCOSE SERPL-MCNC: 178 MG/DL (ref 65–99)
GRAM STN SPEC: ABNORMAL
HCT VFR BLD AUTO: 25.9 % (ref 42–52)
HGB BLD-MCNC: 8.3 G/DL (ref 14–18)
LYMPHOCYTES # BLD AUTO: 0.55 K/UL (ref 1–4.8)
LYMPHOCYTES NFR BLD: 11.5 % (ref 22–41)
MACROCYTES BLD QL SMEAR: ABNORMAL
MAGNESIUM SERPL-MCNC: 1.7 MG/DL (ref 1.5–2.5)
MANUAL DIFF BLD: NORMAL
MCH RBC QN AUTO: 33.2 PG (ref 27–33)
MCHC RBC AUTO-ENTMCNC: 32 G/DL (ref 33.7–35.3)
MCV RBC AUTO: 103.6 FL (ref 81.4–97.8)
MONOCYTES # BLD AUTO: 0.09 K/UL (ref 0–0.85)
MONOCYTES NFR BLD AUTO: 1.8 % (ref 0–13.4)
MORPHOLOGY BLD-IMP: NORMAL
NEUTROPHILS # BLD AUTO: 3.86 K/UL (ref 1.82–7.42)
NEUTROPHILS NFR BLD: 80.5 % (ref 44–72)
NRBC # BLD AUTO: 0 K/UL
NRBC BLD AUTO-RTO: 0 /100 WBC
PHOSPHATE SERPL-MCNC: 3.7 MG/DL (ref 2.5–4.5)
PLATELET # BLD AUTO: 173 K/UL (ref 164–446)
PLATELET BLD QL SMEAR: NORMAL
PMV BLD AUTO: 10.1 FL (ref 9–12.9)
POTASSIUM SERPL-SCNC: 4 MMOL/L (ref 3.6–5.5)
PROT SERPL-MCNC: 4.6 G/DL (ref 6–8.2)
RBC # BLD AUTO: 2.5 M/UL (ref 4.7–6.1)
RBC BLD AUTO: PRESENT
SIGNIFICANT IND 70042: ABNORMAL
SITE SITE: ABNORMAL
SODIUM SERPL-SCNC: 142 MMOL/L (ref 135–145)
SOURCE SOURCE: ABNORMAL
WBC # BLD AUTO: 4.8 K/UL (ref 4.8–10.8)

## 2017-12-18 PROCEDURE — 700102 HCHG RX REV CODE 250 W/ 637 OVERRIDE(OP): Performed by: STUDENT IN AN ORGANIZED HEALTH CARE EDUCATION/TRAINING PROGRAM

## 2017-12-18 PROCEDURE — 80053 COMPREHEN METABOLIC PANEL: CPT

## 2017-12-18 PROCEDURE — 76705 ECHO EXAM OF ABDOMEN: CPT

## 2017-12-18 PROCEDURE — 700111 HCHG RX REV CODE 636 W/ 250 OVERRIDE (IP): Performed by: STUDENT IN AN ORGANIZED HEALTH CARE EDUCATION/TRAINING PROGRAM

## 2017-12-18 PROCEDURE — 85007 BL SMEAR W/DIFF WBC COUNT: CPT

## 2017-12-18 PROCEDURE — A9270 NON-COVERED ITEM OR SERVICE: HCPCS | Performed by: SURGERY

## 2017-12-18 PROCEDURE — 99232 SBSQ HOSP IP/OBS MODERATE 35: CPT | Mod: GC | Performed by: INTERNAL MEDICINE

## 2017-12-18 PROCEDURE — 700102 HCHG RX REV CODE 250 W/ 637 OVERRIDE(OP): Performed by: SURGERY

## 2017-12-18 PROCEDURE — 700105 HCHG RX REV CODE 258: Performed by: SURGERY

## 2017-12-18 PROCEDURE — 700111 HCHG RX REV CODE 636 W/ 250 OVERRIDE (IP): Performed by: SURGERY

## 2017-12-18 PROCEDURE — 84100 ASSAY OF PHOSPHORUS: CPT

## 2017-12-18 PROCEDURE — 83735 ASSAY OF MAGNESIUM: CPT

## 2017-12-18 PROCEDURE — 770020 HCHG ROOM/CARE - TELE (206)

## 2017-12-18 PROCEDURE — 306263 VAC CANNISTER W/GEL 500ML: Performed by: SURGERY

## 2017-12-18 PROCEDURE — 36415 COLL VENOUS BLD VENIPUNCTURE: CPT

## 2017-12-18 PROCEDURE — A9270 NON-COVERED ITEM OR SERVICE: HCPCS | Performed by: STUDENT IN AN ORGANIZED HEALTH CARE EDUCATION/TRAINING PROGRAM

## 2017-12-18 PROCEDURE — 700102 HCHG RX REV CODE 250 W/ 637 OVERRIDE(OP): Performed by: INTERNAL MEDICINE

## 2017-12-18 PROCEDURE — 85027 COMPLETE CBC AUTOMATED: CPT

## 2017-12-18 PROCEDURE — 82962 GLUCOSE BLOOD TEST: CPT

## 2017-12-18 PROCEDURE — 74000 DX-ABDOMEN-1 VIEW: CPT

## 2017-12-18 RX ORDER — BISACODYL 10 MG
10 SUPPOSITORY, RECTAL RECTAL
Status: DISCONTINUED | OUTPATIENT
Start: 2017-12-18 | End: 2017-12-29 | Stop reason: HOSPADM

## 2017-12-18 RX ORDER — MAGNESIUM SULFATE HEPTAHYDRATE 40 MG/ML
4 INJECTION, SOLUTION INTRAVENOUS ONCE
Status: COMPLETED | OUTPATIENT
Start: 2017-12-18 | End: 2017-12-18

## 2017-12-18 RX ORDER — POLYETHYLENE GLYCOL 3350 17 G/17G
1 POWDER, FOR SOLUTION ORAL
Status: DISCONTINUED | OUTPATIENT
Start: 2017-12-18 | End: 2017-12-29 | Stop reason: HOSPADM

## 2017-12-18 RX ORDER — HYDROMORPHONE HYDROCHLORIDE 2 MG/ML
1 INJECTION, SOLUTION INTRAMUSCULAR; INTRAVENOUS; SUBCUTANEOUS ONCE
Status: COMPLETED | OUTPATIENT
Start: 2017-12-18 | End: 2017-12-18

## 2017-12-18 RX ORDER — AMOXICILLIN 250 MG
2 CAPSULE ORAL 2 TIMES DAILY
Status: DISCONTINUED | OUTPATIENT
Start: 2017-12-18 | End: 2017-12-29 | Stop reason: HOSPADM

## 2017-12-18 RX ADMIN — KETOROLAC TROMETHAMINE 15 MG: 30 INJECTION, SOLUTION INTRAMUSCULAR at 05:56

## 2017-12-18 RX ADMIN — KETOROLAC TROMETHAMINE 15 MG: 30 INJECTION, SOLUTION INTRAMUSCULAR at 16:58

## 2017-12-18 RX ADMIN — OXYCODONE HYDROCHLORIDE 10 MG: 10 TABLET ORAL at 00:16

## 2017-12-18 RX ADMIN — HYDROMORPHONE HYDROCHLORIDE 0.5 MG: 2 INJECTION INTRAMUSCULAR; INTRAVENOUS; SUBCUTANEOUS at 22:04

## 2017-12-18 RX ADMIN — SODIUM CHLORIDE: 9 INJECTION, SOLUTION INTRAVENOUS at 20:36

## 2017-12-18 RX ADMIN — OXYCODONE HYDROCHLORIDE 10 MG: 10 TABLET ORAL at 20:36

## 2017-12-18 RX ADMIN — INSULIN HUMAN 4 UNITS: 100 INJECTION, SOLUTION PARENTERAL at 12:00

## 2017-12-18 RX ADMIN — INSULIN HUMAN 1 UNITS: 100 INJECTION, SOLUTION PARENTERAL at 16:59

## 2017-12-18 RX ADMIN — DIBASIC SODIUM PHOSPHATE, MONOBASIC POTASSIUM PHOSPHATE AND MONOBASIC SODIUM PHOSPHATE 1 TABLET: 852; 155; 130 TABLET ORAL at 01:06

## 2017-12-18 RX ADMIN — OXYCODONE HYDROCHLORIDE 10 MG: 10 TABLET ORAL at 15:17

## 2017-12-18 RX ADMIN — INSULIN HUMAN 1 UNITS: 100 INJECTION, SOLUTION PARENTERAL at 06:06

## 2017-12-18 RX ADMIN — ACETAMINOPHEN 1000 MG: 500 TABLET, FILM COATED ORAL at 05:56

## 2017-12-18 RX ADMIN — ACETAMINOPHEN 1000 MG: 500 TABLET, FILM COATED ORAL at 11:25

## 2017-12-18 RX ADMIN — INSULIN HUMAN 2 UNITS: 100 INJECTION, SOLUTION PARENTERAL at 20:40

## 2017-12-18 RX ADMIN — CYANOCOBALAMIN 1000 MCG: 1000 INJECTION, SOLUTION INTRAMUSCULAR; SUBCUTANEOUS at 07:33

## 2017-12-18 RX ADMIN — INSULIN GLARGINE 10 UNITS: 100 INJECTION, SOLUTION SUBCUTANEOUS at 20:40

## 2017-12-18 RX ADMIN — OXYCODONE HYDROCHLORIDE 10 MG: 10 TABLET ORAL at 11:25

## 2017-12-18 RX ADMIN — ALBUTEROL SULFATE 2 PUFF: 90 AEROSOL, METERED RESPIRATORY (INHALATION) at 22:04

## 2017-12-18 RX ADMIN — ACETAMINOPHEN 1000 MG: 500 TABLET, FILM COATED ORAL at 00:15

## 2017-12-18 RX ADMIN — HYDROMORPHONE HYDROCHLORIDE 0.5 MG: 2 INJECTION INTRAMUSCULAR; INTRAVENOUS; SUBCUTANEOUS at 10:36

## 2017-12-18 RX ADMIN — HYDROMORPHONE HYDROCHLORIDE 0.5 MG: 2 INJECTION INTRAMUSCULAR; INTRAVENOUS; SUBCUTANEOUS at 03:03

## 2017-12-18 RX ADMIN — KETOROLAC TROMETHAMINE 15 MG: 30 INJECTION, SOLUTION INTRAMUSCULAR at 11:25

## 2017-12-18 RX ADMIN — KETOROLAC TROMETHAMINE 15 MG: 30 INJECTION, SOLUTION INTRAMUSCULAR at 00:15

## 2017-12-18 RX ADMIN — HYDROMORPHONE HYDROCHLORIDE 0.5 MG: 2 INJECTION INTRAMUSCULAR; INTRAVENOUS; SUBCUTANEOUS at 07:33

## 2017-12-18 RX ADMIN — HYDROMORPHONE HYDROCHLORIDE 1 MG: 2 INJECTION INTRAMUSCULAR; INTRAVENOUS; SUBCUTANEOUS at 15:49

## 2017-12-18 RX ADMIN — MAGNESIUM SULFATE IN WATER 4 G: 40 INJECTION, SOLUTION INTRAVENOUS at 07:34

## 2017-12-18 ASSESSMENT — ENCOUNTER SYMPTOMS
ROS GI COMMENTS: ABDOMINAL DISTENSION
BLURRED VISION: 0
SHORTNESS OF BREATH: 0
SPEECH CHANGE: 0
VOMITING: 0
SENSORY CHANGE: 0
CLAUDICATION: 0
SPUTUM PRODUCTION: 0
DOUBLE VISION: 0
EYE PAIN: 0
MYALGIAS: 0
WEAKNESS: 0
HEADACHES: 0
HEARTBURN: 0
ABDOMINAL PAIN: 0
NAUSEA: 0
SINUS PAIN: 0
FEVER: 0
DEPRESSION: 0
DIZZINESS: 0
EYE DISCHARGE: 0
STRIDOR: 0
PALPITATIONS: 0
BRUISES/BLEEDS EASILY: 1
CHILLS: 0
COUGH: 0
ORTHOPNEA: 0
SORE THROAT: 0
CONSTIPATION: 1
BACK PAIN: 0
NECK PAIN: 0

## 2017-12-18 ASSESSMENT — LIFESTYLE VARIABLES: SUBSTANCE_ABUSE: 0

## 2017-12-18 ASSESSMENT — PAIN SCALES - GENERAL
PAINLEVEL_OUTOF10: 7
PAINLEVEL_OUTOF10: 4
PAINLEVEL_OUTOF10: 5
PAINLEVEL_OUTOF10: 5
PAINLEVEL_OUTOF10: 4
PAINLEVEL_OUTOF10: 7
PAINLEVEL_OUTOF10: 6
PAINLEVEL_OUTOF10: 7
PAINLEVEL_OUTOF10: 8
PAINLEVEL_OUTOF10: 7
PAINLEVEL_OUTOF10: 7
PAINLEVEL_OUTOF10: 8

## 2017-12-18 NOTE — CARE PLAN
Problem: Safety  Goal: Will remain free from falls  Outcome: PROGRESSING AS EXPECTED  Elaine Lindsay Fall Risk Assessment:     Last Known Fall: No falls  Mobility: Dizziness/generalized weakness  Medications: Cardiovascular or central nervous system meds  Mental Status/LOC/Awareness: Awake, alert, and oriented to date, place, and person  Toileting Needs: Use of catheters or diversion devices  Volume/Electrolyte Status: Use of IV fluids/tube feeds  Communication/Sensory: Visual (Glasses)/hearing deficit  Behavior: Appropriate behavior  Elaine Lindsay Fall Risk Total: 9  Fall Risk Level: LOW RISK    Universal Fall Precautions:  call light/belongings in reach, bed in low position and locked, siderails up x 2, use non-slip footwear, adequate lighting, clutter free and spill free environment, educate on level of risk, educate to call for assistance    Fall Risk Level Interventions:   TRIAL ([employer], NEURO, MED DANYELL 5) Low Fall Risk Interventions  Place yellow fall risk ID band on patient: verified  Provide patient/family education based on risk assessment: completed  Educate patient/family to call staff for assistance when getting out of bed: completed  Place fall precaution signage outside patient door: verifiedTRIAL ([employer], NEURO, MED DANYELL 5) Moderate Fall Risk Interventions  Place yellow fall risk ID band on patient: refused  Provide patient/family education based on risk assessment : completed  Educate patient/family to call staff for assistance when getting out of bed: completed  Place fall precaution signage outside patient door: verified  Utilize bed/chair fall alarm: verified     Patient Specific Interventions:     Medication: review medications with patient and family, limit combination of prn medications and assess need for orthostatic hypotension evaluation  Mental Status/LOC/Awareness: not applicable  Toileting: instruct patient/family on the use of grab bars, instruct patient/family on the need to call for  assistance when toileting and toilet prior to giving pain medications  Volume/Electrolyte Status: monitor blood sugars and maintain appropriate blood sugar levels if diabetic, monitor abnormal lab values, ensure IV fluids are appropriate and teach patients to dangle before rising if hypotensive  Communication/Sensory: update plan of care on whiteboard, ensure proper positioning when transferrng/ambulating and ensure patient has glasses/contacts and hearing aids/dentures  Behavioral: encourage patient to voice feelings and engage patient in daily activities  Mobility: schedule physical activity throughout the day, provide comfort measures during transport, dangle prior to standing, ensure bed is locked and in lowest position, provide appropriate assistive device and instruct patient to exit bed on their strongest side    Problem: Pain Management  Goal: Pain level will decrease to patient's comfort goal  Patient is proactive in pain management. Discussed comfort goals, pain management options, and nonpharmacologic alternatives for pain management with patient. Verbalized understanding. Set comfort goals. Compliant with pain interventions.

## 2017-12-18 NOTE — PROGRESS NOTES
Dressing removed from wound, pictures taken and uploaded to [facility]. Wound RN at bedside, per RN would like to d/w Dr. Deluna possibility of placing vac now. Wound RN to follow up with answer. Wound loosely covered with roll gauze in the mean time.

## 2017-12-18 NOTE — OP REPORT
DATE OF SERVICE:  12/17/2017    PREOPERATIVE DIAGNOSIS:  Left posterior thigh wound.    POSTOPERATIVE DIAGNOSIS:  Left posterior thigh wound.    PROCEDURE:  Wound debridement, 25x20 cm area.    SURGEON:  Enrique Deluna MD    ASSISTANT:  None.    ANESTHESIA:  General LMA anesthesia.    ESTIMATED BLOOD LOSS:  50 mL.    SPECIMENS:  Wound culture.    COMPLICATIONS:  None.    CONDITION:  Stable.    INDICATIONS FOR PROCEDURE:  This is a 67-year-old male who was consulted today   by the medical service for his posterior thigh wound.  He apparently has had   an infected hematoma.  He has a large black eschar over foul smelling wound   indicating likely infection and clearly in need of debridement.  Risks,   benefits, and alternatives of surgery were explained to him and we took him to   the OR as soon as he was n.p.o. to debride the wound.    OPERATIVE FINDINGS:  A 20x25 cm eschar of black dead skin was debrided sharply   underlying necrotic dead fatty soft tissues debrided sharply as well, total   area 20x25 cm.  Area was cleaned and dry dressing and clean dressing placed.    OPERATIVE TECHNIQUE:  After informed consent was obtained, the patient was   taken to the operating room, placed in the supine position.  After adequate   LMA anesthesia was achieved, he was placed in lithotomy position.  The left   thigh, posterior thigh was prepped and draped in sterile fashion.  We began by   using a 10-blade scalpel to debride the dead eschar off of the surface of the   wound, which produced foul smelling purulent and necrotic tissues.  Once the   eschar was removed, we sharply debrided away the wound over about a 20x25 cm   area removing as much visible that tissue as possible.  There was brisk   bleeding from all points of the wound at this point.  The wound was cleaned   with 6 L of irrigation with a Pulsavac device.  There was some undermining   proximal and posteriorly, which was cleaned out as well.  With this  completed,   the wound was treated with a Betadine-soaked Kerlix.  We then wrapped ABD and   Kerlix around the wound and the procedure was concluded.  The patient was   returned to the PACU in stable condition.  All instruments counts were correct   at the end of the procedure.       ____________________________________     MD HANNAH Banerjee / AVILA    DD:  12/17/2017 18:24:48  DT:  12/17/2017 18:48:53    D#:  [id number]  Job#:  [id number]

## 2017-12-18 NOTE — PROGRESS NOTES
Assumed care of patient at bedside report from day shift RN. Patient currently in PACU. Updated on POC.

## 2017-12-18 NOTE — CARE PLAN
Problem: Nutritional:  Goal: Achieve adequate nutritional intake  Patient will consume 50% of meals   Outcome: MET Date Met: 12/18/17    Intervention: Advance diet as tolerated    Pt is now on a Regular/Diabetic diet.   ADL documentation shows that pt consistently consumes % of meals.     RD available prn

## 2017-12-18 NOTE — DOCUMENTATION QUERY
DOCUMENTATION QUERY    PROVIDERS: Please select “Cosign w/ note”to reply to query.    To better represent the severity of illness of your patient, please review the following information and exercise your independent professional judgment in responding to this query.     Documentation in the Operative Report reads:  eschar of black dead skin was debrided sharply, underlying necrotic dead fatty soft tissues debrided sharply as well total area 20x25 cm.  Based upon the clinical findings, risk factors, and treatment, can this procedure note  be further specified?    • The deepest level debrided is _________________________  • Unable to determine      The medical record reflects the following:   Clinical Findings Per OP Report-  eschar of black dead skin was debrided sharply, underlying necrotic dead fatty soft tissues debrided sharply as well total area 20x25 cm.   Treatment Debridement of infected wound s/p infected hematoma   Risk Factors DM, infected hematoma   Location within medical record OP report     Thank you,  Emani Salazar RN, CCDS   Sr. Clinical    - [phone]

## 2017-12-18 NOTE — PROGRESS NOTES
Patient back on floor from PACU. Family currently at bedside. Patient A & O x 4 with pain 7/10 which is at comfort level. Updated patient and wife on evening POC. Call light within reach. Fall precautions in place. Leg dressing is showing minimal serosanguinous drainage. Leg is elevated to remove pressure from surgical site. Bed is locked and in lowest position. All questions answered. No needs indicated at this time.

## 2017-12-18 NOTE — PROGRESS NOTES
I have personally evaluated and examined this patient and agree with the findings as documented in the resident note except as documented in this attending note.  I am actively involved in the patient's care.       Complex medicine case  Tele,continous pulse ox,IS  2 IV Access all the time   Monitor cbc with diff,CMP,Mag,PO4,PT/INR/PTT  Appreciated Surgery help,FU Rec  Watch for acute abdomen,watch for bleed/clot,Pt has IVC Filter  Recent LE venous doppler  showed B/L extensive DVT in both extremities. Acute and chronic.Holding anticoag for now as Pt has hematuria and recent evacuation of  blood clot, fulguration of bladder perforation sites and biopsy of posterior bladder wall.Left posterior thigh wound s/p Wound debridement, 25x20 cm area on 12/17 (Also High risk of bleed in that wound),Monitor for compartment and  Neurovascular status closely  Discussed about  holding anticoagulation to Pt and his wife and answered all questions and Pt and his wife  agreed for continue holding anticoagulation,Pt and his wife understands the risks and benefits of holding anticoagulation  Low threshold for CT Abd/Pelvis,EKG,Trop,CXR and ABG  Keep K >4 and Mag >2,Continue to Optimize elctrolytes  Discussed with RN   Prognosis guarded    Resident note to follow

## 2017-12-18 NOTE — PROGRESS NOTES
Received report from nightshift RN, assumed care of patient. Patient is A&O x 4, no bed alarm indicated. Patient educated on importance of calling if in need of assistance. Verbalizes understanding. Patient experiencing pain at this time, will medicate. Patient updated on plan of care, voices no concerns at this time. Will continue to monitor for safety and comfort.

## 2017-12-18 NOTE — OR NURSING
Pt A&O. VSS. Pt weaned to RA however 2 L NC applied following pain medication administration. Pt reports pain tolerable at 8/10 and declines further pain medication.   Kristyn CDI.    Report called to receiving RN prior to shift change. Will provide bedside updates on arrival to unit.

## 2017-12-18 NOTE — PROGRESS NOTES
12/18  Pt seen and examined this am.  Sitting up in bed, dressing changed this am, pain controlled, tolerating PO.      Wound dressing c/d/i  Extremity warm and well perfused  WBC 4.8, otherwise lytes wnl this am    Ongoing wound care with wet-dry dressings as is being done.  Likely he will need a couple more days of aggressive wound care.  Hopefully he can be transitioned to a wound VAC later this week.  Ultimately I think he is going to need plastic surgery to see him given large extent of wound for consideration of tissue flap/grafting, but likely wound not ready for such intervention quite yet.    Ok to ambulate, shower.  Continue IV Abx, await cultures.

## 2017-12-19 ENCOUNTER — APPOINTMENT (OUTPATIENT)
Dept: RADIOLOGY | Facility: MEDICAL CENTER | Age: 67
DRG: 668 | End: 2017-12-19
Attending: STUDENT IN AN ORGANIZED HEALTH CARE EDUCATION/TRAINING PROGRAM
Payer: COMMERCIAL

## 2017-12-19 PROBLEM — R06.02 SHORTNESS OF BREATH: Status: ACTIVE | Noted: 2017-12-19

## 2017-12-19 LAB
ALBUMIN SERPL BCP-MCNC: 2.2 G/DL (ref 3.2–4.9)
ALBUMIN/GLOB SERPL: 0.7 G/DL
ALP SERPL-CCNC: 59 U/L (ref 30–99)
ALT SERPL-CCNC: 6 U/L (ref 2–50)
ANION GAP SERPL CALC-SCNC: 4 MMOL/L (ref 0–11.9)
AST SERPL-CCNC: 12 U/L (ref 12–45)
BACTERIA TISS AEROBE CULT: ABNORMAL
BASOPHILS # BLD AUTO: 0.3 % (ref 0–1.8)
BASOPHILS # BLD: 0.02 K/UL (ref 0–0.12)
BILIRUB SERPL-MCNC: 0.5 MG/DL (ref 0.1–1.5)
BUN SERPL-MCNC: 8 MG/DL (ref 8–22)
CALCIUM SERPL-MCNC: 7.5 MG/DL (ref 8.5–10.5)
CHLORIDE SERPL-SCNC: 102 MMOL/L (ref 96–112)
CO2 SERPL-SCNC: 32 MMOL/L (ref 20–33)
CREAT SERPL-MCNC: 0.39 MG/DL (ref 0.5–1.4)
EOSINOPHIL # BLD AUTO: 0.38 K/UL (ref 0–0.51)
EOSINOPHIL NFR BLD: 5.3 % (ref 0–6.9)
ERYTHROCYTE [DISTWIDTH] IN BLOOD BY AUTOMATED COUNT: 59.8 FL (ref 35.9–50)
GFR SERPL CREATININE-BSD FRML MDRD: >60 ML/MIN/1.73 M 2
GLOBULIN SER CALC-MCNC: 3 G/DL (ref 1.9–3.5)
GLUCOSE BLD-MCNC: 193 MG/DL (ref 65–99)
GLUCOSE BLD-MCNC: 211 MG/DL (ref 65–99)
GLUCOSE BLD-MCNC: 233 MG/DL (ref 65–99)
GLUCOSE BLD-MCNC: 317 MG/DL (ref 65–99)
GLUCOSE BLD-MCNC: 397 MG/DL (ref 65–99)
GLUCOSE SERPL-MCNC: 212 MG/DL (ref 65–99)
GRAM STN SPEC: ABNORMAL
HCT VFR BLD AUTO: 26.2 % (ref 42–52)
HGB BLD-MCNC: 8.5 G/DL (ref 14–18)
IMM GRANULOCYTES # BLD AUTO: 0.03 K/UL (ref 0–0.11)
IMM GRANULOCYTES NFR BLD AUTO: 0.4 % (ref 0–0.9)
LYMPHOCYTES # BLD AUTO: 0.65 K/UL (ref 1–4.8)
LYMPHOCYTES NFR BLD: 9 % (ref 22–41)
MAGNESIUM SERPL-MCNC: 1.4 MG/DL (ref 1.5–2.5)
MCH RBC QN AUTO: 33.5 PG (ref 27–33)
MCHC RBC AUTO-ENTMCNC: 32.4 G/DL (ref 33.7–35.3)
MCV RBC AUTO: 103.1 FL (ref 81.4–97.8)
MONOCYTES # BLD AUTO: 0.28 K/UL (ref 0–0.85)
MONOCYTES NFR BLD AUTO: 3.9 % (ref 0–13.4)
NEUTROPHILS # BLD AUTO: 5.83 K/UL (ref 1.82–7.42)
NEUTROPHILS NFR BLD: 81.1 % (ref 44–72)
NRBC # BLD AUTO: 0 K/UL
NRBC BLD-RTO: 0 /100 WBC
PHOSPHATE SERPL-MCNC: 2.7 MG/DL (ref 2.5–4.5)
PLATELET # BLD AUTO: 162 K/UL (ref 164–446)
PMV BLD AUTO: 9.5 FL (ref 9–12.9)
POTASSIUM SERPL-SCNC: 4 MMOL/L (ref 3.6–5.5)
PROT SERPL-MCNC: 5.2 G/DL (ref 6–8.2)
RBC # BLD AUTO: 2.54 M/UL (ref 4.7–6.1)
SIGNIFICANT IND 70042: ABNORMAL
SITE SITE: ABNORMAL
SODIUM SERPL-SCNC: 138 MMOL/L (ref 135–145)
SOURCE SOURCE: ABNORMAL
WBC # BLD AUTO: 7.2 K/UL (ref 4.8–10.8)

## 2017-12-19 PROCEDURE — 700117 HCHG RX CONTRAST REV CODE 255: Performed by: STUDENT IN AN ORGANIZED HEALTH CARE EDUCATION/TRAINING PROGRAM

## 2017-12-19 PROCEDURE — 700101 HCHG RX REV CODE 250: Performed by: STUDENT IN AN ORGANIZED HEALTH CARE EDUCATION/TRAINING PROGRAM

## 2017-12-19 PROCEDURE — 700111 HCHG RX REV CODE 636 W/ 250 OVERRIDE (IP): Performed by: SURGERY

## 2017-12-19 PROCEDURE — 770020 HCHG ROOM/CARE - TELE (206)

## 2017-12-19 PROCEDURE — 36415 COLL VENOUS BLD VENIPUNCTURE: CPT

## 2017-12-19 PROCEDURE — 93010 ELECTROCARDIOGRAM REPORT: CPT | Performed by: INTERNAL MEDICINE

## 2017-12-19 PROCEDURE — 94760 N-INVAS EAR/PLS OXIMETRY 1: CPT

## 2017-12-19 PROCEDURE — G8988 SELF CARE GOAL STATUS: HCPCS | Mod: CI

## 2017-12-19 PROCEDURE — 94640 AIRWAY INHALATION TREATMENT: CPT

## 2017-12-19 PROCEDURE — 97165 OT EVAL LOW COMPLEX 30 MIN: CPT

## 2017-12-19 PROCEDURE — 93005 ELECTROCARDIOGRAM TRACING: CPT | Performed by: STUDENT IN AN ORGANIZED HEALTH CARE EDUCATION/TRAINING PROGRAM

## 2017-12-19 PROCEDURE — 700105 HCHG RX REV CODE 258: Performed by: SURGERY

## 2017-12-19 PROCEDURE — G8979 MOBILITY GOAL STATUS: HCPCS | Mod: CI

## 2017-12-19 PROCEDURE — 700102 HCHG RX REV CODE 250 W/ 637 OVERRIDE(OP): Performed by: STUDENT IN AN ORGANIZED HEALTH CARE EDUCATION/TRAINING PROGRAM

## 2017-12-19 PROCEDURE — 71275 CT ANGIOGRAPHY CHEST: CPT

## 2017-12-19 PROCEDURE — 97162 PT EVAL MOD COMPLEX 30 MIN: CPT

## 2017-12-19 PROCEDURE — 700111 HCHG RX REV CODE 636 W/ 250 OVERRIDE (IP): Performed by: STUDENT IN AN ORGANIZED HEALTH CARE EDUCATION/TRAINING PROGRAM

## 2017-12-19 PROCEDURE — 85025 COMPLETE CBC W/AUTO DIFF WBC: CPT

## 2017-12-19 PROCEDURE — A9270 NON-COVERED ITEM OR SERVICE: HCPCS | Performed by: SURGERY

## 2017-12-19 PROCEDURE — G8980 MOBILITY D/C STATUS: HCPCS | Mod: CI

## 2017-12-19 PROCEDURE — G8978 MOBILITY CURRENT STATUS: HCPCS | Mod: CI

## 2017-12-19 PROCEDURE — G8989 SELF CARE D/C STATUS: HCPCS | Mod: CI

## 2017-12-19 PROCEDURE — 99233 SBSQ HOSP IP/OBS HIGH 50: CPT | Mod: GC | Performed by: INTERNAL MEDICINE

## 2017-12-19 PROCEDURE — 83735 ASSAY OF MAGNESIUM: CPT

## 2017-12-19 PROCEDURE — G8987 SELF CARE CURRENT STATUS: HCPCS | Mod: CI

## 2017-12-19 PROCEDURE — 700105 HCHG RX REV CODE 258: Performed by: STUDENT IN AN ORGANIZED HEALTH CARE EDUCATION/TRAINING PROGRAM

## 2017-12-19 PROCEDURE — 84100 ASSAY OF PHOSPHORUS: CPT

## 2017-12-19 PROCEDURE — 82962 GLUCOSE BLOOD TEST: CPT | Mod: 91

## 2017-12-19 PROCEDURE — 700102 HCHG RX REV CODE 250 W/ 637 OVERRIDE(OP): Performed by: SURGERY

## 2017-12-19 PROCEDURE — A9270 NON-COVERED ITEM OR SERVICE: HCPCS | Performed by: STUDENT IN AN ORGANIZED HEALTH CARE EDUCATION/TRAINING PROGRAM

## 2017-12-19 PROCEDURE — 80053 COMPREHEN METABOLIC PANEL: CPT

## 2017-12-19 RX ORDER — ALPRAZOLAM 0.25 MG/1
0.25 TABLET ORAL
Status: COMPLETED | OUTPATIENT
Start: 2017-12-19 | End: 2017-12-21

## 2017-12-19 RX ORDER — IPRATROPIUM BROMIDE AND ALBUTEROL SULFATE 2.5; .5 MG/3ML; MG/3ML
3 SOLUTION RESPIRATORY (INHALATION)
Status: DISCONTINUED | OUTPATIENT
Start: 2017-12-19 | End: 2017-12-20

## 2017-12-19 RX ORDER — MAGNESIUM SULFATE HEPTAHYDRATE 40 MG/ML
4 INJECTION, SOLUTION INTRAVENOUS ONCE
Status: COMPLETED | OUTPATIENT
Start: 2017-12-19 | End: 2017-12-19

## 2017-12-19 RX ORDER — METHYLPREDNISOLONE SODIUM SUCCINATE 125 MG/2ML
62.5 INJECTION, POWDER, LYOPHILIZED, FOR SOLUTION INTRAMUSCULAR; INTRAVENOUS ONCE
Status: COMPLETED | OUTPATIENT
Start: 2017-12-19 | End: 2017-12-19

## 2017-12-19 RX ORDER — LIDOCAINE HYDROCHLORIDE 20 MG/ML
20 INJECTION, SOLUTION INFILTRATION; PERINEURAL ONCE
Status: DISPENSED | OUTPATIENT
Start: 2017-12-19 | End: 2017-12-20

## 2017-12-19 RX ORDER — PREDNISONE 20 MG/1
40 TABLET ORAL DAILY
Status: DISCONTINUED | OUTPATIENT
Start: 2017-12-20 | End: 2017-12-20

## 2017-12-19 RX ORDER — INSULIN GLARGINE 100 [IU]/ML
15 INJECTION, SOLUTION SUBCUTANEOUS EVERY EVENING
Status: DISCONTINUED | OUTPATIENT
Start: 2017-12-19 | End: 2017-12-21

## 2017-12-19 RX ORDER — SODIUM CHLORIDE 9 MG/ML
500 INJECTION, SOLUTION INTRAVENOUS ONCE
Status: COMPLETED | OUTPATIENT
Start: 2017-12-19 | End: 2017-12-19

## 2017-12-19 RX ADMIN — ACETAMINOPHEN 1000 MG: 500 TABLET, FILM COATED ORAL at 18:22

## 2017-12-19 RX ADMIN — SODIUM CHLORIDE: 9 INJECTION, SOLUTION INTRAVENOUS at 20:00

## 2017-12-19 RX ADMIN — IPRATROPIUM BROMIDE AND ALBUTEROL SULFATE 3 ML: .5; 3 SOLUTION RESPIRATORY (INHALATION) at 20:18

## 2017-12-19 RX ADMIN — ALBUTEROL SULFATE 2 PUFF: 90 AEROSOL, METERED RESPIRATORY (INHALATION) at 01:40

## 2017-12-19 RX ADMIN — METHYLPREDNISOLONE SODIUM SUCCINATE 62.5 MG: 125 INJECTION, POWDER, FOR SOLUTION INTRAMUSCULAR; INTRAVENOUS at 09:21

## 2017-12-19 RX ADMIN — MAGNESIUM HYDROXIDE 30 ML: 400 SUSPENSION ORAL at 11:58

## 2017-12-19 RX ADMIN — MAGNESIUM GLUCONATE 500 MG ORAL TABLET 400 MG: 500 TABLET ORAL at 20:00

## 2017-12-19 RX ADMIN — INSULIN HUMAN 5 UNITS: 100 INJECTION, SOLUTION PARENTERAL at 17:14

## 2017-12-19 RX ADMIN — CYANOCOBALAMIN 1000 MCG: 1000 INJECTION, SOLUTION INTRAMUSCULAR; SUBCUTANEOUS at 07:29

## 2017-12-19 RX ADMIN — KETOROLAC TROMETHAMINE 15 MG: 30 INJECTION, SOLUTION INTRAMUSCULAR at 12:03

## 2017-12-19 RX ADMIN — ACETAMINOPHEN 1000 MG: 500 TABLET, FILM COATED ORAL at 00:28

## 2017-12-19 RX ADMIN — KETOROLAC TROMETHAMINE 15 MG: 30 INJECTION, SOLUTION INTRAMUSCULAR at 18:22

## 2017-12-19 RX ADMIN — INSULIN GLARGINE 15 UNITS: 100 INJECTION, SOLUTION SUBCUTANEOUS at 20:01

## 2017-12-19 RX ADMIN — ALBUTEROL SULFATE 2 PUFF: 90 AEROSOL, METERED RESPIRATORY (INHALATION) at 13:35

## 2017-12-19 RX ADMIN — ACETAMINOPHEN 1000 MG: 500 TABLET, FILM COATED ORAL at 11:15

## 2017-12-19 RX ADMIN — KETOROLAC TROMETHAMINE 15 MG: 30 INJECTION, SOLUTION INTRAMUSCULAR at 06:23

## 2017-12-19 RX ADMIN — INSULIN HUMAN 4 UNITS: 100 INJECTION, SOLUTION PARENTERAL at 11:16

## 2017-12-19 RX ADMIN — MAGNESIUM SULFATE IN WATER 4 G: 40 INJECTION, SOLUTION INTRAVENOUS at 08:05

## 2017-12-19 RX ADMIN — OXYCODONE HYDROCHLORIDE 10 MG: 10 TABLET ORAL at 05:03

## 2017-12-19 RX ADMIN — OXYCODONE HYDROCHLORIDE 10 MG: 10 TABLET ORAL at 11:15

## 2017-12-19 RX ADMIN — ALBUTEROL SULFATE 2.5 MG: 2.5 SOLUTION RESPIRATORY (INHALATION) at 09:11

## 2017-12-19 RX ADMIN — IOHEXOL 75 ML: 350 INJECTION, SOLUTION INTRAVENOUS at 10:10

## 2017-12-19 RX ADMIN — OXYCODONE HYDROCHLORIDE 10 MG: 10 TABLET ORAL at 01:39

## 2017-12-19 RX ADMIN — OXYCODONE HYDROCHLORIDE 10 MG: 10 TABLET ORAL at 20:00

## 2017-12-19 RX ADMIN — STANDARDIZED SENNA CONCENTRATE AND DOCUSATE SODIUM 2 TABLET: 8.6; 5 TABLET, FILM COATED ORAL at 20:00

## 2017-12-19 RX ADMIN — IPRATROPIUM BROMIDE AND ALBUTEROL SULFATE 3 ML: .5; 3 SOLUTION RESPIRATORY (INHALATION) at 23:04

## 2017-12-19 RX ADMIN — IPRATROPIUM BROMIDE AND ALBUTEROL SULFATE 3 ML: .5; 3 SOLUTION RESPIRATORY (INHALATION) at 14:07

## 2017-12-19 RX ADMIN — INSULIN HUMAN 5 UNITS: 100 INJECTION, SOLUTION PARENTERAL at 20:01

## 2017-12-19 RX ADMIN — MAGNESIUM GLUCONATE 500 MG ORAL TABLET 400 MG: 500 TABLET ORAL at 11:15

## 2017-12-19 RX ADMIN — INSULIN HUMAN 2 UNITS: 100 INJECTION, SOLUTION PARENTERAL at 06:24

## 2017-12-19 RX ADMIN — KETOROLAC TROMETHAMINE 15 MG: 30 INJECTION, SOLUTION INTRAMUSCULAR at 00:28

## 2017-12-19 RX ADMIN — ACETAMINOPHEN 1000 MG: 500 TABLET, FILM COATED ORAL at 05:03

## 2017-12-19 RX ADMIN — SODIUM CHLORIDE 500 ML: 9 INJECTION, SOLUTION INTRAVENOUS at 09:11

## 2017-12-19 RX ADMIN — ALBUTEROL SULFATE 2 PUFF: 90 AEROSOL, METERED RESPIRATORY (INHALATION) at 18:45

## 2017-12-19 ASSESSMENT — ENCOUNTER SYMPTOMS
BRUISES/BLEEDS EASILY: 1
HEADACHES: 0
CHILLS: 0
SORE THROAT: 0
DIZZINESS: 0
CONSTIPATION: 1
COUGH: 0
BLURRED VISION: 0
FEVER: 0
CLAUDICATION: 0
PALPITATIONS: 0
SHORTNESS OF BREATH: 0
DOUBLE VISION: 0
BACK PAIN: 0
SPEECH CHANGE: 0
SPUTUM PRODUCTION: 0
NECK PAIN: 0
EYE DISCHARGE: 0
MYALGIAS: 0
HEARTBURN: 0
EYE PAIN: 0
DEPRESSION: 0
VOMITING: 0
SINUS PAIN: 0
NAUSEA: 0
ROS GI COMMENTS: ABDOMINAL DISTENSION
STRIDOR: 0
WEAKNESS: 0
ORTHOPNEA: 0
SENSORY CHANGE: 0
ABDOMINAL PAIN: 0

## 2017-12-19 ASSESSMENT — COGNITIVE AND FUNCTIONAL STATUS - GENERAL
MOBILITY SCORE: 24
DRESSING REGULAR UPPER BODY CLOTHING: A LITTLE
HELP NEEDED FOR BATHING: A LITTLE
SUGGESTED CMS G CODE MODIFIER MOBILITY: CH
SUGGESTED CMS G CODE MODIFIER DAILY ACTIVITY: CJ
DAILY ACTIVITIY SCORE: 20
TOILETING: A LITTLE
DRESSING REGULAR LOWER BODY CLOTHING: A LITTLE

## 2017-12-19 ASSESSMENT — PAIN SCALES - GENERAL
PAINLEVEL_OUTOF10: 9
PAINLEVEL_OUTOF10: 7

## 2017-12-19 ASSESSMENT — GAIT ASSESSMENTS
GAIT LEVEL OF ASSIST: SUPERVISED
ASSISTIVE DEVICE: FRONT WHEEL WALKER
DISTANCE (FEET): 200

## 2017-12-19 ASSESSMENT — LIFESTYLE VARIABLES: SUBSTANCE_ABUSE: 0

## 2017-12-19 ASSESSMENT — ACTIVITIES OF DAILY LIVING (ADL): TOILETING: INDEPENDENT

## 2017-12-19 NOTE — THERAPY
"Physical Therapy Evaluation completed.   Bed Mobility:  Supine to Sit: Supervised  Transfers: Sit to Stand: Supervised  Gait: Level Of Assist: Supervised with Front-Wheel Walker       Plan of Care: Patient with no further skilled PT needs in the acute care setting at this time  Discharge Recommendations: Equipment: Front-Wheel Walker. Post-acute therapy Discharge to home with outpatient or home health for additional skilled therapy services.  Pt presents with complex medical problems including bladder rupture and bilateral hydronephrosis and posterior thigh wound with VAC. Today, pt tolerated bed mobility, transfers and ambulation well. Pt will need daily walking using FWW 1-2x per day with nsg assist. Pt will have help at home from wife as needed. No further inpt PT needs.     See \"Rehab Therapy-Acute\" Patient Summary Report for complete documentation.     "

## 2017-12-19 NOTE — CARE PLAN
Problem: Oxygenation:  Goal: Maintain adequate oxygenation dependent on patient condition  Outcome: PROGRESSING AS EXPECTED      Problem: Bronchoconstriction:  Goal: Improve in air movement and diminished wheezing  Outcome: PROGRESSING AS EXPECTED

## 2017-12-19 NOTE — PROGRESS NOTES
Elaine Lindsay Fall Risk Assessment:     Last Known Fall: No falls  Mobility: Dizziness/generalized weakness  Medications: Cardiovascular or central nervous system meds  Mental Status/LOC/Awareness: Awake, alert, and oriented to date, place, and person  Toileting Needs: Use of catheters or diversion devices  Volume/Electrolyte Status: Use of IV fluids/tube feeds  Communication/Sensory: Visual (Glasses)/hearing deficit  Behavior: Appropriate behavior  Elaine Lindsay Fall Risk Total: 9  Fall Risk Level: LOW RISK    Universal Fall Precautions:  bed in low position and locked, call light/belongings in reach, wheelchairs and assistive devices out of sight, siderails up x 2, clutter free and spill free environment, adequate lighting, use non-slip footwear, educate on level of risk, educate to call for assistance    Fall Risk Level Interventions:   TRIAL ([employer], NEURO, MED DANYELL 5) Low Fall Risk Interventions  Place yellow fall risk ID band on patient: verified  Provide patient/family education based on risk assessment: completed  Educate patient/family to call staff for assistance when getting out of bed: completed  Place fall precaution signage outside patient door: verifiedTRIAL ([employer], NEURO, MED DANYELL 5) Moderate Fall Risk Interventions  Place yellow fall risk ID band on patient: refused  Provide patient/family education based on risk assessment : completed  Educate patient/family to call staff for assistance when getting out of bed: completed  Place fall precaution signage outside patient door: verified  Utilize bed/chair fall alarm: verified     Patient Specific Interventions:     Medication: limit combination of prn medications  Mental Status/LOC/Awareness: check on patient hourly  Toileting: instruct male patients prone to dizziness to void while sitting  Volume/Electrolyte Status: monitor abnormal lab values  Communication/Sensory: ensure proper positioning when transferrng/ambulating  Behavioral: administer  medication as ordered  Mobility: ensure bed is locked and in lowest position

## 2017-12-19 NOTE — PROGRESS NOTES
Internal Medicine Interval Note  Note Author: Lilo Hernandes M.D.     Name Niels Lima     1950   Age/Sex 67 y.o. male   MRN [id number]   Code Status Full code     After 5PM or if no immediate response to page, please call for cross-coverage  Attending/Team: Dr. Gomez/ Fco See Patient List for primary contact information  Call [phone] to page    1st Call - Day Intern (R1):   Dr. Hernandes 2nd Call - Day Sr. Resident (R2/R3):   Dr. Greer         Reason for interval visit  (Principal Problem)   Ileus  Wound on left lower extremity  Hydronephrosis  DVT    Interval Problem Daily Status Update  (24 hours)   - Patient underwent wound debridement yesterday. Dressing CDI. Pulses intact, patient moving extremity. Pain control and glucose control  - Acute DVT in bilateral lower extremities.Holding off anticoagulation at this time due to heamturia. He has an IVC filter in place. Patient will eventually need to resume anticoagulation. Discussed with patient and his wife the risks and benefits of holding off anticoagulation. They are in agreement with the plan.  - Ileus: Patient tolerating diet. Last bowel movement yesterday, passing gas. Bowel protocol  - Bilateral nephrostomy tubes and kaur catheter in place. Creatinine normal. As per urology, patient will leave with kaur and nephrostomy tubes on discharge          Review of Systems   Constitutional: Negative for chills, fever and malaise/fatigue.   HENT: Negative for sinus pain and sore throat.    Eyes: Negative for blurred vision, double vision, pain and discharge.   Respiratory: Negative for cough, sputum production, shortness of breath and stridor.    Cardiovascular: Negative for chest pain, palpitations, orthopnea, claudication and leg swelling.   Gastrointestinal: Positive for constipation. Negative for abdominal pain, heartburn, nausea and vomiting.        Abdominal distension   Genitourinary: Negative for dysuria, frequency, hematuria and  urgency.        Bladder spasms   Musculoskeletal: Negative for back pain, myalgias and neck pain.   Skin: Negative for itching and rash.   Neurological: Negative for dizziness, sensory change, speech change, weakness and headaches.   Endo/Heme/Allergies: Bruises/bleeds easily.   Psychiatric/Behavioral: Negative for depression, substance abuse and suicidal ideas.       Consultants/Specialty  Urology  Nephrology  IR  surgery    Disposition  inpatient    Quality Measures    Reviewed items::  Labs reviewed and Medications reviewed  Pacheco catheter::  Urinary Tract Retention or Urinary Tract Obstruction  DVT prophylaxis pharmacological::  Contraindicated - High bleeding risk  DVT prophylaxis - mechanical:  SCDs  Ulcer Prophylaxis::  Not indicated          Physical Exam       Vitals:    12/18/17 0323 12/18/17 0711 12/18/17 1223 12/18/17 1536   BP: 120/69 137/74 154/84 151/85   Pulse: 64 69 75 78   Resp: 20 18 18 18   Temp: 36.1 °C (96.9 °F) 36.6 °C (97.9 °F) 37 °C (98.6 °F) 37 °C (98.6 °F)   SpO2: 91% 92% 94% 93%   Weight:       Height:         Body mass index is 27.34 kg/m².    Oxygen Therapy:  Pulse Oximetry: 93 %, O2 (LPM): 0, O2 Delivery: None (Room Air)    Physical Exam   Constitutional: He is oriented to person, place, and time and well-developed, well-nourished, and in no distress. No distress.   HENT:   Head: Normocephalic and atraumatic.   Mouth/Throat: No oropharyngeal exudate.   Eyes: EOM are normal. Pupils are equal, round, and reactive to light. No scleral icterus.   Neck: Normal range of motion. Neck supple. No JVD present.   Cardiovascular: Normal rate and regular rhythm.    No murmur heard.  Pulmonary/Chest: Effort normal and breath sounds normal. No respiratory distress. He has no wheezes.   Abdominal: Soft. Bowel sounds are normal. He exhibits distension. There is no tenderness.   B/L nephrostomy tube present.   Genitourinary:   Genitourinary Comments: Pacheco catheter present   Musculoskeletal: He exhibits  no edema.   Neurological: He is alert and oriented to person, place, and time. No cranial nerve deficit.   Skin:   Left posterior thigh- dressing CDI   Psychiatric: Mood, memory, affect and judgment normal.         Lab Data Review:         12/11/2017  5:55 PM    Recent Labs      12/16/17 0329 12/17/17 0343 12/18/17   0449   SODIUM  141  141  142   POTASSIUM  3.3*  3.7  4.0   CHLORIDE  103  102  106   CO2  31  30  31   BUN  7*  6*  7*   CREATININE  0.26*  0.29*  0.37*   MAGNESIUM  1.4*  1.4*  1.7   PHOSPHORUS  2.4*  2.2*  3.7   CALCIUM  7.7*  7.7*  7.5*       Recent Labs      12/16/17 0329 12/17/17 0343 12/18/17 0449   ALTSGPT  6   --   7   ASTSGOT  10*   --   11*   ALKPHOSPHAT  53   --   55   TBILIRUBIN  0.6   --   0.5   GLUCOSE  186*  167*  178*       Recent Labs      12/16/17 0329 12/17/17 0343 12/18/17   0449   RBC  2.55*  2.68*  2.50*   HEMOGLOBIN  8.4*  8.9*  8.3*   HEMATOCRIT  25.9*  26.9*  25.9*   PLATELETCT  217  203  173       Recent Labs      12/16/17 0329 12/17/17 0343 12/18/17   0449   WBC  5.5  6.4  4.8   NEUTSPOLYS  73.60*  77.40*  80.50*   LYMPHOCYTES  10.70*  9.40*  11.50*   MONOCYTES  7.60  6.40  1.80   EOSINOPHILS  7.40*  6.10  6.20   BASOPHILS  0.20  0.20  0.00   ASTSGOT  10*   --   11*   ALTSGPT  6   --   7   ALKPHOSPHAT  53   --   55   TBILIRUBIN  0.6   --   0.5           Assessment/Plan     * Hydronephrosis   Assessment & Plan    - CT abdomen/pelvis from 12/08 (prior to transfer) showed bilateral hydronephrosis R>L, bilateral nephrolithiasis and prostate enlargement. Patient had rigid cystoscopy with removal of 100 ml of clot on 12/10 which was thought to be the cause for obstruction and resulting hydronephrosis.  Patient had worsening abdominal distension with nausea/vomiting on 12/12 and CT abdomen/pelvis showed persistent moderate hydronephrosis. Bilateral nephrostomy tubes placed on 12/13. Patient's creatinine returned to baseline (0.7) following procedure.  -  creatinine normal (0.37). Nephrostomy tubes draining well.   - As per urology, on discharge, patient will leave with kaur and nephrostomy tubes.   - will continue to monitor        Abdominal distension   Assessment & Plan    - Most likely secondary to ileus  - Improving. Abdomen feels soft, bowel sounds present.  - No nausea/vomiting. Last bowel movement was yesterday  - NG tube disocontinued  - Bowel protocol  - Advance diet as tolerated  - Will monitor electrolytes and replace as needed                Acute DVT (deep venous thrombosis) (CMS-[facility])   Assessment & Plan    H/o DVT 3 times in the past. Was on coumadin previously which was changed to dabigatran recently. He also has an IVC filter in place from 2009  - Patient had a recent admission [facility] for new left leg DVT on warfarin secondary to missed bridging with lovenox. He was on heparin drip for 7 days and discharged on dabigatran  - LE venous doppler ordered and it showed B/L extensive DVT in both extremities. Acute and chronic.  - UA from nephrostomy tubes shows >150 RBCs.   - patient continues to have blood tinged fluid from nephrostomy tubes and kaur. Will hold off anticoagulation for now , patient has an IVC filter in place. Explained to the patient and his wife about the rationale behind holding off anticoagulation, they are in agreement            Extraperitoneal rupture of bladder   Assessment & Plan    Patient was transferred from [facility] for suspected extraperitoneal bladder rupture and bilateral hydronephrosis.   - 12/11: He underwent rigid cystoscopy with evacuation of 100 ml of blood clot, fulguration of bladder perforation sites and biopsy of posterior bladder wall.  - Kaur catheter in place. He underwent B/L nephrostomy tube placement on 12/13  - B&O suppository for bladder spasms          Wound of left lower extremity   Assessment & Plan    Patient has a large eschar (15 x 10 cm ) on his left posterior thigh from previous subcutaneous hemorrhage  as a result of supra therapeutic INR while on coumadin  Followed by wound care  Surgery on board  Patient underwent debridement of 20x25 cm eschar of black dead skin yesterday.   Wound dressing cdi  Continue zosyn        Protein calorie malnutrition (CMS-HCC)   Assessment & Plan    Pre albumin of 3  Nutrition consulted        Electrolyte abnormality   Assessment & Plan     likely contributing to ileus  Will monitor and replace as needed        Anemia   Assessment & Plan    Likely secondary to acute blood loss from hematuria  Decreased iron, TIBC. Ferritin normal.   Vitamin B12 lower limits of normal- Vitamin B12 IM daily for 1 week  Patient will need oral iron once bowel movements are regular        Hyperkalemia   Assessment & Plan    - Resolved  - Likely secondary to JENNIFFER  - Will continue to monitor        Acute kidney injury (CMS-HCC)   Assessment & Plan    Resolved  Baseline creat 0.7.   Bilateral nephrostomy tubes placed on 12/13 for hydronephrosis with improvement in creatinine  Creatinine now at baseline   Will monitor        DM type 2 (diabetes mellitus, type 2) (CMS-HCC)- (present on admission)   Assessment & Plan    Patient on metformin and glipizide as home medication  - HbA1c 7  - Hold home medication  - Patient started on SSI and hypoglycemia protocol  - Glargine increased to 10 units

## 2017-12-19 NOTE — WOUND TEAM
[facility]  Inpatient Services   Wound and Skin Care Evaluation    Admission Date: 12/10/2017    HPI, PMH, SH: Reviewed  Unit where seen by Wound Team: T834.02    WOUND CONSULT RELATED TO: Placement of NPWT dressing    SUBJECTIVE:  Pleasant, agreeable    Self Report / Pain Level: 10/10 at times with dressing removal and placement    OBJECTIVE: wet to dry dressing intact    WOUND TYPE, LOCATION, CHARACTERISTICS (Pressure ulcers: location, stage, POA or date identified)    Location and type of wound: Left posterior thigh, full thickness surgical      Periwound:     Intact, slight induration  Drainage:     Min ss  Tissue Type and %:    20% yellow slough, 80% pink  Wound Edges:    intact  Odor:      none  Exposed structure(s):  muscle  S&S of Infection:    induration  Measurements:   12/18/2017  Length:     ~17cm  Width:      ~21cm  Depth:     ~3cm      INTERVENTIONS BY WOUND TEAM: Wet to dry dressing removed, wound irrigated with wound cleanser and patted dry, applied no sting skin prep to periwound followed by drape. Applied adaptic x2 to wound base, filled wound with a single pieace of black foam and secured with drape. NPWT applied at 125mmHg continuously. Patient complained of intermittent pain but tolerated fairly well.       Interdisciplinary consultation: RN, Dr. Deluna    EVALUATION: Large open wound post I&D, relitivley clean, should do well with NPWT to promote rapid influx of new granulation tissue and limit risk for infection.Patient requesting lidocaine with vac removal and premedication. On 12/12 INR was 1.69, patient is likely to have bleeding with vac removal.      Factors affecting wound healing:DM, INR, Infection.   Goals: new granulation tissue to be visible at next dressing change    NURSING PLAN OF CARE ORDERS (X):    Dressing changes: See Dressing Maintenance orders: x  Skin care: See Skin Care orders:   Rectal tube care: See Rectal Tube Care orders:   Other orders:    RSKIN:  CURRENT (X) ORDERED (O)  Q shift Bol[place]ar:  X  Q shift pressure point assessments:  X  Pressure redistribution mattress        YAJAIRA      Bariatric YAJAIRA      Bariatric foam        Heel float boots       Heels floated on pillows      Barrier wipes      Barrier Cream      Barrier paste      Sacral silicone dressing      Silicone O2 tubing      Anchorfast      Trach with Optifoam split foam       Waffle cushion      Rectal tube or BMS      Antifungal tx    Turn q 2 hours     Up to chair     Ambulate   PT/OT     Dietician x     PO  x   TF   TPN     PVN    NPO   # days   Other       WOUND TEAM PLAN OF CARE (X):   NPWT change 3 x week:        Dressing changes by wound team:       Follow up as needed:   x    Other (explain):    Anticipated discharge plans (X):  SNF:           Home Care:   x        Outpatient Wound Center: x         Self Care:            Other:

## 2017-12-19 NOTE — PROGRESS NOTES
12/19  Pt seen and examined.  Will be going for a CTA today as he had some SOB, want to R/O PE.  Wound vac in place to left inner thigh, suction maintained.  Pt expressed concern about how painful the first dressing change was, even when pre-medicated.   Mention was made about plastics getting on board for further care of this wound., pt agreed.      Wound dressing C/D/I  Extremities warm and perfusing

## 2017-12-19 NOTE — THERAPY
"Occupational Therapy Evaluation completed.   Functional Status:  Supervision supine to sit, sit to stand.  Pt walked hallway w/FWW.  Pt did not want to sit on toilet so pt stood/squatted over toilet to have BM with SBA for toileting.  Supervision to wash hands at sink.  Pt requires assist during ADL's at this time due to lines/tubes.  Pt very motivated and strong.  Plan of Care: Patient with no further skilled OT needs in the acute care setting at this time  Discharge Recommendations:  Equipment: No Equipment Needed. Post-acute therapy Discharge to home with outpatient or home health for additional skilled therapy services.    See \"Rehab Therapy-Acute\" Patient Summary Report for complete documentation.    "

## 2017-12-20 PROBLEM — Z93.6 NEPHROSTOMY STATUS (HCC): Status: ACTIVE | Noted: 2017-12-20

## 2017-12-20 LAB
ALBUMIN SERPL BCP-MCNC: 2.4 G/DL (ref 3.2–4.9)
ALBUMIN/GLOB SERPL: 0.8 G/DL
ALP SERPL-CCNC: 62 U/L (ref 30–99)
ALT SERPL-CCNC: 6 U/L (ref 2–50)
ANION GAP SERPL CALC-SCNC: 9 MMOL/L (ref 0–11.9)
AST SERPL-CCNC: 14 U/L (ref 12–45)
BACTERIA SPEC ANAEROBE CULT: ABNORMAL
BACTERIA SPEC ANAEROBE CULT: ABNORMAL
BASOPHILS # BLD AUTO: 0.1 % (ref 0–1.8)
BASOPHILS # BLD: 0.01 K/UL (ref 0–0.12)
BILIRUB SERPL-MCNC: 0.5 MG/DL (ref 0.1–1.5)
BUN SERPL-MCNC: 9 MG/DL (ref 8–22)
CALCIUM SERPL-MCNC: 7.6 MG/DL (ref 8.5–10.5)
CHLORIDE SERPL-SCNC: 101 MMOL/L (ref 96–112)
CO2 SERPL-SCNC: 26 MMOL/L (ref 20–33)
CREAT SERPL-MCNC: 0.48 MG/DL (ref 0.5–1.4)
EKG IMPRESSION: NORMAL
EOSINOPHIL # BLD AUTO: 0.07 K/UL (ref 0–0.51)
EOSINOPHIL NFR BLD: 1 % (ref 0–6.9)
ERYTHROCYTE [DISTWIDTH] IN BLOOD BY AUTOMATED COUNT: 60.2 FL (ref 35.9–50)
GFR SERPL CREATININE-BSD FRML MDRD: >60 ML/MIN/1.73 M 2
GLOBULIN SER CALC-MCNC: 3.1 G/DL (ref 1.9–3.5)
GLUCOSE BLD-MCNC: 186 MG/DL (ref 65–99)
GLUCOSE BLD-MCNC: 209 MG/DL (ref 65–99)
GLUCOSE BLD-MCNC: 279 MG/DL (ref 65–99)
GLUCOSE BLD-MCNC: 356 MG/DL (ref 65–99)
GLUCOSE BLD-MCNC: 372 MG/DL (ref 65–99)
GLUCOSE SERPL-MCNC: 243 MG/DL (ref 65–99)
HCT VFR BLD AUTO: 25.2 % (ref 42–52)
HGB BLD-MCNC: 8.2 G/DL (ref 14–18)
IMM GRANULOCYTES # BLD AUTO: 0.03 K/UL (ref 0–0.11)
IMM GRANULOCYTES NFR BLD AUTO: 0.4 % (ref 0–0.9)
LYMPHOCYTES # BLD AUTO: 0.56 K/UL (ref 1–4.8)
LYMPHOCYTES NFR BLD: 7.8 % (ref 22–41)
MAGNESIUM SERPL-MCNC: 1.5 MG/DL (ref 1.5–2.5)
MCH RBC QN AUTO: 32.9 PG (ref 27–33)
MCHC RBC AUTO-ENTMCNC: 32.5 G/DL (ref 33.7–35.3)
MCV RBC AUTO: 101.2 FL (ref 81.4–97.8)
MONOCYTES # BLD AUTO: 0.38 K/UL (ref 0–0.85)
MONOCYTES NFR BLD AUTO: 5.3 % (ref 0–13.4)
NEUTROPHILS # BLD AUTO: 6.15 K/UL (ref 1.82–7.42)
NEUTROPHILS NFR BLD: 85.4 % (ref 44–72)
NRBC # BLD AUTO: 0 K/UL
NRBC BLD-RTO: 0 /100 WBC
PHOSPHATE SERPL-MCNC: 2.1 MG/DL (ref 2.5–4.5)
PLATELET # BLD AUTO: 186 K/UL (ref 164–446)
PMV BLD AUTO: 10.2 FL (ref 9–12.9)
POTASSIUM SERPL-SCNC: 3.6 MMOL/L (ref 3.6–5.5)
PROT SERPL-MCNC: 5.5 G/DL (ref 6–8.2)
RBC # BLD AUTO: 2.49 M/UL (ref 4.7–6.1)
SIGNIFICANT IND 70042: ABNORMAL
SITE SITE: ABNORMAL
SODIUM SERPL-SCNC: 136 MMOL/L (ref 135–145)
SOURCE SOURCE: ABNORMAL
WBC # BLD AUTO: 7.2 K/UL (ref 4.8–10.8)

## 2017-12-20 PROCEDURE — 97606 NEG PRS WND THER DME>50 SQCM: CPT

## 2017-12-20 PROCEDURE — A9270 NON-COVERED ITEM OR SERVICE: HCPCS | Performed by: STUDENT IN AN ORGANIZED HEALTH CARE EDUCATION/TRAINING PROGRAM

## 2017-12-20 PROCEDURE — 84100 ASSAY OF PHOSPHORUS: CPT

## 2017-12-20 PROCEDURE — 700102 HCHG RX REV CODE 250 W/ 637 OVERRIDE(OP): Performed by: SURGERY

## 2017-12-20 PROCEDURE — 94640 AIRWAY INHALATION TREATMENT: CPT

## 2017-12-20 PROCEDURE — 700105 HCHG RX REV CODE 258: Performed by: STUDENT IN AN ORGANIZED HEALTH CARE EDUCATION/TRAINING PROGRAM

## 2017-12-20 PROCEDURE — 83735 ASSAY OF MAGNESIUM: CPT

## 2017-12-20 PROCEDURE — A9270 NON-COVERED ITEM OR SERVICE: HCPCS | Performed by: SURGERY

## 2017-12-20 PROCEDURE — 700101 HCHG RX REV CODE 250: Performed by: STUDENT IN AN ORGANIZED HEALTH CARE EDUCATION/TRAINING PROGRAM

## 2017-12-20 PROCEDURE — 700105 HCHG RX REV CODE 258: Performed by: SURGERY

## 2017-12-20 PROCEDURE — 85025 COMPLETE CBC W/AUTO DIFF WBC: CPT

## 2017-12-20 PROCEDURE — 82962 GLUCOSE BLOOD TEST: CPT | Mod: 91

## 2017-12-20 PROCEDURE — 770004 HCHG ROOM/CARE - ONCOLOGY PRIVATE *

## 2017-12-20 PROCEDURE — 700101 HCHG RX REV CODE 250

## 2017-12-20 PROCEDURE — 700102 HCHG RX REV CODE 250 W/ 637 OVERRIDE(OP): Performed by: STUDENT IN AN ORGANIZED HEALTH CARE EDUCATION/TRAINING PROGRAM

## 2017-12-20 PROCEDURE — 700111 HCHG RX REV CODE 636 W/ 250 OVERRIDE (IP): Performed by: STUDENT IN AN ORGANIZED HEALTH CARE EDUCATION/TRAINING PROGRAM

## 2017-12-20 PROCEDURE — 94760 N-INVAS EAR/PLS OXIMETRY 1: CPT

## 2017-12-20 PROCEDURE — 700111 HCHG RX REV CODE 636 W/ 250 OVERRIDE (IP): Performed by: SURGERY

## 2017-12-20 PROCEDURE — 36415 COLL VENOUS BLD VENIPUNCTURE: CPT

## 2017-12-20 PROCEDURE — 80053 COMPREHEN METABOLIC PANEL: CPT

## 2017-12-20 PROCEDURE — 99232 SBSQ HOSP IP/OBS MODERATE 35: CPT | Mod: GC | Performed by: INTERNAL MEDICINE

## 2017-12-20 RX ORDER — POTASSIUM CHLORIDE 20 MEQ/1
40 TABLET, EXTENDED RELEASE ORAL ONCE
Status: COMPLETED | OUTPATIENT
Start: 2017-12-20 | End: 2017-12-20

## 2017-12-20 RX ORDER — IPRATROPIUM BROMIDE AND ALBUTEROL SULFATE 2.5; .5 MG/3ML; MG/3ML
3 SOLUTION RESPIRATORY (INHALATION)
Status: DISCONTINUED | OUTPATIENT
Start: 2017-12-20 | End: 2017-12-26

## 2017-12-20 RX ORDER — IPRATROPIUM BROMIDE AND ALBUTEROL SULFATE 2.5; .5 MG/3ML; MG/3ML
SOLUTION RESPIRATORY (INHALATION)
Status: COMPLETED
Start: 2017-12-20 | End: 2017-12-20

## 2017-12-20 RX ORDER — HYDROMORPHONE HYDROCHLORIDE 2 MG/ML
1-2 INJECTION, SOLUTION INTRAMUSCULAR; INTRAVENOUS; SUBCUTANEOUS
Status: DISCONTINUED | OUTPATIENT
Start: 2017-12-20 | End: 2017-12-29 | Stop reason: HOSPADM

## 2017-12-20 RX ORDER — FERROUS SULFATE 325(65) MG
325 TABLET ORAL
Status: DISCONTINUED | OUTPATIENT
Start: 2017-12-21 | End: 2017-12-29 | Stop reason: HOSPADM

## 2017-12-20 RX ADMIN — OXYCODONE HYDROCHLORIDE 10 MG: 10 TABLET ORAL at 19:35

## 2017-12-20 RX ADMIN — KETOROLAC TROMETHAMINE 15 MG: 30 INJECTION, SOLUTION INTRAMUSCULAR at 01:19

## 2017-12-20 RX ADMIN — INSULIN HUMAN 2 UNITS: 100 INJECTION, SOLUTION PARENTERAL at 05:54

## 2017-12-20 RX ADMIN — ACETAMINOPHEN 1000 MG: 500 TABLET, FILM COATED ORAL at 01:19

## 2017-12-20 RX ADMIN — ALBUTEROL SULFATE 2 PUFF: 90 AEROSOL, METERED RESPIRATORY (INHALATION) at 02:26

## 2017-12-20 RX ADMIN — ALBUTEROL SULFATE 2 PUFF: 90 AEROSOL, METERED RESPIRATORY (INHALATION) at 00:58

## 2017-12-20 RX ADMIN — CYANOCOBALAMIN 1000 MCG: 1000 INJECTION, SOLUTION INTRAMUSCULAR; SUBCUTANEOUS at 09:15

## 2017-12-20 RX ADMIN — ALBUTEROL SULFATE 2 PUFF: 90 AEROSOL, METERED RESPIRATORY (INHALATION) at 23:41

## 2017-12-20 RX ADMIN — SODIUM CHLORIDE: 9 INJECTION, SOLUTION INTRAVENOUS at 23:08

## 2017-12-20 RX ADMIN — POTASSIUM CHLORIDE 40 MEQ: 1500 TABLET, EXTENDED RELEASE ORAL at 23:08

## 2017-12-20 RX ADMIN — IPRATROPIUM BROMIDE AND ALBUTEROL SULFATE 3 ML: .5; 3 SOLUTION RESPIRATORY (INHALATION) at 06:35

## 2017-12-20 RX ADMIN — IPRATROPIUM BROMIDE AND ALBUTEROL SULFATE 3 ML: .5; 3 SOLUTION RESPIRATORY (INHALATION) at 19:50

## 2017-12-20 RX ADMIN — IPRATROPIUM BROMIDE AND ALBUTEROL SULFATE 3 ML: .5; 3 SOLUTION RESPIRATORY (INHALATION) at 13:33

## 2017-12-20 RX ADMIN — ALBUTEROL SULFATE 2 PUFF: 90 AEROSOL, METERED RESPIRATORY (INHALATION) at 19:40

## 2017-12-20 RX ADMIN — OXYCODONE HYDROCHLORIDE 10 MG: 10 TABLET ORAL at 09:10

## 2017-12-20 RX ADMIN — MAGNESIUM GLUCONATE 500 MG ORAL TABLET 400 MG: 500 TABLET ORAL at 09:10

## 2017-12-20 RX ADMIN — MAGNESIUM SULFATE HEPTAHYDRATE 4 G: 500 INJECTION, SOLUTION INTRAMUSCULAR; INTRAVENOUS at 09:15

## 2017-12-20 RX ADMIN — ACETAMINOPHEN 1000 MG: 500 TABLET, FILM COATED ORAL at 11:43

## 2017-12-20 RX ADMIN — INSULIN HUMAN 5 UNITS: 100 INJECTION, SOLUTION PARENTERAL at 17:41

## 2017-12-20 RX ADMIN — MAGNESIUM GLUCONATE 500 MG ORAL TABLET 400 MG: 500 TABLET ORAL at 22:02

## 2017-12-20 RX ADMIN — HYDROMORPHONE HYDROCHLORIDE 0.5 MG: 2 INJECTION INTRAMUSCULAR; INTRAVENOUS; SUBCUTANEOUS at 09:39

## 2017-12-20 RX ADMIN — HYDROMORPHONE HYDROCHLORIDE 1 MG: 2 INJECTION INTRAMUSCULAR; INTRAVENOUS; SUBCUTANEOUS at 20:26

## 2017-12-20 RX ADMIN — INSULIN HUMAN 3 UNITS: 100 INJECTION, SOLUTION PARENTERAL at 11:46

## 2017-12-20 RX ADMIN — IPRATROPIUM BROMIDE AND ALBUTEROL SULFATE 3 ML: .5; 3 SOLUTION RESPIRATORY (INHALATION) at 03:01

## 2017-12-20 RX ADMIN — INSULIN HUMAN 1 UNITS: 100 INJECTION, SOLUTION PARENTERAL at 22:04

## 2017-12-20 RX ADMIN — ACETAMINOPHEN 1000 MG: 500 TABLET, FILM COATED ORAL at 05:54

## 2017-12-20 RX ADMIN — POTASSIUM CHLORIDE 40 MEQ: 1500 TABLET, EXTENDED RELEASE ORAL at 09:15

## 2017-12-20 RX ADMIN — PREDNISONE 40 MG: 20 TABLET ORAL at 09:10

## 2017-12-20 RX ADMIN — KETOROLAC TROMETHAMINE 15 MG: 30 INJECTION, SOLUTION INTRAMUSCULAR at 05:53

## 2017-12-20 RX ADMIN — KETOROLAC TROMETHAMINE 15 MG: 30 INJECTION, SOLUTION INTRAMUSCULAR at 11:44

## 2017-12-20 ASSESSMENT — ENCOUNTER SYMPTOMS
DIZZINESS: 0
VOMITING: 0
HEADACHES: 0
CLAUDICATION: 0
SORE THROAT: 0
FEVER: 0
EYE PAIN: 0
BACK PAIN: 0
ABDOMINAL PAIN: 0
SPUTUM PRODUCTION: 0
STRIDOR: 0
SPEECH CHANGE: 0
SENSORY CHANGE: 0
CHILLS: 0
EYE DISCHARGE: 0
SINUS PAIN: 0
ORTHOPNEA: 0
WEAKNESS: 0
DOUBLE VISION: 0
CONSTIPATION: 0
BLURRED VISION: 0
COUGH: 0
SHORTNESS OF BREATH: 0
BRUISES/BLEEDS EASILY: 0
DEPRESSION: 0
HEARTBURN: 0
NAUSEA: 0
MYALGIAS: 0
NECK PAIN: 0
PALPITATIONS: 0

## 2017-12-20 ASSESSMENT — PAIN SCALES - GENERAL
PAINLEVEL_OUTOF10: 4
PAINLEVEL_OUTOF10: 9
PAINLEVEL_OUTOF10: 9
PAINLEVEL_OUTOF10: 4
PAINLEVEL_OUTOF10: 4
PAINLEVEL_OUTOF10: 8
PAINLEVEL_OUTOF10: 0
PAINLEVEL_OUTOF10: 8
PAINLEVEL_OUTOF10: 6

## 2017-12-20 ASSESSMENT — LIFESTYLE VARIABLES: SUBSTANCE_ABUSE: 0

## 2017-12-20 NOTE — DOCUMENTATION QUERY
DOCUMENTATION QUERY    PROVIDERS: Please select “Cosign w/ note”to reply to query.    To better represent the severity of illness of your patient, please review the following information and exercise your independent professional judgment in responding to this query.     Protein calorie malnutrition is documented in the 12/18 Progress Notes. Based upon the clinical findings, risk factors, and treatment, can this diagnosis of protein calorie malnutrition be further specified?    • Mild Protein Calorie Malnutrition  • Moderate Protein Calorie Malnutrition  • Severe Protein Calorie Malnutrition  • Findings of no clinical significance  • Other explanation of clinical findings  • Unable to determine    The medical record reflects the following:   Clinical Findings pre-albumin - 3;  consistently consumes % of meals per ADL   Treatment Dietician consult; regular/diabetic diet   Risk Factors Age,    Location within medical record Hx of Crohn's disease per H&P;  DM2                 Thank you,  Emani Salazar RN, CCDS   Sr. Clinical    ph- [phone]

## 2017-12-20 NOTE — PROGRESS NOTES
Elaine Lindsay Fall Risk Assessment:     Last Known Fall: No falls  Mobility: Dizziness/generalized weakness  Medications: Cardiovascular or central nervous system meds  Mental Status/LOC/Awareness: Awake, alert, and oriented to date, place, and person  Toileting Needs: Use of catheters or diversion devices  Volume/Electrolyte Status: Use of IV fluids/tube feeds  Communication/Sensory: Visual (Glasses)/hearing deficit  Behavior: Appropriate behavior  Elanie Lindsay Fall Risk Total: 9  Fall Risk Level: LOW RISK    Universal Fall Precautions:  call light/belongings in reach, bed in low position and locked, wheelchairs and assistive devices out of sight, siderails up x 2, use non-slip footwear, adequate lighting, clutter free and spill free environment, educate on level of risk, educate to call for assistance    Fall Risk Level Interventions:   TRIAL (PPT [facility], NEURO, MED DANYELL 5) Low Fall Risk Interventions  Place yellow fall risk ID band on patient: verified  Provide patient/family education based on risk assessment: completed  Educate patient/family to call staff for assistance when getting out of bed: completed  Place fall precaution signage outside patient door: verifiedTRIAL (PPT [employer], NEURO, MED DANYELL 5) Moderate Fall Risk Interventions  Place yellow fall risk ID band on patient: refused  Provide patient/family education based on risk assessment : completed  Educate patient/family to call staff for assistance when getting out of bed: completed  Place fall precaution signage outside patient door: verified  Utilize bed/chair fall alarm: verified     Patient Specific Interventions:     Medication: limit combination of prn medications  Mental Status/LOC/Awareness: reinforce the use of call light  Toileting: instruct male patients prone to dizziness to void while sitting  Volume/Electrolyte Status: monitor abnormal lab values  Communication/Sensory: ensure proper positioning when transferrng/ambulating  Behavioral:  administer medication as ordered  Mobility: utilize bed/chair fall alarm and ensure bed is locked and in lowest position

## 2017-12-20 NOTE — PROGRESS NOTES
Elaine Lindsay Fall Risk Assessment:     Last Known Fall: No falls  Mobility: Dizziness/generalized weakness  Medications: Cardiovascular or central nervous system meds  Mental Status/LOC/Awareness: Awake, alert, and oriented to date, place, and person  Toileting Needs: Use of catheters or diversion devices  Volume/Electrolyte Status: Use of IV fluids/tube feeds  Communication/Sensory: Visual (Glasses)/hearing deficit  Behavior: Behavioral noncompliance with instruction  Elaine Lindsay Fall Risk Total: 11  Fall Risk Level: MODERATE RISK    Universal Fall Precautions:  call light/belongings in reach, bed in low position and locked, wheelchairs and assistive devices out of sight, siderails up x 2, use non-slip footwear, adequate lighting, clutter free and spill free environment, educate on level of risk, educate to call for assistance    Fall Risk Level Interventions:   TRIAL (2Peer ([place]) 8, NEURO, MED DANYELL 5) Low Fall Risk Interventions  Place yellow fall risk ID band on patient: verified  Provide patient/family education based on risk assessment: completed  Educate patient/family to call staff for assistance when getting out of bed: completed  Place fall precaution signage outside patient door: verifiedTRIAL (2Peer ([place]) 8, NEURO, MED DANYELL 5) Moderate Fall Risk Interventions  Place yellow fall risk ID band on patient: refused  Provide patient/family education based on risk assessment : completed  Educate patient/family to call staff for assistance when getting out of bed: completed  Place fall precaution signage outside patient door: verified  Utilize bed/chair fall alarm: refused     Patient Specific Interventions:     Medication: review medications with patient and family and limit combination of prn medications  Mental Status/LOC/Awareness: reorient patient, reinforce falls education, encourage family to stay with patient, check on patient hourly and reinforce the use of call light  Toileting: provide frquent toileting,  instruct patient/family on the use of grab bars, instruct patient/family on the need to call for assistance when toileting, do not leave patient unattended in bathroom/refer to toileting scripting and toilet prior to giving pain medications  Volume/Electrolyte Status: monitor blood sugars and maintain appropriate blood sugar levels if diabetic, monitor abnormal lab values and ensure IV fluids are appropriate  Communication/Sensory: update plan of care on whiteboard and ensure proper positioning when transferrng/ambulating  Behavioral: administer medication as ordered, instruct/reinforce fall program rationale and encourage family to stay with impulsive patients  Mobility: provide comfort measures during transport, dangle prior to standing and instruct patient to exit bed on their strongest side

## 2017-12-20 NOTE — CARE PLAN
Problem: Bronchoconstriction:  Goal: Improve in air movement and diminished wheezing  Outcome: PROGRESSING SLOWER THAN EXPECTED  Q4 Duoneb  Q4 PRN Albuterol MDI  Pt frequently using MDI and neb txs  2L NC

## 2017-12-20 NOTE — WOUND TEAM
Notified by staff RN that patient just got OOB to use the bathroom and didn't take NPWT pump with him so that VAC tubing removed.  Arrived to find dressing intact and VAC tubing replaced and resumed NPWT at 125mmHg continuously.  Patient reported 7/10 pain with suction resumed but within 5 minutes he was 3/10 and this was with no pain medication on board.  Instructed him to be aware of trac pad and taking pump with him.  Informed that another wound team member will change dressing later today.

## 2017-12-20 NOTE — CONSULTS
DATE OF SERVICE:  12/19/2017    INFECTIOUS DISEASE CONSULTATION    Seen at the request of Dr. Yasmin Dorsey.    IDENTIFICATION:  This is a 67-year-old  who [facility] of a hematoma and wound on his left thigh.    Patient has a history of recurrent DVTs and was on anticoagulation.  Also,   has a history of BPH and recent orchiectomy for testicular torsion.  [facility]   [facility], with silvia blood in the urine, abdominal   pain, nausea, and vomiting.  He was found to have possible extraperitoneal   bladder rupture and hydronephrosis.  Urology was consulted, and there was some   extravasated air seen.  He was on antibiotics and a Pacheco was placed, and he   was continued on anticoagulation.  He had bilateral nephrostomy tubes placed   here, and his creatinine returned to baseline normal.  He also had a Pacheco   placed in addition.  In addition, the patient has a history of DVT in the   past.  He was previously on Coumadin, and [facility] found   [facility] leg DVT and this started bleeding and became hemorrhagic site, IVC   filter was in place.  He also has had shortness of breath.  He has been   evaluated with CT scan to rule out pulmonary embolus as well as anemia and   acute kidney injury, which has resolved.  The patient was transferred to the   [facility] where he had a large black eschar over his left thigh.  Wound care   service was concerned that this could be an infected hematoma, and Dr. Deluna   from surgery consulted and on 12/17/2017 he underwent wound debridement 25 x   20 cm area.  Dr. Deluna noted that there was a large black eschar with   foul-smelling wound with likely infection.  When he debrided, he found   foul-smelling purulent necrotic tissues and the eschar was removed with sharp   debridement and a wound VAC was placed on the following day.  The patient had   cultures done and now is seeking antibiotic  advice.  The patient presently has   a past history of adult-onset diabetes mellitus, Crohn's disease, DVT as   noted above and IVC filter.    PAST SURGICAL HISTORY:  Scrotal exploration in November here at [facility].    His home medications were glipizide, mesalamine, metformin, pain meds,   tamsulosin and here in the hospital he is presently on IV Zosyn.    SOCIAL HISTORY:  The patient is .  His wife is a nurse at the [facility],   working night shifts for the last few  months and will be switched into the day   shift soon.  The patient has a [employer] business.  He is a nonsmoker and   describes himself as [employer] again [employer], which has given him strengths to   get through his multiple troubles.    REVIEW OF SYSTEMS:  He does have a cough.  He does state that he has been able   to get up and walk despite having multiple drains and tubes in.  He has no   history of heart disease.  He stated he is eating well and has had bowel   movements.    PHYSICAL EXAMINATION:  GENERAL:  He is anxious, pleasant male, is presently afebrile.  Vitals are   otherwise unremarkable.  He does have some congestive cough.  HEENT:  He has no oral lesions.  LUNGS:  Clear except for some rhonchi with coughing.  CARDIAC:  Reveals no murmur.  ABDOMEN:  Soft.  Good bowel sounds.  He presently has a Pacheco in, and he has   bilateral nephrostomy tubes and he has a wound VAC on the left thigh.  EXTREMITIES:  Show good pulses and no edema.  He is fully oriented.I reviewed Media section pictures taken before and after surgery    LABORATORY DATA:  His white count is 7.2.  His chemistry panel shows an   albumin of 2.2, creatinine 0.39.  Urinalysis previously showed a lot of white   cells.    Microbiology from the surgical site is growing Enterococcus faecalis   susceptible toampicillin, penicillin and gentamicin.  He is   growing out a Citrobacter sensitive to  meropenem, ertapenem, quinolones,   Bactrim, and third generation cephalosporins.  Anaerobic  cultures are pending.    ASSESSMENT:  This is a 67-year-old complicated patient with bilateral   nephrostomies, history of recurrent deep vein thrombosis with IVC filters and   now complication of an infected hematoma.  I reviewed the picture of pre and   post-surgical site showing somewhat deep infection that is quite large.  There   is no granulation present in surgery obviously, and there is tissue exposed.    This appears to be a secondarily infected hematoma.  The major treatment   obviously is debridement associated with wound VAC placement with  antibiotics, the Zosyn is a good choice as it covers  anaerobes, the Citrobacter   and covers enterococcus.  I would continue this at the present time.  If he   goes home, options would be to switch him to ertapenem as an outpatient, which   is once a day infusion to cover Citrobacter, anaerobes and even though does   not cover enterococcus, I suspect that is not important in this situation   since it has been debrided.  The other option would be a combination of oral   antibiotics such as ciprofloxacin or Bactrim plus Augmentin.  If he is on   Coumadin, we would be concerned about antibiotic interactions with   Coumadin.  I have discussed this with the patient who is eager to go home.    Otherwise, I think it may be difficult for him to manage both nephrostomy   tubes, Pacheco catheter, the wound VAC and need to come in for daily infusion.    I will continue to follow this patient with you.    Thank you for this consultation.       ____________________________________     MD MARQUISE WILKES / AVILA    DD:  12/19/2017 17:22:34  DT:  12/19/2017 20:31:18    D#:  [id number]  Job#:  [id number]

## 2017-12-20 NOTE — PROGRESS NOTES
Internal Medicine Interval Note  Note Author: Lilo Hernandes M.D.     Name Niels Lima     1950   Age/Sex 67 y.o. male   MRN [id number]   Code Status Full code     After 5PM or if no immediate response to page, please call for cross-coverage  Attending/Team: Dr. Gomez/ Fco See Patient List for primary contact information  Call [phone] to page    1st Call - Day Intern (R1):   Dr. Hernandes 2nd Call - Day Sr. Resident (R2/R3):   Dr. Lyon         Reason for interval visit  (Principal Problem)   Wound on left lower extremity  Hydronephrosis s/p nephrostomy tube placement  DVT    Interval Problem Daily Status Update  (24 hours)   - Met with the patient and his wife this morning. Answered all questions. Patient anxious to go home. Discussed with the patient that SNF would be a good option for him. Patient was receptive to the idea. Referral to SNF placed. Explained again to the patient and his wife the reason for holding anticoagulation. They are in agreement with the plan.  - Wound culture positive for enterococcus , citrobacter and diphtheroids . Dr. Kearney on board. Will continue zosyn as per recommendations          Review of Systems   Constitutional: Negative for chills, fever and malaise/fatigue.   HENT: Negative for sinus pain and sore throat.    Eyes: Negative for blurred vision, double vision, pain and discharge.   Respiratory: Negative for cough, sputum production, shortness of breath and stridor.    Cardiovascular: Negative for chest pain, palpitations, orthopnea, claudication and leg swelling.   Gastrointestinal: Negative for abdominal pain, constipation, heartburn, nausea and vomiting.        Abdominal distension- improving   Genitourinary: Negative for dysuria, frequency, hematuria and urgency.        Bladder spasms- improving   Musculoskeletal: Negative for back pain, myalgias and neck pain.   Skin: Negative for itching and rash.   Neurological: Negative for dizziness, sensory change,  speech change, weakness and headaches.   Endo/Heme/Allergies: Does not bruise/bleed easily.   Psychiatric/Behavioral: Negative for depression, substance abuse and suicidal ideas.       Consultants/Specialty  Urology  Nephrology  IR  surgery    Disposition  Inpatient    Quality Measures    Reviewed items::  Labs reviewed and Medications reviewed  Pacheco catheter::  Urinary Tract Retention or Urinary Tract Obstruction  DVT prophylaxis pharmacological::  Contraindicated - High bleeding risk  DVT prophylaxis - mechanical:  SCDs  Ulcer Prophylaxis::  Not indicated          Physical Exam       Vitals:    12/20/17 0301 12/20/17 0450 12/20/17 0636 12/20/17 0758   BP:  135/71  140/71   Pulse: 84 75 78 87   Resp: (!) 24 18 20 16   Temp:  36.7 °C (98 °F)  37.6 °C (99.6 °F)   SpO2: 96% 94% 98% 91%   Weight:       Height:         Body mass index is 26.03 kg/m². Weight: 89.5 kg (197 lb 5 oz)  Oxygen Therapy:  Pulse Oximetry: 91 %, O2 (LPM): 2, O2 Delivery: Nasal Cannula    Physical Exam   Constitutional: He is oriented to person, place, and time and well-developed, well-nourished, and in no distress. No distress.   HENT:   Head: Normocephalic and atraumatic.   Mouth/Throat: No oropharyngeal exudate.   Eyes: EOM are normal. Pupils are equal, round, and reactive to light. No scleral icterus.   Neck: Normal range of motion. Neck supple. No JVD present.   Cardiovascular: Normal rate and regular rhythm.    No murmur heard.  Pulmonary/Chest: Effort normal and breath sounds normal. No respiratory distress. He has no wheezes.   Abdominal: Soft. Bowel sounds are normal. He exhibits no distension. There is no tenderness.   Genitourinary:   Genitourinary Comments: Pacheco catheter present draining red urine  B/L nephrostomy tubes present.   Musculoskeletal: He exhibits no edema.   Neurological: He is alert and oriented to person, place, and time. No cranial nerve deficit.   Skin:   Left posterior thigh- dressing CDI  Wound vac present    Psychiatric: Mood, memory, affect and judgment normal.         Lab Data Review:           Recent Labs      12/18/17 0449 12/19/17 0419  12/20/17   0335   SODIUM  142  138  136   POTASSIUM  4.0  4.0  3.6   CHLORIDE  106  102  101   CO2  31  32  26   BUN  7*  8  9   CREATININE  0.37*  0.39*  0.48*   MAGNESIUM  1.7  1.4*  1.5   PHOSPHORUS  3.7  2.7  2.1*   CALCIUM  7.5*  7.5*  7.6*       Recent Labs      12/18/17 0449 12/19/17 0419  12/20/17   0335   ALTSGPT  7  6  6   ASTSGOT  11*  12  14   ALKPHOSPHAT  55  59  62   TBILIRUBIN  0.5  0.5  0.5   GLUCOSE  178*  212*  243*       Recent Labs      12/18/17 0449 12/19/17   0538  12/20/17   0335   RBC  2.50*  2.54*  2.49*   HEMOGLOBIN  8.3*  8.5*  8.2*   HEMATOCRIT  25.9*  26.2*  25.2*   PLATELETCT  173  162*  186       Recent Labs      12/18/17 0449 12/19/17 0419 12/19/17   0538  12/20/17   0335   WBC  4.8   --   7.2  7.2   NEUTSPOLYS  80.50*   --   81.10*  85.40*   LYMPHOCYTES  11.50*   --   9.00*  7.80*   MONOCYTES  1.80   --   3.90  5.30   EOSINOPHILS  6.20   --   5.30  1.00   BASOPHILS  0.00   --   0.30  0.10   ASTSGOT  11*  12   --   14   ALTSGPT  7  6   --   6   ALKPHOSPHAT  55  59   --   62   TBILIRUBIN  0.5  0.5   --   0.5           Assessment/Plan     * Hydronephrosis   Assessment & Plan    S/p bilateral nephrostomy tubes placed on 12/13. Patient's creatinine returned to baseline (0.7) following procedure.  - creatinine normal. Nephrostomy tubes draining well.   - As per urology, on discharge, patient will leave with kaur and nephrostomy tubes.   - will continue to monitor        Abdominal distension   Assessment & Plan    - Most likely secondary to ileus  - Improving. Abdomen feels soft, bowel sounds present.  - No nausea/vomiting. Having bowel movements.  - Patient on regular diet  - Will monitor electrolytes and replace as needed                Acute DVT (deep venous thrombosis) (CMS-[facility])   Assessment & Plan    H/o DVT 3 times in the past.  Was on coumadin previously which was changed to dabigatran recently. He also has an IVC filter in place from 2009  - [facility] on warfarin secondary to missed bridging with lovenox. He was on heparin drip for 7 days and discharged on dabigatran  - LE venous doppler ordered and it showed B/L extensive DVT in both extremities. Acute and chronic.  - UA from nephrostomy tubes shows >150 RBCs.   - patient continues to have blood tinged fluid from nephrostomy tubes and kaur. Will hold off anticoagulation for now , patient has an IVC filter in place. Explained to the patient and his wife about the rationale behind holding off anticoagulation, they are in agreement            Extraperitoneal rupture of bladder   Assessment & Plan    S/p rigid cystoscopy with evacuation of 100 ml of blood clot, fulguration of bladder perforation sites and biopsy of posterior bladder wall on 12/11  - Kaur catheter in place. He underwent B/L nephrostomy tube placement on 12/13   - B&O suppository for bladder spasms  - Continues to have reddish urine. Hemoglobin stable  - Pathology from biopsy shows necrotic tissue. No tumor.          Shortness of breath   Assessment & Plan    Resolved  Patient complained of shortness of breath yesterday. On exam, patient appeared to have increased work of breathing with bilateral expiratory rhonchi  CTA pulmonary arteries was obtained to rule out PE given presence of acute DVT which was negative for PE  Patient was started on duoneb and steroids.  Doing much better today.   Will continue duoneb, O2 and RT per protocol.  Steroids discontinued        Wound of left lower extremity   Assessment & Plan    Patient had a large eschar on his left posterior thigh from previous subcutaneous hemorrhage as a result of supra therapeutic INR while on coumadin. He underwent debridement of 20x25 cm eschar of black dead skin on 12/17.   Wound vac in place. Dressing CDI  Wound  culture growing enterococcus fecalis. ID consulted.  Continue zosyn        Protein calorie malnutrition (CMS-HCC)   Assessment & Plan    Pre albumin of 3  Nutrition consulted        Electrolyte abnormality   Assessment & Plan     likely contributing to ileus  Will monitor and replace as needed  PO magnesium started        Anemia   Assessment & Plan    Likely secondary to acute blood loss from hematuria  Decreased iron and TIBC. Ferritin normal.   Vitamin B12 lower limits of normal- Vitamin B12 IM daily for 1 week  Ferrous sulfate started today as patient is having bowel movements        Hyperkalemia   Assessment & Plan    - Resolved  - Likely secondary to JENNIFFER  - Will continue to monitor        Acute kidney injury (CMS-HCC)   Assessment & Plan    Resolved  Baseline creat 0.7.   Bilateral nephrostomy tubes placed on 12/13 for hydronephrosis with improvement in creatinine  Creatinine now at baseline   Will monitor        DM type 2 (diabetes mellitus, type 2) (CMS-HCC)- (present on admission)   Assessment & Plan    Patient on metformin and glipizide as home medication  - HbA1c 7  - Hold home medication  - Patient started on SSI and hypoglycemia protocol  - Continue glargine 15 units

## 2017-12-20 NOTE — PROGRESS NOTES
RN notified by CNA that patient had gotten up to go to the bathroom without assistance while this RN was getting report. Patient had defecated on the floor and dislodged his wound vac. Patient re-educated on need to call for assistance, pt continues to refuse use of bed alarm, continued education provided. Wound RN notified and to be at bedside to assist with dressing.

## 2017-12-20 NOTE — PROGRESS NOTES
Seen at request of Dr. Dorsey  Full consult note dictated  Patient with multiple medical problems, now seen for antibiotic advice for infected left thigh hematoma s/p debridement.  Agree with pip/tazobactam infusion for now  If wound improves over next few days can consider oral therapy versus outpatient daily infusions of ertapenem   Patient eager to go home instead of SNF, not clear how realistic that is at this point

## 2017-12-20 NOTE — PROGRESS NOTES
Wound team in to change wound vac dressing. Pt pre-medicated with oxycodone and dilaudid, lidocaine used during dressing change.

## 2017-12-20 NOTE — PROGRESS NOTES
Assumed care at 0715. Bedside report received from Night KATHY Jacome. Patient's chart and MAR reviewed. 12 hour chart check complete. Assessment complete, pt has complaints of left thigh pain at wound vac at this time, medicated per MAR. Pt is awake in bed. Pt is A & O x 4. Patient was updated on plan of care for the day. Questions answered and concerns addressed.  Pt denies any additional needs at this time. White board updated. Call light, phone and personal belongings within reach. Bed alarm on and working appropriately. Vital signs stable.

## 2017-12-20 NOTE — ASSESSMENT & PLAN NOTE
Resolved  Pt c/o SOB on 12/19, had increased work of breathing with b/l exp rhonchi  CTA ruled out PE, has acute DVT this admission.  Pt denies any SOB today  On exam: CTA b/l  CXR was repeated which showed chronic bronchitis  Plan:  Continue duonebs qid and albuterol to q2hrs prn  RT and O2 per protocol  Continuous pulse ox.  Encourage incentive spirometry

## 2017-12-20 NOTE — PROGRESS NOTES
Internal Medicine Interval Note  Note Author: Lilo Hernandes M.D.     Name Niels Lima     1950   Age/Sex 67 y.o. male   MRN [id number]   Code Status Full code     After 5PM or if no immediate response to page, please call for cross-coverage  Attending/Team: Dr. Gomez/ Fco See Patient List for primary contact information  Call [phone] to page    1st Call - Day Intern (R1):   Dr. Hernandes 2nd Call - Day Sr. Resident (R2/R3):   Dr. Greer         Reason for interval visit  (Principal Problem)   Ileus  Wound on left lower extremity  Hydronephrosis  DVT    Interval Problem Daily Status Update  (24 hours)   - Patient reported shortness of breath that started last night. Wheeze present on exam. CTPE negative for PE. Will start duo nebs and oral prednisone.  - Ileus: Patient tolerating diet. Last bowel movement on , passing gas. Bowel protocol  - Leg wound : wound vac in place. Wound culture growing enterococcus fecalis. ID consulted.  - Acute DVT in bilateral lower extremities.Holding off anticoagulation at this time due to heamturia. He has an IVC filter in place. Patient will eventually need to resume anticoagulation. Discussed with patient and his wife the risks and benefits of holding off anticoagulation. They are in agreement with the plan.  - Bilateral nephrostomy tubes and kaur catheter in place. Creatinine normal. As per urology, patient will leave with kaur and nephrostomy tubes on discharge          Review of Systems   Constitutional: Negative for chills, fever and malaise/fatigue.   HENT: Negative for sinus pain and sore throat.    Eyes: Negative for blurred vision, double vision, pain and discharge.   Respiratory: Negative for cough, sputum production, shortness of breath and stridor.    Cardiovascular: Negative for chest pain, palpitations, orthopnea, claudication and leg swelling.   Gastrointestinal: Positive for constipation. Negative for abdominal pain, heartburn, nausea and  vomiting.        Abdominal distension   Genitourinary: Negative for dysuria, frequency, hematuria and urgency.        Bladder spasms   Musculoskeletal: Negative for back pain, myalgias and neck pain.   Skin: Negative for itching and rash.   Neurological: Negative for dizziness, sensory change, speech change, weakness and headaches.   Endo/Heme/Allergies: Bruises/bleeds easily.   Psychiatric/Behavioral: Negative for depression, substance abuse and suicidal ideas.       Consultants/Specialty  Urology  Nephrology  IR  surgery    Disposition  inpatient    Quality Measures    Reviewed items::  Labs reviewed and Medications reviewed  Pacheco catheter::  Urinary Tract Retention or Urinary Tract Obstruction  DVT prophylaxis pharmacological::  Contraindicated - High bleeding risk  DVT prophylaxis - mechanical:  SCDs  Ulcer Prophylaxis::  Not indicated          Physical Exam       Vitals:    12/19/17 0726 12/19/17 0912 12/19/17 1218 12/19/17 1407   BP: 140/75  159/79    Pulse: 73 (!) 23 90 82   Resp: 18 18 18 20   Temp: 36.4 °C (97.6 °F)  36.7 °C (98 °F)    SpO2: 95% 99% 95% 97%   Weight:       Height:         Body mass index is 27.34 kg/m².    Oxygen Therapy:  Pulse Oximetry: 97 %, O2 (LPM): 2, O2 Delivery: Silicone Nasal Cannula    Physical Exam   Constitutional: He is oriented to person, place, and time and well-developed, well-nourished, and in no distress. No distress.   HENT:   Head: Normocephalic and atraumatic.   Mouth/Throat: No oropharyngeal exudate.   Eyes: EOM are normal. Pupils are equal, round, and reactive to light. No scleral icterus.   Neck: Normal range of motion. Neck supple. No JVD present.   Cardiovascular: Normal rate and regular rhythm.    No murmur heard.  Pulmonary/Chest: Effort normal and breath sounds normal. No respiratory distress. He has no wheezes.   Abdominal: Soft. Bowel sounds are normal. He exhibits distension. There is no tenderness.   B/L nephrostomy tube present.   Genitourinary:    Genitourinary Comments: Pacheco catheter present   Musculoskeletal: He exhibits no edema.   Neurological: He is alert and oriented to person, place, and time. No cranial nerve deficit.   Skin:   Left posterior thigh- dressing CDI   Psychiatric: Mood, memory, affect and judgment normal.         Lab Data Review:         12/11/2017  5:55 PM    Recent Labs      12/17/17 0343 12/18/17 0449 12/19/17   0419   SODIUM  141  142  138   POTASSIUM  3.7  4.0  4.0   CHLORIDE  102  106  102   CO2  30  31  32   BUN  6*  7*  8   CREATININE  0.29*  0.37*  0.39*   MAGNESIUM  1.4*  1.7  1.4*   PHOSPHORUS  2.2*  3.7  2.7   CALCIUM  7.7*  7.5*  7.5*       Recent Labs      12/17/17 0343 12/18/17 0449 12/19/17 0419   ALTSGPT   --   7  6   ASTSGOT   --   11*  12   ALKPHOSPHAT   --   55  59   TBILIRUBIN   --   0.5  0.5   GLUCOSE  167*  178*  212*       Recent Labs      12/17/17 0343 12/18/17 0449 12/19/17   0538   RBC  2.68*  2.50*  2.54*   HEMOGLOBIN  8.9*  8.3*  8.5*   HEMATOCRIT  26.9*  25.9*  26.2*   PLATELETCT  203  173  162*       Recent Labs      12/17/17 0343 12/18/17 0449 12/19/17 0419  12/19/17   0538   WBC  6.4  4.8   --   7.2   NEUTSPOLYS  77.40*  80.50*   --   81.10*   LYMPHOCYTES  9.40*  11.50*   --   9.00*   MONOCYTES  6.40  1.80   --   3.90   EOSINOPHILS  6.10  6.20   --   5.30   BASOPHILS  0.20  0.00   --   0.30   ASTSGOT   --   11*  12   --    ALTSGPT   --   7  6   --    ALKPHOSPHAT   --   55  59   --    TBILIRUBIN   --   0.5  0.5   --            Assessment/Plan     * Hydronephrosis   Assessment & Plan    - CT abdomen/pelvis from 12/08 (prior to transfer) showed bilateral hydronephrosis R>L, bilateral nephrolithiasis and prostate enlargement. Patient had rigid cystoscopy with removal of 100 ml of clot on 12/10 which was thought to be the cause for obstruction and resulting hydronephrosis.  Patient had worsening abdominal distension with nausea/vomiting on 12/12 and CT abdomen/pelvis showed  persistent moderate hydronephrosis. Bilateral nephrostomy tubes placed on 12/13. Patient's creatinine returned to baseline (0.7) following procedure.  - creatinine normal (0.37). Nephrostomy tubes draining well.   - As per urology, on discharge, patient will leave with kaur and nephrostomy tubes.   - will continue to monitor        Abdominal distension   Assessment & Plan    - Most likely secondary to ileus  - Improving. Abdomen feels soft, bowel sounds present.  - No nausea/vomiting. Last bowel movement on 12/17  - Bowel protocol  - Patient on regular diet  - Will monitor electrolytes and replace as needed                Acute DVT (deep venous thrombosis) (CMS-[facility])   Assessment & Plan    H/o DVT 3 times in the past. Was on coumadin previously which was changed to dabigatran recently. He also has an IVC filter in place from 2009  - [facility] D[facility] on warfarin secondary to missed bridging with lovenox. He was on heparin drip for 7 days and discharged on dabigatran  - LE venous doppler ordered and it showed B/L extensive DVT in both extremities. Acute and chronic.  - UA from nephrostomy tubes shows >150 RBCs.   - patient continues to have blood tinged fluid from nephrostomy tubes and kaur. Will hold off anticoagulation for now , patient has an IVC filter in place. Explained to the patient and his wife about the rationale behind holding off anticoagulation, they are in agreement            Extraperitoneal rupture of bladder   Assessment & Plan    [facility] was [facility] for suspected extraperitoneal bladder rupture and bilateral hydronephrosis.   - 12/11: He underwent rigid cystoscopy with evacuation of 100 ml of blood clot, fulguration of bladder perforation sites and biopsy of posterior bladder wall.  - Kaur catheter in place. He underwent B/L nephrostomy tube placement on 12/13  - B&O suppository for bladder spasms          Shortness of breath   Assessment & Plan     Patient complained of shortness of breath going on since last night. On exam, patient appeared to have increased work of breathing with bilateral expiratory rhonchi  CTA pulmonary arteries was obtained to rule out PE given presence of acute DVT which was negative for PE  Plan:  duoneb Q4H  Prednisone 40 mg Po  RT and oxygen per protocol        Wound of left lower extremity   Assessment & Plan    Patient had a large eschar on his left posterior thigh from previous subcutaneous hemorrhage as a result of supra therapeutic INR while on coumadin. He underwent debridement of 20x25 cm eschar of black dead skin on 12/17.   Wound vac in place. Dressing CDI  Wound culture growing enterococcus fecalis. ID consulted.  Continue zosyn        Protein calorie malnutrition (CMS-[facility])   Assessment & Plan    Pre albumin of 3  Nutrition consulted        Electrolyte abnormality   Assessment & Plan     likely contributing to ileus  Will monitor and replace as needed  PO magnesium started        Anemia   Assessment & Plan    Likely secondary to acute blood loss from hematuria  Decreased iron, TIBC. Ferritin normal.   Vitamin B12 lower limits of normal- Vitamin B12 IM daily for 1 week  Will start oral iron once bowel movements are regular        Hyperkalemia   Assessment & Plan    - Resolved  - Likely secondary to JENNIFFER  - Will continue to monitor        Acute kidney injury (CMS-HCC)   Assessment & Plan    Resolved  Baseline creat 0.7.   Bilateral nephrostomy tubes placed on 12/13 for hydronephrosis with improvement in creatinine  Creatinine now at baseline   Will monitor        DM type 2 (diabetes mellitus, type 2) (CMS-HCC)- (present on admission)   Assessment & Plan    Patient on metformin and glipizide as home medication  - HbA1c 7  - Hold home medication  - Patient started on SSI and hypoglycemia protocol  - Glargine increased to 15 units

## 2017-12-21 LAB
ANION GAP SERPL CALC-SCNC: 6 MMOL/L (ref 0–11.9)
ANISOCYTOSIS BLD QL SMEAR: ABNORMAL
BASO STIPL BLD QL SMEAR: NORMAL
BASOPHILS # BLD AUTO: 0 % (ref 0–1.8)
BASOPHILS # BLD: 0 K/UL (ref 0–0.12)
BUN SERPL-MCNC: 10 MG/DL (ref 8–22)
CALCIUM SERPL-MCNC: 8.1 MG/DL (ref 8.5–10.5)
CHLORIDE SERPL-SCNC: 101 MMOL/L (ref 96–112)
CO2 SERPL-SCNC: 30 MMOL/L (ref 20–33)
CREAT SERPL-MCNC: 0.53 MG/DL (ref 0.5–1.4)
EOSINOPHIL # BLD AUTO: 0 K/UL (ref 0–0.51)
EOSINOPHIL NFR BLD: 0 % (ref 0–6.9)
ERYTHROCYTE [DISTWIDTH] IN BLOOD BY AUTOMATED COUNT: 61.5 FL (ref 35.9–50)
GFR SERPL CREATININE-BSD FRML MDRD: >60 ML/MIN/1.73 M 2
GLUCOSE BLD-MCNC: 149 MG/DL (ref 65–99)
GLUCOSE BLD-MCNC: 191 MG/DL (ref 65–99)
GLUCOSE BLD-MCNC: 231 MG/DL (ref 65–99)
GLUCOSE BLD-MCNC: 255 MG/DL (ref 65–99)
GLUCOSE BLD-MCNC: 276 MG/DL (ref 65–99)
GLUCOSE BLD-MCNC: 335 MG/DL (ref 65–99)
GLUCOSE SERPL-MCNC: 182 MG/DL (ref 65–99)
HCT VFR BLD AUTO: 26.2 % (ref 42–52)
HGB BLD-MCNC: 8.5 G/DL (ref 14–18)
LYMPHOCYTES # BLD AUTO: 0.58 K/UL (ref 1–4.8)
LYMPHOCYTES NFR BLD: 9.2 % (ref 22–41)
MACROCYTES BLD QL SMEAR: ABNORMAL
MAGNESIUM SERPL-MCNC: 1.5 MG/DL (ref 1.5–2.5)
MANUAL DIFF BLD: NORMAL
MCH RBC QN AUTO: 33.2 PG (ref 27–33)
MCHC RBC AUTO-ENTMCNC: 32.4 G/DL (ref 33.7–35.3)
MCV RBC AUTO: 102.3 FL (ref 81.4–97.8)
MONOCYTES # BLD AUTO: 0.21 K/UL (ref 0–0.85)
MONOCYTES NFR BLD AUTO: 3.4 % (ref 0–13.4)
MORPHOLOGY BLD-IMP: NORMAL
NEUTROPHILS # BLD AUTO: 5.51 K/UL (ref 1.82–7.42)
NEUTROPHILS NFR BLD: 87.4 % (ref 44–72)
NRBC # BLD AUTO: 0 K/UL
NRBC BLD-RTO: 0 /100 WBC
PHOSPHATE SERPL-MCNC: 2.4 MG/DL (ref 2.5–4.5)
PLATELET # BLD AUTO: 205 K/UL (ref 164–446)
PLATELET BLD QL SMEAR: NORMAL
PMV BLD AUTO: 10 FL (ref 9–12.9)
POLYCHROMASIA BLD QL SMEAR: NORMAL
POTASSIUM SERPL-SCNC: 3.5 MMOL/L (ref 3.6–5.5)
RBC # BLD AUTO: 2.56 M/UL (ref 4.7–6.1)
RBC BLD AUTO: PRESENT
SODIUM SERPL-SCNC: 137 MMOL/L (ref 135–145)
WBC # BLD AUTO: 6.3 K/UL (ref 4.8–10.8)

## 2017-12-21 PROCEDURE — 99232 SBSQ HOSP IP/OBS MODERATE 35: CPT | Mod: GC | Performed by: INTERNAL MEDICINE

## 2017-12-21 PROCEDURE — A9270 NON-COVERED ITEM OR SERVICE: HCPCS | Performed by: SURGERY

## 2017-12-21 PROCEDURE — 83735 ASSAY OF MAGNESIUM: CPT

## 2017-12-21 PROCEDURE — 770004 HCHG ROOM/CARE - ONCOLOGY PRIVATE *

## 2017-12-21 PROCEDURE — 85007 BL SMEAR W/DIFF WBC COUNT: CPT

## 2017-12-21 PROCEDURE — 700111 HCHG RX REV CODE 636 W/ 250 OVERRIDE (IP): Performed by: STUDENT IN AN ORGANIZED HEALTH CARE EDUCATION/TRAINING PROGRAM

## 2017-12-21 PROCEDURE — 94760 N-INVAS EAR/PLS OXIMETRY 1: CPT

## 2017-12-21 PROCEDURE — A9270 NON-COVERED ITEM OR SERVICE: HCPCS | Performed by: STUDENT IN AN ORGANIZED HEALTH CARE EDUCATION/TRAINING PROGRAM

## 2017-12-21 PROCEDURE — 82962 GLUCOSE BLOOD TEST: CPT | Mod: 91

## 2017-12-21 PROCEDURE — 700102 HCHG RX REV CODE 250 W/ 637 OVERRIDE(OP): Performed by: SURGERY

## 2017-12-21 PROCEDURE — 700102 HCHG RX REV CODE 250 W/ 637 OVERRIDE(OP): Performed by: STUDENT IN AN ORGANIZED HEALTH CARE EDUCATION/TRAINING PROGRAM

## 2017-12-21 PROCEDURE — 700101 HCHG RX REV CODE 250

## 2017-12-21 PROCEDURE — 80048 BASIC METABOLIC PNL TOTAL CA: CPT

## 2017-12-21 PROCEDURE — 84100 ASSAY OF PHOSPHORUS: CPT

## 2017-12-21 PROCEDURE — 85027 COMPLETE CBC AUTOMATED: CPT

## 2017-12-21 PROCEDURE — 700101 HCHG RX REV CODE 250: Performed by: STUDENT IN AN ORGANIZED HEALTH CARE EDUCATION/TRAINING PROGRAM

## 2017-12-21 PROCEDURE — 36415 COLL VENOUS BLD VENIPUNCTURE: CPT

## 2017-12-21 PROCEDURE — 94640 AIRWAY INHALATION TREATMENT: CPT

## 2017-12-21 RX ORDER — POTASSIUM CHLORIDE 20 MEQ/1
40 TABLET, EXTENDED RELEASE ORAL 2 TIMES DAILY
Status: DISCONTINUED | OUTPATIENT
Start: 2017-12-21 | End: 2017-12-21

## 2017-12-21 RX ORDER — INSULIN GLARGINE 100 [IU]/ML
20 INJECTION, SOLUTION SUBCUTANEOUS EVERY EVENING
Status: DISCONTINUED | OUTPATIENT
Start: 2017-12-21 | End: 2017-12-26

## 2017-12-21 RX ORDER — MAGNESIUM SULFATE HEPTAHYDRATE 40 MG/ML
4 INJECTION, SOLUTION INTRAVENOUS ONCE
Status: COMPLETED | OUTPATIENT
Start: 2017-12-21 | End: 2017-12-21

## 2017-12-21 RX ADMIN — MAGNESIUM GLUCONATE 500 MG ORAL TABLET 400 MG: 500 TABLET ORAL at 08:18

## 2017-12-21 RX ADMIN — INSULIN HUMAN 4 UNITS: 100 INJECTION, SOLUTION PARENTERAL at 17:31

## 2017-12-21 RX ADMIN — OXYCODONE HYDROCHLORIDE 10 MG: 10 TABLET ORAL at 00:46

## 2017-12-21 RX ADMIN — INSULIN HUMAN 3 UNITS: 100 INJECTION, SOLUTION PARENTERAL at 19:43

## 2017-12-21 RX ADMIN — ACETAMINOPHEN 1000 MG: 500 TABLET, FILM COATED ORAL at 17:30

## 2017-12-21 RX ADMIN — OXYCODONE HYDROCHLORIDE 10 MG: 10 TABLET ORAL at 11:39

## 2017-12-21 RX ADMIN — ALBUTEROL SULFATE 2.5 MG: 2.5 SOLUTION RESPIRATORY (INHALATION) at 12:52

## 2017-12-21 RX ADMIN — MAGNESIUM SULFATE IN WATER 4 G: 40 INJECTION, SOLUTION INTRAVENOUS at 08:18

## 2017-12-21 RX ADMIN — DIBASIC SODIUM PHOSPHATE, MONOBASIC POTASSIUM PHOSPHATE AND MONOBASIC SODIUM PHOSPHATE 1 TABLET: 852; 155; 130 TABLET ORAL at 11:39

## 2017-12-21 RX ADMIN — ALPRAZOLAM 0.25 MG: 0.25 TABLET ORAL at 00:21

## 2017-12-21 RX ADMIN — IPRATROPIUM BROMIDE AND ALBUTEROL SULFATE 3 ML: .5; 3 SOLUTION RESPIRATORY (INHALATION) at 16:43

## 2017-12-21 RX ADMIN — DIBASIC SODIUM PHOSPHATE, MONOBASIC POTASSIUM PHOSPHATE AND MONOBASIC SODIUM PHOSPHATE 1 TABLET: 852; 155; 130 TABLET ORAL at 17:30

## 2017-12-21 RX ADMIN — ACETAMINOPHEN 1000 MG: 500 TABLET, FILM COATED ORAL at 11:39

## 2017-12-21 RX ADMIN — INSULIN GLARGINE 15 UNITS: 100 INJECTION, SOLUTION SUBCUTANEOUS at 00:22

## 2017-12-21 RX ADMIN — INSULIN HUMAN 3 UNITS: 100 INJECTION, SOLUTION PARENTERAL at 11:39

## 2017-12-21 RX ADMIN — OXYCODONE HYDROCHLORIDE 10 MG: 10 TABLET ORAL at 06:01

## 2017-12-21 RX ADMIN — IPRATROPIUM BROMIDE AND ALBUTEROL SULFATE 3 ML: .5; 3 SOLUTION RESPIRATORY (INHALATION) at 03:35

## 2017-12-21 RX ADMIN — STANDARDIZED SENNA CONCENTRATE AND DOCUSATE SODIUM 2 TABLET: 8.6; 5 TABLET, FILM COATED ORAL at 08:18

## 2017-12-21 RX ADMIN — OXYCODONE HYDROCHLORIDE 10 MG: 10 TABLET ORAL at 19:48

## 2017-12-21 RX ADMIN — ACETAMINOPHEN 1000 MG: 500 TABLET, FILM COATED ORAL at 06:01

## 2017-12-21 RX ADMIN — MAGNESIUM GLUCONATE 500 MG ORAL TABLET 400 MG: 500 TABLET ORAL at 19:48

## 2017-12-21 RX ADMIN — INSULIN GLARGINE 20 UNITS: 100 INJECTION, SOLUTION SUBCUTANEOUS at 21:48

## 2017-12-21 RX ADMIN — Medication 325 MG: at 08:18

## 2017-12-21 RX ADMIN — DIBASIC SODIUM PHOSPHATE, MONOBASIC POTASSIUM PHOSPHATE AND MONOBASIC SODIUM PHOSPHATE 1 TABLET: 852; 155; 130 TABLET ORAL at 08:18

## 2017-12-21 ASSESSMENT — ENCOUNTER SYMPTOMS
EYE DISCHARGE: 0
CLAUDICATION: 0
COUGH: 1
WEAKNESS: 0
DIZZINESS: 0
FLANK PAIN: 0
NECK PAIN: 0
PALPITATIONS: 0
SORE THROAT: 0
CONSTIPATION: 0
FEVER: 0
VOMITING: 0
SPEECH CHANGE: 0
HEADACHES: 0
SPUTUM PRODUCTION: 0
HEARTBURN: 0
ABDOMINAL PAIN: 0
DOUBLE VISION: 0
SINUS PAIN: 0
EYE PAIN: 0
CHILLS: 0
SENSORY CHANGE: 0
BACK PAIN: 0
MYALGIAS: 0
ORTHOPNEA: 0
DEPRESSION: 0
STRIDOR: 0
BRUISES/BLEEDS EASILY: 0
NAUSEA: 0
SHORTNESS OF BREATH: 0
BLURRED VISION: 0

## 2017-12-21 ASSESSMENT — PAIN SCALES - GENERAL
PAINLEVEL_OUTOF10: 0
PAINLEVEL_OUTOF10: 7
PAINLEVEL_OUTOF10: 8
PAINLEVEL_OUTOF10: 2
PAINLEVEL_OUTOF10: 8
PAINLEVEL_OUTOF10: 8
PAINLEVEL_OUTOF10: 0

## 2017-12-21 ASSESSMENT — LIFESTYLE VARIABLES: SUBSTANCE_ABUSE: 0

## 2017-12-21 NOTE — PROGRESS NOTES
Internal Medicine Interval Note  Note Author: Lilo Hernandes M.D.     Name Niels Lima     1950   Age/Sex 67 y.o. male   MRN [id number]   Code Status Full code     After 5PM or if no immediate response to page, please call for cross-coverage  Attending/Team: Dr. Gomez/ Fco See Patient List for primary contact information  Call [phone] to page    1st Call - Day Intern (R1):   Dr. Hernandes 2nd Call - Day Sr. Resident (R2/R3):   Dr. Lyon         Reason for interval visit  (Principal Problem)   Wound on left lower extremity  Hydronephrosis s/p nephrostomy tube placement  DVT    Interval Problem Daily Status Update  (24 hours)   - Wound vac in place. Being treated with zosyn for wound infection. As per ID , total duration of antibiotics will be 2 weeks from . PICC line placement for continued antibiotics.  - Anticoagulant still on hold for hematuria.  Patient aware of the risks and benefits of holding anticoagulation and is in agreement.  - referral placed for LTAC.         Review of Systems   Constitutional: Negative for chills, fever and malaise/fatigue.   HENT: Negative for sinus pain and sore throat.    Eyes: Negative for blurred vision, double vision, pain and discharge.   Respiratory: Positive for cough. Negative for sputum production, shortness of breath and stridor.    Cardiovascular: Negative for chest pain, palpitations, orthopnea, claudication and leg swelling.   Gastrointestinal: Negative for abdominal pain, constipation, heartburn, nausea and vomiting.   Genitourinary: Negative for dysuria, frequency, hematuria and urgency.   Musculoskeletal: Negative for back pain, myalgias and neck pain.   Skin: Negative for itching and rash.   Neurological: Negative for dizziness, sensory change, speech change, weakness and headaches.   Endo/Heme/Allergies: Does not bruise/bleed easily.   Psychiatric/Behavioral: Negative for depression, substance abuse and suicidal ideas.        Consultants/Specialty  Urology  Nephrology  IR  surgery    Disposition  Inpatient    Quality Measures    Reviewed items::  Labs reviewed and Medications reviewed  Pacheco catheter::  Urinary Tract Retention or Urinary Tract Obstruction  DVT prophylaxis pharmacological::  Contraindicated - High bleeding risk  DVT prophylaxis - mechanical:  SCDs  Ulcer Prophylaxis::  Not indicated          Physical Exam       Vitals:    12/21/17 0335 12/21/17 0535 12/21/17 0800 12/21/17 1253   BP:  139/81 124/72    Pulse: 80 73 63 72   Resp: 20 16 18 20   Temp:  36.9 °C (98.5 °F) 36.2 °C (97.1 °F)    SpO2: 98% 98% 94%    Weight:       Height:         Body mass index is 26.03 kg/m².    Oxygen Therapy:  Pulse Oximetry: 94 %, O2 (LPM): 2, O2 Delivery: Silicone Nasal Cannula    Physical Exam   Constitutional: He is oriented to person, place, and time and well-developed, well-nourished, and in no distress. No distress.   HENT:   Head: Normocephalic and atraumatic.   Mouth/Throat: No oropharyngeal exudate.   Eyes: EOM are normal. Pupils are equal, round, and reactive to light. No scleral icterus.   Neck: Normal range of motion. Neck supple. No JVD present.   Cardiovascular: Normal rate and regular rhythm.    No murmur heard.  Pulmonary/Chest: Effort normal and breath sounds normal. No respiratory distress. He has no wheezes.   Abdominal: Soft. Bowel sounds are normal. He exhibits no distension. There is no tenderness.   Genitourinary:   Genitourinary Comments: Pacheco catheter present draining red urine  B/L nephrostomy tubes present.   Musculoskeletal: He exhibits no edema.   Neurological: He is alert and oriented to person, place, and time. No cranial nerve deficit.   Skin:   Left posterior thigh- dressing CDI  Wound vac present   Psychiatric: Mood, memory, affect and judgment normal.         Lab Data Review:           Recent Labs      12/19/17   0419  12/20/17   0335  12/21/17   0005   SODIUM  138  136  137   POTASSIUM  4.0  3.6  3.5*    CHLORIDE  102  101  101   CO2  32  26  30   BUN  8  9  10   CREATININE  0.39*  0.48*  0.53   MAGNESIUM  1.4*  1.5  1.5   PHOSPHORUS  2.7  2.1*  2.4*   CALCIUM  7.5*  7.6*  8.1*       Recent Labs      12/19/17 0419  12/20/17   0335  12/21/17   0005   ALTSGPT  6  6   --    ASTSGOT  12  14   --    ALKPHOSPHAT  59  62   --    TBILIRUBIN  0.5  0.5   --    GLUCOSE  212*  243*  182*       Recent Labs      12/19/17   0538  12/20/17 0335  12/21/17   0005   RBC  2.54*  2.49*  2.56*   HEMOGLOBIN  8.5*  8.2*  8.5*   HEMATOCRIT  26.2*  25.2*  26.2*   PLATELETCT  162*  186  205       Recent Labs      12/19/17 0419 12/19/17 0538  12/20/17 0335 12/21/17   0005   WBC   --   7.2  7.2  6.3   NEUTSPOLYS   --   81.10*  85.40*  87.40*   LYMPHOCYTES   --   9.00*  7.80*  9.20*   MONOCYTES   --   3.90  5.30  3.40   EOSINOPHILS   --   5.30  1.00  0.00   BASOPHILS   --   0.30  0.10  0.00   ASTSGOT  12   --   14   --    ALTSGPT  6   --   6   --    ALKPHOSPHAT  59   --   62   --    TBILIRUBIN  0.5   --   0.5   --            Assessment/Plan     * Hydronephrosis   Assessment & Plan    S/p bilateral nephrostomy tubes placed on 12/13. Patient's creatinine returned to baseline (0.7) following procedure.  - creatinine normal. Nephrostomy tubes draining well.   - As per urology, on discharge, patient will leave with kaur and nephrostomy tubes.   - will continue to monitor        Abdominal distension   Assessment & Plan    - Resolved  - Most likely secondary to ileus  - Abdomen feels soft, bowel sounds present.  - No nausea/vomiting. Having regular bowel movements.  - Will monitor electrolytes and replace as needed                Acute DVT (deep venous thrombosis) (CMS-[facility])   Assessment & Plan    H/o DVT 3 times in the past. Was on coumadin previously which was changed to dabigatran recently. He also has an IVC filter in place from 2009  - [facility] on warfarin secondary to missed bridging  with lovenox. He was on heparin drip for 7 days and discharged on dabigatran  - LE venous doppler ordered and it showed B/L extensive DVT in both extremities. Acute and chronic.  - patient continues to have blood tinged fluid from kaur. Will hold off anticoagulation for now in the setting of hematuria, patient has an IVC filter in place. Explained to the patient and his wife about the rationale behind holding off anticoagulation, they are in agreement            Extraperitoneal rupture of bladder   Assessment & Plan    S/p rigid cystoscopy with evacuation of 100 ml of blood clot, fulguration of bladder perforation sites and biopsy of posterior bladder wall on 12/11  - Kaur catheter in place. He underwent B/L nephrostomy tube placement on 12/13   - B&O suppository for bladder spasms  - Continues to have reddish urine. Hemoglobin stable  - Pathology from biopsy shows necrotic tissue. No tumor          Shortness of breath   Assessment & Plan    Resolved  Patient complained of shortness of breath on 12/19. On exam, patient appeared to have increased work of breathing with bilateral expiratory rhonchi  CTA pulmonary arteries was obtained to rule out PE given presence of acute DVT which was negative for PE  Patient was started on duoneb and steroids.  Doing much better today.   Will continue duoneb, O2 and RT per protocol.  Steroids discontinued        Wound of left lower extremity   Assessment & Plan    Patient had a large eschar on his left posterior thigh from previous subcutaneous hemorrhage as a result of supra therapeutic INR while on coumadin. He underwent debridement of 20x25 cm eschar of black dead skin on 12/17.   Wound vac in place. Dressing CDI  Wound culture growing enterococcus fecalis. Dr. Kearney on board.  As per Dr. Kearney , patient will need 2 weeks of zosyn with start date 12/17        Protein calorie malnutrition (CMS-HCC)   Assessment & Plan    Pre albumin of 3  Nutrition consulted         Electrolyte abnormality   Assessment & Plan    Will monitor and replace as needed  PO magnesium started        Anemia   Assessment & Plan    Likely secondary to acute blood loss from hematuria  Decreased iron and TIBC. Ferritin normal.   Vitamin B12 lower limits of normal- Vitamin B12 IM daily for 1 week  Continue oral iron        Hyperkalemia   Assessment & Plan    - Resolved  - Likely secondary to JENNIFFER  - Will continue to monitor        Acute kidney injury (CMS-HCC)   Assessment & Plan    Resolved  Baseline creat 0.7.   Bilateral nephrostomy tubes placed on 12/13 for hydronephrosis with improvement in creatinine  Creatinine now at baseline   Will monitor        DM type 2 (diabetes mellitus, type 2) (CMS-HCC)- (present on admission)   Assessment & Plan    Patient on metformin and glipizide as home medication  - HbA1c 7  - Home medications held  - Patient started on SSI and hypoglycemia protocol  - Glargine increased to 20 units for better glycemic control

## 2017-12-21 NOTE — PROGRESS NOTES
Patient unhappy with shred room. He states ' I feel like I'm in the psych enriquez'. He wants his own tv and says his room is the size of a retirement cell. He has c/o L lef pain and is medicated with oxy 10. He has been asking for respiratory therapist and she is at bedside at this time. Pt will transfer to another floor as soon as possible.

## 2017-12-21 NOTE — PROGRESS NOTES
Pt arrived to unit. Oriented to floor and room. Denies pain or nausea at this time. IV abx infusing into PIV. Wound vac on and appropriately working. Pacheco in place with pink tinged urine. Bilateral nephrostomy tubes with clear yellow output. Pt on 2L O2 via nasal cannula. Wife at bedside. Call light within reach, bed in low and locked position, all needs met at this time.

## 2017-12-21 NOTE — PROGRESS NOTES
Transport here to take pt . Medicated for pain before transfer. Medication that are here are sent with pt.

## 2017-12-21 NOTE — FLOWSHEET NOTE
12/20/17 6042   Events/Summary/Plan   Events/Summary/Plan Came to bedside after nursing reported pt requested PRN SVN, Pt sleeping comfortably with no distress noted. Tx not indicated at this time.

## 2017-12-21 NOTE — PROGRESS NOTES
"Pt A&Ox4. VS: /72   Pulse 63   Temp 36.2 °C (97.1 °F)   Resp 18   Ht 1.854 m (6' 1\")   Wt 89.5 kg (197 lb 5 oz)   SpO2 94%   BMI 26.03 kg/m² . Pt denies pain, n/v, numbness, tingling, SOB and chest pain. Bilateral nephrotomies to gravity. Pacheco to gravity. Wound vac to LLE.Pt needs met at this time, call light within reach, hourly rounding in effect, and will continue to monitor.       "

## 2017-12-21 NOTE — PROGRESS NOTES
67 year old male seen in f/u for infected hematoma left thigh.  Has had complicated hospital  course, with bilateral nephrostomy drains for obstruction., wound vac after hematoma debridement, Pacheco catheter. Presently off anticoagulation pending resolution of his hematuria. Has a hx of recurrent DVTs and Filter placement and has been on life long anticoagulation.  Has been up walking in hallway, he is eager to go home - has stresses from needing to attend to his [employer]  business.   Meds- iv pip/tazobactzam   ROS:  RES- continued chest congestion  GI- no diarrhea or nausea on his antibiotic  - Pacheco in  Skin- no rash    PE- afebrile , emotional patient  Lungs- scattered anterior rhonchi  Abd- soft, no distension  Bilateral neprostomy bags - clear urine  Wound vac - left thigh    Assess:  Infected hematoma s/p extensive debridement. No bone involvement.     REC: would suggest total 2 weeks of iv pip/tazobactam for the deep wound infection, with day of surgery being day #1. At the end of two weeks, hopefully will have a clean wound with some granulation tissue to then complete healing   With some many critical  issues, he would be best served in a LTAC type facility at least initially

## 2017-12-21 NOTE — DISCHARGE PLANNING
Clinical Note per Chart Review:   CC: [facility] for possible [facility] and bilateral hydronephrosis  PMH: Recurrent DVT's, IVC filter  DM II, recent orchiectomy 11/11/17.   Urology Consult  Nephrology Consult: s/p  Bilateral nephrostomy tube placement 12/13/17  ID Consult- See MAR abx therapy.  Surgical Consult-Debridement of 20x25 cm wound on Left Posterior Thigh. Wound Vac in place.     Discharge plan;  Per physician notes, discussed SNF with pt and wife, and placed referral on 12/20/17.   SW to continue with discharge plan as appropriate.

## 2017-12-21 NOTE — WOUND TEAM
[facility]  Inpatient Services   Wound and Skin Care Progress Note    Admission Date: 12/10/2017    HPI, PMH, SH: Reviewed  Unit where seen by Wound Team: T8    WOUND CONSULT RELATED TO: scheduled npwt drsg change    SUBJECTIVE:  Very anxious but greeable    Self Report / Pain Level: 10/10 at times with dressing removal and placement    OBJECTIVE:       Prev npwt drsg repaired earlier    WOUND TYPE, LOCATION, CHARACTERISTICS (Pressure ulcers: location, stage, POA or date identified)    Location and type of wound:  Left posterior thigh, full thickness surgical        Periwound:      Intact, slight induration  Drainage:      Min ss  Tissue Type and %:   80% pink/red -- 20% yellow  Wound Edges:     attached  Odor:       none  Exposed structure(s):   muscle  S&S of Infection:     induration  Measurements:    12/20/2017  Length:      ~17cm  Width:       ~21cm  Depth:      ~3cm      INTERVENTIONS BY WOUND TEAM:  Prev drsg removed -- wnd irrigated with wnd cleanser -- benzoin and drape prasanna-wnd -- adaptic over the wnd bed -- 1 piece black foam to fill the wound -- sealed with drape and cont neg pressure reapplied at 125mmhg    Interdisciplinary consultation:  Nsg; pt    EVALUATION:   wnd stable; drsg change a little easier today    Factors affecting wound healing:DM, INR, Infection.     Goals: 100% granulation in 3 weeks -- decrease wnd size X 5% every 2 weeks -- wnd bed appro for stsg?    NURSING PLAN OF CARE ORDERS (x):    Dressing changes: See Dressing Maintenance orders: x  Skin care: See Skin Care orders: x  Rectal tube care: See Rectal Tube Care orders:   Other orders:    WOUND TEAM PLAN OF CARE (x):   NPWT change 3 x week:  x    Dressing changes by wound team:       Follow up as needed:   x    Other (explain):    Anticipated discharge plans (x):  SNF:           Home Care:      Outpatient Wound Center: x         Self Care:            Other:

## 2017-12-21 NOTE — DISCHARGE PLANNING
"Medical Social Work     Per MD, pt is appropriate for it LTAC and MD would like to know if pt would be accepted. NARINDER reached out to [facility] liachristian Valenzuela who stated that pt met clinical criteria and would be accepted. NARINDER asked Bianca to speak with pt and pt's wife in regards to their facility and what's services can be offered. According to [facility] liachristian Valenzuela, pt was very rude and stated \"I am not going to your facility, [facility].\" SW will speak with pt in regards to d/c plan.   "

## 2017-12-21 NOTE — PROGRESS NOTES
Report called to receiving RN. Patient transferred to [place] [id number] with all personal belongings, medications, and chart. Wife notified of room transfer.

## 2017-12-21 NOTE — CARE PLAN
Problem: Safety  Goal: Will remain free from injury  Hourly rounding in effect, pt instructed to call for assistance, bed locked and in lowest position. Bed alarm off, with Alana as second RN.       Problem: Knowledge Deficit  Goal: Knowledge of disease process/condition, treatment plan, diagnostic tests, and medications will improve  Pt updated and educated on nursing interventions, medications and POC

## 2017-12-22 PROBLEM — F41.9 ANXIETY: Status: ACTIVE | Noted: 2017-12-22

## 2017-12-22 LAB
ALBUMIN SERPL BCP-MCNC: 2.3 G/DL (ref 3.2–4.9)
ALBUMIN/GLOB SERPL: 0.6 G/DL
ALP SERPL-CCNC: 66 U/L (ref 30–99)
ALT SERPL-CCNC: 9 U/L (ref 2–50)
ANION GAP SERPL CALC-SCNC: 10 MMOL/L (ref 0–11.9)
AST SERPL-CCNC: 22 U/L (ref 12–45)
BASOPHILS # BLD AUTO: 0 % (ref 0–1.8)
BASOPHILS # BLD: 0 K/UL (ref 0–0.12)
BILIRUB SERPL-MCNC: 0.3 MG/DL (ref 0.1–1.5)
BUN SERPL-MCNC: 9 MG/DL (ref 8–22)
CALCIUM SERPL-MCNC: 8 MG/DL (ref 8.5–10.5)
CHLORIDE SERPL-SCNC: 103 MMOL/L (ref 96–112)
CO2 SERPL-SCNC: 24 MMOL/L (ref 20–33)
CREAT SERPL-MCNC: 0.43 MG/DL (ref 0.5–1.4)
EOSINOPHIL # BLD AUTO: 0.12 K/UL (ref 0–0.51)
EOSINOPHIL NFR BLD: 3.6 % (ref 0–6.9)
ERYTHROCYTE [DISTWIDTH] IN BLOOD BY AUTOMATED COUNT: 62.9 FL (ref 35.9–50)
GFR SERPL CREATININE-BSD FRML MDRD: >60 ML/MIN/1.73 M 2
GLOBULIN SER CALC-MCNC: 3.6 G/DL (ref 1.9–3.5)
GLUCOSE BLD-MCNC: 190 MG/DL (ref 65–99)
GLUCOSE BLD-MCNC: 209 MG/DL (ref 65–99)
GLUCOSE BLD-MCNC: 221 MG/DL (ref 65–99)
GLUCOSE BLD-MCNC: 231 MG/DL (ref 65–99)
GLUCOSE BLD-MCNC: 249 MG/DL (ref 65–99)
GLUCOSE SERPL-MCNC: 173 MG/DL (ref 65–99)
HCT VFR BLD AUTO: 27.4 % (ref 42–52)
HGB BLD-MCNC: 8.8 G/DL (ref 14–18)
IMM GRANULOCYTES # BLD AUTO: 0.01 K/UL (ref 0–0.11)
IMM GRANULOCYTES NFR BLD AUTO: 0.3 % (ref 0–0.9)
LYMPHOCYTES # BLD AUTO: 0.61 K/UL (ref 1–4.8)
LYMPHOCYTES NFR BLD: 18.3 % (ref 22–41)
MAGNESIUM SERPL-MCNC: 1.6 MG/DL (ref 1.5–2.5)
MCH RBC QN AUTO: 33.3 PG (ref 27–33)
MCHC RBC AUTO-ENTMCNC: 32.1 G/DL (ref 33.7–35.3)
MCV RBC AUTO: 103.8 FL (ref 81.4–97.8)
MONOCYTES # BLD AUTO: 0.43 K/UL (ref 0–0.85)
MONOCYTES NFR BLD AUTO: 12.9 % (ref 0–13.4)
NEUTROPHILS # BLD AUTO: 2.16 K/UL (ref 1.82–7.42)
NEUTROPHILS NFR BLD: 64.9 % (ref 44–72)
NRBC # BLD AUTO: 0 K/UL
NRBC BLD-RTO: 0 /100 WBC
PHOSPHATE SERPL-MCNC: 3.4 MG/DL (ref 2.5–4.5)
PLATELET # BLD AUTO: 191 K/UL (ref 164–446)
PMV BLD AUTO: 10.4 FL (ref 9–12.9)
POTASSIUM SERPL-SCNC: 4.1 MMOL/L (ref 3.6–5.5)
PROT SERPL-MCNC: 5.9 G/DL (ref 6–8.2)
RBC # BLD AUTO: 2.64 M/UL (ref 4.7–6.1)
SODIUM SERPL-SCNC: 137 MMOL/L (ref 135–145)
WBC # BLD AUTO: 3.3 K/UL (ref 4.8–10.8)

## 2017-12-22 PROCEDURE — 83735 ASSAY OF MAGNESIUM: CPT

## 2017-12-22 PROCEDURE — 770004 HCHG ROOM/CARE - ONCOLOGY PRIVATE *

## 2017-12-22 PROCEDURE — A9270 NON-COVERED ITEM OR SERVICE: HCPCS | Performed by: STUDENT IN AN ORGANIZED HEALTH CARE EDUCATION/TRAINING PROGRAM

## 2017-12-22 PROCEDURE — 84100 ASSAY OF PHOSPHORUS: CPT

## 2017-12-22 PROCEDURE — 700111 HCHG RX REV CODE 636 W/ 250 OVERRIDE (IP): Performed by: STUDENT IN AN ORGANIZED HEALTH CARE EDUCATION/TRAINING PROGRAM

## 2017-12-22 PROCEDURE — 99232 SBSQ HOSP IP/OBS MODERATE 35: CPT | Mod: GC | Performed by: INTERNAL MEDICINE

## 2017-12-22 PROCEDURE — 36415 COLL VENOUS BLD VENIPUNCTURE: CPT

## 2017-12-22 PROCEDURE — 700105 HCHG RX REV CODE 258: Performed by: SURGERY

## 2017-12-22 PROCEDURE — 85025 COMPLETE CBC W/AUTO DIFF WBC: CPT

## 2017-12-22 PROCEDURE — 700111 HCHG RX REV CODE 636 W/ 250 OVERRIDE (IP): Performed by: SURGERY

## 2017-12-22 PROCEDURE — 700102 HCHG RX REV CODE 250 W/ 637 OVERRIDE(OP): Performed by: STUDENT IN AN ORGANIZED HEALTH CARE EDUCATION/TRAINING PROGRAM

## 2017-12-22 PROCEDURE — 80053 COMPREHEN METABOLIC PANEL: CPT

## 2017-12-22 PROCEDURE — A9270 NON-COVERED ITEM OR SERVICE: HCPCS | Performed by: SURGERY

## 2017-12-22 PROCEDURE — 302128 INFUSION PUMP: Performed by: INTERNAL MEDICINE

## 2017-12-22 PROCEDURE — 82962 GLUCOSE BLOOD TEST: CPT

## 2017-12-22 PROCEDURE — 97606 NEG PRS WND THER DME>50 SQCM: CPT

## 2017-12-22 PROCEDURE — 700102 HCHG RX REV CODE 250 W/ 637 OVERRIDE(OP): Performed by: SURGERY

## 2017-12-22 RX ORDER — MAGNESIUM SULFATE HEPTAHYDRATE 40 MG/ML
2 INJECTION, SOLUTION INTRAVENOUS
Status: COMPLETED | OUTPATIENT
Start: 2017-12-22 | End: 2017-12-22

## 2017-12-22 RX ORDER — CYANOCOBALAMIN 1000 UG/ML
1000 INJECTION, SOLUTION INTRAMUSCULAR; SUBCUTANEOUS
Status: DISCONTINUED | OUTPATIENT
Start: 2017-12-25 | End: 2017-12-29 | Stop reason: HOSPADM

## 2017-12-22 RX ORDER — LIDOCAINE HYDROCHLORIDE 20 MG/ML
20 INJECTION, SOLUTION INFILTRATION; PERINEURAL PRN
Status: DISCONTINUED | OUTPATIENT
Start: 2017-12-22 | End: 2017-12-29 | Stop reason: HOSPADM

## 2017-12-22 RX ADMIN — ACETAMINOPHEN 1000 MG: 500 TABLET, FILM COATED ORAL at 00:16

## 2017-12-22 RX ADMIN — OXYCODONE HYDROCHLORIDE 10 MG: 10 TABLET ORAL at 17:13

## 2017-12-22 RX ADMIN — Medication 325 MG: at 08:16

## 2017-12-22 RX ADMIN — OXYCODONE HYDROCHLORIDE 10 MG: 10 TABLET ORAL at 00:21

## 2017-12-22 RX ADMIN — MAGNESIUM SULFATE IN WATER 2 G: 40 INJECTION, SOLUTION INTRAVENOUS at 08:00

## 2017-12-22 RX ADMIN — HYDROMORPHONE HYDROCHLORIDE 2 MG: 2 INJECTION INTRAMUSCULAR; INTRAVENOUS; SUBCUTANEOUS at 09:31

## 2017-12-22 RX ADMIN — INSULIN HUMAN 1 UNITS: 100 INJECTION, SOLUTION PARENTERAL at 14:23

## 2017-12-22 RX ADMIN — SODIUM CHLORIDE: 9 INJECTION, SOLUTION INTRAVENOUS at 00:16

## 2017-12-22 RX ADMIN — INSULIN HUMAN 2 UNITS: 100 INJECTION, SOLUTION PARENTERAL at 17:06

## 2017-12-22 RX ADMIN — DIBASIC SODIUM PHOSPHATE, MONOBASIC POTASSIUM PHOSPHATE AND MONOBASIC SODIUM PHOSPHATE 1 TABLET: 852; 155; 130 TABLET ORAL at 00:16

## 2017-12-22 RX ADMIN — INSULIN HUMAN 2 UNITS: 100 INJECTION, SOLUTION PARENTERAL at 10:37

## 2017-12-22 RX ADMIN — OXYCODONE HYDROCHLORIDE 10 MG: 10 TABLET ORAL at 23:02

## 2017-12-22 RX ADMIN — MAGNESIUM GLUCONATE 500 MG ORAL TABLET 400 MG: 500 TABLET ORAL at 08:16

## 2017-12-22 RX ADMIN — ACETAMINOPHEN 1000 MG: 500 TABLET, FILM COATED ORAL at 14:19

## 2017-12-22 RX ADMIN — INSULIN GLARGINE 20 UNITS: 100 INJECTION, SOLUTION SUBCUTANEOUS at 20:25

## 2017-12-22 RX ADMIN — MAGNESIUM SULFATE IN WATER 2 G: 40 INJECTION, SOLUTION INTRAVENOUS at 06:49

## 2017-12-22 RX ADMIN — MAGNESIUM GLUCONATE 500 MG ORAL TABLET 400 MG: 500 TABLET ORAL at 20:21

## 2017-12-22 RX ADMIN — OXYCODONE HYDROCHLORIDE 10 MG: 10 TABLET ORAL at 08:18

## 2017-12-22 RX ADMIN — ACETAMINOPHEN 1000 MG: 500 TABLET, FILM COATED ORAL at 06:50

## 2017-12-22 RX ADMIN — INSULIN HUMAN 2 UNITS: 100 INJECTION, SOLUTION PARENTERAL at 20:24

## 2017-12-22 ASSESSMENT — ENCOUNTER SYMPTOMS
BACK PAIN: 0
ABDOMINAL PAIN: 0
BLURRED VISION: 0
DIZZINESS: 0
CONSTIPATION: 0
BRUISES/BLEEDS EASILY: 0
DOUBLE VISION: 0
COUGH: 0
SPUTUM PRODUCTION: 0
DEPRESSION: 0
CHILLS: 0
EYE DISCHARGE: 0
HEARTBURN: 0
ORTHOPNEA: 0
SORE THROAT: 0
NAUSEA: 0
NECK PAIN: 0
SINUS PAIN: 0
MYALGIAS: 0
VOMITING: 0
CLAUDICATION: 0
PALPITATIONS: 0
HEADACHES: 0
SHORTNESS OF BREATH: 0
SPEECH CHANGE: 0
FEVER: 0
SENSORY CHANGE: 0
STRIDOR: 0
WEAKNESS: 0
EYE PAIN: 0

## 2017-12-22 ASSESSMENT — PAIN SCALES - GENERAL
PAINLEVEL_OUTOF10: 8
PAINLEVEL_OUTOF10: 7
PAINLEVEL_OUTOF10: 3
PAINLEVEL_OUTOF10: 8
PAINLEVEL_OUTOF10: 7
PAINLEVEL_OUTOF10: 3
PAINLEVEL_OUTOF10: 0

## 2017-12-22 ASSESSMENT — LIFESTYLE VARIABLES: SUBSTANCE_ABUSE: 0

## 2017-12-22 NOTE — PROGRESS NOTES
Pt alert and oriented x 4. C/o pain of an 8/10 in his posterior L thigh. Medicated as needed for pain with oral oxycodone. Wound vac to posterior L thigh CDI and wound care to come change dressing at 0900. Bilateral PIVs CDI and flushing well fluids and magnesium infusing. Pacheco and bilateral nephrostomies to DD with large amount of clear yellow output. Pt up with one person assist. O2 on at 2L. VSS. Pt resting comfortably at this time. Call light within reach. Hourly rounding in place.

## 2017-12-22 NOTE — DISCHARGE PLANNING
"Medical Social Work    NARINDER Minor and RN GERMAN Wyatt met at bedside to speak with pt. Pt was very upset that he has not seen his Urologist in 10 days and is still bleeding from his kaur. SW stated that I would work on addressing those concerns and finding out when Urology would be coming to see him. Pt stated that he's not going anywhere and \"that conversation is over and not to be mentioned again.\"     SW provided pt with contact information to call and he proceeded to go on and on about how how he has moved from multiple rooms and was placed on a psych enriquez floor with someone who didn't have a bed. SW stated that she was here to assist with d/c planning and address any needs that come up. Pt was very clear and stated \"converstaion over, you can go now.\"    This NARINDER and Yoselin left the room. SW will update [facility] MD cardoso on pt's concerns and how adamant he is on not leaving his room.   "

## 2017-12-22 NOTE — DISCHARGE PLANNING
S/w with Dr. Gomez, [facility]  [facility] level of care.  Notified NARINDER Minor of this, she contacted the transfer center.  Provided pt information and physician contact information, transfer to be completed today if possible.

## 2017-12-22 NOTE — FLOWSHEET NOTE
SVN     12/21/17 1644   Events/Summary/Plan   Events/Summary/Plan SVN   Interdisciplinary Plan of Care-Goals (Indications)   Obstructive Ventilatory Defect or Pulmonary Disease without Obvious Obstruction History / Diagnosis   Education   Education Yes - Pt. / Family has been Instructed in Trach Care   SVN Group   #SVN Performed Yes   Given By: Mouthpiece   Respiratory WDL   Respiratory (WDL) X   Chest Exam   Work Of Breathing / Effort Mild   Respiration 16   Pulse 68   Heart Rate (Monitored) 68   Breath Sounds   Pre/Post Intervention Pre Intervention Assessment   RUL Breath Sounds Clear   RML Breath Sounds Clear   RLL Breath Sounds Diminished   FLORENCE Breath Sounds Clear   LLL Breath Sounds Diminished   Secretions   Cough Non Productive   How Sputum Obtained Spontaneous   Oxygen   O2 (LPM) 2

## 2017-12-22 NOTE — PROGRESS NOTES
Received report from day RN, assumed care 1900.  Pt has PIV to left hand infusing 24 hr antibiotic. Bi lat neph tubes, dressings CDI. Pacheco to dd, dylan output. Wound vac for wound to left posterior/medial area thigh.   Pt c/o of pain to wound vac area, a 8/10 was medicated per MAR.  Sequentials on.   Pt ambulated hallways. All needs met throughout shift.

## 2017-12-22 NOTE — DISCHARGE SUMMARY
Internal Medicine Discharge Summary  Note Author: Sarai Baer M.D.      Admit Date:  12/10/2017       Discharge Date:   12/29/2017    Service:   [facility] [place] Team  Attending Physician(s):   Dr. Kraft       Senior Resident(s):   Dr. Lyon  Geovani Resident(s):   Dr. Baer      Primary Diagnosis:   Extraperitoneal rupture of the bladder    Secondary Diagnoses:                Principal Problem (Resolved):    Hydronephrosis POA: Yes  Active Problems:    Extraperitoneal rupture of bladder POA: Yes    DM type 2 (diabetes mellitus, type 2) (CMS-HCC) POA: Yes    Acute DVT (deep venous thrombosis) (CMS-HCC) POA: Yes    Wound of left lower extremity POA: Yes    Crohn disease (CMS-HCC) POA: Yes    Abdominal distension POA: Yes    Anemia POA: Yes    Protein calorie malnutrition (CMS-HCC) POA: Yes    Anxiety POA: Unknown  Resolved Problems:    Acute kidney injury (CMS-HCC) POA: Yes    Hyperkalemia POA: Yes    Electrolyte abnormality POA: Yes    Shortness of breath POA: Yes      Hospital Summary (Brief Narrative):       Patient is a 67 year old male with a past medical history of 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   Creatinine prior to transfer was 2.6 increased from 1.0 on the previous day. Admission labs showed BUN/creat of 40/3.27 and Hb/hct of 8.9/27. Urology was consulted.  CT cystogram was done which showed an extraperitoneal perforation and fluid in the pelvis. Patient underwent an an emergent cystoscopy on 12/10 with evacuation of 100 ml of old blood clot and fulguration of bladder perforation. He also had a biopsy of posterior bladder wall at that time which showed  extensive necrotic tissue, no tumor. Kaur catheter was left in place and CBI was started. CBI was then weaned off in 2 days. Patient however continued to have worsening renal function in spite of kaur draining well with creatinine increasing to 6.53. He also had worsening abdominal distension. CT abdomen showed ileus and persistent bilateral hydronephrosis. Bilateral nephrostomy tubes were placed on 12/13 with immediate resolution of JENNIFFER. Repeat renal u/s 12/28 showed resolution of the hydronephrosis.   Ileus was managed conservatively with NG tube and replacement of electrolytes with resolution.   Lower extremity venous doppler was done given his history of DVT which showed acute on chronic thrombosis. After discussion of risks and benefits with patient and his wife, anticoagulation was held in the setting of hematuria initially. Patient has an IVC filter in place. On 12/28 pt was restarted on his home dose of dabigatran per urology and was monitored closely for any bleeding. Pt remained hemodynamically stable with a stable h&h.  Patient had a 20 x 15 cm eschar on his left posterior thigh on admission from previous subcutaneous hemorrhage which was managed by wound care. Wound care recommended debridement for concern of infection. Surgery was consulted who performed debridement of 25 x 20 cm area. Patient tolerated the procedure well with placement of wound vac the following day. Wound culture came back positive for enterococcus, citrobacter and bacteroids. Infectious disease was consulted ( Dr. Kearney) who started patient on zosyn to be continued for total of 2 weeks.  Patient is hemodynamically stable for discharge. He has a kaur catheter, nephrostomy tubes and wound vac in place. Patient will need to follow up with urology as outpatient within 1 week of discharge. He will need regular wound care at the wound clinic.  Antibiotics are to be continued till 12/30/2017  Patient will be discharged in a stable  condition to [facility] care            Patient /Hospital Summary (Details -- Problem Oriented) :          Extraperitoneal rupture of bladder   Assessment & Plan    S/p rigid cystoscopy with evacuation of 100 ml of blood clot, fulguration of bladder perforation sites and biopsy of posterior bladder wall on 12/11. Pathology shows necrotic tissue. No tumor  B/L nephrostomy tube placement on 12/13   Kaur catheter in place- draining clear urine, no blood seen  Hb stable at 9.5  Plan:  Continue B&O suppository for bladder spasms  To f/u with urology in a week after discharge          * Hydronephrosis-resolved as of 12/29/2017   Assessment & Plan    S/p b/l nephrostomy tubes 12/13  Cr returned to baseline of 0.4-0.5  Nephrostomy tubes draining clear fluid  Renal u/s ordered to look for improvement in the hydronephrosis- no hydronephrosis- resolved  Contacted urology- pt will be discharged on kaur and nephrostomy tubes  Internalization to be done as an outpatient  Pt to be f/u with Dr. Upton in his office within a week of discharge          Wound of left lower extremity   Assessment & Plan    Patient had a large eschar on his left posterior thigh from previous subcutaneous hemorrhage as a result of supra therapeutic INR while on coumadin.   s/p debridement of 20x25 cm eschar of black dead skin on 12/17.   Wound vac in place.   Wound culture positive for enterococcus fecalis, citrobacter and bacteroids.   Dr. Kearney on board- recommended zosyn  Plan:  Continue zosyn- day 12 today  Continue wound care per protocol  As per Dr. Kearney,  patient will need 2 weeks of zosyn with start date 12/17  Pt will need to f/u with wound clinic after discharge- orders in place        Acute DVT (deep venous thrombosis) (CMS-[facility])   Assessment & Plan    Past h/o DVT- as per pt 3 episodes in the past, s/p IVC filter in 2009  Was on coumadin and [facility] times in the past.  LE venous doppler 12/13  showed  B/L extensive DVT in both extremities. Acute and chronic.  Pacheco and nephrostomy tubes draining clear urine  As per urology, ok to start dabigatran  Plan:  Started dabigatran 150mg bid- his home dose  No bleeding episodes  Continue home dose  To be monitored            DM type 2 (diabetes mellitus, type 2) (CMS-HCC)   Assessment & Plan    Home meds include metformin and glipizide- conitnue after discharge  A1C at 7  POCs between 200s .  Plan:  Monitor POCs  To be f/u by PCP        Anxiety   Assessment & Plan    Patient stable to be discharged to [facility] for continued IV antibiotics  Pending discharge tomm am  Anxious about getting discharged to another facility        Protein calorie malnutrition (CMS-HCC)   Assessment & Plan    Pre albumin of 3  Nutrition consulted  Appreciate recs        Anemia   Assessment & Plan    Iron deficiency anemia  2/2 blood loss from hematuria  Low iron and TIBC, Ferritin wnl  Vitamin B12 low, got 5 does of IM B12 injections  Plan:  Continue oral iron  Continue IM b12 weekly for 1 month          Abdominal distension   Assessment & Plan     Resolved  Likely 2/2 ileus  Abdomen soft, non distended with normal bowel sounds.  Pt did have a BM  Will CTM        Crohn disease (CMS-HCC)   Assessment & Plan    Patient on mesalamine and mercaptopurine as home medications  Restarted mesalamine  Mercaptopurine on hold in the setting of active infection        Shortness of breath-resolved as of 12/29/2017   Assessment & Plan    Resolved  Pt c/o SOB on 12/19, had increased work of breathing with b/l exp rhonchi  CTA ruled out PE, has acute DVT this admission.  Pt denies any SOB today  On exam: CTA b/l  CXR was repeated which showed chronic bronchitis  Plan:  Continue duonebs qid and albuterol to q2hrs prn  RT and O2 per protocol  Continuous pulse ox.  Encourage incentive spirometry          Electrolyte abnormality-resolved as of 12/29/2017   Assessment & Plan    Hypokalemia, hypomagnesemia  Will monitor  and replace as needed  Continue Po magnesium        Hyperkalemia-resolved as of 2017   Assessment & Plan    Resolved  2/2 JENNIFFER  Will CTM        Acute kidney injury (CMS-HCC)-resolved as of 2017   Assessment & Plan    Resolved  Baseline creat 0.7.   Bilateral nephrostomy tubes placed on  for hydronephrosis with improvement in creatinine  Creatinine now at baseline               Consultants:     Urology  Nephrology  Infectious diseases    Procedures:        12/10/2017 Rigid cystoscopy, evacuation of blood clots, transurethral biopsy of posterior bladder wall, fulguration of bladder perforation sites with cystogram and drainage films.    2017 B/L percutaneous nephrostomy placement and nephrostogram with u/s and fluoroscopic guidance    2017  Left posterior thigh wound-  Wound debridement, 25x20 cm area.    Imaging/ Testin/10/2017 Cystoscopy   12/10/2017  2017 Renal u/s  12/10/2017 CT pelvis  2017 Xray abd  2017 CT abd/pevis  2017 U/S gallbladder  2017 LE venous doppler  2017 CTA chest  2017 cxr    Discharge Medications:         Medication Reconciliation: Completed   Niels Lima   Home Medication Instructions ISMAEL:[id number]    Printed on:17 1209   Medication Information                      benzonatate (TESSALON) 100 MG Cap  Take 1 Cap by mouth 3 times a day as needed for Cough.             cyanocobalamin (VITAMIN B-12) 1000 MCG/ML Solution  1 mL by Intramuscular route every 7 days for 10 doses.             dabigatran (PRADAXA) 150 MG Cap capsule  Take 1 Cap by mouth 2 Times a Day.             ferrous sulfate 325 (65 Fe) MG tablet  Take 1 Tab by mouth every morning with breakfast.             glipiZIDE (GLUCOTROL) 5 MG Tab  Take 5 mg by mouth 2 times a day.             magnesium oxide (MAG-OX) 400 (241.3 Mg) MG Tab tablet  Take 1 Tab by mouth 3 times a day.             mercaptopurine (PURINETHOL)  50 MG Tab  Take 150 mg by mouth every day.             Mesalamine 0.375 GM CAPSULE SR 24 HR  Take 1 Cap by mouth 3 times a day.             metformin (GLUCOPHAGE) 1000 MG tablet  Take 1,000 mg by mouth 2 times a day, with meals.             NS SOLN 500 mL with piperacillin-tazobactam 40.5 (36-4.5) g SOLR 10.125 g  40.5 g by Intravenous route Continuous for 2 days.             opium-belladonna (B&O SUPPRETTES) 16.2-30 MG Suppos  Insert 1 Suppository in rectum 2 times a day as needed for up to 7 days.             oxycodone immediate-release (ROXICODONE) 5 MG Tab  Take 1 Tab by mouth every four hours as needed.             phosphorus (K-PHOS-NEUTRAL, PHOSPHA 250 NEUTRAL) 155-852-130 MG tablet  Take 1 Tab by mouth 2 times a day.             tamsulosin (FLOMAX) 0.4 MG capsule  Take 0.4 mg by mouth every day.                     Disposition:   Pt discharged in a stable condition to [facility]    Diet:   Healthy diet    Activity:   As tolerated    Instructions:      Patient to f/u with urology within 1 week of discharge to discuss about internalization of the stents and kaur catheter  Pt to f/u regularly with the wound clinic  Pt to f/u with the PCP in 1 week of discharge     The patient was instructed to return to the ER in the event of worsening symptoms. I have counseled the patient on the importance of compliance and the patient has agreed to proceed with all medical recommendations and follow up plan indicated above.   The patient understands that all medications come with benefits and risks. Risks may include permanent injury or death and these risks can be minimized with close reassessment and monitoring.        Primary Care Provider:    Pcp Not In Computer    Discharge summary faxed to primary care provider:  Deferred  Copy of discharge summary given to the patient: Deferred      Follow up appointment details :      Pt to f/u with PCP in a week.  Patient to f/u with urology within 1 week of discharge to discuss  about internalization of the stents and kaur catheter  Pt to f/u regularly with the wound clinic      Pending Studies:        None    Time spent on discharge day patient visit, preparing discharge paperwork and arranging for patient follow up.      Discharge Time (Minutes) :    30  Hospital Course Type:  Inpatient Stay >2 midnights      Condition on Discharge    ______________________________________________________________________    Interval history/exam for day of discharge:     Pt feels better  No bleeding after the dabigatran  Wound site looks ok  Nephrostomy and kaur draining clear urine  To be discharged in a stable condition    Vitals:    12/28/17 2000 12/29/17 0334 12/29/17 0417 12/29/17 0800   BP:  126/78  131/83   Pulse:  75 70 82   Resp:  18 20 18   Temp:  37.1 °C (98.7 °F)  36.5 °C (97.7 °F)   SpO2: 92% 93% 97% 93%   Weight:       Height:         Weight/BMI: Body mass index is 22.89 kg/m².  Pulse Oximetry: 93 %, O2 (LPM): 0, O2 Delivery: None (Room Air)        Most Recent Labs:    Lab Results   Component Value Date/Time    WBC 4.1 (L) 12/29/2017 12:12 AM    RBC 2.97 (L) 12/29/2017 12:12 AM    HEMOGLOBIN 9.5 (L) 12/29/2017 12:12 AM    HEMATOCRIT 29.4 (L) 12/29/2017 12:12 AM    MCV 99.0 (H) 12/29/2017 12:12 AM    MCH 32.0 12/29/2017 12:12 AM    MCHC 32.3 (L) 12/29/2017 12:12 AM    MPV 9.4 12/29/2017 12:12 AM    NEUTSPOLYS 67.60 12/29/2017 12:12 AM    LYMPHOCYTES 15.70 (L) 12/29/2017 12:12 AM    MONOCYTES 9.60 12/29/2017 12:12 AM    EOSINOPHILS 6.40 12/29/2017 12:12 AM    BASOPHILS 0.20 12/29/2017 12:12 AM    ANISOCYTOSIS 2+ 12/26/2017 11:48 PM      Lab Results   Component Value Date/Time    SODIUM 137 12/29/2017 12:12 AM    POTASSIUM 3.8 12/29/2017 12:12 AM    CHLORIDE 104 12/29/2017 12:12 AM    CO2 25 12/29/2017 12:12 AM    GLUCOSE 238 (H) 12/29/2017 12:12 AM    BUN 10 12/29/2017 12:12 AM    CREATININE 0.52 12/29/2017 12:12 AM      Lab Results   Component Value Date/Time    ALTSGPT 12 12/29/2017 12:12  AM    ASTSGOT 14 12/29/2017 12:12 AM    ALKPHOSPHAT 61 12/29/2017 12:12 AM    TBILIRUBIN 0.3 12/29/2017 12:12 AM    ALBUMIN 2.4 (L) 12/29/2017 12:12 AM    GLOBULIN 3.3 12/29/2017 12:12 AM    PREALBUMIN 3.0 (L) 12/15/2017 04:15 AM    INR 1.69 (H) 12/12/2017 11:52 PM    MACROCYTOSIS 1+ 12/26/2017 11:48 PM     Lab Results   Component Value Date/Time    PROTHROMBTM 19.6 (H) 12/12/2017 11:52 PM    INR 1.69 (H) 12/12/2017 11:52 PM

## 2017-12-22 NOTE — PROGRESS NOTES
12-22 0750 PICC RN called to check on consent. Per floor RN patient wants MD to talk to Wife regarding PICC but wife works nights and will NOT be in till after 12pm. Will Recheck later today and or asked floor RN to call PICC if consent obtained. PICC available for placement as soon as consents are signed. Thank you.

## 2017-12-22 NOTE — DISCHARGE PLANNING
Medical Social Work    SW spoke [facility] [phone] transfer center and [facility] will begin working on transfer.     Plan[facility].

## 2017-12-23 LAB
ANION GAP SERPL CALC-SCNC: 6 MMOL/L (ref 0–11.9)
BASOPHILS # BLD AUTO: 0.2 % (ref 0–1.8)
BASOPHILS # BLD: 0.01 K/UL (ref 0–0.12)
BUN SERPL-MCNC: 12 MG/DL (ref 8–22)
CALCIUM SERPL-MCNC: 8.4 MG/DL (ref 8.5–10.5)
CHLORIDE SERPL-SCNC: 100 MMOL/L (ref 96–112)
CO2 SERPL-SCNC: 31 MMOL/L (ref 20–33)
CREAT SERPL-MCNC: 0.63 MG/DL (ref 0.5–1.4)
EOSINOPHIL # BLD AUTO: 0.19 K/UL (ref 0–0.51)
EOSINOPHIL NFR BLD: 4.4 % (ref 0–6.9)
ERYTHROCYTE [DISTWIDTH] IN BLOOD BY AUTOMATED COUNT: 59.7 FL (ref 35.9–50)
GFR SERPL CREATININE-BSD FRML MDRD: >60 ML/MIN/1.73 M 2
GLUCOSE BLD-MCNC: 162 MG/DL (ref 65–99)
GLUCOSE BLD-MCNC: 205 MG/DL (ref 65–99)
GLUCOSE BLD-MCNC: 213 MG/DL (ref 65–99)
GLUCOSE BLD-MCNC: 255 MG/DL (ref 65–99)
GLUCOSE SERPL-MCNC: 196 MG/DL (ref 65–99)
HCT VFR BLD AUTO: 30 % (ref 42–52)
HGB BLD-MCNC: 9.5 G/DL (ref 14–18)
IMM GRANULOCYTES # BLD AUTO: 0 K/UL (ref 0–0.11)
IMM GRANULOCYTES NFR BLD AUTO: 0 % (ref 0–0.9)
LYMPHOCYTES # BLD AUTO: 0.71 K/UL (ref 1–4.8)
LYMPHOCYTES NFR BLD: 16.6 % (ref 22–41)
MAGNESIUM SERPL-MCNC: 1.7 MG/DL (ref 1.5–2.5)
MCH RBC QN AUTO: 31.8 PG (ref 27–33)
MCHC RBC AUTO-ENTMCNC: 31.7 G/DL (ref 33.7–35.3)
MCV RBC AUTO: 100.3 FL (ref 81.4–97.8)
MONOCYTES # BLD AUTO: 0.48 K/UL (ref 0–0.85)
MONOCYTES NFR BLD AUTO: 11.2 % (ref 0–13.4)
NEUTROPHILS # BLD AUTO: 2.88 K/UL (ref 1.82–7.42)
NEUTROPHILS NFR BLD: 67.6 % (ref 44–72)
NRBC # BLD AUTO: 0 K/UL
NRBC BLD-RTO: 0 /100 WBC
PHOSPHATE SERPL-MCNC: 2.9 MG/DL (ref 2.5–4.5)
PLATELET # BLD AUTO: 216 K/UL (ref 164–446)
PMV BLD AUTO: 9.8 FL (ref 9–12.9)
POTASSIUM SERPL-SCNC: 3.5 MMOL/L (ref 3.6–5.5)
RBC # BLD AUTO: 2.99 M/UL (ref 4.7–6.1)
SODIUM SERPL-SCNC: 137 MMOL/L (ref 135–145)
WBC # BLD AUTO: 4.3 K/UL (ref 4.8–10.8)

## 2017-12-23 PROCEDURE — A9270 NON-COVERED ITEM OR SERVICE: HCPCS | Performed by: STUDENT IN AN ORGANIZED HEALTH CARE EDUCATION/TRAINING PROGRAM

## 2017-12-23 PROCEDURE — 85025 COMPLETE CBC W/AUTO DIFF WBC: CPT

## 2017-12-23 PROCEDURE — 83735 ASSAY OF MAGNESIUM: CPT

## 2017-12-23 PROCEDURE — 84100 ASSAY OF PHOSPHORUS: CPT

## 2017-12-23 PROCEDURE — 770004 HCHG ROOM/CARE - ONCOLOGY PRIVATE *

## 2017-12-23 PROCEDURE — 700105 HCHG RX REV CODE 258: Performed by: STUDENT IN AN ORGANIZED HEALTH CARE EDUCATION/TRAINING PROGRAM

## 2017-12-23 PROCEDURE — 700111 HCHG RX REV CODE 636 W/ 250 OVERRIDE (IP): Performed by: STUDENT IN AN ORGANIZED HEALTH CARE EDUCATION/TRAINING PROGRAM

## 2017-12-23 PROCEDURE — 700102 HCHG RX REV CODE 250 W/ 637 OVERRIDE(OP): Performed by: STUDENT IN AN ORGANIZED HEALTH CARE EDUCATION/TRAINING PROGRAM

## 2017-12-23 PROCEDURE — 36415 COLL VENOUS BLD VENIPUNCTURE: CPT

## 2017-12-23 PROCEDURE — 82962 GLUCOSE BLOOD TEST: CPT

## 2017-12-23 PROCEDURE — 80048 BASIC METABOLIC PNL TOTAL CA: CPT

## 2017-12-23 PROCEDURE — A9270 NON-COVERED ITEM OR SERVICE: HCPCS | Performed by: SURGERY

## 2017-12-23 PROCEDURE — 99232 SBSQ HOSP IP/OBS MODERATE 35: CPT | Mod: GC | Performed by: INTERNAL MEDICINE

## 2017-12-23 PROCEDURE — 700105 HCHG RX REV CODE 258: Performed by: SURGERY

## 2017-12-23 PROCEDURE — 700102 HCHG RX REV CODE 250 W/ 637 OVERRIDE(OP): Performed by: SURGERY

## 2017-12-23 PROCEDURE — 700111 HCHG RX REV CODE 636 W/ 250 OVERRIDE (IP): Performed by: SURGERY

## 2017-12-23 RX ORDER — POTASSIUM CHLORIDE 20 MEQ/1
40 TABLET, EXTENDED RELEASE ORAL 3 TIMES DAILY
Status: DISCONTINUED | OUTPATIENT
Start: 2017-12-23 | End: 2017-12-23

## 2017-12-23 RX ORDER — MESALAMINE 400 MG/1
400 CAPSULE, DELAYED RELEASE ORAL 3 TIMES DAILY
Status: DISCONTINUED | OUTPATIENT
Start: 2017-12-23 | End: 2017-12-29 | Stop reason: HOSPADM

## 2017-12-23 RX ORDER — POTASSIUM CHLORIDE 20 MEQ/1
40 TABLET, EXTENDED RELEASE ORAL 2 TIMES DAILY
Status: COMPLETED | OUTPATIENT
Start: 2017-12-23 | End: 2017-12-23

## 2017-12-23 RX ORDER — MESALAMINE 0.38 G/1
1 CAPSULE, EXTENDED RELEASE ORAL 3 TIMES DAILY
Status: DISCONTINUED | OUTPATIENT
Start: 2017-12-23 | End: 2017-12-23

## 2017-12-23 RX ADMIN — OXYCODONE HYDROCHLORIDE 10 MG: 10 TABLET ORAL at 14:31

## 2017-12-23 RX ADMIN — POTASSIUM CHLORIDE 40 MEQ: 1500 TABLET, EXTENDED RELEASE ORAL at 21:48

## 2017-12-23 RX ADMIN — INSULIN HUMAN 1 UNITS: 100 INJECTION, SOLUTION PARENTERAL at 18:31

## 2017-12-23 RX ADMIN — SODIUM CHLORIDE: 9 INJECTION, SOLUTION INTRAVENOUS at 01:30

## 2017-12-23 RX ADMIN — OXYCODONE HYDROCHLORIDE 10 MG: 10 TABLET ORAL at 21:57

## 2017-12-23 RX ADMIN — MESALAMINE 400 MG: 400 CAPSULE, DELAYED RELEASE ORAL at 14:31

## 2017-12-23 RX ADMIN — POTASSIUM CHLORIDE 40 MEQ: 1500 TABLET, EXTENDED RELEASE ORAL at 07:48

## 2017-12-23 RX ADMIN — INSULIN GLARGINE 20 UNITS: 100 INJECTION, SOLUTION SUBCUTANEOUS at 21:48

## 2017-12-23 RX ADMIN — STANDARDIZED SENNA CONCENTRATE AND DOCUSATE SODIUM 2 TABLET: 8.6; 5 TABLET, FILM COATED ORAL at 21:48

## 2017-12-23 RX ADMIN — INSULIN HUMAN 3 UNITS: 100 INJECTION, SOLUTION PARENTERAL at 12:03

## 2017-12-23 RX ADMIN — Medication 325 MG: at 07:48

## 2017-12-23 RX ADMIN — MESALAMINE 400 MG: 400 CAPSULE, DELAYED RELEASE ORAL at 21:48

## 2017-12-23 RX ADMIN — MAGNESIUM GLUCONATE 500 MG ORAL TABLET 400 MG: 500 TABLET ORAL at 07:48

## 2017-12-23 RX ADMIN — MAGNESIUM SULFATE HEPTAHYDRATE 4 G: 500 INJECTION, SOLUTION INTRAMUSCULAR; INTRAVENOUS at 07:48

## 2017-12-23 RX ADMIN — OXYCODONE HYDROCHLORIDE 10 MG: 10 TABLET ORAL at 18:27

## 2017-12-23 RX ADMIN — INSULIN HUMAN 2 UNITS: 100 INJECTION, SOLUTION PARENTERAL at 09:58

## 2017-12-23 RX ADMIN — OXYCODONE HYDROCHLORIDE 10 MG: 10 TABLET ORAL at 05:20

## 2017-12-23 RX ADMIN — MESALAMINE 400 MG: 400 CAPSULE, DELAYED RELEASE ORAL at 10:17

## 2017-12-23 RX ADMIN — INSULIN HUMAN 2 UNITS: 100 INJECTION, SOLUTION PARENTERAL at 21:49

## 2017-12-23 RX ADMIN — OXYCODONE HYDROCHLORIDE 10 MG: 10 TABLET ORAL at 10:17

## 2017-12-23 RX ADMIN — MAGNESIUM GLUCONATE 500 MG ORAL TABLET 400 MG: 500 TABLET ORAL at 21:48

## 2017-12-23 ASSESSMENT — ENCOUNTER SYMPTOMS
HEADACHES: 0
NECK PAIN: 0
CLAUDICATION: 0
ORTHOPNEA: 0
PALPITATIONS: 0
DEPRESSION: 0
DOUBLE VISION: 0
MYALGIAS: 0
COUGH: 0
CHILLS: 0
BRUISES/BLEEDS EASILY: 0
BACK PAIN: 0
FEVER: 0
BLURRED VISION: 0
ABDOMINAL PAIN: 0
HEARTBURN: 0
SPUTUM PRODUCTION: 0
EYE PAIN: 0
STRIDOR: 0
CONSTIPATION: 0
SINUS PAIN: 0
SHORTNESS OF BREATH: 0
EYE DISCHARGE: 0
WEAKNESS: 0
SENSORY CHANGE: 0
VOMITING: 0
DIZZINESS: 0
NAUSEA: 0
SPEECH CHANGE: 0
SORE THROAT: 0

## 2017-12-23 ASSESSMENT — PAIN SCALES - GENERAL
PAINLEVEL_OUTOF10: 0
PAINLEVEL_OUTOF10: 8
PAINLEVEL_OUTOF10: 3
PAINLEVEL_OUTOF10: 8
PAINLEVEL_OUTOF10: 3
PAINLEVEL_OUTOF10: 7
PAINLEVEL_OUTOF10: 8
PAINLEVEL_OUTOF10: 3
PAINLEVEL_OUTOF10: 8

## 2017-12-23 ASSESSMENT — LIFESTYLE VARIABLES
DO YOU DRINK ALCOHOL: NO
SUBSTANCE_ABUSE: 0

## 2017-12-23 NOTE — ASSESSMENT & PLAN NOTE
Patient on mesalamine and mercaptopurine as home medications  Restarted mesalamine  Mercaptopurine on hold in the setting of active infection

## 2017-12-23 NOTE — CARE PLAN
Problem: Communication  Goal: The ability to communicate needs accurately and effectively will improve  Patient able to verbally communicate in English    Problem: Infection  Goal: Will remain free from infection  Patient being followed by infectious disease.  Continuous IV infusion of antibiotics.    Problem: Venous Thromboembolism (VTW)/Deep Vein Thrombosis (DVT) Prevention:  Goal: Patient will participate in Venous Thrombosis (VTE)/Deep Vein Thrombosis (DVT)Prevention Measures  SCDs ordered and in plae.    Problem: Discharge Barriers/Planning  Goal: Patient's continuum of care needs will be met  Patient not medically cleared for discharge.  Anticipate IV antibiotics until 12/31/17.

## 2017-12-23 NOTE — PROGRESS NOTES
Patient non-compliant with nutrition.  Patient ate 100% of breakfast.  After eating breakfast, family brought in food from [employer].  Patient had a breakfast sandwich, sausage, egg and cheese McMuffin.

## 2017-12-23 NOTE — PROGRESS NOTES
Internal Medicine Interval Note  Note Author: Lilo Hernandes M.D.     Name Niels Lima     1950   Age/Sex 67 y.o. male   MRN [id number]   Code Status Full code     After 5PM or if no immediate response to page, please call for cross-coverage  Attending/Team: Dr. Gomez/ Fco See Patient List for primary contact information  Call [phone] to page    1st Call - Day Intern (R1):   Dr. Hernandes 2nd Call - Day Sr. Resident (R2/R3):   Dr. Lyon         Reason for interval visit  (Principal Problem)   Wound on left lower extremity  Hydronephrosis s/p nephrostomy tube placement  DVT    Interval Problem Daily Status Update  (24 hours)   - Met with patient this morning and answered all questions.   - Resumed home medication mesalamine for Crohn's disease. Mercaptopurine on hold  - Wound vac in place. CDI. Zosyn for wound infection. Total duration is 2 weeks from   - Anticoagulation on hold for hematuria. Patient aware of the risks and benefits of holding anticoagulation and is in agreement.  - Pacheco and nephrostomy tubes draining well      Review of Systems   Constitutional: Negative for chills, fever and malaise/fatigue.   HENT: Negative for sinus pain and sore throat.    Eyes: Negative for blurred vision, double vision, pain and discharge.   Respiratory: Negative for cough, sputum production, shortness of breath and stridor.    Cardiovascular: Negative for chest pain, palpitations, orthopnea, claudication and leg swelling.   Gastrointestinal: Negative for abdominal pain, constipation, heartburn, nausea and vomiting.   Genitourinary: Negative for dysuria, frequency, hematuria and urgency.   Musculoskeletal: Negative for back pain, myalgias and neck pain.        Pain at the site of surgery   Skin: Negative for itching and rash.   Neurological: Negative for dizziness, sensory change, speech change, weakness and headaches.   Endo/Heme/Allergies: Does not bruise/bleed easily.   Psychiatric/Behavioral:  Negative for depression, substance abuse and suicidal ideas.       Consultants/Specialty  Urology  Nephrology  IR  surgery    Disposition  Inpatient    Quality Measures    Reviewed items::  Labs reviewed and Medications reviewed  Pacheco catheter::  Urologic Surgery or Other Surgery on Contiguous Structures of the Genitourinary Tract or Studies  DVT prophylaxis pharmacological::  Contraindicated - High bleeding risk  DVT prophylaxis - mechanical:  SCDs  Ulcer Prophylaxis::  Not indicated          Physical Exam       Vitals:    12/22/17 1600 12/22/17 2031 12/23/17 0526 12/23/17 0800   BP: 140/79 138/78 133/73 128/78   Pulse: 75 82 75 71   Resp: 18 18 18 17   Temp: 36.4 °C (97.6 °F) 36.9 °C (98.5 °F) 36.6 °C (97.9 °F) 36.7 °C (98 °F)   SpO2: 97% 98% 96% 93%   Weight:       Height:         Body mass index is 26.03 kg/m².    Oxygen Therapy:  Pulse Oximetry: 93 %, O2 (LPM): 0, O2 Delivery: None (Room Air)    Physical Exam   Constitutional: He is oriented to person, place, and time and well-developed, well-nourished, and in no distress. No distress.   HENT:   Head: Normocephalic and atraumatic.   Mouth/Throat: No oropharyngeal exudate.   Eyes: EOM are normal. Pupils are equal, round, and reactive to light. No scleral icterus.   Neck: Normal range of motion. Neck supple. No JVD present.   Cardiovascular: Normal rate and regular rhythm.    No murmur heard.  Pulmonary/Chest: Effort normal and breath sounds normal. No respiratory distress. He has no wheezes.   Abdominal: Soft. Bowel sounds are normal. He exhibits no distension. There is no tenderness.   Genitourinary:   Genitourinary Comments: Pacheco catheter present draining blood tinged urine  B/L nephrostomy tubes present.   Musculoskeletal: He exhibits no edema.   Neurological: He is alert and oriented to person, place, and time. No cranial nerve deficit.   Skin:   Left posterior thigh- dressing CDI  Wound vac present   Psychiatric: Mood, memory, affect and judgment normal.          Lab Data Review:           Recent Labs      12/21/17   0005  12/22/17   0012  12/23/17   0011   SODIUM  137  137  137   POTASSIUM  3.5*  4.1  3.5*   CHLORIDE  101  103  100   CO2  30  24  31   BUN  10  9  12   CREATININE  0.53  0.43*  0.63   MAGNESIUM  1.5  1.6  1.7   PHOSPHORUS  2.4*  3.4  2.9   CALCIUM  8.1*  8.0*  8.4*       Recent Labs      12/21/17   0005  12/22/17 0012  12/23/17   0011   ALTSGPT   --   9   --    ASTSGOT   --   22   --    ALKPHOSPHAT   --   66   --    TBILIRUBIN   --   0.3   --    GLUCOSE  182*  173*  196*       Recent Labs      12/21/17   0005  12/22/17 0433 12/23/17   0011   RBC  2.56*  2.64*  2.99*   HEMOGLOBIN  8.5*  8.8*  9.5*   HEMATOCRIT  26.2*  27.4*  30.0*   PLATELETCT  205  191  216       Recent Labs      12/21/17   0005  12/22/17 0012  12/22/17 0433 12/23/17   0011   WBC  6.3   --   3.3*  4.3*   NEUTSPOLYS  87.40*   --   64.90  67.60   LYMPHOCYTES  9.20*   --   18.30*  16.60*   MONOCYTES  3.40   --   12.90  11.20   EOSINOPHILS  0.00   --   3.60  4.40   BASOPHILS  0.00   --   0.00  0.20   ASTSGOT   --   22   --    --    ALTSGPT   --   9   --    --    ALKPHOSPHAT   --   66   --    --    TBILIRUBIN   --   0.3   --    --            Assessment/Plan     * Hydronephrosis   Assessment & Plan    S/p bilateral nephrostomy tubes placed on 12/13. Patient's creatinine returned to baseline (0.7) following procedure.  - creatinine normal. Nephrostomy tubes draining well.   - As per urology, on discharge, patient will leave with kaur and nephrostomy tubes.         Abdominal distension   Assessment & Plan    - Resolved  - Most likely secondary to ileus  - Abdomen feels soft, bowel sounds present.  - No nausea/vomiting. Having regular bowel movements.  - Will monitor electrolytes and replace as needed                Acute DVT (deep venous thrombosis) (CMS-HCC)   Assessment & Plan    H/o DVT 3 times in the past. Was on coumadin previously which was changed to dabigatran recently. He  also has an IVC filter in place from 2009  - [facility] on warfarin secondary to missed bridging with lovenox. He was on heparin drip for 7 days and discharged on dabigatran  - LE venous doppler ordered and it showed B/L extensive DVT in both extremities. Acute and chronic.  - patient continues to have blood tinged fluid from kaur. Will hold off anticoagulation for now in the setting of hematuria, patient has an IVC filter in place. Explained to the patient and his wife about the rationale behind holding off anticoagulation, they are in agreement            Extraperitoneal rupture of bladder   Assessment & Plan    S/p rigid cystoscopy with evacuation of 100 ml of blood clot, fulguration of bladder perforation sites and biopsy of posterior bladder wall on 12/11  - Kaur catheter in place. He underwent B/L nephrostomy tube placement on 12/13   - B&O suppository for bladder spasms  - Continues to have reddish urine, improving. Hemoglobin stable  - Pathology from biopsy shows necrotic tissue. No tumor  - Outpatient urology follow up within 1 week of discharge          Anxiety   Assessment & Plan    Patient stable to be discharged to LTAC for continued IV antibiotics  Anxious about discharge to another facility        Shortness of breath   Assessment & Plan    Resolved  Patient complained of shortness of breath on 12/19. On exam, patient appeared to have increased work of breathing with bilateral expiratory rhonchi  CTA pulmonary arteries was obtained to rule out PE given presence of acute DVT which was negative for PE  Patient was started on duoneb and steroids  Doing much better today.   Will continue duoneb, O2 and RT per protocol.  Steroids discontinued        Wound of left lower extremity   Assessment & Plan    Patient had a large eschar on his left posterior thigh from previous subcutaneous hemorrhage as a result of supra therapeutic INR while on coumadin. He  underwent debridement of 20x25 cm eschar of black dead skin on 12/17.   Wound vac in place. Dressing CDI  Wound culture growing enterococcus fecalis, citrobacter and bacteroids. Dr. Kearney on board.  As per Dr. Kearney , patient will need 2 weeks of zosyn with start date 12/17        Protein calorie malnutrition (CMS-HCC)   Assessment & Plan    Pre albumin of 3  Nutrition consulted        Electrolyte abnormality   Assessment & Plan    Hypokalemia, hypomagnesemia  Will monitor and replace as needed  Continue Po magnesium        Anemia   Assessment & Plan    Likely secondary to acute blood loss from hematuria  Decreased iron and TIBC. Ferritin normal.   Vitamin B12 lower limits of normal- received 5 doses of IM vitamin B12. Will continue IM vitamin B12 once per week for 1 month  Continue oral iron        Hyperkalemia   Assessment & Plan    - Resolved  - Likely secondary to JENNIFFER  - Will continue to monitor        Acute kidney injury (CMS-HCC)   Assessment & Plan    Resolved  Baseline creat 0.7.   Bilateral nephrostomy tubes placed on 12/13 for hydronephrosis with improvement in creatinine  Creatinine now at baseline           DM type 2 (diabetes mellitus, type 2) (CMS-HCC)- (present on admission)   Assessment & Plan    Patient on metformin and glipizide as home medication  - HbA1c 7  - Home medications held  - continue SSI and hypoglycemia protocol  - Glargine 20 units every evening.        Crohn disease (CMS-HCC)- (present on admission)   Assessment & Plan    Patient on mesalamine and mercaptopurine as home medications  Restarted mesalamine  Mercaptopurine on hold in the setting if active infection           Jasmina Mason(Attending)

## 2017-12-23 NOTE — RESPIRATORY CARE
COPD EDUCATION by COPD CLINICAL EDUCATOR  12/23/2017 at 6:10 AM by Maria Isabel Dueñas     Patient reviewed by COPD education team. Patient does not qualify for COPD program.

## 2017-12-23 NOTE — ASSESSMENT & PLAN NOTE
Patient stable to be discharged to [facility] for continued IV antibiotics  Pending discharge tomm am  Anxious about getting discharged to another facility

## 2017-12-23 NOTE — DISCHARGE PLANNING
Call received from [facility].  Per Sobeida the patient does not meet inpatient criteria so this will need to be passed through the [facility].  Asked Sobeida if the CM would be in over the holiday weekend and Sobeida stated no, that we would need to call back on Tuesday or Wednesday.

## 2017-12-23 NOTE — WOUND TEAM
[facility]  Inpatient Services   Wound and Skin Care Progress Note    Admission Date: 12/10/2017    HPI, PMH, SH: Reviewed  Unit where seen by Wound Team: C[facility]    WOUND CONSULT RELATED TO: scheduled npwt drsg change    SUBJECTIVE:  Very anxious but greeable    Self Report / Pain Level: 8/10 at times with dressing removal and placement    OBJECTIVE:       Prev npwt drsg repaired earlier, functioning.    WOUND TYPE, LOCATION, CHARACTERISTICS (Pressure ulcers: location, stage, POA or date identified)    Location and type of wound:  Left posterior thigh, full thickness surgical        Periwound:      Intact, slight induration  Drainage:      Min ss  Tissue Type and %:   80% pink/red -- 20% yellow  Wound Edges:     attached  Odor:       none  Exposed structure(s):   muscle  S&S of Infection:     induration  Measurements:    Taken 12/20/2017  Length:      ~17cm  Width:       ~21cm  Depth:      ~3cm      INTERVENTIONS BY WOUND TEAM: Had to order liquid lidocaine and wait for it to arrive. Patient medicated with PO pain med an hour before, then given IV pain med just before. Liquid lidocain applied to all edges of black foam after lifting drape. Prev drsg removed -- wnd irrigated with wnd cleanser -- benzoin and drape prasanna-wnd -- adaptic over the wnd bed -- 1 piece black foam to fill the wound -- sealed with drape and cont neg pressure reapplied at 125mmhg    Interdisciplinary consultation:  Nsg; pt    EVALUATION:   wnd stable; drsg change a little easier today    Factors affecting wound healing:DM, INR, Infection.     Goals: 100% granulation in 3 weeks -- decrease wnd size X 5% every 2 weeks -- wnd bed appro for stsg?    NURSING PLAN OF CARE ORDERS (x):    Dressing changes: See Dressing Maintenance orders: x  Skin care: See Skin Care orders: x  Rectal tube care: See Rectal Tube Care orders:   Other orders:    WOUND TEAM PLAN OF CARE (x):   NPWT change 3 x week:  x    Dressing changes by wound team:        Follow up as needed:   x    Other (explain):    Anticipated discharge plans (x):  SNF:           Home Care:      Outpatient Wound Center: x         Self Care:            Other:

## 2017-12-23 NOTE — PROGRESS NOTES
Received report from day RN, assumed care of pt 1900.  Pacheco to dd, output dylan/could be slight blood tinged.   Bi lat neph tubes both with good amount of output, light yellow.  Wound vac to left posterior thigh, site CDI.  Pt receiving 24 hr zosyn via left hand PIV. Right PIV SL.   Pt medicated per MAR.   Pt c/o pain and discomfort r/t neph tubes. Pt was medicated for pain and sat in chair, was able to report reduction in pain from those measures, down to 3/10 from 7/10.   All needs met throughout shift. Pt calls frequently for snacks and requesting walks.   Treaded socks on, sequentials on. Pt now resting comfortably.

## 2017-12-23 NOTE — PROGRESS NOTES
Internal Medicine Interval Note  Note Author: Lilo Hernandes M.D.     Name Niels Lima     1950   Age/Sex 67 y.o. male   MRN [id number]   Code Status Full code     After 5PM or if no immediate response to page, please call for cross-coverage  Attending/Team: Dr. Gomez/ Fco See Patient List for primary contact information  Call [phone] to page    1st Call - Day Intern (R1):   Dr. Hernandes 2nd Call - Day Sr. Resident (R2/R3):   Dr. Lyon         Reason for interval visit  (Principal Problem)   Wound on left lower extremity  Hydronephrosis s/p nephrostomy tube placement  DVT    Interval Problem Daily Status Update  (24 hours)   - Wound vac in place. Being treated with zosyn for wound infection. As per ID , total duration of antibiotics will be 2 weeks from .   - Anticoagulant still on hold for hematuria.  Patient aware of the risks and benefits of holding anticoagulation and is in agreement.  - Patient anxious and resistant to discharge to another facility.        Review of Systems   Constitutional: Negative for chills, fever and malaise/fatigue.   HENT: Negative for sinus pain and sore throat.    Eyes: Negative for blurred vision, double vision, pain and discharge.   Respiratory: Negative for cough, sputum production, shortness of breath and stridor.    Cardiovascular: Negative for chest pain, palpitations, orthopnea, claudication and leg swelling.   Gastrointestinal: Negative for abdominal pain, constipation, heartburn, nausea and vomiting.   Genitourinary: Negative for dysuria, frequency, hematuria and urgency.   Musculoskeletal: Negative for back pain, myalgias and neck pain.   Skin: Negative for itching and rash.   Neurological: Negative for dizziness, sensory change, speech change, weakness and headaches.   Endo/Heme/Allergies: Does not bruise/bleed easily.   Psychiatric/Behavioral: Negative for depression, substance abuse and suicidal ideas.        Consultants/Specialty  Urology  Nephrology  IR  surgery    Disposition  Inpatient    Quality Measures    Reviewed items::  Labs reviewed and Medications reviewed  Pacheco catheter::  Urinary Tract Retention or Urinary Tract Obstruction  DVT prophylaxis pharmacological::  Contraindicated - High bleeding risk  DVT prophylaxis - mechanical:  SCDs  Ulcer Prophylaxis::  Not indicated          Physical Exam       Vitals:    12/21/17 1929 12/22/17 0425 12/22/17 0800 12/22/17 1600   BP: 146/79 126/70 136/78 140/79   Pulse: 80 62 71 75   Resp: 18 18 18 18   Temp: 36.6 °C (97.8 °F) 36.3 °C (97.4 °F) 36.5 °C (97.7 °F) 36.4 °C (97.6 °F)   SpO2: 93% 98% 97% 97%   Weight:       Height:         Body mass index is 26.03 kg/m².    Oxygen Therapy:  Pulse Oximetry: 97 %, O2 (LPM): 3, O2 Delivery: Nasal Cannula    Physical Exam   Constitutional: He is oriented to person, place, and time and well-developed, well-nourished, and in no distress. No distress.   HENT:   Head: Normocephalic and atraumatic.   Mouth/Throat: No oropharyngeal exudate.   Eyes: EOM are normal. Pupils are equal, round, and reactive to light. No scleral icterus.   Neck: Normal range of motion. Neck supple. No JVD present.   Cardiovascular: Normal rate and regular rhythm.    No murmur heard.  Pulmonary/Chest: Effort normal and breath sounds normal. No respiratory distress. He has no wheezes.   Abdominal: Soft. Bowel sounds are normal. He exhibits no distension. There is no tenderness.   Genitourinary:   Genitourinary Comments: Pacheco catheter present draining red urine  B/L nephrostomy tubes present.   Musculoskeletal: He exhibits no edema.   Neurological: He is alert and oriented to person, place, and time. No cranial nerve deficit.   Skin:   Left posterior thigh- dressing CDI  Wound vac present   Psychiatric: Mood, memory, affect and judgment normal.         Lab Data Review:           Recent Labs      12/20/17   0335  12/21/17   0005  12/22/17   0012   SODIUM   136  137  137   POTASSIUM  3.6  3.5*  4.1   CHLORIDE  101  101  103   CO2  26  30  24   BUN  9  10  9   CREATININE  0.48*  0.53  0.43*   MAGNESIUM  1.5  1.5  1.6   PHOSPHORUS  2.1*  2.4*  3.4   CALCIUM  7.6*  8.1*  8.0*       Recent Labs      12/20/17   0335  12/21/17   0005  12/22/17   0012   ALTSGPT  6   --   9   ASTSGOT  14   --   22   ALKPHOSPHAT  62   --   66   TBILIRUBIN  0.5   --   0.3   GLUCOSE  243*  182*  173*       Recent Labs      12/20/17   0335  12/21/17   0005  12/22/17   0433   RBC  2.49*  2.56*  2.64*   HEMOGLOBIN  8.2*  8.5*  8.8*   HEMATOCRIT  25.2*  26.2*  27.4*   PLATELETCT  186  205  191       Recent Labs      12/20/17   0335  12/21/17   0005  12/22/17   0012  12/22/17   0433   WBC  7.2  6.3   --   3.3*   NEUTSPOLYS  85.40*  87.40*   --   64.90   LYMPHOCYTES  7.80*  9.20*   --   18.30*   MONOCYTES  5.30  3.40   --   12.90   EOSINOPHILS  1.00  0.00   --   3.60   BASOPHILS  0.10  0.00   --   0.00   ASTSGOT  14   --   22   --    ALTSGPT  6   --   9   --    ALKPHOSPHAT  62   --   66   --    TBILIRUBIN  0.5   --   0.3   --            Assessment/Plan     * Hydronephrosis   Assessment & Plan    S/p bilateral nephrostomy tubes placed on 12/13. Patient's creatinine returned to baseline (0.7) following procedure.  - creatinine normal. Nephrostomy tubes draining well.   - As per urology, on discharge, patient will leave with kaur and nephrostomy tubes.   - will continue to monitor        Abdominal distension   Assessment & Plan    - Resolved  - Most likely secondary to ileus  - Abdomen feels soft, bowel sounds present.  - No nausea/vomiting. Having regular bowel movements.  - Will monitor electrolytes and replace as needed                Acute DVT (deep venous thrombosis) (CMS-[facility])   Assessment & Plan    H/o DVT 3 times in the past. Was on coumadin previously which was changed to dabigatran recently. He also has an IVC filter in place from 2009  - [facility]  on warfarin secondary to missed bridging with lovenox. He was on heparin drip for 7 days and discharged on dabigatran  - LE venous doppler ordered and it showed B/L extensive DVT in both extremities. Acute and chronic.  - patient continues to have blood tinged fluid from kaur. Will hold off anticoagulation for now in the setting of hematuria, patient has an IVC filter in place. Explained to the patient and his wife about the rationale behind holding off anticoagulation, they are in agreement            Extraperitoneal rupture of bladder   Assessment & Plan    S/p rigid cystoscopy with evacuation of 100 ml of blood clot, fulguration of bladder perforation sites and biopsy of posterior bladder wall on 12/11  - Kaur catheter in place. He underwent B/L nephrostomy tube placement on 12/13   - B&O suppository for bladder spasms  - Continues to have reddish urine. Hemoglobin stable  - Pathology from biopsy shows necrotic tissue. No tumor  - Outpatient urology follow up within 1 week of discharge          Anxiety   Assessment & Plan    Patient stable to be discharged to LTAC for continued IV antibiotics  Anxious about discharge to another facility        Shortness of breath   Assessment & Plan    Resolved  Patient complained of shortness of breath on 12/19. On exam, patient appeared to have increased work of breathing with bilateral expiratory rhonchi  CTA pulmonary arteries was obtained to rule out PE given presence of acute DVT which was negative for PE  Patient was started on duoneb and steroids.  Doing much better today.   Will continue duoneb, O2 and RT per protocol.  Steroids discontinued        Wound of left lower extremity   Assessment & Plan    Patient had a large eschar on his left posterior thigh from previous subcutaneous hemorrhage as a result of supra therapeutic INR while on coumadin. He underwent debridement of 20x25 cm eschar of black dead skin on 12/17.   Wound vac in place. Dressing CDI  Wound culture  growing enterococcus fecalis, citrobacter and bacteroids. Dr. Kearney on board.  As per Dr. Kearney , patient will need 2 weeks of zosyn with start date 12/17        Protein calorie malnutrition (CMS-HCC)   Assessment & Plan    Pre albumin of 3  Nutrition consulted        Electrolyte abnormality   Assessment & Plan    Will monitor and replace as needed  Continue Po magnesium        Anemia   Assessment & Plan    Likely secondary to acute blood loss from hematuria  Decreased iron and TIBC. Ferritin normal.   Vitamin B12 lower limits of normal- received 5 doses of IM vitamin B12. Will continue Im vitamin B12 once per week for 1 month  Continue oral iron        Hyperkalemia   Assessment & Plan    - Resolved  - Likely secondary to JENNIFFER  - Will continue to monitor        Acute kidney injury (CMS-HCC)   Assessment & Plan    Resolved  Baseline creat 0.7.   Bilateral nephrostomy tubes placed on 12/13 for hydronephrosis with improvement in creatinine  Creatinine now at baseline   Will monitor        DM type 2 (diabetes mellitus, type 2) (CMS-HCC)- (present on admission)   Assessment & Plan    Patient on metformin and glipizide as home medication  - HbA1c 7  - Home medications held  - continue SSI and hypoglycemia protocol  - Glargine 20 units every evening.

## 2017-12-24 LAB
ANION GAP SERPL CALC-SCNC: 3 MMOL/L (ref 0–11.9)
ANISOCYTOSIS BLD QL SMEAR: ABNORMAL
BASOPHILS # BLD AUTO: 0 % (ref 0–1.8)
BASOPHILS # BLD: 0 K/UL (ref 0–0.12)
BUN SERPL-MCNC: 11 MG/DL (ref 8–22)
CALCIUM SERPL-MCNC: 8.4 MG/DL (ref 8.5–10.5)
CHLORIDE SERPL-SCNC: 103 MMOL/L (ref 96–112)
CO2 SERPL-SCNC: 31 MMOL/L (ref 20–33)
CREAT SERPL-MCNC: 0.49 MG/DL (ref 0.5–1.4)
EOSINOPHIL # BLD AUTO: 0.08 K/UL (ref 0–0.51)
EOSINOPHIL NFR BLD: 1.7 % (ref 0–6.9)
ERYTHROCYTE [DISTWIDTH] IN BLOOD BY AUTOMATED COUNT: 59.3 FL (ref 35.9–50)
GFR SERPL CREATININE-BSD FRML MDRD: >60 ML/MIN/1.73 M 2
GLUCOSE BLD-MCNC: 181 MG/DL (ref 65–99)
GLUCOSE BLD-MCNC: 205 MG/DL (ref 65–99)
GLUCOSE BLD-MCNC: 221 MG/DL (ref 65–99)
GLUCOSE BLD-MCNC: 291 MG/DL (ref 65–99)
GLUCOSE SERPL-MCNC: 160 MG/DL (ref 65–99)
HCT VFR BLD AUTO: 27.9 % (ref 42–52)
HGB BLD-MCNC: 9.1 G/DL (ref 14–18)
LYMPHOCYTES # BLD AUTO: 0.87 K/UL (ref 1–4.8)
LYMPHOCYTES NFR BLD: 19.3 % (ref 22–41)
MACROCYTES BLD QL SMEAR: ABNORMAL
MAGNESIUM SERPL-MCNC: 1.7 MG/DL (ref 1.5–2.5)
MANUAL DIFF BLD: NORMAL
MCH RBC QN AUTO: 32.9 PG (ref 27–33)
MCHC RBC AUTO-ENTMCNC: 32.6 G/DL (ref 33.7–35.3)
MCV RBC AUTO: 100.7 FL (ref 81.4–97.8)
MICROCYTES BLD QL SMEAR: ABNORMAL
MONOCYTES # BLD AUTO: 0.4 K/UL (ref 0–0.85)
MONOCYTES NFR BLD AUTO: 8.8 % (ref 0–13.4)
MORPHOLOGY BLD-IMP: NORMAL
NEUTROPHILS # BLD AUTO: 3.16 K/UL (ref 1.82–7.42)
NEUTROPHILS NFR BLD: 70.2 % (ref 44–72)
NRBC # BLD AUTO: 0 K/UL
NRBC BLD-RTO: 0 /100 WBC
PHOSPHATE SERPL-MCNC: 2.6 MG/DL (ref 2.5–4.5)
PLATELET # BLD AUTO: 196 K/UL (ref 164–446)
PLATELET BLD QL SMEAR: NORMAL
PMV BLD AUTO: 10.2 FL (ref 9–12.9)
POLYCHROMASIA BLD QL SMEAR: NORMAL
POTASSIUM SERPL-SCNC: 4.1 MMOL/L (ref 3.6–5.5)
RBC # BLD AUTO: 2.77 M/UL (ref 4.7–6.1)
RBC BLD AUTO: PRESENT
SODIUM SERPL-SCNC: 137 MMOL/L (ref 135–145)
WBC # BLD AUTO: 4.5 K/UL (ref 4.8–10.8)

## 2017-12-24 PROCEDURE — 97606 NEG PRS WND THER DME>50 SQCM: CPT

## 2017-12-24 PROCEDURE — 700111 HCHG RX REV CODE 636 W/ 250 OVERRIDE (IP): Performed by: STUDENT IN AN ORGANIZED HEALTH CARE EDUCATION/TRAINING PROGRAM

## 2017-12-24 PROCEDURE — 770004 HCHG ROOM/CARE - ONCOLOGY PRIVATE *

## 2017-12-24 PROCEDURE — A9270 NON-COVERED ITEM OR SERVICE: HCPCS | Performed by: STUDENT IN AN ORGANIZED HEALTH CARE EDUCATION/TRAINING PROGRAM

## 2017-12-24 PROCEDURE — 36415 COLL VENOUS BLD VENIPUNCTURE: CPT

## 2017-12-24 PROCEDURE — 99231 SBSQ HOSP IP/OBS SF/LOW 25: CPT | Mod: GC | Performed by: INTERNAL MEDICINE

## 2017-12-24 PROCEDURE — A9270 NON-COVERED ITEM OR SERVICE: HCPCS | Performed by: SURGERY

## 2017-12-24 PROCEDURE — 83735 ASSAY OF MAGNESIUM: CPT

## 2017-12-24 PROCEDURE — 80048 BASIC METABOLIC PNL TOTAL CA: CPT

## 2017-12-24 PROCEDURE — 85027 COMPLETE CBC AUTOMATED: CPT

## 2017-12-24 PROCEDURE — 85007 BL SMEAR W/DIFF WBC COUNT: CPT

## 2017-12-24 PROCEDURE — 700102 HCHG RX REV CODE 250 W/ 637 OVERRIDE(OP): Performed by: SURGERY

## 2017-12-24 PROCEDURE — 700105 HCHG RX REV CODE 258: Performed by: SURGERY

## 2017-12-24 PROCEDURE — 82962 GLUCOSE BLOOD TEST: CPT | Mod: 91

## 2017-12-24 PROCEDURE — 700102 HCHG RX REV CODE 250 W/ 637 OVERRIDE(OP): Performed by: STUDENT IN AN ORGANIZED HEALTH CARE EDUCATION/TRAINING PROGRAM

## 2017-12-24 PROCEDURE — 700111 HCHG RX REV CODE 636 W/ 250 OVERRIDE (IP): Performed by: SURGERY

## 2017-12-24 PROCEDURE — 84100 ASSAY OF PHOSPHORUS: CPT

## 2017-12-24 RX ORDER — MAGNESIUM SULFATE HEPTAHYDRATE 40 MG/ML
2 INJECTION, SOLUTION INTRAVENOUS ONCE
Status: COMPLETED | OUTPATIENT
Start: 2017-12-24 | End: 2017-12-24

## 2017-12-24 RX ADMIN — CYANOCOBALAMIN 1000 MCG: 1000 INJECTION, SOLUTION INTRAMUSCULAR; SUBCUTANEOUS at 23:55

## 2017-12-24 RX ADMIN — INSULIN HUMAN 3 UNITS: 100 INJECTION, SOLUTION PARENTERAL at 20:17

## 2017-12-24 RX ADMIN — MAGNESIUM SULFATE IN WATER 2 G: 40 INJECTION, SOLUTION INTRAVENOUS at 08:52

## 2017-12-24 RX ADMIN — INSULIN HUMAN 1 UNITS: 100 INJECTION, SOLUTION PARENTERAL at 18:26

## 2017-12-24 RX ADMIN — OXYCODONE HYDROCHLORIDE 10 MG: 10 TABLET ORAL at 04:37

## 2017-12-24 RX ADMIN — MESALAMINE 400 MG: 400 CAPSULE, DELAYED RELEASE ORAL at 08:57

## 2017-12-24 RX ADMIN — OXYCODONE HYDROCHLORIDE 10 MG: 10 TABLET ORAL at 20:15

## 2017-12-24 RX ADMIN — INSULIN HUMAN 2 UNITS: 100 INJECTION, SOLUTION PARENTERAL at 13:08

## 2017-12-24 RX ADMIN — INSULIN GLARGINE 20 UNITS: 100 INJECTION, SOLUTION SUBCUTANEOUS at 20:17

## 2017-12-24 RX ADMIN — MAGNESIUM GLUCONATE 500 MG ORAL TABLET 400 MG: 500 TABLET ORAL at 08:52

## 2017-12-24 RX ADMIN — OXYCODONE HYDROCHLORIDE 10 MG: 10 TABLET ORAL at 16:32

## 2017-12-24 RX ADMIN — Medication 325 MG: at 08:57

## 2017-12-24 RX ADMIN — MAGNESIUM GLUCONATE 500 MG ORAL TABLET 400 MG: 500 TABLET ORAL at 20:16

## 2017-12-24 RX ADMIN — MAGNESIUM GLUCONATE 500 MG ORAL TABLET 400 MG: 500 TABLET ORAL at 16:32

## 2017-12-24 RX ADMIN — MESALAMINE 400 MG: 400 CAPSULE, DELAYED RELEASE ORAL at 16:32

## 2017-12-24 RX ADMIN — STANDARDIZED SENNA CONCENTRATE AND DOCUSATE SODIUM 2 TABLET: 8.6; 5 TABLET, FILM COATED ORAL at 20:16

## 2017-12-24 RX ADMIN — SODIUM CHLORIDE: 9 INJECTION, SOLUTION INTRAVENOUS at 00:10

## 2017-12-24 RX ADMIN — OXYCODONE HYDROCHLORIDE 10 MG: 10 TABLET ORAL at 10:13

## 2017-12-24 RX ADMIN — MESALAMINE 400 MG: 400 CAPSULE, DELAYED RELEASE ORAL at 20:16

## 2017-12-24 RX ADMIN — INSULIN HUMAN 2 UNITS: 100 INJECTION, SOLUTION PARENTERAL at 08:59

## 2017-12-24 RX ADMIN — HYDROMORPHONE HYDROCHLORIDE 2 MG: 2 INJECTION INTRAMUSCULAR; INTRAVENOUS; SUBCUTANEOUS at 10:51

## 2017-12-24 RX ADMIN — SODIUM CHLORIDE: 9 INJECTION, SOLUTION INTRAVENOUS at 23:55

## 2017-12-24 ASSESSMENT — PAIN SCALES - GENERAL
PAINLEVEL_OUTOF10: 8
PAINLEVEL_OUTOF10: 6
PAINLEVEL_OUTOF10: 8
PAINLEVEL_OUTOF10: 6
PAINLEVEL_OUTOF10: 10

## 2017-12-24 ASSESSMENT — ENCOUNTER SYMPTOMS
COUGH: 0
CONSTIPATION: 0
BRUISES/BLEEDS EASILY: 0
CHILLS: 0
FEVER: 0
DEPRESSION: 0
NAUSEA: 0
MYALGIAS: 0
SHORTNESS OF BREATH: 0
PALPITATIONS: 0
BLURRED VISION: 0
DIZZINESS: 0
VOMITING: 0
SORE THROAT: 0

## 2017-12-24 ASSESSMENT — PATIENT HEALTH QUESTIONNAIRE - PHQ9
1. LITTLE INTEREST OR PLEASURE IN DOING THINGS: NOT AT ALL
2. FEELING DOWN, DEPRESSED, IRRITABLE, OR HOPELESS: NOT AT ALL
SUM OF ALL RESPONSES TO PHQ9 QUESTIONS 1 AND 2: 0
SUM OF ALL RESPONSES TO PHQ QUESTIONS 1-9: 0

## 2017-12-24 NOTE — WOUND TEAM
[facility]  Inpatient Services   Wound and Skin Care Progress Note    Admission Date: 12/10/2017    HPI, PMH, SH: Reviewed  Unit where seen by Wound Team: R3    WOUND CONSULT RELATED TO: scheduled npwt drsg change    SUBJECTIVE:  Very anxious but agreeable    Self Report / Pain Level: remains painful despite pre-med    OBJECTIVE:       Prev npwt drsg repaired earlier    WOUND TYPE, LOCATION, CHARACTERISTICS (Pressure ulcers: location, stage, POA or date identified)    Location and type of wound:  Left posterior thigh, full thickness surgical        Periwound:      Intact, slight induration  Drainage:      Min ss  Tissue Type and %:   85% pink/red -- 15% yellow  Wound Edges:     attached  Odor:       none  Exposed structure(s):   muscle  S&S of Infection:     induration  Measurements:    12/20/2017  Length:      ~17cm  Width:       ~21cm  Depth:      ~3cm      INTERVENTIONS BY WOUND TEAM:  Prev drsg removed -- wnd irrigated with wnd cleanser -- benzoin and drape prasanna-wnd -- adaptic over the wnd bed -- 1 piece black foam to fill the wound -- sealed with drape and cont neg pressure reapplied at 125mmhg    Interdisciplinary consultation:  Nsg; pt    EVALUATION:   Decreased yellow tissue over the wnd bed; should cont to progress    Factors affecting wound healing:DM, INR, Infection.     Goals: 100% granulation in 3 weeks -- decrease wnd size X 5% every 2 weeks -- wnd bed appro for stsg?    NURSING PLAN OF CARE ORDERS (x):    Dressing changes: See Dressing Maintenance orders: x  Skin care: See Skin Care orders: x  Rectal tube care: See Rectal Tube Care orders:   Other orders:    WOUND TEAM PLAN OF CARE (x):   NPWT change 3 x week:  x    Dressing changes by wound team:       Follow up as needed:   x    Other (explain):    Anticipated discharge plans (x):  SNF:           Home Care:      Outpatient Wound Center: x         Self Care:            Other:

## 2017-12-24 NOTE — PROGRESS NOTES
Internal Medicine Interval Note  Note Author: Gregoria Lyon M.D.     Name Niels Lima     1950   Age/Sex 67 y.o. male   MRN [id number]   Code Status Full      After 5PM or if no immediate response to page, please call for cross-coverage  Attending/Team: Dr. Gomez / Fco  See Patient List for primary contact information  Call [phone] to page    1st Call - Day Intern (R1):   Dr. Hernandes  2nd Call - Day Sr. Resident (R2/R3):   Dr. Lyon          Reason for interval visit  (Principal Problem)   Hydronephrosis s/p B/L nephrostomy tube placement  Wound on left lower extremity s/p I&D with wound vac in place  DVT s/p IVC filter placement, not on anti-coagulation due to continuous hematuria      Interval Problem Daily Status Update  (24 hours)   Patient was seen and evaluated at bedside, no new complaint.  Patient resistant to discharge to LTAC or  SNF.  [facility]   Continuing wound care and IV antibiotic.   Anticoagulation on hold de to continuous hematuria, patient and wife aware of the risks and benefits of holding it and is in agreement.       Review of Systems   Constitutional: Negative for chills and fever.   HENT: Negative for sore throat.    Eyes: Negative for blurred vision.   Respiratory: Negative for cough and shortness of breath.    Cardiovascular: Negative for chest pain and palpitations.   Gastrointestinal: Negative for constipation, nausea and vomiting.   Genitourinary: Negative for dysuria.   Musculoskeletal: Negative for myalgias.   Skin: Negative for rash.   Neurological: Negative for dizziness.   Endo/Heme/Allergies: Does not bruise/bleed easily.   Psychiatric/Behavioral: Negative for depression.       Consultants/Specialty  ID  General surgery       Disposition  Patient resistant to discharge to LTAC or  SNF.       Quality Measures    Reviewed items::  Labs reviewed and Medications reviewed  Pacheco catheter::  Urinary Tract Retention or Urinary Tract  "Obstruction  DVT prophylaxis pharmacological::  Contraindicated - High bleeding risk  DVT prophylaxis - mechanical:  SCDs  Ulcer Prophylaxis::  Not indicated  Antibiotics:  Treating active infection/contamination beyond 24 hours perioperative coverage          Physical Exam       Vitals:    12/23/17 1707 12/23/17 1924 12/24/17 0400 12/24/17 0800   BP:  133/73 132/76 125/84   Pulse:  77 68 72   Resp:  18 20 18   Temp:  36.4 °C (97.5 °F) 36.3 °C (97.4 °F) 37.1 °C (98.8 °F)   SpO2:  96% 96% 97%   Weight: 78.7 kg (173 lb 8 oz)      Height: 1.854 m (6' 1\")        Body mass index is 22.89 kg/m². Weight: 78.7 kg (173 lb 8 oz)  Oxygen Therapy:  Pulse Oximetry: 97 %, O2 (LPM): 3, O2 Delivery: Silicone Nasal Cannula    Physical Exam   Constitutional: He is oriented to person, place, and time. No distress.   HENT:   Head: Normocephalic and atraumatic.   Eyes: EOM are normal. Pupils are equal, round, and reactive to light.   Neck: Neck supple.   Cardiovascular: Normal rate and regular rhythm.  Exam reveals no gallop and no friction rub.    No murmur heard.  Pulmonary/Chest: Effort normal and breath sounds normal. No respiratory distress.   Abdominal: Soft. Bowel sounds are normal. He exhibits no distension. There is no tenderness.   2 nephrostomy tube in place with clear urine in bags.    Musculoskeletal: Normal range of motion. He exhibits no edema.   Neurological: He is alert and oriented to person, place, and time.   Skin: Skin is warm and dry. He is not diaphoretic.   Psychiatric: Affect normal.         Lab Data Review:       Recent Labs      12/22/17   0012  12/23/17   0011  12/24/17   0010   SODIUM  137  137  137   POTASSIUM  4.1  3.5*  4.1   CHLORIDE  103  100  103   CO2  24  31  31   BUN  9  12  11   CREATININE  0.43*  0.63  0.49*   MAGNESIUM  1.6  1.7  1.7   PHOSPHORUS  3.4  2.9  2.6   CALCIUM  8.0*  8.4*  8.4*       Recent Labs      12/22/17   0012  12/23/17   0011  12/24/17   0010   ALTSGPT  9   --    --    YASIR  " 22   --    --    ALKPHOSPHAT  66   --    --    TBILIRUBIN  0.3   --    --    GLUCOSE  173*  196*  160*       Recent Labs      12/22/17   0433  12/23/17   0011  12/24/17   0010   RBC  2.64*  2.99*  2.77*   HEMOGLOBIN  8.8*  9.5*  9.1*   HEMATOCRIT  27.4*  30.0*  27.9*   PLATELETCT  191  216  196       Recent Labs      12/22/17   0012  12/22/17   0433  12/23/17   0011  12/24/17   0010   WBC   --   3.3*  4.3*  4.5*   NEUTSPOLYS   --   64.90  67.60  70.20   LYMPHOCYTES   --   18.30*  16.60*  19.30*   MONOCYTES   --   12.90  11.20  8.80   EOSINOPHILS   --   3.60  4.40  1.70   BASOPHILS   --   0.00  0.20  0.00   ASTSGOT  22   --    --    --    ALTSGPT  9   --    --    --    ALKPHOSPHAT  66   --    --    --    TBILIRUBIN  0.3   --    --    --            Assessment/Plan     * Hydronephrosis   Assessment & Plan    S/p bilateral nephrostomy tubes placed on 12/13. Patient's creatinine returned to baseline (0.7) following procedure.  - creatinine normal. Nephrostomy tubes draining well.   - As per urology, on discharge, patient will leave with kaur and nephrostomy tubes.         Abdominal distension   Assessment & Plan    - Resolved  - Most likely secondary to ileus  - Abdomen feels soft, bowel sounds present.  - No nausea/vomiting. Having regular bowel movements.  - Will monitor electrolytes and replace as needed                Acute DVT (deep venous thrombosis) (CMS-[facility])   Assessment & Plan    H/o DVT 3 times in the past. Was on coumadin previously which was changed to dabigatran recently. He also has an IVC filter in place from 2009  - [facility] on warfarin secondary to missed bridging with lovenox. He was on heparin drip for 7 days and discharged on dabigatran  - LE venous doppler ordered and it showed B/L extensive DVT in both extremities. Acute and chronic.  - patient continues to have blood tinged fluid from kaur. Will hold off anticoagulation for now in the setting of  hematuria, patient has an IVC filter in place. Explained to the patient and his wife about the rationale behind holding off anticoagulation, they are in agreement            Extraperitoneal rupture of bladder   Assessment & Plan    S/p rigid cystoscopy with evacuation of 100 ml of blood clot, fulguration of bladder perforation sites and biopsy of posterior bladder wall on 12/11  - Pacheco catheter in place. He underwent B/L nephrostomy tube placement on 12/13   - B&O suppository for bladder spasms  - Continues to have reddish urine, improving. Hemoglobin stable  - Pathology from biopsy shows necrotic tissue. No tumor  - Outpatient urology follow up within 1 week of discharge          Anxiety   Assessment & Plan    Patient stable to be discharged to LTAC for continued IV antibiotics  Anxious about discharge to another facility        Shortness of breath   Assessment & Plan    Resolved  Patient complained of shortness of breath on 12/19. On exam, patient appeared to have increased work of breathing with bilateral expiratory rhonchi  CTA pulmonary arteries was obtained to rule out PE given presence of acute DVT which was negative for PE  Patient was started on duoneb and steroids  Doing much better today.   Will continue duoneb, O2 and RT per protocol.  Steroids discontinued        Wound of left lower extremity   Assessment & Plan    Patient had a large eschar on his left posterior thigh from previous subcutaneous hemorrhage as a result of supra therapeutic INR while on coumadin. He underwent debridement of 20x25 cm eschar of black dead skin on 12/17.   Wound vac in place. Dressing CDI  Wound culture growing enterococcus fecalis, citrobacter and bacteroids. Dr. Kearney on board.  As per Dr. Kearney , patient will need 2 weeks of zosyn with start date 12/17        Protein calorie malnutrition (CMS-HCC)   Assessment & Plan    Pre albumin of 3  Nutrition consulted        Electrolyte abnormality   Assessment & Plan     Hypokalemia, hypomagnesemia  Will monitor and replace as needed  Continue Po magnesium        Anemia   Assessment & Plan    Likely secondary to acute blood loss from hematuria  Decreased iron and TIBC. Ferritin normal.   Vitamin B12 lower limits of normal- received 5 doses of IM vitamin B12. Will continue IM vitamin B12 once per week for 1 month  Continue oral iron        Hyperkalemia   Assessment & Plan    - Resolved  - Likely secondary to JENNIFFER  - Will continue to monitor        Acute kidney injury (CMS-HCC)   Assessment & Plan    Resolved  Baseline creat 0.7.   Bilateral nephrostomy tubes placed on 12/13 for hydronephrosis with improvement in creatinine  Creatinine now at baseline           DM type 2 (diabetes mellitus, type 2) (CMS-HCC)- (present on admission)   Assessment & Plan    Patient on metformin and glipizide as home medication  - HbA1c 7  - Home medications held  - continue SSI and hypoglycemia protocol  - Glargine 20 units every evening.        Crohn disease (CMS-HCC)- (present on admission)   Assessment & Plan    Patient on mesalamine and mercaptopurine as home medications  Restarted mesalamine  Mercaptopurine on hold in the setting if active infection

## 2017-12-25 LAB
GLUCOSE BLD-MCNC: 186 MG/DL (ref 65–99)
GLUCOSE BLD-MCNC: 197 MG/DL (ref 65–99)
GLUCOSE BLD-MCNC: 215 MG/DL (ref 65–99)
GLUCOSE BLD-MCNC: 304 MG/DL (ref 65–99)

## 2017-12-25 PROCEDURE — A9270 NON-COVERED ITEM OR SERVICE: HCPCS | Performed by: SURGERY

## 2017-12-25 PROCEDURE — 700105 HCHG RX REV CODE 258: Performed by: SURGERY

## 2017-12-25 PROCEDURE — 85027 COMPLETE CBC AUTOMATED: CPT

## 2017-12-25 PROCEDURE — 700111 HCHG RX REV CODE 636 W/ 250 OVERRIDE (IP): Performed by: SURGERY

## 2017-12-25 PROCEDURE — 770004 HCHG ROOM/CARE - ONCOLOGY PRIVATE *

## 2017-12-25 PROCEDURE — 84100 ASSAY OF PHOSPHORUS: CPT

## 2017-12-25 PROCEDURE — 700102 HCHG RX REV CODE 250 W/ 637 OVERRIDE(OP): Performed by: STUDENT IN AN ORGANIZED HEALTH CARE EDUCATION/TRAINING PROGRAM

## 2017-12-25 PROCEDURE — 700102 HCHG RX REV CODE 250 W/ 637 OVERRIDE(OP): Performed by: SURGERY

## 2017-12-25 PROCEDURE — 36415 COLL VENOUS BLD VENIPUNCTURE: CPT

## 2017-12-25 PROCEDURE — 83735 ASSAY OF MAGNESIUM: CPT

## 2017-12-25 PROCEDURE — 82962 GLUCOSE BLOOD TEST: CPT | Mod: 91

## 2017-12-25 PROCEDURE — A9270 NON-COVERED ITEM OR SERVICE: HCPCS | Performed by: STUDENT IN AN ORGANIZED HEALTH CARE EDUCATION/TRAINING PROGRAM

## 2017-12-25 PROCEDURE — 99231 SBSQ HOSP IP/OBS SF/LOW 25: CPT | Mod: GC | Performed by: INTERNAL MEDICINE

## 2017-12-25 PROCEDURE — 80053 COMPREHEN METABOLIC PANEL: CPT

## 2017-12-25 PROCEDURE — 85007 BL SMEAR W/DIFF WBC COUNT: CPT

## 2017-12-25 RX ADMIN — OXYCODONE HYDROCHLORIDE 10 MG: 10 TABLET ORAL at 10:27

## 2017-12-25 RX ADMIN — STANDARDIZED SENNA CONCENTRATE AND DOCUSATE SODIUM 2 TABLET: 8.6; 5 TABLET, FILM COATED ORAL at 09:46

## 2017-12-25 RX ADMIN — MESALAMINE 400 MG: 400 CAPSULE, DELAYED RELEASE ORAL at 14:58

## 2017-12-25 RX ADMIN — INSULIN HUMAN 1 UNITS: 100 INJECTION, SOLUTION PARENTERAL at 17:18

## 2017-12-25 RX ADMIN — MAGNESIUM GLUCONATE 500 MG ORAL TABLET 400 MG: 500 TABLET ORAL at 20:54

## 2017-12-25 RX ADMIN — INSULIN HUMAN 2 UNITS: 100 INJECTION, SOLUTION PARENTERAL at 20:57

## 2017-12-25 RX ADMIN — MESALAMINE 400 MG: 400 CAPSULE, DELAYED RELEASE ORAL at 20:55

## 2017-12-25 RX ADMIN — INSULIN HUMAN 1 UNITS: 100 INJECTION, SOLUTION PARENTERAL at 13:10

## 2017-12-25 RX ADMIN — STANDARDIZED SENNA CONCENTRATE AND DOCUSATE SODIUM 2 TABLET: 8.6; 5 TABLET, FILM COATED ORAL at 20:55

## 2017-12-25 RX ADMIN — SODIUM CHLORIDE: 9 INJECTION, SOLUTION INTRAVENOUS at 23:49

## 2017-12-25 RX ADMIN — MAGNESIUM GLUCONATE 500 MG ORAL TABLET 400 MG: 500 TABLET ORAL at 09:46

## 2017-12-25 RX ADMIN — INSULIN HUMAN 4 UNITS: 100 INJECTION, SOLUTION PARENTERAL at 09:46

## 2017-12-25 RX ADMIN — OXYCODONE HYDROCHLORIDE 10 MG: 10 TABLET ORAL at 21:01

## 2017-12-25 RX ADMIN — INSULIN GLARGINE 20 UNITS: 100 INJECTION, SOLUTION SUBCUTANEOUS at 20:57

## 2017-12-25 RX ADMIN — MAGNESIUM GLUCONATE 500 MG ORAL TABLET 400 MG: 500 TABLET ORAL at 14:58

## 2017-12-25 RX ADMIN — POLYETHYLENE GLYCOL 3350 1 PACKET: 17 POWDER, FOR SOLUTION ORAL at 10:27

## 2017-12-25 RX ADMIN — OXYCODONE HYDROCHLORIDE 10 MG: 10 TABLET ORAL at 18:16

## 2017-12-25 RX ADMIN — Medication 325 MG: at 09:46

## 2017-12-25 RX ADMIN — MESALAMINE 400 MG: 400 CAPSULE, DELAYED RELEASE ORAL at 09:46

## 2017-12-25 ASSESSMENT — ENCOUNTER SYMPTOMS
SHORTNESS OF BREATH: 0
NAUSEA: 0
VOMITING: 0
COUGH: 0
CHILLS: 0
ABDOMINAL PAIN: 0
ORTHOPNEA: 0
FEVER: 0

## 2017-12-25 ASSESSMENT — PAIN SCALES - GENERAL
PAINLEVEL_OUTOF10: 8
PAINLEVEL_OUTOF10: 7
PAINLEVEL_OUTOF10: 6
PAINLEVEL_OUTOF10: 8
PAINLEVEL_OUTOF10: 6

## 2017-12-25 ASSESSMENT — PATIENT HEALTH QUESTIONNAIRE - PHQ9
2. FEELING DOWN, DEPRESSED, IRRITABLE, OR HOPELESS: NOT AT ALL
SUM OF ALL RESPONSES TO PHQ QUESTIONS 1-9: 0
1. LITTLE INTEREST OR PLEASURE IN DOING THINGS: NOT AT ALL
SUM OF ALL RESPONSES TO PHQ9 QUESTIONS 1 AND 2: 0

## 2017-12-25 NOTE — PROGRESS NOTES
Pt resting in position of comfort; A&O x4; call light and personal possessions within reach; needs and pain addressed; Pacheco drain in place, showing clear yellow urine; bilat nephrostomies draining clear yellow fluid; wound vac in place and showing adequate vacuum to wound; will monitor and assess.

## 2017-12-25 NOTE — CARE PLAN
Problem: Pain Management  Goal: Pain level will decrease to patient's comfort goal    Intervention: Follow pain managment plan developed in collaboration with patient and Interdisciplinary Team  Pain medication given as needed pt. States some pain improvement      Problem: Skin Integrity  Goal: Risk for impaired skin integrity will decrease    Intervention: Assess risk factors for impaired skin integrity and/or pressure ulcers  Wound vac on left lower leg dressing change today per wound team

## 2017-12-25 NOTE — PROGRESS NOTES
Pt reporting tenderness under his jaw; upon palpation, enlargement of the cervical lymph nodes was discovered bilaterally; pt denies swallowing or breathing difficulties at this time; will pass on to on-coming RN.

## 2017-12-25 NOTE — PROGRESS NOTES
[facility] Internal Medicine Interval Note    Name Niels Lima     1950   Age/Sex 67 y.o. male   MRN [id number]   Code Status FULL     After 5PM or if no immediate response to page, please call for cross-coverage  Attending/Team: Dr. TRACY/MARTA Use [employer] Text to page  6AM-5PM  Call [phone] to page afterhours   1st Call - Day Intern (R1):   XIOMARA HANSON 2nd Call - Day Sr. Resident (R2/R3):   YANIRA PÉREZ         Chief complaint/reason for interval visit  [facility]  Hydronephrosis s/p b/l nephrostomy tube placement  DVT s/p IVC filter  Wound on the LLE , wound vac in place    Interval Problem Update  Pt feels better today  Pacheco catheter not draining blood anymore  Urology onboard- appreciate recs  Resistant to discharge to LTAC/SNF        Review of Systems   Constitutional: Negative for chills and fever.   Respiratory: Negative for cough and shortness of breath.    Cardiovascular: Negative for chest pain and orthopnea.   Gastrointestinal: Negative for abdominal pain, nausea and vomiting.   Genitourinary: Negative for dysuria and hematuria.   Skin: Negative for rash.       Consultants/Specialty  Infectious diseases  General surgery    Disposition  Patient resistant to discharge to LTAC/SNF    Quality Measures    Reviewed items::  Labs reviewed, Medications reviewed and Radiology images reviewed  Pacheco catheter::  Urinary Tract Retention or Urinary Tract Obstruction  DVT prophylaxis pharmacological::  Contraindicated - High bleeding risk  DVT prophylaxis - mechanical:  SCDs  Antibiotics:  Treating active infection/contamination beyond 24 hours perioperative coverage         Physical Exam   Vitals:    17 1800 17 2000 17 0500 17 0800   BP:  152/79 116/71 135/79   Pulse:  87 64 67   Resp: 18 18 18 18   Temp:  36.5 °C (97.7 °F) 36.2 °C (97.2 °F) 36.2 °C (97.2 °F)   SpO2:  93% 96% 92%   Weight:       Height:         Body mass index is 22.89  kg/m².  Oxygen Therapy:  Pulse Oximetry: 92 %, O2 (LPM): 3, O2 Delivery: Silicone Nasal Cannula    Physical Exam   Constitutional: He is oriented to person, place, and time and well-developed, well-nourished, and in no distress.   HENT:   Head: Normocephalic and atraumatic.   Eyes: EOM are normal. Pupils are equal, round, and reactive to light.   Neck: Normal range of motion.   Cardiovascular: Normal rate, regular rhythm and normal heart sounds.    Pulmonary/Chest: Breath sounds normal. He has no wheezes. He has no rales.   Abdominal: Bowel sounds are normal.   Nephrostomy tubes in place   Musculoskeletal: Normal range of motion.   Neurological: He is alert and oriented to person, place, and time.         Lab Data Review:  Recent Results (from the past 24 hour(s))   ACCU-CHEK GLUCOSE    Collection Time: 12/24/17  6:23 PM   Result Value Ref Range    Glucose - Accu-Ck 181 (H) 65 - 99 mg/dL   ACCU-CHEK GLUCOSE    Collection Time: 12/24/17  8:13 PM   Result Value Ref Range    Glucose - Accu-Ck 291 (H) 65 - 99 mg/dL   ACCU-CHEK GLUCOSE    Collection Time: 12/25/17  9:44 AM   Result Value Ref Range    Glucose - Accu-Ck 304 (H) 65 - 99 mg/dL   ACCU-CHEK GLUCOSE    Collection Time: 12/25/17  1:09 PM   Result Value Ref Range    Glucose - Accu-Ck 186 (H) 65 - 99 mg/dL       12/25/2017  1:08 PM    Recent Labs      12/23/17   0011  12/24/17   0010   SODIUM  137  137   POTASSIUM  3.5*  4.1   CHLORIDE  100  103   CO2  31  31   BUN  12  11   CREATININE  0.63  0.49*   MAGNESIUM  1.7  1.7   PHOSPHORUS  2.9  2.6   CALCIUM  8.4*  8.4*       Recent Labs      12/23/17   0011  12/24/17   0010   GLUCOSE  196*  160*       Recent Labs      12/23/17   0011  12/24/17   0010   RBC  2.99*  2.77*   HEMOGLOBIN  9.5*  9.1*   HEMATOCRIT  30.0*  27.9*   PLATELETCT  216  196       Recent Labs      12/23/17   0011  12/24/17   0010   WBC  4.3*  4.5*   NEUTSPOLYS  67.60  70.20   LYMPHOCYTES  16.60*  19.30*   MONOCYTES  11.20  8.80   EOSINOPHILS  4.40   1.70   BASOPHILS  0.20  0.00          Assessment/Plan   * Hydronephrosis   Assessment & Plan    S/p b/l nephrostomy tubes 12/13  Cr returned to baseline of 04-0.5  Nephrostomy tubes draining clear fluid  As per urology- pt will be discharged on kaur and nephrostomy tubes  Internalization to be done as an outpatient  Pt to be scheduled for a f/u with urology on discharge within a week.        Wound of left lower extremity- (present on admission)   Assessment & Plan    Patient had a large eschar on his left posterior thigh from previous subcutaneous hemorrhage as a result of supra therapeutic INR while on coumadin.   s/p debridement of 20x25 cm eschar of black dead skin on 12/17.   Wound vac in place.   Wound culture positive for enterococcus fecalis, citrobacter and bacteroids.   Dr. Kearney on board- recommended zosyn  Plan:  Continue zosyn  Continue wound care per protocol  As per Dr. Kearney,  patient will need 2 weeks of zosyn with start date 12/17  Pt will need to f/u with wound clinic after discharge        Acute DVT (deep venous thrombosis) (CMS-HCC)- (present on admission)   Assessment & Plan    Past h/o DVT- as per pt 3 episodes in the past, s/p IVC filter in 2009  Was on coumadin and [facility] H/o DVT 3 times in the past.  LE venous doppler 12/13  showed B/L extensive DVT in both extremities. Acute and chronic.  Anticoagulation on hold due to bloody urine from the kaur  Pt and wife aware of this decision and agreeable  Will CTM            Extraperitoneal rupture of bladder- (present on admission)   Assessment & Plan    S/p rigid cystoscopy with evacuation of 100 ml of blood clot, fulguration of bladder perforation sites and biopsy of posterior bladder wall on 12/11. Pathology shows necrotic tissue. No tumor  Kaur catheter in place- draining clear urine, no blood seen  Hb stable at 6.1  B/L nephrostomy tube placement on 12/13   Plan:  B&O suppository for bladder spasms  To  f/u with urology in a week after discharge          DM type 2 (diabetes mellitus, type 2) (CMS-HCC)- (present on admission)   Assessment & Plan    Home meds include metformin and glipizide- held  A1C at 7  Plan:  Continue insulin glargine 20U Qhs  On ISS and hypoglycemia protocol  Monitor POCs        Anxiety   Assessment & Plan    Patient stable to be discharged to LTAC for continued IV antibiotics  Anxious about discharge to another facility        Shortness of breath   Assessment & Plan    Resolved  Pt c/o SOB on 12/19, had increased work of breathing with b/l exp rhonchi  CTA ruled out PE, has acute DVT this admission.  Pt denies any SOB today  Plan:  Continue duonebs and O2 per protocol  Continuous pulse ox  Encourage incentive spirometry          Protein calorie malnutrition (CMS-HCC)   Assessment & Plan    Pre albumin of 3  Nutrition consulted  Appreciate recs        Electrolyte abnormality   Assessment & Plan    Hypokalemia, hypomagnesemia  Will monitor and replace as needed  Continue Po magnesium        Anemia   Assessment & Plan    Iron deficiency anemia  2/2 blood loss from hematuria  Low iron and TIBC, Ferritin wnl  Vitamin B12 low, got 5 does of IM B12 injections  Plan:  Continue oral iron  Continue IM b12 weekly for 1 month          Abdominal distension   Assessment & Plan     Resolved  Likely 2/2 ileus  Abdomen soft, non distended with normal bowel sounds.  Pt did have a BM  Will CTM        Hyperkalemia   Assessment & Plan    Resolved  2/2 JENNIFFER  Will CTM        Acute kidney injury (CMS-HCC)   Assessment & Plan    Resolved  Baseline creat 0.7.   Bilateral nephrostomy tubes placed on 12/13 for hydronephrosis with improvement in creatinine  Creatinine now at baseline           Crohn disease (CMS-HCC)- (present on admission)   Assessment & Plan    Patient on mesalamine and mercaptopurine as home medications  Restarted mesalamine  Mercaptopurine on hold in the setting if active infection

## 2017-12-25 NOTE — PROGRESS NOTES
Pt. Alert and oriented up with one assist to the chair. This am wound vac alarm went off, re-enforced wound with dressing no leaking noted after that from wound vac machine. Wife at bedside this am. Reviewed POC with pt. Call light within reach hourly rounding in practice.

## 2017-12-25 NOTE — PROGRESS NOTES
Assumed care of the pt at 0715, received report from the NOC RN. Pt is AOx4 with complaint of 6/10 L posterior thigh pain r/t wound. Wound vac in operation. Pt updated on POC for the shift: ambulation, IS, initiation of bowl protocol All questions answered at this time. PIV x 2 w/ IV abx. Pacheco in place per active order, bilateral nephrostomy tubes. O2 via nasal cannula. Pt up + 1 and hand held assist. Fall precautions in place. Call light within reach, hourly rounding in place.

## 2017-12-25 NOTE — CARE PLAN
Problem: Mobility  Goal: Risk for activity intolerance will decrease    Intervention: Encourage patient to increase activity level in collaboration with Interdisciplinary Team  Pt has been encouraged to ambulate as tolerated. Pt is up SBA w/ hand held assist. Pt calls appropriately, call light within reach, hourly rounding in place.       Problem: Respiratory:  Goal: Respiratory status will improve    Intervention: Administer and titrate oxygen therapy  Oxygen has been titrated as tolerated. Pt encouraged to utilize IS, deep breath and cough and ambulate. Pt demonstrates correct technique and is self motivated.

## 2017-12-26 ENCOUNTER — APPOINTMENT (OUTPATIENT)
Dept: RADIOLOGY | Facility: MEDICAL CENTER | Age: 67
DRG: 668 | End: 2017-12-26
Attending: STUDENT IN AN ORGANIZED HEALTH CARE EDUCATION/TRAINING PROGRAM
Payer: COMMERCIAL

## 2017-12-26 LAB
ALBUMIN SERPL BCP-MCNC: 2.4 G/DL (ref 3.2–4.9)
ALBUMIN/GLOB SERPL: 0.7 G/DL
ALP SERPL-CCNC: 66 U/L (ref 30–99)
ALT SERPL-CCNC: 10 U/L (ref 2–50)
ANION GAP SERPL CALC-SCNC: 6 MMOL/L (ref 0–11.9)
ANISOCYTOSIS BLD QL SMEAR: ABNORMAL
AST SERPL-CCNC: 15 U/L (ref 12–45)
BASOPHILS # BLD AUTO: 0 % (ref 0–1.8)
BASOPHILS # BLD: 0 K/UL (ref 0–0.12)
BILIRUB SERPL-MCNC: 0.3 MG/DL (ref 0.1–1.5)
BUN SERPL-MCNC: 12 MG/DL (ref 8–22)
CALCIUM SERPL-MCNC: 8.4 MG/DL (ref 8.5–10.5)
CHLORIDE SERPL-SCNC: 103 MMOL/L (ref 96–112)
CO2 SERPL-SCNC: 29 MMOL/L (ref 20–33)
CREAT SERPL-MCNC: 0.44 MG/DL (ref 0.5–1.4)
EOSINOPHIL # BLD AUTO: 0.13 K/UL (ref 0–0.51)
EOSINOPHIL NFR BLD: 3.5 % (ref 0–6.9)
ERYTHROCYTE [DISTWIDTH] IN BLOOD BY AUTOMATED COUNT: 60.1 FL (ref 35.9–50)
GFR SERPL CREATININE-BSD FRML MDRD: >60 ML/MIN/1.73 M 2
GLOBULIN SER CALC-MCNC: 3.4 G/DL (ref 1.9–3.5)
GLUCOSE BLD-MCNC: 190 MG/DL (ref 65–99)
GLUCOSE BLD-MCNC: 224 MG/DL (ref 65–99)
GLUCOSE BLD-MCNC: 266 MG/DL (ref 65–99)
GLUCOSE BLD-MCNC: 328 MG/DL (ref 65–99)
GLUCOSE SERPL-MCNC: 162 MG/DL (ref 65–99)
HCT VFR BLD AUTO: 29.6 % (ref 42–52)
HGB BLD-MCNC: 9.3 G/DL (ref 14–18)
LYMPHOCYTES # BLD AUTO: 0.93 K/UL (ref 1–4.8)
LYMPHOCYTES NFR BLD: 24.6 % (ref 22–41)
MAGNESIUM SERPL-MCNC: 1.5 MG/DL (ref 1.5–2.5)
MANUAL DIFF BLD: NORMAL
MCH RBC QN AUTO: 32 PG (ref 27–33)
MCHC RBC AUTO-ENTMCNC: 31.4 G/DL (ref 33.7–35.3)
MCV RBC AUTO: 101.7 FL (ref 81.4–97.8)
MICROCYTES BLD QL SMEAR: ABNORMAL
MONOCYTES # BLD AUTO: 0.27 K/UL (ref 0–0.85)
MONOCYTES NFR BLD AUTO: 7 % (ref 0–13.4)
MORPHOLOGY BLD-IMP: NORMAL
NEUTROPHILS # BLD AUTO: 2.47 K/UL (ref 1.82–7.42)
NEUTROPHILS NFR BLD: 64.9 % (ref 44–72)
NRBC # BLD AUTO: 0 K/UL
NRBC BLD-RTO: 0 /100 WBC
PHOSPHATE SERPL-MCNC: 3.1 MG/DL (ref 2.5–4.5)
PLATELET # BLD AUTO: 169 K/UL (ref 164–446)
PLATELET BLD QL SMEAR: NORMAL
PMV BLD AUTO: 9.7 FL (ref 9–12.9)
POIKILOCYTOSIS BLD QL SMEAR: NORMAL
POLYCHROMASIA BLD QL SMEAR: NORMAL
POTASSIUM SERPL-SCNC: 3.7 MMOL/L (ref 3.6–5.5)
PROT SERPL-MCNC: 5.8 G/DL (ref 6–8.2)
RBC # BLD AUTO: 2.91 M/UL (ref 4.7–6.1)
RBC BLD AUTO: PRESENT
SODIUM SERPL-SCNC: 138 MMOL/L (ref 135–145)
SPHEROCYTES BLD QL SMEAR: NORMAL
WBC # BLD AUTO: 3.8 K/UL (ref 4.8–10.8)

## 2017-12-26 PROCEDURE — 700105 HCHG RX REV CODE 258: Performed by: SURGERY

## 2017-12-26 PROCEDURE — 85007 BL SMEAR W/DIFF WBC COUNT: CPT

## 2017-12-26 PROCEDURE — A9270 NON-COVERED ITEM OR SERVICE: HCPCS | Performed by: STUDENT IN AN ORGANIZED HEALTH CARE EDUCATION/TRAINING PROGRAM

## 2017-12-26 PROCEDURE — 700111 HCHG RX REV CODE 636 W/ 250 OVERRIDE (IP): Performed by: STUDENT IN AN ORGANIZED HEALTH CARE EDUCATION/TRAINING PROGRAM

## 2017-12-26 PROCEDURE — 94760 N-INVAS EAR/PLS OXIMETRY 1: CPT

## 2017-12-26 PROCEDURE — 700101 HCHG RX REV CODE 250: Performed by: STUDENT IN AN ORGANIZED HEALTH CARE EDUCATION/TRAINING PROGRAM

## 2017-12-26 PROCEDURE — 83735 ASSAY OF MAGNESIUM: CPT

## 2017-12-26 PROCEDURE — 770004 HCHG ROOM/CARE - ONCOLOGY PRIVATE *

## 2017-12-26 PROCEDURE — 700102 HCHG RX REV CODE 250 W/ 637 OVERRIDE(OP): Performed by: SURGERY

## 2017-12-26 PROCEDURE — A9270 NON-COVERED ITEM OR SERVICE: HCPCS | Performed by: SURGERY

## 2017-12-26 PROCEDURE — 99233 SBSQ HOSP IP/OBS HIGH 50: CPT | Mod: GC | Performed by: HOSPITALIST

## 2017-12-26 PROCEDURE — 80053 COMPREHEN METABOLIC PANEL: CPT

## 2017-12-26 PROCEDURE — 84100 ASSAY OF PHOSPHORUS: CPT

## 2017-12-26 PROCEDURE — 94640 AIRWAY INHALATION TREATMENT: CPT

## 2017-12-26 PROCEDURE — 700102 HCHG RX REV CODE 250 W/ 637 OVERRIDE(OP): Performed by: STUDENT IN AN ORGANIZED HEALTH CARE EDUCATION/TRAINING PROGRAM

## 2017-12-26 PROCEDURE — 85027 COMPLETE CBC AUTOMATED: CPT

## 2017-12-26 PROCEDURE — 71020 DX-CHEST-2 VIEWS: CPT

## 2017-12-26 PROCEDURE — 82962 GLUCOSE BLOOD TEST: CPT | Mod: 91

## 2017-12-26 PROCEDURE — 36415 COLL VENOUS BLD VENIPUNCTURE: CPT

## 2017-12-26 PROCEDURE — 700111 HCHG RX REV CODE 636 W/ 250 OVERRIDE (IP): Performed by: SURGERY

## 2017-12-26 RX ORDER — IPRATROPIUM BROMIDE AND ALBUTEROL SULFATE 2.5; .5 MG/3ML; MG/3ML
3 SOLUTION RESPIRATORY (INHALATION) 4 TIMES DAILY
Status: DISCONTINUED | OUTPATIENT
Start: 2017-12-26 | End: 2017-12-28

## 2017-12-26 RX ORDER — ALBUTEROL SULFATE 90 UG/1
2 AEROSOL, METERED RESPIRATORY (INHALATION) EVERY 4 HOURS PRN
Status: DISCONTINUED | OUTPATIENT
Start: 2017-12-26 | End: 2017-12-26

## 2017-12-26 RX ORDER — MAGNESIUM SULFATE HEPTAHYDRATE 40 MG/ML
4 INJECTION, SOLUTION INTRAVENOUS ONCE
Status: COMPLETED | OUTPATIENT
Start: 2017-12-26 | End: 2017-12-26

## 2017-12-26 RX ORDER — POTASSIUM CHLORIDE 20 MEQ/1
40 TABLET, EXTENDED RELEASE ORAL ONCE
Status: COMPLETED | OUTPATIENT
Start: 2017-12-26 | End: 2017-12-26

## 2017-12-26 RX ORDER — INSULIN GLARGINE 100 [IU]/ML
25 INJECTION, SOLUTION SUBCUTANEOUS EVERY EVENING
Status: DISCONTINUED | OUTPATIENT
Start: 2017-12-26 | End: 2017-12-27

## 2017-12-26 RX ORDER — BENZONATATE 100 MG/1
100 CAPSULE ORAL 3 TIMES DAILY PRN
Status: DISCONTINUED | OUTPATIENT
Start: 2017-12-26 | End: 2017-12-29 | Stop reason: HOSPADM

## 2017-12-26 RX ADMIN — INSULIN HUMAN 1 UNITS: 100 INJECTION, SOLUTION PARENTERAL at 13:05

## 2017-12-26 RX ADMIN — MESALAMINE 400 MG: 400 CAPSULE, DELAYED RELEASE ORAL at 21:08

## 2017-12-26 RX ADMIN — MAGNESIUM GLUCONATE 500 MG ORAL TABLET 400 MG: 500 TABLET ORAL at 21:07

## 2017-12-26 RX ADMIN — IPRATROPIUM BROMIDE AND ALBUTEROL SULFATE 3 ML: .5; 3 SOLUTION RESPIRATORY (INHALATION) at 18:37

## 2017-12-26 RX ADMIN — IPRATROPIUM BROMIDE AND ALBUTEROL SULFATE 3 ML: .5; 3 SOLUTION RESPIRATORY (INHALATION) at 00:12

## 2017-12-26 RX ADMIN — IPRATROPIUM BROMIDE AND ALBUTEROL SULFATE 3 ML: .5; 3 SOLUTION RESPIRATORY (INHALATION) at 15:31

## 2017-12-26 RX ADMIN — OXYCODONE HYDROCHLORIDE 10 MG: 10 TABLET ORAL at 11:07

## 2017-12-26 RX ADMIN — INSULIN HUMAN 4 UNITS: 100 INJECTION, SOLUTION PARENTERAL at 21:13

## 2017-12-26 RX ADMIN — POTASSIUM CHLORIDE 40 MEQ: 1500 TABLET, EXTENDED RELEASE ORAL at 08:48

## 2017-12-26 RX ADMIN — INSULIN GLARGINE 25 UNITS: 100 INJECTION, SOLUTION SUBCUTANEOUS at 21:13

## 2017-12-26 RX ADMIN — OXYCODONE HYDROCHLORIDE 10 MG: 10 TABLET ORAL at 18:32

## 2017-12-26 RX ADMIN — MAGNESIUM GLUCONATE 500 MG ORAL TABLET 400 MG: 500 TABLET ORAL at 08:48

## 2017-12-26 RX ADMIN — SODIUM CHLORIDE: 9 INJECTION, SOLUTION INTRAVENOUS at 23:08

## 2017-12-26 RX ADMIN — MAGNESIUM GLUCONATE 500 MG ORAL TABLET 400 MG: 500 TABLET ORAL at 14:53

## 2017-12-26 RX ADMIN — MAGNESIUM SULFATE IN WATER 4 G: 40 INJECTION, SOLUTION INTRAVENOUS at 08:47

## 2017-12-26 RX ADMIN — INSULIN HUMAN 2 UNITS: 100 INJECTION, SOLUTION PARENTERAL at 18:34

## 2017-12-26 RX ADMIN — INSULIN HUMAN 3 UNITS: 100 INJECTION, SOLUTION PARENTERAL at 08:52

## 2017-12-26 RX ADMIN — MESALAMINE 400 MG: 400 CAPSULE, DELAYED RELEASE ORAL at 14:53

## 2017-12-26 RX ADMIN — STANDARDIZED SENNA CONCENTRATE AND DOCUSATE SODIUM 2 TABLET: 8.6; 5 TABLET, FILM COATED ORAL at 08:48

## 2017-12-26 RX ADMIN — MESALAMINE 400 MG: 400 CAPSULE, DELAYED RELEASE ORAL at 08:48

## 2017-12-26 RX ADMIN — Medication 325 MG: at 08:48

## 2017-12-26 RX ADMIN — OXYCODONE HYDROCHLORIDE 10 MG: 10 TABLET ORAL at 21:48

## 2017-12-26 RX ADMIN — OXYCODONE HYDROCHLORIDE 10 MG: 10 TABLET ORAL at 06:15

## 2017-12-26 ASSESSMENT — PAIN SCALES - GENERAL
PAINLEVEL_OUTOF10: 8
PAINLEVEL_OUTOF10: 8
PAINLEVEL_OUTOF10: 7
PAINLEVEL_OUTOF10: 6

## 2017-12-26 ASSESSMENT — ENCOUNTER SYMPTOMS
SHORTNESS OF BREATH: 0
FEVER: 0
COUGH: 0
ORTHOPNEA: 0
CHILLS: 0
VOMITING: 0
ABDOMINAL PAIN: 0
NAUSEA: 0

## 2017-12-26 NOTE — PROGRESS NOTES
Assumed care of the patient at 0715, received report from the NOC RN. Pt AOx4 w/ complaint of 6/10 L posterior thigh pain. Declines medication. Pt updated on POC: O2 titration, ambulation, IS and 24 hr abx. All questions answered at this time. 2 PIV w/ IV abx and IV magnesium replacement. Wound vac L posterior thigh, bilateral nephrostomy drains and kaur to gravity. Pt up SBA w/ hand held assist. O2 via nc. Fall precautions implemented, call light within reach, hourly rounding in place.

## 2017-12-26 NOTE — CARE PLAN
Problem: Respiratory:  Goal: Respiratory status will improve    Intervention: Educate and encourage incentive spirometry usage  Pt has been educated on proper incentive spirometer technique. Pt demonstrates proper IS usage. Pt is self motivated.

## 2017-12-26 NOTE — DISCHARGE PLANNING
Updated Clinical Chart Review:  Wound VAC LLE  Dilaudid 1-2 mg IVP Q 3hrs PRN severe Pain  Zosyn 10.125 g IVPB 20.83 ml/hr continuous, 2 week course, initiated on 12/17/17.  Pacheco Catheter  Urology followup x1 week outpt.    Updated Discharge Planning: Pt stable to be transferred to another facility per MD notes. Wound VAC and continued IV Abx.  [facility].  SW to continue to assess and plan for transfer to appropriate LOC.  SNF/LTAC recommendations.

## 2017-12-26 NOTE — DISCHARGE PLANNING
Medical SW    Referral: Sw discussed w/ unit KATHY Wyatt previous EPIC [facility].    Intervention: Yoselin indicates pt not qualified for acute to acute transfer per information collected last week.    Sw noted PT/OT notes on 12/21 recommended home w/ HH. Yoselin recommends Sw speak w/ pt first regarding this update.    Sw called [facility] and left VM requesting information regarding pt's insurance coverage for HH.    [facility] indicates [facility] and is not fully service connected. Also, pt has Medicare A and B effective as of 12/01/17. If pt does need HH, VA insurance will only accept orders written by a [facility] MD. This means pt will need to have appointment w/ his [facility] Primary care provider, Mónica Ambrosio NP. Her RN is [address] at [phone] if an appointment needs to be made for d/c day. [facility] has no open beds for another month, but Sw can fax docs to [facility] if wants pt to be considered for possible admit.     KATHY Wyatt and this Sw met pt at bedside to discuss d/c planning w/ pt. He called his wife who is an [facility] at [facility]. Discussion involved different choices for d/c. Pt may go home w/ HH but will need support for continuous 24 hour IV ABX and wound vac and wound care. Wife indicates she can take time off work and stay at home to care for pt. Pt states if he goes to LTAC for less than a week and he gets full disclosure regarding the rooms and program, he may choose to d/c there. He will allow Bianca quiroz w/ [facility] to come see him again.     Sw spoke to Bianca and updated Yoselin. Bianca indicates pt will need to stay at LTAC for 20-25 days per Medicare guidelines. This subject will have to be revisited w/ pt and wife Wednesday. Also, [facility] MDs will need to write orders for home infusion (option care), HH (pt and wife's choice), and wound vac ([facility]) for pt to d/c home. These orders can be completed w/ referrals to the above entities and request for INS  AUTH  via pt's reported Medicare B coverage.           Plan: Sw to assist w/ d/c planning as needed.

## 2017-12-26 NOTE — DISCHARGE PLANNING
Continued POC r/t discharge planning options are being explored.   Contacted [facility], s/w Dr. Moon regarding outpt needs.  Pt requiring 24 hr continuous IV Abx infusion, unable to deviate from current dosage regimen per ID recs,wound Vac and kaur management. Sts pt is very resistant to SNF/LTAC.    NARINDER Garza awaiting response from [facility] r/t obtaining services if pt returns home with [facility].

## 2017-12-27 ENCOUNTER — APPOINTMENT (OUTPATIENT)
Dept: RADIOLOGY | Facility: MEDICAL CENTER | Age: 67
DRG: 668 | End: 2017-12-27
Attending: STUDENT IN AN ORGANIZED HEALTH CARE EDUCATION/TRAINING PROGRAM
Payer: COMMERCIAL

## 2017-12-27 LAB
ALBUMIN SERPL BCP-MCNC: 2.4 G/DL (ref 3.2–4.9)
ALBUMIN/GLOB SERPL: 0.8 G/DL
ALP SERPL-CCNC: 58 U/L (ref 30–99)
ALT SERPL-CCNC: 12 U/L (ref 2–50)
ANION GAP SERPL CALC-SCNC: 5 MMOL/L (ref 0–11.9)
ANISOCYTOSIS BLD QL SMEAR: ABNORMAL
AST SERPL-CCNC: 15 U/L (ref 12–45)
BASOPHILS # BLD AUTO: 0.9 % (ref 0–1.8)
BASOPHILS # BLD: 0.03 K/UL (ref 0–0.12)
BILIRUB SERPL-MCNC: 0.3 MG/DL (ref 0.1–1.5)
BUN SERPL-MCNC: 12 MG/DL (ref 8–22)
CALCIUM SERPL-MCNC: 8.2 MG/DL (ref 8.5–10.5)
CHLORIDE SERPL-SCNC: 102 MMOL/L (ref 96–112)
CO2 SERPL-SCNC: 28 MMOL/L (ref 20–33)
CREAT SERPL-MCNC: 0.46 MG/DL (ref 0.5–1.4)
EOSINOPHIL # BLD AUTO: 0.18 K/UL (ref 0–0.51)
EOSINOPHIL NFR BLD: 5.2 % (ref 0–6.9)
ERYTHROCYTE [DISTWIDTH] IN BLOOD BY AUTOMATED COUNT: 57 FL (ref 35.9–50)
GFR SERPL CREATININE-BSD FRML MDRD: >60 ML/MIN/1.73 M 2
GLOBULIN SER CALC-MCNC: 3.2 G/DL (ref 1.9–3.5)
GLUCOSE BLD-MCNC: 197 MG/DL (ref 65–99)
GLUCOSE BLD-MCNC: 234 MG/DL (ref 65–99)
GLUCOSE BLD-MCNC: 279 MG/DL (ref 65–99)
GLUCOSE BLD-MCNC: 313 MG/DL (ref 65–99)
GLUCOSE SERPL-MCNC: 255 MG/DL (ref 65–99)
HCT VFR BLD AUTO: 27.2 % (ref 42–52)
HGB BLD-MCNC: 8.8 G/DL (ref 14–18)
LYMPHOCYTES # BLD AUTO: 0.63 K/UL (ref 1–4.8)
LYMPHOCYTES NFR BLD: 18.4 % (ref 22–41)
MACROCYTES BLD QL SMEAR: ABNORMAL
MAGNESIUM SERPL-MCNC: 1.8 MG/DL (ref 1.5–2.5)
MANUAL DIFF BLD: NORMAL
MCH RBC QN AUTO: 31.9 PG (ref 27–33)
MCHC RBC AUTO-ENTMCNC: 32.4 G/DL (ref 33.7–35.3)
MCV RBC AUTO: 98.6 FL (ref 81.4–97.8)
MICROCYTES BLD QL SMEAR: ABNORMAL
MONOCYTES # BLD AUTO: 0.18 K/UL (ref 0–0.85)
MONOCYTES NFR BLD AUTO: 5.3 % (ref 0–13.4)
MORPHOLOGY BLD-IMP: NORMAL
NEUTROPHILS # BLD AUTO: 2.39 K/UL (ref 1.82–7.42)
NEUTROPHILS NFR BLD: 70.2 % (ref 44–72)
NRBC # BLD AUTO: 0 K/UL
NRBC BLD-RTO: 0 /100 WBC
PHOSPHATE SERPL-MCNC: 2.8 MG/DL (ref 2.5–4.5)
PLATELET # BLD AUTO: 148 K/UL (ref 164–446)
PLATELET BLD QL SMEAR: NORMAL
PMV BLD AUTO: 9.5 FL (ref 9–12.9)
POLYCHROMASIA BLD QL SMEAR: NORMAL
POTASSIUM SERPL-SCNC: 4 MMOL/L (ref 3.6–5.5)
PROT SERPL-MCNC: 5.6 G/DL (ref 6–8.2)
RBC # BLD AUTO: 2.76 M/UL (ref 4.7–6.1)
RBC BLD AUTO: PRESENT
SODIUM SERPL-SCNC: 135 MMOL/L (ref 135–145)
WBC # BLD AUTO: 3.4 K/UL (ref 4.8–10.8)

## 2017-12-27 PROCEDURE — A9270 NON-COVERED ITEM OR SERVICE: HCPCS | Performed by: SURGERY

## 2017-12-27 PROCEDURE — 36415 COLL VENOUS BLD VENIPUNCTURE: CPT

## 2017-12-27 PROCEDURE — 700102 HCHG RX REV CODE 250 W/ 637 OVERRIDE(OP): Performed by: SURGERY

## 2017-12-27 PROCEDURE — 85025 COMPLETE CBC W/AUTO DIFF WBC: CPT

## 2017-12-27 PROCEDURE — 700111 HCHG RX REV CODE 636 W/ 250 OVERRIDE (IP): Performed by: STUDENT IN AN ORGANIZED HEALTH CARE EDUCATION/TRAINING PROGRAM

## 2017-12-27 PROCEDURE — 97606 NEG PRS WND THER DME>50 SQCM: CPT

## 2017-12-27 PROCEDURE — 80053 COMPREHEN METABOLIC PANEL: CPT

## 2017-12-27 PROCEDURE — 700102 HCHG RX REV CODE 250 W/ 637 OVERRIDE(OP): Performed by: STUDENT IN AN ORGANIZED HEALTH CARE EDUCATION/TRAINING PROGRAM

## 2017-12-27 PROCEDURE — 94760 N-INVAS EAR/PLS OXIMETRY 1: CPT

## 2017-12-27 PROCEDURE — 84100 ASSAY OF PHOSPHORUS: CPT

## 2017-12-27 PROCEDURE — 700101 HCHG RX REV CODE 250: Performed by: STUDENT IN AN ORGANIZED HEALTH CARE EDUCATION/TRAINING PROGRAM

## 2017-12-27 PROCEDURE — 94640 AIRWAY INHALATION TREATMENT: CPT

## 2017-12-27 PROCEDURE — 84681 ASSAY OF C-PEPTIDE: CPT

## 2017-12-27 PROCEDURE — A9270 NON-COVERED ITEM OR SERVICE: HCPCS | Performed by: STUDENT IN AN ORGANIZED HEALTH CARE EDUCATION/TRAINING PROGRAM

## 2017-12-27 PROCEDURE — 770004 HCHG ROOM/CARE - ONCOLOGY PRIVATE *

## 2017-12-27 PROCEDURE — 82962 GLUCOSE BLOOD TEST: CPT | Mod: 91

## 2017-12-27 PROCEDURE — 99232 SBSQ HOSP IP/OBS MODERATE 35: CPT | Mod: GC | Performed by: HOSPITALIST

## 2017-12-27 PROCEDURE — 83735 ASSAY OF MAGNESIUM: CPT

## 2017-12-27 RX ORDER — BENZOCAINE/MENTHOL 6 MG-10 MG
LOZENGE MUCOUS MEMBRANE 2 TIMES DAILY
Status: DISCONTINUED | OUTPATIENT
Start: 2017-12-27 | End: 2017-12-27 | Stop reason: CLARIF

## 2017-12-27 RX ORDER — INSULIN GLARGINE 100 [IU]/ML
30 INJECTION, SOLUTION SUBCUTANEOUS EVERY EVENING
Status: DISCONTINUED | OUTPATIENT
Start: 2017-12-27 | End: 2017-12-29 | Stop reason: HOSPADM

## 2017-12-27 RX ORDER — MAGNESIUM SULFATE HEPTAHYDRATE 40 MG/ML
2 INJECTION, SOLUTION INTRAVENOUS ONCE
Status: DISPENSED | OUTPATIENT
Start: 2017-12-27 | End: 2017-12-28

## 2017-12-27 RX ADMIN — MAGNESIUM GLUCONATE 500 MG ORAL TABLET 800 MG: 500 TABLET ORAL at 20:59

## 2017-12-27 RX ADMIN — OXYCODONE HYDROCHLORIDE 10 MG: 10 TABLET ORAL at 11:04

## 2017-12-27 RX ADMIN — OXYCODONE HYDROCHLORIDE 10 MG: 10 TABLET ORAL at 16:53

## 2017-12-27 RX ADMIN — INSULIN HUMAN 4 UNITS: 100 INJECTION, SOLUTION PARENTERAL at 09:28

## 2017-12-27 RX ADMIN — OXYCODONE HYDROCHLORIDE 5 MG: 10 TABLET ORAL at 02:34

## 2017-12-27 RX ADMIN — MESALAMINE 400 MG: 400 CAPSULE, DELAYED RELEASE ORAL at 20:59

## 2017-12-27 RX ADMIN — INSULIN HUMAN 3 UNITS: 100 INJECTION, SOLUTION PARENTERAL at 13:03

## 2017-12-27 RX ADMIN — INSULIN HUMAN 2 UNITS: 100 INJECTION, SOLUTION PARENTERAL at 21:10

## 2017-12-27 RX ADMIN — IPRATROPIUM BROMIDE AND ALBUTEROL SULFATE 3 ML: .5; 3 SOLUTION RESPIRATORY (INHALATION) at 16:55

## 2017-12-27 RX ADMIN — INSULIN HUMAN 1 UNITS: 100 INJECTION, SOLUTION PARENTERAL at 18:19

## 2017-12-27 RX ADMIN — MAGNESIUM GLUCONATE 500 MG ORAL TABLET 800 MG: 500 TABLET ORAL at 13:07

## 2017-12-27 RX ADMIN — MAGNESIUM GLUCONATE 500 MG ORAL TABLET 800 MG: 500 TABLET ORAL at 09:22

## 2017-12-27 RX ADMIN — HYDROMORPHONE HYDROCHLORIDE 2 MG: 2 INJECTION INTRAMUSCULAR; INTRAVENOUS; SUBCUTANEOUS at 12:05

## 2017-12-27 RX ADMIN — STANDARDIZED SENNA CONCENTRATE AND DOCUSATE SODIUM 2 TABLET: 8.6; 5 TABLET, FILM COATED ORAL at 20:59

## 2017-12-27 RX ADMIN — INSULIN GLARGINE 30 UNITS: 100 INJECTION, SOLUTION SUBCUTANEOUS at 21:10

## 2017-12-27 RX ADMIN — STANDARDIZED SENNA CONCENTRATE AND DOCUSATE SODIUM 2 TABLET: 8.6; 5 TABLET, FILM COATED ORAL at 09:22

## 2017-12-27 RX ADMIN — IPRATROPIUM BROMIDE AND ALBUTEROL SULFATE 3 ML: .5; 3 SOLUTION RESPIRATORY (INHALATION) at 11:30

## 2017-12-27 RX ADMIN — IPRATROPIUM BROMIDE AND ALBUTEROL SULFATE 3 ML: .5; 3 SOLUTION RESPIRATORY (INHALATION) at 07:33

## 2017-12-27 RX ADMIN — MESALAMINE 400 MG: 400 CAPSULE, DELAYED RELEASE ORAL at 13:08

## 2017-12-27 RX ADMIN — MESALAMINE 400 MG: 400 CAPSULE, DELAYED RELEASE ORAL at 09:22

## 2017-12-27 RX ADMIN — Medication 325 MG: at 09:22

## 2017-12-27 ASSESSMENT — PAIN SCALES - GENERAL
PAINLEVEL_OUTOF10: 10
PAINLEVEL_OUTOF10: 5
PAINLEVEL_OUTOF10: 7
PAINLEVEL_OUTOF10: 4
PAINLEVEL_OUTOF10: 8
PAINLEVEL_OUTOF10: 6
PAINLEVEL_OUTOF10: 8
PAINLEVEL_OUTOF10: 6
PAINLEVEL_OUTOF10: 8

## 2017-12-27 ASSESSMENT — ENCOUNTER SYMPTOMS
ABDOMINAL PAIN: 0
SHORTNESS OF BREATH: 0
CHILLS: 0
COUGH: 0
FEVER: 0
NAUSEA: 0
VOMITING: 0
ORTHOPNEA: 0

## 2017-12-27 ASSESSMENT — LIFESTYLE VARIABLES: DO YOU DRINK ALCOHOL: NO

## 2017-12-27 NOTE — PROGRESS NOTES
[facility] Interval Note    Name Niels Lima     1950   Age/Sex 67 y.o. male   MRN [id number]   Code Status FULL     After 5PM or if no immediate response to page, please call for cross-coverage  Attending/Team: Dr. PETER/MARTA Use Tiger Text to page  6AM-5PM  Call [phone] to page afterhours   1st Call - Day Intern (R1):   XIOMARA HANSON 2nd Call - Day Sr. Resident (R2/R3):   YANIRA PÉREZ         Chief complaint/reason for interval visit  [facility]  Hydronephrosis s/p b/l nephrostomy tube placement  DVT s/p IVC filter  Wound on the LLE , wound vac in place    Interval Problem Update  Pt feels better today  Pacheco catheter not draining blood anymore, nephrostomy tubes draining clear urine.  Renal u/s ordered to look for improvement in the hydronephrosis  Expiratory wheezes heard on exam, changed prn duonebs to qid and albuterol q2 hrs  Prn  2V CXR ordered which showed chronic bronchitis  Urology onboard- will f/u on the discharge plan and restarting anticoagulation- appreciate recs  Resistant to discharge to LTAC/SNF,  on board- appreciate recs        Review of Systems   Constitutional: Negative for chills and fever.   Respiratory: Negative for cough and shortness of breath.    Cardiovascular: Negative for chest pain and orthopnea.   Gastrointestinal: Negative for abdominal pain, nausea and vomiting.   Genitourinary: Negative for dysuria and hematuria.   Skin: Negative for rash.       Consultants/Specialty  Infectious diseases  General surgery    Disposition  Patient resistant to discharge to LTAC/SNF    Quality Measures    Reviewed items::  Labs reviewed, Medications reviewed and Radiology images reviewed  Pacheco catheter::  Urinary Tract Retention or Urinary Tract Obstruction  DVT prophylaxis pharmacological::  Contraindicated - High bleeding risk  DVT prophylaxis - mechanical:  SCDs  Antibiotics:  Treating active infection/contamination  beyond 24 hours perioperative coverage         Physical Exam   Vitals:    12/26/17 0800 12/26/17 1531 12/26/17 1600 12/26/17 1838   BP: 125/75  137/71    Pulse: 65 66 87 80   Resp: 18 18 18 18   Temp: 36.1 °C (97 °F)  36.8 °C (98.3 °F)    SpO2: 92% 92% 93% 93%   Weight:       Height:         Body mass index is 22.89 kg/m².  Oxygen Therapy:  Pulse Oximetry: 93 %, O2 (LPM): 1.5, O2 Delivery: Silicone Nasal Cannula    Physical Exam   Constitutional: He is oriented to person, place, and time and well-developed, well-nourished, and in no distress.   HENT:   Head: Normocephalic and atraumatic.   Eyes: EOM are normal. Pupils are equal, round, and reactive to light.   Neck: Normal range of motion.   Cardiovascular: Normal rate, regular rhythm and normal heart sounds.    Pulmonary/Chest: He has wheezes (b/l expiratory wheezes). He has no rales.   Abdominal: Bowel sounds are normal.   Nephrostomy tubes in place   Musculoskeletal: Normal range of motion.   Neurological: He is alert and oriented to person, place, and time.         Lab Data Review:  Recent Results (from the past 24 hour(s))   ACCU-CHEK GLUCOSE    Collection Time: 12/25/17  8:49 PM   Result Value Ref Range    Glucose - Accu-Ck 215 (H) 65 - 99 mg/dL   CBC WITH DIFFERENTIAL    Collection Time: 12/25/17 11:48 PM   Result Value Ref Range    WBC 3.8 (L) 4.8 - 10.8 K/uL    RBC 2.91 (L) 4.70 - 6.10 M/uL    Hemoglobin 9.3 (L) 14.0 - 18.0 g/dL    Hematocrit 29.6 (L) 42.0 - 52.0 %    .7 (H) 81.4 - 97.8 fL    MCH 32.0 27.0 - 33.0 pg    MCHC 31.4 (L) 33.7 - 35.3 g/dL    RDW 60.1 (H) 35.9 - 50.0 fL    Platelet Count 169 164 - 446 K/uL    MPV 9.7 9.0 - 12.9 fL    Nucleated RBC 0.00 /100 WBC    NRBC (Absolute) 0.00 K/uL    Neutrophils-Polys 64.90 44.00 - 72.00 %    Lymphocytes 24.60 22.00 - 41.00 %    Monocytes 7.00 0.00 - 13.40 %    Eosinophils 3.50 0.00 - 6.90 %    Basophils 0.00 0.00 - 1.80 %    Neutrophils (Absolute) 2.47 1.82 - 7.42 K/uL    Lymphs (Absolute) 0.93  (L) 1.00 - 4.80 K/uL    Monos (Absolute) 0.27 0.00 - 0.85 K/uL    Eos (Absolute) 0.13 0.00 - 0.51 K/uL    Baso (Absolute) 0.00 0.00 - 0.12 K/uL    Anisocytosis 1+     Microcytosis 1+    MAGNESIUM    Collection Time: 12/25/17 11:48 PM   Result Value Ref Range    Magnesium 1.5 1.5 - 2.5 mg/dL   PHOSPHORUS    Collection Time: 12/25/17 11:48 PM   Result Value Ref Range    Phosphorus 3.1 2.5 - 4.5 mg/dL   COMP METABOLIC PANEL    Collection Time: 12/25/17 11:48 PM   Result Value Ref Range    Sodium 138 135 - 145 mmol/L    Potassium 3.7 3.6 - 5.5 mmol/L    Chloride 103 96 - 112 mmol/L    Co2 29 20 - 33 mmol/L    Anion Gap 6.0 0.0 - 11.9    Glucose 162 (H) 65 - 99 mg/dL    Bun 12 8 - 22 mg/dL    Creatinine 0.44 (L) 0.50 - 1.40 mg/dL    Calcium 8.4 (L) 8.5 - 10.5 mg/dL    AST(SGOT) 15 12 - 45 U/L    ALT(SGPT) 10 2 - 50 U/L    Alkaline Phosphatase 66 30 - 99 U/L    Total Bilirubin 0.3 0.1 - 1.5 mg/dL    Albumin 2.4 (L) 3.2 - 4.9 g/dL    Total Protein 5.8 (L) 6.0 - 8.2 g/dL    Globulin 3.4 1.9 - 3.5 g/dL    A-G Ratio 0.7 g/dL   ESTIMATED GFR    Collection Time: 12/25/17 11:48 PM   Result Value Ref Range    GFR If African American >60 >60 mL/min/1.73 m 2    GFR If Non African American >60 >60 mL/min/1.73 m 2   DIFFERENTIAL MANUAL    Collection Time: 12/25/17 11:48 PM   Result Value Ref Range    Manual Diff Status PERFORMED    PERIPHERAL SMEAR REVIEW    Collection Time: 12/25/17 11:48 PM   Result Value Ref Range    Peripheral Smear Review see below    PLATELET ESTIMATE    Collection Time: 12/25/17 11:48 PM   Result Value Ref Range    Plt Estimation Normal    MORPHOLOGY    Collection Time: 12/25/17 11:48 PM   Result Value Ref Range    RBC Morphology Present     Polychromia 1+     Poikilocytosis 1+     Spherocytes 1+    ACCU-CHEK GLUCOSE    Collection Time: 12/26/17  8:52 AM   Result Value Ref Range    Glucose - Accu-Ck 266 (H) 65 - 99 mg/dL   ACCU-CHEK GLUCOSE    Collection Time: 12/26/17  1:03 PM   Result Value Ref Range     Glucose - Accu-Ck 190 (H) 65 - 99 mg/dL   ACCU-CHEK GLUCOSE    Collection Time: 12/26/17  6:33 PM   Result Value Ref Range    Glucose - Accu-Ck 224 (H) 65 - 99 mg/dL       12/25/2017  1:08 PM    Recent Labs      12/24/17   0010  12/25/17   2348   SODIUM  137  138   POTASSIUM  4.1  3.7   CHLORIDE  103  103   CO2  31  29   BUN  11  12   CREATININE  0.49*  0.44*   MAGNESIUM  1.7  1.5   PHOSPHORUS  2.6  3.1   CALCIUM  8.4*  8.4*       Recent Labs      12/24/17   0010  12/25/17   2348   ALTSGPT   --   10   ASTSGOT   --   15   ALKPHOSPHAT   --   66   TBILIRUBIN   --   0.3   GLUCOSE  160*  162*       Recent Labs      12/24/17   0010  12/25/17   2348   RBC  2.77*  2.91*   HEMOGLOBIN  9.1*  9.3*   HEMATOCRIT  27.9*  29.6*   PLATELETCT  196  169       Recent Labs      12/24/17   0010  12/25/17 2348   WBC  4.5*  3.8*   NEUTSPOLYS  70.20  64.90   LYMPHOCYTES  19.30*  24.60   MONOCYTES  8.80  7.00   EOSINOPHILS  1.70  3.50   BASOPHILS  0.00  0.00   ASTSGOT   --   15   ALTSGPT   --   10   ALKPHOSPHAT   --   66   TBILIRUBIN   --   0.3          Assessment/Plan   * Hydronephrosis- (present on admission)   Assessment & Plan    S/p b/l nephrostomy tubes 12/13  Cr returned to baseline of 0.4-0.5  Nephrostomy tubes draining clear fluid  Renal u/s ordered to look for improvement in the hydronephrosis- will f/u  As per urology- pt will be discharged on kaur and nephrostomy tubes  Internalization to be done as an outpatient  Will f/u with urology on the discharge plan          Wound of left lower extremity- (present on admission)   Assessment & Plan    Patient had a large eschar on his left posterior thigh from previous subcutaneous hemorrhage as a result of supra therapeutic INR while on coumadin.   s/p debridement of 20x25 cm eschar of black dead skin on 12/17.   Wound vac in place.   Wound culture positive for enterococcus fecalis, citrobacter and bacteroids.   Dr. Kearney on board- recommended zosyn  Plan:  Continue zosyn  Continue  wound care per protocol  As per Dr. Kearney,  patient will need 2 weeks of zosyn with start date 12/17  Pt will need to f/u with wound clinic after discharge        Acute DVT (deep venous thrombosis) (CMS-HCC)- (present on admission)   Assessment & Plan    Past h/o DVT- as per pt 3 episodes in the past, s/p IVC filter in 2009  [facility] on [facility] and [facility] in [facility].  LE venous doppler 12/13  showed B/L extensive DVT in both extremities. Acute and chronic.  Anticoagulation on hold due to bloody urine from the kaur  Will f/u with urology on starting anticoagulation  Pt and wife aware of this decision and agreeable  Will CTM            Extraperitoneal rupture of bladder- (present on admission)   Assessment & Plan    S/p rigid cystoscopy with evacuation of 100 ml of blood clot, fulguration of bladder perforation sites and biopsy of posterior bladder wall on 12/11. Pathology shows necrotic tissue. No tumor  Kaur catheter in place- draining clear urine, no blood seen  Hb stable at 9.3  B/L nephrostomy tube placement on 12/13   Plan:  B&O suppository for bladder spasms  To f/u with urology in a week after discharge          DM type 2 (diabetes mellitus, type 2) (CMS-HCC)- (present on admission)   Assessment & Plan    Home meds include metformin and glipizide- held  A1C at 7  POCs between 180-250 today  Plan:  Continue insulin glargine 25U Qhs  On ISS and hypoglycemia protocol  Monitor POCs        Anxiety   Assessment & Plan    Patient stable to be discharged to LTAC for continued IV antibiotics  Anxious about discharge to another facility        Shortness of breath- (present on admission)   Assessment & Plan    Resolved  Pt c/o SOB on 12/19, had increased work of breathing with b/l exp rhonchi  CTA ruled out PE, has acute DVT this admission.  Pt denies any SOB today  On exam: b/l wheezes present  CXR was repeated which showed chronic bronchitis  Plan:  Changed duonebs to  qid and albuterol to q2hrs prn  RT and O2 per protocol  Continuous pulse ox  Encourage incentive spirometry          Protein calorie malnutrition (CMS-HCC)- (present on admission)   Assessment & Plan    Pre albumin of 3  Nutrition consulted  Appreciate recs        Electrolyte abnormality- (present on admission)   Assessment & Plan    Hypokalemia, hypomagnesemia  Will monitor and replace as needed  Continue Po magnesium        Anemia- (present on admission)   Assessment & Plan    Iron deficiency anemia  2/2 blood loss from hematuria  Low iron and TIBC, Ferritin wnl  Vitamin B12 low, got 5 does of IM B12 injections  Plan:  Continue oral iron  Continue IM b12 weekly for 1 month          Abdominal distension- (present on admission)   Assessment & Plan     Resolved  Likely 2/2 ileus  Abdomen soft, non distended with normal bowel sounds.  Pt did have a BM  Will CTM        Hyperkalemia- (present on admission)   Assessment & Plan    Resolved  2/2 JENNIFFER  Will CTM        Acute kidney injury (CMS-[facility])- (present on admission)   Assessment & Plan    Resolved  Baseline creat 0.7.   Bilateral nephrostomy tubes placed on 12/13 for hydronephrosis with improvement in creatinine  Creatinine now at baseline           Crohn disease (CMS-[facility])- (present on admission)   Assessment & Plan    Patient on mesalamine and mercaptopurine as home medications  Restarted mesalamine  Mercaptopurine on hold in the setting of active infection

## 2017-12-27 NOTE — DISCHARGE PLANNING
Received choice forms from NARINDER Garza for SNF and Wound Vac. Referral sent to [facility] at 1048.

## 2017-12-27 NOTE — WOUND TEAM
Found new VAC pump at nursing station with unclear why it was ordered 12/24/17.  Found current pump not sucking at 125mmHg and when looking at pump it kept returning to home screen.  Concern for function so changed pump, applied new cannister and resumed therapy.  Found that this pump also decreased suction and alarm of blockage.  Notified team member who instructed me to have staff RN premedicate patient and he will change dressing this AM.  Pump turned off at 10:30A.

## 2017-12-27 NOTE — DISCHARGE PLANNING
"Late Entry:  Met with ptKayla at bedside. Pt called wife, and included her into the conversation.  Discussed options r/t discharge plan.  Provided education r/t payor sources, medical necessity and accessibility to treatment modalities required upon discharge.    Pt's wife sts concerns r/t urology f/u and would like pt to return home upon discharge. Also sts concern r/t HIPPA, sts, \"my supervisor called me into her office and was discussing my 's care.\" Advised a conversation with her supervisor be had, and this CM cannot speak to this perceived breach of confidentiality. Pt and wife agreeable to transition to facility to manage 24 hr abx infusion and wound care prior to return home.  SW to continue to facilitate discharge plan per protocol.       "

## 2017-12-27 NOTE — DISCHARGE PLANNING
Medical SW    Referral: Sw discussed case w/ supervisor Daphney.    Intervention: Sw concerned about possible d/c plans as last night pt reported he is willing to go to facility for 5 or less days and reports he will need to see pictures of the room before he can make a decision.    Diamante advised by Daphney to make blanket referrals to SNF, DME, LTAC. Sw completed these forms and faxed to [facility]. Sw received fax confirmations. Diamante spoke to [facility].    Sw advised by Daphney security met w/ pt and pt indicates he will support any d/c Sw is able to create that his insurance will cover.    Diamante discussed w/ bedside RN Nena current situation w/ d/c and referrals completed. Nena will ask [facility] MDs to please complete orders for in home infusion and HH in case that is the next option for pt's d/c after SNFs.    Diamante spoke to liaison w/ [facility]. She can accept pt but will need to d/c tomorrow as they will order the wound vac tonight. Diamante advised bedside RN and supervisor of update.    Script provided by bedside RN and Sw left report for Sw tomorrow to f/u w/ probable transport to SNF and script.     Plan: Sw to assist w/ d/c planning as needed.

## 2017-12-27 NOTE — PROGRESS NOTES
Received shift report from day shift RN. Pt is AO4 and reports pain on his back left thigh, rest, repositioned, and medicated per MAR. Discussed POC. Assessed and administered evening medications. Bed in lowest position, call light and personal belongings within reach, educated to call if in need of assistance, non skid socks, all needs met at this time.

## 2017-12-27 NOTE — DISCHARGE PLANNING
Received choice form from NARINDER Garza for LTAC. Referral sent to [facility] and [facility] at 1124.

## 2017-12-27 NOTE — DISCHARGE PLANNING
Received call from Jaylene with [facility] accepting patient. Notified NARINDER Garza via voicemail.

## 2017-12-27 NOTE — DISCHARGE PLANNING
Received call Andrew with [facility] Medicare is inactive.  Received declined notification from [place], No skilled need.

## 2017-12-27 NOTE — PROGRESS NOTES
[facility] [place] [facility] [place]    Name Niels Lima     1950   Age/Sex 67 y.o. male   MRN [id number]   Code Status FULL     After 5PM or if no immediate response to page, please call for cross-coverage  Attending/Team: Dr. PETER/MARTA Use [employer] Text to page  6AM-5PM  Call [phone] to page afterhours   1st Call - Day Intern (R1):   XIOMARA HANSON 2nd Call - Day Sr. Resident (R2/R3):   YANIRA PÉREZ         Chief complaint/reason for interval visit  [facility]  Hydronephrosis s/p b/l nephrostomy tube placement  DVT s/p IVC filter  Wound on the LLE , wound vac in place    Interval Problem Update  Pt feels better today  Pacheco catheter not draining blood anymore, nephrostomy tubes draining clear urine.  On zosyn day 11 today, to complete total of 14 days of IV zosyn per ID recs  Renal u/s ordered to look for improvement in the hydronephrosis- ultrasound done but report not back yet- will f/u  Wheezing improved on scheduled duonebs  Urology onboard- ok to start dabigatran for DVT px per urology- overnight monitoring for bleeding- discussed with RN  Social working on possible discharge to home with home health- orders in place  Resistant to discharge to LTAC/SNF,  on board- appreciate recs        Review of Systems   Constitutional: Negative for chills and fever.   Respiratory: Negative for cough and shortness of breath.    Cardiovascular: Negative for chest pain and orthopnea.   Gastrointestinal: Negative for abdominal pain, nausea and vomiting.   Genitourinary: Negative for dysuria and hematuria.   Skin: Negative for rash.       Consultants/Specialty  Urology  Infectious diseases  General surgery    Disposition   Inpatient for continuous IV antibiotics  Patient resistant to discharge to LTAC/SNF    Quality Measures    Reviewed items::  Labs reviewed, Medications reviewed and Radiology images reviewed  Pacheco catheter::  Urinary Tract Retention or Urinary  Tract Obstruction  DVT: ok to start dabigatran today per urology, will monitor for bleeding.  DVT prophylaxis - mechanical:  SCDs  Antibiotics:  Treating active infection/contamination beyond 24 hours perioperative coverage         Physical Exam   Vitals:    12/27/17 0500 12/27/17 0740 12/27/17 0753 12/27/17 1131   BP: 115/70  120/64    Pulse: 64 68 78 76   Resp: 18 18 18 16   Temp: 36.9 °C (98.4 °F)  37.2 °C (98.9 °F)    SpO2: 96% 90% 91% 92%   Weight:       Height:         Body mass index is 22.89 kg/m².  Oxygen Therapy:  Pulse Oximetry: 92 %, O2 (LPM): 1.5, O2 Delivery: Silicone Nasal Cannula    Physical Exam   Constitutional: He is oriented to person, place, and time and well-developed, well-nourished, and in no distress.   HENT:   Head: Normocephalic and atraumatic.   Eyes: EOM are normal. Pupils are equal, round, and reactive to light.   Neck: Normal range of motion.   Cardiovascular: Normal rate, regular rhythm and normal heart sounds.    Pulmonary/Chest: He has wheezes (b/l expiratory wheezes, improved since yesterday). He has no rales.   Abdominal: Bowel sounds are normal.   Nephrostomy tubes in place   Musculoskeletal: Normal range of motion.   Neurological: He is alert and oriented to person, place, and time.         Lab Data Review:  Recent Results (from the past 24 hour(s))   ACCU-CHEK GLUCOSE    Collection Time: 12/26/17  6:33 PM   Result Value Ref Range    Glucose - Accu-Ck 224 (H) 65 - 99 mg/dL   ACCU-CHEK GLUCOSE    Collection Time: 12/26/17  9:11 PM   Result Value Ref Range    Glucose - Accu-Ck 328 (H) 65 - 99 mg/dL   CBC WITH DIFFERENTIAL    Collection Time: 12/26/17 11:48 PM   Result Value Ref Range    WBC 3.4 (L) 4.8 - 10.8 K/uL    RBC 2.76 (L) 4.70 - 6.10 M/uL    Hemoglobin 8.8 (L) 14.0 - 18.0 g/dL    Hematocrit 27.2 (L) 42.0 - 52.0 %    MCV 98.6 (H) 81.4 - 97.8 fL    MCH 31.9 27.0 - 33.0 pg    MCHC 32.4 (L) 33.7 - 35.3 g/dL    RDW 57.0 (H) 35.9 - 50.0 fL    Platelet Count 148 (L) 164 - 446  K/uL    MPV 9.5 9.0 - 12.9 fL    Nucleated RBC 0.00 /100 WBC    NRBC (Absolute) 0.00 K/uL    Neutrophils-Polys 70.20 44.00 - 72.00 %    Lymphocytes 18.40 (L) 22.00 - 41.00 %    Monocytes 5.30 0.00 - 13.40 %    Eosinophils 5.20 0.00 - 6.90 %    Basophils 0.90 0.00 - 1.80 %    Neutrophils (Absolute) 2.39 1.82 - 7.42 K/uL    Lymphs (Absolute) 0.63 (L) 1.00 - 4.80 K/uL    Monos (Absolute) 0.18 0.00 - 0.85 K/uL    Eos (Absolute) 0.18 0.00 - 0.51 K/uL    Baso (Absolute) 0.03 0.00 - 0.12 K/uL    Anisocytosis 2+     Macrocytosis 1+     Microcytosis 1+    MAGNESIUM    Collection Time: 12/26/17 11:48 PM   Result Value Ref Range    Magnesium 1.8 1.5 - 2.5 mg/dL   PHOSPHORUS    Collection Time: 12/26/17 11:48 PM   Result Value Ref Range    Phosphorus 2.8 2.5 - 4.5 mg/dL   COMP METABOLIC PANEL    Collection Time: 12/26/17 11:48 PM   Result Value Ref Range    Sodium 135 135 - 145 mmol/L    Potassium 4.0 3.6 - 5.5 mmol/L    Chloride 102 96 - 112 mmol/L    Co2 28 20 - 33 mmol/L    Anion Gap 5.0 0.0 - 11.9    Glucose 255 (H) 65 - 99 mg/dL    Bun 12 8 - 22 mg/dL    Creatinine 0.46 (L) 0.50 - 1.40 mg/dL    Calcium 8.2 (L) 8.5 - 10.5 mg/dL    AST(SGOT) 15 12 - 45 U/L    ALT(SGPT) 12 2 - 50 U/L    Alkaline Phosphatase 58 30 - 99 U/L    Total Bilirubin 0.3 0.1 - 1.5 mg/dL    Albumin 2.4 (L) 3.2 - 4.9 g/dL    Total Protein 5.6 (L) 6.0 - 8.2 g/dL    Globulin 3.2 1.9 - 3.5 g/dL    A-G Ratio 0.8 g/dL   ESTIMATED GFR    Collection Time: 12/26/17 11:48 PM   Result Value Ref Range    GFR If African American >60 >60 mL/min/1.73 m 2    GFR If Non African American >60 >60 mL/min/1.73 m 2   DIFFERENTIAL MANUAL    Collection Time: 12/26/17 11:48 PM   Result Value Ref Range    Manual Diff Status PERFORMED    PERIPHERAL SMEAR REVIEW    Collection Time: 12/26/17 11:48 PM   Result Value Ref Range    Peripheral Smear Review see below    PLATELET ESTIMATE    Collection Time: 12/26/17 11:48 PM   Result Value Ref Range    Plt Estimation Decreased     MORPHOLOGY    Collection Time: 12/26/17 11:48 PM   Result Value Ref Range    RBC Morphology Present     Polychromia 1+    ACCU-CHEK GLUCOSE    Collection Time: 12/27/17  9:28 AM   Result Value Ref Range    Glucose - Accu-Ck 313 (H) 65 - 99 mg/dL   ACCU-CHEK GLUCOSE    Collection Time: 12/27/17  1:03 PM   Result Value Ref Range    Glucose - Accu-Ck 279 (H) 65 - 99 mg/dL       12/25/2017  1:08 PM    Recent Labs      12/25/17 2348  12/26/17 2348   SODIUM  138  135   POTASSIUM  3.7  4.0   CHLORIDE  103  102   CO2  29  28   BUN  12  12   CREATININE  0.44*  0.46*   MAGNESIUM  1.5  1.8   PHOSPHORUS  3.1  2.8   CALCIUM  8.4*  8.2*       Recent Labs      12/25/17 2348  12/26/17 2348   ALTSGPT  10  12   ASTSGOT  15  15   ALKPHOSPHAT  66  58   TBILIRUBIN  0.3  0.3   GLUCOSE  162*  255*       Recent Labs      12/25/17 2348  12/26/17 2348   RBC  2.91*  2.76*   HEMOGLOBIN  9.3*  8.8*   HEMATOCRIT  29.6*  27.2*   PLATELETCT  169  148*       Recent Labs      12/25/17 2348  12/26/17 2348   WBC  3.8*  3.4*   NEUTSPOLYS  64.90  70.20   LYMPHOCYTES  24.60  18.40*   MONOCYTES  7.00  5.30   EOSINOPHILS  3.50  5.20   BASOPHILS  0.00  0.90   ASTSGOT  15  15   ALTSGPT  10  12   ALKPHOSPHAT  66  58   TBILIRUBIN  0.3  0.3          Assessment/Plan   * Hydronephrosis- (present on admission)   Assessment & Plan    S/p b/l nephrostomy tubes 12/13  Cr returned to baseline of 0.4-0.5  Nephrostomy tubes draining clear fluid  Renal u/s ordered to look for improvement in the hydronephrosis- study done but no results yet, will f/u  Contacted urology- pt will be discharged on kaur and nephrostomy tubes  Internalization to be done as an outpatient  Pt to be f/u with Dr. Upton in his office within a week of discharge          Wound of left lower extremity- (present on admission)   Assessment & Plan    Patient had a large eschar on his left posterior thigh from previous subcutaneous hemorrhage as a result of supra therapeutic INR while on  coumadin.   s/p debridement of 20x25 cm eschar of black dead skin on 12/17.   Wound vac in place.   Wound culture positive for enterococcus fecalis, citrobacter and bacteroids.   Dr. Kearney on board- recommended zosyn  Plan:  Continue zosyn- day 11 today  Continue wound care per protocol  As per Dr. Kearney,  patient will need 2 weeks of zosyn with start date 12/17  Pt will need to f/u with wound clinic after discharge- orders in place        Acute DVT (deep venous thrombosis) (CMS-HCC)- (present on admission)   Assessment & Plan    Past h/o DVT- as per pt 3 episodes in the past, s/p IVC filter in 2009  [facility] times in the past.  LE venous doppler 12/13  showed B/L extensive DVT in both extremities. Acute and chronic.  Anticoagulation on hold due to bloody urine from the kaur  As per urology, ok to start dabigatran  Plan:  Started dabigatran 150mg bid- his home dose  Discussed with RN to monitor overnight for bleeding  Will CTM            Extraperitoneal rupture of bladder- (present on admission)   Assessment & Plan    S/p rigid cystoscopy with evacuation of 100 ml of blood clot, fulguration of bladder perforation sites and biopsy of posterior bladder wall on 12/11. Pathology shows necrotic tissue. No tumor  Kaur catheter in place- draining clear urine, no blood seen  Hb stable at 8.8  B/L nephrostomy tube placement on 12/13   Plan:  B&O suppository for bladder spasms  To f/u with urology in a week after discharge          DM type 2 (diabetes mellitus, type 2) (CMS-HCC)- (present on admission)   Assessment & Plan    Home meds include metformin and glipizide- held  A1C at 7  POCs between 250s-320s today  Plan:  Increased insulin glargine from 25U-->30UQhs  On ISS and hypoglycemia protocol  Monitor POCs        Anxiety   Assessment & Plan    Patient stable to be discharged to LTAC for continued IV antibiotics  Anxious about discharge to another  facility        Shortness of breath- (present on admission)   Assessment & Plan    Resolved  Pt c/o SOB on 12/19, had increased work of breathing with b/l exp rhonchi  CTA ruled out PE, has acute DVT this admission.  Pt denies any SOB today  On exam: CTA b/l  CXR was repeated which showed chronic bronchitis  Plan:  Continue duonebs qid and albuterol to q2hrs prn  RT and O2 per protocol  Continuous pulse ox  Encourage incentive spirometry          Protein calorie malnutrition (CMS-HCC)- (present on admission)   Assessment & Plan    Pre albumin of 3  Nutrition consulted  Appreciate recs        Electrolyte abnormality- (present on admission)   Assessment & Plan    Hypokalemia, hypomagnesemia  Will monitor and replace as needed  Continue Po magnesium        Anemia- (present on admission)   Assessment & Plan    Iron deficiency anemia  2/2 blood loss from hematuria  Low iron and TIBC, Ferritin wnl  Vitamin B12 low, got 5 does of IM B12 injections  Plan:  Continue oral iron  Continue IM b12 weekly for 1 month          Abdominal distension- (present on admission)   Assessment & Plan     Resolved  Likely 2/2 ileus  Abdomen soft, non distended with normal bowel sounds.  Pt did have a BM  Will CTM        Hyperkalemia- (present on admission)   Assessment & Plan    Resolved  2/2 JENNIFFER  Will CTM        Acute kidney injury (CMS-HCC)- (present on admission)   Assessment & Plan    Resolved  Baseline creat 0.7.   Bilateral nephrostomy tubes placed on 12/13 for hydronephrosis with improvement in creatinine  Creatinine now at baseline           Crohn disease (CMS-[facility])- (present on admission)   Assessment & Plan    Patient on mesalamine and mercaptopurine as home medications  Restarted mesalamine  Mercaptopurine on hold in the setting of active infection

## 2017-12-28 ENCOUNTER — APPOINTMENT (OUTPATIENT)
Dept: RADIOLOGY | Facility: MEDICAL CENTER | Age: 67
DRG: 668 | End: 2017-12-28
Attending: STUDENT IN AN ORGANIZED HEALTH CARE EDUCATION/TRAINING PROGRAM
Payer: COMMERCIAL

## 2017-12-28 LAB
ALBUMIN SERPL BCP-MCNC: 2.3 G/DL (ref 3.2–4.9)
ALBUMIN/GLOB SERPL: 0.7 G/DL
ALP SERPL-CCNC: 64 U/L (ref 30–99)
ALT SERPL-CCNC: 11 U/L (ref 2–50)
ANION GAP SERPL CALC-SCNC: 5 MMOL/L (ref 0–11.9)
AST SERPL-CCNC: 12 U/L (ref 12–45)
BASOPHILS # BLD AUTO: 0.3 % (ref 0–1.8)
BASOPHILS # BLD: 0.01 K/UL (ref 0–0.12)
BILIRUB SERPL-MCNC: 0.3 MG/DL (ref 0.1–1.5)
BUN SERPL-MCNC: 11 MG/DL (ref 8–22)
CALCIUM SERPL-MCNC: 8.3 MG/DL (ref 8.5–10.5)
CHLORIDE SERPL-SCNC: 102 MMOL/L (ref 96–112)
CO2 SERPL-SCNC: 31 MMOL/L (ref 20–33)
CREAT SERPL-MCNC: 0.46 MG/DL (ref 0.5–1.4)
EOSINOPHIL # BLD AUTO: 0.26 K/UL (ref 0–0.51)
EOSINOPHIL NFR BLD: 7.4 % (ref 0–6.9)
ERYTHROCYTE [DISTWIDTH] IN BLOOD BY AUTOMATED COUNT: 56.9 FL (ref 35.9–50)
ERYTHROCYTE [DISTWIDTH] IN BLOOD BY AUTOMATED COUNT: 57.9 FL (ref 35.9–50)
GFR SERPL CREATININE-BSD FRML MDRD: >60 ML/MIN/1.73 M 2
GLOBULIN SER CALC-MCNC: 3.3 G/DL (ref 1.9–3.5)
GLUCOSE BLD-MCNC: 212 MG/DL (ref 65–99)
GLUCOSE BLD-MCNC: 234 MG/DL (ref 65–99)
GLUCOSE BLD-MCNC: 244 MG/DL (ref 65–99)
GLUCOSE BLD-MCNC: 249 MG/DL (ref 65–99)
GLUCOSE SERPL-MCNC: 187 MG/DL (ref 65–99)
HCT VFR BLD AUTO: 27.6 % (ref 42–52)
HCT VFR BLD AUTO: 29.9 % (ref 42–52)
HGB BLD-MCNC: 8.9 G/DL (ref 14–18)
HGB BLD-MCNC: 9.7 G/DL (ref 14–18)
IMM GRANULOCYTES # BLD AUTO: 0.01 K/UL (ref 0–0.11)
IMM GRANULOCYTES NFR BLD AUTO: 0.3 % (ref 0–0.9)
LYMPHOCYTES # BLD AUTO: 0.69 K/UL (ref 1–4.8)
LYMPHOCYTES NFR BLD: 19.8 % (ref 22–41)
MAGNESIUM SERPL-MCNC: 1.6 MG/DL (ref 1.5–2.5)
MCH RBC QN AUTO: 31.7 PG (ref 27–33)
MCH RBC QN AUTO: 31.8 PG (ref 27–33)
MCHC RBC AUTO-ENTMCNC: 32.2 G/DL (ref 33.7–35.3)
MCHC RBC AUTO-ENTMCNC: 32.4 G/DL (ref 33.7–35.3)
MCV RBC AUTO: 98 FL (ref 81.4–97.8)
MCV RBC AUTO: 98.2 FL (ref 81.4–97.8)
MONOCYTES # BLD AUTO: 0.31 K/UL (ref 0–0.85)
MONOCYTES NFR BLD AUTO: 8.9 % (ref 0–13.4)
NEUTROPHILS # BLD AUTO: 2.21 K/UL (ref 1.82–7.42)
NEUTROPHILS NFR BLD: 63.3 % (ref 44–72)
NRBC # BLD AUTO: 0 K/UL
NRBC BLD-RTO: 0 /100 WBC
PHOSPHATE SERPL-MCNC: 3.4 MG/DL (ref 2.5–4.5)
PLATELET # BLD AUTO: 153 K/UL (ref 164–446)
PLATELET # BLD AUTO: 171 K/UL (ref 164–446)
PMV BLD AUTO: 10.3 FL (ref 9–12.9)
PMV BLD AUTO: 9.5 FL (ref 9–12.9)
POTASSIUM SERPL-SCNC: 3.9 MMOL/L (ref 3.6–5.5)
PROT SERPL-MCNC: 5.6 G/DL (ref 6–8.2)
RBC # BLD AUTO: 2.81 M/UL (ref 4.7–6.1)
RBC # BLD AUTO: 3.05 M/UL (ref 4.7–6.1)
SODIUM SERPL-SCNC: 138 MMOL/L (ref 135–145)
WBC # BLD AUTO: 3.5 K/UL (ref 4.8–10.8)
WBC # BLD AUTO: 4.1 K/UL (ref 4.8–10.8)

## 2017-12-28 PROCEDURE — 700102 HCHG RX REV CODE 250 W/ 637 OVERRIDE(OP): Performed by: SURGERY

## 2017-12-28 PROCEDURE — 700105 HCHG RX REV CODE 258: Performed by: SURGERY

## 2017-12-28 PROCEDURE — 94760 N-INVAS EAR/PLS OXIMETRY 1: CPT

## 2017-12-28 PROCEDURE — 700102 HCHG RX REV CODE 250 W/ 637 OVERRIDE(OP): Performed by: STUDENT IN AN ORGANIZED HEALTH CARE EDUCATION/TRAINING PROGRAM

## 2017-12-28 PROCEDURE — 82962 GLUCOSE BLOOD TEST: CPT

## 2017-12-28 PROCEDURE — A9270 NON-COVERED ITEM OR SERVICE: HCPCS | Performed by: STUDENT IN AN ORGANIZED HEALTH CARE EDUCATION/TRAINING PROGRAM

## 2017-12-28 PROCEDURE — 36415 COLL VENOUS BLD VENIPUNCTURE: CPT

## 2017-12-28 PROCEDURE — 700111 HCHG RX REV CODE 636 W/ 250 OVERRIDE (IP): Performed by: STUDENT IN AN ORGANIZED HEALTH CARE EDUCATION/TRAINING PROGRAM

## 2017-12-28 PROCEDURE — 85027 COMPLETE CBC AUTOMATED: CPT

## 2017-12-28 PROCEDURE — 99232 SBSQ HOSP IP/OBS MODERATE 35: CPT | Mod: GC | Performed by: HOSPITALIST

## 2017-12-28 PROCEDURE — 94640 AIRWAY INHALATION TREATMENT: CPT

## 2017-12-28 PROCEDURE — 700101 HCHG RX REV CODE 250: Performed by: STUDENT IN AN ORGANIZED HEALTH CARE EDUCATION/TRAINING PROGRAM

## 2017-12-28 PROCEDURE — 770004 HCHG ROOM/CARE - ONCOLOGY PRIVATE *

## 2017-12-28 PROCEDURE — 700111 HCHG RX REV CODE 636 W/ 250 OVERRIDE (IP): Performed by: SURGERY

## 2017-12-28 PROCEDURE — A9270 NON-COVERED ITEM OR SERVICE: HCPCS | Performed by: SURGERY

## 2017-12-28 PROCEDURE — 76775 US EXAM ABDO BACK WALL LIM: CPT

## 2017-12-28 RX ORDER — IPRATROPIUM BROMIDE AND ALBUTEROL SULFATE 2.5; .5 MG/3ML; MG/3ML
3 SOLUTION RESPIRATORY (INHALATION)
Status: DISCONTINUED | OUTPATIENT
Start: 2017-12-28 | End: 2017-12-29 | Stop reason: HOSPADM

## 2017-12-28 RX ORDER — DABIGATRAN ETEXILATE 150 MG/1
150 CAPSULE ORAL 2 TIMES DAILY
Status: DISCONTINUED | OUTPATIENT
Start: 2017-12-28 | End: 2017-12-29 | Stop reason: HOSPADM

## 2017-12-28 RX ORDER — MAGNESIUM SULFATE HEPTAHYDRATE 40 MG/ML
2 INJECTION, SOLUTION INTRAVENOUS ONCE
Status: COMPLETED | OUTPATIENT
Start: 2017-12-28 | End: 2017-12-28

## 2017-12-28 RX ADMIN — MAGNESIUM GLUCONATE 500 MG ORAL TABLET 800 MG: 500 TABLET ORAL at 14:39

## 2017-12-28 RX ADMIN — SODIUM CHLORIDE: 9 INJECTION, SOLUTION INTRAVENOUS at 00:35

## 2017-12-28 RX ADMIN — MAGNESIUM GLUCONATE 500 MG ORAL TABLET 800 MG: 500 TABLET ORAL at 08:16

## 2017-12-28 RX ADMIN — INSULIN HUMAN 2 UNITS: 100 INJECTION, SOLUTION PARENTERAL at 08:18

## 2017-12-28 RX ADMIN — INSULIN GLARGINE 30 UNITS: 100 INJECTION, SOLUTION SUBCUTANEOUS at 22:40

## 2017-12-28 RX ADMIN — MAGNESIUM GLUCONATE 500 MG ORAL TABLET 800 MG: 500 TABLET ORAL at 22:36

## 2017-12-28 RX ADMIN — DABIGATRAN ETEXILATE MESYLATE 150 MG: 150 CAPSULE ORAL at 10:47

## 2017-12-28 RX ADMIN — DABIGATRAN ETEXILATE MESYLATE 150 MG: 150 CAPSULE ORAL at 22:38

## 2017-12-28 RX ADMIN — IPRATROPIUM BROMIDE AND ALBUTEROL SULFATE 3 ML: .5; 3 SOLUTION RESPIRATORY (INHALATION) at 07:38

## 2017-12-28 RX ADMIN — DIBASIC SODIUM PHOSPHATE, MONOBASIC POTASSIUM PHOSPHATE AND MONOBASIC SODIUM PHOSPHATE 1 TABLET: 852; 155; 130 TABLET ORAL at 23:10

## 2017-12-28 RX ADMIN — INSULIN HUMAN 2 UNITS: 100 INJECTION, SOLUTION PARENTERAL at 16:38

## 2017-12-28 RX ADMIN — STANDARDIZED SENNA CONCENTRATE AND DOCUSATE SODIUM 2 TABLET: 8.6; 5 TABLET, FILM COATED ORAL at 22:37

## 2017-12-28 RX ADMIN — MAGNESIUM SULFATE IN WATER 2 G: 40 INJECTION, SOLUTION INTRAVENOUS at 10:47

## 2017-12-28 RX ADMIN — OXYCODONE HYDROCHLORIDE 10 MG: 10 TABLET ORAL at 00:40

## 2017-12-28 RX ADMIN — INSULIN HUMAN 2 UNITS: 100 INJECTION, SOLUTION PARENTERAL at 22:46

## 2017-12-28 RX ADMIN — OXYCODONE HYDROCHLORIDE 10 MG: 10 TABLET ORAL at 12:59

## 2017-12-28 RX ADMIN — SODIUM CHLORIDE: 9 INJECTION, SOLUTION INTRAVENOUS at 22:36

## 2017-12-28 RX ADMIN — INSULIN HUMAN 2 UNITS: 100 INJECTION, SOLUTION PARENTERAL at 12:59

## 2017-12-28 RX ADMIN — Medication 325 MG: at 08:16

## 2017-12-28 RX ADMIN — MESALAMINE 400 MG: 400 CAPSULE, DELAYED RELEASE ORAL at 22:37

## 2017-12-28 RX ADMIN — OXYCODONE HYDROCHLORIDE 10 MG: 10 TABLET ORAL at 23:09

## 2017-12-28 RX ADMIN — MESALAMINE 400 MG: 400 CAPSULE, DELAYED RELEASE ORAL at 14:39

## 2017-12-28 RX ADMIN — IPRATROPIUM BROMIDE AND ALBUTEROL SULFATE 3 ML: .5; 3 SOLUTION RESPIRATORY (INHALATION) at 12:18

## 2017-12-28 RX ADMIN — OXYCODONE HYDROCHLORIDE 10 MG: 10 TABLET ORAL at 17:38

## 2017-12-28 RX ADMIN — MESALAMINE 400 MG: 400 CAPSULE, DELAYED RELEASE ORAL at 08:16

## 2017-12-28 ASSESSMENT — PAIN SCALES - GENERAL
PAINLEVEL_OUTOF10: 7
PAINLEVEL_OUTOF10: 3
PAINLEVEL_OUTOF10: 8
PAINLEVEL_OUTOF10: 6
PAINLEVEL_OUTOF10: 5

## 2017-12-28 ASSESSMENT — ENCOUNTER SYMPTOMS
ORTHOPNEA: 0
CHILLS: 0
COUGH: 0
VOMITING: 0
FEVER: 0
ABDOMINAL PAIN: 0
NAUSEA: 0
SHORTNESS OF BREATH: 0

## 2017-12-28 ASSESSMENT — LIFESTYLE VARIABLES: DO YOU DRINK ALCOHOL: NO

## 2017-12-28 NOTE — DISCHARGE PLANNING
Received call form [facility] to verify that they can accept with ABX.   Contacted Jaylene with [facility], they are fine with the IV ABX.  Contacted Mello with [facility], left message for a call back.  Notified [facility] and [facility] via phone.

## 2017-12-28 NOTE — DISCHARGE PLANNING
Medical Social Work    SW met at bedside with pt to confirm if pt would like to go to [facility] or [facility]. Pt stated that he would like this SW to call his wife to obtain choice. SW left  for pt's wife to return call.

## 2017-12-28 NOTE — DISCHARGE PLANNING
Received call back from Mello with [facility] needing the dose of ABX gave and they will accept. Notified NARINDER Minor via phone.

## 2017-12-28 NOTE — PROGRESS NOTES
[facility] [place] Internal Medicine Interval Note    Name Niels Lima     1950   Age/Sex 67 y.o. male   MRN [id number]   Code Status FULL     After 5PM or if no immediate response to page, please call for cross-coverage  Attending/Team: Dr. PETER/MARTA Use Tiger Text to page  6AM-5PM  Call [phone] to page afterhours   1st Call - Day Intern (R1):   XIOMARA HANSON 2nd Call - Day Sr. Resident (R2/R3):   YANIRA PÉREZ         Chief complaint/reason for interval visit  [facility]  Hydronephrosis s/p b/l nephrostomy tube placement  DVT s/p IVC filter  Wound on the LLE , wound vac in place    Interval Problem Update  Pt feels better today  Pacheco catheter not draining blood anymore, nephrostomy tubes draining clear urine.  On zosyn day 12 today, to complete total of 14 days of IV zosyn per ID recs  Renal u/s ordered to look for improvement in the hydronephrosis- no hydronephrosis- resolved  Wheezing improved on scheduled Intellitactics  Urology onboard- ok to start dabigatran for DVT px per urology- overnight monitoring for bleeding- discussed with RN  Dabigatran couldn't be started yesterday as it took time for prior authorization of the medication.  Pt to be discharged tomm am as per - all discharge orders and summary will be ready am    Review of Systems   Constitutional: Negative for chills and fever.   Respiratory: Negative for cough and shortness of breath.    Cardiovascular: Negative for chest pain and orthopnea.   Gastrointestinal: Negative for abdominal pain, nausea and vomiting.   Genitourinary: Negative for dysuria and hematuria.   Skin: Negative for rash.       Consultants/Specialty  Urology  Infectious diseases  General surgery    Disposition   Inpatient for continuous IV antibiotics      Quality Measures    Reviewed items::  Labs reviewed, Medications reviewed and Radiology images reviewed  Pacheco catheter::  Urinary Tract Retention or Urinary Tract  Obstruction  DVT: ok to start dabigatran today per urology, will monitor for bleeding.  DVT prophylaxis - mechanical:  SCDs  Antibiotics:  Treating active infection/contamination beyond 24 hours perioperative coverage         Physical Exam   Vitals:    12/28/17 0738 12/28/17 0800 12/28/17 1218 12/28/17 1550   BP:  118/74  112/65   Pulse: 69 89 80 79   Resp: 16 18 16 18   Temp:  36.3 °C (97.3 °F)  36.7 °C (98 °F)   SpO2: 97% 96% 97% 93%   Weight:       Height:         Body mass index is 22.89 kg/m².  Oxygen Therapy:  Pulse Oximetry: 93 %, O2 (LPM): 0, O2 Delivery: None (Room Air)    Physical Exam   Constitutional: He is oriented to person, place, and time and well-developed, well-nourished, and in no distress.   HENT:   Head: Normocephalic and atraumatic.   Eyes: EOM are normal. Pupils are equal, round, and reactive to light.   Neck: Normal range of motion.   Cardiovascular: Normal rate, regular rhythm and normal heart sounds.    Pulmonary/Chest: He has wheezes (b/l expiratory wheezes, improved since yesterday). He has no rales.   Abdominal: Bowel sounds are normal.   Nephrostomy tubes in place   Musculoskeletal: Normal range of motion.   Neurological: He is alert and oriented to person, place, and time.         Lab Data Review:  Recent Results (from the past 24 hour(s))   ACCU-CHEK GLUCOSE    Collection Time: 12/27/17  6:19 PM   Result Value Ref Range    Glucose - Accu-Ck 197 (H) 65 - 99 mg/dL   ACCU-CHEK GLUCOSE    Collection Time: 12/27/17  9:03 PM   Result Value Ref Range    Glucose - Accu-Ck 234 (H) 65 - 99 mg/dL   CBC WITH DIFFERENTIAL    Collection Time: 12/27/17 11:50 PM   Result Value Ref Range    WBC 3.5 (L) 4.8 - 10.8 K/uL    RBC 2.81 (L) 4.70 - 6.10 M/uL    Hemoglobin 8.9 (L) 14.0 - 18.0 g/dL    Hematocrit 27.6 (L) 42.0 - 52.0 %    MCV 98.2 (H) 81.4 - 97.8 fL    MCH 31.7 27.0 - 33.0 pg    MCHC 32.2 (L) 33.7 - 35.3 g/dL    RDW 57.9 (H) 35.9 - 50.0 fL    Platelet Count 153 (L) 164 - 446 K/uL    MPV 9.5 9.0  - 12.9 fL    Neutrophils-Polys 63.30 44.00 - 72.00 %    Lymphocytes 19.80 (L) 22.00 - 41.00 %    Monocytes 8.90 0.00 - 13.40 %    Eosinophils 7.40 (H) 0.00 - 6.90 %    Basophils 0.30 0.00 - 1.80 %    Immature Granulocytes 0.30 0.00 - 0.90 %    Nucleated RBC 0.00 /100 WBC    Neutrophils (Absolute) 2.21 1.82 - 7.42 K/uL    Lymphs (Absolute) 0.69 (L) 1.00 - 4.80 K/uL    Monos (Absolute) 0.31 0.00 - 0.85 K/uL    Eos (Absolute) 0.26 0.00 - 0.51 K/uL    Baso (Absolute) 0.01 0.00 - 0.12 K/uL    Immature Granulocytes (abs) 0.01 0.00 - 0.11 K/uL    NRBC (Absolute) 0.00 K/uL   MAGNESIUM    Collection Time: 12/27/17 11:50 PM   Result Value Ref Range    Magnesium 1.6 1.5 - 2.5 mg/dL   PHOSPHORUS    Collection Time: 12/27/17 11:50 PM   Result Value Ref Range    Phosphorus 3.4 2.5 - 4.5 mg/dL   COMP METABOLIC PANEL    Collection Time: 12/27/17 11:50 PM   Result Value Ref Range    Sodium 138 135 - 145 mmol/L    Potassium 3.9 3.6 - 5.5 mmol/L    Chloride 102 96 - 112 mmol/L    Co2 31 20 - 33 mmol/L    Anion Gap 5.0 0.0 - 11.9    Glucose 187 (H) 65 - 99 mg/dL    Bun 11 8 - 22 mg/dL    Creatinine 0.46 (L) 0.50 - 1.40 mg/dL    Calcium 8.3 (L) 8.5 - 10.5 mg/dL    AST(SGOT) 12 12 - 45 U/L    ALT(SGPT) 11 2 - 50 U/L    Alkaline Phosphatase 64 30 - 99 U/L    Total Bilirubin 0.3 0.1 - 1.5 mg/dL    Albumin 2.3 (L) 3.2 - 4.9 g/dL    Total Protein 5.6 (L) 6.0 - 8.2 g/dL    Globulin 3.3 1.9 - 3.5 g/dL    A-G Ratio 0.7 g/dL   ESTIMATED GFR    Collection Time: 12/27/17 11:50 PM   Result Value Ref Range    GFR If African American >60 >60 mL/min/1.73 m 2    GFR If Non African American >60 >60 mL/min/1.73 m 2   ACCU-CHEK GLUCOSE    Collection Time: 12/28/17  8:15 AM   Result Value Ref Range    Glucose - Accu-Ck 212 (H) 65 - 99 mg/dL   ACCU-CHEK GLUCOSE    Collection Time: 12/28/17  1:01 PM   Result Value Ref Range    Glucose - Accu-Ck 249 (H) 65 - 99 mg/dL   CBC WITHOUT DIFFERENTIAL    Collection Time: 12/28/17  2:14 PM   Result Value Ref Range     WBC 4.1 (L) 4.8 - 10.8 K/uL    RBC 3.05 (L) 4.70 - 6.10 M/uL    Hemoglobin 9.7 (L) 14.0 - 18.0 g/dL    Hematocrit 29.9 (L) 42.0 - 52.0 %    MCV 98.0 (H) 81.4 - 97.8 fL    MCH 31.8 27.0 - 33.0 pg    MCHC 32.4 (L) 33.7 - 35.3 g/dL    RDW 56.9 (H) 35.9 - 50.0 fL    Platelet Count 171 164 - 446 K/uL    MPV 10.3 9.0 - 12.9 fL       12/25/2017  1:08 PM    Recent Labs      12/25/17   2348  12/26/17   2348  12/27/17   2350   SODIUM  138  135  138   POTASSIUM  3.7  4.0  3.9   CHLORIDE  103  102  102   CO2  29  28  31   BUN  12  12  11   CREATININE  0.44*  0.46*  0.46*   MAGNESIUM  1.5  1.8  1.6   PHOSPHORUS  3.1  2.8  3.4   CALCIUM  8.4*  8.2*  8.3*       Recent Labs      12/25/17   2348  12/26/17   2348  12/27/17   2350   ALTSGPT  10  12  11   ASTSGOT  15  15  12   ALKPHOSPHAT  66  58  64   TBILIRUBIN  0.3  0.3  0.3   GLUCOSE  162*  255*  187*       Recent Labs      12/26/17   2348  12/27/17   2350  12/28/17   1414   RBC  2.76*  2.81*  3.05*   HEMOGLOBIN  8.8*  8.9*  9.7*   HEMATOCRIT  27.2*  27.6*  29.9*   PLATELETCT  148*  153*  171       Recent Labs      12/25/17   2348  12/26/17   2348  12/27/17   2350  12/28/17   1414   WBC  3.8*  3.4*  3.5*  4.1*   NEUTSPOLYS  64.90  70.20  63.30   --    LYMPHOCYTES  24.60  18.40*  19.80*   --    MONOCYTES  7.00  5.30  8.90   --    EOSINOPHILS  3.50  5.20  7.40*   --    BASOPHILS  0.00  0.90  0.30   --    ASTSGOT  15  15  12   --    ALTSGPT  10  12  11   --    ALKPHOSPHAT  66  58  64   --    TBILIRUBIN  0.3  0.3  0.3   --           Assessment/Plan   * Hydronephrosis- (present on admission)   Assessment & Plan    S/p b/l nephrostomy tubes 12/13  Cr returned to baseline of 0.4-0.5  Nephrostomy tubes draining clear fluid  Renal u/s ordered to look for improvement in the hydronephrosis- no hydronephrosis- resolved  Contacted urology- pt will be discharged on kaur and nephrostomy tubes  Internalization to be done as an outpatient  Pt to be f/u with Dr. Upton in his office within a week of  discharge          Wound of left lower extremity- (present on admission)   Assessment & Plan    Patient had a large eschar on his left posterior thigh from previous subcutaneous hemorrhage as a result of supra therapeutic INR while on coumadin.   s/p debridement of 20x25 cm eschar of black dead skin on 12/17.   Wound vac in place.   Wound culture positive for enterococcus fecalis, citrobacter and bacteroids.   Dr. Kearney on board- recommended zosyn  Plan:  Continue zosyn- day 12 today  Continue wound care per protocol  As per Dr. Kearney,  patient will need 2 weeks of zosyn with start date 12/17  Pt will need to f/u with wound clinic after discharge- orders in place        Acute DVT (deep venous thrombosis) (CMS-HCC)- (present on admission)   Assessment & Plan    Past h/o DVT- as per pt 3 episodes in the past, s/p IVC filter in 2009  [facility] in the past.  LE venous doppler 12/13  showed B/L extensive DVT in both extremities. Acute and chronic.  Pacheco and nephrostomy tubes draining clear urine  As per urology, ok to start dabigatran  Plan:  Started dabigatran 150mg bid- his home dose  Discussed with RN to monitor overnight for bleeding  F/u CBC at 6pm and tomm am  Will CTM            Extraperitoneal rupture of bladder- (present on admission)   Assessment & Plan    S/p rigid cystoscopy with evacuation of 100 ml of blood clot, fulguration of bladder perforation sites and biopsy of posterior bladder wall on 12/11. Pathology shows necrotic tissue. No tumor  B/L nephrostomy tube placement on 12/13   Pacheco catheter in place- draining clear urine, no blood seen  Hb stable at 9.7  Plan:  B&O suppository for bladder spasms  To f/u with urology in a week after discharge          DM type 2 (diabetes mellitus, type 2) (CMS-HCC)- (present on admission)   Assessment & Plan    Home meds include metformin and glipizide- held  A1C at 7  POCs between 200s  .  Plan:  Continue lantus 30U qhs  On ISS and hypoglycemia protocol  Monitor POCs        Anxiety   Assessment & Plan    Patient stable to be discharged to [facility] for continued IV antibiotics  Pending discharge tomm am  Anxious about getting discharged to another facility        Shortness of breath- (present on admission)   Assessment & Plan    Resolved  Pt c/o SOB on 12/19, had increased work of breathing with b/l exp rhonchi  CTA ruled out PE, has acute DVT this admission.  Pt denies any SOB today  On exam: CTA b/l  CXR was repeated which showed chronic bronchitis  Plan:  Continue duonebs qid and albuterol to q2hrs prn  RT and O2 per protocol  Continuous pulse ox.  Encourage incentive spirometry          Protein calorie malnutrition (CMS-HCC)- (present on admission)   Assessment & Plan    Pre albumin of 3  Nutrition consulted  Appreciate recs        Electrolyte abnormality- (present on admission)   Assessment & Plan    Hypokalemia, hypomagnesemia  Will monitor and replace as needed  Continue Po magnesium        Anemia- (present on admission)   Assessment & Plan    Iron deficiency anemia  2/2 blood loss from hematuria  Low iron and TIBC, Ferritin wnl  Vitamin B12 low, got 5 does of IM B12 injections  Plan:  Continue oral iron  Continue IM b12 weekly for 1 month          Abdominal distension- (present on admission)   Assessment & Plan     Resolved  Likely 2/2 ileus  Abdomen soft, non distended with normal bowel sounds.  Pt did have a BM  Will CTM        Hyperkalemia- (present on admission)   Assessment & Plan    Resolved  2/2 JENNIFFER  Will CTM        Acute kidney injury (CMS-HCC)- (present on admission)   Assessment & Plan    Resolved  Baseline creat 0.7.   Bilateral nephrostomy tubes placed on 12/13 for hydronephrosis with improvement in creatinine  Creatinine now at baseline           Crohn disease (CMS-[facility])- (present on admission)   Assessment & Plan    Patient on mesalamine and mercaptopurine as home  medications  Restarted mesalamine  Mercaptopurine on hold in the setting of active infection

## 2017-12-28 NOTE — PROGRESS NOTES
"Pt A&Ox4. VS: /74   Pulse 89   Temp 36.3 °C (97.3 °F)   Resp 18   Ht 1.854 m (6' 1\")   Wt 78.7 kg (173 lb 8 oz)   SpO2 96%   BMI 22.89 kg/m² . Pt denies n/v, numbness, tingling, SOB and chest pain. Pt states pain in his LLE, but declined pain interventions. Wound vac to LLE, CDI. Bilateral nephrostomy tubes to gravity with positive output. Pacheco to gravity. Pt needs met at this time, call light within reach, hourly rounding in effect, and will continue to monitor.       "

## 2017-12-28 NOTE — DISCHARGE PLANNING
Medical Social Work    SW spoke with pt's wife who stated they would like to go to [facility] and not a SNF.     Pt has been a difficult d/c. SW called  Supervisor Daphney to go speak with pt. NARINDER and Daphney and went to bedside. Pt was agreeable to going to [facility] tomorrow morning.     SW to schedule transport in the morning.

## 2017-12-28 NOTE — DISCHARGE PLANNING
Pt being discharged to SNF for continued skilled needs.   Contacted pharmacy s/w Gayle, discussed current IV antibiotic infusion of Zosyn, r/t end date and time and dosing schedule.  End date of 12/30/17 and timing can be adjusted r/t size of Zosyn infusion supply bag.   Contacted [facility] s/w Resident Maximus VILLALOBOS awaiting further guidance r/t discharge time.   Contacted [facility] S[facility]RE to confirmed SNF is able to provide this service at discharge.  [facility] aware of IV abx and able to provide this service.   Will continue to assess and collaborate with  for final discharge plan and time.

## 2017-12-28 NOTE — CARE PLAN
Problem: Communication  Goal: The ability to communicate needs accurately and effectively will improve    Intervention: Educate patient and significant other/support system about the plan of care, procedures, treatments, medications and allow for questions  Pt updated on the POC, all questions have been answered at this time.       Problem: Respiratory:  Goal: Respiratory status will improve    Intervention: Educate and encourage incentive spirometry usage  Pt has been educated on proper incentive spirometer technique. Pt demonstrates proper IS usage. Pt is self motivated.

## 2017-12-28 NOTE — WOUND TEAM
[facility]  Inpatient Services   Wound and Skin Care Progress Note    Admission Date: 12/10/2017    HPI, PMH, SH: Reviewed  Unit where seen by Wound Team: R3    WOUND CONSULT RELATED TO: scheduled npwt drsg change    SUBJECTIVE:  Very anxious but agreeable    Self Report / Pain Level: remains painful despite pre-med    OBJECTIVE:       Prev npwt drsg repaired earlier but there are also issues with the machine    WOUND TYPE, LOCATION, CHARACTERISTICS (Pressure ulcers: location, stage, POA or date identified)    Location and type of wound:  Left posterior thigh, full thickness surgical        Periwound:      Intact  Drainage:      Min ss  Tissue Type and %:   95% pink/red -- 5% yellow  Wound Edges:     attached  Odor:       none  Exposed structure(s):   muscle  S&S of Infection:     None    Measurements: (cm)   12/27/2017  Length:      18.0  Width:       10.0  Depth:      1.5      INTERVENTIONS BY WOUND TEAM:  Prev drsg removed -- wnd irrigated with wnd cleanser -- measurements and photograph done -- benzoin and drape prasanna-wnd -- adaptic over the wnd bed -- 1 piece black foam to fill the wound -- sealed with drape and cont neg pressure reapplied at 125mmhg    Interdisciplinary consultation:  Nsg; pt    EVALUATION:   Surface area about the same but filling in nicely from the base; decreased yellow tissue; should cont to progress    Factors affecting wound healing:DM, INR, Infection.     Goals: 100% granulation in 3 weeks -- decrease wnd size X 5% every 2 weeks -- wnd bed appro for stsg?    NURSING PLAN OF CARE ORDERS (x):    Dressing changes: See Dressing Maintenance orders: x  Skin care: See Skin Care orders: x  Rectal tube care: See Rectal Tube Care orders:   Other orders:    WOUND TEAM PLAN OF CARE (x):   NPWT change 3 x week:  x    Dressing changes by wound team:       Follow up as needed:   x    Other (explain):    Anticipated discharge plans (x):  SNF:           Home Care:      Outpatient Wound  Center: x         Self Care:            Other:

## 2017-12-28 NOTE — PROGRESS NOTES
Assumed care of the pt at 0715, received report from the NOC RN. Pt is AOx4 with complaint of 4/10 L posterior thigh pain r/t wound. Wound vac in operation. Pt updated on POC for the shift: wound vac change, discharge coordination and planning, scheduled respiratory treatments. All questions answered at this time. PIV w/ IV abx. Pacheco in place per active order, bilateral nephrostomy tubes. O2 via nasal cannula. Pt up + 1 and hand held assist. Fall precautions in place. Call light within reach, hourly rounding in place.

## 2017-12-28 NOTE — DISCHARGE PLANNING
Received call from Resident MD Maximus Discussed discharge time.  Pt will be monitored for 24 hrs r/t risk for bleeding r/t new anticoagulation medication administration.  S/w Dr. Kraft, will place discharge order at this time with expected discharge date of tomorrow, after clinical assessment r/t bleeding risk is done.    Will f/u with above staff for appropriate time of discharge.

## 2017-12-28 NOTE — CARE PLAN
Problem: Oxygenation:  Goal: Maintain adequate oxygenation dependent on patient condition  Outcome: PROGRESSING AS EXPECTED      Problem: Bronchoconstriction:  Goal: Improve in air movement and diminished wheezing  Outcome: PROGRESSING AS EXPECTED  QID Duoneb UD

## 2017-12-29 VITALS
WEIGHT: 173.5 LBS | BODY MASS INDEX: 22.99 KG/M2 | HEART RATE: 82 BPM | HEIGHT: 73 IN | OXYGEN SATURATION: 93 % | TEMPERATURE: 97.7 F | SYSTOLIC BLOOD PRESSURE: 131 MMHG | RESPIRATION RATE: 18 BRPM | DIASTOLIC BLOOD PRESSURE: 83 MMHG

## 2017-12-29 PROBLEM — E87.8 ELECTROLYTE ABNORMALITY: Status: RESOLVED | Noted: 2017-12-15 | Resolved: 2017-12-29

## 2017-12-29 PROBLEM — R06.02 SHORTNESS OF BREATH: Status: RESOLVED | Noted: 2017-12-19 | Resolved: 2017-12-29

## 2017-12-29 PROBLEM — N13.30 HYDRONEPHROSIS: Status: RESOLVED | Noted: 2017-12-10 | Resolved: 2017-12-29

## 2017-12-29 PROBLEM — N17.9 ACUTE KIDNEY INJURY (HCC): Status: RESOLVED | Noted: 2017-12-10 | Resolved: 2017-12-29

## 2017-12-29 PROBLEM — E87.5 HYPERKALEMIA: Status: RESOLVED | Noted: 2017-12-11 | Resolved: 2017-12-29

## 2017-12-29 LAB
ALBUMIN SERPL BCP-MCNC: 2.4 G/DL (ref 3.2–4.9)
ALBUMIN/GLOB SERPL: 0.7 G/DL
ALP SERPL-CCNC: 61 U/L (ref 30–99)
ALT SERPL-CCNC: 12 U/L (ref 2–50)
ANION GAP SERPL CALC-SCNC: 8 MMOL/L (ref 0–11.9)
AST SERPL-CCNC: 14 U/L (ref 12–45)
BASOPHILS # BLD AUTO: 0.2 % (ref 0–1.8)
BASOPHILS # BLD: 0.01 K/UL (ref 0–0.12)
BILIRUB SERPL-MCNC: 0.3 MG/DL (ref 0.1–1.5)
BUN SERPL-MCNC: 10 MG/DL (ref 8–22)
C PEPTIDE SERPL-MCNC: 2.1 NG/ML (ref 0.8–3.5)
CALCIUM SERPL-MCNC: 8.1 MG/DL (ref 8.5–10.5)
CHLORIDE SERPL-SCNC: 104 MMOL/L (ref 96–112)
CO2 SERPL-SCNC: 25 MMOL/L (ref 20–33)
CREAT SERPL-MCNC: 0.52 MG/DL (ref 0.5–1.4)
EOSINOPHIL # BLD AUTO: 0.26 K/UL (ref 0–0.51)
EOSINOPHIL NFR BLD: 6.4 % (ref 0–6.9)
ERYTHROCYTE [DISTWIDTH] IN BLOOD BY AUTOMATED COUNT: 57.7 FL (ref 35.9–50)
GFR SERPL CREATININE-BSD FRML MDRD: >60 ML/MIN/1.73 M 2
GLOBULIN SER CALC-MCNC: 3.3 G/DL (ref 1.9–3.5)
GLUCOSE BLD-MCNC: 147 MG/DL (ref 65–99)
GLUCOSE SERPL-MCNC: 238 MG/DL (ref 65–99)
HCT VFR BLD AUTO: 29.4 % (ref 42–52)
HGB BLD-MCNC: 9.5 G/DL (ref 14–18)
IMM GRANULOCYTES # BLD AUTO: 0.02 K/UL (ref 0–0.11)
IMM GRANULOCYTES NFR BLD AUTO: 0.5 % (ref 0–0.9)
LYMPHOCYTES # BLD AUTO: 0.64 K/UL (ref 1–4.8)
LYMPHOCYTES NFR BLD: 15.7 % (ref 22–41)
MAGNESIUM SERPL-MCNC: 1.7 MG/DL (ref 1.5–2.5)
MCH RBC QN AUTO: 32 PG (ref 27–33)
MCHC RBC AUTO-ENTMCNC: 32.3 G/DL (ref 33.7–35.3)
MCV RBC AUTO: 99 FL (ref 81.4–97.8)
MONOCYTES # BLD AUTO: 0.39 K/UL (ref 0–0.85)
MONOCYTES NFR BLD AUTO: 9.6 % (ref 0–13.4)
NEUTROPHILS # BLD AUTO: 2.75 K/UL (ref 1.82–7.42)
NEUTROPHILS NFR BLD: 67.6 % (ref 44–72)
NRBC # BLD AUTO: 0 K/UL
NRBC BLD-RTO: 0 /100 WBC
PHOSPHATE SERPL-MCNC: 2.8 MG/DL (ref 2.5–4.5)
PLATELET # BLD AUTO: 140 K/UL (ref 164–446)
PMV BLD AUTO: 9.4 FL (ref 9–12.9)
POTASSIUM SERPL-SCNC: 3.8 MMOL/L (ref 3.6–5.5)
PROT SERPL-MCNC: 5.7 G/DL (ref 6–8.2)
RBC # BLD AUTO: 2.97 M/UL (ref 4.7–6.1)
SODIUM SERPL-SCNC: 137 MMOL/L (ref 135–145)
WBC # BLD AUTO: 4.1 K/UL (ref 4.8–10.8)

## 2017-12-29 PROCEDURE — A9270 NON-COVERED ITEM OR SERVICE: HCPCS | Performed by: SURGERY

## 2017-12-29 PROCEDURE — 85025 COMPLETE CBC W/AUTO DIFF WBC: CPT

## 2017-12-29 PROCEDURE — 700111 HCHG RX REV CODE 636 W/ 250 OVERRIDE (IP): Performed by: INTERNAL MEDICINE

## 2017-12-29 PROCEDURE — G8991 OTHER PT/OT GOAL STATUS: HCPCS

## 2017-12-29 PROCEDURE — 84100 ASSAY OF PHOSPHORUS: CPT

## 2017-12-29 PROCEDURE — 83735 ASSAY OF MAGNESIUM: CPT

## 2017-12-29 PROCEDURE — 700111 HCHG RX REV CODE 636 W/ 250 OVERRIDE (IP): Performed by: STUDENT IN AN ORGANIZED HEALTH CARE EDUCATION/TRAINING PROGRAM

## 2017-12-29 PROCEDURE — 94640 AIRWAY INHALATION TREATMENT: CPT

## 2017-12-29 PROCEDURE — 82962 GLUCOSE BLOOD TEST: CPT

## 2017-12-29 PROCEDURE — A9270 NON-COVERED ITEM OR SERVICE: HCPCS | Performed by: STUDENT IN AN ORGANIZED HEALTH CARE EDUCATION/TRAINING PROGRAM

## 2017-12-29 PROCEDURE — 700102 HCHG RX REV CODE 250 W/ 637 OVERRIDE(OP): Performed by: STUDENT IN AN ORGANIZED HEALTH CARE EDUCATION/TRAINING PROGRAM

## 2017-12-29 PROCEDURE — 700101 HCHG RX REV CODE 250: Performed by: HOSPITALIST

## 2017-12-29 PROCEDURE — 94760 N-INVAS EAR/PLS OXIMETRY 1: CPT

## 2017-12-29 PROCEDURE — 700102 HCHG RX REV CODE 250 W/ 637 OVERRIDE(OP): Performed by: SURGERY

## 2017-12-29 PROCEDURE — 90670 PCV13 VACCINE IM: CPT | Performed by: INTERNAL MEDICINE

## 2017-12-29 PROCEDURE — 80053 COMPREHEN METABOLIC PANEL: CPT

## 2017-12-29 PROCEDURE — 36415 COLL VENOUS BLD VENIPUNCTURE: CPT

## 2017-12-29 PROCEDURE — 97606 NEG PRS WND THER DME>50 SQCM: CPT

## 2017-12-29 PROCEDURE — 3E0234Z INTRODUCTION OF SERUM, TOXOID AND VACCINE INTO MUSCLE, PERCUTANEOUS APPROACH: ICD-10-PCS | Performed by: HOSPITALIST

## 2017-12-29 PROCEDURE — G8990 OTHER PT/OT CURRENT STATUS: HCPCS

## 2017-12-29 RX ORDER — INSULIN GLARGINE 100 [IU]/ML
15 INJECTION, SOLUTION SUBCUTANEOUS EVERY EVENING
Qty: 10 ML | Refills: 0 | Status: SHIPPED | OUTPATIENT
Start: 2017-12-29 | End: 2017-12-29

## 2017-12-29 RX ORDER — POTASSIUM CHLORIDE 20 MEQ/1
40 TABLET, EXTENDED RELEASE ORAL ONCE
Status: COMPLETED | OUTPATIENT
Start: 2017-12-29 | End: 2017-12-29

## 2017-12-29 RX ORDER — FERROUS SULFATE 325(65) MG
325 TABLET ORAL
Qty: 30 TAB | Refills: 0 | Status: SHIPPED | OUTPATIENT
Start: 2017-12-30 | End: 2018-01-02

## 2017-12-29 RX ORDER — DABIGATRAN ETEXILATE 150 MG/1
150 CAPSULE ORAL 2 TIMES DAILY
Qty: 60 CAP | Refills: 0 | Status: ON HOLD | OUTPATIENT
Start: 2017-12-29 | End: 2018-01-27

## 2017-12-29 RX ORDER — MAGNESIUM SULFATE HEPTAHYDRATE 40 MG/ML
2 INJECTION, SOLUTION INTRAVENOUS ONCE
Status: COMPLETED | OUTPATIENT
Start: 2017-12-29 | End: 2017-12-29

## 2017-12-29 RX ORDER — BENZONATATE 100 MG/1
100 CAPSULE ORAL 3 TIMES DAILY PRN
Qty: 60 CAP | Refills: 0 | Status: SHIPPED | OUTPATIENT
Start: 2017-12-29 | End: 2018-01-02

## 2017-12-29 RX ORDER — CYANOCOBALAMIN 1000 UG/ML
1000 INJECTION, SOLUTION INTRAMUSCULAR; SUBCUTANEOUS
Qty: 10 ML | Refills: 0
Start: 2018-01-01 | End: 2018-01-02

## 2017-12-29 RX ADMIN — MAGNESIUM GLUCONATE 500 MG ORAL TABLET 400 MG: 500 TABLET ORAL at 07:52

## 2017-12-29 RX ADMIN — POTASSIUM CHLORIDE 40 MEQ: 1500 TABLET, EXTENDED RELEASE ORAL at 10:09

## 2017-12-29 RX ADMIN — OXYCODONE HYDROCHLORIDE 10 MG: 10 TABLET ORAL at 07:56

## 2017-12-29 RX ADMIN — IPRATROPIUM BROMIDE AND ALBUTEROL SULFATE 3 ML: .5; 3 SOLUTION RESPIRATORY (INHALATION) at 04:16

## 2017-12-29 RX ADMIN — HYDROMORPHONE HYDROCHLORIDE 2 MG: 2 INJECTION INTRAMUSCULAR; INTRAVENOUS; SUBCUTANEOUS at 08:19

## 2017-12-29 RX ADMIN — PNEUMOCOCCAL 13-VALENT CONJUGATE VACCINE 0.5 ML: 2.2; 2.2; 2.2; 2.2; 2.2; 4.4; 2.2; 2.2; 2.2; 2.2; 2.2; 2.2; 2.2 INJECTION, SUSPENSION INTRAMUSCULAR at 12:08

## 2017-12-29 RX ADMIN — MESALAMINE 400 MG: 400 CAPSULE, DELAYED RELEASE ORAL at 07:52

## 2017-12-29 RX ADMIN — DABIGATRAN ETEXILATE MESYLATE 150 MG: 150 CAPSULE ORAL at 07:52

## 2017-12-29 RX ADMIN — DIBASIC SODIUM PHOSPHATE, MONOBASIC POTASSIUM PHOSPHATE AND MONOBASIC SODIUM PHOSPHATE 1 TABLET: 852; 155; 130 TABLET ORAL at 06:28

## 2017-12-29 RX ADMIN — MAGNESIUM SULFATE IN WATER 2 G: 40 INJECTION, SOLUTION INTRAVENOUS at 10:08

## 2017-12-29 RX ADMIN — Medication 325 MG: at 07:52

## 2017-12-29 ASSESSMENT — PAIN SCALES - GENERAL
PAINLEVEL_OUTOF10: 8
PAINLEVEL_OUTOF10: 8
PAINLEVEL_OUTOF10: 6

## 2017-12-29 NOTE — DISCHARGE PLANNING
Upon utilization review, patient noted to be on the following medications that could potentially require prior authorization if prescribed at discharge: pradaxa.  If it is anticipated that patient will require these medications at discharge, beginning the prescription prior auth process in advance to anticipated discharge could assist in preventing delays when patient is medically cleared to be discharged from the hospital.

## 2017-12-29 NOTE — WOUND TEAM
[facility]  Inpatient Services   Wound and Skin Care Progress Note  visit  G-code  C-code  kx modifier     4 on 12/29/17 [id number]/[id number]           Admission Date: 12/10/2017    HPI, PMH, SH: Reviewed  Unit where seen by Wound Team: R3    WOUND CONSULT RELATED TO: scheduled npwt drsg change    SUBJECTIVE:  Very anxious but agreeable    Self Report / Pain Level: remains painful despite pre-med    OBJECTIVE:     Pt in bed supine with left leg propped up, wound VAC in place, VAC machine continues to have problems      WOUND TYPE, LOCATION, CHARACTERISTICS (Pressure ulcers: location, stage, POA or date identified)    Location and type of wound:  Left posterior thigh, full thickness surgical        Periwound:      Golf ball size lump at 3 oclock aspect of wound  Drainage:      Min yellow purulent  Tissue Type and %:   95% pink/red -- 5% yellow  Wound Edges:     Attached   Odor:       Mild foul  Exposed structure(s):   none  S&S of Infection:     Purulence, tract at 3 oclock    Measurements: (cm)   12/29/2017  Length:      17.0  Width:       10.0  Depth:      1.2  Tract at 3 oclock   7 cm slightly cavernous      INTERVENTIONS BY WOUND TEAM:  Prev drsg removed -- wnd irrigated with wnd cleanser -- measurements and photograph done -- benzoin and drape prasanna-wnd -- adaptic over the wnd bed -- 1 piece black foam to partially fill the tract at 3 oclock, 1 piece of black foam to cover wound bed and 1 button under tract pad - sealed with drape and cont neg pressure reapplied at 125mmhg.  A new VAC was obtained from central supply.    Interdisciplinary consultation:  Nsg; pt, [facility] physician rounded on pt during treatment and informed MD about tract and purulence.    EVALUATION:   Surface area decreased and depth decreased but tract going toward golf ball size lump revealed some purulent drainage.  The area of trapped fluid was was accessed well and expect area to progress now that it can drain.  Tissue appearance is  healthy    Factors affecting wound healing:DM, INR, Infection.     Goals: 100% granulation in 3 weeks -- decrease wnd size X 5% every 2 weeks -- wnd bed appro for stsg?    NURSING PLAN OF CARE ORDERS (x):    Dressing changes: See Dressing Maintenance orders: x  Skin care: See Skin Care orders: x  Rectal tube care: See Rectal Tube Care orders:   Other orders:    WOUND TEAM PLAN OF CARE (x):   NPWT change 3 x week:  x    Dressing changes by wound team:       Follow up as needed:   x    Other (explain):    Anticipated discharge plans (x):  SNF:      X     Home Care:      Outpatient Wound Center:      Self Care:            Other:

## 2017-12-29 NOTE — CARE PLAN
Problem: Pain Management  Goal: Pain level will decrease to patient's comfort goal  Outcome: PROGRESSING AS EXPECTED  Patient has a decreased need for pain meds this shift, only needing one dose of pain meds at bedtime, after ambulating.     Problem: Mobility  Goal: Risk for activity intolerance will decrease  Outcome: PROGRESSING AS EXPECTED  Patient asks to ambulate the hallways to help with his pain, walks a few times a day.

## 2017-12-29 NOTE — PROGRESS NOTES
"Pt A&Ox4. VS: /83   Pulse 82   Temp 36.5 °C (97.7 °F)   Resp 18   Ht 1.854 m (6' 1\")   Wt 78.7 kg (173 lb 8 oz)   SpO2 93%   BMI 22.89 kg/m² . Pt denies n/v, numbness, tingling, SOB and chest pain. Pt premedicated with pain medications prior to wound vac dressing change. Bilateral nephrostomy tubes to gravity. Pacheco to gravity. Pt needs met at this time, call light within reach, hourly rounding in effect, and will continue to monitor.       "

## 2017-12-29 NOTE — DISCHARGE PLANNING
Spoke with Lisset at [facility], they will accept patient in transfer today.  Patient to transfer today at 1200 via the [facility] van.  Lisset has been advised of Medication Dabigatran and RX that will accompanie patient. Per Lisset this will not be a problem.

## 2017-12-29 NOTE — PROGRESS NOTES
Discharge paperwork discussed with pt. PIV d/c-ed. Pt left with [facility] transport via wheelchair. All belongings with pt. All questions answered. Report called to [facility].

## 2017-12-29 NOTE — DISCHARGE PLANNING
Contacted [facility] resident Maximus VILLALOBOS regarding discharge orders. Notified of scheduled transfer at 1200.   Discharge order and summary to be available prior to 1200 per resident Maximus VILLALOBOS  Notified NARINDER Minor of this.   SW to continue to assess for discharge plan as appropriate.

## 2017-12-29 NOTE — DISCHARGE PLANNING
Care Transition Team Final Discharge Disposition    Actual Discharge Information  Actual Discharge Date: 12/29/17  Care Transitions Team Assisting with Transportation: Yes  Method of Transportation: Van  Scheduled Transportation Date: 12/29/17  Scheduled Transportation Time: 1200  Van Company: Outside facility van  Actual Disposition: D/T to SNF with Medicare cert in anticipation of skilled care (03)    Care Transition Team Discharge Planning     Care Transition Team Interventions  Were Resources Provided?: Yes  Community Referrals: General support  Medical Referrals: SNF referral  Internal Referral: None, Other (comment)  Does patient have qualifying stay?: In progress  Medication Authorization: No    PASRR  PASRR Date Submitted: 12/29/17  PASRR II Date Submitted: 12/29/17    Discharge Plan:  Pt is going to [facility] at 1200 today via [facility] van. NARINDER compelted d/c packet and COBRA, provided to bedside RN. NARINDER did PASSR #[id number].

## 2017-12-29 NOTE — DISCHARGE PLANNING
Contacted [facility] Resident Sarai VILLALOBOS regarding potential discharge today.   Sts pt will be discharged this AM, 1100 being the approximate time dependent upon transport.  Notified Mily BARCENAS of above conversation, she will continue to facilitate discharge plan per protocol.

## 2017-12-29 NOTE — CARE PLAN
Problem: Safety  Goal: Will remain free from injury  Hourly rounding in effect, pt instructed to call for assistance, bed locked and in lowest position. Bed alarm off, with Luda as second RN. Pt calls appropriately for assistance and personal possessions within reach.       Problem: Knowledge Deficit  Goal: Knowledge of disease process/condition, treatment plan, diagnostic tests, and medications will improve  Pt updated and educated on nursing interventions, medications and POC      Problem: Pain Management  Goal: Pain level will decrease to patient's comfort goal  PRN pain medication administered per MAR, will continue to reassess and monitor.

## 2017-12-29 NOTE — DISCHARGE INSTRUCTIONS
Discharge Instructions    Discharged to other by medical transportation with escort. Discharged via wheelchair, hospital escort: Yes.  Special equipment needed: Not Applicable    Be sure to schedule a follow-up appointment with your primary care doctor or any specialists as instructed.     Discharge Plan:   Diet Plan: Discussed  Activity Level: Discussed  Confirmed Follow up Appointment: No (Comments) (pt transfering to [facility])  Confirmed Symptoms Management: Discussed  Medication Reconciliation Updated: Yes  Influenza Vaccine Indication: Not indicated: Previously immunized this influenza season and > 8 years of age    I understand that a diet low in cholesterol, fat, and sodium is recommended for good health. Unless I have been given specific instructions below for another diet, I accept this instruction as my diet prescription.   Other diet: Diabetic    Special Instructions: None    · Is patient discharged on Warfarin / Coumadin?   No     · Is patient Post Blood Transfusion?  No    Depression / Suicide Risk    As you are discharged from this [facility] facility, it is important to learn how to keep safe from harming yourself.    Recognize the warning signs:  · Abrupt changes in personality, positive or negative- including increase in energy   · Giving away possessions  · Change in eating patterns- significant weight changes-  positive or negative  · Change in sleeping patterns- unable to sleep or sleeping all the time   · Unwillingness or inability to communicate  · Depression  · Unusual sadness, discouragement and loneliness  · Talk of wanting to die  · Neglect of personal appearance   · Rebelliousness- reckless behavior  · Withdrawal from people/activities they love  · Confusion- inability to concentrate     If you or a loved one observes any of these behaviors or has concerns about self-harm, here's what you can do:  · Talk about it- your feelings and reasons for harming yourself  · Remove any means that  you might use to hurt yourself (examples: pills, rope, extension cords, firearm)  · Get professional help from the community (Mental Health, Substance Abuse, psychological counseling)  · Do not be alone:Call your Safe Contact- someone whom you trust who will be there for you.  · Call your local CRISIS HOTLINE 267-9385 or 787-128-2306  · Call your local Children's Mobile Crisis Response Team [place] (018) 909-6911 or www.CloudLock  · Call the toll free National Suicide Prevention Hotlines   · National Suicide Prevention Lifeline 900-154-NRWP (5480)  · National Hope Line Network 800-SUICIDE (908-1694)

## 2017-12-29 NOTE — PROGRESS NOTES
Patient expressed want of having wound care visit him before he is discharged. Will pass on to day RN.

## 2018-01-02 ENCOUNTER — HOME HEALTH ADMISSION (OUTPATIENT)
Dept: HOME HEALTH SERVICES | Facility: HOME HEALTHCARE | Age: 68
End: 2018-01-02
Payer: COMMERCIAL

## 2018-01-02 ENCOUNTER — RESOLUTE PROFESSIONAL BILLING HOSPITAL PROF FEE (OUTPATIENT)
Dept: HOSPITALIST | Facility: MEDICAL CENTER | Age: 68
End: 2018-01-02
Payer: MEDICARE

## 2018-01-02 ENCOUNTER — HOSPITAL ENCOUNTER (OUTPATIENT)
Facility: MEDICAL CENTER | Age: 68
End: 2018-01-03
Attending: EMERGENCY MEDICINE | Admitting: INTERNAL MEDICINE
Payer: MEDICARE

## 2018-01-02 DIAGNOSIS — S81.802A WOUND OF LEFT LOWER EXTREMITY, INITIAL ENCOUNTER: ICD-10-CM

## 2018-01-02 DIAGNOSIS — Z51.89 ENCOUNTER FOR WOUND CARE: ICD-10-CM

## 2018-01-02 PROBLEM — S81.809A WOUND OF LOWER EXTREMITY: Status: ACTIVE | Noted: 2018-01-02

## 2018-01-02 LAB
ALBUMIN SERPL BCP-MCNC: 3 G/DL (ref 3.2–4.9)
ALBUMIN/GLOB SERPL: 0.8 G/DL
ALP SERPL-CCNC: 74 U/L (ref 30–99)
ALT SERPL-CCNC: 12 U/L (ref 2–50)
ANION GAP SERPL CALC-SCNC: 8 MMOL/L (ref 0–11.9)
AST SERPL-CCNC: 16 U/L (ref 12–45)
BASOPHILS # BLD AUTO: 0.3 % (ref 0–1.8)
BASOPHILS # BLD: 0.02 K/UL (ref 0–0.12)
BILIRUB SERPL-MCNC: 0.4 MG/DL (ref 0.1–1.5)
BUN SERPL-MCNC: 12 MG/DL (ref 8–22)
CALCIUM SERPL-MCNC: 9.1 MG/DL (ref 8.5–10.5)
CHLORIDE SERPL-SCNC: 102 MMOL/L (ref 96–112)
CO2 SERPL-SCNC: 30 MMOL/L (ref 20–33)
CREAT SERPL-MCNC: 0.55 MG/DL (ref 0.5–1.4)
EOSINOPHIL # BLD AUTO: 0.31 K/UL (ref 0–0.51)
EOSINOPHIL NFR BLD: 5.4 % (ref 0–6.9)
ERYTHROCYTE [DISTWIDTH] IN BLOOD BY AUTOMATED COUNT: 56.6 FL (ref 35.9–50)
GFR SERPL CREATININE-BSD FRML MDRD: >60 ML/MIN/1.73 M 2
GLOBULIN SER CALC-MCNC: 3.6 G/DL (ref 1.9–3.5)
GLUCOSE SERPL-MCNC: 207 MG/DL (ref 65–99)
HCT VFR BLD AUTO: 32.4 % (ref 42–52)
HGB BLD-MCNC: 10.3 G/DL (ref 14–18)
IMM GRANULOCYTES # BLD AUTO: 0.02 K/UL (ref 0–0.11)
IMM GRANULOCYTES NFR BLD AUTO: 0.3 % (ref 0–0.9)
LYMPHOCYTES # BLD AUTO: 0.7 K/UL (ref 1–4.8)
LYMPHOCYTES NFR BLD: 12.1 % (ref 22–41)
MCH RBC QN AUTO: 30.9 PG (ref 27–33)
MCHC RBC AUTO-ENTMCNC: 31.8 G/DL (ref 33.7–35.3)
MCV RBC AUTO: 97.3 FL (ref 81.4–97.8)
MONOCYTES # BLD AUTO: 0.59 K/UL (ref 0–0.85)
MONOCYTES NFR BLD AUTO: 10.2 % (ref 0–13.4)
NEUTROPHILS # BLD AUTO: 4.13 K/UL (ref 1.82–7.42)
NEUTROPHILS NFR BLD: 71.7 % (ref 44–72)
NRBC # BLD AUTO: 0 K/UL
NRBC BLD-RTO: 0 /100 WBC
PLATELET # BLD AUTO: 205 K/UL (ref 164–446)
PMV BLD AUTO: 10.1 FL (ref 9–12.9)
POTASSIUM SERPL-SCNC: 3.8 MMOL/L (ref 3.6–5.5)
PROT SERPL-MCNC: 6.6 G/DL (ref 6–8.2)
RBC # BLD AUTO: 3.33 M/UL (ref 4.7–6.1)
SODIUM SERPL-SCNC: 140 MMOL/L (ref 135–145)
WBC # BLD AUTO: 5.8 K/UL (ref 4.8–10.8)

## 2018-01-02 PROCEDURE — 80053 COMPREHEN METABOLIC PANEL: CPT

## 2018-01-02 PROCEDURE — 99285 EMERGENCY DEPT VISIT HI MDM: CPT

## 2018-01-02 PROCEDURE — G0378 HOSPITAL OBSERVATION PER HR: HCPCS

## 2018-01-02 PROCEDURE — 36415 COLL VENOUS BLD VENIPUNCTURE: CPT

## 2018-01-02 PROCEDURE — 85025 COMPLETE CBC W/AUTO DIFF WBC: CPT

## 2018-01-02 PROCEDURE — A9270 NON-COVERED ITEM OR SERVICE: HCPCS | Performed by: EMERGENCY MEDICINE

## 2018-01-02 PROCEDURE — 700102 HCHG RX REV CODE 250 W/ 637 OVERRIDE(OP): Performed by: EMERGENCY MEDICINE

## 2018-01-02 RX ORDER — ONDANSETRON 2 MG/ML
4 INJECTION INTRAMUSCULAR; INTRAVENOUS EVERY 4 HOURS PRN
Status: DISCONTINUED | OUTPATIENT
Start: 2018-01-02 | End: 2018-01-02

## 2018-01-02 RX ORDER — BENZONATATE 100 MG/1
100 CAPSULE ORAL 3 TIMES DAILY PRN
Status: DISCONTINUED | OUTPATIENT
Start: 2018-01-02 | End: 2018-01-02

## 2018-01-02 RX ORDER — BISACODYL 10 MG
10 SUPPOSITORY, RECTAL RECTAL
Status: DISCONTINUED | OUTPATIENT
Start: 2018-01-02 | End: 2018-01-02

## 2018-01-02 RX ORDER — SODIUM CHLORIDE 9 MG/ML
INJECTION, SOLUTION INTRAVENOUS CONTINUOUS
Status: DISCONTINUED | OUTPATIENT
Start: 2018-01-02 | End: 2018-01-03 | Stop reason: HOSPADM

## 2018-01-02 RX ORDER — POLYETHYLENE GLYCOL 3350 17 G/17G
1 POWDER, FOR SOLUTION ORAL
Status: DISCONTINUED | OUTPATIENT
Start: 2018-01-02 | End: 2018-01-02

## 2018-01-02 RX ORDER — DABIGATRAN ETEXILATE 150 MG/1
150 CAPSULE ORAL ONCE
Status: COMPLETED | OUTPATIENT
Start: 2018-01-02 | End: 2018-01-02

## 2018-01-02 RX ORDER — ACETAMINOPHEN 325 MG/1
650 TABLET ORAL EVERY 6 HOURS PRN
Status: DISCONTINUED | OUTPATIENT
Start: 2018-01-02 | End: 2018-01-03 | Stop reason: HOSPADM

## 2018-01-02 RX ORDER — DEXTROSE MONOHYDRATE 25 G/50ML
25 INJECTION, SOLUTION INTRAVENOUS
Status: DISCONTINUED | OUTPATIENT
Start: 2018-01-02 | End: 2018-01-03 | Stop reason: HOSPADM

## 2018-01-02 RX ORDER — AMOXICILLIN 250 MG
2 CAPSULE ORAL 2 TIMES DAILY
Status: DISCONTINUED | OUTPATIENT
Start: 2018-01-02 | End: 2018-01-02

## 2018-01-02 RX ORDER — DABIGATRAN ETEXILATE 150 MG/1
150 CAPSULE ORAL 2 TIMES DAILY
Status: DISCONTINUED | OUTPATIENT
Start: 2018-01-03 | End: 2018-01-03 | Stop reason: HOSPADM

## 2018-01-02 RX ORDER — FERROUS SULFATE 325(65) MG
325 TABLET ORAL
Status: DISCONTINUED | OUTPATIENT
Start: 2018-01-03 | End: 2018-01-02

## 2018-01-02 RX ORDER — OXYCODONE HYDROCHLORIDE 5 MG/1
5 TABLET ORAL EVERY 4 HOURS PRN
Status: DISCONTINUED | OUTPATIENT
Start: 2018-01-02 | End: 2018-01-02

## 2018-01-02 RX ORDER — MESALAMINE 400 MG/1
400 CAPSULE, DELAYED RELEASE ORAL 3 TIMES DAILY
Status: DISCONTINUED | OUTPATIENT
Start: 2018-01-02 | End: 2018-01-03 | Stop reason: HOSPADM

## 2018-01-02 RX ORDER — TAMSULOSIN HYDROCHLORIDE 0.4 MG/1
0.8 CAPSULE ORAL DAILY
Status: DISCONTINUED | OUTPATIENT
Start: 2018-01-03 | End: 2018-01-03 | Stop reason: HOSPADM

## 2018-01-02 RX ORDER — CYANOCOBALAMIN 1000 UG/ML
1000 INJECTION, SOLUTION INTRAMUSCULAR; SUBCUTANEOUS
Status: DISCONTINUED | OUTPATIENT
Start: 2018-01-02 | End: 2018-01-02

## 2018-01-02 RX ORDER — MERCAPTOPURINE 50 MG/1
150 TABLET ORAL DAILY
Status: DISCONTINUED | OUTPATIENT
Start: 2018-01-03 | End: 2018-01-03 | Stop reason: HOSPADM

## 2018-01-02 RX ORDER — ONDANSETRON 4 MG/1
4 TABLET, ORALLY DISINTEGRATING ORAL EVERY 4 HOURS PRN
Status: DISCONTINUED | OUTPATIENT
Start: 2018-01-02 | End: 2018-01-02

## 2018-01-02 RX ADMIN — DABIGATRAN ETEXILATE MESYLATE 150 MG: 150 CAPSULE ORAL at 20:36

## 2018-01-02 ASSESSMENT — PAIN SCALES - GENERAL
PAINLEVEL_OUTOF10: 8
PAINLEVEL_OUTOF10: 9

## 2018-01-02 NOTE — DISCHARGE PLANNING
Per CNU SW, the pt was d/cd to [facility] on Friday and is now back for a wound vac change. Per SW, the pt may have the  wound vac, and [facility] states that if he does not plan on following care, he needs to return it promptly. SW awaiting room assignment to update RN.

## 2018-01-02 NOTE — ED NOTES
Pt with a wound vac to LLE pt discharged from [facility]. Pt doesn't have a referral allegedly for wound care. Pt wants his wound vac change.

## 2018-01-03 VITALS
HEIGHT: 73 IN | SYSTOLIC BLOOD PRESSURE: 137 MMHG | OXYGEN SATURATION: 97 % | RESPIRATION RATE: 18 BRPM | WEIGHT: 170.19 LBS | BODY MASS INDEX: 22.56 KG/M2 | TEMPERATURE: 98.1 F | DIASTOLIC BLOOD PRESSURE: 76 MMHG | HEART RATE: 82 BPM

## 2018-01-03 LAB
ANION GAP SERPL CALC-SCNC: 7 MMOL/L (ref 0–11.9)
BASOPHILS # BLD AUTO: 0.2 % (ref 0–1.8)
BASOPHILS # BLD: 0.01 K/UL (ref 0–0.12)
BUN SERPL-MCNC: 10 MG/DL (ref 8–22)
CALCIUM SERPL-MCNC: 8.2 MG/DL (ref 8.5–10.5)
CHLORIDE SERPL-SCNC: 103 MMOL/L (ref 96–112)
CO2 SERPL-SCNC: 26 MMOL/L (ref 20–33)
CREAT SERPL-MCNC: 0.42 MG/DL (ref 0.5–1.4)
EOSINOPHIL # BLD AUTO: 0.32 K/UL (ref 0–0.51)
EOSINOPHIL NFR BLD: 7.8 % (ref 0–6.9)
ERYTHROCYTE [DISTWIDTH] IN BLOOD BY AUTOMATED COUNT: 56.1 FL (ref 35.9–50)
GFR SERPL CREATININE-BSD FRML MDRD: >60 ML/MIN/1.73 M 2
GLUCOSE BLD-MCNC: 165 MG/DL (ref 65–99)
GLUCOSE BLD-MCNC: 178 MG/DL (ref 65–99)
GLUCOSE BLD-MCNC: 191 MG/DL (ref 65–99)
GLUCOSE SERPL-MCNC: 177 MG/DL (ref 65–99)
HCT VFR BLD AUTO: 29.4 % (ref 42–52)
HGB BLD-MCNC: 9.2 G/DL (ref 14–18)
IMM GRANULOCYTES # BLD AUTO: 0.01 K/UL (ref 0–0.11)
IMM GRANULOCYTES NFR BLD AUTO: 0.2 % (ref 0–0.9)
LYMPHOCYTES # BLD AUTO: 0.85 K/UL (ref 1–4.8)
LYMPHOCYTES NFR BLD: 20.8 % (ref 22–41)
MCH RBC QN AUTO: 30.3 PG (ref 27–33)
MCHC RBC AUTO-ENTMCNC: 31.3 G/DL (ref 33.7–35.3)
MCV RBC AUTO: 96.7 FL (ref 81.4–97.8)
MONOCYTES # BLD AUTO: 0.45 K/UL (ref 0–0.85)
MONOCYTES NFR BLD AUTO: 11 % (ref 0–13.4)
NEUTROPHILS # BLD AUTO: 2.45 K/UL (ref 1.82–7.42)
NEUTROPHILS NFR BLD: 60 % (ref 44–72)
NRBC # BLD AUTO: 0 K/UL
NRBC BLD-RTO: 0 /100 WBC
PLATELET # BLD AUTO: 170 K/UL (ref 164–446)
PMV BLD AUTO: 9.9 FL (ref 9–12.9)
POTASSIUM SERPL-SCNC: 3.5 MMOL/L (ref 3.6–5.5)
RBC # BLD AUTO: 3.04 M/UL (ref 4.7–6.1)
SODIUM SERPL-SCNC: 136 MMOL/L (ref 135–145)
WBC # BLD AUTO: 4.1 K/UL (ref 4.8–10.8)

## 2018-01-03 PROCEDURE — A9270 NON-COVERED ITEM OR SERVICE: HCPCS | Performed by: INTERNAL MEDICINE

## 2018-01-03 PROCEDURE — G0378 HOSPITAL OBSERVATION PER HR: HCPCS

## 2018-01-03 PROCEDURE — 85025 COMPLETE CBC W/AUTO DIFF WBC: CPT

## 2018-01-03 PROCEDURE — 82962 GLUCOSE BLOOD TEST: CPT | Mod: 91

## 2018-01-03 PROCEDURE — 700111 HCHG RX REV CODE 636 W/ 250 OVERRIDE (IP): Performed by: INTERNAL MEDICINE

## 2018-01-03 PROCEDURE — 99235 HOSP IP/OBS SAME DATE MOD 70: CPT | Performed by: INTERNAL MEDICINE

## 2018-01-03 PROCEDURE — 700102 HCHG RX REV CODE 250 W/ 637 OVERRIDE(OP): Performed by: INTERNAL MEDICINE

## 2018-01-03 PROCEDURE — 96374 THER/PROPH/DIAG INJ IV PUSH: CPT | Mod: XU

## 2018-01-03 PROCEDURE — 700105 HCHG RX REV CODE 258: Performed by: INTERNAL MEDICINE

## 2018-01-03 PROCEDURE — 97606 NEG PRS WND THER DME>50 SQCM: CPT

## 2018-01-03 PROCEDURE — 36415 COLL VENOUS BLD VENIPUNCTURE: CPT

## 2018-01-03 PROCEDURE — 97161 PT EVAL LOW COMPLEX 20 MIN: CPT | Mod: CH

## 2018-01-03 PROCEDURE — 80048 BASIC METABOLIC PNL TOTAL CA: CPT

## 2018-01-03 PROCEDURE — 306263 VAC CANNISTER W/GEL 500ML: Performed by: FAMILY MEDICINE

## 2018-01-03 RX ORDER — HYDROMORPHONE HYDROCHLORIDE 2 MG/ML
2 INJECTION, SOLUTION INTRAMUSCULAR; INTRAVENOUS; SUBCUTANEOUS ONCE
Status: COMPLETED | OUTPATIENT
Start: 2018-01-03 | End: 2018-01-03

## 2018-01-03 RX ORDER — ACETAMINOPHEN 325 MG/1
650 TABLET ORAL EVERY 6 HOURS PRN
Qty: 30 TAB | Refills: 0 | Status: SHIPPED | OUTPATIENT
Start: 2018-01-03 | End: 2018-01-25

## 2018-01-03 RX ORDER — OXYCODONE HYDROCHLORIDE 10 MG/1
10 TABLET ORAL ONCE
Status: COMPLETED | OUTPATIENT
Start: 2018-01-03 | End: 2018-01-03

## 2018-01-03 RX ADMIN — MERCAPTOPURINE 150 MG: 50 TABLET ORAL at 09:28

## 2018-01-03 RX ADMIN — INSULIN HUMAN 2 UNITS: 100 INJECTION, SOLUTION PARENTERAL at 12:20

## 2018-01-03 RX ADMIN — OXYCODONE HYDROCHLORIDE 10 MG: 10 TABLET ORAL at 09:30

## 2018-01-03 RX ADMIN — TAMSULOSIN HYDROCHLORIDE 0.8 MG: 0.4 CAPSULE ORAL at 09:27

## 2018-01-03 RX ADMIN — INSULIN HUMAN 2 UNITS: 100 INJECTION, SOLUTION PARENTERAL at 06:49

## 2018-01-03 RX ADMIN — HYDROMORPHONE HYDROCHLORIDE 2 MG: 2 INJECTION, SOLUTION INTRAMUSCULAR; INTRAVENOUS; SUBCUTANEOUS at 09:32

## 2018-01-03 RX ADMIN — MESALAMINE 400 MG: 400 CAPSULE, DELAYED RELEASE ORAL at 14:18

## 2018-01-03 RX ADMIN — MESALAMINE 400 MG: 400 CAPSULE, DELAYED RELEASE ORAL at 03:26

## 2018-01-03 RX ADMIN — SODIUM CHLORIDE: 9 INJECTION, SOLUTION INTRAVENOUS at 11:07

## 2018-01-03 RX ADMIN — SODIUM CHLORIDE: 9 INJECTION, SOLUTION INTRAVENOUS at 01:48

## 2018-01-03 RX ADMIN — DABIGATRAN ETEXILATE MESYLATE 150 MG: 150 CAPSULE ORAL at 09:29

## 2018-01-03 RX ADMIN — INSULIN HUMAN 2 UNITS: 100 INJECTION, SOLUTION PARENTERAL at 17:15

## 2018-01-03 RX ADMIN — MESALAMINE 400 MG: 400 CAPSULE, DELAYED RELEASE ORAL at 09:27

## 2018-01-03 ASSESSMENT — ENCOUNTER SYMPTOMS
CHILLS: 0
DEPRESSION: 0
NAUSEA: 0
SHORTNESS OF BREATH: 0
HALLUCINATIONS: 0
PALPITATIONS: 0
WEAKNESS: 0
DIZZINESS: 0
ABDOMINAL PAIN: 0
COUGH: 0
DIARRHEA: 0
BLOOD IN STOOL: 0
VOMITING: 0
FOCAL WEAKNESS: 0
FEVER: 0
HEADACHES: 0
HEARTBURN: 0
BACK PAIN: 0
SORE THROAT: 0
MYALGIAS: 0

## 2018-01-03 ASSESSMENT — PAIN SCALES - GENERAL
PAINLEVEL_OUTOF10: 0
PAINLEVEL_OUTOF10: 0
PAINLEVEL_OUTOF10: 7

## 2018-01-03 ASSESSMENT — LIFESTYLE VARIABLES
ALCOHOL_USE: NO
EVER_SMOKED: NEVER

## 2018-01-03 NOTE — RESPIRATORY CARE
COPD EDUCATION by COPD CLINICAL EDUCATOR  1/3/2018 at 7:34 AM by Maria Isabel Dueñas     Patient reviewed by COPD education team. Patient does not qualify for COPD program.

## 2018-01-03 NOTE — DISCHARGE PLANNING
NARINDER spoke with [facility] who reports they do not take [facility] who [facility] because [facility] not [facility] their [facility]. If pt has a different pcp other than [facility] can accept. NARINDER called Pam [phone] RN case manager with the [facility] who reports [facility] wound vac paperwork needs to be faxed to pt pcp Mónica Ambrosio and Pearl Cole at [phone] as well as HH order. NARINDER to ask for HH order from hospitalist and signature for [facility] wound vac. SW to f/u with PCP office at [phone] ext [phone] once orders are faxed.

## 2018-01-03 NOTE — ED PROVIDER NOTES
ED Provider Note    Scribed for Jade Montoya M.D. by Delisa Yoo. 1/2/2018, 7:13 PM.    Primary care provider: Pcp Not In Computer  Means of arrival: Walk-in  History obtained from: Patient  History limited by: None    CHIEF COMPLAINT  Chief Complaint   Patient presents with   • Wound Re-Check       HPI  Niels Lima is a 67 y.o. male who presents to the Emergency Department for a wound re-check. The patient states that he recently had surgery on his left posterior thigh secondary to a hematoma.He developed a hematoma after being bedbound for bilateral nephrostomy tube placements. He has a nephrostomy tubes for a bladder tear that occurred during a testicular surgery.  Per patient, he was given IV antibiotics for recent nephrostomy tube infection and was sent to [facility] as an inpatient for IV antibiotics and management of his left thigh wound with wound VAC in place. He states that he was discharged home yesterday without setting up home wound care. Per patient, his wound vacuum was last changed four days ago but it has been leaking. Therefore he came here for wound care management.    REVIEW OF SYSTEMS  Review of Systems   Constitutional: Negative for chills and fever.   Gastrointestinal: Negative for abdominal pain, nausea and vomiting.   Musculoskeletal:        Wound vacuum leaking.   All other systems reviewed and are negative.  C.    PAST MEDICAL HISTORY   has a past medical history of Crohn's disease (CMS-HCC) (01/01/2001); Diabetes (CMS-HCC); DVT (deep venous thrombosis) (CMS-HCC) (01/01/2009); and Presence of IVC filter (01/01/2009).    SURGICAL HISTORY   has a past surgical history that includes bowel resection (01/01/1986); scrotal exploration (Bilateral, 11/12/2017); cystoscopy (12/10/2017); and incision and drainage general (Left, 12/17/2017).    SOCIAL HISTORY  Social History   Substance Use Topics   • Smoking status: Never Smoker   • Smokeless tobacco: Never Used   • Alcohol use  "No      History   Drug Use No       FAMILY HISTORY  No family history noted    CURRENT MEDICATIONS  No current facility-administered medications on file prior to encounter.      Current Outpatient Prescriptions on File Prior to Encounter   Medication Sig Dispense Refill   • dabigatran (PRADAXA) 150 MG Cap capsule Take 1 Cap by mouth 2 Times a Day. 60 Cap 0   • Mesalamine 0.375 GM CAPSULE SR 24 HR Take 3 Caps by mouth every day.     • mercaptopurine (PURINETHOL) 50 MG Tab Take 150 mg by mouth every day.     • metformin (GLUCOPHAGE) 1000 MG tablet Take 1,000 mg by mouth 2 times a day, with meals.     • tamsulosin (FLOMAX) 0.4 MG capsule Take 0.8 mg by mouth every day.         ALLERGIES  Allergies   Allergen Reactions   • Prochlorperazine Unspecified     \"FACE FROZE UP\"       PHYSICAL EXAM  VITAL SIGNS: /88   Pulse 91   Temp 36.9 °C (98.5 °F)   Resp 18   Ht 1.854 m (6' 1\")   Wt 78.5 kg (173 lb)   SpO2 94%   BMI 22.82 kg/m²   Vitals reviewed by myself.  Physical Exam  Nursing note and vitals reviewed.  Constitutional: Well-developed and well-nourished. No acute distress.   HENT: Head is normocephalic and atraumatic.  Eyes: extra-ocular movements intact  Cardiovascular: Regular rate and regular rhythm. No murmur heard.  Pulmonary/Chest: Breath sounds normal. No wheezes or rales.   Abdominal: Bilateral nephrostomy tubes. Pacheco catheter in place. Soft and non-tender. No distention.    Musculoskeletal: Wound vacuum to left posterior thigh with some areas of loose tape with clear drainage. Extremities exhibit normal range of motion without edema or tenderness.   Neurological: Awake and alert  Skin: Skin is warm and dry. No rash.     DIAGNOSTIC STUDIES /  LABS  Labs Reviewed   CBC WITH DIFFERENTIAL - Abnormal; Notable for the following:        Result Value    RBC 3.33 (*)     Hemoglobin 10.3 (*)     Hematocrit 32.4 (*)     MCHC 31.8 (*)     RDW 56.6 (*)     Lymphocytes 12.10 (*)     Lymphs (Absolute) 0.70 (*)     " All other components within normal limits   COMP METABOLIC PANEL - Abnormal; Notable for the following:     Glucose 207 (*)     Albumin 3.0 (*)     Globulin 3.6 (*)     All other components within normal limits   ESTIMATED GFR   CBC WITH DIFFERENTIAL   BASIC METABOLIC PANEL      All labs reviewed by me.    REASSESSMENT  7:28 PM Patient was examined at bedside. Discussed labs and medications ordered with patient. Patient understood and is in agreement.    8:09 PM Spoke with Dr. Diana, Hospitalist, about the patient's condition. She will admit the patient.    COURSE & MEDICAL DECISION MAKING  Nursing notes, VS, PMSFHx reviewed in chart.    Patient is a67-year-old male who comes in for wound care. Differential diagnosis includes hematoma, wound VAC care, infection. Diagnostic workup includes labs. Next    Patient's initial vitals are within normal limits. I discussed the case with our  reports that we cannot get wound care for him here tonight in the emergency department and we cannot arrange outpatient wound care this time of night. She advises he should be admitted for wound care consult and arranging an outpatient wound care. Patient is agreeable to this plan. His labs returned and he does not have leukocytosis, therefore I do not believe it is infected. The wound was also noted to be leaking clear fluid and not purulent material, therefore I will admit him for wound care consult. I discussed the case with Dr. Diana who has accepted the admission. Attempted admission patient is in stable condition.    DISPOSITION:  Patient will be admitted to Dr. Diana in guarded condition.    FINAL IMPRESSION  1. Encounter for wound care            Delisa DAVIS (Shruti), am scribing for, and in the presence of, Jade Montoya M.D..    Electronically signed by: Delisa Welsh), 1/2/2018    Jade DAVIS M.D. personally performed the services described in this documentation, as  scribed by Delisa Yoo in my presence, and it is both accurate and complete.    The note accurately reflects work and decisions made by me.  Jade Montoya  1/3/2018  1:09 AM

## 2018-01-03 NOTE — ED NOTES
Wife at bedside. Pt and pt's wife yelling at one another. Educated on appropriate behavior in ER. Wife waiting in lobby.

## 2018-01-03 NOTE — PROGRESS NOTES
Pt's pta wound vac not functioning appropriately. Leak audible in dressing and vac system indicating clog in line or full cannister. Drape placed around entire original dressing. New order for [facility] wound vac and cannister. Pt detached from personal wound vac and attached to new wound vac. Indicated on the [facility] wound vac that it is functioning appropriately with no leaks.

## 2018-01-03 NOTE — ED NOTES
Med rec updated and complete.  Allergies reviewed.  Dicussed current medications with pt.  Pt states that he takes dibigatran because he has a clotting disorder   That caused him to develop a pulmonary embolism.

## 2018-01-03 NOTE — ASSESSMENT & PLAN NOTE
This was related to a spontaneous hematoma of the left thigh that occurred while on Coumadin, and his dose becoming supratherapeutic.  -He was discharged to [facility] and has had a wound VAC. He completed antibiotic therapy on Friday 12/29.  -He did not get proper wound care at [facility], per patient  -He was discharged form [facility]  (per his verbal report, collateral information indicates he may have discharged himself against medical advice and took [facility]'s wound vac with him  -He was not set up with outpatient wound care.  -His wound VAC has been leaking, hence he came here o nonservation  -Inpatient wound care ordered  He is now set up for Home Health Care through the [facility] and wound vac supplies ordered  At discharge date, he is afenbrile hemodynamically and wants to gohome with [facility].  Assign and follow up to [facility] provider or discharge clinic physician in 1 week  Follow up to Dr. Deluna in 1 week to follow on thigh wound and wound vac

## 2018-01-03 NOTE — ED NOTES
Pt complaining of how long it is taking to move to another room. Educated pt on wait times in ER and apologized for inconvenience.

## 2018-01-03 NOTE — H&P
" Hospital Medicine History and Physical    Date of Service  1/3/2018    Chief Complaint  Chief Complaint   Patient presents with   • Wound Re-Check       History of Presenting Illness  67 y.o. male With a history of hypercoagulable disorder on chronic anticoagulation, Crohn's disease, type II diabetes, recent bladder rupture requiring bilateral nephrostomy tubes who presented 1/2/2018 with left leg wound.     The patient has had a very complicated past few months of medical therapy. Initially he developed an episode of epididymitis and testicular torsion which eventually required an orchiectomy.  His Coumadin was held for the surgery and then resumed. Unfortunately, shortly afterwards his dose got supratherapeutic and he developed a spontaneous left posterior thigh hematoma. This required surgical evacuation and wound VAC placement. He also was placed on IV antibiotics.    He also suffered from a bladder rupture which required surgical repair by Dr. Upton, as well as placement of bilateral nephrostomy tubes. Throughout his surgeries for his bladder and hematoma his Coumadin was held. Due to the spontaneous hematoma which formed on Coumadin, he was changed to Pradaxa which was started about 1 week ago.  He has been tolerating this medication and has not noted any bruising or bleeding, however, has noted a slight blood tinge to the urine in his nephrostomy tubes.    He was discharged from the hospital about a week ago and went to [facility]. While at [facility] he says he got \"horrible care\" and was not having proper management of his posterior left thigh wound VAC.  He completed his IV antibiotics on 12/29 and was discharged from [facility] on 12/30, however without any outpatient wound care arranged. When he arrived home, he subsequently developed leaking of his wound VAC and he was unable to manage this at home therefore he re-presented to the hospital.    Primary Care Physician  Unknown    Consultants  Wound " care    Code Status  Full    Review of Systems  Review of Systems   Constitutional: Negative for chills, fever and malaise/fatigue.   HENT: Negative for sore throat.    Respiratory: Negative for cough and shortness of breath.    Cardiovascular: Negative for chest pain and palpitations.   Gastrointestinal: Negative for abdominal pain, blood in stool, diarrhea, heartburn, nausea and vomiting.   Genitourinary: Positive for hematuria. Negative for dysuria and frequency.        Nephrostomy tubes bilaterally   Musculoskeletal: Negative for back pain and myalgias.   Skin:        Left thigh wound, leakage of clear, straw colored fluid   Neurological: Negative for dizziness, focal weakness, weakness and headaches.   Psychiatric/Behavioral: Negative for depression and hallucinations.   All other systems reviewed and are negative.       Past Medical History  Past Medical History:   Diagnosis Date   • DVT (deep venous thrombosis) (CMS-HCC) 01/01/2009    IVC filter in place   • Presence of IVC filter 01/01/2009   • Crohn's disease (CMS-HCC) 01/01/2001   • Diabetes (CMS-HCC)     Type 2       Surgical History  Past Surgical History:   Procedure Laterality Date   • INCISION AND DRAINAGE GENERAL Left 12/17/2017    Procedure: INCISION AND DRAINAGE GENERAL;  Surgeon: Enrique Deluna M.D.;  Location: [facility];  Service: General   • CYSTOSCOPY  12/10/2017    Procedure: cystoscopy, evacuation of clots, transurethral biopsy, ffulguration of perforation, cystogram;  Surgeon: Amador Upton M.D.;  Location: [facility];  Service: Urology   • SCROTAL EXPLORATION Bilateral 11/12/2017    Procedure: SCROTAL EXPLORATION -  left orchiectomy, right testicular fixation;  Surgeon: José Antonio Mosqueda M.D.;  Location: [facility];  Service: Urology   • BOWEL RESECTION  01/01/1986       Medications  No current facility-administered medications on file prior to encounter.      Current Outpatient Prescriptions on File  "Prior to Encounter   Medication Sig Dispense Refill   • dabigatran (PRADAXA) 150 MG Cap capsule Take 1 Cap by mouth 2 Times a Day. 60 Cap 0   • Mesalamine 0.375 GM CAPSULE SR 24 HR Take 3 Caps by mouth every day.     • mercaptopurine (PURINETHOL) 50 MG Tab Take 150 mg by mouth every day.     • metformin (GLUCOPHAGE) 1000 MG tablet Take 1,000 mg by mouth 2 times a day, with meals.     • tamsulosin (FLOMAX) 0.4 MG capsule Take 0.8 mg by mouth every day.         Family History  Denies any family history of clotting disorders or inflammatory bowel disease.    Social History  Social History   Substance Use Topics   • Smoking status: Never Smoker   • Smokeless tobacco: Never Used   • Alcohol use No       Allergies  Allergies   Allergen Reactions   • Prochlorperazine Unspecified     \"FACE FROZE UP\"        Physical Exam  Laboratory   Hemodynamics  Temp (24hrs), Av.8 °C (98.2 °F), Min:36.1 °C (97 °F), Max:37.2 °C (99 °F)   Temperature: 37.2 °C (99 °F)  Pulse  Av.5  Min: 80  Max: 111    Blood Pressure : 127/70, NIBP: 136/68      Respiratory      Respiration: 16, Pulse Oximetry: 92 %             Physical Exam   Constitutional: He is oriented to person, place, and time. He appears well-developed and well-nourished. No distress.   HENT:   Head: Normocephalic.   Mouth/Throat: No oropharyngeal exudate.   Eyes: Conjunctivae are normal. Pupils are equal, round, and reactive to light.   Neck: Normal range of motion. No tracheal deviation present.   Cardiovascular: Normal rate and regular rhythm.    No murmur heard.  Pulmonary/Chest: Effort normal. No respiratory distress. He has no wheezes. He exhibits no tenderness.   Abdominal: Soft. He exhibits no distension. There is no tenderness. There is no guarding.   Genitourinary:   Genitourinary Comments: Dark urine in nephrostomy tubes tinged   Musculoskeletal: Normal range of motion. He exhibits no edema or tenderness.   Lymphadenopathy:     He has no cervical adenopathy. "   Neurological: He is alert and oriented to person, place, and time. No cranial nerve deficit.   Skin: No rash noted.   Large posterior left thigh wound, wound VAC in place, mild serous drainage.   Psychiatric: He has a normal mood and affect. His behavior is normal.   Nursing note and vitals reviewed.      Recent Labs      01/02/18 1939   WBC  5.8   RBC  3.33*   HEMOGLOBIN  10.3*   HEMATOCRIT  32.4*   MCV  97.3   MCH  30.9   MCHC  31.8*   RDW  56.6*   PLATELETCT  205   MPV  10.1     Recent Labs      01/02/18 1939   SODIUM  140   POTASSIUM  3.8   CHLORIDE  102   CO2  30   GLUCOSE  207*   BUN  12   CREATININE  0.55   CALCIUM  9.1     Recent Labs      01/02/18 1939   ALTSGPT  12   ASTSGOT  16   ALKPHOSPHAT  74   TBILIRUBIN  0.4   GLUCOSE  207*                 No results found for: TROPONINI  Urinalysis:    Lab Results  Component Value Date/Time   SPECGRAVITY 1.019 12/14/2017 1035   SPECGRAVITY 1.012 12/14/2017 1035   GLUCOSEUR >=1000 (A) 12/14/2017 1035   GLUCOSEUR 500 (A) 12/14/2017 1035   KETONES Trace (A) 12/14/2017 1035   KETONES Negative 12/14/2017 1035   NITRITE Negative 12/14/2017 1035   NITRITE Negative 12/14/2017 1035   WBCURINE 20-50 (A) 12/14/2017 1035   WBCURINE 20-50 (A) 12/14/2017 1035   RBCURINE >150 (A) 12/14/2017 1035   RBCURINE >150 (A) 12/14/2017 1035   BACTERIA Negative 12/14/2017 1035   BACTERIA Negative 12/14/2017 1035   EPITHELCELL Negative 12/14/2017 1035   EPITHELCELL Few 12/14/2017 1035        Imaging  Ct-cta Chest Pulmonary Artery W/ Recons  Result Date: 12/19/2017  1.  There is no CT evidence of acute central or segmental pulmonary embolism. Exam is slightly limited by motion artifact. 2.  There is diffuse central bronchial wall thickening possibly related underlying bronchitis. 3.  There is dependent atelectasis. 4.  There is a small groundglass opacity in the anterior left upper lobe which is probably an area of pneumonitis. 5.  There are lower lobe blebs. 6.  There are trace  amounts of dependent pleural fluid with bibasilar atelectasis.     Ir-nephrostogram W/ New Tube Placement (all Radiology) Right  Result Date: 12/13/2017  1. ULTRASOUND AND FLUOROSCOPIC GUIDED PLACEMENT OF A LEFT 10 [place]  PIGTAIL LOCKING LOOP PERCUTANEOUS NEPHROSTOMY CATHETER. 2. ULTRASOUND AND FLUOROSCOPIC GUIDED PLACEMENT OF A RIGHT 10 [place]  PIGTAIL LOCKING LOOP PERCUTANEOUS NEPHROSTOMY CATHETER. 2. NEPHROSTOGRAMS SHOWING SATISFACTORY CATHETER POSITION WITHOUT EXTRAVASATION.    Dx-abdomen For Tube Placement  Result Date: 12/12/2017    1.  Air-filled distended small bowel, consider ileus or evolving obstructive change. 2.  Nasogastric tube tip terminates overlying the expected location of the gastric body with side-port near the gastroesophageal junction, could be advanced 4-8 cm.    Dx-abdomen For Tube Placement  Result Date: 12/12/2017  The enteric tube is looped in the proximal esophagus.     Assessment/Plan     I anticipate this patient is appropriate for observation status at this time.    Extraperitoneal rupture of bladder- (present on admission)   Assessment & Plan    This was surgically repaired by Dr. Upton, he had bilateral nephrostomy tubes placed.  -He has had difficulty scheduling a follow-up appointment with urology, this needs to be arranged for him prior to discharge        DM type 2 (diabetes mellitus, type 2) (CMS-[facility])- (present on admission)   Assessment & Plan    Sliding scale        Wound of lower extremity   Assessment & Plan    Related to history of spontaneous left thigh hematoma. He has ongoing wound VAC  -Inpatient consult wound care  -He will need outpatient wound care with home health  -He has completed IV antibiotics        Nephrostomy status (CMS-[facility])- (present on admission)   Assessment & Plan    Due to history of latter rupture  -Continue nephrostomy tubes  -Monitor for hematuria  -Arrange urology follow-up prior to discharge        Open wound of left thigh- (present on admission)    Assessment & Plan    This was related to a spontaneous hematoma of the left thigh that occurred while on Coumadin, and his dose becoming supratherapeutic.  -He was discharged to [facility] and has had a wound VAC. He completed antibiotic therapy on Friday 12/29.  -He did not get proper wound care at [facility], per patient  -He was not set up with outpatient wound care.  -His wound VAC has been leaking  -Inpatient wound care ordered  -He will need outpatient wound care arranged prior to discharge        Recurrent pulmonary embolism (CMS-HCC)- (present on admission)   Assessment & Plan    He has had multiple recurrent episodes of DVT and PE. He has previously been on Coumadin however due to a spontaneous left thigh hematoma Coumadin was discontinued. He was resumed on an alternative anticoagulation about one week ago with Pradaxa  -Continue Pradaxa, monitor Hgb        Crohn disease (CMS-HCC)- (present on admission)   Assessment & Plan    Continue mercaptopurine            VTE prophylaxis: Pradaxa.

## 2018-01-03 NOTE — DISCHARGE PLANNING
"Medical Social Work    Ongoing: MSW received report from [facility] May.  MSW contacted Sylvia, Charge RN at [facility] regarding pt's D/C plan.  Sylvia states that pt was \"discharged\" from their facility on 12/31/2017 at 1500.  Sylvia states that per notes pt was not happy with waiting for his wound vac to be delivered and changed (pt still has [facility] wound vac) and didn't want to wait for home health to be arranged.  Sylvia states that pt was then discharged.  Per Sylvia the notes state that [facility] was going to deliver pt's wound vac to his home and  [facility]'s wound vac; however, this hasn't occurred.  Sylvia states that they faxed a referral to [facility] this afternoon at 1400.  It's unknown if [facility] has accepted pt for service.  MSW updated ERP and pt will be admitted at this time as he will need his wound vac changed and to assure home health is set up for outpatient care.    Plan: NARINDER will continue to follow.  "

## 2018-01-03 NOTE — ASSESSMENT & PLAN NOTE
Due to history of latter rupture  -Continue nephrostomy tubes  -Monitor for hematuria  He follows Dr. Upton and has called for appointment. Plan was to have possibly stents placed and nephrostomy tube discontinued  Follow up with Dr. Upton in 1 week or as scheduled.

## 2018-01-03 NOTE — PROGRESS NOTES
Dr. Murillo returned page (covering for Dr. Upton)   Will leave kaur in, irrigate and clean if needed.  Dr. Murillo will consult Dr. Upton for further instructions.  If patient discharges before consult, patient will need to follow up with Won's office.

## 2018-01-03 NOTE — ASSESSMENT & PLAN NOTE
Sliding scale  Stable.  Follow up with Pcp Not In Computer in 1 weel. Advised getting a blood sugar log for him to review.

## 2018-01-03 NOTE — DISCHARGE PLANNING
NARINDER faxed choice form to [facility] for Manjinder Diane. NARINDER faxed I would vac application to [phone]. NARINDER to f/u with Faiza with [facility].

## 2018-01-03 NOTE — ASSESSMENT & PLAN NOTE
Related to history of spontaneous left thigh hematoma. He has ongoing wound VAC  -Inpatient consult wound care  -He will need outpatient wound care with home health  -He has completed IV antibiotics

## 2018-01-03 NOTE — DISCHARGE PLANNING
HH accepted by [facility]. Manjinder to see pt at home Friday for wound vac dressing change. Wound vac to be delivered today within next two hours. SW notified pt bedside about HH acceptance as well as wound vac getting delivered. Charge RN and bedside RN notified. No other needs from this Sw.

## 2018-01-03 NOTE — ED NOTES
Ambulatory back to [facility]. Patient changing into gown and chart up for ERP. Patient requesting wound vac eval and outpatient referral for wound care.

## 2018-01-03 NOTE — DISCHARGE SUMMARY
CHIEF COMPLAINT ON ADMISSION  Chief Complaint   Patient presents with   • Wound Re-Check       CODE STATUS  Full Code    HPI & HOSPITAL COURSE  Please review Dr. Keara Diana, CHERRIE. notes for further details of history of present illness, past medical/social/family histories, allergies and medications.     * Open wound of left thigh- (present on admission)   Assessment & Plan    This was related to a spontaneous hematoma of the left thigh that occurred while on Coumadin, and his dose becoming supratherapeutic.  -He was discharged to [facility] and has had a wound VAC. He completed antibiotic therapy on Friday 12/29.  -He did not get proper wound care at [facility], per patient  -He was discharged form [facility]  -He was not set up with outpatient wound care.  -His wound VAC has been leaking, hence he came here o nonservation  -Inpatient wound care ordered. Dressings replaced. No more leaking.  He is now set up for Home Health Care through the [facility] and wound vac supplies ordered  At discharge date, he is afenbrile hemodynamically and wants to gohome with [facility].  Assign and follow up to [facility] provider or discharge clinic physician in 1 week  Follow up to Dr. 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 Upton, he had bilateral nephrostomy tubes placed.  Follow up with Dr. Upton in 1 week or as scheduled.        DM type 2 (diabetes mellitus, type 2) (CMS-HCC)- (present on admission)   Assessment & Plan    Sliding scale  Stable.  Follow up with Pcp Not In Computer in 1 weel. Advised getting a blood sugar log for him to review.        Nephrostomy status (CMS-HCC)- (present on admission)   Assessment & Plan    Due to history of latter rupture  -Continue nephrostomy tubes  -Monitor for hematuria  He follows Dr. Upton and has called for appointment. Plan was to have possibly  stents placed and nephrostomy tube discontinued  Follow up with Dr. Upton in 1 week or as scheduled.        Recurrent pulmonary embolism (CMS-HCC)- (present on admission)   Assessment & Plan    He has had multiple recurrent episodes of DVT and PE. He has previously been on Coumadin however due to a spontaneous left thigh hematoma Coumadin was discontinued. He was resumed on an alternative anticoagulation about one week ago with Pradaxa  -Continue Pradaxa, monitor Hgb        Crohn disease (CMS-HCC)- (present on admission)   Assessment & Plan    Continue mercaptopurine            Discharge Physical Exam  General/Constitutional: No acute distress.   Head: Normocephalic, atraumatic  ENT: Oral mucosa is moist. No obvious pharyngeal exudates  Eyes: Pink conjunctiva, no scleral icterus  Neck: Supple, no lymphadenopathy  Cardiovascular: Normal rate and regular rhythm. S1,2 noted. No murmurs, gallops or rubs.  Pulmonary: Clear to auscultation bilaterally. No wheezes, rales or rhonchi.  Abdominal: Soft, nontender, not distended, bowel sounds normoactive. No guarding or peritoneal signs.  Musculoskeletal: No tenderness to palpation of chest wall.  Neurologic: Alert and oriented. Grossly nonfocal, moving all extremities.  Genitourinary: No gross hematuria. Urine at bilateral nephrostomy bags look clear  Skin: L thigh wound vac noted. Area clean dry and intact  Psychiatric: Pleasant, cooperative.  Vitals Reviewed  Labs Reviewed  Imaging reviewed  Nursing notes reviewed        Therefore, he is discharged in good and stable condition with close outpatient follow-up.    SPECIFIC OUTPATIENT FOLLOW-UP  Assign and follow up to [facility] provider or discharge clinic physician in 1 week  Follow up to Dr. Deluna in 1 week to follow on thigh wound and wound vac  Follow up with Dr. Upton in 1 week    FOLLOW UP  No future appointments.  [facility]  [address]  [place] [address]  [phone]    Schedule an appointment as  soon as possible for a visit in 1 week  Follow up appointment w/ PCP    Amador Upton M.D.  [address]  [address]  [place] [address]  [phone]    Schedule an appointment as soon as possible for a visit in 1 week  Follow up appointment      MEDICATIONS ON DISCHARGE   Niels Lima   Home Medication Instructions ISMAEL:[id number]    Printed on:01/03/18 1127   Medication Information                      acetaminophen (TYLENOL) 325 MG Tab  Take 2 Tabs by mouth every 6 hours as needed (Mild Pain; (Pain scale 1-3); Temp greater than 100.5 F).             dabigatran (PRADAXA) 150 MG Cap capsule  Take 1 Cap by mouth 2 Times a Day.             mercaptopurine (PURINETHOL) 50 MG Tab  Take 150 mg by mouth every day.             Mesalamine 0.375 GM CAPSULE SR 24 HR  Take 3 Caps by mouth every day.             metformin (GLUCOPHAGE) 1000 MG tablet  Take 1,000 mg by mouth 2 times a day, with meals.             tamsulosin (FLOMAX) 0.4 MG capsule  Take 0.8 mg by mouth every day.                 DIET  Orders Placed This Encounter   Procedures   • Diet Order     Standing Status:   Standing     Number of Occurrences:   1     Order Specific Question:   Diet:     Answer:   Diabetic [3]       ACTIVITY  As per PT at [facility]      CONSULTATIONS      PROCEDURES  Ct-abdomen-pelvis W/o    Result Date: 12/12/2017 12/12/2017 10:45 AM HISTORY/REASON FOR EXAM:  Pain. Abdominal pain and distention TECHNIQUE/EXAM DESCRIPTION: CT scan of the abdomen and pelvis without contrast. Noncontrast helical scanning was obtained from the diaphragmatic domes through the pubic symphysis. Low dose optimization technique was utilized for this CT exam including automated exposure control and adjustment of the mA and/or kV according to patient size. COMPARISON: CT pelvis 12/10/2017 FINDINGS: CT Abdomen: Visualized lung bases show small bilateral pleural effusions with associated compressive atelectasis. Small amount of fluid present in the upper abdomen. Multiple small  low-density liver lesions, measuring up to 15 mm. The spleen is unremarkable. Adrenal glands are unremarkable. Low-density lesions in both kidneys, likely cysts, largest at the upper pole RIGHT kidney measuring 9.7 cm. Both kidneys show dilated renal collecting systems, worse on the RIGHT. Nonobstructing RIGHT kidney stone measuring 5 mm. Inferior vena cava filter present. Ureters are mildly dilated bilaterally. Gallbladder is distended. CT Pelvis: Bladder is decompressed with Pacheco catheter present.  Apparent thinning of anterior bladder wall, possibly diverticulum. Small amount of peritoneal fluid present.  Dependent hyperdense fluid present. Increased colonic stool. Appendix is not visualized. Mild atherosclerotic change abdominal aorta. Fat density within the RIGHT lateral abdominal wall consistent with hernia, likely containing retroperitoneal fat. Degenerative change of lumbar spine. Body wall edema. Increased small bowel and colonic gas.     1.  Increased small bowel and colonic gas, most suggestive of ileus.  Developing small bowel obstruction is not excluded.  Clinical follow-up recommended. 2.  No evidence for bowel perforation. 3.  Small volume ascites present. 4.  Distended gallbladder, of uncertain clinical significance. 5.  Bilateral moderate hydronephrosis suggesting urinary retention. 6.  Collapsed bladder with Pacheco catheter present.  Potential bladder wall appears thickened which may indicate diverticulum. 7.  Dependent hyperdense fluid in the pelvis may be secondary to prior CT cystogram and is again concerning for bladder rupture.    Ct-pelvis W/o    Result Date: 12/10/2017  12/10/2017 6:41 PM HISTORY/REASON FOR EXAM: Bladder injury, unknown whether it is intra or extraperitoneal. CT cystogram requested TECHNIQUE/EXAM DESCRIPTION AND NUMBER OF VIEWS: CT scan of the pelvis without intravenous contrast. With bladder contrast through indwelling Pacheco catheter, Omnipaque 240, 5 mL After contrast was  injected, helical sections were obtained from the iliac crests through the pubic symphysis. Contrast was then drained and a repeat CT was performed. Low dose optimization technique was utilized for this CT exam including automated exposure control and adjustment of the mA and/or kV according to patient size. COMPARISON: Ultrasound today FINDINGS: An indwelling Pacheco catheter is present. Through this contrast was prepared to inject. During the injection, by 5 mL the patient was screaming in pain and the injection was terminated. Scan was performed. Initial series shows markedly irregular wall to the bladder with irregular thickening. The bladder is mostly decompressed. Lobular contrast extends anterosuperiorly and there is fat stranding in the extraperitoneal space with a triangular morphology extending towards the umbilicus. This indicates some extraperitoneal abnormality but there is no contrast flowing into this region. No contrast extends dependently either. There is some presacral fluid which does not change in density between the first and second series, 22 Hounsfield units. There is diffuse fat stranding making detection of focal inflammatory change and extravasation less effective No free air is seen in the visualized abdomen or pelvis There is a right renal lower pole stone. There is a posterior cortex cyst. Prostate is moderately enlarged Lymphadenopathy     1.  Limited CT cystogram because of patient tolerance factors. Only approximately 5 mL of contrast was injected. There is extraperitoneal abnormal density indicating there is some extra peritoneal spread 2.  There is also abnormal density layering in the pelvis but no contrast extravasates here. Because the contrast challenge was not robust, false-negative for extravasation could not be excluded. This density in the pelvis is most likely not from intraperitoneal extravasation which is further supported by lack of free air and some abnormal contour to  the bladder anterosuperiorly which would support extraperitoneal primary injury 3.  Right nephrolith and cyst    [id number]-2 View    Result Date: 12/18/2017 12/18/2017 10:50 AM HISTORY/REASON FOR EXAM:  Distention. Abdominal pain TECHNIQUE/EXAM DESCRIPTION AND NUMBER OF VIEWS:  1 view(s) of the abdomen. COMPARISON: 12/16/2017 FINDINGS: Bilateral nephrostomy tubes again present. Inferior vena cava filter present. Increased small bowel and colonic gas again noted. Degenerative change of lumbar spine.     Small bowel and colonic distention again seen and similar to prior exam.    It-[id number] View    Result Date: 12/16/2017 12/16/2017 3:04 PM HISTORY/REASON FOR EXAM:  Distention. Abdominal distention, pain TECHNIQUE/EXAM DESCRIPTION AND NUMBER OF VIEWS:  1 view(s) of the abdomen. COMPARISON: 12/12/2017 FINDINGS: Increased small bowel and colonic gas. Inferior vena cava filter present. Bilateral nephrostomy tubes present. Orogastric tube tip at the proximal stomach with side port just below the GE junction. Degenerative change of lumbar spine.     1.  Increased bowel gas; ileus versus developing small bowel obstruction. 2.  Supportive tubing as described above.    [id number]-6 View    Result Date: 12/12/2017 12/12/2017 8:55 AM HISTORY/REASON FOR EXAM:  Abdominal Pain Distention / constipation / history of SBO / bladder spasms TECHNIQUE/EXAM DESCRIPTION AND NUMBER OF VIEWS:  1 view(s) of the abdomen. COMPARISON: None FINDINGS: Diffuse gaseous distention of the small and large bowel. IVC filter in place. No portal venous gas or pneumatosis. No definite free intraperitoneal air but evaluation is limited on supine radiograph.     Diffuse gaseous distention of the small and large bowel, concerning for ileus.    Dx-chest-2 Views    Result Date: 12/26/2017 12/26/2017 4:37 PM HISTORY/REASON FOR EXAM:  Shortness of Breath TECHNIQUE/EXAM DESCRIPTION AND NUMBER OF VIEWS: Two views of the chest. COMPARISON:  CT chest  12/19/2017 FINDINGS: The cardiac silhouette  and mediastinal contours are normal. No confluent opacities identified. No pleural fluid or pneumothorax. There is increased bronchial wall thickening in the lower lungs. No suspicious bony lesions.     1.  Peribronchial thickening in the lower lobes similar to the recent CT and suggestive of chronic bronchitis    Us-abdomen Limited    Result Date: 12/18/2017 12/18/2017 7:20 AM HISTORY/REASON FOR EXAM:  IVC filter Evaluate for thrombus TECHNIQUE/EXAM DESCRIPTION: Limited abdominal ultrasound. COMPARISON: None available. FINDINGS: Only in the hepatic segment of the IVC is identified. Remainder the IVC could not be delineated due to technical difficulty. Location and IVC filter not delineated.     IVC incompletely evaluated. Location of IVC filter not delineated.    Us-abdomen Limited    Result Date: 12/11/2017 12/11/2017 3:15 PM HISTORY/REASON FOR EXAM:  IVC filter. Evaluate for thrombus. TECHNIQUE/EXAM DESCRIPTION: Limited abdominal ultrasound. COMPARISON: None available. FINDINGS: No thrombus is seen in the visualized IVC. The known IVC filter is not visualized.     No thrombus identified in the visualized IVC. However, the IVC is not evaluated in its entirety, limiting evaluation. Further evaluation can be performed with contrast-enhanced CT.    Us-gallbladder    Result Date: 12/13/2017 12/13/2017 6:28 AM HISTORY/REASON FOR EXAM:  Abdominal distension TECHNIQUE/EXAM DESCRIPTION AND NUMBER OF VIEWS:  Real-time sonography of the gallbladder. COMPARISON: CT yesterday, ultrasound December 11 FINDINGS: The liver is normal in contour. There is no evidence of solid mass lesion. The liver measures 18.87 cm. There are 20 mm right, 12 and 8 mm cysts on the left. Additional smaller cysts are seen Mobile small gallstones are present. The gallbladder wall thickness measures 0.23 cm There is no pericholecystic fluid. The common duct measures 0.51 cm. The visualized pancreas is  unremarkable. The visualized aorta is normal in caliber. Intrahepatic IVC is patent. The portal vein is patent with hepatopetal flow. The right kidney measures 20.07 cm. There is similar moderate hydronephrosis. There is a 10 cm upper pole exophytic cyst. Calculus is not seen Small perihepatic fluid Small right pleural effusion     Cholelithiasis Dilated gallbladder without a positive sonographic Montoya's sign or biliary dilatation Stable moderate right hydronephrosis 10 cm right renal cyst Small right pleural effusion and small perihepatic fluid Mild hepatomegaly    Us-renal    Result Date: 12/28/2017 12/28/2017 10:44 AM HISTORY/REASON FOR EXAM:  Follow-up bilateral hydronephrosis TECHNIQUE/EXAM DESCRIPTION: Renal ultrasound. COMPARISON:  12/13/2017 FINDINGS: The right kidney measures 12.33 cm.  There are right renal cysts measuring up to 10.5 x 8.7 x 9.1 cm. A smaller cyst is seen in the lower pole of the right kidney. An echogenic focus measuring 1.25 x 0.73 x 1 cm likely represents a nonobstructing calculus. The left kidney measures 13.97 cm. Hypoechoic left renal lesions likely represent cysts measuring up to 4.2 x 3.8 x 3.2 cm. There is no hydronephrosis. The bladder is decompressed around a Pacheco catheter. Prostate measures 4.9 x 4 x 5.7 cm and is heterogeneous.     No evidence of hydronephrosis. Bilateral renal cysts. Enlarged and heterogeneous prostate gland. Echogenic 1.25 cm right renal focus likely represents a nonobstructing calculus.    Us-renal    Result Date: 12/10/2017  12/10/2017 1:22 PM HISTORY/REASON FOR EXAM: Hydronephrosis, VA transfer TECHNIQUE/EXAM DESCRIPTION: Renal ultrasound. COMPARISON:  None FINDINGS: The right kidney measures 12.92 cm.  There is moderate right hydronephrosis The left kidney measures 14.13 cm. There is mild left hydronephrosis There are bilateral simple renal cysts, largest measuring 10 cm in the right upper pole. Largest on the left measures 4 cm, also in the upper  pole. There is a 9 mm posterior shadowing echogenic focus in the right lower pole compatible with a calculus The bladder is partially decompressed. A Pacheco catheter is present and there are moderate internal echoes seen in the bladder. There may be a 5 mm calculus.     Moderate right, mild left hydronephrosis 9 mm left nonobstructive nephrolith Echogenic bladder material is compatible with hemorrhage or infectious exudate.    Dx-cysto Fluoro > 1 Hour    Result Date: 12/11/2017  12/10/2017 8:00 PM HISTORY/REASON FOR EXAM:  Transurethral biopsy, Main OR TECHNIQUE/EXAM DESCRIPTION AND NUMBER OF VIEWS: Portable cystoscopy for greater than one hour FINDINGS:      The portable cystoscopy unit was obligated to the procedure for greater than one hour.   Actual fluoro time was 1.4 minutes.     Portable cystoscopy utilized for 1.4 minutes.    Us-extremity Non Vascular Unilateral Left    Result Date: 12/11/2017 12/11/2017 3:30 PM HISTORY/REASON FOR EXAM:  Bruising LEFT posterior thigh, however hematoma TECHNIQUE/EXAM DESCRIPTION AND NUMBER OF VIEWS: Sonographic evaluation of the posterior LEFT side. COMPARISON: None FINDINGS: Sonographic images obtained at the site of discoloration in the posterior LEFT thigh.  No focal fluid question demonstrated.     No fluid collection demonstrated within the posterior LEFT thigh at site of discoloration to indicate hematoma.    Le Venous Duplex (dvt)    Result Date: 12/14/2017   Vascular Laboratory  CONCLUSIONS  Acute on chronic deep and superficial venous thrombophlebitis bilateral  lower extremities as outlined under findings.  OLIVA ASIF  Exam Date:     12/13/2017 08:48  Room #:     Inpatient  Priority:     Routine  Ht (in):             Wt (lb):  Ordering Physician:        AMPARO ESCOBAR  Referring Physician:       [id number]  Sonographer:               Nena Carrington RVT  Study Type:                Complete Bilateral  Technical Quality:          Adequate  Age:    67    Gender:     M  MRN:    [id number]  :    1950      BSA:  Indications:     Swelling of Limb  CPT Codes:       50199  ICD Codes:       729.81  History:         Right lower extremity pain, History of bilateral lower                   extremity DVT.  Limitations:     Unable to demonstrate distal augments due to patient pain.  PROCEDURES:  Bilateral lower extremity venous duplex imaging.  The following venous structures were evaluated: common femoral, profunda  femoral, greater saphenous, femoral, popliteal , peroneal and posterior  tibial veins.  Serial compression, augmentation maneuvers,  color and spectral Doppler  flow evaluations were performed.  FINDINGS:  Right lower extremity -  Bifid system of the superficial femoral vein.  Acute on chronic thrombus is visualized within the distal femoral and  popliteal veins, becoming occlusive at the popliteal vein.  Extensive chronic thrombus is visualized throughout the right lower  extremity, including the femoral, profunda femoral, and popliteal veins.  The peroneal and posterior tibial veins are difficult to assess for  compressibility, limited flow response to augmentation is demonstrated.  Cannot rule out the presence of DVT.  Left lower extremity -  Extensive chronic thrombus is visualized throughout the left lower  extremity, including the femoral, profunda femoral, and popliteal veins.  The peroneal and posterior tibial veins are difficult to assess for  compressibility, limited flow response to augmentation is demonstrated.  Cannot rule out the presence of DVT.  Echolucent material fills the greater saphenous vein at the knee and mid  calf that has the appearance of acute venous thrombosis.  Jorge Whitney M.D.  (Electronically Signed)  Final Date:      2017                   14:50    Ct-cta Chest Pulmonary Artery W/ Recons    Result Date: 2017 9:39 AM HISTORY/REASON FOR EXAM:  Shortness of breath  TECHNIQUE/EXAM DESCRIPTION: CT angiogram scan for pulmonary embolism with contrast, with reconstructions. 1.25 mm helical sections were obtained from the lung apices through the lung bases following the rapid bolus administration of 75 mL of Omnipaque 350 nonionic contrast. Thin-section overlapping reconstruction interval was utilized. Coronal reconstructions were generated from the axial data. MIP post processing was performed and utilized for the interpretation. Low dose optimization technique was utilized for this CT exam including automated exposure control and adjustment of the mA and/or kV according to patient size. COMPARISON: None FINDINGS: Study is limited by motion artifact. Pulmonary Embolism: No. Lungs: There is linear scarring of both apices. There is a small triangular-shaped ground glass opacities in the anterior left upper lobe measuring 10 mm in diameter. There is mild diffuse central bronchial wall thickening. There is bibasal atelectasis. There are blebs in the medial lung bases bilaterally.. Pleura: There is a trace amount of dependent bilateral pleural effusion. Nodes: No enlarged lymph nodes. Additional findings: There is no pericardial effusion. There is a nonspecific 16 mm hypodense lesion in the superior right lobe of the liver which is most likely a cyst. There are other smaller hypodense lesions which are too small to characterize. There  is partial visualization of a superior pole right renal cyst. There is no acute bony process.     1.  There is no CT evidence of acute central or segmental pulmonary embolism. Exam is slightly limited by motion artifact. 2.  There is diffuse central bronchial wall thickening possibly related underlying bronchitis. 3.  There is dependent atelectasis. 4.  There is a small groundglass opacity in the anterior left upper lobe which is probably an area of pneumonitis. 5.  There are lower lobe blebs. 6.  There are trace amounts of dependent pleural fluid with  bibasilar atelectasis.     Ir-nephrostogram W/ New Tube Placement (all Radiology) Right    Result Date: 12/13/2017 12/13/2017 11:13 AM HISTORY/REASON FOR EXAM:  Patient renal functions are worsening and he has bilateral hydronephrosis. Creatinine 6.5. TECHNIQUE/EXAM DESCRIPTION: 1. LEFT Percutaneous Nephrostomy and Nephrostogram with ultrasound and fluoroscopic guidance. 2. RIGHT percutaneous nephrostomy and nephrostogram with ultrasound and fluoroscopic guidance. PROCEDURE: Informed consent was obtained. The skin was prepped and draped in a sterile fashion using chlorhexidine antiseptic. Moderate sedation with Fentanyl and Versed was administered during the procedure with appropriate continuous patient monitoring by the radiology nurse. SEDATION DURATION: 45 minutes CURRENT ALARA PRINCIPLES FOR DOSE REDUCTION TECHNIQUES WERE UTILIZED FOR THIS EXAMINATION. FLUOROSCOPY TIME: 3.9 minutes NUMBER OF FILMS: 6 fluoroscopic frame capture images and/or digital series obtained. CONTRAST AMOUNT: 20 mL Omnipaque The LEFT kidney was localized with real time ultrasound. Following local anesthesia with 1% lidocaine, an 18 G needle with a virginia tip stylet was advanced into the renal collecting system under real time ultrasound guidance. An angled glide wire was advanced into the renal pelvis and following serial tract dilatation, a 10 Faroese locking loop pigtail nephrostomy catheter was placed and reformed in the renal pelvis. The locking loop was secured and a nephrostogram obtained documenting satisfactory catheter position with all side holes within the collecting system. The catheter was secured to the skin and connected to a gravity drainage bag. The right kidney was localized with real time ultrasound. Following local anesthesia with 1% lidocaine, an 18 G needle with a virginia tip stylet was advanced into the renal collecting system under real time ultrasound guidance. An angled glide wire was advanced into the renal  pelvis and following serial tract dilatation, a 10 Portuguese locking loop pigtail nephrostomy catheter was placed and reformed in the renal pelvis. The locking loop was secured and a nephrostogram obtained documenting satisfactory catheter position with all side holes within the collecting system. The catheter was secured to the skin and connected to a gravity drainage bag. The patient tolerated the procedure well with no evidence of complication. COMPARISON:  CT of the abdomen and pelvis, renal colic CT 12/12/2017 FINDINGS:  There is moderate bilateral hydronephrosis seen on preliminary and localizing ultrasound. The nephrostograms show the catheters to be in satisfactory position. There is no extravasation.     1. ULTRASOUND AND FLUOROSCOPIC GUIDED PLACEMENT OF A LEFT 10 Bengali  PIGTAIL LOCKING LOOP PERCUTANEOUS NEPHROSTOMY CATHETER. 2. ULTRASOUND AND FLUOROSCOPIC GUIDED PLACEMENT OF A RIGHT 10 Bengali  PIGTAIL LOCKING LOOP PERCUTANEOUS NEPHROSTOMY CATHETER. 2. NEPHROSTOGRAMS SHOWING SATISFACTORY CATHETER POSITION WITHOUT EXTRAVASATION.    Dx-abdomen For Tube Placement    Result Date: 12/12/2017 12/12/2017 8:30 PM HISTORY/REASON FOR EXAM:  Nasogastric tube placement TECHNIQUE/EXAM DESCRIPTION:  Single AP view the abdomen. COMPARISON:  None. FINDINGS: Limited views of the lung bases are clear. Nasogastric tube is seen, the tip lies to the left of the lumbar spine.  Air-filled distended loops of small bowel are seen throughout the abdomen. Inferior vena cava filter is noted overlying the right third lumbar vertebrae. The bony structures appear age-appropriate.     1.  Air-filled distended small bowel, consider ileus or evolving obstructive change. 2.  Nasogastric tube tip terminates overlying the expected location of the gastric body with side-port near the gastroesophageal junction, could be advanced 4-8 cm.    Dx-abdomen For Tube Placement    Result Date: 12/12/2017 12/12/2017 7:36 PM HISTORY/REASON FOR EXAM:  Line  evaluation. TECHNIQUE/EXAM DESCRIPTION AND NUMBER OF VIEWS:  1 view(s) of the abdomen. COMPARISON:  None. FINDINGS: The enteric tube is looped in the proximal esophagus.     The enteric tube is looped in the proximal esophagus.      LABORATORY  Lab Results   Component Value Date/Time    SODIUM 136 01/03/2018 02:30 AM    POTASSIUM 3.5 (L) 01/03/2018 02:30 AM    CHLORIDE 103 01/03/2018 02:30 AM    CO2 26 01/03/2018 02:30 AM    GLUCOSE 177 (H) 01/03/2018 02:30 AM    BUN 10 01/03/2018 02:30 AM    CREATININE 0.42 (L) 01/03/2018 02:30 AM        Lab Results   Component Value Date/Time    WBC 4.1 (L) 01/03/2018 02:30 AM    HEMOGLOBIN 9.2 (L) 01/03/2018 02:30 AM    HEMATOCRIT 29.4 (L) 01/03/2018 02:30 AM    PLATELETCT 170 01/03/2018 02:30 AM        Total time of the discharge process exceeds 38 minutes

## 2018-01-03 NOTE — PROGRESS NOTES
Assumed care at 0700. Received report from night shift RN. Bedside report completed. AOx4.    Wound vac to Left posterior thigh, small leak noted-wound care notified prior to change  Pre-medicated prior to wound vac change.  Denies nausea.  Tolerating diet.   Kaur in place from previous stay and procedure-Paged MD to get order to change kaur per policy.   B/L nephrostomy tubes in place draining clear yellow  +BM  IVF infusing.   Ambulating with steady gait. Pt call light and belongings within reach, fall precautions in place.

## 2018-01-03 NOTE — FACE TO FACE
Face to Face Supporting Documentation - Home Health    The encounter with this patient was in whole or in part the primary reason for home health admission.    Date of encounter:   Patient:                    MRN:                       YOB: 2016  Niels Lima  [id number]  1950     Home health to see patient for:  Skilled Nursing care for assessment, interventions & education  OT pT  Skilled need for:  Surgical Aftercare wound care    Skilled nursing interventions to include:  Wound Care    Homebound status evidenced by:  Needs the assistance of another person in order to leave the home. Leaving home requires a considerable and taxing effort. There is a normal inability to leave the home.    Community Physician to provide follow up care: Pcp Not In Computer     Optional Interventions? No      I certify the face to face encounter for this home health care referral meets the CMS requirements and the encounter/clinical assessment with the patient was, in whole, or in part, for the medical condition(s) listed above, which is the primary reason for home health care. Based on my clinical findings: the service(s) are medically necessary, support the need for home health care, and the homebound criteria are met.  I certify that this patient has had a face to face encounter by myself.  Ze Navarro M.D. - NPI: [id number]

## 2018-01-03 NOTE — PROGRESS NOTES
Report received from ED RN.  Pt up with transport via gurney.  Pt ambulated to bed, unsteady, SBA.  Wound vac to posterior L thigh, pta.  B/l nephrostomy tubes with collection devices attached, draining dark brown fluid, pta.  Pacheco in place, pta.  IVF to PIV, patent.  RA, satting >90%, denies SOB.  Denies nausea, +normoactive BSx4, no BM this shift, +flatus.  Diabetic diet, tolerates fine.  ACHS, coverage needed.  Oriented to unit.  Call light within reach, calls appropriately.  Bed in low and locked position.

## 2018-01-03 NOTE — ASSESSMENT & PLAN NOTE
He has had multiple recurrent episodes of DVT and PE. He has previously been on Coumadin however due to a spontaneous left thigh hematoma Coumadin was discontinued. He was resumed on an alternative anticoagulation about one week ago with Pradaxa  -Continue Pradaxa, monitor Hgb

## 2018-01-03 NOTE — ED NOTES
Rounded with pt in lobby at pt request, pt is upset regarding wait times and thinks he was not triaged appropriately. Pt states he was discharged with a wound vac and without a referral to wound care. Pt states he has bilateral nephrostomy tubes, a kaur, and a wound vac and should be seen soon. Apologized for wait times and tried to impress that we are doing our very best to get him back.

## 2018-01-03 NOTE — DISCHARGE PLANNING
Received HH choice from Antonio) at 1249.  Home Health referral sent to [facility] at 1316.

## 2018-01-04 NOTE — PROGRESS NOTES
[facility] Hospitalist Progress Note    Date of Service: 1/3/2018    Chief Complaint  67 y.o. male admitted 2018 with Wound Re-Check        Interval Problem Update  He wants to go home. Dressing changes clean now no leak. He is awaiting HHC and wound vac    Consultants/Specialty      Disposition  HHC with wound vac        Review of Systems   Unable to perform ROS: Other   Not reliable   Physical Exam  Laboratory/Imaging   Hemodynamics  Temp (24hrs), Av.9 °C (98.4 °F), Min:36.7 °C (98.1 °F), Max:37.2 °C (99 °F)   Temperature: 36.7 °C (98.1 °F)  Pulse  Av.1  Min: 72  Max: 111    Blood Pressure : 137/76, NIBP: 136/68      Respiratory      Respiration: 18, Pulse Oximetry: 97 %             Fluids    Intake/Output Summary (Last 24 hours) at 18 1739  Last data filed at 18 1600   Gross per 24 hour   Intake              800 ml   Output             3000 ml   Net            -2200 ml       Nutrition  Orders Placed This Encounter   Procedures   • Diet Order     Standing Status:   Standing     Number of Occurrences:   1     Order Specific Question:   Diet:     Answer:   Diabetic [3]     Physical Exam   Constitutional: He appears well-developed and well-nourished.   HENT:   Head: Normocephalic and atraumatic.   Eyes: Conjunctivae and EOM are normal. No scleral icterus.   Neck: Normal range of motion. Neck supple.   Cardiovascular: Normal rate and regular rhythm.  Exam reveals no gallop and no friction rub.    No murmur heard.  Pulmonary/Chest: Effort normal and breath sounds normal. No respiratory distress. He has no wheezes. He has no rales.   Abdominal: Soft. Bowel sounds are normal. He exhibits no distension. There is no tenderness. There is no rebound and no guarding.   Genitourinary:   Genitourinary Comments: Bilateral nephrostomy tubes and bags with clear yellow urine   Musculoskeletal: He exhibits no tenderness. Edema: mild.   Wound vac in place. L posterior thigh dressing CDI, no leakage    Neurological: He is alert.   Skin: Skin is warm.   Psychiatric: He has a normal mood and affect. His behavior is normal.       Recent Labs      01/02/18 1939 01/03/18   0230   WBC  5.8  4.1*   RBC  3.33*  3.04*   HEMOGLOBIN  10.3*  9.2*   HEMATOCRIT  32.4*  29.4*   MCV  97.3  96.7   MCH  30.9  30.3   MCHC  31.8*  31.3*   RDW  56.6*  56.1*   PLATELETCT  205  170   MPV  10.1  9.9     Recent Labs      01/02/18 1939 01/03/18   0230   SODIUM  140  136   POTASSIUM  3.8  3.5*   CHLORIDE  102  103   CO2  30  26   GLUCOSE  207*  177*   BUN  12  10   CREATININE  0.55  0.42*   CALCIUM  9.1  8.2*                      Assessment/Plan     * Open wound of left thigh- (present on admission)   Assessment & Plan    This was related to a spontaneous hematoma of the left thigh that occurred while on Coumadin, and his dose becoming supratherapeutic.  -He was discharged to [facility] and has had a wound VAC. He completed antibiotic therapy on Friday 12/29.  -He did not get proper wound care at [facility], per patient  -He was discharged form [facility]  (per his verbal report, collateral information indicates he may have discharged himself against medical advice and took [facility]'s wound vac with him  -He was not set up with outpatient wound care.  -His wound VAC has been leaking, hence he came here o nonservation  -Inpatient wound care ordered  He is now set up for Home Health Care through the [facility] and wound vac supplies ordered  At discharge date, he is afenbrile hemodynamically and wants to gohome with [facility].  [facility] or discharge clinic physician in 1 week  Follow up to Dr. 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 Upton, he had bilateral nephrostomy tubes placed.  Follow up with Dr. Upton in 1 week or as scheduled.        DM type 2 (diabetes  mellitus, type 2) (CMS-[facility])- (present on admission)   Assessment & Plan    Sliding scale  Stable.  Follow up with Pcp Not In Computer in 1 weel. Advised getting a blood sugar log for him to review.        Nephrostomy status (CMS-HCC)- (present on admission)   Assessment & Plan    Due to history of latter rupture  -Continue nephrostomy tubes  -Monitor for hematuria  He follows Dr. Upton and has called for appointment. Plan was to have possibly stents placed and nephrostomy tube discontinued  Follow up with Dr. Upton in 1 week or as scheduled.        Recurrent pulmonary embolism (CMS-HCC)- (present on admission)   Assessment & Plan    He has had multiple recurrent episodes of DVT and PE. He has previously been on Coumadin however due to a spontaneous left thigh hematoma Coumadin was discontinued. He was resumed on an alternative anticoagulation about one week ago with Pradaxa  -Continue Pradaxa, monitor Hgb        Crohn disease (CMS-HCC)- (present on admission)   Assessment & Plan    Continue mercaptopurine          Quality-Core Measures   On Pradaxa

## 2018-01-04 NOTE — WOUND TEAM
[facility]  Inpatient Services  Initial Wound and Skin Care Evaluation    Admission Date:   1/2/18  HPI, PMH, SH: Reviewed  Unit where seen by Wound Team:   T4    WOUND CONSULT RELATED TO:   md request to continue npwt drsg changes per protocol    SUBJECTIVE:  Pleasant/agreeable    Self Report / Pain Level:  terell okay with pre-med    OBJECTIVE:   Prev npwt drsg remained intact    WOUND TYPE, LOCATION, CHARACTERISTICS (Pressure ulcers: location, stage, POA or date identified)    Location and type of wound; Full thickness surgical wnd left posterior thigh POA        Periwound:      intact  Drainage:      Min serosanguinous  Tissue Type and %:     95% pink/red -- 5% tan/yellow  Wound Edges:     Mostly attached  Odor:       none  Exposed structure(s):   muscle  S&S of Infection:    none      Measurements: (cm)   Taken 1/3/18  Length:      17.0  Width:       9.0  Depth:      1.0  Tract:     2.5cm @ 3:00       INTERVENTIONS BY WOUND TEAM:  Prev drsg removed -- wnd irrigated with wnd cleanser -- measurements and photograph done -- benzoin and drape prasanna-wnd -- adaptic over the wnd bed -- 1 piece black foam to cover the wnd  -- sealed with drape and cont negative pressure applied at 125mmhg    Interdisciplinary consultation:  Nsg; pt    EVALUATION: clean surgical wnd that has continued to improve with npwt    Factors affecting wound healing:   Dm -- compliance?    Goals:  Decrease wnd size X 5% every 2 weeks    NURSING PLAN OF CARE ORDERS (x):    Dressing changes: See Dressing Maintenance orders: x  Skin care: See Skin Care orders: x  Rectal tube care: See Rectal Tube Care orders:   Other orders:    RSKIN: CURRENT (X) ORDERED (O)  Q shift [place]:  X  Q shift pressure point assessments:  X  Pressure redistribution mattress        YAJAIRA      Bariatric YAJAIRA      Bariatric foam        Heel float boots       Heels floated on pillows    x  Barrier wipes      Barrier Cream      Barrier paste      Sacral silicone  dressing      Silicone O2 tubing      Anchorfast      Trach with Optifoam split foam       Waffle cushion      Rectal tube or BMS      Antifungal tx    Turn q 2 hours   x  Up to chair     Ambulate   PT/OT     Dietician      PO     TF   TPN     PVN    NPO   # days   Other       WOUND TEAM PLAN OF CARE (x):   NPWT change 3 x week:      x  Dressing changes by wound team:       Follow up as needed:     x  Other (explain):    Anticipated discharge plans (x):  SNF:           Home Care:       x    Outpatient Wound Center:            Self Care:            Other:

## 2018-01-04 NOTE — PROGRESS NOTES
Discharging Patient home per physician order.  Discharged with Family.  Demonstrated understanding of discharge instructions, follow up appointments, home medications, prescriptions, home care for wound and nursing care instructions for wound vac.  Ambulating without assistance, Pacheco and nephrostomy tubes in place, no c/o pain, tolerating oral medications, oxygen saturation greater than 90%, tolerating diet.  Educational handouts given and discussed.  Verbalized understanding of discharge instructions and educational handouts.  All questions answered.  Belongings with patient at time of discharge.

## 2018-01-04 NOTE — DISCHARGE INSTRUCTIONS
Discharge Instructions    Discharged to home by car with relative. Discharged via wheelchair, hospital escort: Yes.  Special equipment needed: Wound VAC    Be sure to schedule a follow-up appointment with your primary care doctor or any specialists as instructed.     Discharge Plan:   Diet Plan: Discussed  Activity Level: Discussed  Confirmed Follow up Appointment: Patient to Call and Schedule Appointment  Confirmed Symptoms Management: Discussed  Medication Reconciliation Updated: Yes  Influenza Vaccine Indication: Not indicated: Previously immunized this influenza season and > 8 years of age    I understand that a diet low in cholesterol, fat, and sodium is recommended for good health. Unless I have been given specific instructions below for another diet, I accept this instruction as my diet prescription.   Other diet: Diabetic diet as tolerated    Special Instructions:   Monitor for signs and symptoms of infection (fever, chills, nausea, vomiting)  Monitor incisions for swelling, redness, or drainage    Vacuum-Assisted Closure Therapy Home Guide  Vacuum-assisted closure therapy (VAC therapy) is a device that helps wounds heal. It is used on wounds that cannot be closed with stitches. They often heal slowly. VAC therapy helps the wound stay clean and healthy while its edges slowly grow back together.  VAC therapy uses a bandage (dressing) that is made of foam. It is put inside the wound. Then, a drape is placed over the wound. This drape sticks to your skin (adhesive) to keep air out. A tube is hooked up to a small pump and is attached to the drape. The pump sucks fluid and germs from the wound. It can also decrease any bad smell that comes from the wound.  RISKS AND COMPLICATIONS  VAC therapy is usually safe to use at home. Your skin may get sore from the adhesive drape. That is the most common problem. However, more serious problems can develop, such as:   · Bleeding. This can happen if the dressing in the wound  comes into contact with blood vessels. A little bleeding may occur when the dressing is being changed. This is normal now and then. Major bleeding can happen if a large blood vessel breaks. This is more likely if you are taking blood-thinning medicine. Emergency surgery may be needed.  · Infection. This can happen if the dressing has an air leak that is not repaired within a couple of hours.  · Dehydration. This can happen if the pump sucks out too much body fluid.  DRESSING CHANGES  Your dressing will have to be changed. Sometimes this is needed once a day. Other times, a dressing change must be done 3 times a week. How often you change your dressing will depend on what your wound is like. A trained caregiver will most likely change the dressing. However, a family member or friend may be trained to change the dressing. Below are steps to change a dressing in order to prevent an infection. The steps apply to you or the person that changes your dressing.  · Wash your hands with soap and water before and after each dressing change.  · Wear gloves and protective clothing. This may include eye protection.  · Do not allow anyone to change your dressing if they have an infection or a skin condition. Even a small cut can be a problem.  To change the dressing:   · Turn off the pump.  · Take off the adhesive drape.  · Disconnect the tube from the dressing.  · Take out the dressing that is inside the wound. If the dressing sticks, use a germ-free (sterile), saltwater solution to wet the dressing. This helps it come out more easily. If it hurts when the dressing is changed, take pain medicine 30 minutes before the dressing change.  · Cleanse the wound with normal saline or sterile water.  · Apply a skin barrier film to the skin that will be covered with the drape. This will protect the skin.  · Put a new dressing into the wound.  · Apply a new drape and tube.  · Replace the container in the pump that collects fluid if it is  full. Do this at least once per week.  · Turn the pump back on.  · Your doctor will decide what setting of suction is best. Do not change the settings on the machine without talking to your nurse or doctor.  HOME CARE INSTRUCTIONS   · The VAC pump has an alarm. It goes off if there are any problems such as a leak.  ¨ Ask your caregiver what to do if the alarm goes off.  ¨ Call your caregiver right away if the alarm goes off and you cannot fix the problem.  · Do not turn off the pump for more than 2 hours.  · Check your wound carefully at each dressing change for signs of infection. Watch for redness, swelling, or any fluid leaking from the wound. If you develop an infection:  ¨ You may have to stop VAC therapy.  ¨ The wound will need to be cleaned and washed out.  ¨ You will have to take antibiotic medicine.  · Ask your caregiver what activities you should or should not do while you are getting VAC therapy. This will depend on your particular wound.  · Ask if it is okay to turn off the pump so you can take a shower. If it is okay, make sure the wound is covered with plastic. The wound area must stay dry.  · Drink enough fluids to keep your urine clear or pale yellow.  · Eat foods that contain a lot of protein. Examples are meat, poultry, seafood, eggs, nuts, beans, and peas. Protein can help your wound heal.  SEEK MEDICAL CARE IF:  · Your wound itches or hurts.  · Dressing changes are often painful or bleeding often occurs.  · You have a headache.  · You have diarrhea.  · You have a sore throat.  · You have a rash.  · You feel nauseous.  · You feel dizzy or weak.  SEEK IMMEDIATE MEDICAL CARE IF:   · You have very bad pain.  · You have bleeding that will not stop.  · Your wound smells bad.  · You have redness, swelling, or fluid leaking from your wound.  · Your alarm goes off and you do not know what to do.  · You have a fever.     This information is not intended to replace advice given to you by your health care  provider. Make sure you discuss any questions you have with your health care provider.     Document Released: 03/11/2013 Document Reviewed: 03/11/2013  [facility] Interactive Patient Education ©2016 [employer] [facility] [employer]      Pacheco Catheter Care, Adult  A Pacheco catheter is a soft, flexible tube that is placed into the bladder to drain urine. A Pacheco catheter may be inserted if:  · You leak urine or are not able to control when you urinate (urinary incontinence).  · You are not able to urinate when you need to (urinary retention).  · You had prostate surgery or surgery on the genitals.  · You have certain medical conditions, such as multiple sclerosis, dementia, or a spinal cord injury.  If you are going home with a Pacheco catheter in place, follow the instructions below.  TAKING CARE OF THE CATHETER  1. Wash your hands with soap and water.  2. Using mild soap and warm water on a clean washcloth:  ¨ Clean the area on your body closest to the catheter insertion site using a circular motion, moving away from the catheter. Never wipe toward the catheter because this could sweep bacteria up into the urethra and cause infection.  ¨ Remove all traces of soap. Pat the area dry with a clean towel. For males, reposition the foreskin.  3. Attach the catheter to your leg so there is no tension on the catheter. Use adhesive tape or a leg strap. If you are using adhesive tape, remove any sticky residue left behind by the previous tape you used.  4. Keep the drainage bag below the level of the bladder, but keep it off the floor.  5. Check throughout the day to be sure the catheter is working and urine is draining freely. Make sure the tubing does not become kinked.  6. Do not pull on the catheter or try to remove it. Pulling could damage internal tissues.  TAKING CARE OF THE DRAINAGE BAGS  You will be given two drainage bags to take home. One is a large overnight drainage bag, and the other is a smaller leg bag that fits underneath clothing.  You may wear the overnight bag at any time, but you should never wear the smaller leg bag at night. Follow the instructions below for how to empty, change, and clean your drainage bags.  Emptying the Drainage Bag  You must empty your drainage bag when it is  -½ full or at least 2-3 times a day.  1. Wash your hands with soap and water.  2. Keep the drainage bag below your hips, below the level of your bladder. This stops urine from going back into the tubing and into your bladder.  3. Hold the dirty bag over the toilet or a clean container.  4. Open the pour spout at the bottom of the bag and empty the urine into the toilet or container. Do not let the pour spout touch the toilet, container, or any other surface. Doing so can place bacteria on the bag, which can cause an infection.  5. Clean the pour spout with a gauze pad or cotton ball that has rubbing alcohol on it.  6. Close the pour spout.  7. Attach the bag to your leg with adhesive tape or a leg strap.  8. Wash your hands well.  Changing the Drainage Bag  Change your drainage bag once a month or sooner if it starts to smell bad or look dirty. Below are steps to follow when changing the drainage bag.  1. Wash your hands with soap and water.  2. Pinch off the rubber catheter so that urine does not spill out.  3. Disconnect the catheter tube from the drainage tube at the connection valve. Do not let the tubes touch any surface.  4. Clean the end of the catheter tube with an alcohol wipe. Use a different alcohol wipe to clean the end of the drainage tube.  5. Connect the catheter tube to the drainage tube of the clean drainage bag.  6. Attach the new bag to the leg with adhesive tape or a leg strap. Avoid attaching the new bag too tightly.  7. Wash your hands well.  Cleaning the Drainage Bag  1. Wash your hands with soap and water.  2. Wash the bag in warm, soapy water.  3. Rinse the bag thoroughly with warm water.  4. Fill the bag with a solution of white vinegar  and water (1 cup vinegar to 1 qt warm water [.2 L vinegar to 1 L warm water]). Close the bag and soak it for 30 minutes in the solution.  5. Rinse the bag with warm water.  6. Hang the bag to dry with the pour spout open and hanging downward.  7. Store the clean bag (once it is dry) in a clean plastic bag.  8. Wash your hands well.  PREVENTING INFECTION  · Wash your hands before and after handling your catheter.  · Take showers daily and wash the area where the catheter enters your body. Do not take baths. Replace wet leg straps with dry ones, if this applies.  · Do not use powders, sprays, or lotions on the genital area. Only use creams, lotions, or ointments as directed by your caregiver.  · For females, wipe from front to back after each bowel movement.  · Drink enough fluids to keep your urine clear or pale yellow unless you have a fluid restriction.  · Do not let the drainage bag or tubing touch or lie on the floor.  · Wear cotton underwear to absorb moisture and to keep your .  SEEK MEDICAL CARE IF:   · Your urine is cloudy or smells unusually bad.  · Your catheter becomes clogged.  · You are not draining urine into the bag or your bladder feels full.  · Your catheter starts to leak.  SEEK IMMEDIATE MEDICAL CARE IF:   · You have pain, swelling, redness, or pus where the catheter enters the body.  · You have pain in the abdomen, legs, lower back, or bladder.  · You have a fever.  · You see blood fill the catheter, or your urine is pink or red.  · You have nausea, vomiting, or chills.  · Your catheter gets pulled out.  MAKE SURE YOU:   · Understand these instructions.  · Will watch your condition.  · Will get help right away if you are not doing well or get worse.     This information is not intended to replace advice given to you by your health care provider. Make sure you discuss any questions you have with your health care provider.     Document Released: 12/18/2006 Document Revised: 05/04/2015  Document Reviewed: 12/09/2013  [employer] Interactive Patient Education ©2016 [employer]      Depression / Suicide Risk    As you are discharged from this [facility], it is important to learn how to keep safe from harming yourself.    Recognize the warning signs:  · Abrupt changes in personality, positive or negative- including increase in energy   · Giving away possessions  · Change in eating patterns- significant weight changes-  positive or negative  · Change in sleeping patterns- unable to sleep or sleeping all the time   · Unwillingness or inability to communicate  · Depression  · Unusual sadness, discouragement and loneliness  · Talk of wanting to die  · Neglect of personal appearance   · Rebelliousness- reckless behavior  · Withdrawal from people/activities they love  · Confusion- inability to concentrate     If you or a loved one observes any of these behaviors or has concerns about self-harm, here's what you can do:  · Talk about it- your feelings and reasons for harming yourself  · Remove any means that you might use to hurt yourself (examples: pills, rope, extension cords, firearm)  · Get professional help from the community (Mental Health, Substance Abuse, psychological counseling)  · Do not be alone:Call your Safe Contact- someone whom you trust who will be there for you.  · Call your local CRISIS HOTLINE 679-5622 or 222-551-0011  · Call your local Children's Mobile Crisis Response Team [place] (746) 696-5953 or www.[facility]  · Call the toll free National Suicide Prevention Hotlines   · National Suicide Prevention Lifeline 037-921-AXJB (8402)  · National Hope Line Network 800-SUICIDE (663-4462)

## 2018-01-04 NOTE — PROGRESS NOTES
Spoke to TIARA pineda.  Will be delivering wound vac soon.  Informed patient he will be discharging tonight when vac is delivered.

## 2018-01-11 ENCOUNTER — HOSPITAL ENCOUNTER (OUTPATIENT)
Dept: RADIOLOGY | Facility: MEDICAL CENTER | Age: 68
End: 2018-01-11

## 2018-01-17 ENCOUNTER — HOSPITAL ENCOUNTER (OUTPATIENT)
Facility: MEDICAL CENTER | Age: 68
DRG: 862 | End: 2018-01-17
Attending: UROLOGY | Admitting: UROLOGY
Payer: MEDICARE

## 2018-01-25 ENCOUNTER — APPOINTMENT (OUTPATIENT)
Dept: ADMISSIONS | Facility: MEDICAL CENTER | Age: 68
DRG: 862 | End: 2018-01-25
Attending: UROLOGY
Payer: MEDICARE

## 2018-01-25 VITALS — BODY MASS INDEX: 22.67 KG/M2 | HEIGHT: 73 IN | WEIGHT: 171.08 LBS

## 2018-01-25 DIAGNOSIS — Z01.812 PRE-OPERATIVE LABORATORY EXAMINATION: ICD-10-CM

## 2018-01-25 LAB
BASOPHILS # BLD AUTO: 0.6 % (ref 0–1.8)
BASOPHILS # BLD: 0.02 K/UL (ref 0–0.12)
EOSINOPHIL # BLD AUTO: 0.16 K/UL (ref 0–0.51)
EOSINOPHIL NFR BLD: 5 % (ref 0–6.9)
ERYTHROCYTE [DISTWIDTH] IN BLOOD BY AUTOMATED COUNT: 61.5 FL (ref 35.9–50)
HCT VFR BLD AUTO: 34.9 % (ref 42–52)
HGB BLD-MCNC: 10.5 G/DL (ref 14–18)
IMM GRANULOCYTES # BLD AUTO: 0.02 K/UL (ref 0–0.11)
IMM GRANULOCYTES NFR BLD AUTO: 0.6 % (ref 0–0.9)
INR PPP: 1.11 (ref 0.87–1.13)
LYMPHOCYTES # BLD AUTO: 0.56 K/UL (ref 1–4.8)
LYMPHOCYTES NFR BLD: 17.3 % (ref 22–41)
MCH RBC QN AUTO: 28.9 PG (ref 27–33)
MCHC RBC AUTO-ENTMCNC: 30.1 G/DL (ref 33.7–35.3)
MCV RBC AUTO: 96.1 FL (ref 81.4–97.8)
MONOCYTES # BLD AUTO: 0.21 K/UL (ref 0–0.85)
MONOCYTES NFR BLD AUTO: 6.5 % (ref 0–13.4)
NEUTROPHILS # BLD AUTO: 2.26 K/UL (ref 1.82–7.42)
NEUTROPHILS NFR BLD: 70 % (ref 44–72)
NRBC # BLD AUTO: 0 K/UL
NRBC BLD-RTO: 0 /100 WBC
PLATELET # BLD AUTO: 249 K/UL (ref 164–446)
PMV BLD AUTO: 9 FL (ref 9–12.9)
PROTHROMBIN TIME: 14 SEC (ref 12–14.6)
RBC # BLD AUTO: 3.63 M/UL (ref 4.7–6.1)
WBC # BLD AUTO: 3.2 K/UL (ref 4.8–10.8)

## 2018-01-25 PROCEDURE — 85610 PROTHROMBIN TIME: CPT

## 2018-01-25 PROCEDURE — 85025 COMPLETE CBC W/AUTO DIFF WBC: CPT

## 2018-01-25 PROCEDURE — 36415 COLL VENOUS BLD VENIPUNCTURE: CPT

## 2018-01-25 RX ORDER — GRANULES FOR ORAL 3 G/1
3 POWDER ORAL
Status: ON HOLD | COMMUNITY
End: 2018-01-29 | Stop reason: HOSPADM

## 2018-01-26 PROCEDURE — 99285 EMERGENCY DEPT VISIT HI MDM: CPT

## 2018-01-27 ENCOUNTER — HOSPITAL ENCOUNTER (INPATIENT)
Facility: MEDICAL CENTER | Age: 68
LOS: 2 days | DRG: 698 | End: 2018-01-29
Attending: EMERGENCY MEDICINE | Admitting: HOSPITALIST
Payer: COMMERCIAL

## 2018-01-27 ENCOUNTER — APPOINTMENT (OUTPATIENT)
Dept: RADIOLOGY | Facility: MEDICAL CENTER | Age: 68
DRG: 698 | End: 2018-01-27
Attending: EMERGENCY MEDICINE
Payer: COMMERCIAL

## 2018-01-27 ENCOUNTER — RESOLUTE PROFESSIONAL BILLING HOSPITAL PROF FEE (OUTPATIENT)
Dept: HOSPITALIST | Facility: MEDICAL CENTER | Age: 68
End: 2018-01-27
Payer: MEDICARE

## 2018-01-27 DIAGNOSIS — S81.802A WOUND OF LEFT LOWER EXTREMITY, INITIAL ENCOUNTER: ICD-10-CM

## 2018-01-27 DIAGNOSIS — N39.0 URINARY TRACT INFECTION ASSOCIATED WITH NEPHROSTOMY CATHETER, INITIAL ENCOUNTER (HCC): ICD-10-CM

## 2018-01-27 DIAGNOSIS — T83.512A URINARY TRACT INFECTION ASSOCIATED WITH NEPHROSTOMY CATHETER, INITIAL ENCOUNTER (HCC): ICD-10-CM

## 2018-01-27 DIAGNOSIS — N12 PYELONEPHRITIS: Primary | ICD-10-CM

## 2018-01-27 DIAGNOSIS — R65.10 SIRS (SYSTEMIC INFLAMMATORY RESPONSE SYNDROME) (HCC): ICD-10-CM

## 2018-01-27 LAB
ALBUMIN SERPL BCP-MCNC: 3.2 G/DL (ref 3.2–4.9)
ALBUMIN/GLOB SERPL: 1 G/DL
ALP SERPL-CCNC: 51 U/L (ref 30–99)
ALT SERPL-CCNC: 15 U/L (ref 2–50)
ANION GAP SERPL CALC-SCNC: 8 MMOL/L (ref 0–11.9)
APPEARANCE UR: ABNORMAL
AST SERPL-CCNC: 14 U/L (ref 12–45)
BACTERIA #/AREA URNS HPF: NEGATIVE /HPF
BASOPHILS # BLD AUTO: 0.3 % (ref 0–1.8)
BASOPHILS # BLD: 0.01 K/UL (ref 0–0.12)
BILIRUB SERPL-MCNC: 0.5 MG/DL (ref 0.1–1.5)
BILIRUB UR QL STRIP.AUTO: NEGATIVE
BUN SERPL-MCNC: 15 MG/DL (ref 8–22)
CALCIUM SERPL-MCNC: 8.4 MG/DL (ref 8.5–10.5)
CAOX CRY #/AREA URNS HPF: ABNORMAL /HPF
CHLORIDE SERPL-SCNC: 104 MMOL/L (ref 96–112)
CO2 SERPL-SCNC: 24 MMOL/L (ref 20–33)
COLOR UR: YELLOW
CREAT SERPL-MCNC: 0.45 MG/DL (ref 0.5–1.4)
EOSINOPHIL # BLD AUTO: 0.09 K/UL (ref 0–0.51)
EOSINOPHIL NFR BLD: 2.9 % (ref 0–6.9)
EPI CELLS #/AREA URNS HPF: NEGATIVE /HPF
ERYTHROCYTE [DISTWIDTH] IN BLOOD BY AUTOMATED COUNT: 61.8 FL (ref 35.9–50)
EST. AVERAGE GLUCOSE BLD GHB EST-MCNC: 146 MG/DL
GLOBULIN SER CALC-MCNC: 3.3 G/DL (ref 1.9–3.5)
GLUCOSE BLD-MCNC: 192 MG/DL (ref 65–99)
GLUCOSE BLD-MCNC: 202 MG/DL (ref 65–99)
GLUCOSE BLD-MCNC: 204 MG/DL (ref 65–99)
GLUCOSE BLD-MCNC: 286 MG/DL (ref 65–99)
GLUCOSE SERPL-MCNC: 218 MG/DL (ref 65–99)
GLUCOSE UR STRIP.AUTO-MCNC: NEGATIVE MG/DL
HBA1C MFR BLD: 6.7 % (ref 0–5.6)
HCT VFR BLD AUTO: 31.9 % (ref 42–52)
HGB BLD-MCNC: 9.8 G/DL (ref 14–18)
HYALINE CASTS #/AREA URNS LPF: ABNORMAL /LPF
IMM GRANULOCYTES # BLD AUTO: 0.01 K/UL (ref 0–0.11)
IMM GRANULOCYTES NFR BLD AUTO: 0.3 % (ref 0–0.9)
KETONES UR STRIP.AUTO-MCNC: ABNORMAL MG/DL
LACTATE BLD-SCNC: 0.9 MMOL/L (ref 0.5–2)
LACTATE BLD-SCNC: 1.1 MMOL/L (ref 0.5–2)
LEUKOCYTE ESTERASE UR QL STRIP.AUTO: ABNORMAL
LYMPHOCYTES # BLD AUTO: 0.34 K/UL (ref 1–4.8)
LYMPHOCYTES NFR BLD: 10.8 % (ref 22–41)
MCH RBC QN AUTO: 29.4 PG (ref 27–33)
MCHC RBC AUTO-ENTMCNC: 30.7 G/DL (ref 33.7–35.3)
MCV RBC AUTO: 95.8 FL (ref 81.4–97.8)
MICRO URNS: ABNORMAL
MONOCYTES # BLD AUTO: 0.22 K/UL (ref 0–0.85)
MONOCYTES NFR BLD AUTO: 7 % (ref 0–13.4)
NEUTROPHILS # BLD AUTO: 2.48 K/UL (ref 1.82–7.42)
NEUTROPHILS NFR BLD: 78.7 % (ref 44–72)
NITRITE UR QL STRIP.AUTO: POSITIVE
NRBC # BLD AUTO: 0 K/UL
NRBC BLD-RTO: 0 /100 WBC
PH UR STRIP.AUTO: 5.5 [PH]
PLATELET # BLD AUTO: 226 K/UL (ref 164–446)
PMV BLD AUTO: 9.5 FL (ref 9–12.9)
POTASSIUM SERPL-SCNC: 3.8 MMOL/L (ref 3.6–5.5)
PROT SERPL-MCNC: 6.5 G/DL (ref 6–8.2)
PROT UR QL STRIP: 100 MG/DL
RBC # BLD AUTO: 3.33 M/UL (ref 4.7–6.1)
RBC # URNS HPF: ABNORMAL /HPF
RBC UR QL AUTO: ABNORMAL
SODIUM SERPL-SCNC: 136 MMOL/L (ref 135–145)
SP GR UR STRIP.AUTO: 1.02
UROBILINOGEN UR STRIP.AUTO-MCNC: 0.2 MG/DL
WBC # BLD AUTO: 3.2 K/UL (ref 4.8–10.8)
WBC #/AREA URNS HPF: ABNORMAL /HPF
YEAST BUDDING URNS QL: PRESENT /HPF

## 2018-01-27 PROCEDURE — 71045 X-RAY EXAM CHEST 1 VIEW: CPT

## 2018-01-27 PROCEDURE — 700111 HCHG RX REV CODE 636 W/ 250 OVERRIDE (IP): Performed by: HOSPITALIST

## 2018-01-27 PROCEDURE — 87086 URINE CULTURE/COLONY COUNT: CPT

## 2018-01-27 PROCEDURE — 700105 HCHG RX REV CODE 258: Performed by: HOSPITALIST

## 2018-01-27 PROCEDURE — 80053 COMPREHEN METABOLIC PANEL: CPT

## 2018-01-27 PROCEDURE — 700111 HCHG RX REV CODE 636 W/ 250 OVERRIDE (IP): Performed by: EMERGENCY MEDICINE

## 2018-01-27 PROCEDURE — 306263 VAC CANNISTER W/GEL 500ML: Performed by: HOSPITALIST

## 2018-01-27 PROCEDURE — 76870 US EXAM SCROTUM: CPT

## 2018-01-27 PROCEDURE — 87040 BLOOD CULTURE FOR BACTERIA: CPT

## 2018-01-27 PROCEDURE — 82962 GLUCOSE BLOOD TEST: CPT | Mod: 91

## 2018-01-27 PROCEDURE — 83036 HEMOGLOBIN GLYCOSYLATED A1C: CPT

## 2018-01-27 PROCEDURE — A9270 NON-COVERED ITEM OR SERVICE: HCPCS | Performed by: HOSPITALIST

## 2018-01-27 PROCEDURE — 700102 HCHG RX REV CODE 250 W/ 637 OVERRIDE(OP): Performed by: HOSPITALIST

## 2018-01-27 PROCEDURE — 85025 COMPLETE CBC W/AUTO DIFF WBC: CPT

## 2018-01-27 PROCEDURE — 83605 ASSAY OF LACTIC ACID: CPT

## 2018-01-27 PROCEDURE — 99223 1ST HOSP IP/OBS HIGH 75: CPT | Performed by: HOSPITALIST

## 2018-01-27 PROCEDURE — 96374 THER/PROPH/DIAG INJ IV PUSH: CPT

## 2018-01-27 PROCEDURE — 770006 HCHG ROOM/CARE - MED/SURG/GYN SEMI*

## 2018-01-27 PROCEDURE — 81001 URINALYSIS AUTO W/SCOPE: CPT

## 2018-01-27 PROCEDURE — 36415 COLL VENOUS BLD VENIPUNCTURE: CPT

## 2018-01-27 RX ORDER — OXYCODONE HYDROCHLORIDE AND ACETAMINOPHEN 5; 325 MG/1; MG/1
2 TABLET ORAL EVERY 12 HOURS PRN
Status: ON HOLD | COMMUNITY
End: 2018-02-12

## 2018-01-27 RX ORDER — AMOXICILLIN 250 MG
2 CAPSULE ORAL 2 TIMES DAILY
Status: DISCONTINUED | OUTPATIENT
Start: 2018-01-27 | End: 2018-01-29 | Stop reason: HOSPADM

## 2018-01-27 RX ORDER — SODIUM CHLORIDE 9 MG/ML
INJECTION, SOLUTION INTRAVENOUS CONTINUOUS
Status: DISCONTINUED | OUTPATIENT
Start: 2018-01-27 | End: 2018-01-29

## 2018-01-27 RX ORDER — CEFTRIAXONE 2 G/1
2 INJECTION, POWDER, FOR SOLUTION INTRAMUSCULAR; INTRAVENOUS ONCE
Status: COMPLETED | OUTPATIENT
Start: 2018-01-27 | End: 2018-01-27

## 2018-01-27 RX ORDER — POLYETHYLENE GLYCOL 3350 17 G/17G
1 POWDER, FOR SOLUTION ORAL
Status: DISCONTINUED | OUTPATIENT
Start: 2018-01-27 | End: 2018-01-27

## 2018-01-27 RX ORDER — HALOPERIDOL 5 MG/ML
5 INJECTION INTRAMUSCULAR EVERY 4 HOURS PRN
Status: DISCONTINUED | OUTPATIENT
Start: 2018-01-27 | End: 2018-01-29 | Stop reason: HOSPADM

## 2018-01-27 RX ORDER — ALBUTEROL SULFATE 90 UG/1
2 AEROSOL, METERED RESPIRATORY (INHALATION) EVERY 4 HOURS PRN
COMMUNITY

## 2018-01-27 RX ORDER — BISACODYL 10 MG
10 SUPPOSITORY, RECTAL RECTAL
Status: DISCONTINUED | OUTPATIENT
Start: 2018-01-27 | End: 2018-01-29 | Stop reason: HOSPADM

## 2018-01-27 RX ORDER — AMOXICILLIN 250 MG
2 CAPSULE ORAL 2 TIMES DAILY
Status: DISCONTINUED | OUTPATIENT
Start: 2018-01-27 | End: 2018-01-27

## 2018-01-27 RX ORDER — ACETAMINOPHEN 325 MG/1
650 TABLET ORAL EVERY 6 HOURS PRN
Status: DISCONTINUED | OUTPATIENT
Start: 2018-01-27 | End: 2018-01-29 | Stop reason: HOSPADM

## 2018-01-27 RX ORDER — MESALAMINE 400 MG/1
400 CAPSULE, DELAYED RELEASE ORAL 3 TIMES DAILY
Status: DISCONTINUED | OUTPATIENT
Start: 2018-01-27 | End: 2018-01-29 | Stop reason: HOSPADM

## 2018-01-27 RX ORDER — DEXTROSE MONOHYDRATE 25 G/50ML
25 INJECTION, SOLUTION INTRAVENOUS
Status: DISCONTINUED | OUTPATIENT
Start: 2018-01-27 | End: 2018-01-29 | Stop reason: HOSPADM

## 2018-01-27 RX ORDER — MERCAPTOPURINE 50 MG/1
150 TABLET ORAL DAILY
Status: DISCONTINUED | OUTPATIENT
Start: 2018-01-27 | End: 2018-01-29 | Stop reason: HOSPADM

## 2018-01-27 RX ORDER — POLYETHYLENE GLYCOL 3350 17 G/17G
1 POWDER, FOR SOLUTION ORAL
Status: DISCONTINUED | OUTPATIENT
Start: 2018-01-27 | End: 2018-01-29 | Stop reason: HOSPADM

## 2018-01-27 RX ORDER — MESALAMINE 1000 MG/1
1000 SUPPOSITORY RECTAL
COMMUNITY
End: 2018-01-30

## 2018-01-27 RX ORDER — MORPHINE SULFATE 4 MG/ML
2 INJECTION, SOLUTION INTRAMUSCULAR; INTRAVENOUS
Status: DISCONTINUED | OUTPATIENT
Start: 2018-01-27 | End: 2018-01-29

## 2018-01-27 RX ORDER — TAMSULOSIN HYDROCHLORIDE 0.4 MG/1
0.8 CAPSULE ORAL DAILY
Status: DISCONTINUED | OUTPATIENT
Start: 2018-01-27 | End: 2018-01-29 | Stop reason: HOSPADM

## 2018-01-27 RX ORDER — OXYCODONE HYDROCHLORIDE 5 MG/1
5 TABLET ORAL
Status: DISCONTINUED | OUTPATIENT
Start: 2018-01-27 | End: 2018-01-29

## 2018-01-27 RX ORDER — ONDANSETRON 2 MG/ML
4 INJECTION INTRAMUSCULAR; INTRAVENOUS EVERY 4 HOURS PRN
Status: DISCONTINUED | OUTPATIENT
Start: 2018-01-27 | End: 2018-01-29 | Stop reason: HOSPADM

## 2018-01-27 RX ORDER — GLIPIZIDE 5 MG/1
5 TABLET ORAL 2 TIMES DAILY
COMMUNITY

## 2018-01-27 RX ORDER — BISACODYL 10 MG
10 SUPPOSITORY, RECTAL RECTAL
Status: DISCONTINUED | OUTPATIENT
Start: 2018-01-27 | End: 2018-01-27

## 2018-01-27 RX ORDER — OXYCODONE HYDROCHLORIDE 5 MG/1
2.5 TABLET ORAL
Status: DISCONTINUED | OUTPATIENT
Start: 2018-01-27 | End: 2018-01-29

## 2018-01-27 RX ORDER — ONDANSETRON 4 MG/1
4 TABLET, ORALLY DISINTEGRATING ORAL EVERY 4 HOURS PRN
Status: DISCONTINUED | OUTPATIENT
Start: 2018-01-27 | End: 2018-01-29 | Stop reason: HOSPADM

## 2018-01-27 RX ADMIN — CEFTRIAXONE SODIUM 2 G: 2 INJECTION, POWDER, FOR SOLUTION INTRAMUSCULAR; INTRAVENOUS at 01:59

## 2018-01-27 RX ADMIN — GENTAMICIN SULFATE 533 MG: 40 INJECTION, SOLUTION INTRAMUSCULAR; INTRAVENOUS at 09:37

## 2018-01-27 RX ADMIN — ENOXAPARIN SODIUM 100 MG: 100 INJECTION SUBCUTANEOUS at 20:20

## 2018-01-27 RX ADMIN — MESALAMINE 400 MG: 400 CAPSULE, DELAYED RELEASE ORAL at 10:23

## 2018-01-27 RX ADMIN — TAMSULOSIN HYDROCHLORIDE 0.8 MG: 0.4 CAPSULE ORAL at 09:36

## 2018-01-27 RX ADMIN — MERCAPTOPURINE 150 MG: 50 TABLET ORAL at 09:37

## 2018-01-27 RX ADMIN — SODIUM CHLORIDE: 9 INJECTION, SOLUTION INTRAVENOUS at 22:39

## 2018-01-27 RX ADMIN — INSULIN HUMAN 2 UNITS: 100 INJECTION, SOLUTION PARENTERAL at 12:22

## 2018-01-27 RX ADMIN — INSULIN HUMAN 3 UNITS: 100 INJECTION, SOLUTION PARENTERAL at 18:49

## 2018-01-27 RX ADMIN — MESALAMINE 400 MG: 400 CAPSULE, DELAYED RELEASE ORAL at 20:27

## 2018-01-27 RX ADMIN — MESALAMINE 400 MG: 400 CAPSULE, DELAYED RELEASE ORAL at 15:25

## 2018-01-27 RX ADMIN — INSULIN HUMAN 3 UNITS: 100 INJECTION, SOLUTION PARENTERAL at 20:26

## 2018-01-27 ASSESSMENT — PAIN SCALES - GENERAL
PAINLEVEL_OUTOF10: 0
PAINLEVEL_OUTOF10: 0
PAINLEVEL_OUTOF10: 3

## 2018-01-27 ASSESSMENT — ENCOUNTER SYMPTOMS
CONSTIPATION: 0
VOMITING: 0
COUGH: 0
FEVER: 1
PALPITATIONS: 0
FLANK PAIN: 1
DOUBLE VISION: 0
SHORTNESS OF BREATH: 0
NAUSEA: 0
DIARRHEA: 0
CHILLS: 1
ABDOMINAL PAIN: 0
BLURRED VISION: 0
SPUTUM PRODUCTION: 0
HEADACHES: 0

## 2018-01-27 ASSESSMENT — LIFESTYLE VARIABLES
EVER_SMOKED: NEVER
DO YOU DRINK ALCOHOL: NO
DO YOU DRINK ALCOHOL: NO

## 2018-01-27 NOTE — ASSESSMENT & PLAN NOTE
Patient likely has a hypercoagulable state, certainly this could be related to his inflammatory bowel disease  Continue Lovenox

## 2018-01-27 NOTE — ASSESSMENT & PLAN NOTE
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 Mosqueda of urology  Dr. Salomon will review office records and discuss with Dr. Upton who is primary urologist  NPO at midnight, likely to pursue IR procedure tomorrow

## 2018-01-27 NOTE — PROGRESS NOTES
"Patient alert/oriented x4,angry  About not having a tv in room,patient states,he wants a private with a tv and states that want to go to [facility] with tv or else he rather go home \"and die\"     Right posterior thigh wound vac in place, right nephrostomy tube site and left nephrostomy tube site. 2 nurse verify.   "

## 2018-01-27 NOTE — ED NOTES
Pt's O2 sat down to 88% while sleeping, easily arouses to voice. Pt reports possible sleep apnea, placed on 2 L O2 NC, sats improved to 96%.

## 2018-01-27 NOTE — ED NOTES
Pt resting quietly in [facility] with eyes closed. Nurse visualized chest rise and fall. NAD noted. Respirations even and unlabored. Pt awaiting admission orders and room.

## 2018-01-27 NOTE — ASSESSMENT & PLAN NOTE
Complains of flank pain, chills and sweats  Urinalysis 1/27/2018: Large leukocyte esterase, positive nitrate, packed white blood cells  Urine cultures pending, await results  Continue ceftriaxone

## 2018-01-27 NOTE — ASSESSMENT & PLAN NOTE
Posterior left thigh, related to hematoma with ulceration of surrounding skin  Has wound VAC in place  Wound care

## 2018-01-27 NOTE — ED TRIAGE NOTES
"Chief Complaint   Patient presents with   • Fever     chills and fever at home; onset 4-5 nights ago   • N/V     onset today     Pt ambulatory to triage by self w/ no assistance. Pt states he had a \"bladder tear\" in December and had bilateral nephrostomy tubes placed shortly after, remain in place. Pt also has wound vac on posterior L thigh. Pt states he is having R flank pain around the neprhostomy site, and has recently had fever, chills, N/V, worried that he is septic. Pt is poor historian. Pt also reporting that he thinks he has a recurrence of epididymitis. Pt states he had two courses of unfinished antibiotics due to recent hospitalizations. Pt has sepsis score of 3, sepsis protocol ordered, charge RN notified. Pt placed back in lobby at this time.     Blood pressure 118/69, pulse (!) 101, temperature 36.7 °C (98 °F), resp. rate 18, height 1.854 m (6' 1\"), weight 76.1 kg (167 lb 12.3 oz), SpO2 92 %.      "

## 2018-01-27 NOTE — ED PROVIDER NOTES
ED Provider Note    CHIEF COMPLAINT  Chief Complaint   Patient presents with   • Fever     chills and fever at home; onset 4-5 nights ago   • N/V     onset today       HPI  Niels Lima is a 67 y.o. male who presents to the emergency department for right flank pain. Patient with progressive right flank pain over the last 2-3 days. Nausea without vomiting. Fever, up to 101.3 at home, and chills. Decreased appetite. Patient has bilateral nephrostomy tubes placed after found to have a bladder tear in December 2017. Previous notable UTI, [facility] after IV dose of Zosyn, compliant with fosfomycin at home without improvement this week. Scheduled to have left nephrostomy tube removed on Monday. Additionally, patient complains of discomfort and swelling to the right testicle, also progressive over the past week, despite history of chronic epididymitis.  Status post left orchiectomy last year.    REVIEW OF SYSTEMS  See HPI for further details. All other systems are negative.    PAST MEDICAL HISTORY   has a past medical history of Asthma; Bowel habit changes; Crohn's disease (CMS-HCC) (01/01/2001); Diabetes (CMS-HCC); DVT (deep venous thrombosis) (CMS-HCC) (2002, 2003, 2009, 2015); Hemorrhagic disorder (CMS-HCC); Presence of IVC filter (01/01/2009); Renal disorder; and Snoring.    SOCIAL HISTORY  Social History     Social History Main Topics   • Smoking status: Never Smoker   • Smokeless tobacco: Never Used   • Alcohol use No   • Drug use: No   • Sexual activity: Not on file       SURGICAL HISTORY   has a past surgical history that includes bowel resection (01/01/1986); scrotal exploration (Bilateral, 11/12/2017); cystoscopy (12/10/2017); incision and drainage general (Left, 12/17/2017); lithotripsy (2010); and other (2009).    CURRENT MEDICATIONS  Home Medications     Reviewed by Rylee Sweeney, R.N. (Registered Nurse) on 01/27/18 at 0059  Med List Status: Partial   Medication Last  "Dose Status   dabigatran (PRADAXA) 150 MG Cap capsule 01/11/2018 Active   enoxaparin (LOVENOX) 100 MG/ML Solution inj  Active   fosfomycin (MONUROL) 3 GM Pack 01/24/2018 Active   GLIPIZIDE PO  Active   mercaptopurine (PURINETHOL) 50 MG Tab  Active   Mesalamine 0.375 GM CAPSULE SR 24 HR  Active   metformin (GLUCOPHAGE) 1000 MG tablet  Active   PIPERACILLIN SODIUM IV 01/24/2018 Active   tamsulosin (FLOMAX) 0.4 MG capsule  Active                ALLERGIES  Allergies   Allergen Reactions   • Prochlorperazine Unspecified     \"FACE FROZE UP\"       PHYSICAL EXAM  VITAL SIGNS: /69   Pulse 70   Temp 36.6 °C (97.9 °F)   Resp 20   Ht 1.854 m (6' 1\")   Wt 76.1 kg (167 lb 12.3 oz)   SpO2 98%   BMI 22.13 kg/m²   Pulse ox interpretation: I interpret this pulse ox as normal.  Constitutional: Alert in no apparent distress.  HENT: Normocephalic, atraumatic. Bilateral external ears normal, Nose normal. Moist mucous membranes.    Eyes: Pupils are equal and reactive, Conjunctiva normal.   Neck: Normal range of motion, Supple, non-tender. No stridor.   Lymphatic: No lymphadenopathy noted.   Cardiovascular: Regular rate and rhythm, no murmurs. Distal pulses intact.  No peripheral edema.  Thorax & Lungs: Normal breath sounds.  No wheezing/rales/ronchi. No increased work of breathing.  Abdomen: Soft, non-distended, non-tender to palpation. No palpable or pulsatile masses. No peritoneal signs. Mild right CVA tenderness to percussion. Nephrostomy tubes bilateral flanks, intact without cellulitis or drainage. Clouded, nor purulent nephrostomy tube drainage on the right.  Skin: Warm, Dry, No erythema, No rash.   Musculoskeletal: Good range of motion in all major joints.   Neurologic: Alert , no gross focal deficit noted.  Psychiatric: Affect normal, Judgment normal, Mood normal.       DIAGNOSTIC STUDIES / PROCEDURES  LABS  Results for orders placed or performed during the hospital encounter of 01/27/18   Lactic acid (lactate) "   Result Value Ref Range    Lactic Acid 1.1 0.5 - 2.0 mmol/L   Lactic acid (lactate)   Result Value Ref Range    Lactic Acid 0.9 0.5 - 2.0 mmol/L   CBC WITH DIFFERENTIAL   Result Value Ref Range    WBC 3.2 (L) 4.8 - 10.8 K/uL    RBC 3.33 (L) 4.70 - 6.10 M/uL    Hemoglobin 9.8 (L) 14.0 - 18.0 g/dL    Hematocrit 31.9 (L) 42.0 - 52.0 %    MCV 95.8 81.4 - 97.8 fL    MCH 29.4 27.0 - 33.0 pg    MCHC 30.7 (L) 33.7 - 35.3 g/dL    RDW 61.8 (H) 35.9 - 50.0 fL    Platelet Count 226 164 - 446 K/uL    MPV 9.5 9.0 - 12.9 fL    Neutrophils-Polys 78.70 (H) 44.00 - 72.00 %    Lymphocytes 10.80 (L) 22.00 - 41.00 %    Monocytes 7.00 0.00 - 13.40 %    Eosinophils 2.90 0.00 - 6.90 %    Basophils 0.30 0.00 - 1.80 %    Immature Granulocytes 0.30 0.00 - 0.90 %    Nucleated RBC 0.00 /100 WBC    Neutrophils (Absolute) 2.48 1.82 - 7.42 K/uL    Lymphs (Absolute) 0.34 (L) 1.00 - 4.80 K/uL    Monos (Absolute) 0.22 0.00 - 0.85 K/uL    Eos (Absolute) 0.09 0.00 - 0.51 K/uL    Baso (Absolute) 0.01 0.00 - 0.12 K/uL    Immature Granulocytes (abs) 0.01 0.00 - 0.11 K/uL    NRBC (Absolute) 0.00 K/uL   COMP METABOLIC PANEL   Result Value Ref Range    Sodium 136 135 - 145 mmol/L    Potassium 3.8 3.6 - 5.5 mmol/L    Chloride 104 96 - 112 mmol/L    Co2 24 20 - 33 mmol/L    Anion Gap 8.0 0.0 - 11.9    Glucose 218 (H) 65 - 99 mg/dL    Bun 15 8 - 22 mg/dL    Creatinine 0.45 (L) 0.50 - 1.40 mg/dL    Calcium 8.4 (L) 8.5 - 10.5 mg/dL    AST(SGOT) 14 12 - 45 U/L    ALT(SGPT) 15 2 - 50 U/L    Alkaline Phosphatase 51 30 - 99 U/L    Total Bilirubin 0.5 0.1 - 1.5 mg/dL    Albumin 3.2 3.2 - 4.9 g/dL    Total Protein 6.5 6.0 - 8.2 g/dL    Globulin 3.3 1.9 - 3.5 g/dL    A-G Ratio 1.0 g/dL   URINALYSIS   Result Value Ref Range    Color Yellow     Character Turbid (A)     Specific Gravity 1.016 <1.035    Ph 5.5 5.0 - 8.0    Glucose Negative Negative mg/dL    Ketones Trace (A) Negative mg/dL    Protein 100 (A) Negative mg/dL    Bilirubin Negative Negative    Urobilinogen,  Urine 0.2 Negative    Nitrite Positive (A) Negative    Leukocyte Esterase Large (A) Negative    Occult Blood Large (A) Negative    Micro Urine Req Microscopic    ESTIMATED GFR   Result Value Ref Range    GFR If African American >60 >60 mL/min/1.73 m 2    GFR If Non African American >60 >60 mL/min/1.73 m 2   URINE MICROSCOPIC (W/UA)   Result Value Ref Range    WBC Packed WBC /hpf    RBC  (A) /hpf    Bacteria Negative None /hpf    Epithelial Cells Negative /hpf    Ca Oxalate Crystal Few /hpf    Hyaline Cast 3-5 (A) /lpf    Budding Yeast Present (A) Absent /hpf     RADIOLOGY  [id number]   Final Result         1.  Heterogeneous parenchyma of the right testicle with markedly increased flow of the right epididymis and testis, appearance favors epididymal orchitis. Recommend sonographic follow-up for reevaluation of the right testis in 7-14 days to exclude    infiltrating testicular mass.   2.  Large right hydrocele.   3.  Surgical absence the left testicle      DX-CHEST-PORTABLE (1 VIEW)   Final Result         1.  No acute cardiopulmonary disease.          COURSE & MEDICAL DECISION MAKING  Nursing notes and vital signs were reviewed. (See chart for details)  The patients records were reviewed, history was obtained from the patient;     ED evaluation for right flank pain is concerning for a pyelonephritis. Patient has bilateral nephrostomy tubes now with clotted purulent drainage. Reported fever at home. They history for the admission for Zosyn, now compliant with fosfomycin home without improvement. Leukopenia without a bandemia. Lactic acidosis. Renal function is preserved. Urinalysis grossly positive, culture pending. Patient received Rocephin in the emergency department. No clinical evidence for sepsis. Additionally, evidence for a right epididymitis, Rocephin was given, history of chronicity to be followed by urology. Patient will be admitted given pyelonephritis with indwelling catheters and SIRS  criteria. He is aware the findings and agreeable to the disposition and plan.    3:25 AM Dr. Ring is aware the patient is agreeable to admission.    FINAL IMPRESSION  (N12) Pyelonephritis  (primary encounter diagnosis)  (T83.512A,  N39.0) Urinary tract infection associated with nephrostomy catheter, initial encounter (CMS-[facility])  (R65.10) SIRS (systemic inflammatory response syndrome) (CMS-HCC)      Electronically signed by: Beata Blanco, 1/27/2018 3:25 AM      This dictation was created using voice recognition software. The accuracy of the dictation is limited to the abilities of the software. I expect there may be some errors of grammar and possibly content. The nursing notes were reviewed and certain aspects of this information were incorporated into this note.

## 2018-01-27 NOTE — H&P
Hospital Medicine History and Physical    Date of Service  1/27/2018    Chief Complaint  Chief Complaint   Patient presents with   • Fever     chills and fever at home; onset 4-5 nights ago   • N/V     onset today       History of Presenting Illness  67 y.o. male with complicated urinary history, apparently was in his usual state of health until sometime in December 2017, when he developed a urinary bladder rupture. At that time, urine was diverted, with bilateral nephrostomy tube placement as well as Pacheco catheter placement. [facility] Approximately week and a half prior to admission, he reports the onset of right thigh pain, presented to the [facility], where he was found to have a new acute DVT. He was placed on Lovenox therapy at that time, and discharged. Approximately one day after discharge, he developed fever and chills, concern for urinary tract infection, as he noted that right nephrostomy tube had some cloudy output. [facility] for 1 day, given a dose of Zosyn, as well as sent home with fosfomycin. He went home, proximally 3 days prior to this admission, however he continued to have fever and chills, and noted urinary changes as above. He subsequently presented to this facility. He denies any current headache or vision changes, has no chest pain or shortness of breath, he denies abdominal pain, he does have some pain from the right nephrostomy site, he denies any diarrhea or constipation. He continues to have fever, chills and weakness.    Primary Care Physician  Pcp Pt States None    Consultants  None    Code Status  Subha    Review of Systems  Review of Systems   Constitutional: Positive for chills and fever.   Eyes: Negative for blurred vision and double vision.   Respiratory: Negative for cough, sputum production and shortness of breath.    Cardiovascular: Negative for chest pain and palpitations.   Gastrointestinal:  "Negative for abdominal pain, constipation, diarrhea, nausea and vomiting.   Genitourinary: Positive for flank pain and frequency.   Neurological: Negative for headaches.        Past Medical History  Past Medical History:   Diagnosis Date   • Presence of IVC filter 01/01/2009   • Crohn's disease (CMS-HCC) 01/01/2001   • Asthma     daily inhaler   • Bowel habit changes     diarrhea   • Diabetes (CMS-HCC)     Type 2; oral medication   • DVT (deep venous thrombosis) (CMS-HCC) 2002, 2003, 2009, 2015    IVC filter in place- pt's wife states that he had a test done in [facility] \"snake venom\" to check pt's blood and she thinks he has some factor issue but unsure of what   • Hemorrhagic disorder (CMS-HCC)     on lovenox at this time   • Renal disorder     nephrostomy tube in place   • Snoring     sleep study not done       Surgical History  Past Surgical History:   Procedure Laterality Date   • INCISION AND DRAINAGE GENERAL Left 12/17/2017    Procedure: INCISION AND DRAINAGE GENERAL;  Surgeon: Enrique Deluna M.D.;  Location: SURGERY [facility];  Service: General   • CYSTOSCOPY  12/10/2017    Procedure: cystoscopy, evacuation of clots, transurethral biopsy, ffulguration of perforation, cystogram;  Surgeon: Amador Upton M.D.;  Location: SURGERY [facility];  Service: Urology   • SCROTAL EXPLORATION Bilateral 11/12/2017    Procedure: SCROTAL EXPLORATION -  left orchiectomy, right testicular fixation;  Surgeon: José Antonio Mosqueda M.D.;  Location: SURGERY [facility];  Service: Urology   • LITHOTRIPSY  2010   • OTHER  2009    IVC filter   • BOWEL RESECTION  01/01/1986       Medications  No current facility-administered medications on file prior to encounter.      Current Outpatient Prescriptions on File Prior to Encounter   Medication Sig Dispense Refill   • GLIPIZIDE PO Take 1 Tab by mouth 2 Times a Day.     • enoxaparin (LOVENOX) 100 MG/ML Solution inj Inject 1 Syringe as instructed every 12 hours.     • fosfomycin " "(MONUROL) 3 GM Pack Take 3 g by mouth every 3 days.     • PIPERACILLIN SODIUM IV by Intravenous route. q 6 hours [facility], unsure of dose     • dabigatran (PRADAXA) 150 MG Cap capsule Take 1 Cap by mouth 2 Times a Day. 60 Cap 0   • Mesalamine 0.375 GM CAPSULE SR 24 HR Take 3 Caps by mouth every day.     • mercaptopurine (PURINETHOL) 50 MG Tab Take 150 mg by mouth every day.     • metformin (GLUCOPHAGE) 1000 MG tablet Take 1,000 mg by mouth 2 times a day, with meals.     • tamsulosin (FLOMAX) 0.4 MG capsule Take 0.8 mg by mouth every day.         Family History  History reviewed. No pertinent family history.    Social History  Social History   Substance Use Topics   • Smoking status: Never Smoker   • Smokeless tobacco: Never Used   • Alcohol use No       Allergies  Allergies   Allergen Reactions   • Prochlorperazine Unspecified     \"FACE FROZE UP\"        Physical Exam  Laboratory   Hemodynamics  Temp (24hrs), Av.7 °C (98 °F), Min:36.6 °C (97.9 °F), Max:36.8 °C (98.2 °F)   Temperature: 36.6 °C (97.9 °F)  Pulse  Av  Min: 62  Max: 101 Heart Rate (Monitored): 62  Blood Pressure : 118/69, NIBP: 110/64      Respiratory      Respiration: (!) 21, Pulse Oximetry: 98 %             Physical Exam   Constitutional: He is oriented to person, place, and time. He appears well-developed and well-nourished. No distress.   HENT:   Head: Normocephalic.   Eyes: Pupils are equal, round, and reactive to light.   Neck: Normal range of motion. Neck supple.   Cardiovascular: Normal rate, regular rhythm and normal heart sounds.  Exam reveals no gallop and no friction rub.    No murmur heard.  Pulmonary/Chest: Effort normal and breath sounds normal. No respiratory distress. He has no wheezes.   Abdominal: Soft. Bowel sounds are normal. He exhibits no distension and no mass. There is no tenderness. There is no rebound and no guarding.   Nephrostomy tubes in place   Musculoskeletal: Normal range of motion. He exhibits " no edema.   Left posterior thigh wound vacuum in place   Neurological: He is alert and oriented to person, place, and time. No cranial nerve deficit.   Skin: He is not diaphoretic.   Nursing note and vitals reviewed.      Recent Labs      01/25/18   1337  01/27/18   0045   WBC  3.2*  3.2*   RBC  3.63*  3.33*   HEMOGLOBIN  10.5*  9.8*   HEMATOCRIT  34.9*  31.9*   MCV  96.1  95.8   MCH  28.9  29.4   MCHC  30.1*  30.7*   RDW  61.5*  61.8*   PLATELETCT  249  226   MPV  9.0  9.5     Recent Labs      01/27/18   0045   SODIUM  136   POTASSIUM  3.8   CHLORIDE  104   CO2  24   GLUCOSE  218*   BUN  15   CREATININE  0.45*   CALCIUM  8.4*     Recent Labs      01/27/18   0045   ALTSGPT  15   ASTSGOT  14   ALKPHOSPHAT  51   TBILIRUBIN  0.5   GLUCOSE  218*     Recent Labs      01/25/18   1337   INR  1.11             No results found for: TROPONINI  Urinalysis:    Lab Results  Component Value Date/Time   SPECGRAVITY 1.016 01/27/2018 0106   GLUCOSEUR Negative 01/27/2018 0106   KETONES Trace (A) 01/27/2018 0106   NITRITE Positive (A) 01/27/2018 0106   WBCURINE Packed WBC 01/27/2018 0106   RBCURINE  (A) 01/27/2018 0106   BACTERIA Negative 01/27/2018 0106   EPITHELCELL Negative 01/27/2018 0106        Imaging  Normal chest radiograph   Assessment/Plan     I anticipate this patient will require at least two midnights for appropriate medical management, necessitating inpatient admission.    * Pyelonephritis   Assessment & Plan    Likely due to in-situ nephrostomy tubes.  Will given single dose gentamicin, and start rocephin.  Follow up culture results, which may be negative as patient [facility] and sent with fosfamycin, which he has no yet taken.          Nephrostomy status (CMS-HCC)- (present on admission)   Assessment & Plan    Due to prior bladder rupture.  Apparently is going to have the left side removed, and possibly right changed over.  [facility]         Wound of  left lower extremity- (present on admission)   Assessment & Plan    Posterior left thigh, related to hematoma with ulceration of surrounding skin, status post surgical repair.  Patient is followed by home health for the same.  Appreciate wound consultation for the same.         Ruptured bladder- (present on admission)   Assessment & Plan    Unclear etiology, did necessitate urinary diversion with bilateral nephrostomy and kaur catheter placement.  Bilateral nephrostomies remain in place.  No kaur catheter in place.          Acute DVT (deep venous thrombosis) (CMS-[facility])- (present on admission)   Assessment & Plan    RLE, diagnosed [facility] (records not yet obtained).  This in the setting of recurrent clotting and factor deficiency.  Placed on L[facility]ox [facility]mg BID at [facility] which will be continued this admission.          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  Does have IVC filter in place, was previously on coumadin therapy, however required switch to pradaxa, apparently due to multiple needed procedures in setting of nephrostomies.  Recently changed to [facility]enox by the [facility], which he has been administering at home.          Crohn disease (CMS-HCC)- (present on admission)   Assessment & Plan    Symptomatically stable on current medication regimen.  Continue the same as tolerated.             VTE prophylaxis: SCD, full anticoagulation with lovenox ordered and ongoing

## 2018-01-27 NOTE — PROGRESS NOTES
Med rec updated and complete.  Allergies reviewed.  Placed call to pts home pharmacy  To verify medications and strengths.  Pt was unable to recall them. Pt is currently   Taking an antibiotic every 3 days for a UTI

## 2018-01-27 NOTE — ED NOTES
Pt roomed, placed on monitor. X-ray at bedside. Agree with triage nurse's assessment. Chart up for ERP.

## 2018-01-28 PROBLEM — N39.0 URINARY TRACT INFECTION: Status: ACTIVE | Noted: 2018-01-28

## 2018-01-28 PROBLEM — N45.3 ORCHITIS AND EPIDIDYMITIS: Status: ACTIVE | Noted: 2018-01-28

## 2018-01-28 LAB
ANION GAP SERPL CALC-SCNC: 8 MMOL/L (ref 0–11.9)
BASOPHILS # BLD AUTO: 0.7 % (ref 0–1.8)
BASOPHILS # BLD: 0.02 K/UL (ref 0–0.12)
BUN SERPL-MCNC: 14 MG/DL (ref 8–22)
CALCIUM SERPL-MCNC: 8.3 MG/DL (ref 8.5–10.5)
CHLORIDE SERPL-SCNC: 103 MMOL/L (ref 96–112)
CO2 SERPL-SCNC: 26 MMOL/L (ref 20–33)
CREAT SERPL-MCNC: 0.43 MG/DL (ref 0.5–1.4)
EOSINOPHIL # BLD AUTO: 0.21 K/UL (ref 0–0.51)
EOSINOPHIL NFR BLD: 7.7 % (ref 0–6.9)
ERYTHROCYTE [DISTWIDTH] IN BLOOD BY AUTOMATED COUNT: 61.1 FL (ref 35.9–50)
GLUCOSE BLD-MCNC: 156 MG/DL (ref 65–99)
GLUCOSE BLD-MCNC: 164 MG/DL (ref 65–99)
GLUCOSE BLD-MCNC: 172 MG/DL (ref 65–99)
GLUCOSE BLD-MCNC: 181 MG/DL (ref 65–99)
GLUCOSE SERPL-MCNC: 181 MG/DL (ref 65–99)
HCT VFR BLD AUTO: 29.1 % (ref 42–52)
HGB BLD-MCNC: 8.8 G/DL (ref 14–18)
IMM GRANULOCYTES # BLD AUTO: 0.01 K/UL (ref 0–0.11)
IMM GRANULOCYTES NFR BLD AUTO: 0.4 % (ref 0–0.9)
LYMPHOCYTES # BLD AUTO: 0.54 K/UL (ref 1–4.8)
LYMPHOCYTES NFR BLD: 19.7 % (ref 22–41)
MCH RBC QN AUTO: 29.1 PG (ref 27–33)
MCHC RBC AUTO-ENTMCNC: 30.2 G/DL (ref 33.7–35.3)
MCV RBC AUTO: 96.4 FL (ref 81.4–97.8)
MONOCYTES # BLD AUTO: 0.22 K/UL (ref 0–0.85)
MONOCYTES NFR BLD AUTO: 8 % (ref 0–13.4)
NEUTROPHILS # BLD AUTO: 1.74 K/UL (ref 1.82–7.42)
NEUTROPHILS NFR BLD: 63.5 % (ref 44–72)
NRBC # BLD AUTO: 0 K/UL
NRBC BLD-RTO: 0 /100 WBC
PLATELET # BLD AUTO: 215 K/UL (ref 164–446)
PMV BLD AUTO: 9.1 FL (ref 9–12.9)
POTASSIUM SERPL-SCNC: 4 MMOL/L (ref 3.6–5.5)
RBC # BLD AUTO: 3.02 M/UL (ref 4.7–6.1)
SODIUM SERPL-SCNC: 137 MMOL/L (ref 135–145)
WBC # BLD AUTO: 2.7 K/UL (ref 4.8–10.8)

## 2018-01-28 PROCEDURE — 36415 COLL VENOUS BLD VENIPUNCTURE: CPT

## 2018-01-28 PROCEDURE — 700111 HCHG RX REV CODE 636 W/ 250 OVERRIDE (IP): Performed by: HOSPITALIST

## 2018-01-28 PROCEDURE — 99233 SBSQ HOSP IP/OBS HIGH 50: CPT | Performed by: HOSPITALIST

## 2018-01-28 PROCEDURE — 82962 GLUCOSE BLOOD TEST: CPT | Mod: 91

## 2018-01-28 PROCEDURE — 85025 COMPLETE CBC W/AUTO DIFF WBC: CPT

## 2018-01-28 PROCEDURE — 700102 HCHG RX REV CODE 250 W/ 637 OVERRIDE(OP): Performed by: HOSPITALIST

## 2018-01-28 PROCEDURE — 770006 HCHG ROOM/CARE - MED/SURG/GYN SEMI*

## 2018-01-28 PROCEDURE — 700105 HCHG RX REV CODE 258: Performed by: HOSPITALIST

## 2018-01-28 PROCEDURE — 80048 BASIC METABOLIC PNL TOTAL CA: CPT

## 2018-01-28 PROCEDURE — A9270 NON-COVERED ITEM OR SERVICE: HCPCS | Performed by: HOSPITALIST

## 2018-01-28 RX ADMIN — SODIUM CHLORIDE: 9 INJECTION, SOLUTION INTRAVENOUS at 10:31

## 2018-01-28 RX ADMIN — MESALAMINE 400 MG: 400 CAPSULE, DELAYED RELEASE ORAL at 20:24

## 2018-01-28 RX ADMIN — INSULIN HUMAN 2 UNITS: 100 INJECTION, SOLUTION PARENTERAL at 10:45

## 2018-01-28 RX ADMIN — MESALAMINE 400 MG: 400 CAPSULE, DELAYED RELEASE ORAL at 16:44

## 2018-01-28 RX ADMIN — ENOXAPARIN SODIUM 100 MG: 100 INJECTION SUBCUTANEOUS at 06:02

## 2018-01-28 RX ADMIN — MERCAPTOPURINE 150 MG: 50 TABLET ORAL at 10:07

## 2018-01-28 RX ADMIN — INSULIN HUMAN 2 UNITS: 100 INJECTION, SOLUTION PARENTERAL at 20:29

## 2018-01-28 RX ADMIN — CEFTRIAXONE 2 G: 2 INJECTION, POWDER, FOR SOLUTION INTRAMUSCULAR; INTRAVENOUS at 10:08

## 2018-01-28 RX ADMIN — ENOXAPARIN SODIUM 80 MG: 100 INJECTION SUBCUTANEOUS at 18:51

## 2018-01-28 RX ADMIN — MESALAMINE 400 MG: 400 CAPSULE, DELAYED RELEASE ORAL at 10:08

## 2018-01-28 RX ADMIN — INSULIN HUMAN 2 UNITS: 100 INJECTION, SOLUTION PARENTERAL at 06:09

## 2018-01-28 RX ADMIN — TAMSULOSIN HYDROCHLORIDE 0.8 MG: 0.4 CAPSULE ORAL at 10:08

## 2018-01-28 RX ADMIN — OXYCODONE HYDROCHLORIDE 5 MG: 5 TABLET ORAL at 22:37

## 2018-01-28 RX ADMIN — SODIUM CHLORIDE: 9 INJECTION, SOLUTION INTRAVENOUS at 20:38

## 2018-01-28 RX ADMIN — INSULIN HUMAN 2 UNITS: 100 INJECTION, SOLUTION PARENTERAL at 16:44

## 2018-01-28 ASSESSMENT — ENCOUNTER SYMPTOMS
ABDOMINAL PAIN: 0
VOMITING: 0
BACK PAIN: 0
ORTHOPNEA: 0
PALPITATIONS: 0
NAUSEA: 0
NECK PAIN: 0

## 2018-01-28 NOTE — CARE PLAN
Problem: Communication  Goal: The ability to communicate needs accurately and effectively will improve  Outcome: PROGRESSING SLOWER THAN EXPECTED      Problem: Infection  Goal: Will remain free from infection  Outcome: PROGRESSING SLOWER THAN EXPECTED  Currently receiving IV antibiotic therapy. No signs of worsening infection at this time.    Problem: Venous Thromboembolism (VTW)/Deep Vein Thrombosis (DVT) Prevention:  Goal: Patient will participate in Venous Thrombosis (VTE)/Deep Vein Thrombosis (DVT)Prevention Measures  Outcome: PROGRESSING AS EXPECTED  Hx of DVT. Pt administered his own Lovenox this AM. Lovenox to be administered for the duration of hospital stay per MAR.

## 2018-01-28 NOTE — WOUND TEAM
Patient admitted with home wound VAC.  Patient is refusing to have his home wound VAC replaced with hospital wound VAC upon admission because he believes it will disrupt his wound healing.  Staff continues to educate patient on need to switch home VAC with hospital VAC due to billing.  Patient continues to refuse until wound VAC change Monday.  Wound team to change wound VAC dressing Monday 01/29.

## 2018-01-28 NOTE — PROGRESS NOTES
AAOx4 - extremely irritable. C/o 0/10 pain, declining intervention at this time. -N/V. -N/T. Denies new onset of chest pain/SOB. Bilateral nephrostomy tubes in place, patent. Wound vac in place to L posterior thigh. POC discussed, denies further needs at this time. Refusing bed alarm, call light within reach & hourly rounding in place.

## 2018-01-28 NOTE — ASSESSMENT & PLAN NOTE
Scrotal Ultrasound  1.  Heterogeneous parenchyma of the right testicle with markedly increased flow of the right epididymis and testis, appearance favors epididymal orchitis. Recommend sonographic follow-up for reevaluation of the right testis in 7-14 days to exclude   infiltrating testicular mass.  2.  Large right hydrocele.  3.  Surgical absence the left testicle    Continue antibiotics

## 2018-01-28 NOTE — RESPIRATORY CARE
COPD EDUCATION by COPD CLINICAL EDUCATOR  1/28/2018 at 7:39 AM by Maria Isabel Dueñas     Patient reviewed by COPD education team. Patient does not qualify for COPD program.

## 2018-01-28 NOTE — PROGRESS NOTES
[facility] Hospitalist Progress Note    Date of Service: 2018    Chief Complaint  67 y.o. male admitted 2018 with fever and chills    Mr. Lima has a complicated urinary history, 2017 he had urinary bladder rupture, subsequently had bilaterally nephrostomy tube placement as well as Pacheco catheter.  Also was diagnosed with DVT and treated with Lovenox.  Was most recently treated for suspected urinary tract infection at the [facility].  Presents 18 with fever and chills    Interval Problem Update  Patient overall feels better, less flank pain  Has been afebrile, T-max 36.7, chills and malaise improved  No nausea vomiting, no abdominal pain  Has sensation to urinate, incontinent of small amount of urine this morning     Consultants/Specialty  None    Disposition  To be determined        Review of Systems   Cardiovascular: Negative for chest pain, palpitations and orthopnea.   Gastrointestinal: Negative for abdominal pain, nausea and vomiting.   Musculoskeletal: Negative for back pain and neck pain.   Skin: Negative for itching and rash.      Physical Exam  Laboratory/Imaging   Hemodynamics  Temp (24hrs), Av.6 °C (97.9 °F), Min:36.4 °C (97.6 °F), Max:36.7 °C (98.1 °F)   Temperature: 36.4 °C (97.6 °F)  Pulse  Av  Min: 62  Max: 101    Blood Pressure : 124/70      Respiratory      Respiration: 17, Pulse Oximetry: 93 %             Fluids    Intake/Output Summary (Last 24 hours) at 18 0905  Last data filed at 18 0715   Gross per 24 hour   Intake              895 ml   Output              860 ml   Net               35 ml       Nutrition  Orders Placed This Encounter   Procedures   • Diet Order     Standing Status:   Standing     Number of Occurrences:   1     Order Specific Question:   Diet:     Answer:   Regular [1]     Physical Exam   Constitutional: He is oriented to person, place, and time. He appears well-developed and well-nourished.   Eyes: Conjunctivae and EOM are normal.  Right eye exhibits no discharge. Left eye exhibits no discharge.   Neck: Neck supple.   Cardiovascular: Normal rate, regular rhythm and intact distal pulses.    No murmur heard.  Pulmonary/Chest: Effort normal and breath sounds normal. No respiratory distress. He has no wheezes.   Abdominal: Soft. Bowel sounds are normal. He exhibits no distension. There is no tenderness. There is no rebound.   Back: There are bilateral nephrostomy tubes   Musculoskeletal: Normal range of motion. He exhibits no edema.   Neurological: He is alert and oriented to person, place, and time. No cranial nerve deficit.   Skin: Skin is warm and dry. He is not diaphoretic. No erythema.   Psychiatric: He has a normal mood and affect.       Recent Labs      01/25/18   1337  01/27/18   0045  01/28/18   0248   WBC  3.2*  3.2*  2.7*   RBC  3.63*  3.33*  3.02*   HEMOGLOBIN  10.5*  9.8*  8.8*   HEMATOCRIT  34.9*  31.9*  29.1*   MCV  96.1  95.8  96.4   MCH  28.9  29.4  29.1   MCHC  30.1*  30.7*  30.2*   RDW  61.5*  61.8*  61.1*   PLATELETCT  249  226  215   MPV  9.0  9.5  9.1     Recent Labs      01/27/18   0045  01/28/18   0248   SODIUM  136  137   POTASSIUM  3.8  4.0   CHLORIDE  104  103   CO2  24  26   GLUCOSE  218*  181*   BUN  15  14   CREATININE  0.45*  0.43*   CALCIUM  8.4*  8.3*     Recent Labs      01/25/18   1337   INR  1.11                  Assessment/Plan     * Pyelonephritis- (present on admission)   Assessment & Plan    Complains of flank pain, chills and sweats  Urinalysis 1/27/2018: Large leukocyte esterase, positive nitrate, packed white blood cells  Urine cultures pending, await results  Continue ceftriaxone        Orchitis and epididymitis- (present on admission)   Assessment & Plan    Scrotal Ultrasound  1.  Heterogeneous parenchyma of the right testicle with markedly increased flow of the right epididymis and testis, appearance favors epididymal orchitis. Recommend sonographic follow-up for reevaluation of the right testis in  7-14 days to exclude   infiltrating testicular mass.  2.  Large right hydrocele.  3.  Surgical absence the left testicle    Continue antibiotics        Nephrostomy status (CMS-HCC)- (present on admission)   Assessment & Plan    Bilateral nephrostomy after bladder rupture  Has IR procedure scheduled for tomorrow: Internalization of stent and removal of nephrostomy tube  I discussed with Dr. Mosqueda of urology  Dr. Salomon will review office records and discuss with Dr. Upton who is primary urologist  NPO at midnight, likely to pursue IR procedure tomorrow        Wound of left lower extremity- (present on admission)   Assessment & Plan    Posterior left thigh, related to hematoma with ulceration of surrounding skin  Has wound VAC in place  Wound care        Ruptured bladder- (present on admission)   Assessment & Plan    Status post bilateral nephrostomy        Acute DVT (deep venous thrombosis) (CMS-HCC)- (present on admission)   Assessment & Plan    RLE, diagnosed at [facility]        Recurrent pulmonary embolism (CMS-HCC)- (present on admission)   Assessment & Plan    Patient likely has a hypercoagulable state, certainly this could be related to his inflammatory bowel disease  Continue Lovenox        Crohn disease (CMS-HCC)- (present on admission)   Assessment & Plan    Continue mercaptopurine and mesalamine            Reviewed items::  Labs reviewed and Medications reviewed  Pacheco catheter::  No Pacheco  DVT prophylaxis pharmacological::  Enoxaparin (Lovenox)  Antibiotics:  Treating active infection/contamination beyond 24 hours perioperative coverage

## 2018-01-28 NOTE — PROGRESS NOTES
Bedside report received.  Assessment complete.  A&O x 4. Patient calls appropriately.  Denies numbness and tingling.   Patient ambualtes with standby assist.   Patient denies pain at this time.  Denies N&V. Tolerating regualr diabetic diet.  Nephrostomy tubes x2; wound vac back of left thigh.  + output through nephrostomy tubes, + flatus with loose stool  Patient denies SOB.  Review plan with of care with patient. Call light and personal belongings with in reach. Hourly rounding in place. All needs met at this time.

## 2018-01-28 NOTE — PROGRESS NOTES
Upon arriving on shift, the patient was slightly agitated about a dose timing of lovenox. Patient threatened to leave AMA if the time was not corrected. The medication time query was resolved and the patient became more calm once his wife showed up. Patient is refusing maintenance fluids and the wound vac that was suggested. Patient stated that if his original wound vac is due to come off this Monday and that he doesn't want to disrupt the healing by disconnecting and reconnecting to another vac. Patient was educated about hospital policy about our wound vacs and still refused to have it changed. Will continue to monitor.

## 2018-01-28 NOTE — PROGRESS NOTES
Patient is AOX4. Agitation has subsided since the beginning of shift. The agitation is coming from frustration with complications from recent hospitalizations. No pain noted at this time. Wound vac to left posterior thigh is intact. Patient still refusing the different wound vac. Bilateral nephrostomy tubes present and draining appropriately. Patient needs to have wound care consult for home discharge. Bed in lowest and locked position. Call light within reach. Will continue to monitor.

## 2018-01-28 NOTE — CARE PLAN
Problem: Safety  Goal: Will remain free from falls  Outcome: PROGRESSING AS EXPECTED  Patient uses call light appropriately when getting out of bed.

## 2018-01-29 ENCOUNTER — HOSPITAL ENCOUNTER (OUTPATIENT)
Dept: RADIOLOGY | Facility: MEDICAL CENTER | Age: 68
DRG: 862 | End: 2018-01-29
Attending: UROLOGY | Admitting: UROLOGY
Payer: MEDICARE

## 2018-01-29 VITALS
DIASTOLIC BLOOD PRESSURE: 81 MMHG | RESPIRATION RATE: 18 BRPM | OXYGEN SATURATION: 91 % | BODY MASS INDEX: 22.24 KG/M2 | HEART RATE: 77 BPM | WEIGHT: 167.77 LBS | TEMPERATURE: 98.1 F | HEIGHT: 73 IN | SYSTOLIC BLOOD PRESSURE: 138 MMHG

## 2018-01-29 DIAGNOSIS — N20.0 CALCULUS OF KIDNEY: ICD-10-CM

## 2018-01-29 PROBLEM — Z93.6 NEPHROSTOMY STATUS (HCC): Status: RESOLVED | Noted: 2017-12-20 | Resolved: 2018-01-29

## 2018-01-29 PROBLEM — N32.89: Status: RESOLVED | Noted: 2017-12-11 | Resolved: 2018-01-29

## 2018-01-29 LAB
ANION GAP SERPL CALC-SCNC: 5 MMOL/L (ref 0–11.9)
BACTERIA UR CULT: NORMAL
BASOPHILS # BLD AUTO: 0.8 % (ref 0–1.8)
BASOPHILS # BLD: 0.02 K/UL (ref 0–0.12)
BUN SERPL-MCNC: 10 MG/DL (ref 8–22)
CALCIUM SERPL-MCNC: 8.6 MG/DL (ref 8.5–10.5)
CHLORIDE SERPL-SCNC: 106 MMOL/L (ref 96–112)
CO2 SERPL-SCNC: 25 MMOL/L (ref 20–33)
CREAT SERPL-MCNC: 0.43 MG/DL (ref 0.5–1.4)
EOSINOPHIL # BLD AUTO: 0.29 K/UL (ref 0–0.51)
EOSINOPHIL NFR BLD: 12 % (ref 0–6.9)
ERYTHROCYTE [DISTWIDTH] IN BLOOD BY AUTOMATED COUNT: 60.6 FL (ref 35.9–50)
GLUCOSE BLD-MCNC: 168 MG/DL (ref 65–99)
GLUCOSE SERPL-MCNC: 197 MG/DL (ref 65–99)
HCT VFR BLD AUTO: 29.8 % (ref 42–52)
HGB BLD-MCNC: 9.2 G/DL (ref 14–18)
IMM GRANULOCYTES # BLD AUTO: 0.01 K/UL (ref 0–0.11)
IMM GRANULOCYTES NFR BLD AUTO: 0.4 % (ref 0–0.9)
LYMPHOCYTES # BLD AUTO: 0.63 K/UL (ref 1–4.8)
LYMPHOCYTES NFR BLD: 26 % (ref 22–41)
MCH RBC QN AUTO: 29.7 PG (ref 27–33)
MCHC RBC AUTO-ENTMCNC: 30.9 G/DL (ref 33.7–35.3)
MCV RBC AUTO: 96.1 FL (ref 81.4–97.8)
MONOCYTES # BLD AUTO: 0.24 K/UL (ref 0–0.85)
MONOCYTES NFR BLD AUTO: 9.9 % (ref 0–13.4)
NEUTROPHILS # BLD AUTO: 1.23 K/UL (ref 1.82–7.42)
NEUTROPHILS NFR BLD: 50.9 % (ref 44–72)
NRBC # BLD AUTO: 0 K/UL
NRBC BLD-RTO: 0 /100 WBC
PLATELET # BLD AUTO: 242 K/UL (ref 164–446)
PMV BLD AUTO: 9.4 FL (ref 9–12.9)
POTASSIUM SERPL-SCNC: 3.8 MMOL/L (ref 3.6–5.5)
RBC # BLD AUTO: 3.1 M/UL (ref 4.7–6.1)
SIGNIFICANT IND 70042: NORMAL
SITE SITE: NORMAL
SODIUM SERPL-SCNC: 136 MMOL/L (ref 135–145)
SOURCE SOURCE: NORMAL
WBC # BLD AUTO: 2.4 K/UL (ref 4.8–10.8)

## 2018-01-29 PROCEDURE — 700117 HCHG RX CONTRAST REV CODE 255: Performed by: RADIOLOGY

## 2018-01-29 PROCEDURE — 700111 HCHG RX REV CODE 636 W/ 250 OVERRIDE (IP)

## 2018-01-29 PROCEDURE — BT131ZZ FLUOROSCOPY OF BILATERAL KIDNEYS USING LOW OSMOLAR CONTRAST: ICD-10-PCS | Performed by: RADIOLOGY

## 2018-01-29 PROCEDURE — 97161 PT EVAL LOW COMPLEX 20 MIN: CPT | Mod: CH

## 2018-01-29 PROCEDURE — 700111 HCHG RX REV CODE 636 W/ 250 OVERRIDE (IP): Performed by: HOSPITALIST

## 2018-01-29 PROCEDURE — 700101 HCHG RX REV CODE 250: Performed by: HOSPITALIST

## 2018-01-29 PROCEDURE — 99153 MOD SED SAME PHYS/QHP EA: CPT

## 2018-01-29 PROCEDURE — 99239 HOSP IP/OBS DSCHRG MGMT >30: CPT | Performed by: HOSPITALIST

## 2018-01-29 PROCEDURE — BT141ZZ FLUOROSCOPY OF KIDNEYS, URETERS AND BLADDER USING LOW OSMOLAR CONTRAST: ICD-10-PCS | Performed by: RADIOLOGY

## 2018-01-29 PROCEDURE — 36415 COLL VENOUS BLD VENIPUNCTURE: CPT

## 2018-01-29 PROCEDURE — 0TP5X0Z REMOVAL OF DRAINAGE DEVICE FROM KIDNEY, EXTERNAL APPROACH: ICD-10-PCS | Performed by: RADIOLOGY

## 2018-01-29 PROCEDURE — 94760 N-INVAS EAR/PLS OXIMETRY 1: CPT

## 2018-01-29 PROCEDURE — 50693 PLMT URETERAL STENT PRQ: CPT | Mod: LT,XU

## 2018-01-29 PROCEDURE — 97606 NEG PRS WND THER DME>50 SQCM: CPT

## 2018-01-29 PROCEDURE — 0T773DZ DILATION OF LEFT URETER WITH INTRALUMINAL DEVICE, PERCUTANEOUS APPROACH: ICD-10-PCS | Performed by: RADIOLOGY

## 2018-01-29 PROCEDURE — A9270 NON-COVERED ITEM OR SERVICE: HCPCS | Performed by: HOSPITALIST

## 2018-01-29 PROCEDURE — 85025 COMPLETE CBC W/AUTO DIFF WBC: CPT

## 2018-01-29 PROCEDURE — 700102 HCHG RX REV CODE 250 W/ 637 OVERRIDE(OP): Performed by: HOSPITALIST

## 2018-01-29 PROCEDURE — 82962 GLUCOSE BLOOD TEST: CPT

## 2018-01-29 PROCEDURE — 700105 HCHG RX REV CODE 258: Performed by: HOSPITALIST

## 2018-01-29 PROCEDURE — G8990 OTHER PT/OT CURRENT STATUS: HCPCS

## 2018-01-29 PROCEDURE — 50389 REMOVE RENAL TUBE W/FLUORO: CPT | Mod: 50

## 2018-01-29 PROCEDURE — 80048 BASIC METABOLIC PNL TOTAL CA: CPT

## 2018-01-29 PROCEDURE — 700111 HCHG RX REV CODE 636 W/ 250 OVERRIDE (IP): Performed by: RADIOLOGY

## 2018-01-29 RX ORDER — GRANULES FOR ORAL 3 G/1
3 POWDER ORAL
Status: DISCONTINUED | OUTPATIENT
Start: 2018-01-29 | End: 2018-01-29 | Stop reason: HOSPADM

## 2018-01-29 RX ORDER — SODIUM CHLORIDE 9 MG/ML
500 INJECTION, SOLUTION INTRAVENOUS
Status: ACTIVE | OUTPATIENT
Start: 2018-01-29 | End: 2018-01-29

## 2018-01-29 RX ORDER — FLUCONAZOLE 200 MG/1
100 TABLET ORAL ONCE
Status: COMPLETED | OUTPATIENT
Start: 2018-01-29 | End: 2018-01-29

## 2018-01-29 RX ORDER — ONDANSETRON 2 MG/ML
4 INJECTION INTRAMUSCULAR; INTRAVENOUS PRN
Status: ACTIVE | OUTPATIENT
Start: 2018-01-29 | End: 2018-01-29

## 2018-01-29 RX ORDER — FLUCONAZOLE 100 MG/1
100 TABLET ORAL DAILY
Qty: 14 TAB | Refills: 0 | Status: SHIPPED | OUTPATIENT
Start: 2018-01-29 | End: 2018-01-30

## 2018-01-29 RX ORDER — MIDAZOLAM HYDROCHLORIDE 1 MG/ML
.5-2 INJECTION INTRAMUSCULAR; INTRAVENOUS PRN
Status: DISCONTINUED | OUTPATIENT
Start: 2018-01-29 | End: 2018-01-29

## 2018-01-29 RX ORDER — MIDAZOLAM HYDROCHLORIDE 1 MG/ML
INJECTION INTRAMUSCULAR; INTRAVENOUS
Status: COMPLETED
Start: 2018-01-29 | End: 2018-01-29

## 2018-01-29 RX ORDER — HYDROMORPHONE HYDROCHLORIDE 2 MG/ML
1 INJECTION, SOLUTION INTRAMUSCULAR; INTRAVENOUS; SUBCUTANEOUS ONCE
Status: COMPLETED | OUTPATIENT
Start: 2018-01-29 | End: 2018-01-29

## 2018-01-29 RX ORDER — OXYCODONE HYDROCHLORIDE 10 MG/1
10 TABLET ORAL EVERY 4 HOURS PRN
Status: DISCONTINUED | OUTPATIENT
Start: 2018-01-29 | End: 2018-01-29 | Stop reason: HOSPADM

## 2018-01-29 RX ORDER — CEFAZOLIN SODIUM 1 G/3ML
INJECTION, POWDER, FOR SOLUTION INTRAMUSCULAR; INTRAVENOUS
Status: COMPLETED
Start: 2018-01-29 | End: 2018-01-29

## 2018-01-29 RX ADMIN — MIDAZOLAM 2 MG: 1 INJECTION INTRAMUSCULAR; INTRAVENOUS at 10:10

## 2018-01-29 RX ADMIN — MESALAMINE 400 MG: 400 CAPSULE, DELAYED RELEASE ORAL at 13:40

## 2018-01-29 RX ADMIN — ENOXAPARIN SODIUM 80 MG: 100 INJECTION SUBCUTANEOUS at 17:29

## 2018-01-29 RX ADMIN — FENTANYL CITRATE 50 MCG: 50 INJECTION, SOLUTION INTRAMUSCULAR; INTRAVENOUS at 10:10

## 2018-01-29 RX ADMIN — INSULIN HUMAN 2 UNITS: 100 INJECTION, SOLUTION PARENTERAL at 11:42

## 2018-01-29 RX ADMIN — MIDAZOLAM 1 MG: 1 INJECTION INTRAMUSCULAR; INTRAVENOUS at 10:26

## 2018-01-29 RX ADMIN — CEFAZOLIN 1000 MG: 330 INJECTION, POWDER, FOR SOLUTION INTRAMUSCULAR; INTRAVENOUS at 10:10

## 2018-01-29 RX ADMIN — OXYCODONE HYDROCHLORIDE 5 MG: 5 TABLET ORAL at 11:46

## 2018-01-29 RX ADMIN — FLUCONAZOLE 100 MG: 200 TABLET ORAL at 17:19

## 2018-01-29 RX ADMIN — FENTANYL CITRATE 50 MCG: 50 INJECTION, SOLUTION INTRAMUSCULAR; INTRAVENOUS at 10:26

## 2018-01-29 RX ADMIN — IOHEXOL 45 ML: 300 INJECTION, SOLUTION INTRAVENOUS at 10:30

## 2018-01-29 RX ADMIN — HYDROMORPHONE HYDROCHLORIDE 1 MG: 2 INJECTION INTRAMUSCULAR; INTRAVENOUS; SUBCUTANEOUS at 14:32

## 2018-01-29 RX ADMIN — MERCAPTOPURINE 150 MG: 50 TABLET ORAL at 13:40

## 2018-01-29 RX ADMIN — TAMSULOSIN HYDROCHLORIDE 0.8 MG: 0.4 CAPSULE ORAL at 08:50

## 2018-01-29 RX ADMIN — FENTANYL CITRATE 50 MCG: 50 INJECTION, SOLUTION INTRAMUSCULAR; INTRAVENOUS at 10:19

## 2018-01-29 RX ADMIN — SODIUM CHLORIDE: 9 INJECTION, SOLUTION INTRAVENOUS at 08:50

## 2018-01-29 RX ADMIN — MIDAZOLAM 2 MG: 1 INJECTION INTRAMUSCULAR; INTRAVENOUS at 10:19

## 2018-01-29 RX ADMIN — CEFTRIAXONE 2 G: 2 INJECTION, POWDER, FOR SOLUTION INTRAMUSCULAR; INTRAVENOUS at 08:51

## 2018-01-29 RX ADMIN — FOSFOMYCIN TROMETHAMINE 3 G: 3 POWDER ORAL at 17:29

## 2018-01-29 ASSESSMENT — PAIN SCALES - GENERAL: PAINLEVEL_OUTOF10: 2

## 2018-01-29 NOTE — CARE PLAN
Problem: Communication  Goal: The ability to communicate needs accurately and effectively will improve  Outcome: PROGRESSING SLOWER THAN EXPECTED  Reinforced use of call light  Encouraged patient to ask for things    Problem: Bowel/Gastric:  Goal: Normal bowel function is maintained or improved  Outcome: PROGRESSING SLOWER THAN EXPECTED  Frequent loose bowel movements, more than normal for patient  Medications discussed with patient and MD  IV fluids in use    Problem: Pain Management  Goal: Pain level will decrease to patient's comfort goal  Outcome: PROGRESSING AS EXPECTED  Patient denies pain at this time

## 2018-01-29 NOTE — PROGRESS NOTES
Patient AOX4. PRN pain meds given. IR to remove one of the nephrostomy tubes tomorrow in AM pending approval  But may be held off d/t pyelonephritis.Patient's behavior has significantly improved since yesterday's agitation. Patient is NPO at midnight. WBC count this morning had decreased to 2.7. Will watch for labs in morning. No acute distresses noted otherwise at this time. Bed in lowest and locked position. Wound vac in place to left posterior thigh. Will continue to monitor.

## 2018-01-29 NOTE — PROGRESS NOTES
IR Procedure Note:    Site Marked and Confirmed with MD, RT patient and RN pre procedure.    Dr. Clark performed  a left ureteral stent placement and a right and left nephrostomy tube removal.  The pt tolerated the procedure well; ETCo2 baseline 35, with consistent waveform during the procedure.  Metapore tape and gauze applied to right and left back.  Pt alert, oriented and verbally appropriate post procedure, vital signs stable during procedure and transport, see flow sheet for vital signs.  Report given to KATHY Arriaga.  RN transported pt to T432 with Sao2 monitor = 96% on room air.

## 2018-01-29 NOTE — CONSULTS
INFECTIOUS DISEASES INPATIENT CONSULT NOTE     Date of Service: 1/29/2018    Consult Requested By: Jovon Ralph M.D.    Reason for Consultation: Complicated UTI    History of Present Illness:   Niels Lima is a 67 y.o. Man with a history of DM2, Crohn's, DVT with recent bladder rupture in December 2017 s/p bilateral PCN tubes and kaur catheter admitted 1/27/2018 for fevers and chills. Pt was recently admitted at the [facility] last week for fevers Tmax 100. He was treated with zosyn and discharged last Friday on fosfomycin. He was supposed to take fosfomycin on Saturday but developed recurrent fevers and chills Friday evening and presented to our ED for further evaluation. Pt states he also noted bilateral flank pain from PCN tubes and some cloudy colored urine. On presentation, he was afebrile with leukopenia. UA revealed packed WBCs, negative bacteria, large LE and positive nitrites. He was given a dose of gentamicin and then transitioned to ceftriaxone. Ucx is growing mixed skin anca including yeast. He is now s/p bilateral PCN tube removal with placement of left ureteral stent today. Since his tubes were removed, he has been able to urinate without difficulty. He denies dysuria or hematuria. Has some discomfort from tube removal. Denies any recurrent fevers or chills. In addition, pt states he was treated for an infected left lower extremity hematoma with IV abx which were completed on 12/29/17 per review of the hospital records. His wound is overall improving. ID consulted for further abx recommendations.     Review Of Systems:  All other ROS were reviewed and are negative except as noted above in the HPI.    PMH:   Past Medical History:   Diagnosis Date   • Asthma     daily inhaler   • Bowel habit changes     diarrhea   • Crohn's disease (CMS-[facility]) 01/01/2001   • Diabetes (CMS-HCC)     Type 2; oral medication   • DVT (deep venous thrombosis) (CMS-HCC) 2002, 2003, 2009, 2015    IVC filter in place- pt's  "wife states that he had a test done in [facility] \"snake venom\" to check pt's blood and she thinks he has some factor issue but unsure of what   • Hemorrhagic disorder (CMS-HCC)     on lovenox at this time   • Presence of IVC filter 01/01/2009   • Renal disorder     nephrostomy tube in place   • Snoring     sleep study not done       PSH:  Past Surgical History:   Procedure Laterality Date   • INCISION AND DRAINAGE GENERAL Left 12/17/2017    Procedure: INCISION AND DRAINAGE GENERAL;  Surgeon: Enrique Deluna M.D.;  Location: SURGERY [facility];  Service: General   • CYSTOSCOPY  12/10/2017    Procedure: cystoscopy, evacuation of clots, transurethral biopsy, ffulguration of perforation, cystogram;  Surgeon: Amador Upton M.D.;  Location: SURGERY [facility];  Service: Urology   • SCROTAL EXPLORATION Bilateral 11/12/2017    Procedure: SCROTAL EXPLORATION -  left orchiectomy, right testicular fixation;  Surgeon: José Antonio Mosqueda M.D.;  Location: SURGERY [facility];  Service: Urology   • LITHOTRIPSY  2010   • OTHER  2009    IVC filter   • BOWEL RESECTION  01/01/1986       FAMILY HX:  He is adopted    SOCIAL HX:  Social History     Social History   • Marital status:      Spouse name: N/A   • Number of children: N/A   • Years of education: N/A     Occupational History   • Not on file.     Social History Main Topics   • Smoking status: Never Smoker   • Smokeless tobacco: Never Used   • Alcohol use No   • Drug use: No   • Sexual activity: Not on file     Other Topics Concern   • Not on file     Social History Narrative   • No narrative on file     History   Smoking Status   • Never Smoker   Smokeless Tobacco   • Never Used     History   Alcohol Use No       Allergies/Intolerances:  Allergies   Allergen Reactions   • Prochlorperazine Unspecified     \"FACE FROZE UP\"       History reviewed with the patient    Other Current Medications:    Current Facility-Administered Medications:   •  oxycodone immediate " release (ROXICODONE) tablet 10 mg, 10 mg, Oral, Q4HRS PRN, Jovon Ralph M.D.  •  HYDROmorphone (DILAUDID) injection 1 mg, 1 mg, Intravenous, Once, Jovon Ralph M.D.  •  enoxaparin (LOVENOX) inj 80 mg, 80 mg, Subcutaneous, Q12HRS, Jovon Ralph M.D.  •  enoxaparin (LOVENOX) inj 80 mg, 80 mg, Subcutaneous, Q12HRS, Jovon Ralph M.D.  •  mercaptopurine (PURINETHOL) tablet 150 mg, 150 mg, Oral, DAILY, Willian Long M.D., 150 mg at 01/29/18 1340  •  mesalamine delayed-release (DELZICOL) capsule 400 mg, 400 mg, Oral, TID, Willian Long M.D., 400 mg at 01/29/18 1340  •  tamsulosin (FLOMAX) capsule 0.8 mg, 0.8 mg, Oral, DAILY, Wililan Long M.D., 0.8 mg at 01/29/18 0850  •  ondansetron (ZOFRAN) syringe/vial injection 4 mg, 4 mg, Intravenous, Q4HRS PRN, Willian Long M.D.  •  ondansetron (ZOFRAN ODT) dispertab 4 mg, 4 mg, Oral, Q4HRS PRN, Willian Long M.D.  •  haloperidol lactate (HALDOL) injection 5 mg, 5 mg, Intravenous, Q4HRS PRN, Willian Long M.D.  •  acetaminophen (TYLENOL) tablet 650 mg, 650 mg, Oral, Q6HRS PRN, Willian Long M.D.  •  Notify provider if pain remains uncontrolled, , , CONTINUOUS **AND** Use the numeric rating scale (NRS-11) on regular floors and Critical-Care Pain Observation Tool (CPOT) on ICUs/Trauma to assess pain, , , CONTINUOUS **AND** Pulse Ox (Oximetry), , , CONTINUOUS **AND** Pharmacy Consult Request ...Pain Management Review, , Other, PRN **AND** If patient difficult to arouse and/or has respiratory depression, stop any opiates that are currently infusing and call a Rapid Response., , , CONTINUOUS **AND** [DISCONTINUED] oxycodone immediate-release (ROXICODONE) tablet 2.5 mg, 2.5 mg, Oral, Q3HRS PRN **AND** [DISCONTINUED] oxycodone immediate-release (ROXICODONE) tablet 5 mg, 5 mg, Oral, Q3HRS PRN, 5 mg at 01/29/18 1146 **AND** [DISCONTINUED] morphine (pf) 4 mg/ml injection 2 mg, 2 mg, Intravenous, Q3HRS PRN, Willian Long M.D.  •  cefTRIAXone (ROCEPHIN) syringe 2 g, 2 g,  "Intravenous, Q24HRS, Willian Long M.D., 2 g at 18 0851  •  senna-docusate (PERICOLACE or SENOKOT S) 8.6-50 MG per tablet 2 Tab, 2 Tab, Oral, BID, Stopped at 18 0900 **AND** polyethylene glycol/lytes (MIRALAX) PACKET 1 Packet, 1 Packet, Oral, QDAY PRN **AND** magnesium hydroxide (MILK OF MAGNESIA) suspension 30 mL, 30 mL, Oral, QDAY PRN **AND** bisacodyl (DULCOLAX) suppository 10 mg, 10 mg, Rectal, QDAY PRN, Nena Napier M.D.  •  insulin regular (HUMULIN R) injection 2-9 Units, 2-9 Units, Subcutaneous, 4X/DAY ACHS, 2 Units at 18 1142 **AND** Accu-Chek ACHS, , , Q AC AND BEDTIME(S) **AND** NOTIFY MD and PharmD, , , Once **AND** glucose 4 g chewable tablet 16 g, 16 g, Oral, Q15 MIN PRN **AND** dextrose 50% (D50W) injection 25 mL, 25 mL, Intravenous, Q15 MIN PRN, Nena Napier M.D.  [unfilled]    Most Recent Vital Signs:  /73   Pulse 70   Temp 36.6 °C (97.8 °F)   Resp 18   Ht 1.854 m (6' 0.99\")   Wt 76.1 kg (167 lb 12.3 oz)   SpO2 93%   BMI 22.14 kg/m²   Temp  Av.7 °C (98 °F)  Min: 36.1 °C (97 °F)  Max: 37.2 °C (99 °F)    Physical Exam:  General: older than stated age but well-appearing, no acute distress  HEENT: sclera anicteric, PERRL, EOMI, MMM, no oral lesions  Neck: supple, no lymphadenopathy  Chest: CTAB, no r/r/w, normal work of breathing.  Cardiac: RRR, normal S1 S2, no m/r/g   Abdomen: + bowel sounds, soft, non-tender, non-distended, no HSM  Back: prior BL PCN sites dressed some TTP  Extremities: WWP, no edema, 2+ pulses, left posterior thigh wound vac - no extending erythema  Skin: no rashes or worrisome lesions  Neuro: Alert and oriented times 3, non-focal exam, speech fluent, moves all extremities    Pertinent Lab Results:  Recent Labs      18   0045  18   0248  18   0315   WBC  3.2*  2.7*  2.4*      Recent Labs      18   0045  18   0248  18   0315   HEMOGLOBIN  9.8*  8.8*  9.2*   HEMATOCRIT  31.9*  29.1*  29.8*   MCV  " "95.8  96.4  96.1   MCH  29.4  29.1  29.7   PLATELETCT  226  215  242         Recent Labs      01/27/18   0045  01/28/18   0248  01/29/18   0315   SODIUM  136  137  136   POTASSIUM  3.8  4.0  3.8   CHLORIDE  104  103  106   CO2  24  26  25   CREATININE  0.45*  0.43*  0.43*        Recent Labs      01/27/18 0045   ALBUMIN  3.2        Pertinent Micro:  Results     Procedure Component Value Units Date/Time    URINE CULTURE(NEW) [[id number]] Collected:  01/27/18 0106    Order Status:  Completed Specimen:  Urine from Urine, Clean Catch Updated:  01/29/18 0901     Significant Indicator NEG     Source UR     Site URINE, CLEAN CATCH     Urine Culture Mixed skin anca 10-50,000 cfu/mL including yeast    Narrative:       Indication for culture:->Emergency Room Patient    BLOOD CULTURE [[id number]] Collected:  01/27/18 0102    Order Status:  Completed Specimen:  Blood from Peripheral Updated:  01/28/18 0840     Significant Indicator NEG     Source BLD     Site PERIPHERAL     Blood Culture --     No Growth    Note: Blood cultures are incubated for 5 days and  are monitored continuously.Positive blood cultures  are called to the RN and reported as soon as  they are identified.      Narrative:       Per Hospital Policy: Only change Specimen Src: to \"Line\" if  specified by physician order.    BLOOD CULTURE [[id number]] Collected:  01/27/18 0101    Order Status:  Completed Specimen:  Blood from Peripheral Updated:  01/28/18 0840     Significant Indicator NEG     Source BLD     Site PERIPHERAL     Blood Culture --     No Growth    Note: Blood cultures are incubated for 5 days and  are monitored continuously.Positive blood cultures  are called to the RN and reported as soon as  they are identified.      Narrative:       Per Hospital Policy: Only change Specimen Src: to \"Line\" if  specified by physician order.    URINALYSIS [[id number]]  (Abnormal) Collected:  01/27/18 0106    Order Status:  Completed Specimen:  Urine from Urine, Clean " Catch Updated:  01/27/18 0142     Color Yellow     Character Turbid (A)     Specific Gravity 1.016     Ph 5.5     Glucose Negative mg/dL      Ketones Trace (A) mg/dL      Protein 100 (A) mg/dL      Bilirubin Negative     Urobilinogen, Urine 0.2     Nitrite Positive (A)     Leukocyte Esterase Large (A)     Occult Blood Large (A)     Micro Urine Req Microscopic    Narrative:       Indication for culture:->Emergency Room Patient        Blood Culture   Date Value Ref Range Status   01/27/2018   Preliminary    No Growth    Note: Blood cultures are incubated for 5 days and  are monitored continuously.Positive blood cultures  are called to the RN and reported as soon as  they are identified.          Studies:  Dx-chest-portable (1 View)    Result Date: 1/27/2018 1/27/2018 12:17 AM HISTORY/REASON FOR EXAM:  Possible sepsis TECHNIQUE/EXAM DESCRIPTION:  Single AP view of the chest. COMPARISON: December 26, 2017 FINDINGS: Overlying cardiac leads are present. The cardiac silhouette appears within normal limits. The mediastinal contour appears within normal limits.  The central pulmonary vasculature appears normal. The lungs appear well expanded bilaterally.  Bilateral lungs are clear. No significant pleural effusions are identified. The bony structures appear age-appropriate.     1.  No acute cardiopulmonary disease.    [id number]    Result Date: 1/27/2018 1/27/2018 2:27 AM HISTORY/REASON FOR EXAM: Pain. TECHNIQUE/EXAM DESCRIPTION: Real-time sonography of the scrotum was performed with gray-scale, color and duplex Doppler imaging. COMPARISON: November 12, 2017 FINDINGS: There is diffuse heterogeneous flow within the right testicle, which is new since prior study. Increased flow within the right testicle and epididymis is identified. The right testis measures 3.97 cm x 2.66 cm x 3.47 cm. 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 The right epididymis measures 13.3 x 17.6 mm. Large right hydrocele is seen. No  varicocele is detected.     1.  Heterogeneous parenchyma of the right testicle with markedly increased flow of the right epididymis and testis, appearance favors epididymal orchitis. Recommend sonographic follow-up for reevaluation of the right testis in 7-14 days to exclude infiltrating testicular mass. 2.  Large right hydrocele. 3.  Surgical absence the left testicle    Ir-conv Perq Neph To Nephroureteral Cath (all Radiology) Left    Result Date: 1/29/2018 1/29/2018 9:28 AM Bilateral nephrostograms and bilateral nephrostomy tube removals and left ureteral stent placement. HISTORY/REASON FOR EXAM: Ureteral obstruction. TECHNIQUE: Following informed consent patient prepped and draped in the usual fashion.  10 cc 1% lidocaine was utilized for local anesthesia. SEDATION: Conscious sedation with 150 mcg of Fentanyl and 5 mg of Versed was administered during the procedure with appropriate continuous patient monitoring by the radiology nurse. Sedation duration: 30 minutes. The procedure was performed using MAXIMAL STERILE BARRIER technique including sterile gown, mask, cap, and donning of sterile gloves following appropriate hand hygiene and/or sterile scrub. Patient skin site was prepped with 2% Chlorhexidine solution. CONTRAST: 45 mL of Omnipaque 300. FLUOROSCOPY: 4 fluoroscopic images obtained. 3.4 minutes. Omnipaque 300 was injected into the indwelling bilateral nephrostomy tubes and digital imaging was obtained over the kidneys and ureters. The right nephrostomy tube was subsequently removed. Next a guidewire was advanced into the left ureter and the indwelling left nephrostomy tube was removed.  A 5-Citizen of [place] catheter was advanced into the bladder over the guidewire.  Next the guidewire was exchanged for an Amplatz guidewire which was coiled in  the bladder.  Next an 8-Citizen of [place] 24-cm in length double-J ureteral stent was advanced over the guidewire and positioned with its distal loop in the bladder and proximal loop in  the left renal collecting system.  The internal stiffener was removed and the guidewire was retracted and positioned in the left renal collecting system.  An 8.5-Uzbek locking loop catheter was also positioned in the left renal collecting system.   Omnipaque 300 was injected and digital imaging was obtained.  The patient tolerated procedure well and sent to the floor in good condition. FINDINGS: The initial right nephrostogram revealed excellent filling of the right renal collecting system with good positioning of the right nephrostomy tube. There was prompt flow of contrast down the right ureter into the bladder. There was no evidence of right-sided ureteral stricture or filling defect. The right nephrostomy tube was subsequently removed. The initial left-sided nephrostogram revealed a high-grade focal stricture at the left ureteral vesicular junction. There was delayed filling of the left ureter with no other areas of stricture formation or obstruction noted in the left ureter. Digital images demonstrate good positioning of the left-sided double-J ureteral stent in the renal collecting system and bladder.     1. Right-sided nephrostogram revealed normal-appearing right renal collecting system and right ureter with prompt flow of contrast into the bladder and no evidence of right-sided ureteral stricture or filling defect. The right-sided nephrostomy tube was subsequently removed. 2. Left-sided nephrostogram revealed a high-grade focal stricture at the left ureteral fascicular junction. The remainder of the left ureter has a normal appearance. 3. Successful percutaneous placement of left-sided double-J ureteral stent.      IMPRESSION:   1. Complicated urinary tract infection   2. Bilateral percutaneous nephrostomy tubes s/p removal  3. DM2      PLAN:   Niels Lima is a 67 y.o. man with a history of DM2, recent bladder rupture s/p bilateral PCN tubes and prior kaur admitted with recurrent fever and chills.  Source of his fevers and chills likely from urine. UA on admit is consistent with infection however Ucx is only growing mixed skin anca and yeast likely because [facility] Fortunately, his bilateral PCN tubes have been removed. Agree with ceftriaxone for now.  Can transition to PO cipro and fluconazole to complete a 14 day course total. Ok to DC patient from ID standpoint.      Plan of care discussed with IM Jovon Ralph M.D..     Adrienne Li M.D.

## 2018-01-29 NOTE — DISCHARGE PLANNING
Care Transition Team Assessment    This  and BILL Rueda met with pt at bedside.  Pt initialed IMM.  Provided pt with a copy and placed signed copy in Medical Records pile.  Pt states that he has no concerns discharging home with his wife.  Pt is on service with [facility].  This  faxed choice form to [facility] for resume of HH service.    Information Source  Orientation : Oriented x 4  Information Given By: Patient  Informant's Name: Shiraz Lima  Who is responsible for making decisions for patient? : Patient    Readmission Evaluation  Is this a readmission?: Yes - planned readmission    Elopement Risk  Legal Hold: No  Ambulatory or Self Mobile in Wheelchair: Yes  Disoriented: No  Psychiatric Symptoms: None  History of Wandering: No  Elopement this Admit: No  Vocalizing Wanting to Leave: No  Displays Behaviors, Body Language Wanting to Leave: No-Not at Risk for Elopement  Elopement Risk: Not at Risk for Elopement    Interdisciplinary Discharge Planning  Does Admitting Nurse Feel This Could be a Complex Discharge?: No  Lives with - Patient's Self Care Capacity: Spouse  Patient or legal guardian wants to designate a caregiver (see row info): No  Support Systems: Spouse / Significant Other  Housing / Facility: [address]  Do You Take your Prescribed Medications Regularly: Yes  Able to Return to Previous ADL's: Yes  Mobility Issues: No  Prior Services: Home-Independent  Assistance Needed: No  Durable Medical Equipment: Not Applicable    Discharge Preparedness  What is your plan after discharge?: Home with help  What are your discharge supports?: Spouse  Prior Functional Level: Independent with Activities of Daily Living, Independent with Medication Management  Difficulity with ADLs: None  Difficulity with IADLs: None    Functional Assesment  Prior Functional Level: Independent with Activities of Daily Living, Independent with Medication Management    Finances  Financial Barriers to Discharge:  No  Prescription Coverage: Yes    Vision / Hearing Impairment  Vision Impairment : No (reading glasses)  Right Eye Vision: Wears Glasses  Left Eye Vision: Wears Glasses  Hearing Impairment : No    Advance Directive  Advance Directive?: Living Will, DPOA for Health Care  Durable Power of  Name and Contact : Keeley Turcios [phone]    Domestic Abuse  Have you ever been the victim of abuse or violence?: No  Physical Abuse or Sexual Abuse: No  Verbal Abuse or Emotional Abuse: No  Possible Abuse Reported to:: Not Applicable    Psychological Assessment  History of Substance Abuse: None  History of Psychiatric Problems: No  Non-compliant with Treatment: No  Newly Diagnosed Illness: No    Discharge Risks or Barriers  Discharge risks or barriers?: No    Anticipated Discharge Information  Anticipated discharge disposition: Home  Discharge Address: [address] [place] [address] [address] [place] [address]  Discharge Contact Phone Number: [phone]

## 2018-01-29 NOTE — CARE PLAN
Problem: Communication  Goal: The ability to communicate needs accurately and effectively will improve  Outcome: PROGRESSING AS EXPECTED      Problem: Safety  Goal: Will remain free from injury  Outcome: PROGRESSING AS EXPECTED    Goal: Will remain free from falls  Outcome: PROGRESSING AS EXPECTED      Problem: Infection  Goal: Will remain free from infection  Outcome: PROGRESSING AS EXPECTED

## 2018-01-29 NOTE — OR SURGEON
Immediate Post- Operative Note        PostOp Diagnosis: LEFT UPJ URETERAL STRICTURE      Procedure(s): BILATERAL NEPHROSTOGRAMS AND LEFT URETERAL STENT    Estimated Blood Loss: Less than 5 ml        Complications: None            1/29/2018     10:33 AM     Graeme Clark

## 2018-01-29 NOTE — CARE PLAN
Problem: Safety  Goal: Will remain free from falls  Outcome: PROGRESSING AS EXPECTED  Patient uses the call light appropriately to get out of bed.

## 2018-01-30 ENCOUNTER — RESOLUTE PROFESSIONAL BILLING HOSPITAL PROF FEE (OUTPATIENT)
Dept: HOSPITALIST | Facility: MEDICAL CENTER | Age: 68
End: 2018-01-30
Payer: MEDICARE

## 2018-01-30 ENCOUNTER — APPOINTMENT (OUTPATIENT)
Dept: RADIOLOGY | Facility: MEDICAL CENTER | Age: 68
DRG: 862 | End: 2018-01-30
Attending: EMERGENCY MEDICINE
Payer: MEDICARE

## 2018-01-30 ENCOUNTER — HOSPITAL ENCOUNTER (INPATIENT)
Facility: MEDICAL CENTER | Age: 68
LOS: 13 days | DRG: 862 | End: 2018-02-12
Attending: EMERGENCY MEDICINE | Admitting: HOSPITALIST
Payer: MEDICARE

## 2018-01-30 DIAGNOSIS — A41.9 SEPSIS, DUE TO UNSPECIFIED ORGANISM: ICD-10-CM

## 2018-01-30 DIAGNOSIS — N30.01 ACUTE CYSTITIS WITH HEMATURIA: ICD-10-CM

## 2018-01-30 PROBLEM — Z86.718 HISTORY OF DVT (DEEP VEIN THROMBOSIS): Status: ACTIVE | Noted: 2018-01-30

## 2018-01-30 LAB
ALBUMIN SERPL BCP-MCNC: 3.4 G/DL (ref 3.2–4.9)
ALBUMIN/GLOB SERPL: 1 G/DL
ALP SERPL-CCNC: 52 U/L (ref 30–99)
ALT SERPL-CCNC: 14 U/L (ref 2–50)
ANION GAP SERPL CALC-SCNC: 11 MMOL/L (ref 0–11.9)
APPEARANCE UR: ABNORMAL
APTT PPP: 38.7 SEC (ref 24.7–36)
AST SERPL-CCNC: 12 U/L (ref 12–45)
BACTERIA #/AREA URNS HPF: NEGATIVE /HPF
BASOPHILS # BLD AUTO: 0.4 % (ref 0–1.8)
BASOPHILS # BLD: 0.02 K/UL (ref 0–0.12)
BILIRUB SERPL-MCNC: 0.7 MG/DL (ref 0.1–1.5)
BILIRUB UR QL STRIP.AUTO: NEGATIVE
BUN SERPL-MCNC: 12 MG/DL (ref 8–22)
CALCIUM SERPL-MCNC: 9 MG/DL (ref 8.5–10.5)
CHLORIDE SERPL-SCNC: 103 MMOL/L (ref 96–112)
CO2 SERPL-SCNC: 22 MMOL/L (ref 20–33)
COLOR UR: ABNORMAL
CREAT SERPL-MCNC: 0.53 MG/DL (ref 0.5–1.4)
EOSINOPHIL # BLD AUTO: 0.03 K/UL (ref 0–0.51)
EOSINOPHIL NFR BLD: 0.6 % (ref 0–6.9)
EPI CELLS #/AREA URNS HPF: NEGATIVE /HPF
ERYTHROCYTE [DISTWIDTH] IN BLOOD BY AUTOMATED COUNT: 61.1 FL (ref 35.9–50)
GLOBULIN SER CALC-MCNC: 3.4 G/DL (ref 1.9–3.5)
GLUCOSE BLD-MCNC: 142 MG/DL (ref 65–99)
GLUCOSE BLD-MCNC: 235 MG/DL (ref 65–99)
GLUCOSE SERPL-MCNC: 251 MG/DL (ref 65–99)
GLUCOSE UR STRIP.AUTO-MCNC: NEGATIVE MG/DL
HCT VFR BLD AUTO: 34.9 % (ref 42–52)
HGB BLD-MCNC: 10.8 G/DL (ref 14–18)
HYALINE CASTS #/AREA URNS LPF: ABNORMAL /LPF
IMM GRANULOCYTES # BLD AUTO: 0.02 K/UL (ref 0–0.11)
IMM GRANULOCYTES NFR BLD AUTO: 0.4 % (ref 0–0.9)
INR PPP: 1.26 (ref 0.87–1.13)
KETONES UR STRIP.AUTO-MCNC: ABNORMAL MG/DL
LACTATE BLD-SCNC: 1 MMOL/L (ref 0.5–2)
LACTATE BLD-SCNC: 2 MMOL/L (ref 0.5–2)
LEUKOCYTE ESTERASE UR QL STRIP.AUTO: ABNORMAL
LYMPHOCYTES # BLD AUTO: 0.31 K/UL (ref 1–4.8)
LYMPHOCYTES NFR BLD: 5.8 % (ref 22–41)
MCH RBC QN AUTO: 29 PG (ref 27–33)
MCHC RBC AUTO-ENTMCNC: 30.9 G/DL (ref 33.7–35.3)
MCV RBC AUTO: 93.8 FL (ref 81.4–97.8)
MICRO URNS: ABNORMAL
MONOCYTES # BLD AUTO: 0.48 K/UL (ref 0–0.85)
MONOCYTES NFR BLD AUTO: 9.1 % (ref 0–13.4)
NEUTROPHILS # BLD AUTO: 4.44 K/UL (ref 1.82–7.42)
NEUTROPHILS NFR BLD: 83.7 % (ref 44–72)
NITRITE UR QL STRIP.AUTO: NEGATIVE
NRBC # BLD AUTO: 0 K/UL
NRBC BLD-RTO: 0 /100 WBC
PH UR STRIP.AUTO: 5 [PH]
PLATELET # BLD AUTO: 317 K/UL (ref 164–446)
PMV BLD AUTO: 9 FL (ref 9–12.9)
POTASSIUM SERPL-SCNC: 3.7 MMOL/L (ref 3.6–5.5)
PROCALCITONIN SERPL-MCNC: 1.74 NG/ML
PROCALCITONIN SERPL-MCNC: 2.41 NG/ML
PROT SERPL-MCNC: 6.8 G/DL (ref 6–8.2)
PROT UR QL STRIP: 300 MG/DL
PROTHROMBIN TIME: 15.5 SEC (ref 12–14.6)
RBC # BLD AUTO: 3.72 M/UL (ref 4.7–6.1)
RBC # URNS HPF: ABNORMAL /HPF
RBC UR QL AUTO: ABNORMAL
SODIUM SERPL-SCNC: 136 MMOL/L (ref 135–145)
SP GR UR STRIP.AUTO: 1.02
UROBILINOGEN UR STRIP.AUTO-MCNC: 0.2 MG/DL
WBC # BLD AUTO: 5.3 K/UL (ref 4.8–10.8)
WBC #/AREA URNS HPF: ABNORMAL /HPF
YEAST BUDDING URNS QL: PRESENT /HPF

## 2018-01-30 PROCEDURE — A9270 NON-COVERED ITEM OR SERVICE: HCPCS | Performed by: HOSPITALIST

## 2018-01-30 PROCEDURE — 96361 HYDRATE IV INFUSION ADD-ON: CPT

## 2018-01-30 PROCEDURE — 87086 URINE CULTURE/COLONY COUNT: CPT

## 2018-01-30 PROCEDURE — 36415 COLL VENOUS BLD VENIPUNCTURE: CPT

## 2018-01-30 PROCEDURE — 99285 EMERGENCY DEPT VISIT HI MDM: CPT

## 2018-01-30 PROCEDURE — A9270 NON-COVERED ITEM OR SERVICE: HCPCS | Performed by: STUDENT IN AN ORGANIZED HEALTH CARE EDUCATION/TRAINING PROGRAM

## 2018-01-30 PROCEDURE — 700105 HCHG RX REV CODE 258: Performed by: EMERGENCY MEDICINE

## 2018-01-30 PROCEDURE — 80053 COMPREHEN METABOLIC PANEL: CPT

## 2018-01-30 PROCEDURE — 96376 TX/PRO/DX INJ SAME DRUG ADON: CPT

## 2018-01-30 PROCEDURE — 85730 THROMBOPLASTIN TIME PARTIAL: CPT

## 2018-01-30 PROCEDURE — 700117 HCHG RX CONTRAST REV CODE 255: Performed by: EMERGENCY MEDICINE

## 2018-01-30 PROCEDURE — 87186 SC STD MICRODIL/AGAR DIL: CPT

## 2018-01-30 PROCEDURE — 87040 BLOOD CULTURE FOR BACTERIA: CPT

## 2018-01-30 PROCEDURE — 85025 COMPLETE CBC W/AUTO DIFF WBC: CPT

## 2018-01-30 PROCEDURE — 700111 HCHG RX REV CODE 636 W/ 250 OVERRIDE (IP): Performed by: HOSPITALIST

## 2018-01-30 PROCEDURE — 700105 HCHG RX REV CODE 258: Performed by: HOSPITALIST

## 2018-01-30 PROCEDURE — 700102 HCHG RX REV CODE 250 W/ 637 OVERRIDE(OP): Performed by: HOSPITALIST

## 2018-01-30 PROCEDURE — 770006 HCHG ROOM/CARE - MED/SURG/GYN SEMI*

## 2018-01-30 PROCEDURE — 83605 ASSAY OF LACTIC ACID: CPT

## 2018-01-30 PROCEDURE — 700102 HCHG RX REV CODE 250 W/ 637 OVERRIDE(OP): Performed by: STUDENT IN AN ORGANIZED HEALTH CARE EDUCATION/TRAINING PROGRAM

## 2018-01-30 PROCEDURE — 700111 HCHG RX REV CODE 636 W/ 250 OVERRIDE (IP): Performed by: EMERGENCY MEDICINE

## 2018-01-30 PROCEDURE — 82962 GLUCOSE BLOOD TEST: CPT

## 2018-01-30 PROCEDURE — 81001 URINALYSIS AUTO W/SCOPE: CPT

## 2018-01-30 PROCEDURE — 71045 X-RAY EXAM CHEST 1 VIEW: CPT

## 2018-01-30 PROCEDURE — 74177 CT ABD & PELVIS W/CONTRAST: CPT

## 2018-01-30 PROCEDURE — 84145 PROCALCITONIN (PCT): CPT | Mod: 91

## 2018-01-30 PROCEDURE — 96375 TX/PRO/DX INJ NEW DRUG ADDON: CPT

## 2018-01-30 PROCEDURE — 85610 PROTHROMBIN TIME: CPT

## 2018-01-30 PROCEDURE — 99223 1ST HOSP IP/OBS HIGH 75: CPT | Performed by: HOSPITALIST

## 2018-01-30 PROCEDURE — 304561 HCHG STAT O2

## 2018-01-30 PROCEDURE — 96374 THER/PROPH/DIAG INJ IV PUSH: CPT

## 2018-01-30 PROCEDURE — 87106 FUNGI IDENTIFICATION YEAST: CPT

## 2018-01-30 RX ORDER — FLUCONAZOLE 200 MG/1
100 TABLET ORAL EVERY EVENING
Status: DISCONTINUED | OUTPATIENT
Start: 2018-01-30 | End: 2018-02-01

## 2018-01-30 RX ORDER — FLUCONAZOLE 100 MG/1
100 TABLET ORAL DAILY
Status: ON HOLD | COMMUNITY
Start: 2018-01-29 | End: 2018-02-12

## 2018-01-30 RX ORDER — DEXTROSE MONOHYDRATE 25 G/50ML
25 INJECTION, SOLUTION INTRAVENOUS
Status: DISCONTINUED | OUTPATIENT
Start: 2018-01-30 | End: 2018-02-12 | Stop reason: HOSPADM

## 2018-01-30 RX ORDER — MORPHINE SULFATE 4 MG/ML
4 INJECTION, SOLUTION INTRAMUSCULAR; INTRAVENOUS
Status: COMPLETED | OUTPATIENT
Start: 2018-01-30 | End: 2018-01-30

## 2018-01-30 RX ORDER — HYDROCODONE BITARTRATE AND ACETAMINOPHEN 5; 325 MG/1; MG/1
1-2 TABLET ORAL EVERY 4 HOURS PRN
Status: DISCONTINUED | OUTPATIENT
Start: 2018-01-30 | End: 2018-01-31

## 2018-01-30 RX ORDER — OXYBUTYNIN CHLORIDE 5 MG/1
5 TABLET ORAL 2 TIMES DAILY
Status: DISCONTINUED | OUTPATIENT
Start: 2018-01-30 | End: 2018-02-06 | Stop reason: ALTCHOICE

## 2018-01-30 RX ORDER — ONDANSETRON 4 MG/1
4 TABLET, ORALLY DISINTEGRATING ORAL EVERY 4 HOURS PRN
Status: DISCONTINUED | OUTPATIENT
Start: 2018-01-30 | End: 2018-02-12 | Stop reason: HOSPADM

## 2018-01-30 RX ORDER — GLIPIZIDE 5 MG/1
5 TABLET ORAL 2 TIMES DAILY WITH MEALS
Status: DISCONTINUED | OUTPATIENT
Start: 2018-01-30 | End: 2018-02-11

## 2018-01-30 RX ORDER — ACETAMINOPHEN 325 MG/1
650 TABLET ORAL EVERY 6 HOURS PRN
Status: DISCONTINUED | OUTPATIENT
Start: 2018-01-30 | End: 2018-01-31

## 2018-01-30 RX ORDER — SODIUM CHLORIDE 9 MG/ML
30 INJECTION, SOLUTION INTRAVENOUS
Status: DISCONTINUED | OUTPATIENT
Start: 2018-01-30 | End: 2018-02-12 | Stop reason: HOSPADM

## 2018-01-30 RX ORDER — MERCAPTOPURINE 50 MG/1
150 TABLET ORAL DAILY
Status: DISCONTINUED | OUTPATIENT
Start: 2018-01-31 | End: 2018-02-12 | Stop reason: HOSPADM

## 2018-01-30 RX ORDER — MESALAMINE 0.38 G/1
4 CAPSULE, EXTENDED RELEASE ORAL DAILY
COMMUNITY

## 2018-01-30 RX ORDER — MESALAMINE 0.38 G/1
4 CAPSULE, EXTENDED RELEASE ORAL DAILY
Status: DISCONTINUED | OUTPATIENT
Start: 2018-01-30 | End: 2018-01-30

## 2018-01-30 RX ORDER — ONDANSETRON 2 MG/ML
4 INJECTION INTRAMUSCULAR; INTRAVENOUS ONCE
Status: COMPLETED | OUTPATIENT
Start: 2018-01-30 | End: 2018-01-30

## 2018-01-30 RX ORDER — SODIUM CHLORIDE 9 MG/ML
INJECTION, SOLUTION INTRAVENOUS CONTINUOUS
Status: DISCONTINUED | OUTPATIENT
Start: 2018-01-30 | End: 2018-02-07

## 2018-01-30 RX ORDER — ONDANSETRON 2 MG/ML
4 INJECTION INTRAMUSCULAR; INTRAVENOUS EVERY 4 HOURS PRN
Status: DISCONTINUED | OUTPATIENT
Start: 2018-01-30 | End: 2018-02-12 | Stop reason: HOSPADM

## 2018-01-30 RX ORDER — SODIUM CHLORIDE 9 MG/ML
500 INJECTION, SOLUTION INTRAVENOUS
Status: COMPLETED | OUTPATIENT
Start: 2018-01-30 | End: 2018-02-06

## 2018-01-30 RX ORDER — SODIUM CHLORIDE 9 MG/ML
1000 INJECTION, SOLUTION INTRAVENOUS ONCE
Status: COMPLETED | OUTPATIENT
Start: 2018-01-30 | End: 2018-01-30

## 2018-01-30 RX ORDER — GRANULES FOR ORAL 3 G/1
3 POWDER ORAL
Status: ON HOLD | COMMUNITY
Start: 2018-01-24 | End: 2018-02-12

## 2018-01-30 RX ORDER — TAMSULOSIN HYDROCHLORIDE 0.4 MG/1
0.8 CAPSULE ORAL DAILY
Status: DISCONTINUED | OUTPATIENT
Start: 2018-01-31 | End: 2018-02-12 | Stop reason: HOSPADM

## 2018-01-30 RX ORDER — MESALAMINE 400 MG/1
400 CAPSULE, DELAYED RELEASE ORAL 3 TIMES DAILY
Status: DISCONTINUED | OUTPATIENT
Start: 2018-01-31 | End: 2018-02-12 | Stop reason: HOSPADM

## 2018-01-30 RX ADMIN — OXYBUTYNIN CHLORIDE 5 MG: 5 TABLET ORAL at 20:48

## 2018-01-30 RX ADMIN — CEFEPIME 2 G: 2 INJECTION, POWDER, FOR SOLUTION INTRAMUSCULAR; INTRAVENOUS at 20:45

## 2018-01-30 RX ADMIN — SODIUM CHLORIDE 1000 ML: 9 INJECTION, SOLUTION INTRAVENOUS at 14:36

## 2018-01-30 RX ADMIN — INSULIN HUMAN 2 UNITS: 100 INJECTION, SOLUTION PARENTERAL at 21:08

## 2018-01-30 RX ADMIN — HYDROCODONE BITARTRATE AND ACETAMINOPHEN 2 TABLET: 5; 325 TABLET ORAL at 22:46

## 2018-01-30 RX ADMIN — ONDANSETRON 4 MG: 2 INJECTION INTRAMUSCULAR; INTRAVENOUS at 15:17

## 2018-01-30 RX ADMIN — SODIUM CHLORIDE: 9 INJECTION, SOLUTION INTRAVENOUS at 23:32

## 2018-01-30 RX ADMIN — IOHEXOL 80 ML: 350 INJECTION, SOLUTION INTRAVENOUS at 16:33

## 2018-01-30 RX ADMIN — FLUCONAZOLE 100 MG: 200 TABLET ORAL at 20:47

## 2018-01-30 RX ADMIN — MORPHINE SULFATE 4 MG: 4 INJECTION INTRAVENOUS at 17:06

## 2018-01-30 RX ADMIN — MORPHINE SULFATE 4 MG: 4 INJECTION INTRAVENOUS at 15:22

## 2018-01-30 RX ADMIN — CEFEPIME 2 G: 2 INJECTION, POWDER, FOR SOLUTION INTRAMUSCULAR; INTRAVENOUS at 16:37

## 2018-01-30 RX ADMIN — ENOXAPARIN SODIUM 80 MG: 100 INJECTION SUBCUTANEOUS at 21:06

## 2018-01-30 ASSESSMENT — PAIN SCALES - GENERAL
PAINLEVEL_OUTOF10: 9
PAINLEVEL_OUTOF10: 8
PAINLEVEL_OUTOF10: 8
PAINLEVEL_OUTOF10: 9

## 2018-01-30 ASSESSMENT — ENCOUNTER SYMPTOMS
WEAKNESS: 1
BACK PAIN: 0
PALPITATIONS: 0
FLANK PAIN: 1
BLOOD IN STOOL: 0
NECK PAIN: 0
EYE DISCHARGE: 0
FEVER: 1
HALLUCINATIONS: 0
WHEEZING: 0
COUGH: 0
VOMITING: 1
NAUSEA: 1
SHORTNESS OF BREATH: 0
SPEECH CHANGE: 0
STRIDOR: 0
FOCAL WEAKNESS: 0
ABDOMINAL PAIN: 1
DIAPHORESIS: 0
EYE REDNESS: 0
SENSORY CHANGE: 0
TREMORS: 0
CHILLS: 1

## 2018-01-30 ASSESSMENT — COGNITIVE AND FUNCTIONAL STATUS - GENERAL
DAILY ACTIVITIY SCORE: 24
SUGGESTED CMS G CODE MODIFIER DAILY ACTIVITY: CH

## 2018-01-30 ASSESSMENT — LIFESTYLE VARIABLES
DO YOU DRINK ALCOHOL: NO
EVER_SMOKED: NEVER
SUBSTANCE_ABUSE: 0

## 2018-01-30 NOTE — DISCHARGE INSTRUCTIONS
Complete fosfomycin as recommended by Gi   Complete 2 weeks of diflucan   [facility]    Discharge Instructions    Discharged to home by car with relative. Discharged via walking, hospital escort: Refused.  Special equipment needed: Not Applicable    Be sure to schedule a follow-up appointment with your primary care doctor or any specialists as instructed.     Discharge Plan:   Diet Plan: Discussed  Activity Level: Discussed  Confirmed Follow up Appointment: Patient to Call and Schedule Appointment  Confirmed Symptoms Management: Discussed  Medication Reconciliation Updated: Yes  Influenza Vaccine Indication: Not indicated: Previously immunized this influenza season and > 8 years of age    I understand that a diet low in cholesterol, fat, and sodium is recommended for good health. Unless I have been given specific instructions below for another diet, I accept this instruction as my diet prescription.   Other diet: regular    Special Instructions: None    · Is patient discharged on Warfarin / Coumadin?   No     Depression / Suicide Risk    As you are discharged from this [facility] facility, it is important to learn how to keep safe from harming yourself.    Recognize the warning signs:  · Abrupt changes in personality, positive or negative- including increase in energy   · Giving away possessions  · Change in eating patterns- significant weight changes-  positive or negative  · Change in sleeping patterns- unable to sleep or sleeping all the time   · Unwillingness or inability to communicate  · Depression  · Unusual sadness, discouragement and loneliness  · Talk of wanting to die  · Neglect of personal appearance   · Rebelliousness- reckless behavior  · Withdrawal from people/activities they love  · Confusion- inability to concentrate     If you or a loved one observes any of these behaviors or has concerns about self-harm, here's what you can do:  · Talk about it- your feelings and reasons  for harming yourself  · Remove any means that you might use to hurt yourself (examples: pills, rope, extension cords, firearm)  · Get professional help from the community (Mental Health, Substance Abuse, psychological counseling)  · Do not be alone:Call your Safe Contact- someone whom you trust who will be there for you.  · Call your local CRISIS HOTLINE 213-0232 or 314-121-1174  · Call your local Children's Mobile Crisis Response Team [place] (212) 973-9823 or www[employer]  · Call the toll free National Suicide Prevention Hotlines   · National Suicide Prevention Lifeline 062-853-KDNZ (8925)  · National Hope Line Network 800-SUICIDE (631-9886)      Antibiotic Medication  Antibiotics are among the most frequently prescribed medicines. Antibiotics cure illness by assisting our body to injure or kill the bacteria that cause infection. While antibiotics are useful to treat a wide variety of infections they are useless against viruses. Antibiotics cannot cure colds, flu, or other viral infections.   There are many types of antibiotics available. Your caregiver will decide which antibiotic will be useful for an illness. Never take or give someone else's antibiotics or left over medicine.  Your caregiver may also take into account:  · Allergies.  · The cost of the medicine.  · Dosing schedules.  · Taste.  · Common side effects when choosing an antibiotic for an infection.  Ask your caregiver if you have questions about why a certain medicine was chosen.  HOME CARE INSTRUCTIONS  Read all instructions and labels on medicine bottles carefully. Some antibiotics should be taken on an empty stomach while others should be taken with food. Taking antibiotics incorrectly may reduce how well they work. Some antibiotics need to be kept in the refrigerator. Others should be kept at room temperature. Ask your caregiver or pharmacist if you do not understand how to give the medicine.  Be sure to give the amount of medicine  your caregiver has prescribed. Even if you feel better and your symptoms improve, bacteria may still remain alive in the body. Taking all of the medicine will prevent:  · The infection from returning and becoming harder to treat.  · Complications from partially treated infections.  If there is any medicine left over after you have taken the medicine as your caregiver has instructed, throw the medicine away.  Be sure to tell your caregiver if you:  · Are allergic to any medicines.  · Are pregnant or intend to become pregnant while using this medicine.  · Are breastfeeding.  · Are taking any other prescription, non-prescription medicine, or herbal remedies.  · Have any other medical conditions or problems you have not already discussed.  If you are taking birth control pills, they may not work while you are on antibiotics. To avoid unwanted pregnancy:  · Continue taking your birth control pills as usual.  · Use a second form of birth control (such as condoms) while you are taking antibiotic medicine.  · When you finish taking the antibiotic medicine, continue using the second form of birth control until you are finished with your current 1 month cycle of birth control pills.  Try not to miss any doses of medicine. If you miss a dose, take it as soon as possible. However, if it is almost time for the next dose and the dosing schedule is:  · 2 doses a day, take the missed dose and the next dose 5 to 6 hours apart.  · 3 or more doses a day, take the missed dose and the next dose 2 to 4 hours apart, then go back to the normal schedule.  · If you are unable to make up a missed dose, take the next scheduled dose on time and complete the missed dose at the end of the prescribed time for your medicine.  SIDE EFFECTS TO TAKING ANTIBIOTICS  Common side effects to antibiotic use include:  · Soft stools or diarrhea.  · Mild stomach upset.  · Sun sensitivity.  SEEK MEDICAL CARE IF:   · If you get worse or do not improve within a  few days of starting the medicine.  · Vomiting develops.  · Diaper rash or rash on the genitals appears.  · Vaginal itching occurs.  · White patches appear on the tongue or in the mouth.  · Severe watery diarrhea and abdominal cramps occur.  · Signs of an allergy develop (hives, unknown itchy rash appears). STOP TAKING THE ANTIBIOTIC.  SEEK IMMEDIATE MEDICAL CARE IF:   · Urine turns dark or blood colored.  · Skin turns yellow.  · Easy bruising or bleeding occurs.  · Joint pain or muscle aches occur.  · Fever returns.  · Severe headache occurs.  · Signs of an allergy develop (trouble breathing, wheezing, swelling of the lips, face or tongue, fainting, or blisters on the skin or in the mouth). STOP TAKING THE ANTIBIOTIC.     This information is not intended to replace advice given to you by your health care provider. Make sure you discuss any questions you have with your health care provider.     Document Released: 08/30/2005 Document Revised: 01/08/2016 Document Reviewed: 09/09/2010  [employer] Interactive Patient Education ©2016 [employer]

## 2018-01-30 NOTE — PROGRESS NOTES
Pt medicated with fluconazole, lovenox and monural.  Spoke with Social work and home care restarted.

## 2018-01-30 NOTE — PROGRESS NOTES
"Pt stated to me \" I dont have anything else to live for besides my wife\", \"I'm just sick of being in the hospital and being sick\", \"I wouldn't care if I \".  Stated he had no intentions of harming himself or wanting to commit suicide.  IP consult to  for depression put in.  "

## 2018-01-30 NOTE — WOUND TEAM
[facility]  Inpatient Services  Initial Wound and Skin Care Evaluation    Admission Date:   1/27/18  HPI, PMH, SH: Reviewed  Unit where seen by Wound Team:   T4    WOUND CONSULT RELATED TO:  md request to cont npwt drsg changes per protocol    SUBJECTIVE:  Anxious but agreeable    Self Report / Pain Level:  terell well with pre-med    OBJECTIVE:   Prev npwt drsg remained intact    WOUND TYPE, LOCATION, CHARACTERISTICS (Pressure ulcers: location, stage, POA or date identified)    Location and type of wound: Full thickness surgical wnd left posterior thigh; POA        Periwound:      intact  Drainage:      Scant serosanguinous  Tissue Type and %:     100% pink/red  Wound Edges:     attached  Odor:       none  Exposed structure(s):   none  S&S of Infection:    none      Measurements: (cm)   Taken 1/29/18  Length:      13.0  Width:       6.0  Depth:      0.1      INTERVENTIONS BY WOUND TEAM:   Prev drsg removed -- wnd irrigated with wnd cleanser -- measurements and photograph done -- benzoin and drape prasanna-wnd -- adaptic over the wnd bed -- 1 piece black foam to cover the wnd -- sealed with drape and cont neg pressure reapplied at 125mmhg    Interdisciplinary consultation: nsg; pt    EVALUATION:  Clean wnd that should cont to progress with npwt    Factors affecting wound healing:  Compliance?    Goals:  Decrease wnd size X 5% every week     NURSING PLAN OF CARE ORDERS (x):    Dressing changes: See Dressing Maintenance orders: x  Skin care: See Skin Care orders: x  Rectal tube care: See Rectal Tube Care orders:   Other orders:    WOUND TEAM PLAN OF CARE (x):   NPWT change 3 x week:      x  Dressing changes by wound team:       Follow up as needed:     x  Other (explain):    Anticipated discharge plans (x):  SNF:           Home Care:        x   Outpatient Wound Center:            Self Care:            Other:

## 2018-01-30 NOTE — FACE TO FACE
Face to Face Supporting Documentation - Home Health    The encounter with this patient was in whole or in part the primary reason for home health admission.    Date of encounter:   Patient:                    MRN:                       YOB: 2016  Niels Lima  [id number]  1950     Home health to see patient for:  Skilled Nursing care for assessment, interventions & education    Skilled need for:  Surgical Aftercare thigh hematoma    Skilled nursing interventions to include:  Comment: wound care    Homebound status evidenced by:  Require the use of special transportation or Needs the assistance of another person in order to leave the home. Leaving home requires a considerable and taxing effort. There is a normal inability to leave the home.    Community Physician to provide follow up care: Pcp Pt States None     Optional Interventions? No      I certify the face to face encounter for this home health care referral meets the CMS requirements and the encounter/clinical assessment with the patient was, in whole, or in part, for the medical condition(s) listed above, which is the primary reason for home health care. Based on my clinical findings: the service(s) are medically necessary, support the need for home health care, and the homebound criteria are met.  I certify that this patient has had a face to face encounter by myself.  Jovon Ralph M.D. - NPI: [id number]

## 2018-01-30 NOTE — DISCHARGE SUMMARY
DATE OF DISCHARGE:  01/29/2018    PRIMARY CARE PHYSICIAN:  The patient is followed by the [facility].  He has   an appointment next week with his primary care physician.    DISCHARGE DIAGNOSES:  1.  Urinary tract infection.  2.  Ruptured bladder, status post bilateral nephrostomy tubes, now removed.  3.  Status post left ureteral stent.  4.  Left thigh hematoma with healing wound requiring wound VAC.  5.  Crohn's disease.  6.  Leukopenia.  7.  Deep venous thrombosis.  8.  Possible orchitis and epididymitis.    MAJOR STUDIES AND PROCEDURES PERFORMED WHILE INPATIENT:  1.  Urine culture on 01/27/2018, no growth to date.  2.  Interventional radiology, bilateral nephrostograms with nephrostomies   removed, left ureteral stent was placed.    DISCHARGE MEDICATIONS:  1.  Fluconazole 100 mg daily.  2.  Albuterol 2 puffs every 4 hours as needed for shortness of breath.  3.  Vitamin B12 supplement 1000 mcg daily.  4.  Glipizide 5 mg twice daily.  5.  Mercaptopurine 150 mg daily.  6.  Mesalamine 1000 mg in the rectum at bedtime.  7.  Metformin 1000 mg twice daily.  8.  Percocet 5/325 one to two tablets every 12 hours as needed for moderate   pain.  9.  Flomax 0.8 mg daily.  10. Fosfomycin 1 packet every 3 days    HOSPITAL COURSE:  A 67-year-old male patient has a history of ruptured   bladder.  After this procedure, bilateral nephrostomy tubes were placed.  The   patient has been managed as an outpatient by Dr. Upton for this.  The patient   was scheduled for outpatient IR procedure to remove nephrostomy tubes on   01/29/2018.  He came to the emergency room on 01/27/2018 with chills and   concerns for urinary tract infection.  The patient has already been on   treatment through the [facility] with outpatient fosfomycin.  He also   received an IV dose of Zosyn recently through the [facility].  Now, the   patient was admitted.  Urine and blood cultures are negative with the   exception of yeast.  The patient underwent removal  of his nephrostomy tubes   today and he is urinating and a left ureteral stent has been placed.    For his urinary tract infection, I did consult Dr. Concepción Li of infectious   disease.  We are in agreement that the fosfomycin has adequate coverage for   urinary tract infection.  We are unlikely to get a new positive culture given   that he has been treated with antibiotics.  He will continue this medication.    In addition, due to his immunosuppressed status, we will cover him for yeast   with 2 weeks of Diflucan, prescription has been provided for this.    With regards to the patient's DVT, patient wishes to continue anticoagulation   with Lovenox until he sees his primary care physician, I believe this is   appropriate given the fact that the patient will be on Diflucan and other   medications, which will be a drug-drug interaction if he were to restart   Coumadin, I would recommend closely following his INR when he does restart   Coumadin.    DISCHARGE DISPOSITION:  To home.    FOLLOWUP PLAN:  Follow up with Dr. Upton at scheduled appointment:  Follow up   with primary care physician at scheduled appointment.    DIET:  Diabetic diet recommended.    Forty-five minutes was spent discharging this patient from the hospital.       ____________________________________     MD CECILIA AGARWAL / NTS    DD:  01/29/2018 16:45:15  DT:  01/29/2018 23:09:38    D#:  [id number]  Job#:  [id number]

## 2018-01-30 NOTE — DOCUMENTATION QUERY
DOCUMENTATION QUERY    PROVIDERS: Please select “Cosign w/ note”to reply to query.    To better represent the severity of illness of your patient, please review the following information and exercise your independent professional judgment in responding to this query.     Pyelnophritis is documented in the History and Physical and Progress Notes, and subsequently dropped in the Discharge Summary. Based upon the clinical findings, risk factors, and treatment, can the diagnosis of pyelonephritis be clarified?    • Pyelonephritis has resolved  • Pyelonephritis has been ruled in  • Pyelonephritis has been ruled out  • Other explanation of clinical findings  • Unable to determine      The medical record reflects the following:   Clinical Findings  Per ED provider note: bilateral nephrostomy tubes with clotted purulent drainage.  Reported fever at home. Leukopenia, lactic acidosis.  Urinalysis grossly positive,   1/27 UA: packed WBC, 3-5 hyaline casts, budding yeast present,  RBC  1/28 Progress Note: pyelonephritis, flank pain, chills & sweats.     Treatment  Gentamycin, rocephin, ancef, diflucan, IVF   Risk Factors  nephrostomy tubes, ruptured bladder, IR left ureteral stent placement, right and left nephrostomy tube removal   Location within medical record  ED provider note, History and Physical, Progress Notes, Lab Results  and MAR     Thank you,   Sourav Meza RN, BSN  Clinical   [phone]

## 2018-01-30 NOTE — PROGRESS NOTES
Pt cleared for discharge by Dr. Ralph.  Prescriptions and discharge packet to be given to patient.  Awaiting ride home, no needs, on room air.    Pt leaving with same wound VAC he came in with.

## 2018-01-30 NOTE — DISCHARGE PLANNING
Received choice form from [facility].  Referral sent to [facility] at 1717 on 01-29-18.

## 2018-01-30 NOTE — ED TRIAGE NOTES
Pt roomed from senior lounge, ambulatory. Pt states he was just discharged yesterday for sepsis. He awoke mid morning with chills and a fever of 100.5 oral. Pt has a wound vac in place to L posterior thigh. Pt also has two dressings one on each upper hip area where the had the nephrostomy tubes removed yesterday. Dressing are clean, dry and intact.

## 2018-01-30 NOTE — ED PROVIDER NOTES
"ED Provider Note    CHIEF COMPLAINT  Chief Complaint   Patient presents with   • Wound Infection     pt reports that he had his nephrostomys removed yesterday and this morning woke with fever and chills, as well as vomiting.        HPI  Niels Lima is a 67 y.o. Male  with a history of a ruptured bladder, status post bilateral nephrostomy tube placement, urinary tract infection, left thigh hematoma, Crohn's disease, DVT, orchitis/epididymitis who presents with complaints of fever, shaking chills since last night. The patient was just discharged from the hospital yesterday after having his nephrostomy tubes removed. The patient said he went home, and this morning developed shaking chills along with a fever. He said he had episode of vomiting. He says he's been having bladder spasms which have been very painful. He called his urologist this morning, but never received a call back. The patient denies having any back pain or significant drainage from his nephrostomy tube sites. He does still have a wound VAC to his left thigh. He has had no sore throat, productive cough, ingestion, any difficulty breathing. He says he has been drinking sips of water. He has been feeling somewhat lightheaded, and weak today.    REVIEW OF SYSTEMS  See HPI for further details. All other systems are negative.     PAST MEDICAL HISTORY  Past Medical History:   Diagnosis Date   • Asthma     daily inhaler   • Bowel habit changes     diarrhea   • Crohn's disease (CMS-HCC) 01/01/2001   • Diabetes (CMS-HCC)     Type 2; oral medication   • DVT (deep venous thrombosis) (CMS-HCC) 2002, 2003, 2009, 2015    IVC filter in place- pt's wife states that he had a test done in [facility] \"snake venom\" to check pt's blood and she thinks he has some factor issue but unsure of what   • Hemorrhagic disorder (CMS-HCC)     on lovenox at this time   • Presence of IVC filter 01/01/2009   • Renal disorder     nephrostomy tube in place   • Snoring     sleep study not " done       FAMILY HISTORY  No family history on file.    SOCIAL HISTORY  Social History     Social History   • Marital status:      Spouse name: N/A   • Number of children: N/A   • Years of education: N/A     Social History Main Topics   • Smoking status: Never Smoker   • Smokeless tobacco: Never Used   • Alcohol use No   • Drug use: No   • Sexual activity: Not on file     Other Topics Concern   • Not on file     Social History Narrative   • No narrative on file       SURGICAL HISTORY  Past Surgical History:   Procedure Laterality Date   • INCISION AND DRAINAGE GENERAL Left 12/17/2017    Procedure: INCISION AND DRAINAGE GENERAL;  Surgeon: Enrique Deluna M.D.;  Location: SURGERY [facility];  Service: General   • CYSTOSCOPY  12/10/2017    Procedure: cystoscopy, evacuation of clots, transurethral biopsy, ffulguration of perforation, cystogram;  Surgeon: Amador Upton M.D.;  Location: SURGERY [facility];  Service: Urology   • SCROTAL EXPLORATION Bilateral 11/12/2017    Procedure: SCROTAL EXPLORATION -  left orchiectomy, right testicular fixation;  Surgeon: José Antonio Mosqueda M.D.;  Location: SURGERY [facility];  Service: Urology   • LITHOTRIPSY  2010   • OTHER  2009    IVC filter   • BOWEL RESECTION  01/01/1986       CURRENT MEDICATIONS  Home Medications     Reviewed by Meche Jesus RJASON (Registered Nurse) on 01/30/18 at 1347  Med List Status: Complete   Medication Last Dose Status   albuterol 108 (90 Base) MCG/ACT Aero Soln inhalation aerosol 1/26/2018 Active   cyanocobalamin (VITAMIN B12) 1000 MCG Tab 1/26/2018 Active   fluconazole (DIFLUCAN) 100 MG Tab  Active   glipiZIDE (GLUCOTROL) 5 MG Tab 1/26/2018 Active   mercaptopurine (PURINETHOL) 50 MG Tab 1/26/2018 Active   mesalamine (ROWASA) 1000 MG Suppos 1/25/2018 Active   metformin (GLUCOPHAGE) 1000 MG tablet 1/26/2018 Active   oxycodone-acetaminophen (PERCOCET) 5-325 MG Tab 1/26/2018 Active   tamsulosin (FLOMAX) 0.4 MG capsule 1/26/2018  "Active                ALLERGIES  Allergies   Allergen Reactions   • Prochlorperazine Unspecified     \"FACE FROZE UP\"       PHYSICAL EXAM  VITAL SIGNS: /69   Pulse (!) 117   Temp (!) 38.1 °C (100.6 °F)   Resp 18   Ht 1.854 m (6' 1\")   Wt 76.1 kg (167 lb 12.3 oz)   SpO2 91%   BMI 22.13 kg/m²   Constitutional: Awake, alert, in no acute distress, Non-toxic appearance.   HENT: Atraumatic. Bilateral external ears normal, mucous membranes dry, throat nonerythematous without exudates, nose is normal.  Eyes: PERRL, EOMI, conjunctiva moist, noninjected.  Neck: Nontender, Normal range of motion, No nuchal rigidity, No stridor.   Lymphatic: No lymphadenopathy noted.   Cardiovascular: Regular rhythm, tachycardic,  no murmurs, rubs, gallops.  Thorax & Lungs:  Good breath sounds bilaterally, no wheezes, rales, or retractions.  No chest tenderness.  Abdomen: Bowel sounds normal, Soft, nontender, nondistended, no rebound, guarding, masses.  Back: No CVA or spinal tenderness. There are dressings to both flanks, with no significant tenderness, erythema, or drainage.  Extremities: Intact distal pulses, No edema, No tenderness. There is a wound VAC to the left posterior thigh with no surrounding erythema, induration, or tenderness.  Skin: Warm, Dry, No rashes.   Musculoskeletal: No joint swelling or tenderness.  Neurologic: Alert & oriented x 3, sensory and motor function normal. No focal deficits.   Psychiatric: Affect normal, Judgment normal, Mood normal.         RADIOLOGY/PROCEDURES  DX-CHEST-PORTABLE (1 VIEW)   Final Result         1. No acute cardiopulmonary abnormalities are identified.            COURSE & MEDICAL DECISION MAKING  Pertinent Labs & Imaging studies reviewed. (See chart for details)  The patient presents with above complaints. Sepsis protocol was initiated by nursing. IV is placed, his given a bolus of normal saline, as he has dry mucous members, is tachycardic, and likely septic. He is given a dose of " morphine and Zofran for his pain. Chest x-ray showed no acute cardiopulmonary abnormalities. CBC shows a white count of 5300, hemoglobin 10.8, 84% polys, chemistry shows a glucose of 251, otherwise normal, lactic acid normal 2.0.  On the recommendation of the pharmacy, patient was given 2 g of cefepime IV. Urinalysis shows trace ketones, large blood, 30 protein, moderate leukocyte esterase, packed WBCs, 100-150 RBCs. Given his recurrent symptoms and fever, patient will be admitted. Case was discussed with Dr. Davis.  Dr. Murillo of urology was consulted.  At his request a CT scan of the abdomen/pelvis was obtained.  Critical care time of 35 minutes.    FINAL IMPRESSION  1. sepsis  2. Urinary tract infection  3.         Electronically signed by: Marques Mtz, 1/30/2018 2:59 PM

## 2018-01-30 NOTE — ED TRIAGE NOTES
"Chief Complaint   Patient presents with   • Wound Infection     pt reports that he had his nephrostomys removed yesterday and this morning woke with fever and chills, as well as vomiting.      Pt reports that he has a wound vac on the Left upper thigh.  Sepsis score of 3. Charge RN notified.   Blood pressure 127/69, pulse (!) 117, temperature (!) 38.1 °C (100.6 °F), resp. rate 18, height 1.854 m (6' 1\"), weight 76.1 kg (167 lb 12.3 oz), SpO2 91 %.    Pt informed of wait times. Educated on triage process.  Asked to return to triage RN for any new or worsening of symptoms. Thanked for patience.          "

## 2018-01-30 NOTE — DIETARY
Nutrition Services    Pt noted with nutrition admit screen trigger: unplanned wt loss     Pt is on day 2 of admit. He is a 68 yo male with adm dx: Pyelonephritis    Per chart review pt was hospitalized in December 2017 d/t ruptured bladder.  His wt has been stable since his hospitalization in December per chart review. Pt's BMI is WNL. He is receiving a regular diet with great po intake thus far.     Due to pt's stable weight over the past month and great po thus far, RD will con't to monitor per dept policy.

## 2018-01-30 NOTE — ED NOTES
Unable to obtain repeat lactic lab off of pts Iv. IV flushed easily but would not return blood. Lab notified of need for blood draw.

## 2018-01-31 PROBLEM — D63.8 ANEMIA OF CHRONIC DISEASE: Status: ACTIVE | Noted: 2017-12-12

## 2018-01-31 PROBLEM — E87.6 HYPOKALEMIA: Status: ACTIVE | Noted: 2018-01-31

## 2018-01-31 PROBLEM — R33.9 URINARY RETENTION: Status: ACTIVE | Noted: 2018-01-31

## 2018-01-31 LAB
ANION GAP SERPL CALC-SCNC: 7 MMOL/L (ref 0–11.9)
BUN SERPL-MCNC: 12 MG/DL (ref 8–22)
CALCIUM SERPL-MCNC: 8.2 MG/DL (ref 8.5–10.5)
CHLORIDE SERPL-SCNC: 105 MMOL/L (ref 96–112)
CO2 SERPL-SCNC: 27 MMOL/L (ref 20–33)
CREAT SERPL-MCNC: 0.4 MG/DL (ref 0.5–1.4)
ERYTHROCYTE [DISTWIDTH] IN BLOOD BY AUTOMATED COUNT: 62.9 FL (ref 35.9–50)
GLUCOSE BLD-MCNC: 188 MG/DL (ref 65–99)
GLUCOSE BLD-MCNC: 200 MG/DL (ref 65–99)
GLUCOSE BLD-MCNC: 208 MG/DL (ref 65–99)
GLUCOSE SERPL-MCNC: 174 MG/DL (ref 65–99)
HCT VFR BLD AUTO: 29.4 % (ref 42–52)
HGB BLD-MCNC: 9.2 G/DL (ref 14–18)
MAGNESIUM SERPL-MCNC: 1.5 MG/DL (ref 1.5–2.5)
MCH RBC QN AUTO: 29.9 PG (ref 27–33)
MCHC RBC AUTO-ENTMCNC: 31.3 G/DL (ref 33.7–35.3)
MCV RBC AUTO: 95.5 FL (ref 81.4–97.8)
PLATELET # BLD AUTO: 249 K/UL (ref 164–446)
PMV BLD AUTO: 8.8 FL (ref 9–12.9)
POTASSIUM SERPL-SCNC: 3.4 MMOL/L (ref 3.6–5.5)
RBC # BLD AUTO: 3.08 M/UL (ref 4.7–6.1)
SODIUM SERPL-SCNC: 139 MMOL/L (ref 135–145)
WBC # BLD AUTO: 4 K/UL (ref 4.8–10.8)

## 2018-01-31 PROCEDURE — 85027 COMPLETE CBC AUTOMATED: CPT

## 2018-01-31 PROCEDURE — 700101 HCHG RX REV CODE 250: Performed by: PHYSICIAN ASSISTANT

## 2018-01-31 PROCEDURE — 36415 COLL VENOUS BLD VENIPUNCTURE: CPT

## 2018-01-31 PROCEDURE — 700111 HCHG RX REV CODE 636 W/ 250 OVERRIDE (IP): Performed by: INTERNAL MEDICINE

## 2018-01-31 PROCEDURE — 83735 ASSAY OF MAGNESIUM: CPT

## 2018-01-31 PROCEDURE — 700105 HCHG RX REV CODE 258: Performed by: HOSPITALIST

## 2018-01-31 PROCEDURE — A9270 NON-COVERED ITEM OR SERVICE: HCPCS | Performed by: HOSPITALIST

## 2018-01-31 PROCEDURE — 80048 BASIC METABOLIC PNL TOTAL CA: CPT

## 2018-01-31 PROCEDURE — 700111 HCHG RX REV CODE 636 W/ 250 OVERRIDE (IP): Performed by: STUDENT IN AN ORGANIZED HEALTH CARE EDUCATION/TRAINING PROGRAM

## 2018-01-31 PROCEDURE — 700111 HCHG RX REV CODE 636 W/ 250 OVERRIDE (IP): Performed by: HOSPITALIST

## 2018-01-31 PROCEDURE — A9270 NON-COVERED ITEM OR SERVICE: HCPCS

## 2018-01-31 PROCEDURE — 700102 HCHG RX REV CODE 250 W/ 637 OVERRIDE(OP): Performed by: HOSPITALIST

## 2018-01-31 PROCEDURE — 770006 HCHG ROOM/CARE - MED/SURG/GYN SEMI*

## 2018-01-31 PROCEDURE — 700102 HCHG RX REV CODE 250 W/ 637 OVERRIDE(OP): Performed by: STUDENT IN AN ORGANIZED HEALTH CARE EDUCATION/TRAINING PROGRAM

## 2018-01-31 PROCEDURE — A9270 NON-COVERED ITEM OR SERVICE: HCPCS | Performed by: STUDENT IN AN ORGANIZED HEALTH CARE EDUCATION/TRAINING PROGRAM

## 2018-01-31 PROCEDURE — A9270 NON-COVERED ITEM OR SERVICE: HCPCS | Performed by: INTERNAL MEDICINE

## 2018-01-31 PROCEDURE — 97606 NEG PRS WND THER DME>50 SQCM: CPT

## 2018-01-31 PROCEDURE — 82962 GLUCOSE BLOOD TEST: CPT | Mod: 91

## 2018-01-31 PROCEDURE — 51798 US URINE CAPACITY MEASURE: CPT

## 2018-01-31 PROCEDURE — 99233 SBSQ HOSP IP/OBS HIGH 50: CPT | Performed by: INTERNAL MEDICINE

## 2018-01-31 PROCEDURE — 700102 HCHG RX REV CODE 250 W/ 637 OVERRIDE(OP): Performed by: INTERNAL MEDICINE

## 2018-01-31 PROCEDURE — 306263 VAC CANNISTER W/GEL 500ML: Performed by: INTERNAL MEDICINE

## 2018-01-31 PROCEDURE — 306372 DRESSING,VAC SIMPLACE MED: Performed by: INTERNAL MEDICINE

## 2018-01-31 PROCEDURE — 700102 HCHG RX REV CODE 250 W/ 637 OVERRIDE(OP)

## 2018-01-31 RX ORDER — HYDROMORPHONE HYDROCHLORIDE 2 MG/ML
0.5 INJECTION, SOLUTION INTRAMUSCULAR; INTRAVENOUS; SUBCUTANEOUS
Status: DISCONTINUED | OUTPATIENT
Start: 2018-01-31 | End: 2018-02-12 | Stop reason: HOSPADM

## 2018-01-31 RX ORDER — IBUPROFEN 400 MG/1
400 TABLET ORAL EVERY 6 HOURS PRN
Status: DISCONTINUED | OUTPATIENT
Start: 2018-01-31 | End: 2018-02-12 | Stop reason: HOSPADM

## 2018-01-31 RX ORDER — OXYCODONE HYDROCHLORIDE 5 MG/1
5-10 TABLET ORAL EVERY 4 HOURS PRN
Status: DISCONTINUED | OUTPATIENT
Start: 2018-01-31 | End: 2018-01-31

## 2018-01-31 RX ORDER — ACETAMINOPHEN 325 MG/1
650 TABLET ORAL EVERY 4 HOURS PRN
Status: DISCONTINUED | OUTPATIENT
Start: 2018-01-31 | End: 2018-02-12 | Stop reason: HOSPADM

## 2018-01-31 RX ORDER — HYDROMORPHONE HYDROCHLORIDE 2 MG/ML
0.5 INJECTION, SOLUTION INTRAMUSCULAR; INTRAVENOUS; SUBCUTANEOUS ONCE
Status: COMPLETED | OUTPATIENT
Start: 2018-01-31 | End: 2018-01-31

## 2018-01-31 RX ORDER — POTASSIUM CHLORIDE 20 MEQ/1
40 TABLET, EXTENDED RELEASE ORAL ONCE
Status: COMPLETED | OUTPATIENT
Start: 2018-01-31 | End: 2018-01-31

## 2018-01-31 RX ORDER — OXYCODONE HYDROCHLORIDE 10 MG/1
10 TABLET ORAL EVERY 4 HOURS PRN
Status: DISCONTINUED | OUTPATIENT
Start: 2018-01-31 | End: 2018-02-12 | Stop reason: HOSPADM

## 2018-01-31 RX ORDER — OXYCODONE HYDROCHLORIDE 5 MG/1
5 TABLET ORAL EVERY 4 HOURS PRN
Status: DISCONTINUED | OUTPATIENT
Start: 2018-01-31 | End: 2018-02-12 | Stop reason: HOSPADM

## 2018-01-31 RX ADMIN — LIDOCAINE HYDROCHLORIDE 1 APPLICATION: 20 JELLY TOPICAL at 22:44

## 2018-01-31 RX ADMIN — SODIUM CHLORIDE: 9 INJECTION, SOLUTION INTRAVENOUS at 15:44

## 2018-01-31 RX ADMIN — HYDROCODONE BITARTRATE AND ACETAMINOPHEN 2 TABLET: 5; 325 TABLET ORAL at 07:54

## 2018-01-31 RX ADMIN — ENOXAPARIN SODIUM 80 MG: 100 INJECTION SUBCUTANEOUS at 06:25

## 2018-01-31 RX ADMIN — ACETAMINOPHEN 650 MG: 325 TABLET, FILM COATED ORAL at 21:34

## 2018-01-31 RX ADMIN — ENOXAPARIN SODIUM 80 MG: 100 INJECTION SUBCUTANEOUS at 20:23

## 2018-01-31 RX ADMIN — HYDROCODONE BITARTRATE AND ACETAMINOPHEN 2 TABLET: 5; 325 TABLET ORAL at 03:41

## 2018-01-31 RX ADMIN — FLUCONAZOLE 100 MG: 200 TABLET ORAL at 20:24

## 2018-01-31 RX ADMIN — MESALAMINE 400 MG: 400 CAPSULE, DELAYED RELEASE ORAL at 20:23

## 2018-01-31 RX ADMIN — ACETAMINOPHEN 650 MG: 325 TABLET, FILM COATED ORAL at 15:44

## 2018-01-31 RX ADMIN — INSULIN HUMAN 1 UNITS: 100 INJECTION, SOLUTION PARENTERAL at 06:24

## 2018-01-31 RX ADMIN — INSULIN HUMAN 2 UNITS: 100 INJECTION, SOLUTION PARENTERAL at 17:48

## 2018-01-31 RX ADMIN — HYDROMORPHONE HYDROCHLORIDE 0.5 MG: 2 INJECTION, SOLUTION INTRAMUSCULAR; INTRAVENOUS; SUBCUTANEOUS at 04:54

## 2018-01-31 RX ADMIN — MERCAPTOPURINE 150 MG: 50 TABLET ORAL at 10:22

## 2018-01-31 RX ADMIN — OXYBUTYNIN CHLORIDE 5 MG: 5 TABLET ORAL at 20:23

## 2018-01-31 RX ADMIN — CEFEPIME 2 G: 2 INJECTION, POWDER, FOR SOLUTION INTRAMUSCULAR; INTRAVENOUS at 10:23

## 2018-01-31 RX ADMIN — INSULIN HUMAN 1 UNITS: 100 INJECTION, SOLUTION PARENTERAL at 11:56

## 2018-01-31 RX ADMIN — HYDROMORPHONE HYDROCHLORIDE 0.5 MG: 2 INJECTION, SOLUTION INTRAMUSCULAR; INTRAVENOUS; SUBCUTANEOUS at 12:09

## 2018-01-31 RX ADMIN — OXYBUTYNIN CHLORIDE 5 MG: 5 TABLET ORAL at 07:54

## 2018-01-31 RX ADMIN — MESALAMINE 400 MG: 400 CAPSULE, DELAYED RELEASE ORAL at 10:23

## 2018-01-31 RX ADMIN — CEFEPIME 2 G: 2 INJECTION, POWDER, FOR SOLUTION INTRAMUSCULAR; INTRAVENOUS at 20:23

## 2018-01-31 RX ADMIN — POTASSIUM CHLORIDE 40 MEQ: 1500 TABLET, EXTENDED RELEASE ORAL at 11:56

## 2018-01-31 RX ADMIN — OXYCODONE HYDROCHLORIDE 10 MG: 10 TABLET ORAL at 11:56

## 2018-01-31 RX ADMIN — GLIPIZIDE 5 MG: 5 TABLET ORAL at 17:48

## 2018-01-31 RX ADMIN — MESALAMINE 400 MG: 400 CAPSULE, DELAYED RELEASE ORAL at 15:52

## 2018-01-31 RX ADMIN — OXYCODONE HYDROCHLORIDE 10 MG: 10 TABLET ORAL at 20:24

## 2018-01-31 ASSESSMENT — PAIN SCALES - GENERAL
PAINLEVEL_OUTOF10: 8
PAINLEVEL_OUTOF10: 8
PAINLEVEL_OUTOF10: ASSUMED PAIN PRESENT
PAINLEVEL_OUTOF10: ASSUMED PAIN PRESENT
PAINLEVEL_OUTOF10: 8

## 2018-01-31 NOTE — RESPIRATORY CARE
COPD EDUCATION by COPD CLINICAL EDUCATOR  1/31/2018 at 7:18 AM by Maria Isabel Dueñas     Patient reviewed by COPD education team. Patient does not qualify for COPD program.

## 2018-01-31 NOTE — ASSESSMENT & PLAN NOTE
- Likely related to UTI. Continue Flomax. urology following.  Pacheco catheter discontinued per urology  Follow up as an outpatient  - s/p:lasix renal scan to rule-out ongoing obstruction: No obstruction

## 2018-01-31 NOTE — CONSULTS
INFECTIOUS DISEASES INPATIENT CONSULT NOTE     Date of Service: 1/31/2018    Consult Requested By: Darrion Weathers M.D.    Reason for Consultation: Pyelonephritis    History of Present Illness:   Niels Lima is a 67 y.o. Man with a history of DM2, Crohn's, DVT with recent bladder rupture in December 2017 s/p bilateral PCN tubes and kaur catheter. He was recently admitted at the [facility] last week for fevers Tmax 100. He was treated with zosyn and discharged last Friday on fosfomycin. He presented to [facility] on 1/27 with fevers and chills. Urine cx was negative on 1/27 but not suprising as he had been on abx prior to admit. OSH urine cultures were not available. He underwent removal of his bilateral PCN tubes and placement of a left ureteral stent on 1/29 and was discharged home. When he returned home that evening, he developed chills and a subjective fever along with nausea and vomiting. His flank pain however has been improving. He has been urinating well without issues but noted that his urine was dark in color. Given recurrent fevers and chills, he presented to the ED for further evaluation. On presentation, he was febrile to 100.6 without any leukocytosis. UA revealed packed WBCs, 100-150 RBCs, negative bacteria, moderate LE and negative nitrites. CT a/p revealed left ureteral stent in place, doderate right hydronephrosis and dilatation of the right ureter. No distal right ureteral stone is identified and a 5 mm nonobstructing lower pole right renal stone. He was started on cefepime. Blood cultures are negative to date. ID consulted for further abx recommendations.     Review Of Systems:  All other ROS were reviewed and are negative except as noted above in the HPI.    PMH:   Past Medical History:   Diagnosis Date   • Asthma     daily inhaler   • Bowel habit changes     diarrhea   • Crohn's disease (CMS-[facility]) 01/01/2001   • Diabetes (CMS-HCC)     Type 2; oral medication   • DVT (deep venous thrombosis)  "(CMS-HCC) 2002, 2003, 2009, 2015    IVC filter in place- pt's wife states that he had a test done in [facility] \"snake venom\" to check pt's blood and she thinks he has some factor issue but unsure of what   • Hemorrhagic disorder (CMS-HCC)     on lovenox at this time   • Presence of IVC filter 01/01/2009   • Renal disorder     nephrostomy tube in place   • Snoring     sleep study not done       PSH:  Past Surgical History:   Procedure Laterality Date   • INCISION AND DRAINAGE GENERAL Left 12/17/2017    Procedure: INCISION AND DRAINAGE GENERAL;  Surgeon: Enrique Deluna M.D.;  Location: SURGERY [facility];  Service: General   • CYSTOSCOPY  12/10/2017    Procedure: cystoscopy, evacuation of clots, transurethral biopsy, ffulguration of perforation, cystogram;  Surgeon: Amador Upton M.D.;  Location: SURGERY [facility];  Service: Urology   • SCROTAL EXPLORATION Bilateral 11/12/2017    Procedure: SCROTAL EXPLORATION -  left orchiectomy, right testicular fixation;  Surgeon: José Antonio Mosqueda M.D.;  Location: SURGERY [facility];  Service: Urology   • LITHOTRIPSY  2010   • OTHER  2009    IVC filter   • BOWEL RESECTION  01/01/1986       FAMILY HX:  Pt is adopted    SOCIAL HX:  Social History     Social History   • Marital status:      Spouse name: N/A   • Number of children: N/A   • Years of education: N/A     Occupational History   • Not on file.     Social History Main Topics   • Smoking status: Never Smoker   • Smokeless tobacco: Never Used   • Alcohol use No   • Drug use: No   • Sexual activity: Not on file     Other Topics Concern   • Not on file     Social History Narrative   • No narrative on file     History   Smoking Status   • Never Smoker   Smokeless Tobacco   • Never Used     History   Alcohol Use No       Allergies/Intolerances:  Allergies   Allergen Reactions   • Compazine Unspecified     Pt states \"face froze up\"   • Prochlorperazine Unspecified     \"FACE FROZE UP\"       History reviewed with " the patient     Other Current Medications:    Current Facility-Administered Medications:   •  potassium chloride SA (Kdur) tablet 40 mEq, 40 mEq, Oral, Once, Darrion Weathers M.D.  •  HYDROmorphone (DILAUDID) injection 0.5 mg, 0.5 mg, Intravenous, QDAY PRN, Darrion Weathers M.D.  •  oxycodone immediate-release (ROXICODONE) tablet 5 mg, 5 mg, Oral, Q4HRS PRN **OR** oxycodone immediate release (ROXICODONE) tablet 10 mg, 10 mg, Oral, Q4HRS PRN, Amador Campuzano, PharmD  •  fluconazole (DIFLUCAN) tablet 100 mg, 100 mg, Oral, Q EVENING, Slava Davis M.D., 100 mg at 01/30/18 2047  •  glipiZIDE (GLUCOTROL) tablet 5 mg, 5 mg, Oral, BID WITH MEALS, Slava Davis M.D., Stopped at 01/31/18 0754  •  mercaptopurine (PURINETHOL) tablet 150 mg, 150 mg, Oral, DAILY, Slava Davis M.D., 150 mg at 01/31/18 1022  •  tamsulosin (FLOMAX) capsule 0.8 mg, 0.8 mg, Oral, DAILY, Slava Davis M.D.  •  Respiratory Care per Protocol, , Nebulization, Continuous RT, Slava Davis M.D.  •  NS infusion, , Intravenous, Continuous, Slava Davis M.D., Last Rate: 100 mL/hr at 01/30/18 2332  •  ondansetron (ZOFRAN) syringe/vial injection 4 mg, 4 mg, Intravenous, Q4HRS PRN, Slava Davis M.D.  •  ondansetron (ZOFRAN ODT) dispertab 4 mg, 4 mg, Oral, Q4HRS PRN, Slava Davis M.D.  •  acetaminophen (TYLENOL) tablet 650 mg, 650 mg, Oral, Q6HRS PRN, Slava Davis M.D.  •  insulin regular (HUMULIN R) injection 1-6 Units, 1-6 Units, Subcutaneous, 4X/DAY ACHS, 1 Units at 01/31/18 0624 **AND** Accu-Chek ACHS, , , Q AC AND BEDTIME(S) **AND** NOTIFY MD and PharmD, , , Once **AND** glucose 4 g chewable tablet 16 g, 16 g, Oral, Q15 MIN PRN **AND** dextrose 50% (D50W) injection 25 mL, 25 mL, Intravenous, Q15 MIN PRN, Slava Davis M.D.  •  NS infusion 2,283 mL, 30 mL/kg, Intravenous, Once PRN, Slava Davis M.D.  •  NS (BOLUS) infusion 500 mL, 500 mL, Intravenous, Once PRN, Slava Davis M.D.  •  cefepime (MAXIPIME) syringe 2 g, 2 g, Intravenous, Q12HRS, Slava Davis  "M.D., 2 g at 18 1023  •  oxybutynin (DITROPAN) tablet 5 mg, 5 mg, Oral, BID, Slava Davis M.D., 5 mg at 18 0754  •  mesalamine delayed-release (DELZICOL) capsule 400 mg, 400 mg, Oral, TID, Slava Davis M.D., 400 mg at 18 1023  •  enoxaparin (LOVENOX) inj 80 mg, 80 mg, Subcutaneous, Q12HRS, Slava Davis M.D., 80 mg at 18 0625  [unfilled]    Most Recent Vital Signs:  /67   Pulse 96   Temp 37.2 °C (99 °F)   Resp 18   Ht 1.854 m (6' 0.99\") Comment: copied from last entry  Wt 76.1 kg (167 lb 12.3 oz)   SpO2 96%   BMI 22.14 kg/m²   Temp  Av.4 °C (99.4 °F)  Min: 36.9 °C (98.5 °F)  Max: 38.1 °C (100.6 °F)    Physical Exam:  General: well-appearing, no acute distress  HEENT: sclera anicteric, PERRL, EOMI, MMM, no oral lesions  Neck: supple, no lymphadenopathy  Chest: CTAB, no r/r/w, normal work of breathing.  Cardiac: RRR, normal S1 S2, no m/r/g   Abdomen: + bowel sounds, soft, non-tender, non-distended, no HSM  Extremities: WWP, no edema, 2+ pulses  Skin: no rashes or worrisome lesions  Neuro: Alert and oriented times 3, non-focal exam, speech fluent, moves all extremities    Pertinent Lab Results:  Recent Labs      18   0315  18   1230  18   0204   WBC  2.4*  5.3  4.0*      Recent Labs      18   0315  18   1230  18   0204   HEMOGLOBIN  9.2*  10.8*  9.2*   HEMATOCRIT  29.8*  34.9*  29.4*   MCV  96.1  93.8  95.5   MCH  29.7  29.0  29.9   PLATELETCT  242  317  249         Recent Labs      18   0315  18   1230  18   0204   SODIUM  136  136  139   POTASSIUM  3.8  3.7  3.4*   CHLORIDE  106  103  105   CO2  25  22  27   CREATININE  0.43*  0.53  0.40*        Recent Labs      18   1230   ALBUMIN  3.4        Pertinent Micro:  Results     Procedure Component Value Units Date/Time    BLOOD CULTURE [[id number]] Collected:  18 1230    Order Status:  Completed Specimen:  Blood from Peripheral Updated:  18 0914     " "Significant Indicator NEG     Source BLD     Site PERIPHERAL     Blood Culture --     No Growth    Note: Blood cultures are incubated for 5 days and  are monitored continuously.Positive blood cultures  are called to the RN and reported as soon as  they are identified.      Narrative:       Per Hospital Policy: Only change Specimen Src: to \"Line\" if  specified by physician order.    BLOOD CULTURE [[id number]] Collected:  01/30/18 1435    Order Status:  Completed Specimen:  Blood from Peripheral Updated:  01/31/18 0914     Significant Indicator NEG     Source BLD     Site PERIPHERAL     Blood Culture --     No Growth    Note: Blood cultures are incubated for 5 days and  are monitored continuously.Positive blood cultures  are called to the RN and reported as soon as  they are identified.      Narrative:       Per Hospital Policy: Only change Specimen Src: to \"Line\" if  specified by physician order.    Blood Culture [[id number]]     Order Status:  Sent Specimen:  Blood from Peripheral     Blood Culture [[id number]]     Order Status:  Sent Specimen:  Blood from Peripheral     Urinalysis [[id number]]     Order Status:  Sent Specimen:  Urine     URINALYSIS [[id number]]  (Abnormal) Collected:  01/30/18 1553    Order Status:  Completed Specimen:  Urine Updated:  01/30/18 1627     Micro Urine Req Microscopic     Color Dark Yellow     Character Turbid (A)     Specific Gravity 1.017     Ph 5.0     Glucose Negative mg/dL      Ketones Trace (A) mg/dL      Protein 300 (A) mg/dL      Bilirubin Negative     Urobilinogen, Urine 0.2     Nitrite Negative     Leukocyte Esterase Moderate (A)     Occult Blood Large (A)    Narrative:       Indication for culture:->Emergency Room Patient    URINE CULTURE(NEW) [[id number]] Collected:  01/30/18 1553    Order Status:  Completed Specimen:  Urine Updated:  01/30/18 1606    Narrative:       Indication for culture:->Emergency Room Patient        Blood Culture   Date Value Ref Range Status   01/30/2018   " Preliminary    No Growth    Note: Blood cultures are incubated for 5 days and  are monitored continuously.Positive blood cultures  are called to the RN and reported as soon as  they are identified.          Studies:  Ct-abdomen-pelvis With    Result Date: 1/30/2018 1/30/2018 4:31 PM HISTORY/REASON FOR EXAM:  Pain. Pain and fever TECHNIQUE/EXAM DESCRIPTION:   CT scan of the abdomen and pelvis with contrast. Contrast-enhanced helical scanning was obtained from the diaphragmatic domes through the pubic symphysis following the bolus administration of nonionic contrast without complication. 100 mL of Omnipaque 350 nonionic contrast was administered without complication. Low dose optimization technique was utilized for this CT exam including automated exposure control and adjustment of the mA and/or kV according to patient size. COMPARISON: 12/12/2017 FINDINGS: CT Abdomen: Mild lung base atelectasis. No basilar pleural effusion. Multiple hepatic cysts measuring up to 1.4 cm in diameter are again demonstrated. The spleen is large measuring up to 14.8 cm diameter. No pancreatic or adrenal gland masses. Bilateral renal cysts measuring up to 9.3 cm on the right and 3.4 cm on the left again demonstrated. Left ureteral stent is demonstrated. Mild left pelvocaliectasis. There is moderate right hydronephrosis and dilatation of the right ureter. No distal ureteral stones identified. Lower pole 5 mm right renal stone again demonstrated. The gallbladder is partially distended. No calcified gallstones identified. No evidence of bowel obstruction. The appendix is not delineated. There are diverticula colon. No evidence of diverticulitis. Mild calcified plaque aorta. Subcentimeter short axis retroperitoneal lymph nodes. An IVC filter is located below the renal veins. CT Pelvis: The bladder is partially distended. The wall of the bladder is mildly thickened. There are prostate calcifications.     Left ureteral stent is in place. Mild  left pelvocaliectasis. Moderate right hydronephrosis and dilatation of the right ureter. No distal right ureteral stone is identified. 5 mm nonobstructing lower pole right renal stone. Bilateral renal cysts again demonstrated. Hepatic cysts again demonstrated. Splenomegaly. Diverticula colon. No evidence diverticulitis. The appendix is not delineated. No free fluid.    Dx-chest-portable (1 View)    Result Date: 1/30/2018 1/30/2018 1:42 PM HISTORY/REASON FOR EXAM:  Fever and chills TECHNIQUE/EXAM DESCRIPTION AND NUMBER OF VIEWS: Single portable view of the chest. COMPARISON: 1/27/18 FINDINGS: No pulmonary infiltrates or consolidations are noted. No pleural effusion. No pneumothorax. Stable cardiopericardial silhouette.     1. No acute cardiopulmonary abnormalities are identified.    Dx-chest-portable (1 View)    Result Date: 1/27/2018 1/27/2018 12:17 AM HISTORY/REASON FOR EXAM:  Possible sepsis TECHNIQUE/EXAM DESCRIPTION:  Single AP view of the chest. COMPARISON: December 26, 2017 FINDINGS: Overlying cardiac leads are present. The cardiac silhouette appears within normal limits. The mediastinal contour appears within normal limits.  The central pulmonary vasculature appears normal. The lungs appear well expanded bilaterally.  Bilateral lungs are clear. No significant pleural effusions are identified. The bony structures appear age-appropriate.     1.  No acute cardiopulmonary disease.    [id number]    Result Date: 1/27/2018 1/27/2018 2:27 AM HISTORY/REASON FOR EXAM: Pain. TECHNIQUE/EXAM DESCRIPTION: Real-time sonography of the scrotum was performed with gray-scale, color and duplex Doppler imaging. COMPARISON: November 12, 2017 FINDINGS: There is diffuse heterogeneous flow within the right testicle, which is new since prior study. Increased flow within the right testicle and epididymis is identified. The right testis measures 3.97 cm x 2.66 cm x 3.47 cm. 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  The right epididymis measures 13.3 x 17.6 mm. Large right hydrocele is seen. No varicocele is detected.     1.  Heterogeneous parenchyma of the right testicle with markedly increased flow of the right epididymis and testis, appearance favors epididymal orchitis. Recommend sonographic follow-up for reevaluation of the right testis in 7-14 days to exclude infiltrating testicular mass. 2.  Large right hydrocele. 3.  Surgical absence the left testicle    Ir-conv Perq Neph To Nephroureteral Cath (all Radiology) Left    Result Date: 1/29/2018 1/29/2018 9:28 AM Bilateral nephrostograms and bilateral nephrostomy tube removals and left ureteral stent placement. HISTORY/REASON FOR EXAM: Ureteral obstruction. TECHNIQUE: Following informed consent patient prepped and draped in the usual fashion.  10 cc 1% lidocaine was utilized for local anesthesia. SEDATION: Conscious sedation with 150 mcg of Fentanyl and 5 mg of Versed was administered during the procedure with appropriate continuous patient monitoring by the radiology nurse. Sedation duration: 30 minutes. The procedure was performed using MAXIMAL STERILE BARRIER technique including sterile gown, mask, cap, and donning of sterile gloves following appropriate hand hygiene and/or sterile scrub. Patient skin site was prepped with 2% Chlorhexidine solution. CONTRAST: 45 mL of Omnipaque 300. FLUOROSCOPY: 4 fluoroscopic images obtained. 3.4 minutes. Omnipaque 300 was injected into the indwelling bilateral nephrostomy tubes and digital imaging was obtained over the kidneys and ureters. The right nephrostomy tube was subsequently removed. Next a guidewire was advanced into the left ureter and the indwelling left nephrostomy tube was removed.  A 5-Faroese catheter was advanced into the bladder over the guidewire.  Next the guidewire was exchanged for an Amplatz guidewire which was coiled in  the bladder.  Next an 8-Faroese 24-cm in length double-J ureteral stent was advanced over the  guidewire and positioned with its distal loop in the bladder and proximal loop in the left renal collecting system.  The internal stiffener was removed and the guidewire was retracted and positioned in the left renal collecting system.  An 8.5-Irish locking loop catheter was also positioned in the left renal collecting system.   Omnipaque 300 was injected and digital imaging was obtained.  The patient tolerated procedure well and sent to the floor in good condition. FINDINGS: The initial right nephrostogram revealed excellent filling of the right renal collecting system with good positioning of the right nephrostomy tube. There was prompt flow of contrast down the right ureter into the bladder. There was no evidence of right-sided ureteral stricture or filling defect. The right nephrostomy tube was subsequently removed. The initial left-sided nephrostogram revealed a high-grade focal stricture at the left ureteral vesicular junction. There was delayed filling of the left ureter with no other areas of stricture formation or obstruction noted in the left ureter. Digital images demonstrate good positioning of the left-sided double-J ureteral stent in the renal collecting system and bladder.     1. Right-sided nephrostogram revealed normal-appearing right renal collecting system and right ureter with prompt flow of contrast into the bladder and no evidence of right-sided ureteral stricture or filling defect. The right-sided nephrostomy tube was subsequently removed. 2. Left-sided nephrostogram revealed a high-grade focal stricture at the left ureteral fascicular junction. The remainder of the left ureter has a normal appearance. 3. Successful percutaneous placement of left-sided double-J ureteral stent.      IMPRESSION:   1. Fever   2. Pyelonephritis      PLAN:   Niels Lima is a 67 y.o. Man with a history of DM2, Crohn's, DVT with recent bladder rupture in December 2017 s/p bilateral PCN tubes with removal and  placement of a left ureteral stent on 01/29/18 admitted for fevers and chills. Pt found to have hydronephrosis on imaging. [facility] Recent urine cx here a few days ago was negative but he had been on abx. Agree with cefepime and fluconazole for now. Blood cultures are negative. Further recommendations per clinical course.       Plan of care discussed with CEE Weathers M.D. And hospitalist RN. Will continue to follow    Adrienne Li M.D.

## 2018-01-31 NOTE — ASSESSMENT & PLAN NOTE
- No further fevers.   - Continue meropenem and micafungin. ID on board, guiding antibiotics. regarding final antibiotic and duration recommendations.  On Invanz ×2 weeks per infectious disease, final duration per ID, will need arrangement for outpatient antibiotic infusion,  to assist  Repeat cultures are negative    2/8: MAX per Dr. Renee is negative, still pending official report transcript    2/10: PICC ordered

## 2018-01-31 NOTE — ASSESSMENT & PLAN NOTE
- Continue glipizide, continue holding metformin while in-house. Continue sliding scale insulin coverage while in-house. Continue Accu-Cheks before meals and at bedtime. On regular diet per patient's request. Monitor for compliance.

## 2018-01-31 NOTE — DISCHARGE PLANNING
Care Transition Team Assessment    Information Source  Orientation : Oriented x 4  Information Given By: Patient  Who is responsible for making decisions for patient? : Patient    Readmission Evaluation  Is this a readmission?: Yes - unplanned readmission  Why do you think you were readmitted?: infection  Was an appointment arranged for you prior to discharge?: Yes, attended appointment  Were there new prescriptions you were supposed to fill after you were discharged?: Yes, prescriptions filled  Did you understand your discharge instructions?: Yes  Did you have enough support after your last discharge?: Yes    Elopement Risk  Legal Hold: No  Ambulatory or Self Mobile in Wheelchair: Yes  Disoriented: No  Psychiatric Symptoms: None  History of Wandering: No  Elopement this Admit: No  Vocalizing Wanting to Leave: No  Displays Behaviors, Body Language Wanting to Leave: No-Not at Risk for Elopement  Elopement Risk: Not at Risk for Elopement    Interdisciplinary Discharge Planning  Does Admitting Nurse Feel This Could be a Complex Discharge?: No  Primary Care Physician: Mónica Ambrosio NP  Lives with - Patient's Self Care Capacity: Spouse  Patient or legal guardian wants to designate a caregiver (see row info): No  Support Systems: Spouse / Significant Other  Housing / Facility: [address]  Do You Take your Prescribed Medications Regularly: Yes  Able to Return to Previous ADL's: Yes  Mobility Issues: No  Prior Services: Skilled Home Health Services  Patient Expects to be Discharged to:: home  Assistance Needed: Yes (standby assist)  Durable Medical Equipment: Other - Specify (wound vac L thigh)    Discharge Preparedness  What is your plan after discharge?: Home health care  What are your discharge supports?: Spouse  Prior Functional Level: Independent with Activities of Daily Living, Independent with Medication Management  Difficulity with ADLs: None  Difficulity with IADLs: None    Functional Assesment  Prior Functional  Level: Independent with Activities of Daily Living, Independent with Medication Management    Finances  Financial Barriers to Discharge: No  Prescription Coverage: Yes ([facility])    Vision / Hearing Impairment  Vision Impairment : No  Hearing Impairment : Yes    Values / Beliefs / Concerns  Values / Beliefs Concerns : No    Advance Directive  Advance Directive?: DPOA for Health Care    Domestic Abuse  Have you ever been the victim of abuse or violence?: No  Physical Abuse or Sexual Abuse: No  Verbal Abuse or Emotional Abuse: No  Possible Abuse Reported to:: Not Applicable    Psychological Assessment  History of Substance Abuse: None  History of Psychiatric Problems: No  Non-compliant with Treatment: No  Newly Diagnosed Illness: No    Discharge Risks or Barriers  Discharge risks or barriers?: No    Anticipated Discharge Information  Anticipated discharge disposition: Home, [facility]

## 2018-01-31 NOTE — CARE PLAN
Problem: Urinary Elimination:  Goal: Ability to reestablish a normal urinary elimination pattern will improve  Outcome: PROGRESSING SLOWER THAN EXPECTED  Pt has bladder spasms so he needs the urinal constantly there. Pt has painful urination.  Pts urine is light brown.    Problem: Safety  Goal: Will remain free from falls  Outcome: PROGRESSING AS EXPECTED  No fall has occurred. Pt has not wanted to stand up during my shift he has been sleeping.

## 2018-01-31 NOTE — DIETARY
Nutrition Services: Pt seen for wt loss per admit screen. Diet= Diabetic. Pt is 67 y.o. male history of Crohn's disease, diabetes , bladder rupture , discharge yesterday following sepsis who presented 1/30/2018 with fevers chills nausea vomiting . Pt reports UBW was 205-210# with wt loss over past couple months due to being in/out of hospital. Current wt is 76.1 kg/ 167 lbs (per stand up scale on 1/30), pt with 19% wt loss over past couple months (severe). Pt states appetite PTA was good, eating his normal intake of three meals/day. Pt states his appetite is not doing well while in hospital, only had bite of lunch and unsure of PO intake at breakfast. Pt very sleeping, interview limited. Discussed importance of adequate nutrition, pt verbalized understanding but declined snacks/supplements with no questions/concerns.     Pertinent Labs: FSBS: 142-235 (1/30-1/31), pt on Glipizide, SSI.  Fluids: NS @100 ml/hr.   Skin: open surgical wound to left thigh (debridement of a thigh hematoma) with wound VAC. No pressure ulcers known per RN.       Plan/Rec:  Nutrition Representative to see pt daily for snacks/meal preferences as appropriate with diet order. Adjust insulin PRN for tight glucose control. RD to monitor for adequate intake.

## 2018-01-31 NOTE — ASSESSMENT & PLAN NOTE
- This is sepsis (without associated acute organ dysfunction).  Source is pyelonephritis/complicated UTI. Rule out endocarditis. Fever has resolved.  - Await MAX.  - Continue IV antibiotics for pyelonephritis and fungemia as below.

## 2018-01-31 NOTE — H&P
Hospital Medicine History and Physical    Date of Service  1/30/2018    Chief Complaint  Chief Complaint   Patient presents with   • Wound Infection     pt reports that he had his nephrostomys removed yesterday and this morning woke with fever and chills, as well as vomiting.        History of Presenting Illness  67 y.o. male history of Crohn's disease, diabetes , bladder rupture , discharge yesterday following sepsis on fosfomycin, Diflucan, culture negative,  with nephrostomy tubes removedl with left ureteral stent in place who presented 1/30/2018 with fevers chills nausea vomiting .  Patient reportedly felt okay following discharge then this morning developed increasing urinary frequency and bladder spasms temperature 100.3 .  He's had decreased oral intake his urine initially was light in color now dark.  No reported melena or hematochezia. Has had flank pain and lower abdominal, pelvic spasms.      Primary Care Physician  None     Consultants  None     Code Status  Code: Full code    Review of Systems  Review of Systems   Constitutional: Positive for chills, fever and malaise/fatigue. Negative for diaphoresis.   HENT: Negative for congestion.    Eyes: Negative for discharge and redness.   Respiratory: Negative for cough, shortness of breath, wheezing and stridor.    Cardiovascular: Negative for chest pain, palpitations and leg swelling.   Gastrointestinal: Positive for abdominal pain, nausea and vomiting. Negative for blood in stool.   Genitourinary: Positive for flank pain and frequency. Negative for hematuria.   Musculoskeletal: Negative for back pain, joint pain and neck pain.   Neurological: Positive for weakness. Negative for tremors, sensory change, speech change and focal weakness.   Psychiatric/Behavioral: Negative for hallucinations and substance abuse.          Past Medical History  Past Medical History:   Diagnosis Date   • Presence of IVC filter 01/01/2009   • Crohn's disease (CMS-[facility]) 01/01/2001  "  • Asthma     daily inhaler   • Bowel habit changes     diarrhea   • Diabetes (CMS-HCC)     Type 2; oral medication   • DVT (deep venous thrombosis) (CMS-HCC) 2002, 2003, 2009, 2015    IVC filter in place- pt's wife states that he had a test done in [facility] \"snake venom\" to check pt's blood and she thinks he has some factor issue but unsure of what   • Hemorrhagic disorder (CMS-HCC)     on lovenox at this time   • Renal disorder     nephrostomy tube in place   • Snoring     sleep study not done     Surgical History  Past Surgical History:   Procedure Laterality Date   • INCISION AND DRAINAGE GENERAL Left 12/17/2017    Procedure: INCISION AND DRAINAGE GENERAL;  Surgeon: Enrique Deluna M.D.;  Location: SURGERY [facility];  Service: General   • CYSTOSCOPY  12/10/2017    Procedure: cystoscopy, evacuation of clots, transurethral biopsy, ffulguration of perforation, cystogram;  Surgeon: Amador Upton M.D.;  Location: SURGERY [facility];  Service: Urology   • SCROTAL EXPLORATION Bilateral 11/12/2017    Procedure: SCROTAL EXPLORATION -  left orchiectomy, right testicular fixation;  Surgeon: José Antonio Mosqueda M.D.;  Location: SURGERY [facility];  Service: Urology   • LITHOTRIPSY  2010   • OTHER  2009    IVC filter   • BOWEL RESECTION  01/01/1986       Medications  No current facility-administered medications on file prior to encounter.      Current Outpatient Prescriptions on File Prior to Encounter   Medication Sig Dispense Refill   • glipiZIDE (GLUCOTROL) 5 MG Tab Take 5 mg by mouth 2 times a day.     • oxycodone-acetaminophen (PERCOCET) 5-325 MG Tab Take 2 Tabs by mouth every 12 hours as needed for Moderate Pain.     • albuterol 108 (90 Base) MCG/ACT Aero Soln inhalation aerosol Inhale 2 Puffs by mouth every four hours as needed for Shortness of Breath.     • mercaptopurine (PURINETHOL) 50 MG Tab Take 150 mg by mouth every day.     • metformin (GLUCOPHAGE) 1000 MG tablet Take 1,000 mg by mouth 2 times a day, " "with meals.     • tamsulosin (FLOMAX) 0.4 MG capsule Take 0.8 mg by mouth every day.         Family History  No family history on file.    Social History  Social History   Substance Use Topics   • Smoking status: Never Smoker   • Smokeless tobacco: Never Used   • Alcohol use No     Lives with his wife    Allergies  Allergies   Allergen Reactions   • Compazine Unspecified     Pt states \"face froze up\"   • Prochlorperazine Unspecified     \"FACE FROZE UP\"        Physical Exam  Laboratory   Hemodynamics  Temp (24hrs), Av.4 °C (99.4 °F), Min:36.9 °C (98.5 °F), Max:38.1 °C (100.6 °F)   Temperature: 37.3 °C (99.2 °F)  Pulse  Av.4  Min: 79  Max: 117 Heart Rate (Monitored): 80  Blood Pressure : 125/67, NIBP: 113/62      Respiratory      Respiration: 16, Pulse Oximetry: 90 %        RUL Breath Sounds: Clear, FLORENCE Breath Sounds: Clear    Physical Exam   Constitutional: He is oriented to person, place, and time. No distress.   HENT:   Head: Normocephalic and atraumatic.   Nose: Nose normal.   Dry mucous membranes   Eyes: Conjunctivae and EOM are normal. No scleral icterus.   Neck: Normal range of motion. No JVD present.   Cardiovascular: Normal rate and regular rhythm.    No murmur heard.  Pulmonary/Chest: Effort normal. No stridor. No respiratory distress. He has no wheezes. He has no rales.   Abdominal: Soft. Bowel sounds are normal. He exhibits no distension. There is tenderness. There is no rebound and no guarding.   Bilateral flank pain  abdominal ventral scar   Musculoskeletal: He exhibits no edema or tenderness.   Left thigh Wound vac present   Neurological: He is alert and oriented to person, place, and time. No cranial nerve deficit.   Skin: Skin is warm and dry. He is not diaphoretic. No pallor.   Psychiatric: He has a normal mood and affect. His behavior is normal.   Vitals reviewed.        Assessment/Plan  * Sepsis (CMS-[facility])   Assessment & Plan    This is sepsis (without associated acute organ dysfunction).  " With fever and tachycardia - Due to urinary source infection.  IV fluid support, IV antibiotics  When necessary Tylenol for fever  Follow Cultures.            Urinary tract infection- (present on admission)   Assessment & Plan    Recent culture negative . Start IV cefepime.  Follow up urine Culture.  Signs of reoccurrence consider ID reconsultation following admission  Check pro calcitonin level to guide clinical picture.         Pyelonephritis- (present on admission)   Assessment & Plan    With bilateral flank pain, differential also includes musculoskeletal type of flank pain.  Urinalysis suggests infection.  Will be started on empiric antibiotics follow-up. Culture  Provide analgesics        DM (diabetes mellitus) (CMS-HCC)- (present on admission)   Assessment & Plan    With hyperglycemia  Continue with glipizide monitor serial Accu-Cheks provide insulin sliding scale coverage as needed        History of DVT (deep vein thrombosis)   Assessment & Plan    With report hypercoagulable disorder and DVTs ×4  ,  Most recent diagnosis of Right lower extremity DVT to be continued on Lovenox subcu injections.           Open wound of left thigh- (present on admission)   Assessment & Plan    With wound VAC in place, wound care        Anemia- (present on admission)   Assessment & Plan    Likely chronic disease. No symptoms of acute bleeding.        Crohn disease (CMS-HCC)- (present on admission)   Assessment & Plan    Stable symptoms continue outpatient medications              I anticipate this patient will require at least two midnights for appropriate medical management, necessitating inpatient admission.    Prophylaxis: lovenox    Recent Labs      01/28/18   0248  01/29/18   0315  01/30/18   1230   WBC  2.7*  2.4*  5.3   RBC  3.02*  3.10*  3.72*   HEMOGLOBIN  8.8*  9.2*  10.8*   HEMATOCRIT  29.1*  29.8*  34.9*   MCV  96.4  96.1  93.8   MCH  29.1  29.7  29.0   MCHC  30.2*  30.9*  30.9*   RDW  61.1*  60.6*  61.1*    PLATELETCT  215  242  317   MPV  9.1  9.4  9.0     Recent Labs      01/28/18   0248  01/29/18   0315  01/30/18   1230   SODIUM  137  136  136   POTASSIUM  4.0  3.8  3.7   CHLORIDE  103  106  103   CO2  26  25  22   GLUCOSE  181*  197*  251*   BUN  14  10  12   CREATININE  0.43*  0.43*  0.53   CALCIUM  8.3*  8.6  9.0     Recent Labs      01/28/18   0248  01/29/18   0315  01/30/18   1230   ALTSGPT   --    --   14   ASTSGOT   --    --   12   ALKPHOSPHAT   --    --   52   TBILIRUBIN   --    --   0.7   GLUCOSE  181*  197*  251*     Recent Labs      01/30/18   2035   APTT  38.7*   INR  1.26*             No results found for: TROPONINI    Imaging  CT-ABDOMEN-PELVIS WITH   Final Result      Left ureteral stent is in place. Mild left pelvocaliectasis.   Moderate right hydronephrosis and dilatation of the right ureter. No distal right ureteral stone is identified.   5 mm nonobstructing lower pole right renal stone.   Bilateral renal cysts again demonstrated.   Hepatic cysts again demonstrated.   Splenomegaly.   Diverticula colon. No evidence diverticulitis.   The appendix is not delineated.   No free fluid.      DX-CHEST-PORTABLE (1 VIEW)   Final Result         1. No acute cardiopulmonary abnormalities are identified.

## 2018-01-31 NOTE — ASSESSMENT & PLAN NOTE
- with hypercoagulable disorder, and multiple episodes of DVTs.  - Continue therapeutic Lovenox subcutaneous injections.

## 2018-01-31 NOTE — ED NOTES
"Med rec updated and complete  Allergies reviewed  Called the [phone] to verify pts medications.  [facility] has [facility] taking 150MG of MERCAPTOPURINE once a day, pt states \"I take 350MG of MERCAPTOPURINE once a day\".  Talked to pt regarding MERCAPTOPURINE 50MG, pt states \"I take 3 tables of my MERCAPTOPURINE 50MG\".  \"I think I told you 350MG, but I only take 150MG daily\".     "

## 2018-01-31 NOTE — CONSULTS
"UROLOGY Consult Note:    Jeremie Head  Date & Time note created:    1/31/2018   11:25 AM     Referring MD:  Dr. Mtz    Patient ID:   Name:             Niels Lima   YOB: 1950  Age:                 67 y.o.  male   MRN:               [id number]                                                             Reason for Consult:      Fever    History of Present Illness:    Mr. Lima is known to our service for prior admission in early December [facility] that was subsequently repaired by Dr. Upton on 12/10/17. He also had bilateral NPTs placed and eventually he was discharged to skilled nursing for completion of IV antibiotics and management of a wound vac that was placed following debridement of a thigh hematoma. 2 days ago he was in IR for removal of his nephrostomy tubes, which occurred without difficulty, however, not too long after arriving at home he developed fevers and chills which prompted his return to the ER. He has since been admitted for treatment of urosepsis and has been started on Cefepime pending cultures. He has been voiding and feels as if he is emptying. There has been pain in the kidneys with voiding but no dysuria or hematuria.     Review of Systems:      Full ROS completed with the exception of that being reported in the HPI.    Past Medical History:   Past Medical History:   Diagnosis Date   • Asthma     daily inhaler   • Bowel habit changes     diarrhea   • Crohn's disease (CMS-HCC) 01/01/2001   • Diabetes (CMS-HCC)     Type 2; oral medication   • DVT (deep venous thrombosis) (CMS-HCC) 2002, 2003, 2009, 2015    IVC filter in place- pt's wife states that he had a test done in [facility] \"snake venom\" to check pt's blood and she thinks he has some factor issue but unsure of what   • Hemorrhagic disorder (CMS-HCC)     on lovenox at this time   • Presence of IVC filter 01/01/2009   • Renal disorder     nephrostomy tube " in place   • Snoring     sleep study not done     Active Hospital Problems    Diagnosis   • Sepsis (CMS-HCC) [A41.9]     Priority: High   • Urinary tract infection [N39.0]     Priority: High   • Pyelonephritis [N12]     Priority: High   • DM (diabetes mellitus) (CMS-HCC) [E11.9]     Priority: Medium   • Anemia [D64.9]     Priority: Low   • Crohn disease (CMS-HCC) [K50.90]     Priority: Low   • History of DVT (deep vein thrombosis) [Z86.718]   • Open wound of left thigh [S71.102A]   Bladder perforation s/p repair 12/10/17    Past Surgical History:  Past Surgical History:   Procedure Laterality Date   • INCISION AND DRAINAGE GENERAL Left 12/17/2017    Procedure: INCISION AND DRAINAGE GENERAL;  Surgeon: Enrique Deluna M.D.;  Location: [facility];  Service: General   • CYSTOSCOPY  12/10/2017    Procedure: cystoscopy, evacuation of clots, transurethral biopsy, ffulguration of perforation, cystogram;  Surgeon: Amador Upton M.D.;  Location: [facility];  Service: Urology   • SCROTAL EXPLORATION Bilateral 11/12/2017    Procedure: SCROTAL EXPLORATION -  left orchiectomy, right testicular fixation;  Surgeon: José Antonio Mosqueda M.D.;  Location: [facility];  Service: Urology   • LITHOTRIPSY  2010   • OTHER  2009    IVC filter   • BOWEL RESECTION  01/01/1986       Hospital Medications:    Current Facility-Administered Medications:   •  potassium chloride SA (Kdur) tablet 40 mEq, 40 mEq, Oral, Once, Darrion Weathers M.D.  •  HYDROmorphone (DILAUDID) injection 0.5 mg, 0.5 mg, Intravenous, QDAY PRN, Darrion Weathers M.D.  •  oxycodone immediate-release (ROXICODONE) tablet 5 mg, 5 mg, Oral, Q4HRS PRN **OR** oxycodone immediate release (ROXICODONE) tablet 10 mg, 10 mg, Oral, Q4HRS PRN, Amador Campuzano, PharmD  •  fluconazole (DIFLUCAN) tablet 100 mg, 100 mg, Oral, Q EVENING, Slava Davis M.D., 100 mg at 01/30/18 2047  •  glipiZIDE (GLUCOTROL) tablet 5 mg, 5 mg, Oral, BID WITH MEALS, Slava Davis,  M.D., Stopped at 01/31/18 0754  •  mercaptopurine (PURINETHOL) tablet 150 mg, 150 mg, Oral, DAILY, Slava Davis M.D., 150 mg at 01/31/18 1022  •  tamsulosin (FLOMAX) capsule 0.8 mg, 0.8 mg, Oral, DAILY, Slava Davis M.D.  •  Respiratory Care per Protocol, , Nebulization, Continuous RT, Slava Davis M.D.  •  NS infusion, , Intravenous, Continuous, Slava Davis M.D., Last Rate: 100 mL/hr at 01/30/18 2332  •  ondansetron (ZOFRAN) syringe/vial injection 4 mg, 4 mg, Intravenous, Q4HRS PRN, Slava Davis M.D.  •  ondansetron (ZOFRAN ODT) dispertab 4 mg, 4 mg, Oral, Q4HRS PRN, Slava Davis M.D.  •  acetaminophen (TYLENOL) tablet 650 mg, 650 mg, Oral, Q6HRS PRN, Slava Davis M.D.  •  insulin regular (HUMULIN R) injection 1-6 Units, 1-6 Units, Subcutaneous, 4X/DAY ACHS, 1 Units at 01/31/18 0624 **AND** Accu-Chek ACHS, , , Q AC AND BEDTIME(S) **AND** NOTIFY MD and PharmD, , , Once **AND** glucose 4 g chewable tablet 16 g, 16 g, Oral, Q15 MIN PRN **AND** dextrose 50% (D50W) injection 25 mL, 25 mL, Intravenous, Q15 MIN PRN, Slava Davis M.D.  •  NS infusion 2,283 mL, 30 mL/kg, Intravenous, Once PRN, Slava Davis M.D.  •  NS (BOLUS) infusion 500 mL, 500 mL, Intravenous, Once PRN, Slava Davis M.D.  •  cefepime (MAXIPIME) syringe 2 g, 2 g, Intravenous, Q12HRS, Slava Davis M.D., 2 g at 01/31/18 1023  •  oxybutynin (DITROPAN) tablet 5 mg, 5 mg, Oral, BID, Slava Davis M.D., 5 mg at 01/31/18 0754  •  mesalamine delayed-release (DELZICOL) capsule 400 mg, 400 mg, Oral, TID, Slava Davis M.D., 400 mg at 01/31/18 1023  •  enoxaparin (LOVENOX) inj 80 mg, 80 mg, Subcutaneous, Q12HRS, Slava Davis M.D., 80 mg at 01/31/18 0625    Current Outpatient Medications:  Prescriptions Prior to Admission   Medication Sig Dispense Refill Last Dose   • fluconazole (DIFLUCAN) 100 MG Tab Take 100 mg by mouth every day. Pt started 1/29/2018 for 14 day course   1/29/2018 at 1700   • fosfomycin (MONUROL) 3 GM Pack Take 3 g by mouth every 3  "days. Pts first dose was on 1/24/2018 1/29/2018 at 1700   • enoxaparin (LOVENOX) 100 MG/ML Solution inj Inject 1 Syringe as instructed every 12 hours. Pt started on 1/21/2018 1/30/2018 at 0700   • Mesalamine (APRISO) 0.375 GM CAPSULE SR 24 HR Take 4 Caps by mouth every day.   1/30/2018 at 0700   • glipiZIDE (GLUCOTROL) 5 MG Tab Take 5 mg by mouth 2 times a day.   1/30/2018 at 0700   • oxycodone-acetaminophen (PERCOCET) 5-325 MG Tab Take 2 Tabs by mouth every 12 hours as needed for Moderate Pain.   1/30/2018 at 0000   • albuterol 108 (90 Base) MCG/ACT Aero Soln inhalation aerosol Inhale 2 Puffs by mouth every four hours as needed for Shortness of Breath.   1/29/2018 at 2200   • mercaptopurine (PURINETHOL) 50 MG Tab Take 150 mg by mouth every day.   1/30/2018 at 0700   • metformin (GLUCOPHAGE) 1000 MG tablet Take 1,000 mg by mouth 2 times a day, with meals.   1/30/2018 at 0700   • tamsulosin (FLOMAX) 0.4 MG capsule Take 0.8 mg by mouth every day.   1/30/2018 at 0900       Medication Allergy:  Allergies   Allergen Reactions   • Compazine Unspecified     Pt states \"face froze up\"   • Prochlorperazine Unspecified     \"FACE FROZE UP\"       Family History:  No family history on file.    Social History:  Social History     Social History   • Marital status:      Spouse name: N/A   • Number of children: N/A   • Years of education: N/A     Occupational History   • Not on file.     Social History Main Topics   • Smoking status: Never Smoker   • Smokeless tobacco: Never Used   • Alcohol use No   • Drug use: No   • Sexual activity: Not on file     Other Topics Concern   • Not on file     Social History Narrative   • No narrative on file         Physical Exam:  Vitals/ General Appearance:   Weight/BMI: Body mass index is 22.14 kg/m².  Blood pressure 114/67, pulse 96, temperature 37.2 °C (99 °F), resp. rate 18, height 1.854 m (6' 0.99\"), weight 76.1 kg (167 lb 12.3 oz), SpO2 96 %.  Vitals:    01/30/18 1958 01/31/18 0106 " 01/31/18 0452 01/31/18 0800   BP: 125/67 113/61 125/64 114/67   Pulse: 89 73 91 96   Resp: 16 16 18 18   Temp: 37.3 °C (99.2 °F)  37.7 °C (99.8 °F) 37.2 °C (99 °F)   SpO2: 90%  95% 96%   Weight:       Height:         Oxygen Therapy:  Pulse Oximetry: 96 %, O2 (LPM): 2, O2 Delivery: Silicone Nasal Cannula    Constitutional:   Well developed, Well nourished, No acute distress  HENMT:  Normocephalic, Atraumatic, Oropharynx moist mucous membranes, No oral exudates, Nose normal.  No thyromegaly.  Eyes:  EOMI, Conjunctiva normal, No discharge.  Neck:  Normal range of motion, No cervical tenderness.  Cardiovascular:  Normal heart rate, Normal rhythm, No murmurs, No rubs, No gallops.   Extremitites with intact distal pulses, no cyanosis, or edema.  Lungs:  Normal breath sounds, breath sounds clear to auscultation bilaterally,  no crackles, no wheezing.   Abdomen: Bowel sounds normal, Soft, No tenderness, No guarding, No rebound, No masses, No hepatosplenomegaly.  : No CVAT. Circumcised penis. Meatus adequate. Scrotum without edema. Non tender spermatocele on the right. No inguinal LAD.   Skin: Warm, Dry, No erythema, No rash, no induration.  Neurologic: Alert & oriented x 3, No focal deficits noted, cranial nerves II through X are grossly intact.  Psychiatric: Affect normal, Judgment normal, Mood normal.      MDM (Data Review):     Records reviewed and summarized in current documentation    Lab Data Review:  Recent Results (from the past 24 hour(s))   Lactic acid (lactate)    Collection Time: 01/30/18 12:30 PM   Result Value Ref Range    Lactic Acid 2.0 0.5 - 2.0 mmol/L   CBC WITH DIFFERENTIAL    Collection Time: 01/30/18 12:30 PM   Result Value Ref Range    WBC 5.3 4.8 - 10.8 K/uL    RBC 3.72 (L) 4.70 - 6.10 M/uL    Hemoglobin 10.8 (L) 14.0 - 18.0 g/dL    Hematocrit 34.9 (L) 42.0 - 52.0 %    MCV 93.8 81.4 - 97.8 fL    MCH 29.0 27.0 - 33.0 pg    MCHC 30.9 (L) 33.7 - 35.3 g/dL    RDW 61.1 (H) 35.9 - 50.0 fL    Platelet  Count 317 164 - 446 K/uL    MPV 9.0 9.0 - 12.9 fL    Neutrophils-Polys 83.70 (H) 44.00 - 72.00 %    Lymphocytes 5.80 (L) 22.00 - 41.00 %    Monocytes 9.10 0.00 - 13.40 %    Eosinophils 0.60 0.00 - 6.90 %    Basophils 0.40 0.00 - 1.80 %    Immature Granulocytes 0.40 0.00 - 0.90 %    Nucleated RBC 0.00 /100 WBC    Neutrophils (Absolute) 4.44 1.82 - 7.42 K/uL    Lymphs (Absolute) 0.31 (L) 1.00 - 4.80 K/uL    Monos (Absolute) 0.48 0.00 - 0.85 K/uL    Eos (Absolute) 0.03 0.00 - 0.51 K/uL    Baso (Absolute) 0.02 0.00 - 0.12 K/uL    Immature Granulocytes (abs) 0.02 0.00 - 0.11 K/uL    NRBC (Absolute) 0.00 K/uL   COMP METABOLIC PANEL    Collection Time: 01/30/18 12:30 PM   Result Value Ref Range    Sodium 136 135 - 145 mmol/L    Potassium 3.7 3.6 - 5.5 mmol/L    Chloride 103 96 - 112 mmol/L    Co2 22 20 - 33 mmol/L    Anion Gap 11.0 0.0 - 11.9    Glucose 251 (H) 65 - 99 mg/dL    Bun 12 8 - 22 mg/dL    Creatinine 0.53 0.50 - 1.40 mg/dL    Calcium 9.0 8.5 - 10.5 mg/dL    AST(SGOT) 12 12 - 45 U/L    ALT(SGPT) 14 2 - 50 U/L    Alkaline Phosphatase 52 30 - 99 U/L    Total Bilirubin 0.7 0.1 - 1.5 mg/dL    Albumin 3.4 3.2 - 4.9 g/dL    Total Protein 6.8 6.0 - 8.2 g/dL    Globulin 3.4 1.9 - 3.5 g/dL    A-G Ratio 1.0 g/dL   BLOOD CULTURE    Collection Time: 01/30/18 12:30 PM   Result Value Ref Range    Significant Indicator NEG     Source BLD     Site PERIPHERAL     Blood Culture       No Growth    Note: Blood cultures are incubated for 5 days and  are monitored continuously.Positive blood cultures  are called to the RN and reported as soon as  they are identified.     ESTIMATED GFR    Collection Time: 01/30/18 12:30 PM   Result Value Ref Range    GFR If African American >60 >60 mL/min/1.73 m 2    GFR If Non African American >60 >60 mL/min/1.73 m 2   BLOOD CULTURE    Collection Time: 01/30/18  2:35 PM   Result Value Ref Range    Significant Indicator NEG     Source BLD     Site PERIPHERAL     Blood Culture       No Growth    Note:  Blood cultures are incubated for 5 days and  are monitored continuously.Positive blood cultures  are called to the RN and reported as soon as  they are identified.     URINALYSIS    Collection Time: 01/30/18  3:53 PM   Result Value Ref Range    Micro Urine Req Microscopic     Color Dark Yellow     Character Turbid (A)     Specific Gravity 1.017 <1.035    Ph 5.0 5.0 - 8.0    Glucose Negative Negative mg/dL    Ketones Trace (A) Negative mg/dL    Protein 300 (A) Negative mg/dL    Bilirubin Negative Negative    Urobilinogen, Urine 0.2 Negative    Nitrite Negative Negative    Leukocyte Esterase Moderate (A) Negative    Occult Blood Large (A) Negative   URINE MICROSCOPIC (W/UA)    Collection Time: 01/30/18  3:53 PM   Result Value Ref Range    WBC Packed WBC /hpf    -150 (A) /hpf    Bacteria Negative None /hpf    Epithelial Cells Negative /hpf    Hyaline Cast 0-2 /lpf    Budding Yeast Present (A) Absent /hpf   Lactic acid (lactate)    Collection Time: 01/30/18  4:03 PM   Result Value Ref Range    Lactic Acid 1.0 0.5 - 2.0 mmol/L   PROCALCITONIN    Collection Time: 01/30/18  4:03 PM   Result Value Ref Range    Procalcitonin 1.74 (H) <0.25 ng/mL   ACCU-CHEK GLUCOSE    Collection Time: 01/30/18  6:19 PM   Result Value Ref Range    Glucose - Accu-Ck 142 (H) 65 - 99 mg/dL   APTT    Collection Time: 01/30/18  8:35 PM   Result Value Ref Range    APTT 38.7 (H) 24.7 - 36.0 sec   Prothrombin time (INR)    Collection Time: 01/30/18  8:35 PM   Result Value Ref Range    PT 15.5 (H) 12.0 - 14.6 sec    INR 1.26 (H) 0.87 - 1.13   ACCU-CHEK GLUCOSE    Collection Time: 01/30/18  8:51 PM   Result Value Ref Range    Glucose - Accu-Ck 235 (H) 65 - 99 mg/dL   Procalcitonin    Collection Time: 01/30/18 11:01 PM   Result Value Ref Range    Procalcitonin 2.41 (H) <0.25 ng/mL   Basic Metabolic Panel (BMP)    Collection Time: 01/31/18  2:04 AM   Result Value Ref Range    Sodium 139 135 - 145 mmol/L    Potassium 3.4 (L) 3.6 - 5.5 mmol/L     Chloride 105 96 - 112 mmol/L    Co2 27 20 - 33 mmol/L    Glucose 174 (H) 65 - 99 mg/dL    Bun 12 8 - 22 mg/dL    Creatinine 0.40 (L) 0.50 - 1.40 mg/dL    Calcium 8.2 (L) 8.5 - 10.5 mg/dL    Anion Gap 7.0 0.0 - 11.9   CBC without Differential    Collection Time: 01/31/18  2:04 AM   Result Value Ref Range    WBC 4.0 (L) 4.8 - 10.8 K/uL    RBC 3.08 (L) 4.70 - 6.10 M/uL    Hemoglobin 9.2 (L) 14.0 - 18.0 g/dL    Hematocrit 29.4 (L) 42.0 - 52.0 %    MCV 95.5 81.4 - 97.8 fL    MCH 29.9 27.0 - 33.0 pg    MCHC 31.3 (L) 33.7 - 35.3 g/dL    RDW 62.9 (H) 35.9 - 50.0 fL    Platelet Count 249 164 - 446 K/uL    MPV 8.8 (L) 9.0 - 12.9 fL   ESTIMATED GFR    Collection Time: 01/31/18  2:04 AM   Result Value Ref Range    GFR If African American >60 >60 mL/min/1.73 m 2    GFR If Non African American >60 >60 mL/min/1.73 m 2   ACCU-CHEK GLUCOSE    Collection Time: 01/31/18  6:18 AM   Result Value Ref Range    Glucose - Accu-Ck 200 (H) 65 - 99 mg/dL   MAGNESIUM    Collection Time: 01/31/18  9:41 AM   Result Value Ref Range    Magnesium 1.5 1.5 - 2.5 mg/dL       Imaging/Procedures Review:    Reviewed    MDM (Assessment and Plan):     Active Hospital Problems    Diagnosis   • Sepsis (CMS-HCC) [A41.9]     Priority: High   • Urinary tract infection [N39.0]     Priority: High   • Pyelonephritis [N12]     Priority: High   • DM (diabetes mellitus) (CMS-HCC) [E11.9]     Priority: Medium   • Anemia [D64.9]     Priority: Low   • Crohn disease (CMS-HCC) [K50.90]     Priority: Low   • History of DVT (deep vein thrombosis) [Z86.718]   • Open wound of left thigh [S71.102A]   Hx bladder perforation s/p repair 12/10/17    A 67 year old male with history of bladder perforation and repair who had bilateral nephrostomy tubes removed and has been admitted with urosepsis. He has been appropriately started on IV antibiotics and cultures are currently pending. He is voiding, however a recent bladder scan revealed 500 cc. This was not a post void scan and  patient was able to empty 300 cc since then. We will continue to monitor with post void scans q 6 hours. He is opposed to placement of a kaur so intermittent catheterization will be suggested for post void scans >200 cc. Urology appreciates this consultation. Dr. Murillo has directed this plan of care.

## 2018-01-31 NOTE — PROGRESS NOTES
1810:m  Pt arrived to floor from ED via stretcher. Wound vac noted to L posterior thigh. Ambulated to bed from hallway with x1 assist. C/o pain while micturating. [place], AOX4, 93% on Ra. Oriented to room and floor, call bell within reach, and education provided on fall prevention.

## 2018-01-31 NOTE — PROGRESS NOTES
"Skin check completed. Reddened intact sacral area, L posterior thigh wound vac with wound dressing in place and functioning, and R ankle dried discolored intact area of skin noted. Teodoro c,d,i  bandages were \"nephrostomies were removed yesterday\" No drainage, prasanna skin intact.  "

## 2018-01-31 NOTE — ASSESSMENT & PLAN NOTE
Recent culture negative . Start IV cefepime.  Follow up urine Culture.  Signs of reoccurrence consider ID reconsultation following admission  Check pro calcitonin level to guide clinical picture.

## 2018-01-31 NOTE — DISCHARGE PLANNING
Per chart review patient DC on 1/29. HH was to be set up with [facility] but per documentation no confirmation of acceptance was completed prior to hospital DC. Pending DC of home recommend new referral to  be placed and confirmed prior to DC home.

## 2018-02-01 LAB
ANION GAP SERPL CALC-SCNC: 8 MMOL/L (ref 0–11.9)
ANISOCYTOSIS BLD QL SMEAR: ABNORMAL
BACTERIA BLD CULT: NORMAL
BACTERIA BLD CULT: NORMAL
BASOPHILS # BLD AUTO: 0 % (ref 0–1.8)
BASOPHILS # BLD: 0 K/UL (ref 0–0.12)
BUN SERPL-MCNC: 9 MG/DL (ref 8–22)
CALCIUM SERPL-MCNC: 8.2 MG/DL (ref 8.5–10.5)
CHLORIDE SERPL-SCNC: 103 MMOL/L (ref 96–112)
CO2 SERPL-SCNC: 25 MMOL/L (ref 20–33)
CREAT SERPL-MCNC: 0.45 MG/DL (ref 0.5–1.4)
EOSINOPHIL # BLD AUTO: 0.05 K/UL (ref 0–0.51)
EOSINOPHIL NFR BLD: 1.7 % (ref 0–6.9)
ERYTHROCYTE [DISTWIDTH] IN BLOOD BY AUTOMATED COUNT: 61.5 FL (ref 35.9–50)
GLUCOSE BLD-MCNC: 126 MG/DL (ref 65–99)
GLUCOSE BLD-MCNC: 143 MG/DL (ref 65–99)
GLUCOSE BLD-MCNC: 161 MG/DL (ref 65–99)
GLUCOSE BLD-MCNC: 178 MG/DL (ref 65–99)
GLUCOSE BLD-MCNC: 212 MG/DL (ref 65–99)
GLUCOSE SERPL-MCNC: 133 MG/DL (ref 65–99)
HCT VFR BLD AUTO: 27.8 % (ref 42–52)
HGB BLD-MCNC: 8.9 G/DL (ref 14–18)
LACTATE BLD-SCNC: 1.1 MMOL/L (ref 0.5–2)
LYMPHOCYTES # BLD AUTO: 0.42 K/UL (ref 1–4.8)
LYMPHOCYTES NFR BLD: 13.2 % (ref 22–41)
MACROCYTES BLD QL SMEAR: ABNORMAL
MANUAL DIFF BLD: NORMAL
MCH RBC QN AUTO: 29.9 PG (ref 27–33)
MCHC RBC AUTO-ENTMCNC: 32 G/DL (ref 33.7–35.3)
MCV RBC AUTO: 93.3 FL (ref 81.4–97.8)
METAMYELOCYTES NFR BLD MANUAL: 0.9 %
MICROCYTES BLD QL SMEAR: ABNORMAL
MONOCYTES # BLD AUTO: 0.08 K/UL (ref 0–0.85)
MONOCYTES NFR BLD AUTO: 2.6 % (ref 0–13.4)
MORPHOLOGY BLD-IMP: NORMAL
MYELOCYTES NFR BLD MANUAL: 0.9 %
NEUTROPHILS # BLD AUTO: 2.58 K/UL (ref 1.82–7.42)
NEUTROPHILS NFR BLD: 78.9 % (ref 44–72)
NEUTS BAND NFR BLD MANUAL: 1.8 % (ref 0–10)
NRBC # BLD AUTO: 0 K/UL
NRBC BLD-RTO: 0 /100 WBC
PLATELET # BLD AUTO: 227 K/UL (ref 164–446)
PLATELET BLD QL SMEAR: NORMAL
PMV BLD AUTO: 9.6 FL (ref 9–12.9)
POLYCHROMASIA BLD QL SMEAR: NORMAL
POTASSIUM SERPL-SCNC: 3.4 MMOL/L (ref 3.6–5.5)
PROCALCITONIN SERPL-MCNC: 1.3 NG/ML
RBC # BLD AUTO: 2.98 M/UL (ref 4.7–6.1)
RBC BLD AUTO: PRESENT
SIGNIFICANT IND 70042: NORMAL
SIGNIFICANT IND 70042: NORMAL
SITE SITE: NORMAL
SITE SITE: NORMAL
SODIUM SERPL-SCNC: 136 MMOL/L (ref 135–145)
SOURCE SOURCE: NORMAL
SOURCE SOURCE: NORMAL
WBC # BLD AUTO: 3.2 K/UL (ref 4.8–10.8)

## 2018-02-01 PROCEDURE — 700105 HCHG RX REV CODE 258: Performed by: INTERNAL MEDICINE

## 2018-02-01 PROCEDURE — 93306 TTE W/DOPPLER COMPLETE: CPT | Mod: 26 | Performed by: INTERNAL MEDICINE

## 2018-02-01 PROCEDURE — 82962 GLUCOSE BLOOD TEST: CPT | Mod: 91

## 2018-02-01 PROCEDURE — 84145 PROCALCITONIN (PCT): CPT

## 2018-02-01 PROCEDURE — 87040 BLOOD CULTURE FOR BACTERIA: CPT | Mod: 91

## 2018-02-01 PROCEDURE — 80048 BASIC METABOLIC PNL TOTAL CA: CPT

## 2018-02-01 PROCEDURE — 85007 BL SMEAR W/DIFF WBC COUNT: CPT

## 2018-02-01 PROCEDURE — 700111 HCHG RX REV CODE 636 W/ 250 OVERRIDE (IP): Performed by: HOSPITALIST

## 2018-02-01 PROCEDURE — 36415 COLL VENOUS BLD VENIPUNCTURE: CPT

## 2018-02-01 PROCEDURE — A9270 NON-COVERED ITEM OR SERVICE: HCPCS

## 2018-02-01 PROCEDURE — 93306 TTE W/DOPPLER COMPLETE: CPT

## 2018-02-01 PROCEDURE — 700111 HCHG RX REV CODE 636 W/ 250 OVERRIDE (IP): Performed by: INTERNAL MEDICINE

## 2018-02-01 PROCEDURE — 700102 HCHG RX REV CODE 250 W/ 637 OVERRIDE(OP): Performed by: HOSPITALIST

## 2018-02-01 PROCEDURE — 85027 COMPLETE CBC AUTOMATED: CPT

## 2018-02-01 PROCEDURE — 51798 US URINE CAPACITY MEASURE: CPT

## 2018-02-01 PROCEDURE — 99233 SBSQ HOSP IP/OBS HIGH 50: CPT | Performed by: INTERNAL MEDICINE

## 2018-02-01 PROCEDURE — 700102 HCHG RX REV CODE 250 W/ 637 OVERRIDE(OP)

## 2018-02-01 PROCEDURE — 770006 HCHG ROOM/CARE - MED/SURG/GYN SEMI*

## 2018-02-01 PROCEDURE — 83605 ASSAY OF LACTIC ACID: CPT

## 2018-02-01 PROCEDURE — 700102 HCHG RX REV CODE 250 W/ 637 OVERRIDE(OP): Performed by: INTERNAL MEDICINE

## 2018-02-01 PROCEDURE — 700105 HCHG RX REV CODE 258: Performed by: HOSPITALIST

## 2018-02-01 PROCEDURE — A9270 NON-COVERED ITEM OR SERVICE: HCPCS | Performed by: INTERNAL MEDICINE

## 2018-02-01 PROCEDURE — A9270 NON-COVERED ITEM OR SERVICE: HCPCS | Performed by: HOSPITALIST

## 2018-02-01 RX ORDER — POTASSIUM CHLORIDE 20 MEQ/1
40 TABLET, EXTENDED RELEASE ORAL ONCE
Status: COMPLETED | OUTPATIENT
Start: 2018-02-01 | End: 2018-02-01

## 2018-02-01 RX ADMIN — CEFEPIME 2 G: 2 INJECTION, POWDER, FOR SOLUTION INTRAMUSCULAR; INTRAVENOUS at 08:02

## 2018-02-01 RX ADMIN — MERCAPTOPURINE 150 MG: 50 TABLET ORAL at 08:01

## 2018-02-01 RX ADMIN — TAMSULOSIN HYDROCHLORIDE 0.8 MG: 0.4 CAPSULE ORAL at 08:00

## 2018-02-01 RX ADMIN — LIDOCAINE HYDROCHLORIDE 1 APPLICATION: 20 JELLY TOPICAL at 01:08

## 2018-02-01 RX ADMIN — OXYCODONE HYDROCHLORIDE 10 MG: 10 TABLET ORAL at 01:05

## 2018-02-01 RX ADMIN — OXYBUTYNIN CHLORIDE 5 MG: 5 TABLET ORAL at 20:06

## 2018-02-01 RX ADMIN — ENOXAPARIN SODIUM 80 MG: 100 INJECTION SUBCUTANEOUS at 06:08

## 2018-02-01 RX ADMIN — MAGNESIUM GLUCONATE 500 MG ORAL TABLET 400 MG: 500 TABLET ORAL at 08:00

## 2018-02-01 RX ADMIN — ACETAMINOPHEN 650 MG: 325 TABLET, FILM COATED ORAL at 04:01

## 2018-02-01 RX ADMIN — OXYCODONE HYDROCHLORIDE 10 MG: 10 TABLET ORAL at 06:08

## 2018-02-01 RX ADMIN — ENOXAPARIN SODIUM 80 MG: 100 INJECTION SUBCUTANEOUS at 19:10

## 2018-02-01 RX ADMIN — OXYCODONE HYDROCHLORIDE 10 MG: 10 TABLET ORAL at 15:59

## 2018-02-01 RX ADMIN — ACETAMINOPHEN 650 MG: 325 TABLET, FILM COATED ORAL at 09:49

## 2018-02-01 RX ADMIN — SODIUM CHLORIDE: 9 INJECTION, SOLUTION INTRAVENOUS at 14:18

## 2018-02-01 RX ADMIN — POTASSIUM CHLORIDE 40 MEQ: 1500 TABLET, EXTENDED RELEASE ORAL at 08:03

## 2018-02-01 RX ADMIN — MEROPENEM 500 MG: 500 INJECTION, POWDER, FOR SOLUTION INTRAVENOUS at 19:09

## 2018-02-01 RX ADMIN — OXYCODONE HYDROCHLORIDE 10 MG: 10 TABLET ORAL at 20:06

## 2018-02-01 RX ADMIN — ACETAMINOPHEN 650 MG: 325 TABLET, FILM COATED ORAL at 20:05

## 2018-02-01 RX ADMIN — SODIUM CHLORIDE: 9 INJECTION, SOLUTION INTRAVENOUS at 02:14

## 2018-02-01 RX ADMIN — ACETAMINOPHEN 650 MG: 325 TABLET, FILM COATED ORAL at 15:59

## 2018-02-01 RX ADMIN — INSULIN HUMAN 1 UNITS: 100 INJECTION, SOLUTION PARENTERAL at 11:15

## 2018-02-01 RX ADMIN — MESALAMINE 400 MG: 400 CAPSULE, DELAYED RELEASE ORAL at 14:18

## 2018-02-01 RX ADMIN — MEROPENEM 500 MG: 500 INJECTION, POWDER, FOR SOLUTION INTRAVENOUS at 12:43

## 2018-02-01 RX ADMIN — OXYCODONE HYDROCHLORIDE 10 MG: 10 TABLET ORAL at 11:14

## 2018-02-01 RX ADMIN — MICAFUNGIN SODIUM 100 MG: 20 INJECTION, POWDER, LYOPHILIZED, FOR SOLUTION INTRAVENOUS at 09:46

## 2018-02-01 RX ADMIN — INSULIN HUMAN 2 UNITS: 100 INJECTION, SOLUTION PARENTERAL at 17:13

## 2018-02-01 RX ADMIN — OXYBUTYNIN CHLORIDE 5 MG: 5 TABLET ORAL at 08:01

## 2018-02-01 RX ADMIN — MESALAMINE 400 MG: 400 CAPSULE, DELAYED RELEASE ORAL at 20:06

## 2018-02-01 RX ADMIN — MESALAMINE 400 MG: 400 CAPSULE, DELAYED RELEASE ORAL at 08:00

## 2018-02-01 RX ADMIN — GLIPIZIDE 5 MG: 5 TABLET ORAL at 07:30

## 2018-02-01 ASSESSMENT — PAIN SCALES - GENERAL
PAINLEVEL_OUTOF10: 5
PAINLEVEL_OUTOF10: 8
PAINLEVEL_OUTOF10: 5
PAINLEVEL_OUTOF10: 7
PAINLEVEL_OUTOF10: 7

## 2018-02-01 ASSESSMENT — ENCOUNTER SYMPTOMS
FEVER: 1
COUGH: 0
CHILLS: 1
ABDOMINAL PAIN: 0
DOUBLE VISION: 0
ABDOMINAL PAIN: 1
BLURRED VISION: 0
VOMITING: 0
SHORTNESS OF BREATH: 0
NAUSEA: 0
DIARRHEA: 0

## 2018-02-01 NOTE — PROGRESS NOTES
Infectious Disease Progress Note    Author: Adrienne Li M.D. Date & Time of service: 2018  11:44 AM    Chief Complaint:  FU fungemia and pyelopnephritis    Interval History:   Tmax 103, WBC 3.2, kaur placed given retention, denies any abd pain  Labs Reviewed, Medications Reviewed, Radiology Reviewed and Wound Reviewed.    Review of Systems:  Review of Systems   Constitutional: Positive for fever.   Eyes: Negative for blurred vision and double vision.   Respiratory: Negative for cough and shortness of breath.    Gastrointestinal: Negative for abdominal pain, diarrhea, nausea and vomiting.   Genitourinary: Negative for dysuria.        Urinary retention       Hemodynamics:  Temp (24hrs), Av.1 °C (100.5 °F), Min:36.7 °C (98.1 °F), Max:39.4 °C (103 °F)  Temperature: (!) 38.1 °C (100.6 °F)  Pulse  Av.9  Min: 68  Max: 117   Blood Pressure : 111/64       Physical Exam:  Physical Exam   Constitutional: He is oriented to person, place, and time. He appears well-developed.   Older than stated age   HENT:   Head: Normocephalic and atraumatic.   Eyes: EOM are normal. Pupils are equal, round, and reactive to light.   Neck: Neck supple.   Cardiovascular: Normal rate, regular rhythm and normal heart sounds.    Pulmonary/Chest: Effort normal. He has no wheezes. He has no rales.   Abdominal: Soft. There is no tenderness.   Genitourinary:   Genitourinary Comments: Kaur   Musculoskeletal:   Left posterior thigh wound  vac   Neurological: He is alert and oriented to person, place, and time.       Meds:    Current Facility-Administered Medications:   •  magnesium oxide  •  micafungin  •  HYDROmorphone  •  oxyCODONE immediate-release **OR** oxyCODONE immediate-release  •  ibuprofen  •  acetaminophen  •  lidocaine  •  glipiZIDE  •  mercaptopurine  •  tamsulosin  •  Respiratory Care per Protocol  •  NS  •  ondansetron  •  ondansetron  •  insulin regular **AND** Accu-Chek ACHS **AND** NOTIFY MD and PharmD **AND**  glucose 4 g **AND** dextrose 50%  •  NS  •  NS  •  cefepime  •  oxybutynin  •  mesalamine delayed-release  •  enoxaparin    Labs:  Recent Labs      01/30/18   1230  01/31/18   0204  02/01/18   0352   WBC  5.3  4.0*  3.2*   RBC  3.72*  3.08*  2.98*   HEMOGLOBIN  10.8*  9.2*  8.9*   HEMATOCRIT  34.9*  29.4*  27.8*   MCV  93.8  95.5  93.3   MCH  29.0  29.9  29.9   RDW  61.1*  62.9*  61.5*   PLATELETCT  317  249  227   MPV  9.0  8.8*  9.6   NEUTSPOLYS  83.70*   --   78.90*   LYMPHOCYTES  5.80*   --   13.20*   MONOCYTES  9.10   --   2.60   EOSINOPHILS  0.60   --   1.70   BASOPHILS  0.40   --   0.00   RBCMORPHOLO   --    --   Present     Recent Labs      01/30/18   1230  01/31/18   0204  02/01/18   0352   SODIUM  136  139  136   POTASSIUM  3.7  3.4*  3.4*   CHLORIDE  103  105  103   CO2  22  27  25   GLUCOSE  251*  174*  133*   BUN  12  12  9     Recent Labs      01/30/18   1230  01/31/18   0204  02/01/18   0352   ALBUMIN  3.4   --    --    TBILIRUBIN  0.7   --    --    ALKPHOSPHAT  52   --    --    TOTPROTEIN  6.8   --    --    ALTSGPT  14   --    --    ASTSGOT  12   --    --    CREATININE  0.53  0.40*  0.45*       Imaging:  Ct-abdomen-pelvis With    Result Date: 1/30/2018 1/30/2018 4:31 PM HISTORY/REASON FOR EXAM:  Pain. Pain and fever TECHNIQUE/EXAM DESCRIPTION:   CT scan of the abdomen and pelvis with contrast. Contrast-enhanced helical scanning was obtained from the diaphragmatic domes through the pubic symphysis following the bolus administration of nonionic contrast without complication. 100 mL of Omnipaque 350 nonionic contrast was administered without complication. Low dose optimization technique was utilized for this CT exam including automated exposure control and adjustment of the mA and/or kV according to patient size. COMPARISON: 12/12/2017 FINDINGS: CT Abdomen: Mild lung base atelectasis. No basilar pleural effusion. Multiple hepatic cysts measuring up to 1.4 cm in diameter are again demonstrated. The  spleen is large measuring up to 14.8 cm diameter. No pancreatic or adrenal gland masses. Bilateral renal cysts measuring up to 9.3 cm on the right and 3.4 cm on the left again demonstrated. Left ureteral stent is demonstrated. Mild left pelvocaliectasis. There is moderate right hydronephrosis and dilatation of the right ureter. No distal ureteral stones identified. Lower pole 5 mm right renal stone again demonstrated. The gallbladder is partially distended. No calcified gallstones identified. No evidence of bowel obstruction. The appendix is not delineated. There are diverticula colon. No evidence of diverticulitis. Mild calcified plaque aorta. Subcentimeter short axis retroperitoneal lymph nodes. An IVC filter is located below the renal veins. CT Pelvis: The bladder is partially distended. The wall of the bladder is mildly thickened. There are prostate calcifications.     Left ureteral stent is in place. Mild left pelvocaliectasis. Moderate right hydronephrosis and dilatation of the right ureter. No distal right ureteral stone is identified. 5 mm nonobstructing lower pole right renal stone. Bilateral renal cysts again demonstrated. Hepatic cysts again demonstrated. Splenomegaly. Diverticula colon. No evidence diverticulitis. The appendix is not delineated. No free fluid.    Dx-chest-portable (1 View)    Result Date: 1/30/2018 1/30/2018 1:42 PM HISTORY/REASON FOR EXAM:  Fever and chills TECHNIQUE/EXAM DESCRIPTION AND NUMBER OF VIEWS: Single portable view of the chest. COMPARISON: 1/27/18 FINDINGS: No pulmonary infiltrates or consolidations are noted. No pleural effusion. No pneumothorax. Stable cardiopericardial silhouette.     1. No acute cardiopulmonary abnormalities are identified.    Dx-chest-portable (1 View)    Result Date: 1/27/2018 1/27/2018 12:17 AM HISTORY/REASON FOR EXAM:  Possible sepsis TECHNIQUE/EXAM DESCRIPTION:  Single AP view of the chest. COMPARISON: December 26, 2017 FINDINGS: Overlying  cardiac leads are present. The cardiac silhouette appears within normal limits. The mediastinal contour appears within normal limits.  The central pulmonary vasculature appears normal. The lungs appear well expanded bilaterally.  Bilateral lungs are clear. No significant pleural effusions are identified. The bony structures appear age-appropriate.     1.  No acute cardiopulmonary disease.    [id number]    Result Date: 1/27/2018 1/27/2018 2:27 AM HISTORY/REASON FOR EXAM: Pain. TECHNIQUE/EXAM DESCRIPTION: Real-time sonography of the scrotum was performed with gray-scale, color and duplex Doppler imaging. COMPARISON: November 12, 2017 FINDINGS: There is diffuse heterogeneous flow within the right testicle, which is new since prior study. Increased flow within the right testicle and epididymis is identified. The right testis measures 3.97 cm x 2.66 cm x 3.47 cm. 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 The right epididymis measures 13.3 x 17.6 mm. Large right hydrocele is seen. No varicocele is detected.     1.  Heterogeneous parenchyma of the right testicle with markedly increased flow of the right epididymis and testis, appearance favors epididymal orchitis. Recommend sonographic follow-up for reevaluation of the right testis in 7-14 days to exclude infiltrating testicular mass. 2.  Large right hydrocele. 3.  Surgical absence the left testicle    Ir-conv Perq Neph To Nephroureteral Cath (all Radiology) Left    Result Date: 1/29/2018 1/29/2018 9:28 AM Bilateral nephrostograms and bilateral nephrostomy tube removals and left ureteral stent placement. HISTORY/REASON FOR EXAM: Ureteral obstruction. TECHNIQUE: Following informed consent patient prepped and draped in the usual fashion.  10 cc 1% lidocaine was utilized for local anesthesia. SEDATION: Conscious sedation with 150 mcg of Fentanyl and 5 mg of Versed was administered during the procedure with appropriate continuous patient monitoring by the  radiology nurse. Sedation duration: 30 minutes. The procedure was performed using MAXIMAL STERILE BARRIER technique including sterile gown, mask, cap, and donning of sterile gloves following appropriate hand hygiene and/or sterile scrub. Patient skin site was prepped with 2% Chlorhexidine solution. CONTRAST: 45 mL of Omnipaque 300. FLUOROSCOPY: 4 fluoroscopic images obtained. 3.4 minutes. Omnipaque 300 was injected into the indwelling bilateral nephrostomy tubes and digital imaging was obtained over the kidneys and ureters. The right nephrostomy tube was subsequently removed. Next a guidewire was advanced into the left ureter and the indwelling left nephrostomy tube was removed.  A 5-Somali catheter was advanced into the bladder over the guidewire.  Next the guidewire was exchanged for an Amplatz guidewire which was coiled in  the bladder.  Next an 8-Somali 24-cm in length double-J ureteral stent was advanced over the guidewire and positioned with its distal loop in the bladder and proximal loop in the left renal collecting system.  The internal stiffener was removed and the guidewire was retracted and positioned in the left renal collecting system.  An 8.5-Somali locking loop catheter was also positioned in the left renal collecting system.   Omnipaque 300 was injected and digital imaging was obtained.  The patient tolerated procedure well and sent to the floor in good condition. FINDINGS: The initial right nephrostogram revealed excellent filling of the right renal collecting system with good positioning of the right nephrostomy tube. There was prompt flow of contrast down the right ureter into the bladder. There was no evidence of right-sided ureteral stricture or filling defect. The right nephrostomy tube was subsequently removed. The initial left-sided nephrostogram revealed a high-grade focal stricture at the left ureteral vesicular junction. There was delayed filling of the left ureter with no other areas of  "stricture formation or obstruction noted in the left ureter. Digital images demonstrate good positioning of the left-sided double-J ureteral stent in the renal collecting system and bladder.     1. Right-sided nephrostogram revealed normal-appearing right renal collecting system and right ureter with prompt flow of contrast into the bladder and no evidence of right-sided ureteral stricture or filling defect. The right-sided nephrostomy tube was subsequently removed. 2. Left-sided nephrostogram revealed a high-grade focal stricture at the left ureteral fascicular junction. The remainder of the left ureter has a normal appearance. 3. Successful percutaneous placement of left-sided double-J ureteral stent.      Micro:  Results     Procedure Component Value Units Date/Time    BLOOD CULTURE [[id number]] Collected:  02/01/18 0928    Order Status:  Completed Specimen:  Blood from Peripheral Updated:  02/01/18 1056    Narrative:       Per Hospital Policy: Only change Specimen Src: to \"Line\" if  specified by physician order.    BLOOD CULTURE [[id number]] Collected:  02/01/18 0928    Order Status:  Completed Specimen:  Blood from Peripheral Updated:  02/01/18 0953    Narrative:       Per Hospital Policy: Only change Specimen Src: to \"Line\" if  specified by physician order.    URINE CULTURE(NEW) [[id number]]  (Abnormal) Collected:  01/30/18 1553    Order Status:  Completed Specimen:  Urine Updated:  02/01/18 0910     Significant Indicator POS (POS)     Source UR     Site --     Urine Culture -- (A)     Growth noted after further incubation,see below for  organism identification.       Urine Culture -- (A)     Yeast  >100,000 cfu/mL      Narrative:       Indication for culture:->Emergency Room Patient    BLOOD CULTURE [[id number]]  (Abnormal) Collected:  01/30/18 1435    Order Status:  Completed Specimen:  Blood from Peripheral Updated:  01/31/18 2130     Significant Indicator POS (POS)     Source BLD     Site PERIPHERAL     Blood " "Culture -- (A)     Growth detected by Bactec instrument.  01/31/2018  21:27  Gram Stain: Yeast      Narrative:       CALL  Lisa  131 tel. [phone],  CALLED  131 tel. [phone] 01/31/2018, 21:30, RB PERF. RESULTS CALLED  TO:[phone].  Per Hospital Policy: Only change Specimen Src: to \"Line\" if  specified by physician order.    BLOOD CULTURE [[id number]] Collected:  01/30/18 1230    Order Status:  Completed Specimen:  Blood from Peripheral Updated:  01/31/18 0914     Significant Indicator NEG     Source BLD     Site PERIPHERAL     Blood Culture --     No Growth    Note: Blood cultures are incubated for 5 days and  are monitored continuously.Positive blood cultures  are called to the RN and reported as soon as  they are identified.      Narrative:       Per Hospital Policy: Only change Specimen Src: to \"Line\" if  specified by physician order.    Blood Culture [[id number]]     Order Status:  Sent Specimen:  Blood from Peripheral     Blood Culture [[id number]]     Order Status:  Sent Specimen:  Blood from Peripheral     Urinalysis [[id number]]     Order Status:  Sent Specimen:  Urine     URINALYSIS [[id number]]  (Abnormal) Collected:  01/30/18 1553    Order Status:  Completed Specimen:  Urine Updated:  01/30/18 1627     Micro Urine Req Microscopic     Color Dark Yellow     Character Turbid (A)     Specific Gravity 1.017     Ph 5.0     Glucose Negative mg/dL      Ketones Trace (A) mg/dL      Protein 300 (A) mg/dL      Bilirubin Negative     Urobilinogen, Urine 0.2     Nitrite Negative     Leukocyte Esterase Moderate (A)     Occult Blood Large (A)    Narrative:       Indication for culture:->Emergency Room Patient          Assessment:  Active Hospital Problems    Diagnosis   • *Sepsis due to pyelonephritis / complicated UTI (CMS-[facility]) [A41.9]   • Hypokalemia [E87.6]   • Urinary retention [R33.9]   • Pyelonephritis [N12]   • Open wound of left thigh [S71.102A]   • History of DVT (deep vein thrombosis) [Z86.718]   • Anemia of " chronic disease [D63.8]   • Crohn disease (CMS-[facility]) [K50.90]   • Type II diabetes mellitus (CMS-HCC) [E11.9]       Plan:  Sepsis  2/2 Fungemia and pyelonephritis  Continues to be febrile  DC cefepime as isolate below R   Start meropenem  Continue micafungin     Fungemia, new  Ucx - yeast  Bcx 1/30 (1/2) - yeast  DC fluc  Start micafungin pending ID of yeast  Repeat Bcx ordered today  Needs TTE - ordered  Recommend ophthy eval to r/o endophthalmitis if feasible    Pyelonephritis  Ucx yeast  Abx per above  [facility] - enterobacter cloacae (confirmed with [facility] on 2/1/18) R to cefepime, zosyn. S to meropenem, cipro and bactrim  Pacheco in place    H/o Bladder rupture  S/p bilateral PCN removal on 1/29    DM2  HgA1c 6.7  Maintain BS less than 150 to control infection    Open left thigh wound  Not grossly infected  Wound vac and care    Discussed with internal medicine/Dr Weathers.

## 2018-02-01 NOTE — PROGRESS NOTES
Surgical Progress Note    Author: Jeremie Head Date & Time created: 2018   11:11 AM     Interval Events:    Patient seen and examined.  Yeast growing in urine and blood cultures, now on Micafungin with Cefepime. Fevers overnight but states is feeling better. Retention continued and now has kaur in place.       Review of Systems   Constitutional: Positive for chills and fever.   Gastrointestinal: Positive for abdominal pain.   Genitourinary: Negative for hematuria.     Hemodynamics:  Temp (24hrs), Av.1 °C (100.5 °F), Min:36.7 °C (98.1 °F), Max:39.4 °C (103 °F)  Temperature: (!) 38.1 °C (100.6 °F)  Pulse  Av.9  Min: 68  Max: 117   Blood Pressure : 111/64     Respiratory:    Respiration: 18, Pulse Oximetry: 94 %        RUL Breath Sounds: Clear, RML Breath Sounds: Clear, RLL Breath Sounds: Clear, FLOR[place] Breath Sounds: Clear, LLL Breath Sounds: Clear  Neuro:  GCS       Fluids:    Intake/Output Summary (Last 24 hours) at 18 1111  Last data filed at 18 1557   Gross per 24 hour   Intake                0 ml   Output               75 ml   Net              -75 ml        Current Diet Order   Procedures   • Diet Order     Physical Exam   Constitutional: He is oriented to person, place, and time. He appears well-developed and well-nourished. No distress.   Eyes: Conjunctivae are normal. Pupils are equal, round, and reactive to light.   Pulmonary/Chest: Effort normal.   Abdominal: Soft. He exhibits no distension. There is tenderness. There is no rebound and no guarding.   Genitourinary:   Genitourinary Comments: Kaur in place, urine dark dylan   Neurological: He is alert and oriented to person, place, and time.   Skin: Skin is warm and dry.   Psychiatric: He has a normal mood and affect.   Nursing note and vitals reviewed.    Labs:  Recent Results (from the past 24 hour(s))   ACCU-CHEK GLUCOSE    Collection Time: 18 11:42 AM   Result Value Ref Range    Glucose - Accu-Ck 188 (H) 65 - 99 mg/dL    ACCU-CHEK GLUCOSE    Collection Time: 01/31/18  4:55 PM   Result Value Ref Range    Glucose - Accu-Ck 208 (H) 65 - 99 mg/dL   ACCU-CHEK GLUCOSE    Collection Time: 01/31/18  8:21 PM   Result Value Ref Range    Glucose - Accu-Ck 143 (H) 65 - 99 mg/dL   CBC WITH DIFFERENTIAL    Collection Time: 02/01/18  3:52 AM   Result Value Ref Range    WBC 3.2 (L) 4.8 - 10.8 K/uL    RBC 2.98 (L) 4.70 - 6.10 M/uL    Hemoglobin 8.9 (L) 14.0 - 18.0 g/dL    Hematocrit 27.8 (L) 42.0 - 52.0 %    MCV 93.3 81.4 - 97.8 fL    MCH 29.9 27.0 - 33.0 pg    MCHC 32.0 (L) 33.7 - 35.3 g/dL    RDW 61.5 (H) 35.9 - 50.0 fL    Platelet Count 227 164 - 446 K/uL    MPV 9.6 9.0 - 12.9 fL    Nucleated RBC 0.00 /100 WBC    NRBC (Absolute) 0.00 K/uL    Neutrophils-Polys 78.90 (H) 44.00 - 72.00 %    Lymphocytes 13.20 (L) 22.00 - 41.00 %    Monocytes 2.60 0.00 - 13.40 %    Eosinophils 1.70 0.00 - 6.90 %    Basophils 0.00 0.00 - 1.80 %    Neutrophils (Absolute) 2.58 1.82 - 7.42 K/uL    Lymphs (Absolute) 0.42 (L) 1.00 - 4.80 K/uL    Monos (Absolute) 0.08 0.00 - 0.85 K/uL    Eos (Absolute) 0.05 0.00 - 0.51 K/uL    Baso (Absolute) 0.00 0.00 - 0.12 K/uL    Anisocytosis 1+     Macrocytosis 1+     Microcytosis 1+    BASIC METABOLIC PANEL    Collection Time: 02/01/18  3:52 AM   Result Value Ref Range    Sodium 136 135 - 145 mmol/L    Potassium 3.4 (L) 3.6 - 5.5 mmol/L    Chloride 103 96 - 112 mmol/L    Co2 25 20 - 33 mmol/L    Glucose 133 (H) 65 - 99 mg/dL    Bun 9 8 - 22 mg/dL    Creatinine 0.45 (L) 0.50 - 1.40 mg/dL    Calcium 8.2 (L) 8.5 - 10.5 mg/dL    Anion Gap 8.0 0.0 - 11.9   ESTIMATED GFR    Collection Time: 02/01/18  3:52 AM   Result Value Ref Range    GFR If African American >60 >60 mL/min/1.73 m 2    GFR If Non African American >60 >60 mL/min/1.73 m 2   DIFFERENTIAL MANUAL    Collection Time: 02/01/18  3:52 AM   Result Value Ref Range    Bands-Stabs 1.80 0.00 - 10.00 %    Metamyelocytes 0.90 %    Myelocytes 0.90 %    Manual Diff Status PERFORMED     PERIPHERAL SMEAR REVIEW    Collection Time: 02/01/18  3:52 AM   Result Value Ref Range    Peripheral Smear Review see below    PLATELET ESTIMATE    Collection Time: 02/01/18  3:52 AM   Result Value Ref Range    Plt Estimation Normal    MORPHOLOGY    Collection Time: 02/01/18  3:52 AM   Result Value Ref Range    RBC Morphology Present     Polychromia 1+    ACCU-CHEK GLUCOSE    Collection Time: 02/01/18  6:14 AM   Result Value Ref Range    Glucose - Accu-Ck 126 (H) 65 - 99 mg/dL     Medical Decision Making, by Problem:  Active Hospital Problems    Diagnosis   • Sepsis due to pyelonephritis / complicated UTI (CMS-[facility]) [A41.9]     Priority: High   • Hypokalemia [E87.6]     Priority: Medium   • Urinary retention [R33.9]     Priority: Medium   • Pyelonephritis [N12]     Priority: Medium   • Open wound of left thigh [S71.102A]     Priority: Medium     Left posterior thigh wound s/p Wound debridement, 25x20 cm area 12/17     • History of DVT (deep vein thrombosis) [Z86.718]     Priority: Low   • Anemia of chronic disease [D63.8]     Priority: Low   • Crohn disease (CMS-HCC) [K50.90]     Priority: Low   • Type II diabetes mellitus (CMS-HCC) [E11.9]     Priority: Low   Hx bladder perforation s/p repair 12/10/17      Plan:  Sepsis management per ID  Pacheco to remain for now. Can consider CIC when ready for discharge.    Quality Measures:    Reviewed items::  Labs reviewed and Medications reviewed  Pacheco catheter::  Urinary Tract Retention or Urinary Tract Obstruction      Discussed patient condition with RN, Patient and urology-Dr. Upton

## 2018-02-01 NOTE — CARE PLAN
Problem: Urinary Elimination:  Goal: Ability to reestablish a normal urinary elimination pattern will improve  Outcome: PROGRESSING SLOWER THAN EXPECTED    Pt had urinary retention. A kaur was placed.     Problem: Safety  Goal: Will remain free from injury  Outcome: PROGRESSING AS EXPECTED  No injury has occurred. Pt is SBA for mobilization. Pt mainly sleeps through the night.

## 2018-02-01 NOTE — PROGRESS NOTES
Patient is now ok with using a kaur catheter instead of using straight catheters. Calling PA to update and see if it is ok to place kaur catheter for retention as we have again received another bladder scan over 500ml in same shift.  Dr Gong now on call.

## 2018-02-01 NOTE — PROGRESS NOTES
Spoke to PA on call he is ok and on board with kaur catheter, I ordered lidocaine per patient request and awaiting pharmacy sending it up, most recent bladder scan shows 275ml. Will pass on for NOC RN to place kaur.

## 2018-02-01 NOTE — WOUND TEAM
[facility]  Inpatient Services  Wound and Skin Care Progress Note(Re-Eval)     Admission Date:   1/30/18  HPI, PMH, SH: Reviewed  Unit where seen by Wound Team:   T4     WOUND CONSULT RELATED TO:  md request to cont npwt drsg changes per protocol     SUBJECTIVE:  pleasant/agreeable     Self Report / Pain Level:  terell well with pre-med     OBJECTIVE:   Prev npwt drsg(placed on 1/29) remained intact     WOUND TYPE, LOCATION, CHARACTERISTICS (Pressure ulcers: location, stage, POA or date identified)     Location and type of wound:          Full thickness surgical wnd left posterior thigh; POA                                         Periwound:                                          intact  Drainage:                                            Scant serosanguinous  Tissue Type and %:                            100% pink/red  Wound Edges:                                    attached  Odor:                                                   none  Exposed structure(s):               none  S&S of Infection:                                 none                 Measurements: (cm)                           Taken 1/29/18  Length:                                                13.0  Width:                                                  6.0  Depth:                                                  0.1        INTERVENTIONS BY WOUND TEAM:   Prev drsg removed -- wnd irrigated with wnd cleanser -- benzoin and drape prasanna-wnd -- adaptic over the wnd bed -- 1 piece black foam to cover the wnd -- sealed with drape and cont neg pressure reapplied at 125mmhg     Interdisciplinary consultation: nsg; pt     EVALUATION:  pt readmitted 1/30 after d/c on 1/29 -- wnd remains stable and will progress with npwt     Factors affecting wound healing:  Compliance?     Goals:  Decrease wnd size X 5% every week      NURSING PLAN OF CARE ORDERS (x):     Dressing changes: See Dressing Maintenance orders: x  Skin care: See Skin Care orders:  x  Rectal tube care: See Rectal Tube Care orders:   Other orders:     WOUND TEAM PLAN OF CARE (x):   NPWT change 3 x week:      x  Dressing changes by wound team:       Follow up as needed:     x  Other (explain):     Anticipated discharge plans (x):  SNF:           Home Care:        x   Outpatient Wound Center:            Self Care:            Other:

## 2018-02-01 NOTE — PROGRESS NOTES
Paged Dr Weathers for 4pm vital check, CNA reported patient Temp as 102.6  RN gave Tylenol 650mg per MAR  Vitals recheck Temp down to 101.6.  Dr Weathers returned page, no new orders at this time.

## 2018-02-01 NOTE — PROGRESS NOTES
[facility] Hospitalist Progress Note    Date of Service: 2018    Chief Complaint  67 y.o. male with Crohn's disease, type II diabetes mellitus, history of DVT, history of bladder rupture, bilateral nephrostomy tube with removal and ureteral stent placement, was just discharged yesterday, at that time was also felt to have UTI, but urine grew mixed skin anca and yeast. ID was consulted then, and was transitioned to oral ciprofloxacin and fluconazole to complete 14 days course. Admitted 2018 with fevers, chills, nausea and vomiting, along with increasing urinary frequency and bladder spasms. Labs showed no leukocytosis. BMP was unimpressive except for hyperglycemia, with normal creatinine. CT scan showed left ureteral stent in place, moderate right hydronephrosis and dilation of the right ureter, 5 mm nonobstructing lower pole right renal stone. Urinalysis showed packed WBCs, 100-150 RBC, with moderate leukocyte esterase. Felt to have sepsis due to UTI/pyelonephritis, and started on IV fluids, and IV cefepime. Low potassium replaced. Blood cultures remain negative.       Interval Problem Update  2018 - no overnight events. Remains hemodynamically stable. Had fever spikes last night and this morning Stable on 2L O2 NC. WBC 3200. Hemoglobin stable. Potassium 3.4. Mg 1.5. Continues to retain urine, kaur placed.    > Seen and examined. Feels more comfortable. No further bladder spasms, flank pain. No fevers or chills this morning. Denied any CP, SOB, N/V, diarrhea.         Consultants/Specialty  ID    Disposition  Monitor on the floors.        ROS     Pertinent positives/negatives as mentioned above.     A complete review of systems was done. All other systems were negative.      Physical Exam  Laboratory/Imaging   Hemodynamics  Temp (24hrs), Av.9 °C (100.3 °F), Min:36.7 °C (98.1 °F), Max:39.4 °C (103 °F)   Temperature: 36.7 °C (98.1 °F)  Pulse  Av.2  Min: 73  Max: 117    Blood Pressure : 130/71       Respiratory      Respiration: 18, Pulse Oximetry: 96 %        RUL Breath Sounds: Clear, RML Breath Sounds: Clear, RLL Breath Sounds: Clear, FLORENCE Breath Sounds: Clear, LLL Breath Sounds: Clear    Fluids    Intake/Output Summary (Last 24 hours) at 02/01/18 0706  Last data filed at 01/31/18 1557   Gross per 24 hour   Intake                0 ml   Output              575 ml   Net             -575 ml       Nutrition  Orders Placed This Encounter   Procedures   • Diet Order     Standing Status:   Standing     Number of Occurrences:   1     Order Specific Question:   Diet:     Answer:   Diabetic [3]     Physical Exam   Constitutional: He is oriented to person, place, and time. He appears well-developed. No distress.   HENT:   Head: Normocephalic and atraumatic.   Mouth/Throat: Oropharynx is clear and moist. No oropharyngeal exudate.   Eyes: EOM are normal. Pupils are equal, round, and reactive to light. Right eye exhibits no discharge. Left eye exhibits no discharge. No scleral icterus.   Neck: Normal range of motion. Neck supple. No thyromegaly present.   Cardiovascular: Normal rate and regular rhythm.  Exam reveals no gallop and no friction rub.    No murmur heard.  Pulmonary/Chest: Effort normal and breath sounds normal. He has no wheezes. He has no rales. He exhibits no tenderness.   Abdominal: Soft. Bowel sounds are normal. There is no tenderness. There is no rebound and no guarding.   (+) dressing on B/L flank, CDI. No tenderness.   Genitourinary:   Genitourinary Comments: kaur in place, turbid urine.    Musculoskeletal: Normal range of motion. He exhibits no edema or tenderness.   Lymphadenopathy:     He has no cervical adenopathy.   Neurological: He is alert and oriented to person, place, and time. No cranial nerve deficit. Coordination normal.   Skin: Skin is warm and dry. No rash noted. He is not diaphoretic. No erythema.   (+) wound on the posterior left thigh, wound vac in place   Psychiatric: His behavior  is normal. Thought content normal.   anxious    Vitals reviewed.      Recent Labs      01/30/18   1230  01/31/18   0204  02/01/18   0352   WBC  5.3  4.0*  3.2*   RBC  3.72*  3.08*  2.98*   HEMOGLOBIN  10.8*  9.2*  8.9*   HEMATOCRIT  34.9*  29.4*  27.8*   MCV  93.8  95.5  93.3   MCH  29.0  29.9  29.9   MCHC  30.9*  31.3*  32.0*   RDW  61.1*  62.9*  61.5*   PLATELETCT  317  249  227   MPV  9.0  8.8*  9.6     Recent Labs      01/30/18   1230  01/31/18   0204  02/01/18   0352   SODIUM  136  139  136   POTASSIUM  3.7  3.4*  3.4*   CHLORIDE  103  105  103   CO2  22  27  25   GLUCOSE  251*  174*  133*   BUN  12  12  9   CREATININE  0.53  0.40*  0.45*   CALCIUM  9.0  8.2*  8.2*     Recent Labs      01/30/18 2035   APTT  38.7*   INR  1.26*                  Assessment/Plan     * Sepsis due to pyelonephritis / complicated UTI (CMS-[facility])- (present on admission)   Assessment & Plan    - This is sepsis (without associated acute organ dysfunction).  Source is pyelonephritis/compensated UTI.   - Continue IV fluids. Continue PRN boluses per sepsis protocol. Continue close hemodynamic monitoring. Continue to monitor WBC as slowly trending down.  - Continue IV antibiotics for pyelonephritis as below.         Urinary retention- (present on admission)   Assessment & Plan    - Likely related to UTI. Continue Flomax. Continue Pacheco catheter for now, voiding trial in the next few days. Urology following.        Hypokalemia   Assessment & Plan    - Will give another 40 mEq of K Dur today, and start magnesium oxide for borderline low normal magnesium. Continue to monitor, BMP in the morning.        Pyelonephritis- (present on admission)   Assessment & Plan    - With recurrence of symptoms, along with fevers.  - Will maintain on IV cefepime, and oral fluconazole for now pending cultures. [facility] ID on board for antibiotic guidance.        Open wound of left thigh- (present on admission)   Assessment &  Plan    - Continue wound VAC, wound care and dressing changes. Wound service on board.        History of DVT (deep vein thrombosis)- (present on admission)   Assessment & Plan    - with hypercoagulable disorder, and multiple episodes of DVTs.  - Continue therapeutic Lovenox subcutaneous injections.        Anemia of chronic disease- (present on admission)   Assessment & Plan    - Hemoglobin stable. Continue to monitor, and transfuse for hemoglobin less than 7.        Type II diabetes mellitus (CMS-HCC)- (present on admission)   Assessment & Plan    - Continue glipizide, continue holding metformin while in-house. Maintained on sliding scale insulin coverage in the hospital. Continue Accu-Cheks before meals and at bedtime, and diabetic diet.        Crohn disease (CMS-HCC)- (present on admission)   Assessment & Plan    - Not in acute flare. Continue home dose mesalamine.              Reviewed items::  Labs reviewed, Medications reviewed and Radiology images reviewed  Pacheco catheter::  No Pacheco  DVT prophylaxis pharmacological::  Enoxaparin (Lovenox)  Ulcer Prophylaxis::  Not indicated  Antibiotics:  Treating active infection/contamination beyond 24 hours perioperative coverage

## 2018-02-02 PROBLEM — B49 FUNGEMIA: Status: ACTIVE | Noted: 2018-02-02

## 2018-02-02 LAB
ANION GAP SERPL CALC-SCNC: 6 MMOL/L (ref 0–11.9)
ANISOCYTOSIS BLD QL SMEAR: ABNORMAL
BASOPHILS # BLD AUTO: 0 % (ref 0–1.8)
BASOPHILS # BLD: 0 K/UL (ref 0–0.12)
BUN SERPL-MCNC: 8 MG/DL (ref 8–22)
CALCIUM SERPL-MCNC: 7.9 MG/DL (ref 8.5–10.5)
CHLORIDE SERPL-SCNC: 98 MMOL/L (ref 96–112)
CO2 SERPL-SCNC: 27 MMOL/L (ref 20–33)
CREAT SERPL-MCNC: 0.47 MG/DL (ref 0.5–1.4)
EOSINOPHIL # BLD AUTO: 0.02 K/UL (ref 0–0.51)
EOSINOPHIL NFR BLD: 0.9 % (ref 0–6.9)
ERYTHROCYTE [DISTWIDTH] IN BLOOD BY AUTOMATED COUNT: 59.8 FL (ref 35.9–50)
GLUCOSE BLD-MCNC: 192 MG/DL (ref 65–99)
GLUCOSE BLD-MCNC: 200 MG/DL (ref 65–99)
GLUCOSE BLD-MCNC: 201 MG/DL (ref 65–99)
GLUCOSE BLD-MCNC: 203 MG/DL (ref 65–99)
GLUCOSE SERPL-MCNC: 186 MG/DL (ref 65–99)
HCT VFR BLD AUTO: 26.4 % (ref 42–52)
HGB BLD-MCNC: 8.5 G/DL (ref 14–18)
LV EJECT FRACT  99904: 60
LV EJECT FRACT MOD 4C 99902: 60.55
LYMPHOCYTES # BLD AUTO: 0.25 K/UL (ref 1–4.8)
LYMPHOCYTES NFR BLD: 9.6 % (ref 22–41)
MACROCYTES BLD QL SMEAR: ABNORMAL
MAGNESIUM SERPL-MCNC: 1.4 MG/DL (ref 1.5–2.5)
MANUAL DIFF BLD: NORMAL
MCH RBC QN AUTO: 29.6 PG (ref 27–33)
MCHC RBC AUTO-ENTMCNC: 32.2 G/DL (ref 33.7–35.3)
MCV RBC AUTO: 92 FL (ref 81.4–97.8)
MICROCYTES BLD QL SMEAR: ABNORMAL
MONOCYTES # BLD AUTO: 0.2 K/UL (ref 0–0.85)
MONOCYTES NFR BLD AUTO: 7.8 % (ref 0–13.4)
MORPHOLOGY BLD-IMP: NORMAL
NEUTROPHILS # BLD AUTO: 2.12 K/UL (ref 1.82–7.42)
NEUTROPHILS NFR BLD: 73 % (ref 44–72)
NEUTS BAND NFR BLD MANUAL: 8.7 % (ref 0–10)
NRBC # BLD AUTO: 0 K/UL
NRBC BLD-RTO: 0 /100 WBC
PLATELET # BLD AUTO: 220 K/UL (ref 164–446)
PLATELET BLD QL SMEAR: NORMAL
PMV BLD AUTO: 9.7 FL (ref 9–12.9)
POTASSIUM SERPL-SCNC: 3.4 MMOL/L (ref 3.6–5.5)
RBC # BLD AUTO: 2.87 M/UL (ref 4.7–6.1)
RBC BLD AUTO: PRESENT
SODIUM SERPL-SCNC: 131 MMOL/L (ref 135–145)
WBC # BLD AUTO: 2.6 K/UL (ref 4.8–10.8)

## 2018-02-02 PROCEDURE — 700105 HCHG RX REV CODE 258: Performed by: HOSPITALIST

## 2018-02-02 PROCEDURE — 82962 GLUCOSE BLOOD TEST: CPT | Mod: 91

## 2018-02-02 PROCEDURE — 85027 COMPLETE CBC AUTOMATED: CPT

## 2018-02-02 PROCEDURE — 700111 HCHG RX REV CODE 636 W/ 250 OVERRIDE (IP): Performed by: INTERNAL MEDICINE

## 2018-02-02 PROCEDURE — 700105 HCHG RX REV CODE 258: Performed by: INTERNAL MEDICINE

## 2018-02-02 PROCEDURE — 80048 BASIC METABOLIC PNL TOTAL CA: CPT

## 2018-02-02 PROCEDURE — A9270 NON-COVERED ITEM OR SERVICE: HCPCS | Performed by: HOSPITALIST

## 2018-02-02 PROCEDURE — A9270 NON-COVERED ITEM OR SERVICE: HCPCS | Performed by: INTERNAL MEDICINE

## 2018-02-02 PROCEDURE — 85007 BL SMEAR W/DIFF WBC COUNT: CPT

## 2018-02-02 PROCEDURE — 36415 COLL VENOUS BLD VENIPUNCTURE: CPT

## 2018-02-02 PROCEDURE — 770006 HCHG ROOM/CARE - MED/SURG/GYN SEMI*

## 2018-02-02 PROCEDURE — A9270 NON-COVERED ITEM OR SERVICE: HCPCS

## 2018-02-02 PROCEDURE — 700102 HCHG RX REV CODE 250 W/ 637 OVERRIDE(OP)

## 2018-02-02 PROCEDURE — 99233 SBSQ HOSP IP/OBS HIGH 50: CPT | Performed by: INTERNAL MEDICINE

## 2018-02-02 PROCEDURE — 83735 ASSAY OF MAGNESIUM: CPT

## 2018-02-02 PROCEDURE — 700102 HCHG RX REV CODE 250 W/ 637 OVERRIDE(OP): Performed by: HOSPITALIST

## 2018-02-02 PROCEDURE — 700111 HCHG RX REV CODE 636 W/ 250 OVERRIDE (IP): Performed by: HOSPITALIST

## 2018-02-02 PROCEDURE — 97605 NEG PRS WND THER DME<=50SQCM: CPT

## 2018-02-02 PROCEDURE — 700102 HCHG RX REV CODE 250 W/ 637 OVERRIDE(OP): Performed by: INTERNAL MEDICINE

## 2018-02-02 RX ORDER — POTASSIUM CHLORIDE 20 MEQ/1
40 TABLET, EXTENDED RELEASE ORAL DAILY
Status: DISCONTINUED | OUTPATIENT
Start: 2018-02-02 | End: 2018-02-04

## 2018-02-02 RX ORDER — MAGNESIUM SULFATE HEPTAHYDRATE 40 MG/ML
2 INJECTION, SOLUTION INTRAVENOUS ONCE
Status: COMPLETED | OUTPATIENT
Start: 2018-02-02 | End: 2018-02-02

## 2018-02-02 RX ADMIN — MEROPENEM 500 MG: 500 INJECTION, POWDER, FOR SOLUTION INTRAVENOUS at 00:06

## 2018-02-02 RX ADMIN — ACETAMINOPHEN 650 MG: 325 TABLET, FILM COATED ORAL at 08:40

## 2018-02-02 RX ADMIN — HYDROMORPHONE HYDROCHLORIDE 0.5 MG: 2 INJECTION, SOLUTION INTRAMUSCULAR; INTRAVENOUS; SUBCUTANEOUS at 11:04

## 2018-02-02 RX ADMIN — OXYCODONE HYDROCHLORIDE 10 MG: 10 TABLET ORAL at 18:33

## 2018-02-02 RX ADMIN — POTASSIUM CHLORIDE 40 MEQ: 1500 TABLET, EXTENDED RELEASE ORAL at 08:40

## 2018-02-02 RX ADMIN — MEROPENEM 500 MG: 500 INJECTION, POWDER, FOR SOLUTION INTRAVENOUS at 18:33

## 2018-02-02 RX ADMIN — MEROPENEM 500 MG: 500 INJECTION, POWDER, FOR SOLUTION INTRAVENOUS at 06:52

## 2018-02-02 RX ADMIN — MERCAPTOPURINE 150 MG: 50 TABLET ORAL at 08:40

## 2018-02-02 RX ADMIN — MESALAMINE 400 MG: 400 CAPSULE, DELAYED RELEASE ORAL at 15:08

## 2018-02-02 RX ADMIN — OXYBUTYNIN CHLORIDE 5 MG: 5 TABLET ORAL at 08:41

## 2018-02-02 RX ADMIN — MESALAMINE 400 MG: 400 CAPSULE, DELAYED RELEASE ORAL at 08:40

## 2018-02-02 RX ADMIN — INSULIN HUMAN 2 UNITS: 100 INJECTION, SOLUTION PARENTERAL at 17:30

## 2018-02-02 RX ADMIN — ENOXAPARIN SODIUM 80 MG: 100 INJECTION SUBCUTANEOUS at 06:51

## 2018-02-02 RX ADMIN — ACETAMINOPHEN 650 MG: 325 TABLET, FILM COATED ORAL at 20:13

## 2018-02-02 RX ADMIN — OXYBUTYNIN CHLORIDE 5 MG: 5 TABLET ORAL at 20:10

## 2018-02-02 RX ADMIN — ENOXAPARIN SODIUM 80 MG: 100 INJECTION SUBCUTANEOUS at 18:33

## 2018-02-02 RX ADMIN — MESALAMINE 400 MG: 400 CAPSULE, DELAYED RELEASE ORAL at 20:10

## 2018-02-02 RX ADMIN — ACETAMINOPHEN 650 MG: 325 TABLET, FILM COATED ORAL at 15:02

## 2018-02-02 RX ADMIN — TAMSULOSIN HYDROCHLORIDE 0.8 MG: 0.4 CAPSULE ORAL at 08:41

## 2018-02-02 RX ADMIN — MEROPENEM 500 MG: 500 INJECTION, POWDER, FOR SOLUTION INTRAVENOUS at 13:20

## 2018-02-02 RX ADMIN — INSULIN HUMAN 2 UNITS: 100 INJECTION, SOLUTION PARENTERAL at 06:52

## 2018-02-02 RX ADMIN — INSULIN HUMAN 1 UNITS: 100 INJECTION, SOLUTION PARENTERAL at 20:11

## 2018-02-02 RX ADMIN — GLIPIZIDE 5 MG: 5 TABLET ORAL at 17:30

## 2018-02-02 RX ADMIN — MAGNESIUM GLUCONATE 500 MG ORAL TABLET 400 MG: 500 TABLET ORAL at 08:40

## 2018-02-02 RX ADMIN — MAGNESIUM SULFATE IN WATER 2 G: 40 INJECTION, SOLUTION INTRAVENOUS at 15:04

## 2018-02-02 RX ADMIN — INSULIN HUMAN 1 UNITS: 100 INJECTION, SOLUTION PARENTERAL at 11:35

## 2018-02-02 RX ADMIN — OXYCODONE HYDROCHLORIDE 10 MG: 10 TABLET ORAL at 22:27

## 2018-02-02 RX ADMIN — OXYCODONE HYDROCHLORIDE 10 MG: 10 TABLET ORAL at 00:10

## 2018-02-02 RX ADMIN — MICAFUNGIN SODIUM 100 MG: 20 INJECTION, POWDER, LYOPHILIZED, FOR SOLUTION INTRAVENOUS at 12:06

## 2018-02-02 RX ADMIN — OXYCODONE HYDROCHLORIDE 10 MG: 10 TABLET ORAL at 04:22

## 2018-02-02 RX ADMIN — OXYCODONE HYDROCHLORIDE 10 MG: 10 TABLET ORAL at 14:44

## 2018-02-02 RX ADMIN — GLIPIZIDE 5 MG: 5 TABLET ORAL at 08:40

## 2018-02-02 RX ADMIN — SODIUM CHLORIDE: 9 INJECTION, SOLUTION INTRAVENOUS at 08:44

## 2018-02-02 RX ADMIN — SODIUM CHLORIDE: 9 INJECTION, SOLUTION INTRAVENOUS at 00:10

## 2018-02-02 ASSESSMENT — ENCOUNTER SYMPTOMS
DIARRHEA: 0
MYALGIAS: 1
ABDOMINAL PAIN: 0
BLURRED VISION: 0
DOUBLE VISION: 0
CHILLS: 1
VOMITING: 0
NAUSEA: 0
SHORTNESS OF BREATH: 0
COUGH: 0
FEVER: 1

## 2018-02-02 ASSESSMENT — PAIN SCALES - GENERAL
PAINLEVEL_OUTOF10: 6
PAINLEVEL_OUTOF10: 8

## 2018-02-02 NOTE — PROGRESS NOTES
Infectious Disease Progress Note    Author: Adrienne Li M.D. Date & Time of service: 2018  10:42 AM    Chief Complaint:  FU fungemia and pyelopnephritis    Interval History:   Tmax 103, WBC 3.2, kaur placed given retention, denies any abd pain   Tmax 103, WBC 2.6, resting comfortably but having intermittent fevers and chills  Labs Reviewed, Medications Reviewed, Radiology Reviewed and Wound Reviewed.    Review of Systems:  Review of Systems   Constitutional: Positive for chills and fever.   Eyes: Negative for blurred vision and double vision.   Respiratory: Negative for cough and shortness of breath.    Gastrointestinal: Negative for abdominal pain, diarrhea, nausea and vomiting.   Genitourinary: Negative for dysuria.        Urinary retention   Musculoskeletal: Positive for myalgias.       Hemodynamics:  Temp (24hrs), Av.7 °C (99.9 °F), Min:37.2 °C (98.9 °F), Max:39.4 °C (103 °F)  Temperature: (!) 38.5 °C (101.3 °F)  Pulse  Av.8  Min: 68  Max: 117   Blood Pressure : 115/67       Physical Exam:  Physical Exam   Constitutional: He is oriented to person, place, and time. He appears well-developed.   Older than stated age   HENT:   Head: Normocephalic and atraumatic.   Eyes: EOM are normal. Pupils are equal, round, and reactive to light.   Neck: Neck supple.   Cardiovascular: Normal rate, regular rhythm and normal heart sounds.    Pulmonary/Chest: Effort normal. He has no wheezes. He has no rales.   Abdominal: Soft. There is no tenderness.   Genitourinary:   Genitourinary Comments: Kaur   Musculoskeletal:   Left posterior thigh wound  vac   Neurological: He is alert and oriented to person, place, and time.       Meds:    Current Facility-Administered Medications:   •  potassium chloride SA  •  magnesium sulfate  •  magnesium oxide  •  micafungin  •  meropenem (MERREM) IV  •  HYDROmorphone  •  oxyCODONE immediate-release **OR** oxyCODONE immediate-release  •  ibuprofen  •  acetaminophen  •   lidocaine  •  glipiZIDE  •  mercaptopurine  •  tamsulosin  •  Respiratory Care per Protocol  •  NS  •  ondansetron  •  ondansetron  •  insulin regular **AND** Accu-Chek ACHS **AND** NOTIFY MD and PharmD **AND** glucose 4 g **AND** dextrose 50%  •  NS  •  NS  •  oxybutynin  •  mesalamine delayed-release  •  enoxaparin    Labs:  Recent Labs      01/30/18   1230  01/31/18   0204  02/01/18 0352 02/02/18 0312   WBC  5.3  4.0*  3.2*  2.6*   RBC  3.72*  3.08*  2.98*  2.87*   HEMOGLOBIN  10.8*  9.2*  8.9*  8.5*   HEMATOCRIT  34.9*  29.4*  27.8*  26.4*   MCV  93.8  95.5  93.3  92.0   MCH  29.0  29.9  29.9  29.6   RDW  61.1*  62.9*  61.5*  59.8*   PLATELETCT  317  249  227  220   MPV  9.0  8.8*  9.6  9.7   NEUTSPOLYS  83.70*   --   78.90*  73.00*   LYMPHOCYTES  5.80*   --   13.20*  9.60*   MONOCYTES  9.10   --   2.60  7.80   EOSINOPHILS  0.60   --   1.70  0.90   BASOPHILS  0.40   --   0.00  0.00   RBCMORPHOLO   --    --   Present  Present     Recent Labs      01/31/18   0204 02/01/18 0352 02/02/18 0312   SODIUM  139  136  131*   POTASSIUM  3.4*  3.4*  3.4*   CHLORIDE  105  103  98   CO2  27  25  27   GLUCOSE  174*  133*  186*   BUN  12  9  8     Recent Labs      01/30/18   1230  01/31/18   0204  02/01/18 0352 02/02/18 0312   ALBUMIN  3.4   --    --    --    TBILIRUBIN  0.7   --    --    --    ALKPHOSPHAT  52   --    --    --    TOTPROTEIN  6.8   --    --    --    ALTSGPT  14   --    --    --    ASTSGOT  12   --    --    --    CREATININE  0.53  0.40*  0.45*  0.47*       Imaging:  Ct-abdomen-pelvis With    Result Date: 1/30/2018 1/30/2018 4:31 PM HISTORY/REASON FOR EXAM:  Pain. Pain and fever TECHNIQUE/EXAM DESCRIPTION:   CT scan of the abdomen and pelvis with contrast. Contrast-enhanced helical scanning was obtained from the diaphragmatic domes through the pubic symphysis following the bolus administration of nonionic contrast without complication. 100 mL of Omnipaque 350 nonionic contrast was administered  without complication. Low dose optimization technique was utilized for this CT exam including automated exposure control and adjustment of the mA and/or kV according to patient size. COMPARISON: 12/12/2017 FINDINGS: CT Abdomen: Mild lung base atelectasis. No basilar pleural effusion. Multiple hepatic cysts measuring up to 1.4 cm in diameter are again demonstrated. The spleen is large measuring up to 14.8 cm diameter. No pancreatic or adrenal gland masses. Bilateral renal cysts measuring up to 9.3 cm on the right and 3.4 cm on the left again demonstrated. Left ureteral stent is demonstrated. Mild left pelvocaliectasis. There is moderate right hydronephrosis and dilatation of the right ureter. No distal ureteral stones identified. Lower pole 5 mm right renal stone again demonstrated. The gallbladder is partially distended. No calcified gallstones identified. No evidence of bowel obstruction. The appendix is not delineated. There are diverticula colon. No evidence of diverticulitis. Mild calcified plaque aorta. Subcentimeter short axis retroperitoneal lymph nodes. An IVC filter is located below the renal veins. CT Pelvis: The bladder is partially distended. The wall of the bladder is mildly thickened. There are prostate calcifications.     Left ureteral stent is in place. Mild left pelvocaliectasis. Moderate right hydronephrosis and dilatation of the right ureter. No distal right ureteral stone is identified. 5 mm nonobstructing lower pole right renal stone. Bilateral renal cysts again demonstrated. Hepatic cysts again demonstrated. Splenomegaly. Diverticula colon. No evidence diverticulitis. The appendix is not delineated. No free fluid.    Dx-chest-portable (1 View)    Result Date: 1/30/2018 1/30/2018 1:42 PM HISTORY/REASON FOR EXAM:  Fever and chills TECHNIQUE/EXAM DESCRIPTION AND NUMBER OF VIEWS: Single portable view of the chest. COMPARISON: 1/27/18 FINDINGS: No pulmonary infiltrates or consolidations are noted.  No pleural effusion. No pneumothorax. Stable cardiopericardial silhouette.     1. No acute cardiopulmonary abnormalities are identified.    Dx-chest-portable (1 View)    Result Date: 1/27/2018 1/27/2018 12:17 AM HISTORY/REASON FOR EXAM:  Possible sepsis TECHNIQUE/EXAM DESCRIPTION:  Single AP view of the chest. COMPARISON: December 26, 2017 FINDINGS: Overlying cardiac leads are present. The cardiac silhouette appears within normal limits. The mediastinal contour appears within normal limits.  The central pulmonary vasculature appears normal. The lungs appear well expanded bilaterally.  Bilateral lungs are clear. No significant pleural effusions are identified. The bony structures appear age-appropriate.     1.  No acute cardiopulmonary disease.    [id number]    Result Date: 1/27/2018 1/27/2018 2:27 AM HISTORY/REASON FOR EXAM: Pain. TECHNIQUE/EXAM DESCRIPTION: Real-time sonography of the scrotum was performed with gray-scale, color and duplex Doppler imaging. COMPARISON: November 12, 2017 FINDINGS: There is diffuse heterogeneous flow within the right testicle, which is new since prior study. Increased flow within the right testicle and epididymis is identified. The right testis measures 3.97 cm x 2.66 cm x 3.47 cm. 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 The right epididymis measures 13.3 x 17.6 mm. Large right hydrocele is seen. No varicocele is detected.     1.  Heterogeneous parenchyma of the right testicle with markedly increased flow of the right epididymis and testis, appearance favors epididymal orchitis. Recommend sonographic follow-up for reevaluation of the right testis in 7-14 days to exclude infiltrating testicular mass. 2.  Large right hydrocele. 3.  Surgical absence the left testicle    Ir-conv Perq Neph To Nephroureteral Cath (all Radiology) Left    Result Date: 1/29/2018 1/29/2018 9:28 AM Bilateral nephrostograms and bilateral nephrostomy tube removals and left ureteral stent  placement. HISTORY/REASON FOR EXAM: Ureteral obstruction. TECHNIQUE: Following informed consent patient prepped and draped in the usual fashion.  10 cc 1% lidocaine was utilized for local anesthesia. SEDATION: Conscious sedation with 150 mcg of Fentanyl and 5 mg of Versed was administered during the procedure with appropriate continuous patient monitoring by the radiology nurse. Sedation duration: 30 minutes. The procedure was performed using MAXIMAL STERILE BARRIER technique including sterile gown, mask, cap, and donning of sterile gloves following appropriate hand hygiene and/or sterile scrub. Patient skin site was prepped with 2% Chlorhexidine solution. CONTRAST: 45 mL of Omnipaque 300. FLUOROSCOPY: 4 fluoroscopic images obtained. 3.4 minutes. Omnipaque 300 was injected into the indwelling bilateral nephrostomy tubes and digital imaging was obtained over the kidneys and ureters. The right nephrostomy tube was subsequently removed. Next a guidewire was advanced into the left ureter and the indwelling left nephrostomy tube was removed.  A 5-Moroccan catheter was advanced into the bladder over the guidewire.  Next the guidewire was exchanged for an Amplatz guidewire which was coiled in  the bladder.  Next an 8-Moroccan 24-cm in length double-J ureteral stent was advanced over the guidewire and positioned with its distal loop in the bladder and proximal loop in the left renal collecting system.  The internal stiffener was removed and the guidewire was retracted and positioned in the left renal collecting system.  An 8.5-Moroccan locking loop catheter was also positioned in the left renal collecting system.   Omnipaque 300 was injected and digital imaging was obtained.  The patient tolerated procedure well and sent to the floor in good condition. FINDINGS: The initial right nephrostogram revealed excellent filling of the right renal collecting system with good positioning of the right nephrostomy tube. There was prompt flow  "of contrast down the right ureter into the bladder. There was no evidence of right-sided ureteral stricture or filling defect. The right nephrostomy tube was subsequently removed. The initial left-sided nephrostogram revealed a high-grade focal stricture at the left ureteral vesicular junction. There was delayed filling of the left ureter with no other areas of stricture formation or obstruction noted in the left ureter. Digital images demonstrate good positioning of the left-sided double-J ureteral stent in the renal collecting system and bladder.     1. Right-sided nephrostogram revealed normal-appearing right renal collecting system and right ureter with prompt flow of contrast into the bladder and no evidence of right-sided ureteral stricture or filling defect. The right-sided nephrostomy tube was subsequently removed. 2. Left-sided nephrostogram revealed a high-grade focal stricture at the left ureteral fascicular junction. The remainder of the left ureter has a normal appearance. 3. Successful percutaneous placement of left-sided double-J ureteral stent.      Micro:  Results     Procedure Component Value Units Date/Time    URINE CULTURE(NEW) [[id number]]  (Abnormal) Collected:  01/30/18 1553    Order Status:  Completed Specimen:  Urine Updated:  02/01/18 1816     Significant Indicator POS (POS)     Source UR     Site --     Urine Culture -- (A)     Growth noted after further incubation,see below for  organism identification.       Urine Culture -- (A)     Yeast  >100,000 cfu/mL      Narrative:       Indication for culture:->Emergency Room Patient    BLOOD CULTURE [[id number]] Collected:  02/01/18 1636    Order Status:  Completed Specimen:  Blood from Peripheral Updated:  02/01/18 1656    Narrative:       Per Hospital Policy: Only change Specimen Src: to \"Line\" if  specified by physician order.    BLOOD CULTURE [[id number]] Collected:  02/01/18 1636    Order Status:  Completed Specimen:  Blood from Peripheral " "Updated:  02/01/18 1656    Narrative:       Per Hospital Policy: Only change Specimen Src: to \"Line\" if  specified by physician order.    BLOOD CULTURE [[id number]] Collected:  02/01/18 0928    Order Status:  Completed Specimen:  Blood from Peripheral Updated:  02/01/18 1056    Narrative:       Per Hospital Policy: Only change Specimen Src: to \"Line\" if  specified by physician order.    BLOOD CULTURE [[id number]] Collected:  02/01/18 0928    Order Status:  Completed Specimen:  Blood from Peripheral Updated:  02/01/18 0953    Narrative:       Per Hospital Policy: Only change Specimen Src: to \"Line\" if  specified by physician order.    BLOOD CULTURE [[id number]]  (Abnormal) Collected:  01/30/18 1435    Order Status:  Completed Specimen:  Blood from Peripheral Updated:  01/31/18 2130     Significant Indicator POS (POS)     Source BLD     Site PERIPHERAL     Blood Culture -- (A)     Growth detected by Bactec instrument.  01/31/2018  21:27  Gram Stain: Yeast      Narrative:       CALL  Lisa  131 tel. [phone],  CALLED  131 tel. [phone] 01/31/2018, 21:30, RB PERF. RESULTS CALLED  TO:[phone].  Per Hospital Policy: Only change Specimen Src: to \"Line\" if  specified by physician order.    BLOOD CULTURE [[id number]] Collected:  01/30/18 1230    Order Status:  Completed Specimen:  Blood from Peripheral Updated:  01/31/18 0914     Significant Indicator NEG     Source BLD     Site PERIPHERAL     Blood Culture --     No Growth    Note: Blood cultures are incubated for 5 days and  are monitored continuously.Positive blood cultures  are called to the RN and reported as soon as  they are identified.      Narrative:       Per Hospital Policy: Only change Specimen Src: to \"Line\" if  specified by physician order.    Blood Culture [[id number]]     Order Status:  Sent Specimen:  Blood from Peripheral     Blood Culture [[id number]]     Order Status:  Sent Specimen:  Blood from Peripheral     Urinalysis [[id number]]     Order Status:  Sent " Specimen:  Urine     URINALYSIS [[id number]]  (Abnormal) Collected:  01/30/18 1553    Order Status:  Completed Specimen:  Urine Updated:  01/30/18 1627     Micro Urine Req Microscopic     Color Dark Yellow     Character Turbid (A)     Specific Gravity 1.017     Ph 5.0     Glucose Negative mg/dL      Ketones Trace (A) mg/dL      Protein 300 (A) mg/dL      Bilirubin Negative     Urobilinogen, Urine 0.2     Nitrite Negative     Leukocyte Esterase Moderate (A)     Occult Blood Large (A)    Narrative:       Indication for culture:->Emergency Room Patient          Assessment:  Active Hospital Problems    Diagnosis   • *Sepsis due to pyelonephritis / complicated UTI (CMS-HCC) [A41.9]   • Hypokalemia [E87.6]   • Urinary retention [R33.9]   • Pyelonephritis [N12]   • Open wound of left thigh [S71.102A]   • History of DVT (deep vein thrombosis) [Z86.718]   • Anemia of chronic disease [D63.8]   • Crohn disease (CMS-HCC) [K50.90]   • Type II diabetes mellitus (CMS-HCC) [E11.9]       Plan:  Sepsis  2/2 Fungemia and pyelonephritis  Continues to be febrile  DC cefepime as isolate below R   Continue micafungin and meropenem    Fungemia - ongoing work up  Ucx - yeast  Bcx 1/30 (1/2) - yeast  DC fluc  Continue micafungin pending ID of yeast  Repeat Bcx 2/1 - pending  TTE - neg  Needs MAX given persistent fevers  Recommend ophthy eval to r/o endophthalmitis if feasible    Pyelonephritis  Ucx yeast  Abx per above  [facility] - [facility]obacter cloacae ([facility]) R to cefepime, zosyn. S to meropenem, cipro and bactrim  Apcheco in place    H/o Bladder rupture  S/p bilateral PCN removal on 1/29    DM2  HgA1c 6.7  Maintain BS less than 150 to control infection    Open left thigh wound  Not grossly infected  Wound vac and care    Discussed with internal medicine/Dr Weathers.

## 2018-02-02 NOTE — CARE PLAN
Problem: Urinary Elimination:  Goal: Ability to reestablish a normal urinary elimination pattern will improve  Outcome: PROGRESSING AS EXPECTED  Urinary retention. Kaur in place. Staff conferring with doctors for appropriate time of removal of kaur.     Problem: Psychosocial Needs:  Goal: Level of anxiety will decrease  Outcome: PROGRESSING AS EXPECTED  Reassurance offered to patient. Patient encouraged to express feelings.

## 2018-02-02 NOTE — CARE PLAN
Problem: Nutritional:  Goal: Achieve adequate nutritional intake  Patient will consume >50% of meals   Outcome: MET Date Met: 02/02/18  Pt states sometimes his appetite is not good. Per chart pt PO % of meals since admit. Pt declined snacks. RD available prn

## 2018-02-02 NOTE — CARE PLAN
Problem: Pain Management  Goal: Pain level will decrease to patient's comfort goal    Intervention: Follow pain managment plan developed in collaboration with patient and Interdisciplinary Team  Pain well managed under current regimen per MAR.      Problem: Communication  Goal: The ability to communicate needs accurately and effectively will improve    Intervention: Educate patient and significant other/support system about the plan of care, procedures, treatments, medications and allow for questions  POC discussed with pt along with medications and unit routine.

## 2018-02-02 NOTE — PROGRESS NOTES
Assumed care of patient following shift report. Patient is A&Ox4. He is in pain and very uncomfortable. He reports chills and hot flashes intermittently. Patient has a low grade temp of 99.1. All other vital signs WNL at this time. Pain medication administered. Wound Vac to left thigh noted and a kaur is in place draining to bag at bedside.Wife presented to room and was given update by RN. Plan of care reviewed with patient. Call device in reach. Will continue to monitor.

## 2018-02-02 NOTE — ASSESSMENT & PLAN NOTE
- Continue micafungin IV, duration per ID  Will need to arrange outpatient infusion,  to assist  -Patient also has an IVC filter, we'll discuss with infectious disease for further recommendations   today to rule out endocarditis.   - ophthalmology , Dr. Cobos, negative for infection.      ID assisting    2/11: micagunin through 2/20/18 and diflucan 2/20/18  Bactrim through 2/14/18 per ID  SW to assist  Still awaiting picc placement

## 2018-02-02 NOTE — WOUND TEAM
"[facility]  Inpatient Services  Wound and Skin Care Progress Note     Admission Date:   1/30/18  HPI, PMH, SH: Reviewed  Unit where seen by Wound Team: [facility] [id number]     WOUND CONSULT RELATED TO:  scheduled NPWT dressing change     SUBJECTIVE: \" Thank you for doing a good job.\"     Self Report / Pain Level:  terell well with pre-med     OBJECTIVE:   Previous dressing remained intact     WOUND TYPE, LOCATION, CHARACTERISTICS (Pressure ulcers: location, stage, POA or date identified)     Location and type of wound:          Full thickness surgical wnd left posterior thigh; POA                                         Periwound:                                          intact  Drainage:                                            Scant serosanguinous  Tissue Type and %:                            100% red granular tissue  Wound Edges:                                    attached  Odor:                                                   none  Exposed structure(s):               none  S&S of Infection:                                 none                 Measurements: (cm)                           Taken 1/29/18  Length:                                                13.0  Width:                                                  6.0  Depth:                                                  0.1        INTERVENTIONS BY WOUND TEAM:   Previous dressing removed, no retained foam noted -- wound irrigated normal saline -- benzoin and drape prasanna-wnd -- adaptic over the wound bed -- 1 piece black foam to cover the wound -- sealed with drape and NPWT reapplied at 125 mm Hg dpc     Interdisciplinary consultation: staff RN, patient     EVALUATION:  wound at skin level, wound vac likely to be dc'd in 1-2 weeks     Factors affecting wound healing: ? compliance     Goals:  Decrease wnd size X 5% every week      NURSING PLAN OF CARE ORDERS (x):     Dressing changes: See Dressing Maintenance orders: x  Skin care: See Skin Care orders: "   Rectal tube care: See Rectal Tube Care orders:   Other orders:     WOUND TEAM PLAN OF CARE (x):   NPWT change 3 x week:      x  Dressing changes by wound team:       Follow up as needed:     x  Other (explain):     Anticipated discharge plans (x):  SNF:           Home Care:        x   Outpatient Wound Center:            Self Care:            Other:

## 2018-02-02 NOTE — PROGRESS NOTES
"[facility] Progress Note    Date of Service: 2/2/2018    Chief Complaint  67 y.o. male with Crohn's disease, type II diabetes mellitus, history of DVT, history of bladder rupture, bilateral nephrostomy tube with removal and ureteral stent placement, was just discharged yesterday, at that time was also felt to have UTI, but urine grew mixed skin anca and yeast. ID was consulted then, and was transitioned to oral ciprofloxacin and fluconazole to complete 14 days course. Admitted 1/30/2018 with fevers, chills, nausea and vomiting, along with increasing urinary frequency and bladder spasms. Labs showed no leukocytosis. BMP was unimpressive except for hyperglycemia, with normal creatinine. CT scan showed left ureteral stent in place, moderate right hydronephrosis and dilation of the right ureter, 5 mm nonobstructing lower pole right renal stone. Urinalysis showed packed WBCs, 100-150 RBC, with moderate leukocyte esterase. Felt to have sepsis due to UTI/pyelonephritis, and started on IV fluids, and IV cefepime. Low potassium replaced, given magnesium for low normal levels. Blood cultures remain negative. Continues to retain urine, kaur placed.      Interval Problem Update  2/2/2018 - uneventful night. VSS. Afebrile. Saturating well on 2L O2 NC. Had fevers yesterday afternoon (Tmax 39.4°C).  WBC down to 2.6, with 8.7% bands. Potassium 3.4. Sodium 131. Magnesium 1.4. Lactate was normal. Procalcitonin 1.3. Antibiotic changed to meropenem and micafungin by ID yesterday. Blood cultures ×1 bottle growing yeast, as well as the urine culture. [facility] growing Enterobacter cloacae resistant to cefepime and Zosyn.    > Seen and examined. Feels \"hot\" today. Denied any nausea, vomiting, CP, SOB. States his pains are just \"aches\" today, well controlled with PRNs. ANxious.       Consultants/Specialty  ID  Cardiology  Urology    Disposition  Monitor on the floors.        ROS     Pertinent positives/negatives " as mentioned above.     A complete review of systems was done. All other systems were negative.      Physical Exam  Laboratory/Imaging   Hemodynamics  Temp (24hrs), Av.7 °C (99.8 °F), Min:37.2 °C (98.9 °F), Max:39.4 °C (103 °F)   Temperature: 37.2 °C (99 °F)  Pulse  Av.4  Min: 68  Max: 117    Blood Pressure : 122/67      Respiratory      Respiration: 20, Pulse Oximetry: 98 %        RUL Breath Sounds: Clear, RML Breath Sounds: Clear, RLL Breath Sounds: Diminished, FLORENCE Breath Sounds: Clear, LLL Breath Sounds: Diminished    Fluids  No intake or output data in the 24 hours ending 18 0706    Nutrition  Orders Placed This Encounter   Procedures   • Diet Order     Standing Status:   Standing     Number of Occurrences:   1     Order Specific Question:   Diet:     Answer:   Diabetic [3]     Physical Exam   Constitutional: He is oriented to person, place, and time. He appears well-developed and well-nourished. No distress.   HENT:   Head: Normocephalic and atraumatic.   Mouth/Throat: Oropharynx is clear and moist. No oropharyngeal exudate.   Eyes: EOM are normal. Pupils are equal, round, and reactive to light. Right eye exhibits no discharge. Left eye exhibits no discharge. No scleral icterus.   Neck: Normal range of motion. Neck supple. No thyromegaly present.   Cardiovascular: Normal rate and regular rhythm.  Exam reveals no gallop and no friction rub.    No murmur heard.  Pulmonary/Chest: Effort normal and breath sounds normal. He has no wheezes. He has no rales. He exhibits no tenderness.   Abdominal: Soft. Bowel sounds are normal. There is no tenderness. There is no rebound and no guarding.   Dressing on the bilateral flank, CDI. CVA tenderness.   Genitourinary:   Genitourinary Comments: Pacheco catheter in place.   Musculoskeletal: Normal range of motion. He exhibits no edema or tenderness.   Lymphadenopathy:     He has no cervical adenopathy.   Neurological: He is alert and oriented to person, place, and  time. No cranial nerve deficit. Coordination normal.   Skin: Skin is warm and dry. He is not diaphoretic.   Wound vac on the posterior left thigh, and healing well   Psychiatric: His behavior is normal. Thought content normal.   Anxious, pressured speech   Vitals reviewed.          Recent Labs      01/31/18   0204  02/01/18   0352  02/02/18   0312   WBC  4.0*  3.2*  2.6*   RBC  3.08*  2.98*  2.87*   HEMOGLOBIN  9.2*  8.9*  8.5*   HEMATOCRIT  29.4*  27.8*  26.4*   MCV  95.5  93.3  92.0   MCH  29.9  29.9  29.6   MCHC  31.3*  32.0*  32.2*   RDW  62.9*  61.5*  59.8*   PLATELETCT  249  227  220   MPV  8.8*  9.6  9.7     Recent Labs      01/31/18   0204  02/01/18   0352  02/02/18   0312   SODIUM  139  136  131*   POTASSIUM  3.4*  3.4*  3.4*   CHLORIDE  105  103  98   CO2  27  25  27   GLUCOSE  174*  133*  186*   BUN  12  9  8   CREATININE  0.40*  0.45*  0.47*   CALCIUM  8.2*  8.2*  7.9*     Recent Labs      01/30/18 2035   APTT  38.7*   INR  1.26*                  Assessment/Plan     * Sepsis due to pyelonephritis / complicated UTI (CMS-[facility])- (present on admission)   Assessment & Plan    - This is sepsis (without associated acute organ dysfunction).  Source is pyelonephritis/compensated UTI. Continues to spike fevers, likely related to fungemia.  - Continue IV fluids. Continue PRN boluses per sepsis protocol. Continue close hemodynamic monitoring. Continue to monitor WBC especially with down trending.  - Continue IV antibiotics for pyelonephritis and fungemia as below.         Fungemia due to UTI- (present on admission)   Assessment & Plan    - Continue micafungin IV.   - I have sent out a page for cardiology, to arrange MAX. Will try to get ophthalmology to see him while in-house to rule out endophthalmitis.  - Follow repeat blood cultures. Continue to monitor for fevers.        Enterobacter cloacae UTI / Pyelonephritis- (present on admission)   Assessment & Plan    -Still spiking fevers.  - Continue meropenem, and  micafungin per ID given resistant organisms and [facility]. ID on board for continued antibiotic guidance.         Urinary retention- (present on admission)   Assessment & Plan    - Likely related to UTI. Continue Flomax. Continue Pacheco catheter for now, voiding trial in the next few days. Urology following.        Hypokalemia   Assessment & Plan    - Continue scheduled K Dur 40 mg daily. Continue to monitor. BMP in the morning.        Open wound of left thigh- (present on admission)   Assessment & Plan    - Continue wound VAC, wound care and dressing changes. Wound service on board.        History of DVT (deep vein thrombosis)- (present on admission)   Assessment & Plan    - with hypercoagulable disorder, and multiple episodes of DVTs.  - Continue therapeutic Lovenox subcutaneous injections.        Anemia of chronic disease- (present on admission)   Assessment & Plan    - Continue to monitor Hgb, transfuse for hemoglobin <7.        Type II diabetes mellitus (CMS-HCC)- (present on admission)   Assessment & Plan    - Continue glipizide, continue holding metformin while in-house. Maintained on sliding scale insulin coverage in the hospital. Continue Accu-Cheks before meals and at bedtime, and diabetic diet.        Crohn disease (CMS-HCC)- (present on admission)   Assessment & Plan    - Not in acute flare. Continue home dose mesalamine.              Reviewed items::  Labs reviewed, Medications reviewed and Radiology images reviewed  Pacheco catheter::  No Pacheco  DVT prophylaxis pharmacological::  Enoxaparin (Lovenox)  Ulcer Prophylaxis::  Not indicated  Antibiotics:  Treating active infection/contamination beyond 24 hours perioperative coverage

## 2018-02-02 NOTE — PROGRESS NOTES
Pt spiked a fever of 103, and MEWS  Score of 4. MD Weathers paged and received orders for a lactic acid and new blood cultures. PRN tylenol given as well per MAR. Will continue to monitor VS.

## 2018-02-02 NOTE — DOCUMENTATION QUERY
DOCUMENTATION QUERY    PROVIDERS: Please select “Cosign w/ note”to reply to query.    To better represent the severity of illness of your patient, please review the following information and exercise your independent professional judgment in responding to this query.     Pyelonephritis likely due to in-situ nephrostomy tubes is documented in the History and Physical. Based upon the clinical findings, risk factors, and treatment, can you clarify if you agree with this assessment?    • There is no relationship between indwelling nephrostomy tubes and pyelonephritis  • The pyelonephritis is secondary to the indwelling nephrostomy tubes  • Other explanation of clinical findings  • Findings of no clinical significance  • Unable to determine    The medical record reflects the following:   Clinical Findings  Per ED provider note: bilateral nephrostomy tubes with clotted purulent drainage.  Urinalysis grossly positive.   1/27 UA: packed WBC, 3-5 hyaline casts, budding yeast present,  RBC  Per H/P: pyelonephritis likely due to in-situ nephrostomy tubes   1/28 Progress Note: pyelonephritis, flank pain, chills & sweats   Treatment  Gentamycin, rocephin, ancef, diflucan, IVF, left ureteral stent placement, right and left nephrostomy tube removal   Risk Factors  nephrostomy tubes, ruptured bladder   Location within medical record  ED provider note, History and Physical, Progress Notes, Lab Results  and MAR     Thank you,   Sourav Meza RN, BSN  Clinical   [phone]

## 2018-02-03 LAB
ANION GAP SERPL CALC-SCNC: 7 MMOL/L (ref 0–11.9)
ANISOCYTOSIS BLD QL SMEAR: ABNORMAL
BASOPHILS # BLD AUTO: 0 % (ref 0–1.8)
BASOPHILS # BLD: 0 K/UL (ref 0–0.12)
BUN SERPL-MCNC: 9 MG/DL (ref 8–22)
CALCIUM SERPL-MCNC: 7.7 MG/DL (ref 8.5–10.5)
CHLORIDE SERPL-SCNC: 100 MMOL/L (ref 96–112)
CO2 SERPL-SCNC: 27 MMOL/L (ref 20–33)
CREAT SERPL-MCNC: 0.55 MG/DL (ref 0.5–1.4)
EOSINOPHIL # BLD AUTO: 0.03 K/UL (ref 0–0.51)
EOSINOPHIL NFR BLD: 1 % (ref 0–6.9)
ERYTHROCYTE [DISTWIDTH] IN BLOOD BY AUTOMATED COUNT: 62.8 FL (ref 35.9–50)
GLUCOSE BLD-MCNC: 147 MG/DL (ref 65–99)
GLUCOSE BLD-MCNC: 174 MG/DL (ref 65–99)
GLUCOSE BLD-MCNC: 194 MG/DL (ref 65–99)
GLUCOSE BLD-MCNC: 256 MG/DL (ref 65–99)
GLUCOSE SERPL-MCNC: 160 MG/DL (ref 65–99)
HCT VFR BLD AUTO: 29.7 % (ref 42–52)
HGB BLD-MCNC: 9.3 G/DL (ref 14–18)
LYMPHOCYTES # BLD AUTO: 0.33 K/UL (ref 1–4.8)
LYMPHOCYTES NFR BLD: 11.4 % (ref 22–41)
MACROCYTES BLD QL SMEAR: ABNORMAL
MAGNESIUM SERPL-MCNC: 1.9 MG/DL (ref 1.5–2.5)
MANUAL DIFF BLD: ABNORMAL
MCH RBC QN AUTO: 29.8 PG (ref 27–33)
MCHC RBC AUTO-ENTMCNC: 31.3 G/DL (ref 33.7–35.3)
MCV RBC AUTO: 95.2 FL (ref 81.4–97.8)
MICROCYTES BLD QL SMEAR: ABNORMAL
MONOCYTES # BLD AUTO: 0.22 K/UL (ref 0–0.85)
MONOCYTES NFR BLD AUTO: 7.6 % (ref 0–13.4)
MORPHOLOGY BLD-IMP: NORMAL
NEUTROPHILS # BLD AUTO: 2.32 K/UL (ref 1.82–7.42)
NEUTROPHILS NFR BLD: 66.7 % (ref 44–72)
NEUTS BAND NFR BLD MANUAL: 13.3 % (ref 0–10)
NRBC # BLD AUTO: 0 K/UL
NRBC BLD-RTO: 0 /100 WBC
PLATELET # BLD AUTO: 215 K/UL (ref 164–446)
PLATELET BLD QL SMEAR: NORMAL
PMV BLD AUTO: 10.2 FL (ref 9–12.9)
POTASSIUM SERPL-SCNC: 3.9 MMOL/L (ref 3.6–5.5)
RBC # BLD AUTO: 3.12 M/UL (ref 4.7–6.1)
RBC BLD AUTO: PRESENT
SODIUM SERPL-SCNC: 134 MMOL/L (ref 135–145)
WBC # BLD AUTO: 2.9 K/UL (ref 4.8–10.8)

## 2018-02-03 PROCEDURE — 83735 ASSAY OF MAGNESIUM: CPT

## 2018-02-03 PROCEDURE — 99233 SBSQ HOSP IP/OBS HIGH 50: CPT | Performed by: INTERNAL MEDICINE

## 2018-02-03 PROCEDURE — 700102 HCHG RX REV CODE 250 W/ 637 OVERRIDE(OP): Performed by: HOSPITALIST

## 2018-02-03 PROCEDURE — 700111 HCHG RX REV CODE 636 W/ 250 OVERRIDE (IP): Performed by: INTERNAL MEDICINE

## 2018-02-03 PROCEDURE — 82962 GLUCOSE BLOOD TEST: CPT

## 2018-02-03 PROCEDURE — A9270 NON-COVERED ITEM OR SERVICE: HCPCS

## 2018-02-03 PROCEDURE — 80048 BASIC METABOLIC PNL TOTAL CA: CPT

## 2018-02-03 PROCEDURE — 700102 HCHG RX REV CODE 250 W/ 637 OVERRIDE(OP)

## 2018-02-03 PROCEDURE — 85027 COMPLETE CBC AUTOMATED: CPT

## 2018-02-03 PROCEDURE — 85007 BL SMEAR W/DIFF WBC COUNT: CPT

## 2018-02-03 PROCEDURE — 84145 PROCALCITONIN (PCT): CPT

## 2018-02-03 PROCEDURE — 700105 HCHG RX REV CODE 258: Performed by: HOSPITALIST

## 2018-02-03 PROCEDURE — 700111 HCHG RX REV CODE 636 W/ 250 OVERRIDE (IP): Performed by: HOSPITALIST

## 2018-02-03 PROCEDURE — A9270 NON-COVERED ITEM OR SERVICE: HCPCS | Performed by: HOSPITALIST

## 2018-02-03 PROCEDURE — 700102 HCHG RX REV CODE 250 W/ 637 OVERRIDE(OP): Performed by: INTERNAL MEDICINE

## 2018-02-03 PROCEDURE — 36415 COLL VENOUS BLD VENIPUNCTURE: CPT

## 2018-02-03 PROCEDURE — A9270 NON-COVERED ITEM OR SERVICE: HCPCS | Performed by: INTERNAL MEDICINE

## 2018-02-03 PROCEDURE — 770006 HCHG ROOM/CARE - MED/SURG/GYN SEMI*

## 2018-02-03 PROCEDURE — 700105 HCHG RX REV CODE 258: Performed by: INTERNAL MEDICINE

## 2018-02-03 RX ADMIN — INSULIN HUMAN 1 UNITS: 100 INJECTION, SOLUTION PARENTERAL at 11:48

## 2018-02-03 RX ADMIN — SODIUM CHLORIDE: 9 INJECTION, SOLUTION INTRAVENOUS at 00:47

## 2018-02-03 RX ADMIN — ACETAMINOPHEN 650 MG: 325 TABLET, FILM COATED ORAL at 12:00

## 2018-02-03 RX ADMIN — OXYBUTYNIN CHLORIDE 5 MG: 5 TABLET ORAL at 22:33

## 2018-02-03 RX ADMIN — ENOXAPARIN SODIUM 80 MG: 100 INJECTION SUBCUTANEOUS at 22:33

## 2018-02-03 RX ADMIN — GLIPIZIDE 5 MG: 5 TABLET ORAL at 16:50

## 2018-02-03 RX ADMIN — SODIUM CHLORIDE: 9 INJECTION, SOLUTION INTRAVENOUS at 16:54

## 2018-02-03 RX ADMIN — OXYCODONE HYDROCHLORIDE 10 MG: 10 TABLET ORAL at 22:44

## 2018-02-03 RX ADMIN — MAGNESIUM GLUCONATE 500 MG ORAL TABLET 400 MG: 500 TABLET ORAL at 09:53

## 2018-02-03 RX ADMIN — MEROPENEM 500 MG: 500 INJECTION, POWDER, FOR SOLUTION INTRAVENOUS at 22:44

## 2018-02-03 RX ADMIN — MERCAPTOPURINE 150 MG: 50 TABLET ORAL at 09:53

## 2018-02-03 RX ADMIN — MEROPENEM 500 MG: 500 INJECTION, POWDER, FOR SOLUTION INTRAVENOUS at 05:28

## 2018-02-03 RX ADMIN — OXYCODONE HYDROCHLORIDE 5 MG: 5 TABLET ORAL at 05:28

## 2018-02-03 RX ADMIN — MEROPENEM 500 MG: 500 INJECTION, POWDER, FOR SOLUTION INTRAVENOUS at 11:45

## 2018-02-03 RX ADMIN — GLIPIZIDE 5 MG: 5 TABLET ORAL at 09:52

## 2018-02-03 RX ADMIN — MESALAMINE 400 MG: 400 CAPSULE, DELAYED RELEASE ORAL at 22:33

## 2018-02-03 RX ADMIN — TAMSULOSIN HYDROCHLORIDE 0.8 MG: 0.4 CAPSULE ORAL at 09:54

## 2018-02-03 RX ADMIN — ACETAMINOPHEN 650 MG: 325 TABLET, FILM COATED ORAL at 05:28

## 2018-02-03 RX ADMIN — MEROPENEM 500 MG: 500 INJECTION, POWDER, FOR SOLUTION INTRAVENOUS at 16:53

## 2018-02-03 RX ADMIN — MESALAMINE 400 MG: 400 CAPSULE, DELAYED RELEASE ORAL at 09:55

## 2018-02-03 RX ADMIN — OXYCODONE HYDROCHLORIDE 10 MG: 10 TABLET ORAL at 12:06

## 2018-02-03 RX ADMIN — OXYCODONE HYDROCHLORIDE 10 MG: 10 TABLET ORAL at 16:50

## 2018-02-03 RX ADMIN — MICAFUNGIN SODIUM 100 MG: 20 INJECTION, POWDER, LYOPHILIZED, FOR SOLUTION INTRAVENOUS at 09:53

## 2018-02-03 RX ADMIN — POTASSIUM CHLORIDE 40 MEQ: 1500 TABLET, EXTENDED RELEASE ORAL at 09:54

## 2018-02-03 RX ADMIN — INSULIN HUMAN 1 UNITS: 100 INJECTION, SOLUTION PARENTERAL at 22:45

## 2018-02-03 RX ADMIN — MESALAMINE 400 MG: 400 CAPSULE, DELAYED RELEASE ORAL at 14:56

## 2018-02-03 RX ADMIN — INSULIN HUMAN 3 UNITS: 100 INJECTION, SOLUTION PARENTERAL at 05:28

## 2018-02-03 RX ADMIN — MEROPENEM 500 MG: 500 INJECTION, POWDER, FOR SOLUTION INTRAVENOUS at 00:44

## 2018-02-03 RX ADMIN — ENOXAPARIN SODIUM 80 MG: 100 INJECTION SUBCUTANEOUS at 05:28

## 2018-02-03 RX ADMIN — OXYBUTYNIN CHLORIDE 5 MG: 5 TABLET ORAL at 09:54

## 2018-02-03 ASSESSMENT — ENCOUNTER SYMPTOMS
FEVER: 1
FLANK PAIN: 0
VOMITING: 0
ABDOMINAL PAIN: 0
DOUBLE VISION: 0
CHILLS: 1
COUGH: 0
DIARRHEA: 0
MYALGIAS: 1
SHORTNESS OF BREATH: 0
BLURRED VISION: 0
NAUSEA: 0

## 2018-02-03 ASSESSMENT — PAIN SCALES - GENERAL
PAINLEVEL_OUTOF10: 5
PAINLEVEL_OUTOF10: 4
PAINLEVEL_OUTOF10: 4
PAINLEVEL_OUTOF10: 0
PAINLEVEL_OUTOF10: 5

## 2018-02-03 NOTE — PROGRESS NOTES
Surgical Progress Note    Author: Jeremie Head Date & Time created: 2/3/2018   1:06 PM     Interval Events:    Patient seen and examined.  Intermittent fevers. Pain minimal. Pacheco draining with good UOP. ID managing with Meropenem and Micafungin.     Review of Systems   Constitutional: Positive for fever.   Gastrointestinal: Negative for abdominal pain.   Genitourinary: Negative for hematuria.     Hemodynamics:  Temp (24hrs), Av.2 °C (100.8 °F), Min:36.6 °C (97.8 °F), Max:39.5 °C (103.1 °F)  Temperature: (!) 39 °C (102.2 °F)  Pulse  Av.2  Min: 59  Max: 117   Blood Pressure : 138/69     Respiratory:    Respiration: 18, Pulse Oximetry: 92 %        RUL Breath Sounds: Clear, RML Breath Sounds: Clear, RLL Breath Sounds: Diminished, FLORENCE Breath Sounds: Clear, LLL Breath Sounds: Diminished  Neuro:  GCS       Fluids:    Intake/Output Summary (Last 24 hours) at 18 1306  Last data filed at 18 0400   Gross per 24 hour   Intake                0 ml   Output             2850 ml   Net            -2850 ml        Current Diet Order   Procedures   • Diet Order   • DIET NPO   • DIET NPO     Physical Exam   Constitutional: He is oriented to person, place, and time. He appears well-developed and well-nourished. No distress.   Pulmonary/Chest: Effort normal.   Abdominal: Soft. He exhibits no distension. There is no tenderness.   Genitourinary:   Genitourinary Comments: Pacheco draining dark dylan urine   Neurological: He is alert and oriented to person, place, and time.   Skin: Skin is warm and dry.   Psychiatric: He has a normal mood and affect. His behavior is normal.   Nursing note and vitals reviewed.    Labs:  Recent Results (from the past 24 hour(s))   ACCU-CHEK GLUCOSE    Collection Time: 18  4:42 PM   Result Value Ref Range    Glucose - Accu-Ck 201 (H) 65 - 99 mg/dL   ACCU-CHEK GLUCOSE    Collection Time: 18  8:11 PM   Result Value Ref Range    Glucose - Accu-Ck 200 (H) 65 - 99 mg/dL    MAGNESIUM    Collection Time: 02/03/18  2:29 AM   Result Value Ref Range    Magnesium 1.9 1.5 - 2.5 mg/dL   CBC WITH DIFFERENTIAL    Collection Time: 02/03/18  2:29 AM   Result Value Ref Range    WBC 2.9 (L) 4.8 - 10.8 K/uL    RBC 3.12 (L) 4.70 - 6.10 M/uL    Hemoglobin 9.3 (L) 14.0 - 18.0 g/dL    Hematocrit 29.7 (L) 42.0 - 52.0 %    MCV 95.2 81.4 - 97.8 fL    MCH 29.8 27.0 - 33.0 pg    MCHC 31.3 (L) 33.7 - 35.3 g/dL    RDW 62.8 (H) 35.9 - 50.0 fL    Platelet Count 215 164 - 446 K/uL    MPV 10.2 9.0 - 12.9 fL    Nucleated RBC 0.00 /100 WBC    NRBC (Absolute) 0.00 K/uL    Neutrophils-Polys 66.70 44.00 - 72.00 %    Lymphocytes 11.40 (L) 22.00 - 41.00 %    Monocytes 7.60 0.00 - 13.40 %    Eosinophils 1.00 0.00 - 6.90 %    Basophils 0.00 0.00 - 1.80 %    Neutrophils (Absolute) 2.32 1.82 - 7.42 K/uL    Lymphs (Absolute) 0.33 (L) 1.00 - 4.80 K/uL    Monos (Absolute) 0.22 0.00 - 0.85 K/uL    Eos (Absolute) 0.03 0.00 - 0.51 K/uL    Baso (Absolute) 0.00 0.00 - 0.12 K/uL    Anisocytosis 2+     Macrocytosis 1+     Microcytosis 1+    BASIC METABOLIC PANEL    Collection Time: 02/03/18  2:29 AM   Result Value Ref Range    Sodium 134 (L) 135 - 145 mmol/L    Potassium 3.9 3.6 - 5.5 mmol/L    Chloride 100 96 - 112 mmol/L    Co2 27 20 - 33 mmol/L    Glucose 160 (H) 65 - 99 mg/dL    Bun 9 8 - 22 mg/dL    Creatinine 0.55 0.50 - 1.40 mg/dL    Calcium 7.7 (L) 8.5 - 10.5 mg/dL    Anion Gap 7.0 0.0 - 11.9   ESTIMATED GFR    Collection Time: 02/03/18  2:29 AM   Result Value Ref Range    GFR If African American >60 >60 mL/min/1.73 m 2    GFR If Non African American >60 >60 mL/min/1.73 m 2   DIFFERENTIAL MANUAL    Collection Time: 02/03/18  2:29 AM   Result Value Ref Range    Bands-Stabs 13.30 (H) 0.00 - 10.00 %    Manual Diff Status PERFORMED    PERIPHERAL SMEAR REVIEW    Collection Time: 02/03/18  2:29 AM   Result Value Ref Range    Peripheral Smear Review see below    PLATELET ESTIMATE    Collection Time: 02/03/18  2:29 AM   Result Value  Ref Range    Plt Estimation Normal    MORPHOLOGY    Collection Time: 02/03/18  2:29 AM   Result Value Ref Range    RBC Morphology Present    ACCU-CHEK GLUCOSE    Collection Time: 02/03/18  5:26 AM   Result Value Ref Range    Glucose - Accu-Ck 256 (H) 65 - 99 mg/dL   ACCU-CHEK GLUCOSE    Collection Time: 02/03/18 11:44 AM   Result Value Ref Range    Glucose - Accu-Ck 194 (H) 65 - 99 mg/dL     Medical Decision Making, by Problem:  Active Hospital Problems    Diagnosis   • Fungemia due to UTI [B49]     Priority: High   • Sepsis due to pyelonephritis / complicated UTI (CMS-HCC) [A41.9]     Priority: High   • Enterobacter cloacae UTI / Pyelonephritis [N12]     Priority: High   • Hypokalemia [E87.6]     Priority: Medium   • Urinary retention [R33.9]     Priority: Medium   • Open wound of left thigh [S71.102A]     Priority: Medium     Left posterior thigh wound s/p Wound debridement, 25x20 cm area 12/17     • History of DVT (deep vein thrombosis) [Z86.718]     Priority: Low   • Anemia of chronic disease [D63.8]     Priority: Low   • Crohn disease (CMS-HCC) [K50.90]     Priority: Low   • Type II diabetes mellitus (CMS-HCC) [E11.9]     Priority: Low   Hx bladder perforation s/p repair 12/10/17      Plan:  Sepsis management per ID  Pacheco to remain for now. Can consider CIC when ready for discharge.    Quality Measures:    Reviewed items::  Labs reviewed and Medications reviewed  Pacheco catheter::  Urinary Tract Retention or Urinary Tract Obstruction      Discussed patient condition with Patient and urology-Dr. Upton

## 2018-02-03 NOTE — PROGRESS NOTES
[facility] Hospitalist Progress Note    Date of Service: 2/3/2018    Chief Complaint  67 y.o. male with Crohn's disease, type II diabetes mellitus, history of DVT, history of bladder rupture, bilateral nephrostomy tube with removal and ureteral stent placement, was just discharged yesterday, at that time was also felt to have UTI, but urine grew mixed skin anca and yeast. ID was consulted then, and was transitioned to oral ciprofloxacin and fluconazole to complete 14 days course. Admitted 1/30/2018 with fevers, chills, nausea and vomiting, along with increasing urinary frequency and bladder spasms. Labs showed no leukocytosis. BMP was unimpressive except for hyperglycemia, with normal creatinine. CT scan showed left ureteral stent in place, moderate right hydronephrosis and dilation of the right ureter, 5 mm nonobstructing lower pole right renal stone. Urinalysis showed packed WBCs, 100-150 RBC, with moderate leukocyte esterase. Felt to have sepsis due to UTI/pyelonephritis, and started on IV fluids, and IV cefepime. Low potassium replaced, given magnesium for low normal levels. Blood cultures remain negative. Continues to retain urine, kaur placed. Blood cultures ×1 bottle growing yeast, as well as the urine culture. [facility] growing Enterobacter cloacae resistant to cefepime and Zosyn. Antibiotic changed to meropenem and micafungin by ID.     Interval Problem Update  2/3/2018 - no overnight events. Remains hemodynamically stable. Still spiking fevers T-max 38.2° C. Stable on RA.  Cardiology consulted yesterday, scheduled for MAX on Monday. WBC 2.9. 13% bands, sodium 134, glucose 160. Magnesium normal at 1.9. Repeat blood cultures negative.    > Seen and examined. Lying in bed comfortably. Denied any pain. No nausea, vomiting, abd pain, CP, SOB. Remains with kaur catheter. States he was watching TV last night and noticed his vision blurry. No scotoma.            Consultants/Specialty  ID  Cardiology  Urology    Disposition  Monitor on the floors.        ROS     Pertinent positives/negatives as mentioned above.     A complete review of systems was done. All other systems were negative.      Physical Exam  Laboratory/Imaging   Hemodynamics  Temp (24hrs), Av.2 °C (100.7 °F), Min:36.7 °C (98 °F), Max:39.5 °C (103.1 °F)   Temperature: (!) 38.2 °C (100.8 °F) (RN notified)  Pulse  Av.7  Min: 59  Max: 117    Blood Pressure : 116/69      Respiratory      Respiration: 17, Pulse Oximetry: 91 %        RUL Breath Sounds: Clear, RML Breath Sounds: Clear, RLL Breath Sounds: Diminished, FLORENCE Breath Sounds: Clear, LLL Breath Sounds: Diminished    Fluids    Intake/Output Summary (Last 24 hours) at 18 0700  Last data filed at 18 0400   Gross per 24 hour   Intake                0 ml   Output             2850 ml   Net            -2850 ml       Nutrition  Orders Placed This Encounter   Procedures   • Diet Order     Standing Status:   Standing     Number of Occurrences:   1     Order Specific Question:   Diet:     Answer:   Diabetic [3]   • DIET NPO     Standing Status:   Standing     Number of Occurrences:   8     Order Specific Question:   Restrict to:     Answer:   Sips with Medications [3]   • DIET NPO     Standing Status:   Standing     Number of Occurrences:   8     Order Specific Question:   Restrict to:     Answer:   Strict [1]     Physical Exam   Constitutional: He is oriented to person, place, and time. He appears well-developed. No distress.   HENT:   Head: Normocephalic and atraumatic.   Mouth/Throat: Oropharynx is clear and moist. No oropharyngeal exudate.   Eyes: EOM are normal. Pupils are equal, round, and reactive to light. Right eye exhibits no discharge. Left eye exhibits no discharge. No scleral icterus.   Neck: Normal range of motion. Neck supple. No thyromegaly present.   Cardiovascular: Normal rate and regular rhythm.  Exam reveals no gallop and  no friction rub.    No murmur heard.  Pulmonary/Chest: Effort normal and breath sounds normal. He has no wheezes. He has no rales. He exhibits no tenderness.   Abdominal: Soft. Bowel sounds are normal. There is no tenderness. There is no rebound and no guarding.   No CVA tenderness   Musculoskeletal: Normal range of motion. He exhibits no edema or tenderness.   Wound VAC on the posterior left thigh, wound healing well   Lymphadenopathy:     He has no cervical adenopathy.   Neurological: He is alert and oriented to person, place, and time. No cranial nerve deficit. Coordination normal.   Skin: Skin is warm and dry. No rash noted. He is not diaphoretic. No erythema.   Psychiatric: His behavior is normal. Thought content normal.   More relaxed, calm, coherent and conversant.   Vitals reviewed.      Recent Labs      02/01/18 0352 02/02/18 0312 02/03/18 0229   WBC  3.2*  2.6*  2.9*   RBC  2.98*  2.87*  3.12*   HEMOGLOBIN  8.9*  8.5*  9.3*   HEMATOCRIT  27.8*  26.4*  29.7*   MCV  93.3  92.0  95.2   MCH  29.9  29.6  29.8   MCHC  32.0*  32.2*  31.3*   RDW  61.5*  59.8*  62.8*   PLATELETCT  227  220  215   MPV  9.6  9.7  10.2     Recent Labs      02/01/18 0352 02/02/18 0312 02/03/18 0229   SODIUM  136  131*  134*   POTASSIUM  3.4*  3.4*  3.9   CHLORIDE  103  98  100   CO2  25  27  27   GLUCOSE  133*  186*  160*   BUN  9  8  9   CREATININE  0.45*  0.47*  0.55   CALCIUM  8.2*  7.9*  7.7*                      Assessment/Plan     * Sepsis due to pyelonephritis / complicated UTI (CMS-[facility])- (present on admission)   Assessment & Plan    - This is sepsis (without associated acute organ dysfunction).  Source is pyelonephritis/compensated UTI. Continues to spike fevers, likely related to fungemia.  - Continue IV fluids. Continue PRN boluses per sepsis protocol. Continue close hemodynamic monitoring. Continue to monitor WBC especially with down trending.  - Continue IV antibiotics for pyelonephritis and fungemia as  below.         Fungemia due to UTI- (present on admission)   Assessment & Plan    - Continue micafungin IV.   - I have sent out a page for cardiology, to arrange MAX. Will try to get ophthalmology to see him while in-house to rule out endophthalmitis.  - Follow repeat blood cultures. Continue to monitor for fevers.        Enterobacter cloacae UTI / Pyelonephritis- (present on admission)   Assessment & Plan    -Still spiking fevers.  - Continue meropenem, and micafungin per ID given resistant organisms and [facility]. ID on board for continued antibiotic guidance.         Urinary retention- (present on admission)   Assessment & Plan    - Likely related to UTI. Continue Flomax. Continue Pacheco catheter for now, voiding trial in the next few days. Urology following.        Hypokalemia   Assessment & Plan    - Continue scheduled K Dur 40 mg daily. Continue to monitor. BMP in the morning.        Open wound of left thigh- (present on admission)   Assessment & Plan    - Continue wound VAC, wound care and dressing changes. Wound service on board.        History of DVT (deep vein thrombosis)- (present on admission)   Assessment & Plan    - with hypercoagulable disorder, and multiple episodes of DVTs.  - Continue therapeutic Lovenox subcutaneous injections.        Anemia of chronic disease- (present on admission)   Assessment & Plan    - Continue to monitor Hgb, transfuse for hemoglobin <7.        Type II diabetes mellitus (CMS-HCC)- (present on admission)   Assessment & Plan    - Continue glipizide, continue holding metformin while in-house. Maintained on sliding scale insulin coverage in the hospital. Continue Accu-Cheks before meals and at bedtime, and diabetic diet.        Crohn disease (CMS-HCC)- (present on admission)   Assessment & Plan    - Not in acute flare. Continue home dose mesalamine.              Reviewed items::  Labs reviewed, Medications reviewed and Radiology images reviewed  Pacheco catheter::   Urinary Tract Retention or Urinary Tract Obstruction  DVT prophylaxis pharmacological::  Enoxaparin (Lovenox)  Ulcer Prophylaxis::  Not indicated  Antibiotics:  Treating active infection/contamination beyond 24 hours perioperative coverage

## 2018-02-03 NOTE — PROGRESS NOTES
Infectious Disease Progress Note    Author: Adrienne Li M.D. Date & Time of service: 2/3/2018  12:12 PM    Chief Complaint:  FU fungemia and pyelopnephritis    Interval History:   Tmax 103, WBC 3.2, kaur placed given retention, denies any abd pain  / Tmax 103, WBC 2.6, resting comfortably but having intermittent fevers and chills  /3 Tmax 102.5, WBC 2.9, ongoing fevers and chills, pt very concerned about his illness, plan for MAX on Monday, blurry vision improved  Labs Reviewed, Medications Reviewed, Radiology Reviewed and Wound Reviewed.    Review of Systems:  Review of Systems   Constitutional: Positive for chills and fever.   Eyes: Negative for blurred vision and double vision.   Respiratory: Negative for cough and shortness of breath.    Gastrointestinal: Negative for abdominal pain, diarrhea, nausea and vomiting.   Genitourinary: Negative for dysuria.        Urinary retention   Musculoskeletal: Positive for myalgias.       Hemodynamics:  Temp (24hrs), Av.2 °C (100.8 °F), Min:36.6 °C (97.8 °F), Max:39.5 °C (103.1 °F)  Temperature: (!) 39 °C (102.2 °F)  Pulse  Av.2  Min: 59  Max: 117   Blood Pressure : 138/69       Physical Exam:  Physical Exam   Constitutional: He is oriented to person, place, and time. He appears well-developed.   Older than stated age  Chronically ill appearing   HENT:   Head: Normocephalic and atraumatic.   Eyes: EOM are normal. Pupils are equal, round, and reactive to light.   Neck: Neck supple.   Cardiovascular: Normal rate, regular rhythm and normal heart sounds.    Pulmonary/Chest: Effort normal. He has no wheezes. He has no rales.   Abdominal: Soft. There is no tenderness.   Genitourinary:   Genitourinary Comments: Kaur   Musculoskeletal:   Left posterior thigh wound  vac   Neurological: He is alert and oriented to person, place, and time.   Skin: He is diaphoretic.       Meds:    Current Facility-Administered Medications:   •  potassium chloride SA  •  magnesium  oxide  •  micafungin  •  meropenem (MERREM) IV  •  HYDROmorphone  •  oxyCODONE immediate-release **OR** oxyCODONE immediate-release  •  ibuprofen  •  acetaminophen  •  lidocaine  •  glipiZIDE  •  mercaptopurine  •  tamsulosin  •  Respiratory Care per Protocol  •  NS  •  ondansetron  •  ondansetron  •  insulin regular **AND** Accu-Chek ACHS **AND** NOTIFY MD and PharmD **AND** glucose 4 g **AND** dextrose 50%  •  NS  •  NS  •  oxybutynin  •  mesalamine delayed-release  •  enoxaparin    Labs:  Recent Labs      02/01/18   0352  02/02/18 0312 02/03/18 0229   WBC  3.2*  2.6*  2.9*   RBC  2.98*  2.87*  3.12*   HEMOGLOBIN  8.9*  8.5*  9.3*   HEMATOCRIT  27.8*  26.4*  29.7*   MCV  93.3  92.0  95.2   MCH  29.9  29.6  29.8   RDW  61.5*  59.8*  62.8*   PLATELETCT  227  220  215   MPV  9.6  9.7  10.2   NEUTSPOLYS  78.90*  73.00*  66.70   LYMPHOCYTES  13.20*  9.60*  11.40*   MONOCYTES  2.60  7.80  7.60   EOSINOPHILS  1.70  0.90  1.00   BASOPHILS  0.00  0.00  0.00   RBCMORPHOLO  Present  Present  Present     Recent Labs      02/01/18   0352  02/02/18 0312 02/03/18 0229   SODIUM  136  131*  134*   POTASSIUM  3.4*  3.4*  3.9   CHLORIDE  103  98  100   CO2  25  27  27   GLUCOSE  133*  186*  160*   BUN  9  8  9     Recent Labs      02/01/18   0352  02/02/18 0312 02/03/18 0229   CREATININE  0.45*  0.47*  0.55       Imaging:  Ct-abdomen-pelvis With    Result Date: 1/30/2018 1/30/2018 4:31 PM HISTORY/REASON FOR EXAM:  Pain. Pain and fever TECHNIQUE/EXAM DESCRIPTION:   CT scan of the abdomen and pelvis with contrast. Contrast-enhanced helical scanning was obtained from the diaphragmatic domes through the pubic symphysis following the bolus administration of nonionic contrast without complication. 100 mL of Omnipaque 350 nonionic contrast was administered without complication. Low dose optimization technique was utilized for this CT exam including automated exposure control and adjustment of the mA and/or kV according to  patient size. COMPARISON: 12/12/2017 FINDINGS: CT Abdomen: Mild lung base atelectasis. No basilar pleural effusion. Multiple hepatic cysts measuring up to 1.4 cm in diameter are again demonstrated. The spleen is large measuring up to 14.8 cm diameter. No pancreatic or adrenal gland masses. Bilateral renal cysts measuring up to 9.3 cm on the right and 3.4 cm on the left again demonstrated. Left ureteral stent is demonstrated. Mild left pelvocaliectasis. There is moderate right hydronephrosis and dilatation of the right ureter. No distal ureteral stones identified. Lower pole 5 mm right renal stone again demonstrated. The gallbladder is partially distended. No calcified gallstones identified. No evidence of bowel obstruction. The appendix is not delineated. There are diverticula colon. No evidence of diverticulitis. Mild calcified plaque aorta. Subcentimeter short axis retroperitoneal lymph nodes. An IVC filter is located below the renal veins. CT Pelvis: The bladder is partially distended. The wall of the bladder is mildly thickened. There are prostate calcifications.     Left ureteral stent is in place. Mild left pelvocaliectasis. Moderate right hydronephrosis and dilatation of the right ureter. No distal right ureteral stone is identified. 5 mm nonobstructing lower pole right renal stone. Bilateral renal cysts again demonstrated. Hepatic cysts again demonstrated. Splenomegaly. Diverticula colon. No evidence diverticulitis. The appendix is not delineated. No free fluid.    Dx-chest-portable (1 View)    Result Date: 1/30/2018 1/30/2018 1:42 PM HISTORY/REASON FOR EXAM:  Fever and chills TECHNIQUE/EXAM DESCRIPTION AND NUMBER OF VIEWS: Single portable view of the chest. COMPARISON: 1/27/18 FINDINGS: No pulmonary infiltrates or consolidations are noted. No pleural effusion. No pneumothorax. Stable cardiopericardial silhouette.     1. No acute cardiopulmonary abnormalities are identified.    Dx-chest-portable (1  View)    Result Date: 1/27/2018 1/27/2018 12:17 AM HISTORY/REASON FOR EXAM:  Possible sepsis TECHNIQUE/EXAM DESCRIPTION:  Single AP view of the chest. COMPARISON: December 26, 2017 FINDINGS: Overlying cardiac leads are present. The cardiac silhouette appears within normal limits. The mediastinal contour appears within normal limits.  The central pulmonary vasculature appears normal. The lungs appear well expanded bilaterally.  Bilateral lungs are clear. No significant pleural effusions are identified. The bony structures appear age-appropriate.     1.  No acute cardiopulmonary disease.    [id number]    Result Date: 1/27/2018 1/27/2018 2:27 AM HISTORY/REASON FOR EXAM: Pain. TECHNIQUE/EXAM DESCRIPTION: Real-time sonography of the scrotum was performed with gray-scale, color and duplex Doppler imaging. COMPARISON: November 12, 2017 FINDINGS: There is diffuse heterogeneous flow within the right testicle, which is new since prior study. Increased flow within the right testicle and epididymis is identified. The right testis measures 3.97 cm x 2.66 cm x 3.47 cm. 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 The right epididymis measures 13.3 x 17.6 mm. Large right hydrocele is seen. No varicocele is detected.     1.  Heterogeneous parenchyma of the right testicle with markedly increased flow of the right epididymis and testis, appearance favors epididymal orchitis. Recommend sonographic follow-up for reevaluation of the right testis in 7-14 days to exclude infiltrating testicular mass. 2.  Large right hydrocele. 3.  Surgical absence the left testicle    Ir-conv Perq Neph To Nephroureteral Cath (all Radiology) Left    Result Date: 1/29/2018 1/29/2018 9:28 AM Bilateral nephrostograms and bilateral nephrostomy tube removals and left ureteral stent placement. HISTORY/REASON FOR EXAM: Ureteral obstruction. TECHNIQUE: Following informed consent patient prepped and draped in the usual fashion.  10 cc 1% lidocaine  was utilized for local anesthesia. SEDATION: Conscious sedation with 150 mcg of Fentanyl and 5 mg of Versed was administered during the procedure with appropriate continuous patient monitoring by the radiology nurse. Sedation duration: 30 minutes. The procedure was performed using MAXIMAL STERILE BARRIER technique including sterile gown, mask, cap, and donning of sterile gloves following appropriate hand hygiene and/or sterile scrub. Patient skin site was prepped with 2% Chlorhexidine solution. CONTRAST: 45 mL of Omnipaque 300. FLUOROSCOPY: 4 fluoroscopic images obtained. 3.4 minutes. Omnipaque 300 was injected into the indwelling bilateral nephrostomy tubes and digital imaging was obtained over the kidneys and ureters. The right nephrostomy tube was subsequently removed. Next a guidewire was advanced into the left ureter and the indwelling left nephrostomy tube was removed.  A 5-[place] catheter was advanced into the bladder over the guidewire.  Next the guidewire was exchanged for an Amplatz guidewire which was coiled in  the bladder.  Next an 8-[place] 24-cm in length double-J ureteral stent was advanced over the guidewire and positioned with its distal loop in the bladder and proximal loop in the left renal collecting system.  The internal stiffener was removed and the guidewire was retracted and positioned in the left renal collecting system.  An 8.5-[place] locking loop catheter was also positioned in the left renal collecting system.   Omnipaque 300 was injected and digital imaging was obtained.  The patient tolerated procedure well and sent to the floor in good condition. FINDINGS: The initial right nephrostogram revealed excellent filling of the right renal collecting system with good positioning of the right nephrostomy tube. There was prompt flow of contrast down the right ureter into the bladder. There was no evidence of right-sided ureteral stricture or filling defect. The right nephrostomy tube was  subsequently removed. The initial left-sided nephrostogram revealed a high-grade focal stricture at the left ureteral vesicular junction. There was delayed filling of the left ureter with no other areas of stricture formation or obstruction noted in the left ureter. Digital images demonstrate good positioning of the left-sided double-J ureteral stent in the renal collecting system and bladder.     1. Right-sided nephrostogram revealed normal-appearing right renal collecting system and right ureter with prompt flow of contrast into the bladder and no evidence of right-sided ureteral stricture or filling defect. The right-sided nephrostomy tube was subsequently removed. 2. Left-sided nephrostogram revealed a high-grade focal stricture at the left ureteral fascicular junction. The remainder of the left ureter has a normal appearance. 3. Successful percutaneous placement of left-sided double-J ureteral stent.      Micro:  Results     Procedure Component Value Units Date/Time    URINE CULTURE(NEW) [[id number]]  (Abnormal) Collected:  01/30/18 1553    Order Status:  Completed Specimen:  Urine Updated:  02/02/18 1600     Significant Indicator POS (POS)     Source UR     Site --     Urine Culture -- (A)     Growth noted after further incubation,see below for  organism identification.       Urine Culture -- (A)     Yeast  >100,000 cfu/mL       Urine Culture -- (A)     Candida albicans  <10,000 cfu/mL      Narrative:       Indication for culture:->Emergency Room Patient    BLOOD CULTURE [[id number]] Collected:  02/01/18 1636    Order Status:  Completed Specimen:  Blood from Peripheral Updated:  02/02/18 1153     Significant Indicator NEG     Source BLD     Site PERIPHERAL     Blood Culture --     No Growth    Note: Blood cultures are incubated for 5 days and  are monitored continuously.Positive blood cultures  are called to the RN and reported as soon as  they are identified.      Narrative:       Per Hospital Policy: Only  "change Specimen Src: to \"Line\" if  specified by physician order.    BLOOD CULTURE [[id number]] Collected:  02/01/18 1636    Order Status:  Completed Specimen:  Blood from Peripheral Updated:  02/02/18 1153     Significant Indicator NEG     Source BLD     Site PERIPHERAL     Blood Culture --     No Growth    Note: Blood cultures are incubated for 5 days and  are monitored continuously.Positive blood cultures  are called to the RN and reported as soon as  they are identified.      Narrative:       Per Hospital Policy: Only change Specimen Src: to \"Line\" if  specified by physician order.    BLOOD CULTURE [[id number]] Collected:  02/01/18 0928    Order Status:  Completed Specimen:  Blood from Peripheral Updated:  02/02/18 1153     Significant Indicator NEG     Source BLD     Site PERIPHERAL     Blood Culture --     No Growth    Note: Blood cultures are incubated for 5 days and  are monitored continuously.Positive blood cultures  are called to the RN and reported as soon as  they are identified.      Narrative:       Per Hospital Policy: Only change Specimen Src: to \"Line\" if  specified by physician order.    BLOOD CULTURE [[id number]] Collected:  02/01/18 0928    Order Status:  Completed Specimen:  Blood from Peripheral Updated:  02/02/18 1153     Significant Indicator NEG     Source BLD     Site PERIPHERAL     Blood Culture --     No Growth    Note: Blood cultures are incubated for 5 days and  are monitored continuously.Positive blood cultures  are called to the RN and reported as soon as  they are identified.      Narrative:       Per Hospital Policy: Only change Specimen Src: to \"Line\" if  specified by physician order.    BLOOD CULTURE [[id number]]  (Abnormal) Collected:  01/30/18 1435    Order Status:  Completed Specimen:  Blood from Peripheral Updated:  02/02/18 1055     Significant Indicator POS (POS)     Source BLD     Site PERIPHERAL     Blood Culture Growth detected by Bactec instrument.  01/31/2018  21:27 (A)     " "Blood Culture Yeast (A)    Narrative:       CALL  Lisa  131 tel. [phone],  CALLED  131 tel. [phone] 01/31/2018, 21:30, RB PERF. RESULTS CALLED  TO:[phone].  Per Hospital Policy: Only change Specimen Src: to \"Line\" if  specified by physician order.    BLOOD CULTURE [[id number]] Collected:  01/30/18 1230    Order Status:  Completed Specimen:  Blood from Peripheral Updated:  01/31/18 0914     Significant Indicator NEG     Source BLD     Site PERIPHERAL     Blood Culture --     No Growth    Note: Blood cultures are incubated for 5 days and  are monitored continuously.Positive blood cultures  are called to the RN and reported as soon as  they are identified.      Narrative:       Per Hospital Policy: Only change Specimen Src: to \"Line\" if  specified by physician order.    URINALYSIS [[id number]]  (Abnormal) Collected:  01/30/18 1553    Order Status:  Completed Specimen:  Urine Updated:  01/30/18 1627     Micro Urine Req Microscopic     Color Dark Yellow     Character Turbid (A)     Specific Gravity 1.017     Ph 5.0     Glucose Negative mg/dL      Ketones Trace (A) mg/dL      Protein 300 (A) mg/dL      Bilirubin Negative     Urobilinogen, Urine 0.2     Nitrite Negative     Leukocyte Esterase Moderate (A)     Occult Blood Large (A)    Narrative:       Indication for culture:->Emergency Room Patient    Blood Culture [[id number]] Collected:  01/30/18 0000    Order Status:  Canceled Specimen:  Other from Peripheral     Blood Culture [[id number]] Collected:  01/30/18 0000    Order Status:  Canceled Specimen:  Other from Peripheral     Urinalysis [[id number]] Collected:  01/30/18 0000    Order Status:  Canceled Specimen:  Urine           Assessment:  Active Hospital Problems    Diagnosis   • *Sepsis due to pyelonephritis / complicated UTI (CMS-HCC) [A41.9]   • Hypokalemia [E87.6]   • Urinary retention [R33.9]   • Pyelonephritis [N12]   • Open wound of left thigh [S71.102A]   • History of DVT (deep vein thrombosis) [Z86.718] "   • Anemia of chronic disease [D63.8]   • Crohn disease (CMS-HCC) [K50.90]   • Type II diabetes mellitus (CMS-[facility]) [E11.9]       Plan:  Sepsis  2/2 Fungemia and pyelonephritis  Continues to be febrile  DC cefepime as isolate below R   Continue micafungin and meropenem    Fungemia - ongoing work up  Ucx - yeast  Bcx 1/30 (1/2) - yeast  DC fluc  Continue micafungin pending ID of yeast  Repeat Bcx 2/1 - NGTD  TTE - neg  Needs MAX given persistent fevers - plan for Monday  Recommend ophthy eval to r/o endophthalmitis if feasible    Pyelonephritis  Ucx yeast  Abx per above  [facility] - enterobacter cloacae (confirmed with [facility] on 2/1/18) R to cefepime, zosyn. S to meropenem, cipro and bactrim  Pacheco in place    H/o Bladder rupture  S/p bilateral PCN removal on 1/29    DM2  HgA1c 6.7  Maintain BS less than 150 to control infection    Open left thigh wound  Not grossly infected  Wound vac and care    Discussed with internal medicine/Dr Weathers.

## 2018-02-03 NOTE — CARE PLAN
Problem: Pain Management  Goal: Pain level will decrease to patient's comfort goal    Intervention: Follow pain managment plan developed in collaboration with patient and Interdisciplinary Team  Pain well managed with current regimen per MAR.      Problem: Infection  Goal: Will remain free from infection    Intervention: Assess signs and symptoms of infection  Pt has be febrile for past two shifts.

## 2018-02-03 NOTE — PROGRESS NOTES
Pt requested to have blankets rearranged as well as ice packs and ice water supplied. All requests were met.

## 2018-02-03 NOTE — PROGRESS NOTES
Assumed care of patient following shift report. Patient is A&Ox4. He was moaning and fatigued secondary to pain as well as fever/chills. Patient temp was 103.0 at 2000. RN administered tylenol, applied ice pack to axillaries and notified MD. MD verbally acknowledged and requests to notify again if worsening or non-improving fever. Patient wife was present at bedside from [id number]. She was updated on patient status. Questions answered. At this time fever is 100.8. He reports feeling better. Pain med was administered. All needs currently met. Will continue to monitor.

## 2018-02-03 NOTE — CARE PLAN
Problem: Pain Management  Goal: Pain level will decrease to patient's comfort goal  Outcome: PROGRESSING AS EXPECTED  Pain managed with PRN pain meds.     Problem: Knowledge Deficit  Goal: Knowledge of disease process/condition, treatment plan, diagnostic tests, and medications will improve  Outcome: PROGRESSING AS EXPECTED  Patient updated on POC, planned MAX, medications and safety precautions.

## 2018-02-03 NOTE — PROGRESS NOTES
Surgical Progress Note    Author: Jeremie Head Date & Time created: 2/3/2018   1:28 PM     Interval Events:    Patient seen and examined.  Still with intermittent fevers. Remains on Meropenem and Micafungin. No growth to date in follow up blood cultures. Denies pain in flank or abdomen.     Review of Systems   Constitutional: Positive for fever.   Gastrointestinal: Negative for abdominal pain.   Genitourinary: Negative for flank pain and hematuria.     Hemodynamics:  Temp (24hrs), Av.2 °C (100.8 °F), Min:36.6 °C (97.8 °F), Max:39.5 °C (103.1 °F)  Temperature: (!) 39 °C (102.2 °F)  Pulse  Av.2  Min: 59  Max: 117   Blood Pressure : 138/69     Respiratory:    Respiration: 18, Pulse Oximetry: 92 %        RUL Breath Sounds: Clear, RML Breath Sounds: Clear, RLL Breath Sounds: Diminished, FLORENCE Breath Sounds: Clear, LLL Breath Sounds: Diminished  Neuro:  GCS       Fluids:    Intake/Output Summary (Last 24 hours) at 18 1328  Last data filed at 18 0400   Gross per 24 hour   Intake                0 ml   Output             2850 ml   Net            -2850 ml        Current Diet Order   Procedures   • Diet Order   • DIET NPO   • DIET NPO     Physical Exam   Constitutional: He is oriented to person, place, and time. He appears well-developed and well-nourished. No distress.   Pulmonary/Chest: Effort normal.   Abdominal: Soft. He exhibits no distension. There is no tenderness. There is no rebound and no guarding.   Genitourinary:   Genitourinary Comments: Pacheco in place draining light brown urine. - CVAT   Neurological: He is alert and oriented to person, place, and time.   Skin: Skin is warm and dry.   Psychiatric: He has a normal mood and affect.   Nursing note and vitals reviewed.    Labs:  Recent Results (from the past 24 hour(s))   ACCU-CHEK GLUCOSE    Collection Time: 18  4:42 PM   Result Value Ref Range    Glucose - Accu-Ck 201 (H) 65 - 99 mg/dL   ACCU-CHEK GLUCOSE    Collection Time: 18   8:11 PM   Result Value Ref Range    Glucose - Accu-Ck 200 (H) 65 - 99 mg/dL   MAGNESIUM    Collection Time: 02/03/18  2:29 AM   Result Value Ref Range    Magnesium 1.9 1.5 - 2.5 mg/dL   CBC WITH DIFFERENTIAL    Collection Time: 02/03/18  2:29 AM   Result Value Ref Range    WBC 2.9 (L) 4.8 - 10.8 K/uL    RBC 3.12 (L) 4.70 - 6.10 M/uL    Hemoglobin 9.3 (L) 14.0 - 18.0 g/dL    Hematocrit 29.7 (L) 42.0 - 52.0 %    MCV 95.2 81.4 - 97.8 fL    MCH 29.8 27.0 - 33.0 pg    MCHC 31.3 (L) 33.7 - 35.3 g/dL    RDW 62.8 (H) 35.9 - 50.0 fL    Platelet Count 215 164 - 446 K/uL    MPV 10.2 9.0 - 12.9 fL    Nucleated RBC 0.00 /100 WBC    NRBC (Absolute) 0.00 K/uL    Neutrophils-Polys 66.70 44.00 - 72.00 %    Lymphocytes 11.40 (L) 22.00 - 41.00 %    Monocytes 7.60 0.00 - 13.40 %    Eosinophils 1.00 0.00 - 6.90 %    Basophils 0.00 0.00 - 1.80 %    Neutrophils (Absolute) 2.32 1.82 - 7.42 K/uL    Lymphs (Absolute) 0.33 (L) 1.00 - 4.80 K/uL    Monos (Absolute) 0.22 0.00 - 0.85 K/uL    Eos (Absolute) 0.03 0.00 - 0.51 K/uL    Baso (Absolute) 0.00 0.00 - 0.12 K/uL    Anisocytosis 2+     Macrocytosis 1+     Microcytosis 1+    BASIC METABOLIC PANEL    Collection Time: 02/03/18  2:29 AM   Result Value Ref Range    Sodium 134 (L) 135 - 145 mmol/L    Potassium 3.9 3.6 - 5.5 mmol/L    Chloride 100 96 - 112 mmol/L    Co2 27 20 - 33 mmol/L    Glucose 160 (H) 65 - 99 mg/dL    Bun 9 8 - 22 mg/dL    Creatinine 0.55 0.50 - 1.40 mg/dL    Calcium 7.7 (L) 8.5 - 10.5 mg/dL    Anion Gap 7.0 0.0 - 11.9   ESTIMATED GFR    Collection Time: 02/03/18  2:29 AM   Result Value Ref Range    GFR If African American >60 >60 mL/min/1.73 m 2    GFR If Non African American >60 >60 mL/min/1.73 m 2   DIFFERENTIAL MANUAL    Collection Time: 02/03/18  2:29 AM   Result Value Ref Range    Bands-Stabs 13.30 (H) 0.00 - 10.00 %    Manual Diff Status PERFORMED    PERIPHERAL SMEAR REVIEW    Collection Time: 02/03/18  2:29 AM   Result Value Ref Range    Peripheral Smear Review see  below    PLATELET ESTIMATE    Collection Time: 02/03/18  2:29 AM   Result Value Ref Range    Plt Estimation Normal    MORPHOLOGY    Collection Time: 02/03/18  2:29 AM   Result Value Ref Range    RBC Morphology Present    ACCU-CHEK GLUCOSE    Collection Time: 02/03/18  5:26 AM   Result Value Ref Range    Glucose - Accu-Ck 256 (H) 65 - 99 mg/dL   ACCU-CHEK GLUCOSE    Collection Time: 02/03/18 11:44 AM   Result Value Ref Range    Glucose - Accu-Ck 194 (H) 65 - 99 mg/dL     Medical Decision Making, by Problem:  Active Hospital Problems    Diagnosis   • Fungemia due to UTI [B49]     Priority: High   • Sepsis due to pyelonephritis / complicated UTI (CMS-HCC) [A41.9]     Priority: High   • Enterobacter cloacae UTI / Pyelonephritis [N12]     Priority: High   • Hypokalemia [E87.6]     Priority: Medium   • Urinary retention [R33.9]     Priority: Medium   • Open wound of left thigh [S71.102A]     Priority: Medium     Left posterior thigh wound s/p Wound debridement, 25x20 cm area 12/17     • History of DVT (deep vein thrombosis) [Z86.718]     Priority: Low   • Anemia of chronic disease [D63.8]     Priority: Low   • Crohn disease (CMS-HCC) [K50.90]     Priority: Low   • Type II diabetes mellitus (CMS-HCC) [E11.9]     Priority: Low   Hx bladder perforation s/p repair 12/10/17      Plan:  Sepsis management per ID  Pacheco drainage      Quality Measures:    Reviewed items::  Labs reviewed and Medications reviewed  Pacheco catheter::  Urinary Tract Retention or Urinary Tract Obstruction      Discussed patient condition with Patient and urology-Dr. Upton

## 2018-02-03 NOTE — PROGRESS NOTES
Pt resting most of shift. Pt has been febrile off and on. Medicated per MAR. Pt very anxious and scared about his condition. VSS beside temperature. Urine starting to look slightly better than yesterday and is not as brown and cloudy but is still dark and hazy.

## 2018-02-04 ENCOUNTER — APPOINTMENT (OUTPATIENT)
Dept: RADIOLOGY | Facility: MEDICAL CENTER | Age: 68
DRG: 862 | End: 2018-02-04
Attending: PHYSICIAN ASSISTANT
Payer: MEDICARE

## 2018-02-04 LAB
ANION GAP SERPL CALC-SCNC: 9 MMOL/L (ref 0–11.9)
ANISOCYTOSIS BLD QL SMEAR: ABNORMAL
APPEARANCE UR: ABNORMAL
BACTERIA #/AREA URNS HPF: NEGATIVE /HPF
BACTERIA BLD CULT: NORMAL
BACTERIA UR CULT: ABNORMAL
BASOPHILS # BLD AUTO: 0 % (ref 0–1.8)
BASOPHILS # BLD: 0 K/UL (ref 0–0.12)
BILIRUB UR QL STRIP.AUTO: NEGATIVE
BUN SERPL-MCNC: 9 MG/DL (ref 8–22)
CALCIUM SERPL-MCNC: 7.6 MG/DL (ref 8.5–10.5)
CHLORIDE SERPL-SCNC: 100 MMOL/L (ref 96–112)
CO2 SERPL-SCNC: 24 MMOL/L (ref 20–33)
COLOR UR: ABNORMAL
CREAT SERPL-MCNC: 0.54 MG/DL (ref 0.5–1.4)
EOSINOPHIL # BLD AUTO: 0.03 K/UL (ref 0–0.51)
EOSINOPHIL NFR BLD: 0.9 % (ref 0–6.9)
EPI CELLS #/AREA URNS HPF: ABNORMAL /HPF
ERYTHROCYTE [DISTWIDTH] IN BLOOD BY AUTOMATED COUNT: 61.1 FL (ref 35.9–50)
GLUCOSE BLD-MCNC: 154 MG/DL (ref 65–99)
GLUCOSE BLD-MCNC: 181 MG/DL (ref 65–99)
GLUCOSE BLD-MCNC: 215 MG/DL (ref 65–99)
GLUCOSE SERPL-MCNC: 222 MG/DL (ref 65–99)
GLUCOSE UR STRIP.AUTO-MCNC: 100 MG/DL
HCT VFR BLD AUTO: 27.9 % (ref 42–52)
HGB BLD-MCNC: 8.5 G/DL (ref 14–18)
HYALINE CASTS #/AREA URNS LPF: ABNORMAL /LPF
KETONES UR STRIP.AUTO-MCNC: NEGATIVE MG/DL
LEUKOCYTE ESTERASE UR QL STRIP.AUTO: ABNORMAL
LYMPHOCYTES # BLD AUTO: 0.25 K/UL (ref 1–4.8)
LYMPHOCYTES NFR BLD: 7 % (ref 22–41)
MACROCYTES BLD QL SMEAR: ABNORMAL
MANUAL DIFF BLD: NORMAL
MCH RBC QN AUTO: 28.1 PG (ref 27–33)
MCHC RBC AUTO-ENTMCNC: 30.5 G/DL (ref 33.7–35.3)
MCV RBC AUTO: 92.4 FL (ref 81.4–97.8)
MICRO URNS: ABNORMAL
MICROCYTES BLD QL SMEAR: ABNORMAL
MONOCYTES # BLD AUTO: 0.34 K/UL (ref 0–0.85)
MONOCYTES NFR BLD AUTO: 9.6 % (ref 0–13.4)
MORPHOLOGY BLD-IMP: NORMAL
MUCOUS THREADS #/AREA URNS HPF: ABNORMAL /HPF
NEUTROPHILS # BLD AUTO: 2.89 K/UL (ref 1.82–7.42)
NEUTROPHILS NFR BLD: 81.6 % (ref 44–72)
NEUTS BAND NFR BLD MANUAL: 0.9 % (ref 0–10)
NITRITE UR QL STRIP.AUTO: NEGATIVE
NRBC # BLD AUTO: 0 K/UL
NRBC BLD-RTO: 0 /100 WBC
PH UR STRIP.AUTO: 5.5 [PH]
PLATELET # BLD AUTO: 205 K/UL (ref 164–446)
PLATELET BLD QL SMEAR: NORMAL
PMV BLD AUTO: 9.8 FL (ref 9–12.9)
POTASSIUM SERPL-SCNC: 3.3 MMOL/L (ref 3.6–5.5)
PROCALCITONIN SERPL-MCNC: 0.88 NG/ML
PROT UR QL STRIP: 100 MG/DL
RBC # BLD AUTO: 3.02 M/UL (ref 4.7–6.1)
RBC # URNS HPF: >150 /HPF
RBC BLD AUTO: PRESENT
RBC UR QL AUTO: ABNORMAL
SIGNIFICANT IND 70042: ABNORMAL
SIGNIFICANT IND 70042: NORMAL
SITE SITE: ABNORMAL
SITE SITE: NORMAL
SODIUM SERPL-SCNC: 133 MMOL/L (ref 135–145)
SOURCE SOURCE: ABNORMAL
SOURCE SOURCE: NORMAL
SP GR UR STRIP.AUTO: 1.02
UROBILINOGEN UR STRIP.AUTO-MCNC: 1 MG/DL
WBC # BLD AUTO: 3.5 K/UL (ref 4.8–10.8)
WBC #/AREA URNS HPF: ABNORMAL /HPF
YEAST BUDDING URNS QL: PRESENT /HPF
YEAST HYPHAE #/AREA URNS HPF: PRESENT /HPF

## 2018-02-04 PROCEDURE — 700102 HCHG RX REV CODE 250 W/ 637 OVERRIDE(OP)

## 2018-02-04 PROCEDURE — 700111 HCHG RX REV CODE 636 W/ 250 OVERRIDE (IP): Performed by: INTERNAL MEDICINE

## 2018-02-04 PROCEDURE — 82962 GLUCOSE BLOOD TEST: CPT | Mod: 91

## 2018-02-04 PROCEDURE — 770006 HCHG ROOM/CARE - MED/SURG/GYN SEMI*

## 2018-02-04 PROCEDURE — 700105 HCHG RX REV CODE 258: Performed by: HOSPITALIST

## 2018-02-04 PROCEDURE — 700102 HCHG RX REV CODE 250 W/ 637 OVERRIDE(OP): Performed by: HOSPITALIST

## 2018-02-04 PROCEDURE — 85007 BL SMEAR W/DIFF WBC COUNT: CPT

## 2018-02-04 PROCEDURE — A9270 NON-COVERED ITEM OR SERVICE: HCPCS | Performed by: INTERNAL MEDICINE

## 2018-02-04 PROCEDURE — A9270 NON-COVERED ITEM OR SERVICE: HCPCS

## 2018-02-04 PROCEDURE — 36415 COLL VENOUS BLD VENIPUNCTURE: CPT

## 2018-02-04 PROCEDURE — 99233 SBSQ HOSP IP/OBS HIGH 50: CPT | Performed by: INTERNAL MEDICINE

## 2018-02-04 PROCEDURE — A9270 NON-COVERED ITEM OR SERVICE: HCPCS | Performed by: HOSPITALIST

## 2018-02-04 PROCEDURE — 80048 BASIC METABOLIC PNL TOTAL CA: CPT

## 2018-02-04 PROCEDURE — 700102 HCHG RX REV CODE 250 W/ 637 OVERRIDE(OP): Performed by: INTERNAL MEDICINE

## 2018-02-04 PROCEDURE — 85027 COMPLETE CBC AUTOMATED: CPT

## 2018-02-04 PROCEDURE — 700111 HCHG RX REV CODE 636 W/ 250 OVERRIDE (IP): Performed by: HOSPITALIST

## 2018-02-04 PROCEDURE — 700105 HCHG RX REV CODE 258: Performed by: INTERNAL MEDICINE

## 2018-02-04 PROCEDURE — 81001 URINALYSIS AUTO W/SCOPE: CPT

## 2018-02-04 PROCEDURE — 76870 US EXAM SCROTUM: CPT

## 2018-02-04 RX ORDER — POTASSIUM CHLORIDE 20 MEQ/1
40 TABLET, EXTENDED RELEASE ORAL 2 TIMES DAILY
Status: DISCONTINUED | OUTPATIENT
Start: 2018-02-04 | End: 2018-02-12 | Stop reason: HOSPADM

## 2018-02-04 RX ADMIN — POTASSIUM CHLORIDE 40 MEQ: 1500 TABLET, EXTENDED RELEASE ORAL at 09:05

## 2018-02-04 RX ADMIN — OXYCODONE HYDROCHLORIDE 10 MG: 10 TABLET ORAL at 22:34

## 2018-02-04 RX ADMIN — TAMSULOSIN HYDROCHLORIDE 0.8 MG: 0.4 CAPSULE ORAL at 09:04

## 2018-02-04 RX ADMIN — MERCAPTOPURINE 150 MG: 50 TABLET ORAL at 09:05

## 2018-02-04 RX ADMIN — OXYBUTYNIN CHLORIDE 5 MG: 5 TABLET ORAL at 20:07

## 2018-02-04 RX ADMIN — SODIUM CHLORIDE: 9 INJECTION, SOLUTION INTRAVENOUS at 23:55

## 2018-02-04 RX ADMIN — ENOXAPARIN SODIUM 80 MG: 100 INJECTION SUBCUTANEOUS at 09:04

## 2018-02-04 RX ADMIN — MAGNESIUM GLUCONATE 500 MG ORAL TABLET 400 MG: 500 TABLET ORAL at 09:05

## 2018-02-04 RX ADMIN — SODIUM CHLORIDE: 9 INJECTION, SOLUTION INTRAVENOUS at 15:02

## 2018-02-04 RX ADMIN — POTASSIUM CHLORIDE 40 MEQ: 1500 TABLET, EXTENDED RELEASE ORAL at 20:08

## 2018-02-04 RX ADMIN — GLIPIZIDE 5 MG: 5 TABLET ORAL at 16:55

## 2018-02-04 RX ADMIN — INSULIN HUMAN 1 UNITS: 100 INJECTION, SOLUTION PARENTERAL at 11:17

## 2018-02-04 RX ADMIN — MESALAMINE 400 MG: 400 CAPSULE, DELAYED RELEASE ORAL at 15:01

## 2018-02-04 RX ADMIN — INSULIN HUMAN 2 UNITS: 100 INJECTION, SOLUTION PARENTERAL at 20:14

## 2018-02-04 RX ADMIN — MEROPENEM 500 MG: 500 INJECTION, POWDER, FOR SOLUTION INTRAVENOUS at 23:50

## 2018-02-04 RX ADMIN — OXYCODONE HYDROCHLORIDE 10 MG: 10 TABLET ORAL at 09:06

## 2018-02-04 RX ADMIN — ACETAMINOPHEN 650 MG: 325 TABLET, FILM COATED ORAL at 00:07

## 2018-02-04 RX ADMIN — SODIUM CHLORIDE: 9 INJECTION, SOLUTION INTRAVENOUS at 00:10

## 2018-02-04 RX ADMIN — OXYBUTYNIN CHLORIDE 5 MG: 5 TABLET ORAL at 09:06

## 2018-02-04 RX ADMIN — INSULIN HUMAN 2 UNITS: 100 INJECTION, SOLUTION PARENTERAL at 05:39

## 2018-02-04 RX ADMIN — OXYCODONE HYDROCHLORIDE 10 MG: 10 TABLET ORAL at 13:15

## 2018-02-04 RX ADMIN — HYDROMORPHONE HYDROCHLORIDE 0.5 MG: 2 INJECTION, SOLUTION INTRAMUSCULAR; INTRAVENOUS; SUBCUTANEOUS at 23:21

## 2018-02-04 RX ADMIN — ENOXAPARIN SODIUM 80 MG: 100 INJECTION SUBCUTANEOUS at 20:07

## 2018-02-04 RX ADMIN — MESALAMINE 400 MG: 400 CAPSULE, DELAYED RELEASE ORAL at 09:05

## 2018-02-04 RX ADMIN — MEROPENEM 500 MG: 500 INJECTION, POWDER, FOR SOLUTION INTRAVENOUS at 05:35

## 2018-02-04 RX ADMIN — ACETAMINOPHEN 650 MG: 325 TABLET, FILM COATED ORAL at 13:15

## 2018-02-04 RX ADMIN — MEROPENEM 500 MG: 500 INJECTION, POWDER, FOR SOLUTION INTRAVENOUS at 16:56

## 2018-02-04 RX ADMIN — MEROPENEM 500 MG: 500 INJECTION, POWDER, FOR SOLUTION INTRAVENOUS at 13:15

## 2018-02-04 RX ADMIN — MICAFUNGIN SODIUM 100 MG: 20 INJECTION, POWDER, LYOPHILIZED, FOR SOLUTION INTRAVENOUS at 09:29

## 2018-02-04 RX ADMIN — MESALAMINE 400 MG: 400 CAPSULE, DELAYED RELEASE ORAL at 20:07

## 2018-02-04 RX ADMIN — IBUPROFEN 400 MG: 400 TABLET ORAL at 09:07

## 2018-02-04 RX ADMIN — GLIPIZIDE 5 MG: 5 TABLET ORAL at 09:06

## 2018-02-04 ASSESSMENT — PAIN SCALES - GENERAL
PAINLEVEL_OUTOF10: 0
PAINLEVEL_OUTOF10: 6
PAINLEVEL_OUTOF10: 2

## 2018-02-04 ASSESSMENT — ENCOUNTER SYMPTOMS
FLANK PAIN: 0
DOUBLE VISION: 0
NERVOUS/ANXIOUS: 1
SHORTNESS OF BREATH: 0
VOMITING: 0
COUGH: 0
FEVER: 1
MYALGIAS: 1
ABDOMINAL PAIN: 0
DIARRHEA: 0
BLURRED VISION: 0
NAUSEA: 0
CHILLS: 1

## 2018-02-04 NOTE — PROGRESS NOTES
Surgical Progress Note    Author: Jeremie Head Date & Time created: 2018   12:02 PM     Interval Events:    Patient seen and examined.  Still with intermittent fevers. Otherwise has been well and has no pain complaints. Very dark urine via kaur. ID following and he continues on Meropenem and Micafungin. Follow up blood cultures without growth.     Prior CT revealed right sided hydroureteronephrosis. This was after removal of NPTs. IR note from that procedure is not documented.     Review of Systems   Constitutional: Positive for fever.   Gastrointestinal: Negative for abdominal pain.   Genitourinary: Negative for flank pain.     Hemodynamics:  Temp (24hrs), Av.5 °C (99.5 °F), Min:36.4 °C (97.6 °F), Max:38.9 °C (102 °F)  Temperature:  (Q8 Vitals per RN)  Pulse  Av.8  Min: 59  Max: 117   Blood Pressure : 103/66     Respiratory:    Respiration: 17, Pulse Oximetry: 93 %        RUL Breath Sounds: Clear, RML Breath Sounds: Clear, RLL Breath Sounds: Diminished, FLORENCE Breath Sounds: Clear, LLL Breath Sounds: Diminished  Neuro:  GCS       Fluids:    Intake/Output Summary (Last 24 hours) at 18 1202  Last data filed at 18 0900   Gross per 24 hour   Intake             2400 ml   Output             2250 ml   Net              150 ml        Current Diet Order   Procedures   • Diet Order     Physical Exam   Constitutional: He is oriented to person, place, and time. No distress.   Pulmonary/Chest: Effort normal.   Abdominal: Soft. He exhibits no distension. There is no tenderness. There is no rebound and no guarding.   Genitourinary:   Genitourinary Comments: Kaur in draining brownish urine similar to yesterday. Scrotum without edema. Nontender soft cystic structure superior right epididymis. Testicles without swelling or tenderness.    Neurological: He is alert and oriented to person, place, and time.   Skin: Skin is warm and dry.   Psychiatric: He has a normal mood and affect. His behavior is normal.    Nursing note and vitals reviewed.    Labs:  Recent Results (from the past 24 hour(s))   ACCU-CHEK GLUCOSE    Collection Time: 02/03/18  4:46 PM   Result Value Ref Range    Glucose - Accu-Ck 147 (H) 65 - 99 mg/dL   Procalcitonin    Collection Time: 02/03/18 10:37 PM   Result Value Ref Range    Procalcitonin 0.88 (H) <0.25 ng/mL   ACCU-CHEK GLUCOSE    Collection Time: 02/03/18 10:38 PM   Result Value Ref Range    Glucose - Accu-Ck 174 (H) 65 - 99 mg/dL   CBC WITH DIFFERENTIAL    Collection Time: 02/04/18  3:03 AM   Result Value Ref Range    WBC 3.5 (L) 4.8 - 10.8 K/uL    RBC 3.02 (L) 4.70 - 6.10 M/uL    Hemoglobin 8.5 (L) 14.0 - 18.0 g/dL    Hematocrit 27.9 (L) 42.0 - 52.0 %    MCV 92.4 81.4 - 97.8 fL    MCH 28.1 27.0 - 33.0 pg    MCHC 30.5 (L) 33.7 - 35.3 g/dL    RDW 61.1 (H) 35.9 - 50.0 fL    Platelet Count 205 164 - 446 K/uL    MPV 9.8 9.0 - 12.9 fL    Nucleated RBC 0.00 /100 WBC    NRBC (Absolute) 0.00 K/uL    Neutrophils-Polys 81.60 (H) 44.00 - 72.00 %    Lymphocytes 7.00 (L) 22.00 - 41.00 %    Monocytes 9.60 0.00 - 13.40 %    Eosinophils 0.90 0.00 - 6.90 %    Basophils 0.00 0.00 - 1.80 %    Neutrophils (Absolute) 2.89 1.82 - 7.42 K/uL    Lymphs (Absolute) 0.25 (L) 1.00 - 4.80 K/uL    Monos (Absolute) 0.34 0.00 - 0.85 K/uL    Eos (Absolute) 0.03 0.00 - 0.51 K/uL    Baso (Absolute) 0.00 0.00 - 0.12 K/uL    Anisocytosis 1+     Macrocytosis 1+     Microcytosis 1+    BASIC METABOLIC PANEL    Collection Time: 02/04/18  3:03 AM   Result Value Ref Range    Sodium 133 (L) 135 - 145 mmol/L    Potassium 3.3 (L) 3.6 - 5.5 mmol/L    Chloride 100 96 - 112 mmol/L    Co2 24 20 - 33 mmol/L    Glucose 222 (H) 65 - 99 mg/dL    Bun 9 8 - 22 mg/dL    Creatinine 0.54 0.50 - 1.40 mg/dL    Calcium 7.6 (L) 8.5 - 10.5 mg/dL    Anion Gap 9.0 0.0 - 11.9   ESTIMATED GFR    Collection Time: 02/04/18  3:03 AM   Result Value Ref Range    GFR If African American >60 >60 mL/min/1.73 m 2    GFR If Non African American >60 >60 mL/min/1.73 m 2    DIFFERENTIAL MANUAL    Collection Time: 02/04/18  3:03 AM   Result Value Ref Range    Bands-Stabs 0.90 0.00 - 10.00 %    Manual Diff Status PERFORMED    PERIPHERAL SMEAR REVIEW    Collection Time: 02/04/18  3:03 AM   Result Value Ref Range    Peripheral Smear Review see below    PLATELET ESTIMATE    Collection Time: 02/04/18  3:03 AM   Result Value Ref Range    Plt Estimation Normal    MORPHOLOGY    Collection Time: 02/04/18  3:03 AM   Result Value Ref Range    RBC Morphology Present    ACCU-CHEK GLUCOSE    Collection Time: 02/04/18  5:36 AM   Result Value Ref Range    Glucose - Accu-Ck 215 (H) 65 - 99 mg/dL   ACCU-CHEK GLUCOSE    Collection Time: 02/04/18 11:12 AM   Result Value Ref Range    Glucose - Accu-Ck 181 (H) 65 - 99 mg/dL     Medical Decision Making, by Problem:  Active Hospital Problems    Diagnosis   • Fungemia due to UTI [B49]     Priority: High   • Sepsis due to pyelonephritis / complicated UTI (CMS-HCC) [A41.9]     Priority: High   • Enterobacter cloacae UTI / Pyelonephritis [N12]     Priority: High   • Hypokalemia [E87.6]     Priority: Medium   • Urinary retention [R33.9]     Priority: Medium   • Open wound of left thigh [S71.102A]     Priority: Medium     Left posterior thigh wound s/p Wound debridement, 25x20 cm area 12/17     • History of DVT (deep vein thrombosis) [Z86.718]     Priority: Low   • Anemia of chronic disease [D63.8]     Priority: Low   • Crohn disease (CMS-HCC) [K50.90]     Priority: Low   • Type II diabetes mellitus (CMS-HCC) [E11.9]     Priority: Low   Hx bladder perforation s/p repair 12/10/17  Right hydronephrosis  Right scrotal mass      Plan:  Lasix renal scan to assess obstruction on right. If obstructed, will need NPT and later internalization with ureteral stent  YELITZA to follow up on prior imaging  Sepsis management per ID  Pacheco drainage    Quality Measures:    Reviewed items::  Labs reviewed, Medications reviewed and Radiology images reviewed  Pacheco catheter::  Urinary  Tract Retention or Urinary Tract Obstruction      Discussed patient condition with Patient and urology-Dr. Upton

## 2018-02-04 NOTE — PROGRESS NOTES
Slept in am. T=102.2 . Given tylenol.  Better in pm.  Up to walk in [place] and to BR.  Pain med for back and  Wound l thigh.  Picky about food. Dietician to talk with him.  Wound vac working well.

## 2018-02-04 NOTE — PROGRESS NOTES
"Received shift report from noc RN and assumed care of this pt at 0715. Pt AOx4, calm, sitting up for breakfast. Pt reports pain is 3/10 L buttock 2/2 wnd vac, declines intervention. Pt is up SBA +FWW. Does call appropriately. Bed alarm is off per pt refusal. PIV assessed and is patent, CDI, SL. Pt is on RA with SaO2 >90%. Pt states priority this shift is \"getting more food for breakfast,\" placed call to kitchen. Discussed POC for mobility, medications, day time routine, comfort, and safety. Patient has call light and personal belongings within reach. Safety and fall precautions in place. Reviewed orders, notes, labs, and test results. Hourly rounding in place with RN rounding on odd hours and CNA on even hours.   "

## 2018-02-04 NOTE — PROGRESS NOTES
[facility] Hospitalist Progress Note    Date of Service: 2/4/2018    Chief Complaint  67 y.o. male with Crohn's disease, type II diabetes mellitus, history of DVT, history of bladder rupture, bilateral nephrostomy tube with removal and ureteral stent placement, was just discharged yesterday, at that time was also felt to have UTI, but urine grew mixed skin anca and yeast. ID was consulted then, and was transitioned to oral ciprofloxacin and fluconazole to complete 14 days course. Admitted 1/30/2018 with fevers, chills, nausea and vomiting, along with increasing urinary frequency and bladder spasms. Labs showed no leukocytosis. BMP was unimpressive except for hyperglycemia, with normal creatinine. CT scan showed left ureteral stent in place, moderate right hydronephrosis and dilation of the right ureter, 5 mm nonobstructing lower pole right renal stone. Urinalysis showed packed WBCs, 100-150 RBC, with moderate leukocyte esterase. Felt to have sepsis due to UTI/pyelonephritis, and started on IV fluids, and IV cefepime. Low potassium replaced, given magnesium for low normal levels. Blood cultures remain negative. Continues to retain urine, kaur placed. Blood cultures ×1 bottle growing yeast, as well as the urine culture. [facility] growing Enterobacter cloacae resistant to cefepime and Zosyn. Antibiotic changed to meropenem and micafungin by ID. continued to spike fevers. Cardiology consulted for MAX. Repeat blood cultures negative.    Interval Problem Update  2/4/2018 - uneventful night. VSS. Afebrile. Saturating well on RA. Continues to spike fevers, T-max 38.9°C.  WBC 3.5, 0.9% bands. Sodium 133, potassium 3.3. Awaiting MAX tomorrow.    > Seen and examined. Anxious about MAX for tomorrow. Otherwise, no complaints. No CP, SOB, nausea, vomiting, abd pain, diarrhea. (+) hematuria.             Consultants/Specialty  ID  Cardiology  Urology    Disposition  Monitor on the floors.        ROS     Pertinent  positives/negatives as mentioned above.     A complete review of systems was done. All other systems were negative.      Physical Exam  Laboratory/Imaging   Hemodynamics  Temp (24hrs), Av.8 °C (100 °F), Min:36.6 °C (97.8 °F), Max:39 °C (102.2 °F)   Temperature: 36.9 °C (98.4 °F)  Pulse  Av  Min: 59  Max: 117    Blood Pressure : 102/60      Respiratory      Respiration: 17, Pulse Oximetry: 96 %        RUL Breath Sounds: Clear, RML Breath Sounds: Clear, RLL Breath Sounds: Diminished, FLORENCE Breath Sounds: Clear, LLL Breath Sounds: Diminished    Fluids    Intake/Output Summary (Last 24 hours) at 18 0658  Last data filed at 18   Gross per 24 hour   Intake             2400 ml   Output             1850 ml   Net              550 ml       Nutrition  Orders Placed This Encounter   Procedures   • Diet Order     Standing Status:   Standing     Number of Occurrences:   1     Order Specific Question:   Diet:     Answer:   Diabetic [3]   • DIET NPO     Standing Status:   Standing     Number of Occurrences:   8     Order Specific Question:   Restrict to:     Answer:   Sips with Medications [3]   • DIET NPO     Standing Status:   Standing     Number of Occurrences:   8     Order Specific Question:   Restrict to:     Answer:   Strict [1]     Physical Exam   Constitutional: He is oriented to person, place, and time. He appears well-developed. No distress.   HENT:   Head: Normocephalic and atraumatic.   Mouth/Throat: Oropharynx is clear and moist. No oropharyngeal exudate.   Eyes: EOM are normal. Pupils are equal, round, and reactive to light. Right eye exhibits no discharge. Left eye exhibits no discharge. No scleral icterus.   Neck: Normal range of motion. Neck supple. No thyromegaly present.   Cardiovascular: Normal rate and regular rhythm.  Exam reveals no gallop and no friction rub.    No murmur heard.  Pulmonary/Chest: Effort normal and breath sounds normal. He has no wheezes. He has no rales. He exhibits  no tenderness.   Abdominal: Soft. Bowel sounds are normal. There is no tenderness. There is no rebound and no guarding.   Genitourinary:   Genitourinary Comments: Pacheco catheter in place, with reddish urine   Musculoskeletal: Normal range of motion. He exhibits no edema or tenderness.   Wound VAC on the posterior left thigh, wound healing well   Lymphadenopathy:     He has no cervical adenopathy.   Neurological: He is alert and oriented to person, place, and time. No cranial nerve deficit. Coordination normal.   Skin: Skin is warm and dry. No rash noted. He is not diaphoretic. No erythema.   Psychiatric: His behavior is normal. Thought content normal.   anxious about MAX. AOx3.   Vitals reviewed.      Recent Labs      02/02/18 0312 02/03/18 0229 02/04/18   0303   WBC  2.6*  2.9*  3.5*   RBC  2.87*  3.12*  3.02*   HEMOGLOBIN  8.5*  9.3*  8.5*   HEMATOCRIT  26.4*  29.7*  27.9*   MCV  92.0  95.2  92.4   MCH  29.6  29.8  28.1   MCHC  32.2*  31.3*  30.5*   RDW  59.8*  62.8*  61.1*   PLATELETCT  220  215  205   MPV  9.7  10.2  9.8     Recent Labs      02/02/18 0312 02/03/18 0229 02/04/18   0303   SODIUM  131*  134*  133*   POTASSIUM  3.4*  3.9  3.3*   CHLORIDE  98  100  100   CO2  27  27  24   GLUCOSE  186*  160*  222*   BUN  8  9  9   CREATININE  0.47*  0.55  0.54   CALCIUM  7.9*  7.7*  7.6*                      Assessment/Plan     * Sepsis due to pyelonephritis / complicated UTI (CMS-[facility])- (present on admission)   Assessment & Plan    - This is sepsis (without associated acute organ dysfunction).  Source is pyelonephritis/complicated UTI. Continues to spike fevers. Rule out endocarditis.  - Continue gentle IV fluids, with PRN boluses per sepsis protocol. Continue close hemodynamic monitoring. Continue to trend WBC, as going down.   - MAX tomorrow, rule out endocarditis.  - Continue IV antibiotics for pyelonephritis and fungemia as below.         Fungemia due to UTI- (present on admission)   Assessment & Plan     - Continue micafungin IV.   - Awaiting MAX to rule out endocarditis, scheduled for tomorrow. Will need ophthalmology examination to rule out endophthalmitis. Repeat blood cultures negative so far, continued to monitor.  - Continue fever monitoring.        Enterobacter cloacae UTI / Pyelonephritis- (present on admission)   Assessment & Plan    -Still with intermittent fever spikes.  - Continue meropenem given resistant organisms [facility], and micafungin for yeast in urine and blood. ID on-board.         Urinary retention- (present on admission)   Assessment & Plan    - Likely related to UTI. Continue Flomax. Continue Pacheco catheter for now, urology following.        Hypokalemia   Assessment & Plan    - Increase potassium supplementation with K Dur 40 mg 2 twice a day. BMP in AM.        Open wound of left thigh- (present on admission)   Assessment & Plan    - Continue wound VAC, wound care and dressing changes. Wound service on board.        History of DVT (deep vein thrombosis)- (present on admission)   Assessment & Plan    - with hypercoagulable disorder, and multiple episodes of DVTs.  - Continue therapeutic Lovenox subcutaneous injections.        Anemia of chronic disease- (present on admission)   Assessment & Plan    - Continue to monitor Hgb, transfuse for hemoglobin <7.        Type II diabetes mellitus (CMS-HCC)- (present on admission)   Assessment & Plan    - Continue glipizide, continue holding metformin while in-house. Continue sliding scale insulin coverage while in-house. Continue Accu-Cheks before meals and at bedtime, and diabetic diet.        Crohn disease (CMS-HCC)- (present on admission)   Assessment & Plan    - Not in acute flare. Continue home dose mesalamine.              Reviewed items::  Labs reviewed, Medications reviewed and Radiology images reviewed  Pacheco catheter::  Urinary Tract Retention or Urinary Tract Obstruction  DVT prophylaxis pharmacological::  Enoxaparin  (Lovenox)  Ulcer Prophylaxis::  Not indicated  Antibiotics:  Treating active infection/contamination beyond 24 hours perioperative coverage

## 2018-02-04 NOTE — PROGRESS NOTES
Infectious Disease Progress Note    Author: Adrienne Li M.D. Date & Time of service: 2018  11:41 AM    Chief Complaint:  FU fungemia and pyelopnephritis    Interval History:   Tmax 103, WBC 3.2, kaur placed given retention, denies any abd pain  2/ Tmax 103, WBC 2.6, resting comfortably but having intermittent fevers and chills  2/3 Tmax 102.5, WBC 2.9, ongoing fevers and chills, pt very concerned about his illness, plan for MAX on Monday, blurry vision improved  / Tmax 102, WBC 3.5, Bcx Cglabrata, very anxious, states he may have kidney procedure today for possible R ureteral stent, MAX tmrw, no abd pain  Labs Reviewed, Medications Reviewed, Radiology Reviewed and Wound Reviewed.    Review of Systems:  Review of Systems   Constitutional: Positive for chills and fever.   Eyes: Negative for blurred vision and double vision.   Respiratory: Negative for cough and shortness of breath.    Gastrointestinal: Negative for abdominal pain, diarrhea, nausea and vomiting.   Genitourinary: Negative for dysuria.        Urinary retention   Musculoskeletal: Positive for myalgias.   Psychiatric/Behavioral: The patient is nervous/anxious.        Hemodynamics:  Temp (24hrs), Av.8 °C (100 °F), Min:36.4 °C (97.6 °F), Max:39 °C (102.2 °F)  Temperature:  (Q8 Vitals per RN)  Pulse  Av.8  Min: 59  Max: 117   Blood Pressure : 103/66       Physical Exam:  Physical Exam   Constitutional: He is oriented to person, place, and time. He appears well-developed.   Older than stated age  Chronically ill appearing   HENT:   Head: Normocephalic and atraumatic.   Eyes: EOM are normal. Pupils are equal, round, and reactive to light.   Neck: Neck supple.   Cardiovascular: Normal rate, regular rhythm and normal heart sounds.    Pulmonary/Chest: Effort normal. He has no wheezes. He has no rales.   Abdominal: Soft. There is no tenderness.   Genitourinary:   Genitourinary Comments: Kaur   Musculoskeletal:   Left posterior thigh  wound  vac   Neurological: He is alert and oriented to person, place, and time.   Skin: He is diaphoretic.       Meds:    Current Facility-Administered Medications:   •  potassium chloride SA  •  magnesium oxide  •  micafungin  •  meropenem (MERREM) IV  •  HYDROmorphone  •  oxyCODONE immediate-release **OR** oxyCODONE immediate-release  •  ibuprofen  •  acetaminophen  •  lidocaine  •  glipiZIDE  •  mercaptopurine  •  tamsulosin  •  Respiratory Care per Protocol  •  NS  •  ondansetron  •  ondansetron  •  insulin regular **AND** Accu-Chek ACHS **AND** NOTIFY MD and PharmD **AND** glucose 4 g **AND** dextrose 50%  •  NS  •  NS  •  oxybutynin  •  mesalamine delayed-release  •  enoxaparin    Labs:  Recent Labs      02/02/18 0312 02/03/18 0229 02/04/18   0303   WBC  2.6*  2.9*  3.5*   RBC  2.87*  3.12*  3.02*   HEMOGLOBIN  8.5*  9.3*  8.5*   HEMATOCRIT  26.4*  29.7*  27.9*   MCV  92.0  95.2  92.4   MCH  29.6  29.8  28.1   RDW  59.8*  62.8*  61.1*   PLATELETCT  220  215  205   MPV  9.7  10.2  9.8   NEUTSPOLYS  73.00*  66.70  81.60*   LYMPHOCYTES  9.60*  11.40*  7.00*   MONOCYTES  7.80  7.60  9.60   EOSINOPHILS  0.90  1.00  0.90   BASOPHILS  0.00  0.00  0.00   RBCMORPHOLO  Present  Present  Present     Recent Labs      02/02/18 0312 02/03/18 0229 02/04/18   0303   SODIUM  131*  134*  133*   POTASSIUM  3.4*  3.9  3.3*   CHLORIDE  98  100  100   CO2  27  27  24   GLUCOSE  186*  160*  222*   BUN  8  9  9     Recent Labs      02/02/18 0312 02/03/18 0229 02/04/18   0303   CREATININE  0.47*  0.55  0.54       Imaging:  Ct-abdomen-pelvis With    Result Date: 1/30/2018 1/30/2018 4:31 PM HISTORY/REASON FOR EXAM:  Pain. Pain and fever TECHNIQUE/EXAM DESCRIPTION:   CT scan of the abdomen and pelvis with contrast. Contrast-enhanced helical scanning was obtained from the diaphragmatic domes through the pubic symphysis following the bolus administration of nonionic contrast without complication. 100 mL of Omnipaque 350  nonionic contrast was administered without complication. Low dose optimization technique was utilized for this CT exam including automated exposure control and adjustment of the mA and/or kV according to patient size. COMPARISON: 12/12/2017 FINDINGS: CT Abdomen: Mild lung base atelectasis. No basilar pleural effusion. Multiple hepatic cysts measuring up to 1.4 cm in diameter are again demonstrated. The spleen is large measuring up to 14.8 cm diameter. No pancreatic or adrenal gland masses. Bilateral renal cysts measuring up to 9.3 cm on the right and 3.4 cm on the left again demonstrated. Left ureteral stent is demonstrated. Mild left pelvocaliectasis. There is moderate right hydronephrosis and dilatation of the right ureter. No distal ureteral stones identified. Lower pole 5 mm right renal stone again demonstrated. The gallbladder is partially distended. No calcified gallstones identified. No evidence of bowel obstruction. The appendix is not delineated. There are diverticula colon. No evidence of diverticulitis. Mild calcified plaque aorta. Subcentimeter short axis retroperitoneal lymph nodes. An IVC filter is located below the renal veins. CT Pelvis: The bladder is partially distended. The wall of the bladder is mildly thickened. There are prostate calcifications.     Left ureteral stent is in place. Mild left pelvocaliectasis. Moderate right hydronephrosis and dilatation of the right ureter. No distal right ureteral stone is identified. 5 mm nonobstructing lower pole right renal stone. Bilateral renal cysts again demonstrated. Hepatic cysts again demonstrated. Splenomegaly. Diverticula colon. No evidence diverticulitis. The appendix is not delineated. No free fluid.    Dx-chest-portable (1 View)    Result Date: 1/30/2018 1/30/2018 1:42 PM HISTORY/REASON FOR EXAM:  Fever and chills TECHNIQUE/EXAM DESCRIPTION AND NUMBER OF VIEWS: Single portable view of the chest. COMPARISON: 1/27/18 FINDINGS: No pulmonary  infiltrates or consolidations are noted. No pleural effusion. No pneumothorax. Stable cardiopericardial silhouette.     1. No acute cardiopulmonary abnormalities are identified.    Dx-chest-portable (1 View)    Result Date: 1/27/2018 1/27/2018 12:17 AM HISTORY/REASON FOR EXAM:  Possible sepsis TECHNIQUE/EXAM DESCRIPTION:  Single AP view of the chest. COMPARISON: December 26, 2017 FINDINGS: Overlying cardiac leads are present. The cardiac silhouette appears within normal limits. The mediastinal contour appears within normal limits.  The central pulmonary vasculature appears normal. The lungs appear well expanded bilaterally.  Bilateral lungs are clear. No significant pleural effusions are identified. The bony structures appear age-appropriate.     1.  No acute cardiopulmonary disease.    [id number]    Result Date: 1/27/2018 1/27/2018 2:27 AM HISTORY/REASON FOR EXAM: Pain. TECHNIQUE/EXAM DESCRIPTION: Real-time sonography of the scrotum was performed with gray-scale, color and duplex Doppler imaging. COMPARISON: November 12, 2017 FINDINGS: There is diffuse heterogeneous flow within the right testicle, which is new since prior study. Increased flow within the right testicle and epididymis is identified. The right testis measures 3.97 cm x 2.66 cm x 3.47 cm. 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 The right epididymis measures 13.3 x 17.6 mm. Large right hydrocele is seen. No varicocele is detected.     1.  Heterogeneous parenchyma of the right testicle with markedly increased flow of the right epididymis and testis, appearance favors epididymal orchitis. Recommend sonographic follow-up for reevaluation of the right testis in 7-14 days to exclude infiltrating testicular mass. 2.  Large right hydrocele. 3.  Surgical absence the left testicle    Ir-conv Perq Neph To Nephroureteral Cath (all Radiology) Left    Result Date: 1/29/2018 1/29/2018 9:28 AM Bilateral nephrostograms and bilateral nephrostomy  tube removals and left ureteral stent placement. HISTORY/REASON FOR EXAM: Ureteral obstruction. TECHNIQUE: Following informed consent patient prepped and draped in the usual fashion.  10 cc 1% lidocaine was utilized for local anesthesia. SEDATION: Conscious sedation with 150 mcg of Fentanyl and 5 mg of Versed was administered during the procedure with appropriate continuous patient monitoring by the radiology nurse. Sedation duration: 30 minutes. The procedure was performed using MAXIMAL STERILE BARRIER technique including sterile gown, mask, cap, and donning of sterile gloves following appropriate hand hygiene and/or sterile scrub. Patient skin site was prepped with 2% Chlorhexidine solution. CONTRAST: 45 mL of Omnipaque 300. FLUOROSCOPY: 4 fluoroscopic images obtained. 3.4 minutes. Omnipaque 300 was injected into the indwelling bilateral nephrostomy tubes and digital imaging was obtained over the kidneys and ureters. The right nephrostomy tube was subsequently removed. Next a guidewire was advanced into the left ureter and the indwelling left nephrostomy tube was removed.  A 5-Gibraltarian catheter was advanced into the bladder over the guidewire.  Next the guidewire was exchanged for an Amplatz guidewire which was coiled in  the bladder.  Next an 8-Gibraltarian 24-cm in length double-J ureteral stent was advanced over the guidewire and positioned with its distal loop in the bladder and proximal loop in the left renal collecting system.  The internal stiffener was removed and the guidewire was retracted and positioned in the left renal collecting system.  An 8.5-Gibraltarian locking loop catheter was also positioned in the left renal collecting system.   Omnipaque 300 was injected and digital imaging was obtained.  The patient tolerated procedure well and sent to the floor in good condition. FINDINGS: The initial right nephrostogram revealed excellent filling of the right renal collecting system with good positioning of the right  "nephrostomy tube. There was prompt flow of contrast down the right ureter into the bladder. There was no evidence of right-sided ureteral stricture or filling defect. The right nephrostomy tube was subsequently removed. The initial left-sided nephrostogram revealed a high-grade focal stricture at the left ureteral vesicular junction. There was delayed filling of the left ureter with no other areas of stricture formation or obstruction noted in the left ureter. Digital images demonstrate good positioning of the left-sided double-J ureteral stent in the renal collecting system and bladder.     1. Right-sided nephrostogram revealed normal-appearing right renal collecting system and right ureter with prompt flow of contrast into the bladder and no evidence of right-sided ureteral stricture or filling defect. The right-sided nephrostomy tube was subsequently removed. 2. Left-sided nephrostogram revealed a high-grade focal stricture at the left ureteral fascicular junction. The remainder of the left ureter has a normal appearance. 3. Successful percutaneous placement of left-sided double-J ureteral stent.      Micro:  Results     Procedure Component Value Units Date/Time    URINALYSIS [[id number]]     Order Status:  No result Specimen:  Urine from Urine, Pacheco Cath     BLOOD CULTURE [[id number]]  (Abnormal) Collected:  01/30/18 1435    Order Status:  Completed Specimen:  Blood from Peripheral Updated:  02/04/18 0701     Significant Indicator POS (POS)     Source BLD     Site PERIPHERAL     Blood Culture Growth detected by Bactec instrument.  01/31/2018  21:27 (A)     Blood Culture Candida glabrata (A)    Narrative:       CALL  Lisa  131 tel. [phone],  CALLED  131 tel. [phone] 01/31/2018, 21:30, RB PERF. RESULTS CALLED  TO:[phone].  Per Hospital Policy: Only change Specimen Src: to \"Line\" if  specified by physician order.    URINE CULTURE(NEW) [[id number]]  (Abnormal) Collected:  01/30/18 1553    Order Status:  " "Completed Specimen:  Urine Updated:  02/02/18 1600     Significant Indicator POS (POS)     Source UR     Site --     Urine Culture -- (A)     Growth noted after further incubation,see below for  organism identification.       Urine Culture -- (A)     Yeast  >100,000 cfu/mL       Urine Culture -- (A)     Candida albicans  <10,000 cfu/mL      Narrative:       Indication for culture:->Emergency Room Patient    BLOOD CULTURE [[id number]] Collected:  02/01/18 1636    Order Status:  Completed Specimen:  Blood from Peripheral Updated:  02/02/18 1153     Significant Indicator NEG     Source BLD     Site PERIPHERAL     Blood Culture --     No Growth    Note: Blood cultures are incubated for 5 days and  are monitored continuously.Positive blood cultures  are called to the RN and reported as soon as  they are identified.      Narrative:       Per Hospital Policy: Only change Specimen Src: to \"Line\" if  specified by physician order.    BLOOD CULTURE [[id number]] Collected:  02/01/18 1636    Order Status:  Completed Specimen:  Blood from Peripheral Updated:  02/02/18 1153     Significant Indicator NEG     Source BLD     Site PERIPHERAL     Blood Culture --     No Growth    Note: Blood cultures are incubated for 5 days and  are monitored continuously.Positive blood cultures  are called to the RN and reported as soon as  they are identified.      Narrative:       Per Hospital Policy: Only change Specimen Src: to \"Line\" if  specified by physician order.    BLOOD CULTURE [[id number]] Collected:  02/01/18 0928    Order Status:  Completed Specimen:  Blood from Peripheral Updated:  02/02/18 1153     Significant Indicator NEG     Source BLD     Site PERIPHERAL     Blood Culture --     No Growth    Note: Blood cultures are incubated for 5 days and  are monitored continuously.Positive blood cultures  are called to the RN and reported as soon as  they are identified.      Narrative:       Per Hospital Policy: Only change Specimen Src: to " "\"Line\" if  specified by physician order.    BLOOD CULTURE [[id number]] Collected:  02/01/18 0928    Order Status:  Completed Specimen:  Blood from Peripheral Updated:  02/02/18 1153     Significant Indicator NEG     Source BLD     Site PERIPHERAL     Blood Culture --     No Growth    Note: Blood cultures are incubated for 5 days and  are monitored continuously.Positive blood cultures  are called to the RN and reported as soon as  they are identified.      Narrative:       Per Hospital Policy: Only change Specimen Src: to \"Line\" if  specified by physician order.    BLOOD CULTURE [[id number]] Collected:  01/30/18 1230    Order Status:  Completed Specimen:  Blood from Peripheral Updated:  01/31/18 0914     Significant Indicator NEG     Source BLD     Site PERIPHERAL     Blood Culture --     No Growth    Note: Blood cultures are incubated for 5 days and  are monitored continuously.Positive blood cultures  are called to the RN and reported as soon as  they are identified.      Narrative:       Per Hospital Policy: Only change Specimen Src: to \"Line\" if  specified by physician order.    URINALYSIS [[id number]]  (Abnormal) Collected:  01/30/18 1553    Order Status:  Completed Specimen:  Urine Updated:  01/30/18 1627     Micro Urine Req Microscopic     Color Dark Yellow     Character Turbid (A)     Specific Gravity 1.017     Ph 5.0     Glucose Negative mg/dL      Ketones Trace (A) mg/dL      Protein 300 (A) mg/dL      Bilirubin Negative     Urobilinogen, Urine 0.2     Nitrite Negative     Leukocyte Esterase Moderate (A)     Occult Blood Large (A)    Narrative:       Indication for culture:->Emergency Room Patient    Blood Culture [[id number]] Collected:  01/30/18 0000    Order Status:  Canceled Specimen:  Other from Peripheral     Blood Culture [[id number]] Collected:  01/30/18 0000    Order Status:  Canceled Specimen:  Other from Peripheral     Urinalysis [[id number]] Collected:  01/30/18 0000    Order Status:  Canceled " Specimen:  Urine           Assessment:  Active Hospital Problems    Diagnosis   • *Sepsis due to pyelonephritis / complicated UTI (CMS-HCC) [A41.9]   • Hypokalemia [E87.6]   • Urinary retention [R33.9]   • Pyelonephritis [N12]   • Open wound of left thigh [S71.102A]   • History of DVT (deep vein thrombosis) [Z86.718]   • Anemia of chronic disease [D63.8]   • Crohn disease (CMS-HCC) [K50.90]   • Type II diabetes mellitus (CMS-HCC) [E11.9]       Plan:  Sepsis  2/2 Fungemia and pyelonephritis  Continues to be febrile  DC'd cefepime as isolate below R   Continue micafungin and meropenem    Fungemia - ongoing work up  Ucx - [place]  Bcx 1/30 (1/2) - Cglabrata  DC fluc  Continue micafungin pending ID of yeast  Repeat Bcx 2/1 - NGTD  TTE - neg  MAX to r/o endocarditis given persistent fevers - plan for Monday  Ophthy eval to r/o endophthalmitis if feasible    Pyelonephritis  Ucx yeast  Abx per above  [facility] - enterobacter cloacae (confirmed with [facility] on [facility]1/18) R to cefepime, zosyn. S to meropenem, cipro and bactrim  Pacheco in place    H/o Bladder rupture  S/p bilateral PCN removal on 1/29    DM2  HgA1c 6.7  Maintain BS less than 150 to control infection    Open left thigh wound  Not grossly infected  Wound vac and care    Discussed with internal medicine/Dr Weathers.

## 2018-02-05 ENCOUNTER — APPOINTMENT (OUTPATIENT)
Dept: RADIOLOGY | Facility: MEDICAL CENTER | Age: 68
DRG: 862 | End: 2018-02-05
Attending: PHYSICIAN ASSISTANT
Payer: MEDICARE

## 2018-02-05 LAB
ANION GAP SERPL CALC-SCNC: 7 MMOL/L (ref 0–11.9)
ANISOCYTOSIS BLD QL SMEAR: ABNORMAL
BACTERIA BLD CULT: ABNORMAL
BACTERIA BLD CULT: ABNORMAL
BASOPHILS # BLD AUTO: 0 % (ref 0–1.8)
BASOPHILS # BLD: 0 K/UL (ref 0–0.12)
BUN SERPL-MCNC: 10 MG/DL (ref 8–22)
CALCIUM SERPL-MCNC: 7.7 MG/DL (ref 8.5–10.5)
CHLORIDE SERPL-SCNC: 103 MMOL/L (ref 96–112)
CO2 SERPL-SCNC: 24 MMOL/L (ref 20–33)
CREAT SERPL-MCNC: 0.48 MG/DL (ref 0.5–1.4)
EOSINOPHIL # BLD AUTO: 0.03 K/UL (ref 0–0.51)
EOSINOPHIL NFR BLD: 0.9 % (ref 0–6.9)
ERYTHROCYTE [DISTWIDTH] IN BLOOD BY AUTOMATED COUNT: 62.4 FL (ref 35.9–50)
FUNGAL MIC INTERP  7292: NORMAL
GLUCOSE BLD-MCNC: 161 MG/DL (ref 65–99)
GLUCOSE BLD-MCNC: 204 MG/DL (ref 65–99)
GLUCOSE BLD-MCNC: 221 MG/DL (ref 65–99)
GLUCOSE BLD-MCNC: 270 MG/DL (ref 65–99)
GLUCOSE SERPL-MCNC: 232 MG/DL (ref 65–99)
HCT VFR BLD AUTO: 26.5 % (ref 42–52)
HGB BLD-MCNC: 8.2 G/DL (ref 14–18)
LYMPHOCYTES # BLD AUTO: 0.34 K/UL (ref 1–4.8)
LYMPHOCYTES NFR BLD: 10.6 % (ref 22–41)
MANUAL DIFF BLD: NORMAL
MCH RBC QN AUTO: 29.2 PG (ref 27–33)
MCHC RBC AUTO-ENTMCNC: 30.9 G/DL (ref 33.7–35.3)
MCV RBC AUTO: 94.3 FL (ref 81.4–97.8)
MICROCYTES BLD QL SMEAR: ABNORMAL
MONOCYTES # BLD AUTO: 0.23 K/UL (ref 0–0.85)
MONOCYTES NFR BLD AUTO: 7.1 % (ref 0–13.4)
MORPHOLOGY BLD-IMP: NORMAL
NEUTROPHILS # BLD AUTO: 2.6 K/UL (ref 1.82–7.42)
NEUTROPHILS NFR BLD: 77.9 % (ref 44–72)
NEUTS BAND NFR BLD MANUAL: 3.5 % (ref 0–10)
NRBC # BLD AUTO: 0 K/UL
NRBC BLD-RTO: 0 /100 WBC
PLATELET # BLD AUTO: 219 K/UL (ref 164–446)
PLATELET BLD QL SMEAR: NORMAL
PMV BLD AUTO: 10.1 FL (ref 9–12.9)
POTASSIUM SERPL-SCNC: 4 MMOL/L (ref 3.6–5.5)
RBC # BLD AUTO: 2.81 M/UL (ref 4.7–6.1)
RBC BLD AUTO: PRESENT
SIGNIFICANT IND 70042: ABNORMAL
SIGNIFICANT IND 70042: NORMAL
SITE SITE: ABNORMAL
SITE SITE: NORMAL
SODIUM SERPL-SCNC: 134 MMOL/L (ref 135–145)
SOURCE SOURCE: ABNORMAL
SOURCE SOURCE: NORMAL
WBC # BLD AUTO: 3.2 K/UL (ref 4.8–10.8)

## 2018-02-05 PROCEDURE — 85007 BL SMEAR W/DIFF WBC COUNT: CPT

## 2018-02-05 PROCEDURE — 82962 GLUCOSE BLOOD TEST: CPT | Mod: 91

## 2018-02-05 PROCEDURE — 700102 HCHG RX REV CODE 250 W/ 637 OVERRIDE(OP): Performed by: INTERNAL MEDICINE

## 2018-02-05 PROCEDURE — 306372 DRESSING,VAC SIMPLACE MED: Performed by: INTERNAL MEDICINE

## 2018-02-05 PROCEDURE — 97606 NEG PRS WND THER DME>50 SQCM: CPT

## 2018-02-05 PROCEDURE — A9270 NON-COVERED ITEM OR SERVICE: HCPCS | Performed by: INTERNAL MEDICINE

## 2018-02-05 PROCEDURE — 160002 HCHG RECOVERY MINUTES (STAT)

## 2018-02-05 PROCEDURE — 700102 HCHG RX REV CODE 250 W/ 637 OVERRIDE(OP): Performed by: HOSPITALIST

## 2018-02-05 PROCEDURE — B24BZZ4 ULTRASONOGRAPHY OF HEART WITH AORTA, TRANSESOPHAGEAL: ICD-10-PCS | Performed by: INTERNAL MEDICINE

## 2018-02-05 PROCEDURE — 700102 HCHG RX REV CODE 250 W/ 637 OVERRIDE(OP)

## 2018-02-05 PROCEDURE — 93320 DOPPLER ECHO COMPLETE: CPT

## 2018-02-05 PROCEDURE — 93312 ECHO TRANSESOPHAGEAL: CPT

## 2018-02-05 PROCEDURE — 78708 K FLOW/FUNCT IMAGE W/DRUG: CPT

## 2018-02-05 PROCEDURE — 36415 COLL VENOUS BLD VENIPUNCTURE: CPT

## 2018-02-05 PROCEDURE — 700111 HCHG RX REV CODE 636 W/ 250 OVERRIDE (IP): Performed by: HOSPITALIST

## 2018-02-05 PROCEDURE — 99233 SBSQ HOSP IP/OBS HIGH 50: CPT | Performed by: INTERNAL MEDICINE

## 2018-02-05 PROCEDURE — 80048 BASIC METABOLIC PNL TOTAL CA: CPT

## 2018-02-05 PROCEDURE — A9270 NON-COVERED ITEM OR SERVICE: HCPCS

## 2018-02-05 PROCEDURE — 85027 COMPLETE CBC AUTOMATED: CPT

## 2018-02-05 PROCEDURE — 700111 HCHG RX REV CODE 636 W/ 250 OVERRIDE (IP)

## 2018-02-05 PROCEDURE — 93325 DOPPLER ECHO COLOR FLOW MAPG: CPT

## 2018-02-05 PROCEDURE — 700105 HCHG RX REV CODE 258: Performed by: INTERNAL MEDICINE

## 2018-02-05 PROCEDURE — A9270 NON-COVERED ITEM OR SERVICE: HCPCS | Performed by: HOSPITALIST

## 2018-02-05 PROCEDURE — 700111 HCHG RX REV CODE 636 W/ 250 OVERRIDE (IP): Performed by: INTERNAL MEDICINE

## 2018-02-05 PROCEDURE — 770006 HCHG ROOM/CARE - MED/SURG/GYN SEMI*

## 2018-02-05 RX ORDER — FUROSEMIDE 10 MG/ML
INJECTION INTRAMUSCULAR; INTRAVENOUS
Status: COMPLETED
Start: 2018-02-05 | End: 2018-02-05

## 2018-02-05 RX ORDER — MIDAZOLAM HYDROCHLORIDE 1 MG/ML
INJECTION INTRAMUSCULAR; INTRAVENOUS
Status: COMPLETED
Start: 2018-02-05 | End: 2018-02-05

## 2018-02-05 RX ADMIN — SODIUM CHLORIDE: 9 INJECTION, SOLUTION INTRAVENOUS at 14:05

## 2018-02-05 RX ADMIN — OXYBUTYNIN CHLORIDE 5 MG: 5 TABLET ORAL at 09:40

## 2018-02-05 RX ADMIN — TAMSULOSIN HYDROCHLORIDE 0.8 MG: 0.4 CAPSULE ORAL at 09:40

## 2018-02-05 RX ADMIN — POTASSIUM CHLORIDE 40 MEQ: 1500 TABLET, EXTENDED RELEASE ORAL at 09:39

## 2018-02-05 RX ADMIN — MERCAPTOPURINE 150 MG: 50 TABLET ORAL at 09:40

## 2018-02-05 RX ADMIN — MESALAMINE 400 MG: 400 CAPSULE, DELAYED RELEASE ORAL at 21:02

## 2018-02-05 RX ADMIN — MESALAMINE 400 MG: 400 CAPSULE, DELAYED RELEASE ORAL at 09:39

## 2018-02-05 RX ADMIN — MICAFUNGIN SODIUM 100 MG: 20 INJECTION, POWDER, LYOPHILIZED, FOR SOLUTION INTRAVENOUS at 09:39

## 2018-02-05 RX ADMIN — MESALAMINE 400 MG: 400 CAPSULE, DELAYED RELEASE ORAL at 14:05

## 2018-02-05 RX ADMIN — ENOXAPARIN SODIUM 80 MG: 100 INJECTION SUBCUTANEOUS at 06:17

## 2018-02-05 RX ADMIN — MEROPENEM 500 MG: 500 INJECTION, POWDER, FOR SOLUTION INTRAVENOUS at 16:57

## 2018-02-05 RX ADMIN — OXYBUTYNIN CHLORIDE 5 MG: 5 TABLET ORAL at 21:01

## 2018-02-05 RX ADMIN — ENOXAPARIN SODIUM 80 MG: 100 INJECTION SUBCUTANEOUS at 21:02

## 2018-02-05 RX ADMIN — OXYCODONE HYDROCHLORIDE 10 MG: 10 TABLET ORAL at 16:57

## 2018-02-05 RX ADMIN — MAGNESIUM GLUCONATE 500 MG ORAL TABLET 400 MG: 500 TABLET ORAL at 09:40

## 2018-02-05 RX ADMIN — MEROPENEM 500 MG: 500 INJECTION, POWDER, FOR SOLUTION INTRAVENOUS at 06:17

## 2018-02-05 RX ADMIN — INSULIN HUMAN 2 UNITS: 100 INJECTION, SOLUTION PARENTERAL at 21:10

## 2018-02-05 RX ADMIN — MEROPENEM 500 MG: 500 INJECTION, POWDER, FOR SOLUTION INTRAVENOUS at 11:13

## 2018-02-05 RX ADMIN — FUROSEMIDE 20 MG: 10 INJECTION, SOLUTION INTRAMUSCULAR; INTRAVENOUS at 12:25

## 2018-02-05 RX ADMIN — INSULIN HUMAN 3 UNITS: 100 INJECTION, SOLUTION PARENTERAL at 11:19

## 2018-02-05 RX ADMIN — IBUPROFEN 400 MG: 400 TABLET ORAL at 16:56

## 2018-02-05 RX ADMIN — POTASSIUM CHLORIDE 40 MEQ: 1500 TABLET, EXTENDED RELEASE ORAL at 21:01

## 2018-02-05 RX ADMIN — GLIPIZIDE 5 MG: 5 TABLET ORAL at 09:40

## 2018-02-05 ASSESSMENT — PAIN SCALES - GENERAL
PAINLEVEL_OUTOF10: 0
PAINLEVEL_OUTOF10: 4
PAINLEVEL_OUTOF10: 0
PAINLEVEL_OUTOF10: 2
PAINLEVEL_OUTOF10: 0
PAINLEVEL_OUTOF10: 6
PAINLEVEL_OUTOF10: 0
PAINLEVEL_OUTOF10: 6
PAINLEVEL_OUTOF10: 0
PAINLEVEL_OUTOF10: 0

## 2018-02-05 NOTE — PROGRESS NOTES
"Urology Progress Note        Interval Events: Pt Nuclear medicine study -lasix renal scan pending from today  S: No fevers, chills, nausea or vomiting.  + flatus.    O:   Blood pressure 128/77, pulse 81, temperature 37.7 °C (99.8 °F), resp. rate 17, height 1.854 m (6' 0.99\"), weight 76.1 kg (167 lb 12.3 oz), SpO2 98 %.  Recent Labs      02/03/18 0229 02/04/18 0303 02/05/18 0229   SODIUM  134*  133*  134*   POTASSIUM  3.9  3.3*  4.0   CHLORIDE  100  100  103   CO2  27  24  24   GLUCOSE  160*  222*  232*   BUN  9  9  10   CREATININE  0.55  0.54  0.48*   CALCIUM  7.7*  7.6*  7.7*     Recent Labs      02/03/18 0229 02/04/18 0303 02/05/18 0229   WBC  2.9*  3.5*  3.2*   RBC  3.12*  3.02*  2.81*   HEMOGLOBIN  9.3*  8.5*  8.2*   HEMATOCRIT  29.7*  27.9*  26.5*   MCV  95.2  92.4  94.3   MCH  29.8  28.1  29.2   MCHC  31.3*  30.5*  30.9*   RDW  62.8*  61.1*  62.4*   PLATELETCT  215  205  219   MPV  10.2  9.8  10.1         Intake/Output Summary (Last 24 hours) at 02/05/18 1430  Last data filed at 02/05/18 0945   Gross per 24 hour   Intake              480 ml   Output             1575 ml   Net            -1095 ml     Physical Exam   Constitutional: He is oriented to person, place, and time. No distress.   Pulmonary/Chest: Effort normal.   Abdominal: Soft. He exhibits no distension. There is no tenderness. There is no rebound and no guarding.   Genitourinary:   Genitourinary Comments: Pacheco  draining brownish urine. Did not assess scrotum due to pt friends in room - privacy reasons.    Neurological: He is alert and oriented to person, place, and time.   Skin: Skin is warm and dry.   Psychiatric: He has a normal mood and affect. His behavior is normal.   Nursing note and vitals reviewed.    A/P:    Active Hospital Problems    Diagnosis   • Fungemia due to UTI [B49]     Priority: High   • Sepsis due to pyelonephritis / complicated UTI (CMS-[facility]) [A41.9]     Priority: High   • Enterobacter cloacae and Candida glabrata " UTI / Pyelonephritis [N12]     Priority: High   • Hypokalemia [E87.6]     Priority: Medium   • Urinary retention [R33.9]     Priority: Medium   • Open wound of left thigh [S71.102A]     Priority: Medium     Left posterior thigh wound s/p Wound debridement, 25x20 cm area 12/17     • History of DVT (deep vein thrombosis) [Z86.718]     Priority: Low   • Anemia of chronic disease [D63.8]     Priority: Low   • Crohn disease (CMS-HCC) [K50.90]     Priority: Low   • Type II diabetes mellitus (CMS-HCC) [E11.9]     Priority: Low     Plan:  Lasix renal scan to assess obstruction on right shows good flow and no obstruction.  Scrotal US  Pending and will follow tomorrow.   ID to manage sepsis  Monitor kaur drainage     Quality Measures:    Reviewed items::  Labs reviewed, Medications reviewed and Radiology images reviewed  Kaur catheter::  Urinary Tract Retention or Urinary Tract Obstruction  Discussed patient condition with pt and urology Dr. Won Diane, A.P.R.N.

## 2018-02-05 NOTE — PROGRESS NOTES
Assumed care of patient following shift report. Patient is A&Ox4. He is on room air at this time. VSS. Afebrile. Wound vac to left posterior thigh. Plan of care and safety precautions reviewed. Call device is within reach. Will continue to monitor.

## 2018-02-05 NOTE — OR NURSING
0824   PATIENT RECEIVED FROM CATH LAB,  S/P MAX UNDER ANESTHESIA.  PATIENT IS A/A/OX4.  DENIES ANY PAIN.    0915  REPORT GIVEN TO SRI CHAVEZ ON T-3.  PATIENT [facility]SP[facility] [place],  ON 2L NC,  NO MONITOR,  ESCORTED BY TRANSPORT.

## 2018-02-05 NOTE — CARE PLAN
"Problem: Psychosocial Needs:  Goal: Level of anxiety will decrease  Outcome: PROGRESSING AS EXPECTED  Pt w generalized anxiety, verbally aggressive (When assisting to place a blanket in the pt he stated \"Put the god damn blanket all the way to me feet, f**k\") and demanding toward staff; Set boundaries w pt, requesting he not cuss at staff and refrain from offensive comments (pt remarked to EVS \"Go figure, a [place]\"; Encouraged pt to verbalize needs and requests; Hourly rounding in place + responding to call light ~30min - Discussed w pt to plan and anticipate needs (extra blankets, questions/concerns, snacks, fluids) so that staff may address upon hourly rounding    Charge RN in to speak w pt reg pt and staff complaints    "

## 2018-02-05 NOTE — PROGRESS NOTES
Received shift report from prabhjot RN and assumed care of this pt at 0715. Pt AOx4, calm, sitting in chair. Pt reports pain is 2/10 to LLE, declines intervention. Pt is up self, steady. Does call appropriately. Bed alarm is off per pt refusal. PIV assessed and is patent, CDI, running IVF. Pt is on RA with SaO2 >90%. Pt states priority this shift is MAX and NM dx today. Discussed POC for diagnostics, mobility, medications, diet, day time routine, comfort, and safety. Patient has call light and personal belongings within reach. Safety and fall precautions in place. Reviewed orders, notes, labs, and test results. Hourly rounding in place with RN rounding on odd hours and CNA on even hours.

## 2018-02-05 NOTE — CARE PLAN
Problem: Pain Management  Goal: Pain level will decrease to patient's comfort goal  Outcome: PROGRESSING AS EXPECTED  Patient pain is managed with PRN pain medications.     Problem: Mobility  Goal: Risk for activity intolerance will decrease  Outcome: PROGRESSING AS EXPECTED  Patient ambulates halls with staff assist. Patient encouraged to continue ambulation and be up to chair throughout day.

## 2018-02-05 NOTE — PROGRESS NOTES
"Patient wound vac detected a leak in seal. RN attempted multiple tiems to cover with Tegaderm. Leak still persists. Per protocol, RN is to removed wound vac dressing and apply wet-to-dry. See flowsheet documentation under \"Wounds.\"   "

## 2018-02-06 LAB
ANION GAP SERPL CALC-SCNC: 7 MMOL/L (ref 0–11.9)
BACTERIA BLD CULT: NORMAL
BASOPHILS # BLD AUTO: 0.8 % (ref 0–1.8)
BASOPHILS # BLD: 0.03 K/UL (ref 0–0.12)
BUN SERPL-MCNC: 7 MG/DL (ref 8–22)
CALCIUM SERPL-MCNC: 8.2 MG/DL (ref 8.5–10.5)
CHLORIDE SERPL-SCNC: 102 MMOL/L (ref 96–112)
CO2 SERPL-SCNC: 28 MMOL/L (ref 20–33)
CREAT SERPL-MCNC: 0.39 MG/DL (ref 0.5–1.4)
EOSINOPHIL # BLD AUTO: 0.1 K/UL (ref 0–0.51)
EOSINOPHIL NFR BLD: 2.6 % (ref 0–6.9)
ERYTHROCYTE [DISTWIDTH] IN BLOOD BY AUTOMATED COUNT: 62.2 FL (ref 35.9–50)
GLUCOSE BLD-MCNC: 217 MG/DL (ref 65–99)
GLUCOSE BLD-MCNC: 245 MG/DL (ref 65–99)
GLUCOSE BLD-MCNC: 267 MG/DL (ref 65–99)
GLUCOSE BLD-MCNC: 279 MG/DL (ref 65–99)
GLUCOSE BLD-MCNC: 283 MG/DL (ref 65–99)
GLUCOSE SERPL-MCNC: 203 MG/DL (ref 65–99)
HCT VFR BLD AUTO: 29.3 % (ref 42–52)
HGB BLD-MCNC: 8.9 G/DL (ref 14–18)
IMM GRANULOCYTES # BLD AUTO: 0.02 K/UL (ref 0–0.11)
IMM GRANULOCYTES NFR BLD AUTO: 0.5 % (ref 0–0.9)
LYMPHOCYTES # BLD AUTO: 0.41 K/UL (ref 1–4.8)
LYMPHOCYTES NFR BLD: 10.6 % (ref 22–41)
MCH RBC QN AUTO: 28.2 PG (ref 27–33)
MCHC RBC AUTO-ENTMCNC: 30.4 G/DL (ref 33.7–35.3)
MCV RBC AUTO: 92.7 FL (ref 81.4–97.8)
MONOCYTES # BLD AUTO: 0.3 K/UL (ref 0–0.85)
MONOCYTES NFR BLD AUTO: 7.8 % (ref 0–13.4)
NEUTROPHILS # BLD AUTO: 3 K/UL (ref 1.82–7.42)
NEUTROPHILS NFR BLD: 77.7 % (ref 44–72)
NRBC # BLD AUTO: 0 K/UL
NRBC BLD-RTO: 0 /100 WBC
PLATELET # BLD AUTO: 227 K/UL (ref 164–446)
PMV BLD AUTO: 9.8 FL (ref 9–12.9)
POTASSIUM SERPL-SCNC: 3.7 MMOL/L (ref 3.6–5.5)
RBC # BLD AUTO: 3.16 M/UL (ref 4.7–6.1)
SIGNIFICANT IND 70042: NORMAL
SITE SITE: NORMAL
SODIUM SERPL-SCNC: 137 MMOL/L (ref 135–145)
SOURCE SOURCE: NORMAL
WBC # BLD AUTO: 3.9 K/UL (ref 4.8–10.8)

## 2018-02-06 PROCEDURE — 82962 GLUCOSE BLOOD TEST: CPT | Mod: 91

## 2018-02-06 PROCEDURE — 87040 BLOOD CULTURE FOR BACTERIA: CPT | Mod: 91

## 2018-02-06 PROCEDURE — 700102 HCHG RX REV CODE 250 W/ 637 OVERRIDE(OP): Performed by: INTERNAL MEDICINE

## 2018-02-06 PROCEDURE — 700102 HCHG RX REV CODE 250 W/ 637 OVERRIDE(OP)

## 2018-02-06 PROCEDURE — 770006 HCHG ROOM/CARE - MED/SURG/GYN SEMI*

## 2018-02-06 PROCEDURE — 700102 HCHG RX REV CODE 250 W/ 637 OVERRIDE(OP): Performed by: NURSE PRACTITIONER

## 2018-02-06 PROCEDURE — 700111 HCHG RX REV CODE 636 W/ 250 OVERRIDE (IP): Performed by: HOSPITALIST

## 2018-02-06 PROCEDURE — 99233 SBSQ HOSP IP/OBS HIGH 50: CPT | Performed by: INTERNAL MEDICINE

## 2018-02-06 PROCEDURE — A9270 NON-COVERED ITEM OR SERVICE: HCPCS | Performed by: STUDENT IN AN ORGANIZED HEALTH CARE EDUCATION/TRAINING PROGRAM

## 2018-02-06 PROCEDURE — 80048 BASIC METABOLIC PNL TOTAL CA: CPT

## 2018-02-06 PROCEDURE — 700105 HCHG RX REV CODE 258: Performed by: INTERNAL MEDICINE

## 2018-02-06 PROCEDURE — 700102 HCHG RX REV CODE 250 W/ 637 OVERRIDE(OP): Performed by: HOSPITALIST

## 2018-02-06 PROCEDURE — 85025 COMPLETE CBC W/AUTO DIFF WBC: CPT

## 2018-02-06 PROCEDURE — A9270 NON-COVERED ITEM OR SERVICE: HCPCS | Performed by: INTERNAL MEDICINE

## 2018-02-06 PROCEDURE — 700102 HCHG RX REV CODE 250 W/ 637 OVERRIDE(OP): Performed by: STUDENT IN AN ORGANIZED HEALTH CARE EDUCATION/TRAINING PROGRAM

## 2018-02-06 PROCEDURE — A9270 NON-COVERED ITEM OR SERVICE: HCPCS | Performed by: HOSPITALIST

## 2018-02-06 PROCEDURE — 306015 LOCK STAT FOLEY: Performed by: HOSPITALIST

## 2018-02-06 PROCEDURE — 700111 HCHG RX REV CODE 636 W/ 250 OVERRIDE (IP): Performed by: INTERNAL MEDICINE

## 2018-02-06 PROCEDURE — A9270 NON-COVERED ITEM OR SERVICE: HCPCS | Performed by: NURSE PRACTITIONER

## 2018-02-06 PROCEDURE — A9270 NON-COVERED ITEM OR SERVICE: HCPCS

## 2018-02-06 PROCEDURE — 36415 COLL VENOUS BLD VENIPUNCTURE: CPT

## 2018-02-06 RX ORDER — FINASTERIDE 5 MG/1
5 TABLET, FILM COATED ORAL DAILY
Status: DISCONTINUED | OUTPATIENT
Start: 2018-02-06 | End: 2018-02-12 | Stop reason: HOSPADM

## 2018-02-06 RX ORDER — LORAZEPAM 2 MG/ML
1 INJECTION INTRAMUSCULAR EVERY 4 HOURS PRN
Status: DISCONTINUED | OUTPATIENT
Start: 2018-02-06 | End: 2018-02-12 | Stop reason: HOSPADM

## 2018-02-06 RX ORDER — FLUCONAZOLE 200 MG/1
400 TABLET ORAL DAILY
Status: DISCONTINUED | OUTPATIENT
Start: 2018-02-06 | End: 2018-02-12 | Stop reason: HOSPADM

## 2018-02-06 RX ORDER — ALBUTEROL SULFATE 90 UG/1
2 AEROSOL, METERED RESPIRATORY (INHALATION)
Status: DISCONTINUED | OUTPATIENT
Start: 2018-02-06 | End: 2018-02-12 | Stop reason: HOSPADM

## 2018-02-06 RX ORDER — FAMOTIDINE 20 MG/1
20 TABLET, FILM COATED ORAL 2 TIMES DAILY
Status: DISCONTINUED | OUTPATIENT
Start: 2018-02-06 | End: 2018-02-12 | Stop reason: HOSPADM

## 2018-02-06 RX ADMIN — INSULIN HUMAN 3 UNITS: 100 INJECTION, SOLUTION PARENTERAL at 20:45

## 2018-02-06 RX ADMIN — INSULIN HUMAN 3 UNITS: 100 INJECTION, SOLUTION PARENTERAL at 12:26

## 2018-02-06 RX ADMIN — MEROPENEM 500 MG: 500 INJECTION, POWDER, FOR SOLUTION INTRAVENOUS at 23:50

## 2018-02-06 RX ADMIN — POTASSIUM CHLORIDE 40 MEQ: 1500 TABLET, EXTENDED RELEASE ORAL at 20:36

## 2018-02-06 RX ADMIN — SODIUM CHLORIDE: 9 INJECTION, SOLUTION INTRAVENOUS at 12:26

## 2018-02-06 RX ADMIN — MERCAPTOPURINE 150 MG: 50 TABLET ORAL at 08:35

## 2018-02-06 RX ADMIN — FINASTERIDE 5 MG: 5 TABLET, FILM COATED ORAL at 18:56

## 2018-02-06 RX ADMIN — ENOXAPARIN SODIUM 80 MG: 100 INJECTION SUBCUTANEOUS at 20:36

## 2018-02-06 RX ADMIN — FLUCONAZOLE 400 MG: 200 TABLET ORAL at 15:28

## 2018-02-06 RX ADMIN — MICAFUNGIN SODIUM 100 MG: 20 INJECTION, POWDER, LYOPHILIZED, FOR SOLUTION INTRAVENOUS at 08:34

## 2018-02-06 RX ADMIN — MEROPENEM 500 MG: 500 INJECTION, POWDER, FOR SOLUTION INTRAVENOUS at 18:52

## 2018-02-06 RX ADMIN — TAMSULOSIN HYDROCHLORIDE 0.8 MG: 0.4 CAPSULE ORAL at 08:24

## 2018-02-06 RX ADMIN — MEROPENEM 500 MG: 500 INJECTION, POWDER, FOR SOLUTION INTRAVENOUS at 00:37

## 2018-02-06 RX ADMIN — MAGNESIUM GLUCONATE 500 MG ORAL TABLET 400 MG: 500 TABLET ORAL at 08:27

## 2018-02-06 RX ADMIN — POTASSIUM CHLORIDE 40 MEQ: 1500 TABLET, EXTENDED RELEASE ORAL at 08:26

## 2018-02-06 RX ADMIN — MEROPENEM 500 MG: 500 INJECTION, POWDER, FOR SOLUTION INTRAVENOUS at 12:26

## 2018-02-06 RX ADMIN — ENOXAPARIN SODIUM 80 MG: 100 INJECTION SUBCUTANEOUS at 05:58

## 2018-02-06 RX ADMIN — MESALAMINE 400 MG: 400 CAPSULE, DELAYED RELEASE ORAL at 08:35

## 2018-02-06 RX ADMIN — MEROPENEM 500 MG: 500 INJECTION, POWDER, FOR SOLUTION INTRAVENOUS at 05:57

## 2018-02-06 RX ADMIN — INSULIN HUMAN 2 UNITS: 100 INJECTION, SOLUTION PARENTERAL at 01:34

## 2018-02-06 RX ADMIN — OXYBUTYNIN CHLORIDE 5 MG: 5 TABLET ORAL at 08:25

## 2018-02-06 RX ADMIN — FAMOTIDINE 20 MG: 20 TABLET, FILM COATED ORAL at 04:48

## 2018-02-06 RX ADMIN — INSULIN HUMAN 3 UNITS: 100 INJECTION, SOLUTION PARENTERAL at 06:09

## 2018-02-06 RX ADMIN — INSULIN HUMAN 2 UNITS: 100 INJECTION, SOLUTION PARENTERAL at 18:58

## 2018-02-06 RX ADMIN — MESALAMINE 400 MG: 400 CAPSULE, DELAYED RELEASE ORAL at 20:36

## 2018-02-06 RX ADMIN — OXYCODONE HYDROCHLORIDE 10 MG: 10 TABLET ORAL at 08:26

## 2018-02-06 RX ADMIN — MESALAMINE 400 MG: 400 CAPSULE, DELAYED RELEASE ORAL at 15:27

## 2018-02-06 ASSESSMENT — ENCOUNTER SYMPTOMS
HEADACHES: 0
DIAPHORESIS: 0
DEPRESSION: 0
FLANK PAIN: 0
ABDOMINAL PAIN: 0
WEAKNESS: 1
BLURRED VISION: 0
NERVOUS/ANXIOUS: 1
DIZZINESS: 0
MEMORY LOSS: 1
VOMITING: 0
DOUBLE VISION: 0
PALPITATIONS: 0
SHORTNESS OF BREATH: 0
DIARRHEA: 0
BACK PAIN: 0
MYALGIAS: 1
FEVER: 0
COUGH: 0
CHILLS: 1
FOCAL WEAKNESS: 0
NAUSEA: 0

## 2018-02-06 NOTE — PROGRESS NOTES
[facility] Hospitalist Progress Note    Date of Service: 2/6/2018    Chief Complaint  67 y.o. male with Crohn's disease, type II diabetes mellitus, history of DVT, history of bladder rupture, bilateral nephrostomy tube with removal and ureteral stent placement, was just discharged yesterday, at that time was also felt to have UTI, but urine grew mixed skin anca and yeast. ID was consulted then, and was transitioned to oral ciprofloxacin and fluconazole to complete 14 days course. Admitted 1/30/2018 with fevers, chills, nausea and vomiting, along with increasing urinary frequency and bladder spasms. Labs showed no leukocytosis. BMP was unimpressive except for hyperglycemia, with normal creatinine. CT scan showed left ureteral stent in place, moderate right hydronephrosis and dilation of the right ureter, 5 mm nonobstructing lower pole right renal stone. Urinalysis showed packed WBCs, 100-150 RBC, with moderate leukocyte esterase. Felt to have sepsis due to UTI/pyelonephritis, and started on IV fluids, and IV cefepime. Low potassium replaced, given magnesium for low normal levels.Continues to retain urine, kaur placed. Blood cultures ×1 bottle growing Candida glabrata, as well as the urine culture. [facility] growing Enterobacter cloacae resistant to cefepime and Zosyn. Antibiotic changed to meropenem and micafungin by ID. continued to spike fevers. Cardiology consulted for MAX. Repeat blood cultures negative.    Interval Problem Update  Per patient's wife, she was wheezing this a.m. with cough  Now on room air oxygenation  Denies flank pain  Very anxious and repetitive  Continues to state that he has issues that he needs to take care of at home  Wife is at bedside. Providing history  Plan of care discussed  Continue Kaur catheter use  Discussed with wound care, wound VAC has been removed        Consultants/Specialty  ID  Cardiology  Urology    Disposition  TBD        Review of Systems   Constitutional:  Positive for chills. Negative for diaphoresis, fever and malaise/fatigue.   HENT: Negative for congestion and hearing loss.    Respiratory: Negative for cough and shortness of breath.    Cardiovascular: Negative for chest pain, palpitations and leg swelling.   Gastrointestinal: Negative for abdominal pain and nausea.   Genitourinary: Negative for dysuria, flank pain and urgency.   Musculoskeletal: Positive for myalgias. Negative for back pain and joint pain.   Neurological: Positive for weakness. Negative for dizziness, focal weakness and headaches.   Psychiatric/Behavioral: Positive for memory loss. Negative for depression. The patient is nervous/anxious.         Pertinent positives/negatives as mentioned above.     A complete review of systems was done. All other systems were negative.      Physical Exam  Laboratory/Imaging   Hemodynamics  Temp (24hrs), Av.7 °C (98.1 °F), Min:36.4 °C (97.5 °F), Max:36.9 °C (98.5 °F)   Temperature: 36.9 °C (98.5 °F)  Pulse  Av.8  Min: 59  Max: 117    Blood Pressure : 142/83      Respiratory      Respiration: 17, Pulse Oximetry: 95 %        RUL Breath Sounds: Clear, RML Breath Sounds: Clear, RLL Breath Sounds: Diminished, FLORENCE Breath Sounds: Clear, LLL Breath Sounds: Diminished    Fluids    Intake/Output Summary (Last 24 hours) at 18 1515  Last data filed at 18 1100   Gross per 24 hour   Intake                0 ml   Output             4200 ml   Net            -4200 ml       Nutrition  Orders Placed This Encounter   Procedures   • DIET ORDER     Standing Status:   Standing     Number of Occurrences:   1     Order Specific Question:   Diet:     Answer:   Regular [1]     Physical Exam   Constitutional: He is oriented to person, place, and time. He appears well-developed. No distress.   Thin   HENT:   Head: Normocephalic and atraumatic.   Mouth/Throat: No oropharyngeal exudate.   Eyes: EOM are normal. Pupils are equal, round, and reactive to light. No scleral  icterus.   Neck: Normal range of motion. Neck supple.   Cardiovascular: Normal rate, regular rhythm and intact distal pulses.    No murmur heard.  Pulmonary/Chest: Effort normal. No respiratory distress. He has no wheezes. He has no rales.   Abdominal: Soft. Bowel sounds are normal. He exhibits no distension. There is no tenderness.   Genitourinary:   Genitourinary Comments: Pacheco catheter in place, clear urine.   Musculoskeletal: Normal range of motion. He exhibits no edema or tenderness.   Neurological: He is alert and oriented to person, place, and time. Coordination normal.   Skin: Skin is warm and dry.   Wound VAC on posterior left thigh.    Psychiatric: He has a normal mood and affect. His behavior is normal. Thought content normal.   anxious, AOx2, conversant. Wife at bedside.    Vitals reviewed.            Recent Labs      02/04/18   0303  02/05/18   0229  02/06/18   0320   WBC  3.5*  3.2*  3.9*   RBC  3.02*  2.81*  3.16*   HEMOGLOBIN  8.5*  8.2*  8.9*   HEMATOCRIT  27.9*  26.5*  29.3*   MCV  92.4  94.3  92.7   MCH  28.1  29.2  28.2   MCHC  30.5*  30.9*  30.4*   RDW  61.1*  62.4*  62.2*   PLATELETCT  205  219  227   MPV  9.8  10.1  9.8     Recent Labs      02/04/18   0303  02/05/18 0229 02/06/18   0320   SODIUM  133*  134*  137   POTASSIUM  3.3*  4.0  3.7   CHLORIDE  100  103  102   CO2  24  24  28   GLUCOSE  222*  232*  203*   BUN  9  10  7*   CREATININE  0.54  0.48*  0.39*   CALCIUM  7.6*  7.7*  8.2*                      Assessment/Plan     * Sepsis due to pyelonephritis / complicated UTI (CMS-[facility])- (present on admission)   Assessment & Plan    - This is sepsis (without associated acute organ dysfunction).  Source is pyelonephritis/complicated UTI. Rule out endocarditis. Fever has resolved.  - Continue gentle IV fluids with NS at 75cc/hr. Continue PRN boluses per sepsis protocol. Continue close hemodynamic monitoring. Continue to trend WBC.   - Await MAX.  - Continue IV antibiotics for pyelonephritis  and fungemia as below.         Fungemia due to UTI- (present on admission)   Assessment & Plan    - Continue micafungin IV.   - Follow-up MAX, report pending,  -Patient also has an IVC filter, we'll discuss with infectious disease for further recommendations   today to rule out endocarditis.   - ? Will need ophthalmology examination to rule out endophthalmitis. Repeat blood cultures negative so far, continue to follow.   - Continue to monitor for fever., Positive ongoing chills  f/u labs  ID assisting        Enterobacter cloacae and Candida glabrata UTI / Pyelonephritis- (present on admission)   Assessment & Plan    - No further fevers.   - Continue meropenem and micafungin. ID on board, guiding antibiotics. Await inputs regarding final antibiotic and duration recommendations.        Urinary retention- (present on admission)   Assessment & Plan    - Likely related to UTI. Continue Flomax. Continue Pacheco catheter for now, urology following.  - s/p:lasix renal scan to rule-out ongoing obstruction: No obstruction          Hypokalemia   Assessment & Plan    - K WNL today. Continue PO K Dur 40 mg BID. Repeat K levels in AM.        Open wound of left thigh- (present on admission)   Assessment & Plan    - Continue wound VAC, wound care and dressing changes. Wound service on board.        History of DVT (deep vein thrombosis)- (present on admission)   Assessment & Plan    - with hypercoagulable disorder, and multiple episodes of DVTs.  - Continue therapeutic Lovenox subcutaneous injections.        Anemia of chronic disease- (present on admission)   Assessment & Plan    - Continue to monitor Hgb, transfuse for hemoglobin <7.        Type II diabetes mellitus (CMS-[facility])- (present on admission)   Assessment & Plan    - Continue glipizide, continue holding metformin while in-house. Continue sliding scale insulin coverage while in-house. Continue Accu-Cheks before meals and at bedtime. On regular diet per patient's request.  Monitor for compliance.        Crohn disease (CMS-[facility])- (present on admission)   Assessment & Plan    - Not in acute flare. Continue home dose mesalamine.              Reviewed items::  Labs reviewed, Medications reviewed and Radiology images reviewed  Pacheco catheter::  Urinary Tract Retention or Urinary Tract Obstruction  DVT prophylaxis pharmacological::  Enoxaparin (Lovenox)  Ulcer Prophylaxis::  Not indicated  Antibiotics:  Treating active infection/contamination beyond 24 hours perioperative coverage

## 2018-02-06 NOTE — PROGRESS NOTES
Infectious Disease Progress Note    Author: Benita Hutchison M.D. Date & Time of service: 2018  1:45 PM    Chief Complaint:  FU fungemia and pyelopnephritis    Interval History:   Tmax 103, WBC 3.2, kaur placed given retention, denies any abd pain   Tmax 103, WBC 2.6, resting comfortably but having intermittent fevers and chills  2/3 Tmax 102.5, WBC 2.9, ongoing fevers and chills, pt very concerned about his illness, plan for MAX on Monday, blurry vision improved   Tmax 102, WBC 3.5, Bcx Cglabrata, very anxious, states he may have kidney procedure today for possible R ureteral stent, MAX tmrw, no abd pain  2018-Tmax 98.5. Apparently patient had chills today as per the wife but patient says he only was cold. WBC 3.9 platelets 227 creatinine 0.39  Labs Reviewed, Medications Reviewed, Radiology Reviewed and Wound Reviewed.    Review of Systems:  Review of Systems   Constitutional: Positive for chills. Negative for fever.   Eyes: Negative for blurred vision and double vision.   Respiratory: Negative for cough and shortness of breath.    Gastrointestinal: Negative for abdominal pain, diarrhea, nausea and vomiting.   Genitourinary: Negative for dysuria.        Urinary retention   Musculoskeletal: Positive for myalgias.   Psychiatric/Behavioral: The patient is nervous/anxious.        Hemodynamics:  Temp (24hrs), Av.7 °C (98.1 °F), Min:36.4 °C (97.5 °F), Max:36.9 °C (98.5 °F)  Temperature: 36.9 °C (98.5 °F)  Pulse  Av.8  Min: 59  Max: 117   Blood Pressure : 142/83       Physical Exam:  Physical Exam   Constitutional: He is oriented to person, place, and time. He appears well-developed.   Older than stated age  Chronically ill appearing   HENT:   Head: Normocephalic and atraumatic.   Eyes: EOM are normal. Pupils are equal, round, and reactive to light.   Neck: Neck supple.   Cardiovascular: Normal rate, regular rhythm and normal heart sounds.    Pulmonary/Chest: Effort normal. He has no wheezes. He  has no rales.   Abdominal: Soft. There is no tenderness.   Genitourinary:   Genitourinary Comments: Pacheco   Musculoskeletal:   Left posterior thigh wound  vac   Neurological: He is alert and oriented to person, place, and time.   Skin: He is diaphoretic.       Meds:    Current Facility-Administered Medications:   •  famotidine  •  albuterol  •  LORazepam  •  potassium chloride SA  •  magnesium oxide  •  micafungin  •  meropenem (MERREM) IV  •  HYDROmorphone  •  oxyCODONE immediate-release **OR** oxyCODONE immediate-release  •  ibuprofen  •  acetaminophen  •  lidocaine  •  glipiZIDE  •  mercaptopurine  •  tamsulosin  •  Respiratory Care per Protocol  •  NS  •  ondansetron  •  ondansetron  •  insulin regular **AND** Accu-Chek ACHS **AND** NOTIFY MD and PharmD **AND** glucose 4 g **AND** dextrose 50%  •  NS  •  oxybutynin  •  mesalamine delayed-release  •  enoxaparin    Labs:  Recent Labs      02/04/18 0303 02/05/18 0229 02/06/18   0320   WBC  3.5*  3.2*  3.9*   RBC  3.02*  2.81*  3.16*   HEMOGLOBIN  8.5*  8.2*  8.9*   HEMATOCRIT  27.9*  26.5*  29.3*   MCV  92.4  94.3  92.7   MCH  28.1  29.2  28.2   RDW  61.1*  62.4*  62.2*   PLATELETCT  205  219  227   MPV  9.8  10.1  9.8   NEUTSPOLYS  81.60*  77.90*  77.70*   LYMPHOCYTES  7.00*  10.60*  10.60*   MONOCYTES  9.60  7.10  7.80   EOSINOPHILS  0.90  0.90  2.60   BASOPHILS  0.00  0.00  0.80   RBCMORPHOLO  Present  Present   --      Recent Labs      02/04/18 0303 02/05/18 0229 02/06/18   0320   SODIUM  133*  134*  137   POTASSIUM  3.3*  4.0  3.7   CHLORIDE  100  103  102   CO2  24  24  28   GLUCOSE  222*  232*  203*   BUN  9  10  7*     Recent Labs      02/04/18 0303 02/05/18 0229 02/06/18   0320   CREATININE  0.54  0.48*  0.39*       Imaging:  Ct-abdomen-pelvis With    Result Date: 1/30/2018 1/30/2018 4:31 PM HISTORY/REASON FOR EXAM:  Pain. Pain and fever TECHNIQUE/EXAM DESCRIPTION:   CT scan of the abdomen and pelvis with contrast. Contrast-enhanced  helical scanning was obtained from the diaphragmatic domes through the pubic symphysis following the bolus administration of nonionic contrast without complication. 100 mL of Omnipaque 350 nonionic contrast was administered without complication. Low dose optimization technique was utilized for this CT exam including automated exposure control and adjustment of the mA and/or kV according to patient size. COMPARISON: 12/12/2017 FINDINGS: CT Abdomen: Mild lung base atelectasis. No basilar pleural effusion. Multiple hepatic cysts measuring up to 1.4 cm in diameter are again demonstrated. The spleen is large measuring up to 14.8 cm diameter. No pancreatic or adrenal gland masses. Bilateral renal cysts measuring up to 9.3 cm on the right and 3.4 cm on the left again demonstrated. Left ureteral stent is demonstrated. Mild left pelvocaliectasis. There is moderate right hydronephrosis and dilatation of the right ureter. No distal ureteral stones identified. Lower pole 5 mm right renal stone again demonstrated. The gallbladder is partially distended. No calcified gallstones identified. No evidence of bowel obstruction. The appendix is not delineated. There are diverticula colon. No evidence of diverticulitis. Mild calcified plaque aorta. Subcentimeter short axis retroperitoneal lymph nodes. An IVC filter is located below the renal veins. CT Pelvis: The bladder is partially distended. The wall of the bladder is mildly thickened. There are prostate calcifications.     Left ureteral stent is in place. Mild left pelvocaliectasis. Moderate right hydronephrosis and dilatation of the right ureter. No distal right ureteral stone is identified. 5 mm nonobstructing lower pole right renal stone. Bilateral renal cysts again demonstrated. Hepatic cysts again demonstrated. Splenomegaly. Diverticula colon. No evidence diverticulitis. The appendix is not delineated. No free fluid.    Dx-chest-portable (1 View)    Result Date:  1/30/2018 1/30/2018 1:42 PM HISTORY/REASON FOR EXAM:  Fever and chills TECHNIQUE/EXAM DESCRIPTION AND NUMBER OF VIEWS: Single portable view of the chest. COMPARISON: 1/27/18 FINDINGS: No pulmonary infiltrates or consolidations are noted. No pleural effusion. No pneumothorax. Stable cardiopericardial silhouette.     1. No acute cardiopulmonary abnormalities are identified.    Dx-chest-portable (1 View)    Result Date: 1/27/2018 1/27/2018 12:17 AM HISTORY/REASON FOR EXAM:  Possible sepsis TECHNIQUE/EXAM DESCRIPTION:  Single AP view of the chest. COMPARISON: December 26, 2017 FINDINGS: Overlying cardiac leads are present. The cardiac silhouette appears within normal limits. The mediastinal contour appears within normal limits.  The central pulmonary vasculature appears normal. The lungs appear well expanded bilaterally.  Bilateral lungs are clear. No significant pleural effusions are identified. The bony structures appear age-appropriate.     1.  No acute cardiopulmonary disease.    [id number]    Result Date: 1/27/2018 1/27/2018 2:27 AM HISTORY/REASON FOR EXAM: Pain. TECHNIQUE/EXAM DESCRIPTION: Real-time sonography of the scrotum was performed with gray-scale, color and duplex Doppler imaging. COMPARISON: November 12, 2017 FINDINGS: There is diffuse heterogeneous flow within the right testicle, which is new since prior study. Increased flow within the right testicle and epididymis is identified. The right testis measures 3.97 cm x 2.66 cm x 3.47 cm. 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 The right epididymis measures 13.3 x 17.6 mm. Large right hydrocele is seen. No varicocele is detected.     1.  Heterogeneous parenchyma of the right testicle with markedly increased flow of the right epididymis and testis, appearance favors epididymal orchitis. Recommend sonographic follow-up for reevaluation of the right testis in 7-14 days to exclude infiltrating testicular mass. 2.  Large right hydrocele. 3.   Surgical absence the left testicle    Ir-conv Perq Neph To Nephroureteral Cath (all Radiology) Left    Result Date: 1/29/2018 1/29/2018 9:28 AM Bilateral nephrostograms and bilateral nephrostomy tube removals and left ureteral stent placement. HISTORY/REASON FOR EXAM: Ureteral obstruction. TECHNIQUE: Following informed consent patient prepped and draped in the usual fashion.  10 cc 1% lidocaine was utilized for local anesthesia. SEDATION: Conscious sedation with 150 mcg of Fentanyl and 5 mg of Versed was administered during the procedure with appropriate continuous patient monitoring by the radiology nurse. Sedation duration: 30 minutes. The procedure was performed using MAXIMAL STERILE BARRIER technique including sterile gown, mask, cap, and donning of sterile gloves following appropriate hand hygiene and/or sterile scrub. Patient skin site was prepped with 2% Chlorhexidine solution. CONTRAST: 45 mL of Omnipaque 300. FLUOROSCOPY: 4 fluoroscopic images obtained. 3.4 minutes. Omnipaque 300 was injected into the indwelling bilateral nephrostomy tubes and digital imaging was obtained over the kidneys and ureters. The right nephrostomy tube was subsequently removed. Next a guidewire was advanced into the left ureter and the indwelling left nephrostomy tube was removed.  A 5-Mozambican catheter was advanced into the bladder over the guidewire.  Next the guidewire was exchanged for an Amplatz guidewire which was coiled in  the bladder.  Next an 8-Mozambican 24-cm in length double-J ureteral stent was advanced over the guidewire and positioned with its distal loop in the bladder and proximal loop in the left renal collecting system.  The internal stiffener was removed and the guidewire was retracted and positioned in the left renal collecting system.  An 8.5-Mozambican locking loop catheter was also positioned in the left renal collecting system.   Omnipaque 300 was injected and digital imaging was obtained.  The patient tolerated  "procedure well and sent to the floor in good condition. FINDINGS: The initial right nephrostogram revealed excellent filling of the right renal collecting system with good positioning of the right nephrostomy tube. There was prompt flow of contrast down the right ureter into the bladder. There was no evidence of right-sided ureteral stricture or filling defect. The right nephrostomy tube was subsequently removed. The initial left-sided nephrostogram revealed a high-grade focal stricture at the left ureteral vesicular junction. There was delayed filling of the left ureter with no other areas of stricture formation or obstruction noted in the left ureter. Digital images demonstrate good positioning of the left-sided double-J ureteral stent in the renal collecting system and bladder.     1. Right-sided nephrostogram revealed normal-appearing right renal collecting system and right ureter with prompt flow of contrast into the bladder and no evidence of right-sided ureteral stricture or filling defect. The right-sided nephrostomy tube was subsequently removed. 2. Left-sided nephrostogram revealed a high-grade focal stricture at the left ureteral fascicular junction. The remainder of the left ureter has a normal appearance. 3. Successful percutaneous placement of left-sided double-J ureteral stent.      Micro:  Results     Procedure Component Value Units Date/Time    BLOOD CULTURE [[id number]] Collected:  02/01/18 0928    Order Status:  Completed Specimen:  Blood from Peripheral Updated:  02/06/18 1100     Significant Indicator NEG     Source BLD     Site PERIPHERAL     Blood Culture No growth after 5 days of incubation.    Narrative:       Per Hospital Policy: Only change Specimen Src: to \"Line\" if  specified by physician order.    BLOOD CULTURE [[id number]]  (Abnormal) Collected:  02/01/18 0928    Order Status:  Completed Specimen:  Blood from Peripheral Updated:  02/06/18 0730     Significant Indicator POS (POS)     " "Source BLD     Site PERIPHERAL     Blood Culture Growth detected by Bactec instrument.  02/05/2018  11:21 (A)     Blood Culture Yeast (A)    Narrative:       CALL  Lisa  131 tel. [phone],  CALLED  131 tel. [phone] 02/05/2018, 11:22, RB PERF. RESULTS CALLED  TO:[id number], KATHY Shoemaker  Per Hospital Policy: Only change Specimen Src: to \"Line\" if  specified by physician order.    FUNGAL JOSE ALFREDO INTERPRETATION [[id number]] Collected:  01/30/18 1435    Order Status:  Completed Specimen:  Blood Updated:  02/05/18 1201     Significant Indicator NEG     Source BLD     Site PERIPHERAL     Fungal JOSE ALFREDO Interp --     JOSE ALFREDO Interpretative Data:  -  Units: mcg/mL (micrograms/mL)  SDD: Susceptible-dose dependent (SDD category implies  clinical efficacy when higher than normal dosage of a drug  can be used and maximal possible blood level achieved.)  NS: Nonsusceptible (This category is used for organisms that  currently have only a susceptible interpretive category, but  not intermediate or resistant interpretive categories.)  None: No CLSI interpretive guidelines available at this time.  -  If an AMPHOTERICIN B JOSE ALFREDO of >1 mcg/mL is obtained for Candida  spp., that isolate is likely resistant to AMPHOTERICIN B.  There are no CLSI breakpoints.  -  FLUCYTOSINE JOSE ALFREDO breakpoints are based largely on historical  data and partially on the drug's pharmacokinetics.  -  For FLUCONAZOLE, the guidelines are based on extensive  experience with mucosal and invasive infections due to  Candida spp. When an isolate is identified as Candida  glabrata and the JOSE ALFREDO is <32mcg/mL, patients should receive  a maximum dosage regimen of FLUCONAZOLE.  Isolates of Candida  krusei are assumed to be intrinsically resistant to  FLUCONAZOLE and their MICs should not be interpreted using  this scale.  -  For ITRACONAZOLE, the data is based entirely on experience  with mucosal infections, and data supporting breakpoints for  invasive infections due to Candida spp. are not " "available.  -  For ANIDULAFUNGIN, CASPOFUNGIN, MICAFUNGIN, and VORICONZOLE  the data are based substantially on experience with  non-neutropenic patients with candidemia, and their clinical  relevance in other settings is uncertain.  -  For POSACONAZOLE the majority of isolates are inhibited by  <1 mcg/mL.  However, data are not yet available to indicate  a correlation between JOSE ALFREDO and outcome of treatment with this  agent.  -  Susceptible Dose Dependent (SDD) applies to FLUCONAZOLE and  ITRACONAZOLE. Susceptible is dependent on achieving the  maximum possible blood level.  -  The Sensititre YeastOne Susceptibility plates have been  validated for use at [facility]. These  plates are designed for Research Use Only. However, there are  CLSI approved documents for interpretive criteria for  5-FLUCYTOSINE, FLUCONAZOLE, ITRACONAZOLE, VORICONAZOLE, and  the ECHINOCANDINS. As with any in vitro susceptibility  testing method, the results of testing should be correlated  with the patient's clinical response to prescribed therapy.      Narrative:       CALL  Lisa  131 tel. [phone],  CALLED  131 tel. [phone] 01/31/2018, 21:30, RB PERF. RESULTS CALLED  TO:[phone].  Per Hospital Policy: Only change Specimen Src: to \"Line\" if  specified by physician order.    BLOOD CULTURE [[id number]]  (Abnormal)  (Susceptibility) Collected:  01/30/18 1435    Order Status:  Completed Specimen:  Blood from Peripheral Updated:  02/05/18 1201     Significant Indicator POS (POS)     Source BLD     Site PERIPHERAL     Blood Culture Growth detected by Bactec instrument.  01/31/2018  21:27 (A)     Blood Culture Candida glabrata (A)    Narrative:       CALL  Lisa  131 tel. [phone],  CALLED  131 tel. [phone] 01/31/2018, 21:30, RB PERF. RESULTS CALLED  TO:[phone].  Per Hospital Policy: Only change Specimen Src: to \"Line\" if  specified by physician order.    Culture & Susceptibility     CANDIDA GLABRATA     Antibiotic " "Sensitivity Microscan Unit Status    Amphoter B 24hr No Interpretation 0.5 mcg/mL Final    Anidulafungin 24hr Sensitive 0.06 mcg/mL Final    Caspofungin 24hr Sensitive 0.12 mcg/mL Final    Fluconazole 24hr Susceptible Dose-dependent 32 mcg/mL Final    Micafungin 24hr Sensitive 0.015 mcg/mL Final                       URINALYSIS [[id number]]  (Abnormal) Collected:  02/04/18 0900    Order Status:  Completed Specimen:  Urine from Urine, Pacheco Cath Updated:  02/04/18 2344     Micro Urine Req Microscopic     Color Lynnette     Character Turbid (A)     Specific Gravity 1.018     Ph 5.5     Glucose 100 (A) mg/dL      Ketones Negative mg/dL      Protein 100 (A) mg/dL      Bilirubin Negative     Urobilinogen, Urine 1.0     Nitrite Negative     Leukocyte Esterase Moderate (A)     Occult Blood Large (A)    Narrative:       Collected By:[id number] DARNELL ANGELES    URINE CULTURE(NEW) [[id number]]  (Abnormal) Collected:  01/30/18 1553    Order Status:  Completed Specimen:  Urine Updated:  02/04/18 1652     Significant Indicator POS (POS)     Source UR     Site --     Urine Culture -- (A)     Growth noted after further incubation,see below for  organism identification.       Urine Culture -- (A)     Candida glabrata  >100,000 cfu/mL       Urine Culture -- (A)     Candida albicans  <10,000 cfu/mL      Narrative:       Indication for culture:->Emergency Room Patient    BLOOD CULTURE [[id number]] Collected:  01/30/18 1230    Order Status:  Completed Specimen:  Blood from Peripheral Updated:  02/04/18 1500     Significant Indicator NEG     Source BLD     Site PERIPHERAL     Blood Culture No growth after 5 days of incubation.    Narrative:       Per Hospital Policy: Only change Specimen Src: to \"Line\" if  specified by physician order.    BLOOD CULTURE [[id number]] Collected:  02/01/18 1636    Order Status:  Completed Specimen:  Blood from Peripheral Updated:  02/02/18 1153     Significant Indicator NEG     Source BLD     Site PERIPHERAL     " "Blood Culture --     No Growth    Note: Blood cultures are incubated for 5 days and  are monitored continuously.Positive blood cultures  are called to the RN and reported as soon as  they are identified.      Narrative:       Per Hospital Policy: Only change Specimen Src: to \"Line\" if  specified by physician order.    BLOOD CULTURE [[id number]] Collected:  02/01/18 1636    Order Status:  Completed Specimen:  Blood from Peripheral Updated:  02/02/18 1153     Significant Indicator NEG     Source BLD     Site PERIPHERAL     Blood Culture --     No Growth    Note: Blood cultures are incubated for 5 days and  are monitored continuously.Positive blood cultures  are called to the RN and reported as soon as  they are identified.      Narrative:       Per Hospital Policy: Only change Specimen Src: to \"Line\" if  specified by physician order.    URINALYSIS [[id number]]  (Abnormal) Collected:  01/30/18 1553    Order Status:  Completed Specimen:  Urine Updated:  01/30/18 1627     Micro Urine Req Microscopic     Color Dark Yellow     Character Turbid (A)     Specific Gravity 1.017     Ph 5.0     Glucose Negative mg/dL      Ketones Trace (A) mg/dL      Protein 300 (A) mg/dL      Bilirubin Negative     Urobilinogen, Urine 0.2     Nitrite Negative     Leukocyte Esterase Moderate (A)     Occult Blood Large (A)    Narrative:       Indication for culture:->Emergency Room Patient          Assessment:  Active Hospital Problems    Diagnosis   • *Sepsis due to pyelonephritis / complicated UTI (CMS-HCC) [A41.9]   • Hypokalemia [E87.6]   • Urinary retention [R33.9]   • Pyelonephritis [N12]   • Open wound of left thigh [S71.102A]   • History of DVT (deep vein thrombosis) [Z86.718]   • Anemia of chronic disease [D63.8]   • Crohn disease (CMS-HCC) [K50.90]   • Type II diabetes mellitus (CMS-HCC) [E11.9]       Plan:  Sepsis  2/2 Fungemia and pyelonephritis  Blood cultures are positive from 2/1/2018  Repeat blood cultures today  Ophthalmology " follow-up      Fungemia - ongoing work up  Ucx - Calbicans  Bcx 1/30 (1/2) - Cglabrata  DC fluc  Continue micafungin   Repeat Bcx 2/1 - positive now  TTE - neg  MAX results are pending  Ophth eval to r/o endophthalmitis if feasible  Add Diflucan for urinary coverage      Pyelonephritis  Ucx yeast  Abx per above  [facility] - enterobacter cloacae (confirmed with [facility] on 2/1/18) R to cefepime, zosyn. S to meropenem, cipro and bactrim  Pacheco in place  Can be DC'd with IV Invanz when ready    H/o Bladder rupture  S/p bilateral PCN removal on 1/29    DM2  HgA1c 6.7  Maintain BS less than 150 to control infection    Open left thigh wound  Not grossly infected  Wound vac and care    Discussed with internal medicine/Dr Weathers.

## 2018-02-06 NOTE — WOUND TEAM
[facility]  Inpatient Services  Wound and Skin Care Progress Note     Admission Date:   1/30/18  HPI, PMH, SH: Reviewed  Unit where seen by Wound Team:   T4     WOUND CONSULT RELATED TO:  Scheduled npwt drsg change     SUBJECTIVE:  pleasant/agreeable     Self Report / Pain Level:  terell well     OBJECTIVE:   Prev npwt drsg remained intact     WOUND TYPE, LOCATION, CHARACTERISTICS (Pressure ulcers: location, stage, POA or date identified)     Location and type of wound:          Full thickness surgical wnd left posterior thigh; POA                                         Periwound:                                          intact  Drainage:                                            Scant serosanguinous  Tissue Type and %:                            100% pink/red  Wound Edges:                                    attached  Odor:                                                   none  Exposed structure(s):               none  S&S of Infection:                                 none                 Measurements: (cm)                          Taken 2/5/18  Length:                                                12.5  Width:                                                  5.3  Depth:                                                  0.1        INTERVENTIONS BY WOUND TEAM:   Prev drsg removed -- wnd irrigated with wnd cleanser -- measurements and photograph done -- benzoin and drape prasanna-wnd -- adaptic over the wnd bed -- 1 piece black foam to cover the wnd -- sealed with drape and neg pressure reapplied at 125mmhg/dpc     Interdisciplinary consultation: nsg; pt     EVALUATION:  measurements smaller; making good progress     Factors affecting wound healing:  Compliance?     Goals:  Decrease wnd size X 5% every week      NURSING PLAN OF CARE ORDERS (x):     Dressing changes: See Dressing Maintenance orders: x  Skin care: See Skin Care orders: x  Rectal tube care: See Rectal Tube Care orders:   Other  orders:     WOUND TEAM PLAN OF CARE (x):   NPWT change 3 x week:      x  Dressing changes by wound team:       Follow up as needed:     x  Other (explain):     Anticipated discharge plans (x):  SNF:           Home Care:        x   Outpatient Wound Center:            Self Care:            Other:

## 2018-02-06 NOTE — CARE PLAN
Problem: Urinary Elimination:  Goal: Ability to reestablish a normal urinary elimination pattern will improve  Outcome: PROGRESSING AS EXPECTED  UO appears to be clearing up; No longer red/cloudy, but yellow/dylan, hazy, with some remnant sedimentation    Problem: Psychosocial Needs:  Goal: Level of anxiety will decrease  Outcome: PROGRESSING AS EXPECTED  Behavior improved today; Pt compliant and generally calm; Pt saying please and thank you to staff; No derogatory remarks noted, no aggressive behavior, no verbal assault

## 2018-02-06 NOTE — PROGRESS NOTES
Pt resting comfortably in bed. Pacheco care completed. Pt given a cheeseburger and fries. No needs at this time.

## 2018-02-06 NOTE — WOUND TEAM
"Patient asked RN to contact wound care yesterday evening to have vac removed. States, \" I can't stand this, I have diarrhea from Crohn's and can't make it to the bathroom in time.\" Vac dressing removed today, wound is smaller per measurements from previous week and at skin level, no depth, appropriate for vac removal. Applied Hydrofera blue ready and adhesive foams x 2 to wound. Orders written for nursing to follow for dressing changes going forward. Discussed POC with Hospitalist Dr. Chairez and staff RN.  "

## 2018-02-06 NOTE — PROGRESS NOTES
Report received from day RN, and care assumed. Pt A&O x 4. Pt denies N/V at this time. He complains of generalized pain to his left leg and back, but refuses pain medication when offered. Pacheco present for urinary retention. It is draining to gravity and is intact. Urine is a dark dylan color and very hazy, however it has been like this since he was admitted. Pt requested a fingerstick at 0130 and had a BS of 245. He then request a dose of humalin. After speaking with the charge nurse, two units of insulin were given via the sliding scale. Vital signs reviewed and are WNL. IV assessed and patent, with dressing CDI. POC discussed with Pt and questions answered. POC includes pain control PRN, rest, ABX, and possible DC of Pacheco in the morning. Bed is in the lowest position, rails are up, and call light is within reach. Communication board updated and hourly rounding implemented.

## 2018-02-06 NOTE — CARE PLAN
Problem: Urinary Elimination:  Goal: Ability to reestablish a normal urinary elimination pattern will improve    Intervention: Evaluate need to continue indwelling urinary catheter  Will reassess need for Pacheco and ask about possible DC during rounds.       Problem: Safety  Goal: Will remain free from falls    Intervention: Implement fall precautions  Bed is in the lowest position, call light and belongings are within reach, treaded socks are on, DME is in the bathroom, rails are up, and hourly rounding is in place.

## 2018-02-07 ENCOUNTER — APPOINTMENT (OUTPATIENT)
Dept: RADIOLOGY | Facility: MEDICAL CENTER | Age: 68
DRG: 862 | End: 2018-02-07
Attending: INTERNAL MEDICINE
Payer: MEDICARE

## 2018-02-07 LAB
BACTERIA BLD CULT: ABNORMAL
BACTERIA BLD CULT: ABNORMAL
GLUCOSE BLD-MCNC: 154 MG/DL (ref 65–99)
GLUCOSE BLD-MCNC: 220 MG/DL (ref 65–99)
GLUCOSE BLD-MCNC: 249 MG/DL (ref 65–99)
GLUCOSE BLD-MCNC: 312 MG/DL (ref 65–99)
SIGNIFICANT IND 70042: ABNORMAL
SITE SITE: ABNORMAL
SOURCE SOURCE: ABNORMAL

## 2018-02-07 PROCEDURE — 700102 HCHG RX REV CODE 250 W/ 637 OVERRIDE(OP): Performed by: INTERNAL MEDICINE

## 2018-02-07 PROCEDURE — 700102 HCHG RX REV CODE 250 W/ 637 OVERRIDE(OP): Performed by: STUDENT IN AN ORGANIZED HEALTH CARE EDUCATION/TRAINING PROGRAM

## 2018-02-07 PROCEDURE — 99233 SBSQ HOSP IP/OBS HIGH 50: CPT | Performed by: INTERNAL MEDICINE

## 2018-02-07 PROCEDURE — A9270 NON-COVERED ITEM OR SERVICE: HCPCS | Performed by: NURSE PRACTITIONER

## 2018-02-07 PROCEDURE — A9270 NON-COVERED ITEM OR SERVICE: HCPCS | Performed by: INTERNAL MEDICINE

## 2018-02-07 PROCEDURE — 700102 HCHG RX REV CODE 250 W/ 637 OVERRIDE(OP): Performed by: HOSPITALIST

## 2018-02-07 PROCEDURE — 770006 HCHG ROOM/CARE - MED/SURG/GYN SEMI*

## 2018-02-07 PROCEDURE — 700105 HCHG RX REV CODE 258: Performed by: INTERNAL MEDICINE

## 2018-02-07 PROCEDURE — 700111 HCHG RX REV CODE 636 W/ 250 OVERRIDE (IP): Performed by: HOSPITALIST

## 2018-02-07 PROCEDURE — 82962 GLUCOSE BLOOD TEST: CPT | Mod: 91

## 2018-02-07 PROCEDURE — A9270 NON-COVERED ITEM OR SERVICE: HCPCS | Performed by: STUDENT IN AN ORGANIZED HEALTH CARE EDUCATION/TRAINING PROGRAM

## 2018-02-07 PROCEDURE — 700102 HCHG RX REV CODE 250 W/ 637 OVERRIDE(OP): Performed by: NURSE PRACTITIONER

## 2018-02-07 PROCEDURE — 51798 US URINE CAPACITY MEASURE: CPT

## 2018-02-07 PROCEDURE — A9270 NON-COVERED ITEM OR SERVICE: HCPCS | Performed by: HOSPITALIST

## 2018-02-07 PROCEDURE — A9270 NON-COVERED ITEM OR SERVICE: HCPCS

## 2018-02-07 PROCEDURE — 700111 HCHG RX REV CODE 636 W/ 250 OVERRIDE (IP): Performed by: INTERNAL MEDICINE

## 2018-02-07 PROCEDURE — 700102 HCHG RX REV CODE 250 W/ 637 OVERRIDE(OP)

## 2018-02-07 RX ADMIN — MESALAMINE 400 MG: 400 CAPSULE, DELAYED RELEASE ORAL at 15:23

## 2018-02-07 RX ADMIN — GLIPIZIDE 5 MG: 5 TABLET ORAL at 08:45

## 2018-02-07 RX ADMIN — INSULIN HUMAN 2 UNITS: 100 INJECTION, SOLUTION PARENTERAL at 19:56

## 2018-02-07 RX ADMIN — FLUCONAZOLE 400 MG: 200 TABLET ORAL at 08:45

## 2018-02-07 RX ADMIN — TAMSULOSIN HYDROCHLORIDE 0.8 MG: 0.4 CAPSULE ORAL at 08:44

## 2018-02-07 RX ADMIN — MEROPENEM 500 MG: 500 INJECTION, POWDER, FOR SOLUTION INTRAVENOUS at 23:26

## 2018-02-07 RX ADMIN — MESALAMINE 400 MG: 400 CAPSULE, DELAYED RELEASE ORAL at 19:51

## 2018-02-07 RX ADMIN — SODIUM CHLORIDE: 9 INJECTION, SOLUTION INTRAVENOUS at 01:07

## 2018-02-07 RX ADMIN — MAGNESIUM GLUCONATE 500 MG ORAL TABLET 400 MG: 500 TABLET ORAL at 08:45

## 2018-02-07 RX ADMIN — POTASSIUM CHLORIDE 40 MEQ: 1500 TABLET, EXTENDED RELEASE ORAL at 08:44

## 2018-02-07 RX ADMIN — MESALAMINE 400 MG: 400 CAPSULE, DELAYED RELEASE ORAL at 08:46

## 2018-02-07 RX ADMIN — OXYCODONE HYDROCHLORIDE 10 MG: 10 TABLET ORAL at 08:45

## 2018-02-07 RX ADMIN — FAMOTIDINE 20 MG: 20 TABLET, FILM COATED ORAL at 08:45

## 2018-02-07 RX ADMIN — FINASTERIDE 5 MG: 5 TABLET, FILM COATED ORAL at 08:44

## 2018-02-07 RX ADMIN — MEROPENEM 500 MG: 500 INJECTION, POWDER, FOR SOLUTION INTRAVENOUS at 17:15

## 2018-02-07 RX ADMIN — ENOXAPARIN SODIUM 80 MG: 100 INJECTION SUBCUTANEOUS at 06:06

## 2018-02-07 RX ADMIN — MEROPENEM 500 MG: 500 INJECTION, POWDER, FOR SOLUTION INTRAVENOUS at 06:06

## 2018-02-07 RX ADMIN — INSULIN HUMAN 4 UNITS: 100 INJECTION, SOLUTION PARENTERAL at 12:30

## 2018-02-07 RX ADMIN — MICAFUNGIN SODIUM 100 MG: 20 INJECTION, POWDER, LYOPHILIZED, FOR SOLUTION INTRAVENOUS at 08:53

## 2018-02-07 RX ADMIN — POTASSIUM CHLORIDE 40 MEQ: 1500 TABLET, EXTENDED RELEASE ORAL at 19:51

## 2018-02-07 RX ADMIN — INSULIN HUMAN 2 UNITS: 100 INJECTION, SOLUTION PARENTERAL at 06:12

## 2018-02-07 RX ADMIN — MEROPENEM 500 MG: 500 INJECTION, POWDER, FOR SOLUTION INTRAVENOUS at 12:30

## 2018-02-07 RX ADMIN — GLIPIZIDE 5 MG: 5 TABLET ORAL at 17:15

## 2018-02-07 RX ADMIN — ENOXAPARIN SODIUM 80 MG: 100 INJECTION SUBCUTANEOUS at 19:50

## 2018-02-07 RX ADMIN — MERCAPTOPURINE 150 MG: 50 TABLET ORAL at 08:46

## 2018-02-07 RX ADMIN — INSULIN HUMAN 1 UNITS: 100 INJECTION, SOLUTION PARENTERAL at 17:15

## 2018-02-07 RX ADMIN — OXYCODONE HYDROCHLORIDE 10 MG: 10 TABLET ORAL at 15:21

## 2018-02-07 ASSESSMENT — ENCOUNTER SYMPTOMS
SHORTNESS OF BREATH: 0
MEMORY LOSS: 1
DEPRESSION: 0
WHEEZING: 0
BLURRED VISION: 0
NAUSEA: 0
DOUBLE VISION: 0
VOMITING: 0
FLANK PAIN: 0
HEADACHES: 0
BACK PAIN: 0
COUGH: 0
DIARRHEA: 0
NERVOUS/ANXIOUS: 0
WEAKNESS: 1
MYALGIAS: 1
NERVOUS/ANXIOUS: 1
FEVER: 0
CHILLS: 0
PALPITATIONS: 0
ABDOMINAL PAIN: 0

## 2018-02-07 ASSESSMENT — PAIN SCALES - GENERAL: PAINLEVEL_OUTOF10: 0

## 2018-02-07 NOTE — PROGRESS NOTES
"Urology Progress Note      Interval Events: Catheter removed.  Pt PVR's low and pt urinating well. States he finally feels like he is getting ready to leave the hospital    S: No fevers, chills, nausea or vomiting.  + flatus. Voiding on his own    O:   Blood pressure 122/81, pulse 81, temperature 36.9 °C (98.4 °F), resp. rate 16, height 1.854 m (6' 0.99\"), weight 76.1 kg (167 lb 12.3 oz), SpO2 96 %.  Recent Labs      02/05/18 0229 02/06/18   0320   SODIUM  134*  137   POTASSIUM  4.0  3.7   CHLORIDE  103  102   CO2  24  28   GLUCOSE  232*  203*   BUN  10  7*   CREATININE  0.48*  0.39*   CALCIUM  7.7*  8.2*     Recent Labs      02/05/18 0229 02/06/18   0320   WBC  3.2*  3.9*   RBC  2.81*  3.16*   HEMOGLOBIN  8.2*  8.9*   HEMATOCRIT  26.5*  29.3*   MCV  94.3  92.7   MCH  29.2  28.2   MCHC  30.9*  30.4*   RDW  62.4*  62.2*   PLATELETCT  219  227   MPV  10.1  9.8         Intake/Output Summary (Last 24 hours) at 02/07/18 1319  Last data filed at 02/07/18 0800   Gross per 24 hour   Intake             1060 ml   Output             4300 ml   Net            -3240 ml     Physical Exam   Constitutional: He is oriented to person, place, and time. No distress.   Pulmonary/Chest: Effort normal.   Abdominal: Soft. He exhibits no distension. There is no tenderness. There is no rebound and no guarding.   Genitourinary:   Genitourinary Comments:pt voiding clear yellow urine.  Left testicle absent.   Neurological: He is alert and oriented to person, place, and time.   Skin: Skin is warm and dry.   Psychiatric: He has a normal mood and affect. His behavior is normal.   Nursing note and vitals reviewed.*      A/P:    Active Hospital Problems    Diagnosis   • Fungemia due to UTI [B49]     Priority: High   • Sepsis due to pyelonephritis / complicated UTI (CMS-HCC) [A41.9]     Priority: High   • Enterobacter cloacae and Candida glabrata UTI / Pyelonephritis [N12]     Priority: High   • Hypokalemia [E87.6]     Priority: Medium   • " Urinary retention [R33.9]     Priority: Medium   • Open wound of left thigh [S71.102A]     Priority: Medium     Left posterior thigh wound s/p Wound debridement, 25x20 cm area 12/17     • History of DVT (deep vein thrombosis) [Z86.718]     Priority: Low   • Anemia of chronic disease [D63.8]     Priority: Low   • Crohn disease (CMS-HCC) [K50.90]     Priority: Low   • Type II diabetes mellitus (CMS-HCC) [E11.9]     Priority: Low       Stable.   Ambulate, IS.  [facility] signing off.  Follow up with [facility] to set up appointment in 2-3 weeks for follow up appointment with Dr. Upton and  evaluate for TURP.      [facility]     CHRISTINE Kuhn.

## 2018-02-07 NOTE — PROGRESS NOTES
[facility] Hospitalist Progress Note    Date of Service: 2/7/2018    Chief Complaint  67 y.o. male with Crohn's disease, type II diabetes mellitus, history of DVT, history of bladder rupture, bilateral nephrostomy tube with removal and ureteral stent placement, was just discharged yesterday, at that time was also felt to have UTI, but urine grew mixed skin anca and yeast. ID was consulted then, and was transitioned to oral ciprofloxacin and fluconazole to complete 14 days course. Admitted 1/30/2018 with fevers, chills, nausea and vomiting, along with increasing urinary frequency and bladder spasms. Labs showed no leukocytosis. BMP was unimpressive except for hyperglycemia, with normal creatinine. CT scan showed left ureteral stent in place, moderate right hydronephrosis and dilation of the right ureter, 5 mm nonobstructing lower pole right renal stone. Urinalysis showed packed WBCs, 100-150 RBC, with moderate leukocyte esterase. Felt to have sepsis due to UTI/pyelonephritis, and started on IV fluids, and IV cefepime. Low potassium replaced, given magnesium for low normal levels.Continues to retain urine, kaur placed. Blood cultures ×1 bottle growing Candida glabrata, as well as the urine culture. [facility] growing Enterobacter cloacae resistant to cefepime and Zosyn. Antibiotic changed to meropenem and micafungin by ID. continued to spike fevers. Cardiology consulted for MAX. Repeat blood cultures negative.    Interval Problem Update  Patient is up and ambulating without assistance  Afebrile    Consultants/Specialty  ID  Cardiology  Urology  Ophthalmology    Disposition  PICC line on Friday if blood cultures remain negative  ID for antibiotic, antifungal duration      Review of Systems   Constitutional: Negative for chills and fever.   HENT: Negative for congestion.    Respiratory: Negative for cough, shortness of breath and wheezing.    Cardiovascular: Negative for palpitations and leg swelling.    Gastrointestinal: Negative for abdominal pain, nausea and vomiting.   Genitourinary: Negative for dysuria, flank pain and urgency.   Musculoskeletal: Positive for myalgias. Negative for back pain.   Neurological: Positive for weakness. Negative for headaches.   Psychiatric/Behavioral: Positive for memory loss. Negative for depression. The patient is not nervous/anxious.         Pertinent positives/negatives as mentioned above.     A complete review of systems was done. All other systems were negative.      Physical Exam  Laboratory/Imaging   Hemodynamics  Temp (24hrs), Av.5 °C (97.7 °F), Min:36.2 °C (97.2 °F), Max:36.9 °C (98.4 °F)   Temperature: 36.9 °C (98.4 °F)  Pulse  Av.7  Min: 59  Max: 117    Blood Pressure : 122/81      Respiratory      Respiration: 16, Pulse Oximetry: 96 %        RUL Breath Sounds: Clear, RML Breath Sounds: Clear, RLL Breath Sounds: Diminished, FLORENCE Breath Sounds: Clear, LLL Breath Sounds: Diminished    Fluids    Intake/Output Summary (Last 24 hours) at 18 1348  Last data filed at 18 0800   Gross per 24 hour   Intake             1060 ml   Output             4300 ml   Net            -3240 ml       Nutrition  Orders Placed This Encounter   Procedures   • DIET ORDER     Standing Status:   Standing     Number of Occurrences:   1     Order Specific Question:   Diet:     Answer:   Regular [1]     Physical Exam   Constitutional: He is oriented to person, place, and time. No distress.   Thin   HENT:   Head: Normocephalic and atraumatic.   Mouth/Throat: No oropharyngeal exudate.   Eyes: EOM are normal. Pupils are equal, round, and reactive to light.   Neck: Normal range of motion. Neck supple.   Cardiovascular: Normal rate, regular rhythm and intact distal pulses.    No murmur heard.  Pulmonary/Chest: Effort normal. No respiratory distress.   Abdominal: Soft. Bowel sounds are normal. He exhibits no distension. There is no tenderness.   Musculoskeletal: Normal range of motion. He  exhibits no edema.   Neurological: He is alert and oriented to person, place, and time. No cranial nerve deficit. Coordination normal.   Skin: Skin is warm and dry.   Psychiatric: He has a normal mood and affect. His behavior is normal. Thought content normal.   Nursing note and vitals reviewed.            Recent Labs      02/05/18 0229 02/06/18   0320   WBC  3.2*  3.9*   RBC  2.81*  3.16*   HEMOGLOBIN  8.2*  8.9*   HEMATOCRIT  26.5*  29.3*   MCV  94.3  92.7   MCH  29.2  28.2   MCHC  30.9*  30.4*   RDW  62.4*  62.2*   PLATELETCT  219  227   MPV  10.1  9.8     Recent Labs      02/05/18 0229 02/06/18   0320   SODIUM  134*  137   POTASSIUM  4.0  3.7   CHLORIDE  103  102   CO2  24  28   GLUCOSE  232*  203*   BUN  10  7*   CREATININE  0.48*  0.39*   CALCIUM  7.7*  8.2*                      Assessment/Plan     * Sepsis due to pyelonephritis / complicated UTI (CMS-[facility])- (present on admission)   Assessment & Plan    - This is sepsis (without associated acute organ dysfunction).  Source is pyelonephritis/complicated UTI. Rule out endocarditis. Fever has resolved.  -Discontinue NS at 75cc/hr.   - Await MAX.  - Continue IV antibiotics for pyelonephritis and fungemia as below.         Fungemia due to UTI- (present on admission)   Assessment & Plan    - Continue micafungin IV.   - Follow-up MAX, report pending,  -Patient also has an IVC filter, we'll discuss with infectious disease for further recommendations   today to rule out endocarditis.   - Will need ophthalmology examination to rule out endophthalmitis. Repeat blood cultures negative so far, continue to follow.   Consult placed, follow-up recommendations    ID assisting        Enterobacter cloacae and Candida glabrata UTI / Pyelonephritis- (present on admission)   Assessment & Plan    - No further fevers.   - Continue meropenem and micafungin. ID on board, guiding antibiotics. regarding final antibiotic and duration recommendations.  On Invanz ×2 weeks per  infectious disease, final duration per ID, will need arrangement for outpatient antibiotic infusion,  to assist  Repeat cultures are negative, follow-up, PICC line on Friday if remains negative, discussed with ID          Urinary retention- (present on admission)   Assessment & Plan    - Likely related to UTI. Continue Flomax. urology following.  Pacheco catheter discontinued per urology  Follow up as an outpatient  - s/p:lasix renal scan to rule-out ongoing obstruction: No obstruction          Hypokalemia   Assessment & Plan    - K WNL today. Continue PO K Dur 40 mg BID. Repeat K levels in AM.        Open wound of left thigh- (present on admission)   Assessment & Plan    - Continue wound VAC, wound care and dressing changes. Wound service on board.        History of DVT (deep vein thrombosis)- (present on admission)   Assessment & Plan    - with hypercoagulable disorder, and multiple episodes of DVTs.  - Continue therapeutic Lovenox subcutaneous injections.        Anemia of chronic disease- (present on admission)   Assessment & Plan    - Continue to monitor Hgb, transfuse for hemoglobin <7.        Type II diabetes mellitus (CMS-HCC)- (present on admission)   Assessment & Plan    - Continue glipizide, continue holding metformin while in-house. Continue sliding scale insulin coverage while in-house. Continue Accu-Cheks before meals and at bedtime. On regular diet per patient's request. Monitor for compliance.        Crohn disease (CMS-HCC)- (present on admission)   Assessment & Plan    - Not in acute flare. Continue home dose mesalamine.              Reviewed items::  Labs reviewed, Medications reviewed and Radiology images reviewed  Pacheco catheter::  Urinary Tract Retention or Urinary Tract Obstruction  DVT prophylaxis pharmacological::  Enoxaparin (Lovenox)  Ulcer Prophylaxis::  Not indicated  Antibiotics:  Treating active infection/contamination beyond 24 hours perioperative coverage

## 2018-02-07 NOTE — CARE PLAN
Problem: Urinary Elimination:  Goal: Ability to reestablish a normal urinary elimination pattern will improve    Intervention: Evaluate need to continue indwelling urinary catheter  Per urology Pt will have a bladder trial 2/7. Orders received to pull Pacheco at 0600 and day nurse to bladder scan at 1200 and notify MD of results.       Problem: Safety  Goal: Will remain free from falls    Intervention: Implement fall precautions  Bed is in the lowest position, call light and belongings are within reach, treaded socks are on, DME is in the bathroom, rails are up, and hourly rounding is in place.

## 2018-02-07 NOTE — PROGRESS NOTES
Infectious Disease Progress Note    Author: Benita Hutchison M.D. Date & Time of service: 2018  1:27 PM    Chief Complaint:  FU fungemia and pyelopnephritis    Interval History:   Tmax 103, WBC 3.2, kaur placed given retention, denies any abd pain   Tmax 103, WBC 2.6, resting comfortably but having intermittent fevers and chills  2/3 Tmax 102.5, WBC 2.9, ongoing fevers and chills, pt very concerned about his illness, plan for MAX on Monday, blurry vision improved   Tmax 102, WBC 3.5, Bcx Cglabrata, very anxious, states he may have kidney procedure today for possible R ureteral stent, MAX tmrw, no abd pain  2018-Tmax 98.5. Apparently patient had chills today as per the wife but patient says he only was cold. WBC 3.9 platelets 227 creatinine 0.39  2018-Tmax 98.4. Kaur has been taken out. WBC 3.9 platelets 227 creatinine 0.39  Labs Reviewed, Medications Reviewed, Radiology Reviewed and Wound Reviewed.    Review of Systems:  Review of Systems   Constitutional: Negative for chills and fever.   Eyes: Negative for blurred vision and double vision.   Respiratory: Negative for cough and shortness of breath.    Gastrointestinal: Negative for abdominal pain, diarrhea, nausea and vomiting.   Genitourinary: Negative for dysuria.        Kaur discontinued and has been urinating normally.   Musculoskeletal: Positive for myalgias.   Psychiatric/Behavioral: The patient is nervous/anxious.        Hemodynamics:  Temp (24hrs), Av.5 °C (97.7 °F), Min:36.2 °C (97.2 °F), Max:36.9 °C (98.4 °F)  Temperature: 36.9 °C (98.4 °F)  Pulse  Av.7  Min: 59  Max: 117   Blood Pressure : 122/81       Physical Exam:  Physical Exam   Constitutional: He is oriented to person, place, and time. He appears well-developed.   Older than stated age  Chronically ill appearing   HENT:   Head: Normocephalic and atraumatic.   Eyes: EOM are normal. Pupils are equal, round, and reactive to light.   Neck: Neck supple.   Cardiovascular:  Normal rate, regular rhythm and normal heart sounds.    Pulmonary/Chest: Effort normal. He has no wheezes. He has no rales.   Abdominal: Soft. There is no tenderness.   Genitourinary:   Genitourinary Comments: Pacheco   Musculoskeletal:   Left posterior thigh wound  Clean-refer to the pictures   Neurological: He is alert and oriented to person, place, and time.       Meds:    Current Facility-Administered Medications:   •  famotidine  •  albuterol  •  LORazepam  •  fluconazole  •  finasteride  •  potassium chloride SA  •  magnesium oxide  •  micafungin  •  meropenem (MERREM) IV  •  HYDROmorphone  •  oxyCODONE immediate-release **OR** oxyCODONE immediate-release  •  ibuprofen  •  acetaminophen  •  lidocaine  •  glipiZIDE  •  mercaptopurine  •  tamsulosin  •  Respiratory Care per Protocol  •  ondansetron  •  ondansetron  •  insulin regular **AND** Accu-Chek ACHS **AND** NOTIFY MD and PharmD **AND** glucose 4 g **AND** dextrose 50%  •  NS  •  mesalamine delayed-release  •  enoxaparin    Labs:  Recent Labs      02/05/18 0229 02/06/18   0320   WBC  3.2*  3.9*   RBC  2.81*  3.16*   HEMOGLOBIN  8.2*  8.9*   HEMATOCRIT  26.5*  29.3*   MCV  94.3  92.7   MCH  29.2  28.2   RDW  62.4*  62.2*   PLATELETCT  219  227   MPV  10.1  9.8   NEUTSPOLYS  77.90*  77.70*   LYMPHOCYTES  10.60*  10.60*   MONOCYTES  7.10  7.80   EOSINOPHILS  0.90  2.60   BASOPHILS  0.00  0.80   RBCMORPHOLO  Present   --      Recent Labs      02/05/18 0229 02/06/18   0320   SODIUM  134*  137   POTASSIUM  4.0  3.7   CHLORIDE  103  102   CO2  24  28   GLUCOSE  232*  203*   BUN  10  7*     Recent Labs      02/05/18 0229 02/06/18   0320   CREATININE  0.48*  0.39*       Imaging:  Ct-abdomen-pelvis With    Result Date: 1/30/2018 1/30/2018 4:31 PM HISTORY/REASON FOR EXAM:  Pain. Pain and fever TECHNIQUE/EXAM DESCRIPTION:   CT scan of the abdomen and pelvis with contrast. Contrast-enhanced helical scanning was obtained from the diaphragmatic domes through the  pubic symphysis following the bolus administration of nonionic contrast without complication. 100 mL of Omnipaque 350 nonionic contrast was administered without complication. Low dose optimization technique was utilized for this CT exam including automated exposure control and adjustment of the mA and/or kV according to patient size. COMPARISON: 12/12/2017 FINDINGS: CT Abdomen: Mild lung base atelectasis. No basilar pleural effusion. Multiple hepatic cysts measuring up to 1.4 cm in diameter are again demonstrated. The spleen is large measuring up to 14.8 cm diameter. No pancreatic or adrenal gland masses. Bilateral renal cysts measuring up to 9.3 cm on the right and 3.4 cm on the left again demonstrated. Left ureteral stent is demonstrated. Mild left pelvocaliectasis. There is moderate right hydronephrosis and dilatation of the right ureter. No distal ureteral stones identified. Lower pole 5 mm right renal stone again demonstrated. The gallbladder is partially distended. No calcified gallstones identified. No evidence of bowel obstruction. The appendix is not delineated. There are diverticula colon. No evidence of diverticulitis. Mild calcified plaque aorta. Subcentimeter short axis retroperitoneal lymph nodes. An IVC filter is located below the renal veins. CT Pelvis: The bladder is partially distended. The wall of the bladder is mildly thickened. There are prostate calcifications.     Left ureteral stent is in place. Mild left pelvocaliectasis. Moderate right hydronephrosis and dilatation of the right ureter. No distal right ureteral stone is identified. 5 mm nonobstructing lower pole right renal stone. Bilateral renal cysts again demonstrated. Hepatic cysts again demonstrated. Splenomegaly. Diverticula colon. No evidence diverticulitis. The appendix is not delineated. No free fluid.    Dx-chest-portable (1 View)    Result Date: 1/30/2018 1/30/2018 1:42 PM HISTORY/REASON FOR EXAM:  Fever and chills TECHNIQUE/EXAM  DESCRIPTION AND NUMBER OF VIEWS: Single portable view of the chest. COMPARISON: 1/27/18 FINDINGS: No pulmonary infiltrates or consolidations are noted. No pleural effusion. No pneumothorax. Stable cardiopericardial silhouette.     1. No acute cardiopulmonary abnormalities are identified.    Dx-chest-portable (1 View)    Result Date: 1/27/2018 1/27/2018 12:17 AM HISTORY/REASON FOR EXAM:  Possible sepsis TECHNIQUE/EXAM DESCRIPTION:  Single AP view of the chest. COMPARISON: December 26, 2017 FINDINGS: Overlying cardiac leads are present. The cardiac silhouette appears within normal limits. The mediastinal contour appears within normal limits.  The central pulmonary vasculature appears normal. The lungs appear well expanded bilaterally.  Bilateral lungs are clear. No significant pleural effusions are identified. The bony structures appear age-appropriate.     1.  No acute cardiopulmonary disease.    [id number]    Result Date: 1/27/2018 1/27/2018 2:27 AM HISTORY/REASON FOR EXAM: Pain. TECHNIQUE/EXAM DESCRIPTION: Real-time sonography of the scrotum was performed with gray-scale, color and duplex Doppler imaging. COMPARISON: November 12, 2017 FINDINGS: There is diffuse heterogeneous flow within the right testicle, which is new since prior study. Increased flow within the right testicle and epididymis is identified. The right testis measures 3.97 cm x 2.66 cm x 3.47 cm. 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 The right epididymis measures 13.3 x 17.6 mm. Large right hydrocele is seen. No varicocele is detected.     1.  Heterogeneous parenchyma of the right testicle with markedly increased flow of the right epididymis and testis, appearance favors epididymal orchitis. Recommend sonographic follow-up for reevaluation of the right testis in 7-14 days to exclude infiltrating testicular mass. 2.  Large right hydrocele. 3.  Surgical absence the left testicle    Ir-conv Perq Neph To Nephroureteral Cath (all  Radiology) Left    Result Date: 1/29/2018 1/29/2018 9:28 AM Bilateral nephrostograms and bilateral nephrostomy tube removals and left ureteral stent placement. HISTORY/REASON FOR EXAM: Ureteral obstruction. TECHNIQUE: Following informed consent patient prepped and draped in the usual fashion.  10 cc 1% lidocaine was utilized for local anesthesia. SEDATION: Conscious sedation with 150 mcg of Fentanyl and 5 mg of Versed was administered during the procedure with appropriate continuous patient monitoring by the radiology nurse. Sedation duration: 30 minutes. The procedure was performed using MAXIMAL STERILE BARRIER technique including sterile gown, mask, cap, and donning of sterile gloves following appropriate hand hygiene and/or sterile scrub. Patient skin site was prepped with 2% Chlorhexidine solution. CONTRAST: 45 mL of Omnipaque 300. FLUOROSCOPY: 4 fluoroscopic images obtained. 3.4 minutes. Omnipaque 300 was injected into the indwelling bilateral nephrostomy tubes and digital imaging was obtained over the kidneys and ureters. The right nephrostomy tube was subsequently removed. Next a guidewire was advanced into the left ureter and the indwelling left nephrostomy tube was removed.  A 5-Danish catheter was advanced into the bladder over the guidewire.  Next the guidewire was exchanged for an Amplatz guidewire which was coiled in  the bladder.  Next an 8-Danish 24-cm in length double-J ureteral stent was advanced over the guidewire and positioned with its distal loop in the bladder and proximal loop in the left renal collecting system.  The internal stiffener was removed and the guidewire was retracted and positioned in the left renal collecting system.  An 8.5-Danish locking loop catheter was also positioned in the left renal collecting system.   Omnipaque 300 was injected and digital imaging was obtained.  The patient tolerated procedure well and sent to the floor in good condition. FINDINGS: The initial right  "nephrostogram revealed excellent filling of the right renal collecting system with good positioning of the right nephrostomy tube. There was prompt flow of contrast down the right ureter into the bladder. There was no evidence of right-sided ureteral stricture or filling defect. The right nephrostomy tube was subsequently removed. The initial left-sided nephrostogram revealed a high-grade focal stricture at the left ureteral vesicular junction. There was delayed filling of the left ureter with no other areas of stricture formation or obstruction noted in the left ureter. Digital images demonstrate good positioning of the left-sided double-J ureteral stent in the renal collecting system and bladder.     1. Right-sided nephrostogram revealed normal-appearing right renal collecting system and right ureter with prompt flow of contrast into the bladder and no evidence of right-sided ureteral stricture or filling defect. The right-sided nephrostomy tube was subsequently removed. 2. Left-sided nephrostogram revealed a high-grade focal stricture at the left ureteral fascicular junction. The remainder of the left ureter has a normal appearance. 3. Successful percutaneous placement of left-sided double-J ureteral stent.      Micro:  Results     Procedure Component Value Units Date/Time    BLOOD CULTURE [[id number]]  (Abnormal) Collected:  02/01/18 0928    Order Status:  Completed Specimen:  Blood from Peripheral Updated:  02/07/18 1019     Significant Indicator POS (POS)     Source BLD     Site PERIPHERAL     Blood Culture Growth detected by Bactec instrument.  02/05/2018  11:21 (A)     Blood Culture -- (A)     Candida glabrata  See previous culture for sensitivity report.      Narrative:       CALL  Lisa  131 tel. [phone],  CALLED  131 tel. [phone] 02/05/2018, 11:22, RB PERF. RESULTS CALLED  TO:[id number]  Per Hospital Policy: Only change Specimen Src: to \"Line\" if  specified by physician order.    BLOOD CULTURE " "[[id number]] Collected:  02/06/18 1649    Order Status:  Completed Specimen:  Blood from Peripheral Updated:  02/07/18 0705     Significant Indicator NEG     Source BLD     Site PERIPHERAL     Blood Culture --     No Growth    Note: Blood cultures are incubated for 5 days and  are monitored continuously.Positive blood cultures  are called to the RN and reported as soon as  they are identified.      Narrative:       Per Hospital Policy: Only change Specimen Src: to \"Line\" if  specified by physician order.    BLOOD CULTURE [[id number]] Collected:  02/06/18 1649    Order Status:  Completed Specimen:  Blood from Peripheral Updated:  02/07/18 0705     Significant Indicator NEG     Source BLD     Site PERIPHERAL     Blood Culture --     No Growth    Note: Blood cultures are incubated for 5 days and  are monitored continuously.Positive blood cultures  are called to the RN and reported as soon as  they are identified.      Narrative:       Per Hospital Policy: Only change Specimen Src: to \"Line\" if  specified by physician order.    BLOOD CULTURE [[id number]] Collected:  02/01/18 1636    Order Status:  Completed Specimen:  Blood from Peripheral Updated:  02/06/18 1900     Significant Indicator NEG     Source BLD     Site PERIPHERAL     Blood Culture No growth after 5 days of incubation.    Narrative:       Per Hospital Policy: Only change Specimen Src: to \"Line\" if  specified by physician order.    BLOOD CULTURE [[id number]] Collected:  02/01/18 1636    Order Status:  Completed Specimen:  Blood from Peripheral Updated:  02/06/18 1900     Significant Indicator NEG     Source BLD     Site PERIPHERAL     Blood Culture No growth after 5 days of incubation.    Narrative:       Per Hospital Policy: Only change Specimen Src: to \"Line\" if  specified by physician order.    BLOOD CULTURE [[id number]] Collected:  02/01/18 0928    Order Status:  Completed Specimen:  Blood from Peripheral Updated:  02/06/18 1100     Significant Indicator NEG " "    Source BLD     Site PERIPHERAL     Blood Culture No growth after 5 days of incubation.    Narrative:       Per Hospital Policy: Only change Specimen Src: to \"Line\" if  specified by physician order.    FUNGAL JOSE ALFREDO INTERPRETATION [[id number]] Collected:  01/30/18 1435    Order Status:  Completed Specimen:  Blood Updated:  02/05/18 1201     Significant Indicator NEG     Source BLD     Site PERIPHERAL     Fungal JOSE ALFREDO Interp --     JOSE ALFREDO Interpretative Data:  -  Units: mcg/mL (micrograms/mL)  SDD: Susceptible-dose dependent (SDD category implies  clinical efficacy when higher than normal dosage of a drug  can be used and maximal possible blood level achieved.)  NS: Nonsusceptible (This category is used for organisms that  currently have only a susceptible interpretive category, but  not intermediate or resistant interpretive categories.)  None: No CLSI interpretive guidelines available at this time.  -  If an AMPHOTERICIN B JOSE ALFREDO of >1 mcg/mL is obtained for Candida  spp., that isolate is likely resistant to AMPHOTERICIN B.  There are no CLSI breakpoints.  -  FLUCYTOSINE JOSE ALFREDO breakpoints are based largely on historical  data and partially on the drug's pharmacokinetics.  -  For FLUCONAZOLE, the guidelines are based on extensive  experience with mucosal and invasive infections due to  Candida spp. When an isolate is identified as Candida  glabrata and the JOSE ALFREDO is <32mcg/mL, patients should receive  a maximum dosage regimen of FLUCONAZOLE.  Isolates of Candida  krusei are assumed to be intrinsically resistant to  FLUCONAZOLE and their MICs should not be interpreted using  this scale.  -  For ITRACONAZOLE, the data is based entirely on experience  with mucosal infections, and data supporting breakpoints for  invasive infections due to Candida spp. are not available.  -  For ANIDULAFUNGIN, CASPOFUNGIN, MICAFUNGIN, and VORICONZOLE  the data are based substantially on experience with  non-neutropenic patients with candidemia, and " "their clinical  relevance in other settings is uncertain.  -  For POSACONAZOLE the majority of isolates are inhibited by  <1 mcg/mL.  However, data are not yet available to indicate  a correlation between JOSE ALFREDO and outcome of treatment with this  agent.  -  Susceptible Dose Dependent (SDD) applies to FLUCONAZOLE and  ITRACONAZOLE. Susceptible is dependent on achieving the  maximum possible blood level.  -  The Sensititre YeastOne Susceptibility plates have been  validated for use at [facility]. These  plates are designed for Research Use Only. However, there are  CLSI approved documents for interpretive criteria for  5-FLUCYTOSINE, FLUCONAZOLE, ITRACONAZOLE, VORICONAZOLE, and  the ECHINOCANDINS. As with any in vitro susceptibility  testing method, the results of testing should be correlated  with the patient's clinical response to prescribed therapy.      Narrative:       CALL  Lisa  131 tel. [phone],  CALLED  131 tel. [phone] 01/31/2018, 21:30, RB PERF. RESULTS CALLED  TO:[phone].  Per Hospital Policy: Only change Specimen Src: to \"Line\" if  specified by physician order.    BLOOD CULTURE [[id number]]  (Abnormal)  (Susceptibility) Collected:  01/30/18 1435    Order Status:  Completed Specimen:  Blood from Peripheral Updated:  02/05/18 1201     Significant Indicator POS (POS)     Source BLD     Site PERIPHERAL     Blood Culture Growth detected by Bactec instrument.  01/31/2018  21:27 (A)     Blood Culture Candida glabrata (A)    Narrative:       CALL  Lisa  131 tel. [phone],  CALLED  131 tel. [phone] 01/31/2018, 21:30, RB PERF. RESULTS CALLED  TO:[phone].  Per Hospital Policy: Only change Specimen Src: to \"Line\" if  specified by physician order.    Culture & Susceptibility     CANDIDA GLABRATA     Antibiotic Sensitivity Microscan Unit Status    Amphoter B 24hr No Interpretation 0.5 mcg/mL Final    Anidulafungin 24hr Sensitive 0.06 mcg/mL Final    Caspofungin 24hr Sensitive 0.12 mcg/mL " "Final    Fluconazole 24hr Susceptible Dose-dependent 32 mcg/mL Final    Micafungin 24hr Sensitive 0.015 mcg/mL Final                       URINALYSIS [[id number]]  (Abnormal) Collected:  02/04/18 0900    Order Status:  Completed Specimen:  Urine from Urine, [facility] Cath Updated:  02/04/18 2344     Micro Urine Req Microscopic     Color Lynnette     Character Turbid (A)     Specific Gravity 1.018     Ph 5.5     Glucose 100 (A) mg/dL      Ketones Negative mg/dL      Protein 100 (A) mg/dL      Bilirubin Negative     Urobilinogen, Urine 1.0     Nitrite Negative     Leukocyte Esterase Moderate (A)     Occult Blood Large (A)    Narrative:       Collected By:[id number] DARNELL SRI KARTHIK    URINE CULTURE(NEW) [[id number]]  (Abnormal) Collected:  01/30/18 1553    Order Status:  Completed Specimen:  Urine Updated:  02/04/18 1652     Significant Indicator POS (POS)     Source UR     Site --     Urine Culture -- (A)     Growth noted after further incubation,see below for  organism identification.       Urine Culture -- (A)     Candida glabrata  >100,000 cfu/mL       Urine Culture -- (A)     Candida albicans  <10,000 cfu/mL      Narrative:       Indication for culture:->Emergency Room Patient    BLOOD CULTURE [[id number]] Collected:  01/30/18 1230    Order Status:  Completed Specimen:  Blood from Peripheral Updated:  02/04/18 1500     Significant Indicator NEG     Source BLD     Site PERIPHERAL     Blood Culture No growth after 5 days of incubation.    Narrative:       Per Hospital Policy: Only change Specimen Src: to \"Line\" if  specified by physician order.          Assessment:  Active Hospital Problems    Diagnosis   • *Sepsis due to pyelonephritis / complicated UTI (CMS-HCC) [A41.9]   • Hypokalemia [E87.6]   • Urinary retention [R33.9]   • Pyelonephritis [N12]   • Open wound of left thigh [S71.102A]   • History of DVT (deep vein thrombosis) [Z86.718]   • Anemia of chronic disease [D63.8]   • Crohn disease (CMS-HCC) [K50.90]   • Type II " diabetes mellitus (CMS-HCC) [E11.9]       Plan:  Sepsis  2/2 Fungemia and pyelonephritis  Blood cultures are positive from 2/1/2018  Repeat blood cultures to 2/6/2018- if negative then can get a PICC line  Ophthalmology follow-up      Fungemia - ongoing work up  Ucx - [place]  Bcx 1/30 (1/2) - Cglabrata  DC fluc  Continue micafungin   Repeat Bcx 2/1 - positive now  TTE - neg  MAX results are pending  Ophth eval to r/o endophthalmitis if feasible  Add Diflucan for urinary coverage  Aim for at least 2 weeks      Pyelonephritis  Ucx yeast  Abx per above  [facility] - enterobacter cloacae (confirmed with [facility] on 2/1/18) R to cefepime, zosyn. S to meropenem, cipro and bactrim  Pacheco in place  Will likely put him on by mouth Cipro when discharged    H/o Bladder rupture  S/p bilateral PCN removal on 1/29    DM2  HgA1c 6.7  Maintain BS less than 150 to control infection    Open left thigh wound  Not grossly infected  Wound vac and care    Discussed with internal medicine/Dr Weathers.

## 2018-02-07 NOTE — PROGRESS NOTES
Spoke with APRN with Urology, kaur removal trial tomorrow at 0600, bladder scan at 1200 and she will back to check in tomorrow afternoon. Bladder scans PRN for patient discomfort and if scan shows over 300ml kaur should be replaced.

## 2018-02-07 NOTE — PROGRESS NOTES
"Urology Progress Note    Interval Events: pt states he feels fine and wishes to go home.  He would like his catheter out    S: No fevers, chills, nausea or vomiting.  +flatus. Pt would like to have kaur removed.  Also request second medication for BPH until TURP scheduled    O:   Blood pressure 120/68, pulse 69, temperature 36.3 °C (97.3 °F), resp. rate 15, height 1.854 m (6' 0.99\"), weight 76.1 kg (167 lb 12.3 oz), SpO2 97 %.  Recent Labs      02/04/18   0303  02/05/18   0229  02/06/18   0320   SODIUM  133*  134*  137   POTASSIUM  3.3*  4.0  3.7   CHLORIDE  100  103  102   CO2  24  24  28   GLUCOSE  222*  232*  203*   BUN  9  10  7*   CREATININE  0.54  0.48*  0.39*   CALCIUM  7.6*  7.7*  8.2*     Recent Labs      02/04/18 0303 02/05/18 0229 02/06/18   0320   WBC  3.5*  3.2*  3.9*   RBC  3.02*  2.81*  3.16*   HEMOGLOBIN  8.5*  8.2*  8.9*   HEMATOCRIT  27.9*  26.5*  29.3*   MCV  92.4  94.3  92.7   MCH  28.1  29.2  28.2   MCHC  30.5*  30.9*  30.4*   RDW  61.1*  62.4*  62.2*   PLATELETCT  205  219  227   MPV  9.8  10.1  9.8         Intake/Output Summary (Last 24 hours) at 02/06/18 1630  Last data filed at 02/06/18 1100   Gross per 24 hour   Intake                0 ml   Output             2000 ml   Net            -2000 ml     Physical Exam   Constitutional: He is oriented to person, place, and time. No distress.   Pulmonary/Chest: Effort normal.   Abdominal: Soft. He exhibits no distension. There is no tenderness. There is no rebound and no guarding.   Genitourinary:   Genitourinary Comments: Kaur  draining dark urine. Left testicle absent.   Neurological: He is alert and oriented to person, place, and time.   Skin: Skin is warm and dry.   Psychiatric: He has a normal mood and affect. His behavior is normal.   Nursing note and vitals reviewed.    A/P:    Active Hospital Problems    Diagnosis   • Fungemia due to UTI [B49]     Priority: High   • Sepsis due to pyelonephritis / complicated UTI (CMS-[facility]) [A41.9]     " Priority: High   • Enterobacter cloacae and Candida glabrata UTI / Pyelonephritis [N12]     Priority: High   • Hypokalemia [E87.6]     Priority: Medium   • Urinary retention [R33.9]     Priority: Medium   • Open wound of left thigh [S71.102A]     Priority: Medium     Left posterior thigh wound s/p Wound debridement, 25x20 cm area 12/17     • History of DVT (deep vein thrombosis) [Z86.718]     Priority: Low   • Anemia of chronic disease [D63.8]     Priority: Low   • Crohn disease (CMS-HCC) [K50.90]     Priority: Low   • Type II diabetes mellitus (CMS-HCC) [E11.9]     Priority: Low       Stable.   Ambulate, IS.   Normal appearance of the right testis and epididymis. 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  Start finasteride  Voiding trial in AM F/U Urology [place] in 2-3 weeks upon discharge.    Sobeida Diane A.P.R.N.

## 2018-02-07 NOTE — PROGRESS NOTES
Patient refusing to go for PICC line. States ID says he should not have PICC until infection clears up and that the new cultures are negative.

## 2018-02-08 LAB
GLUCOSE BLD-MCNC: 116 MG/DL (ref 65–99)
GLUCOSE BLD-MCNC: 168 MG/DL (ref 65–99)
GLUCOSE BLD-MCNC: 183 MG/DL (ref 65–99)
GLUCOSE BLD-MCNC: 220 MG/DL (ref 65–99)

## 2018-02-08 PROCEDURE — 700102 HCHG RX REV CODE 250 W/ 637 OVERRIDE(OP): Performed by: INTERNAL MEDICINE

## 2018-02-08 PROCEDURE — 770006 HCHG ROOM/CARE - MED/SURG/GYN SEMI*

## 2018-02-08 PROCEDURE — 82962 GLUCOSE BLOOD TEST: CPT

## 2018-02-08 PROCEDURE — A9270 NON-COVERED ITEM OR SERVICE: HCPCS | Performed by: INTERNAL MEDICINE

## 2018-02-08 PROCEDURE — A9270 NON-COVERED ITEM OR SERVICE: HCPCS | Performed by: HOSPITALIST

## 2018-02-08 PROCEDURE — 700111 HCHG RX REV CODE 636 W/ 250 OVERRIDE (IP): Performed by: INTERNAL MEDICINE

## 2018-02-08 PROCEDURE — 700111 HCHG RX REV CODE 636 W/ 250 OVERRIDE (IP): Performed by: HOSPITALIST

## 2018-02-08 PROCEDURE — 700105 HCHG RX REV CODE 258: Performed by: INTERNAL MEDICINE

## 2018-02-08 PROCEDURE — 700102 HCHG RX REV CODE 250 W/ 637 OVERRIDE(OP): Performed by: HOSPITALIST

## 2018-02-08 PROCEDURE — 99233 SBSQ HOSP IP/OBS HIGH 50: CPT | Performed by: INTERNAL MEDICINE

## 2018-02-08 PROCEDURE — 700102 HCHG RX REV CODE 250 W/ 637 OVERRIDE(OP): Performed by: NURSE PRACTITIONER

## 2018-02-08 PROCEDURE — A9270 NON-COVERED ITEM OR SERVICE: HCPCS | Performed by: NURSE PRACTITIONER

## 2018-02-08 RX ADMIN — GLIPIZIDE 5 MG: 5 TABLET ORAL at 09:29

## 2018-02-08 RX ADMIN — FINASTERIDE 5 MG: 5 TABLET, FILM COATED ORAL at 09:29

## 2018-02-08 RX ADMIN — MEROPENEM 500 MG: 500 INJECTION, POWDER, FOR SOLUTION INTRAVENOUS at 12:49

## 2018-02-08 RX ADMIN — POTASSIUM CHLORIDE 40 MEQ: 1500 TABLET, EXTENDED RELEASE ORAL at 20:48

## 2018-02-08 RX ADMIN — MERCAPTOPURINE 150 MG: 50 TABLET ORAL at 09:29

## 2018-02-08 RX ADMIN — POTASSIUM CHLORIDE 40 MEQ: 1500 TABLET, EXTENDED RELEASE ORAL at 09:29

## 2018-02-08 RX ADMIN — MESALAMINE 400 MG: 400 CAPSULE, DELAYED RELEASE ORAL at 09:30

## 2018-02-08 RX ADMIN — ENOXAPARIN SODIUM 80 MG: 100 INJECTION SUBCUTANEOUS at 18:07

## 2018-02-08 RX ADMIN — MESALAMINE 400 MG: 400 CAPSULE, DELAYED RELEASE ORAL at 15:27

## 2018-02-08 RX ADMIN — MAGNESIUM GLUCONATE 500 MG ORAL TABLET 400 MG: 500 TABLET ORAL at 09:29

## 2018-02-08 RX ADMIN — INSULIN HUMAN 1 UNITS: 100 INJECTION, SOLUTION PARENTERAL at 11:38

## 2018-02-08 RX ADMIN — MICAFUNGIN SODIUM 100 MG: 20 INJECTION, POWDER, LYOPHILIZED, FOR SOLUTION INTRAVENOUS at 10:46

## 2018-02-08 RX ADMIN — INSULIN HUMAN 1 UNITS: 100 INJECTION, SOLUTION PARENTERAL at 20:58

## 2018-02-08 RX ADMIN — GLIPIZIDE 5 MG: 5 TABLET ORAL at 16:46

## 2018-02-08 RX ADMIN — FLUCONAZOLE 400 MG: 200 TABLET ORAL at 09:30

## 2018-02-08 RX ADMIN — INSULIN HUMAN 2 UNITS: 100 INJECTION, SOLUTION PARENTERAL at 06:07

## 2018-02-08 RX ADMIN — MEROPENEM 500 MG: 500 INJECTION, POWDER, FOR SOLUTION INTRAVENOUS at 18:07

## 2018-02-08 RX ADMIN — MESALAMINE 400 MG: 400 CAPSULE, DELAYED RELEASE ORAL at 20:48

## 2018-02-08 RX ADMIN — MEROPENEM 500 MG: 500 INJECTION, POWDER, FOR SOLUTION INTRAVENOUS at 06:09

## 2018-02-08 RX ADMIN — TAMSULOSIN HYDROCHLORIDE 0.8 MG: 0.4 CAPSULE ORAL at 09:28

## 2018-02-08 RX ADMIN — ENOXAPARIN SODIUM 80 MG: 100 INJECTION SUBCUTANEOUS at 06:09

## 2018-02-08 ASSESSMENT — ENCOUNTER SYMPTOMS
ABDOMINAL PAIN: 0
MYALGIAS: 1
NAUSEA: 0
COUGH: 0
CHILLS: 0
FLANK PAIN: 0
NERVOUS/ANXIOUS: 0
PALPITATIONS: 0
VOMITING: 0
DIARRHEA: 0
SHORTNESS OF BREATH: 0
HEADACHES: 0
BACK PAIN: 0
FEVER: 0
WEAKNESS: 1
DOUBLE VISION: 0
BLURRED VISION: 0
FOCAL WEAKNESS: 0
MEMORY LOSS: 1

## 2018-02-08 ASSESSMENT — PAIN SCALES - GENERAL
PAINLEVEL_OUTOF10: 0
PAINLEVEL_OUTOF10: 0

## 2018-02-08 NOTE — PROGRESS NOTES
[facility]ist Progress Note    Date of Service: 2/8/2018    Chief Complaint  67 y.o. male with Crohn's disease, type II diabetes mellitus, history of DVT, history of bladder rupture, bilateral nephrostomy tube with removal and ureteral stent placement, was just discharged yesterday, at that time was also felt to have UTI, but urine grew mixed skin anca and yeast. ID was consulted then, and was transitioned to oral ciprofloxacin and fluconazole to complete 14 days course. Admitted 1/30/2018 with fevers, chills, nausea and vomiting, along with increasing urinary frequency and bladder spasms. Labs showed no leukocytosis. BMP was unimpressive except for hyperglycemia, with normal creatinine. CT scan showed left ureteral stent in place, moderate right hydronephrosis and dilation of the right ureter, 5 mm nonobstructing lower pole right renal stone. Urinalysis showed packed WBCs, 100-150 RBC, with moderate leukocyte esterase. Felt to have sepsis due to UTI/pyelonephritis, and started on IV fluids, and IV cefepime. Low potassium replaced, given magnesium for low normal levels.Continues to retain urine, kaur placed. Blood cultures ×1 bottle growing Candida glabrata, as well as the urine culture. [facility] Enterobacter cloacae resistant to cefepime and Zosyn. Antibiotic changed to meropenem and micafungin by ID. continued to spike fevers. Cardiology consulted for MAX. Repeat blood cultures negative.    Interval Problem Update  No new complaints  Remains afebrile  MAX report per cardiologist is negative    Consultants/Specialty  ID  Cardiology  Urology  Ophthalmology    Disposition  PICC line on Friday if blood cultures remain negative  ID for antibiotic, antifungal duration   to assist      Review of Systems   Constitutional: Negative for chills, fever and malaise/fatigue.   HENT: Negative for congestion.    Respiratory: Negative for shortness of breath.    Cardiovascular: Negative  for chest pain and palpitations.   Gastrointestinal: Negative for abdominal pain and nausea.   Genitourinary: Negative for dysuria, flank pain and urgency.   Musculoskeletal: Positive for myalgias. Negative for back pain.   Neurological: Positive for weakness. Negative for focal weakness and headaches.   Psychiatric/Behavioral: Positive for memory loss. The patient is not nervous/anxious.         Pertinent positives/negatives as mentioned above.     A complete review of systems was done. All other systems were negative.      Physical Exam  Laboratory/Imaging   Hemodynamics  Temp (24hrs), Av.8 °C (98.2 °F), Min:36.4 °C (97.6 °F), Max:37.2 °C (98.9 °F)   Temperature: 37.2 °C (98.9 °F)  Pulse  Av.8  Min: 59  Max: 117    Blood Pressure : 146/85      Respiratory      Respiration: 20, Pulse Oximetry: 95 %        RUL Breath Sounds: Clear, RML Breath Sounds: Clear, RLL Breath Sounds: Diminished, FLORENCE Breath Sounds: Clear, LLL Breath Sounds: Diminished    Fluids    Intake/Output Summary (Last 24 hours) at 18 1539  Last data filed at 18 1243   Gross per 24 hour   Intake             1400 ml   Output             1625 ml   Net             -225 ml       Nutrition  Orders Placed This Encounter   Procedures   • DIET ORDER     Standing Status:   Standing     Number of Occurrences:   1     Order Specific Question:   Diet:     Answer:   Regular [1]     Physical Exam   Constitutional: He is oriented to person, place, and time. No distress.   Thin   HENT:   Head: Normocephalic and atraumatic.   Eyes: EOM are normal. Pupils are equal, round, and reactive to light. No scleral icterus.   Neck: Normal range of motion. Neck supple.   Cardiovascular: Normal rate, regular rhythm and intact distal pulses.    Pulmonary/Chest: Effort normal. No respiratory distress. He has no rales.   Abdominal: Soft. Bowel sounds are normal. He exhibits no distension.   Musculoskeletal: Normal range of motion. He exhibits no edema or  tenderness.   Neurological: He is alert and oriented to person, place, and time. No cranial nerve deficit. He exhibits normal muscle tone.   Skin: Skin is warm and dry. No rash noted. No erythema.   Psychiatric: He has a normal mood and affect. His behavior is normal. Judgment and thought content normal.   Nursing note and vitals reviewed.            Recent Labs      02/06/18   0320   WBC  3.9*   RBC  3.16*   HEMOGLOBIN  8.9*   HEMATOCRIT  29.3*   MCV  92.7   MCH  28.2   MCHC  30.4*   RDW  62.2*   PLATELETCT  227   MPV  9.8     Recent Labs      02/06/18   0320   SODIUM  137   POTASSIUM  3.7   CHLORIDE  102   CO2  28   GLUCOSE  203*   BUN  7*   CREATININE  0.39*   CALCIUM  8.2*                      Assessment/Plan     * Sepsis due to pyelonephritis / complicated UTI (CMS-[facility])- (present on admission)   Assessment & Plan    - This is sepsis (without associated acute organ dysfunction).  Source is pyelonephritis/complicated UTI. Rule out endocarditis. Fever has resolved.  -Discontinue NS at 75cc/hr.   - Await MAX.  - Continue IV antibiotics for pyelonephritis and fungemia as below.         Fungemia due to UTI- (present on admission)   Assessment & Plan    - Continue micafungin IV.   -Patient also has an IVC filter, we'll discuss with infectious disease for further recommendations   today to rule out endocarditis.   - ophthalmology , Dr. Cobos, negative for infection.   Repeat blood cultures negative so far, continue to follow.   Consult placed    ID assisting        Enterobacter cloacae and Candida glabrata UTI / Pyelonephritis- (present on admission)   Assessment & Plan    - No further fevers.   - Continue meropenem and micafungin. ID on board, guiding antibiotics. regarding final antibiotic and duration recommendations.  On Invanz ×2 weeks per infectious disease, final duration per ID, will need arrangement for outpatient antibiotic infusion,  to assist  Repeat cultures are negative, follow-up, PICC  line on Friday if remains negative, discussed with ID    2/8: MAX per Dr. Renee is negative, still pending official report transcript        Urinary retention- (present on admission)   Assessment & Plan    - Likely related to UTI. Continue Flomax. urology following.  Pacheco catheter discontinued per urology  Follow up as an outpatient  - s/p:lasix renal scan to rule-out ongoing obstruction: No obstruction          Hypokalemia   Assessment & Plan    - K WNL today. Continue PO K Dur 40 mg BID. Repeat K levels in AM.        Open wound of left thigh- (present on admission)   Assessment & Plan    - Continue wound VAC, wound care and dressing changes. Wound service on board.        History of DVT (deep vein thrombosis)- (present on admission)   Assessment & Plan    - with hypercoagulable disorder, and multiple episodes of DVTs.  - Continue therapeutic Lovenox subcutaneous injections.        Anemia of chronic disease- (present on admission)   Assessment & Plan    - Continue to monitor Hgb, transfuse for hemoglobin <7.        Type II diabetes mellitus (CMS-HCC)- (present on admission)   Assessment & Plan    - Continue glipizide, continue holding metformin while in-house. Continue sliding scale insulin coverage while in-house. Continue Accu-Cheks before meals and at bedtime. On regular diet per patient's request. Monitor for compliance.        Crohn disease (CMS-HCC)- (present on admission)   Assessment & Plan    - Not in acute flare. Continue home dose mesalamine.              Reviewed items::  Labs reviewed, Medications reviewed and Radiology images reviewed  Pacheco catheter::  Urinary Tract Retention or Urinary Tract Obstruction  DVT prophylaxis pharmacological::  Enoxaparin (Lovenox)  Ulcer Prophylaxis::  Not indicated  Antibiotics:  Treating active infection/contamination beyond 24 hours perioperative coverage

## 2018-02-08 NOTE — DISCHARGE PLANNING
Met with pt to discuss discharge plans. Pt is agreeable to PICC placement tomorrow as long as he will be able to go to [facility].  Assured pt that oupt infusions are a [facility] covered benefit. Pt is aware that he will need to go to infusion twice daily. Will also need Outpt Wound Care. ABX to be determined after final cultures. Pt states that he has  home wound that he would like to return. Called Rosalba from [facility]. She instructed me to inform  pt to send Vac back to [facility] in blue bag provided by [facility] for return. Planning for PICC placement tomorrow and discharge to home with [facility] and Outp Wound Care possibly Monday 2/12/2018.

## 2018-02-08 NOTE — PROGRESS NOTES
Infectious Disease Progress Note    Author: Benita Hutchison M.D. Date & Time of service: 2018  1:36 PM    Chief Complaint:  FU fungemia and pyelopnephritis    Interval History:   Tmax 103, WBC 3.2, kaur placed given retention, denies any abd pain   Tmax 103, WBC 2.6, resting comfortably but having intermittent fevers and chills  /3 Tmax 102.5, WBC 2.9, ongoing fevers and chills, pt very concerned about his illness, plan for MAX on Monday, blurry vision improved   Tmax 102, WBC 3.5, Bcx Cglabrata, very anxious, states he may have kidney procedure today for possible R ureteral stent, MAX tmrw, no abd pain  2018-Tmax 98.5. Apparently patient had chills today as per the wife but patient says he only was cold. WBC 3.9 platelets 227 creatinine 0.39  2018-Tmax 98.4. Kaur has been taken out. WBC 3.9 platelets 227 creatinine 0.39  2018-Tmax 98.9. Feels well. No new issues overnight  Labs Reviewed, Medications Reviewed, Radiology Reviewed and Wound Reviewed.    Review of Systems:  Review of Systems   Constitutional: Negative for chills and fever.   Eyes: Negative for blurred vision and double vision.   Respiratory: Negative for cough and shortness of breath.    Gastrointestinal: Negative for abdominal pain, diarrhea, nausea and vomiting.   Genitourinary: Negative for dysuria.        Kaur discontinued and has been urinating normally.   Musculoskeletal: Positive for myalgias.   Psychiatric/Behavioral: The patient is not nervous/anxious.        Hemodynamics:  Temp (24hrs), Av.8 °C (98.2 °F), Min:36.4 °C (97.6 °F), Max:37.2 °C (98.9 °F)  Temperature: 37.2 °C (98.9 °F)  Pulse  Av.8  Min: 59  Max: 117   Blood Pressure : 146/85       Physical Exam:  Physical Exam   Constitutional: He is oriented to person, place, and time. He appears well-developed.   Older than stated age  Chronically ill appearing   HENT:   Head: Normocephalic and atraumatic.   Eyes: EOM are normal. Pupils are equal, round,  and reactive to light.   Neck: Neck supple.   Cardiovascular: Normal rate, regular rhythm and normal heart sounds.    Pulmonary/Chest: Effort normal. He has no wheezes. He has no rales.   Abdominal: Soft. There is no tenderness.   Genitourinary:   Genitourinary Comments: [facility]co   Musculoskeletal:   Left posterior thigh wound  Clean-refer to the pictures   Neurological: He is alert and oriented to person, place, and time.       Meds:    Current Facility-Administered Medications:   •  famotidine  •  albuterol  •  LORazepam  •  fluconazole  •  finasteride  •  potassium chloride SA  •  magnesium oxide  •  micafungin  •  meropenem (MERREM) IV  •  HYDROmorphone  •  oxyCODONE immediate-release **OR** oxyCODONE immediate-release  •  ibuprofen  •  acetaminophen  •  lidocaine  •  glipiZIDE  •  mercaptopurine  •  tamsulosin  •  Respiratory Care per Protocol  •  ondansetron  •  ondansetron  •  insulin regular **AND** Accu-Chek ACHS **AND** NOTIFY MD and PharmD **AND** glucose 4 g **AND** dextrose 50%  •  NS  •  mesalamine delayed-release  •  enoxaparin    Labs:  Recent Labs      02/06/18   0320   WBC  3.9*   RBC  3.16*   HEMOGLOBIN  8.9*   HEMATOCRIT  29.3*   MCV  92.7   MCH  28.2   RDW  62.2*   PLATELETCT  227   MPV  9.8   NEUTSPOLYS  77.70*   LYMPHOCYTES  10.60*   MONOCYTES  7.80   EOSINOPHILS  2.60   BASOPHILS  0.80     Recent Labs      02/06/18   0320   SODIUM  137   POTASSIUM  3.7   CHLORIDE  102   CO2  28   GLUCOSE  203*   BUN  7*     Recent Labs      02/06/18   0320   CREATININE  0.39*       Imaging:  Ct-abdomen-pelvis With    Result Date: 1/30/2018 1/30/2018 4:31 PM HISTORY/REASON FOR EXAM:  Pain. Pain and fever TECHNIQUE/EXAM DESCRIPTION:   CT scan of the abdomen and pelvis with contrast. Contrast-enhanced helical scanning was obtained from the diaphragmatic domes through the pubic symphysis following the bolus administration of nonionic contrast without complication. 100 mL of Omnipaque 350 nonionic contrast was  administered without complication. Low dose optimization technique was utilized for this CT exam including automated exposure control and adjustment of the mA and/or kV according to patient size. COMPARISON: 12/12/2017 FINDINGS: CT Abdomen: Mild lung base atelectasis. No basilar pleural effusion. Multiple hepatic cysts measuring up to 1.4 cm in diameter are again demonstrated. The spleen is large measuring up to 14.8 cm diameter. No pancreatic or adrenal gland masses. Bilateral renal cysts measuring up to 9.3 cm on the right and 3.4 cm on the left again demonstrated. Left ureteral stent is demonstrated. Mild left pelvocaliectasis. There is moderate right hydronephrosis and dilatation of the right ureter. No distal ureteral stones identified. Lower pole 5 mm right renal stone again demonstrated. The gallbladder is partially distended. No calcified gallstones identified. No evidence of bowel obstruction. The appendix is not delineated. There are diverticula colon. No evidence of diverticulitis. Mild calcified plaque aorta. Subcentimeter short axis retroperitoneal lymph nodes. An IVC filter is located below the renal veins. CT Pelvis: The bladder is partially distended. The wall of the bladder is mildly thickened. There are prostate calcifications.     Left ureteral stent is in place. Mild left pelvocaliectasis. Moderate right hydronephrosis and dilatation of the right ureter. No distal right ureteral stone is identified. 5 mm nonobstructing lower pole right renal stone. Bilateral renal cysts again demonstrated. Hepatic cysts again demonstrated. Splenomegaly. Diverticula colon. No evidence diverticulitis. The appendix is not delineated. No free fluid.    Dx-chest-portable (1 View)    Result Date: 1/30/2018 1/30/2018 1:42 PM HISTORY/REASON FOR EXAM:  Fever and chills TECHNIQUE/EXAM DESCRIPTION AND NUMBER OF VIEWS: Single portable view of the chest. COMPARISON: 1/27/18 FINDINGS: No pulmonary infiltrates or  consolidations are noted. No pleural effusion. No pneumothorax. Stable cardiopericardial silhouette.     1. No acute cardiopulmonary abnormalities are identified.    Dx-chest-portable (1 View)    Result Date: 1/27/2018 1/27/2018 12:17 AM HISTORY/REASON FOR EXAM:  Possible sepsis TECHNIQUE/EXAM DESCRIPTION:  Single AP view of the chest. COMPARISON: December 26, 2017 FINDINGS: Overlying cardiac leads are present. The cardiac silhouette appears within normal limits. The mediastinal contour appears within normal limits.  The central pulmonary vasculature appears normal. The lungs appear well expanded bilaterally.  Bilateral lungs are clear. No significant pleural effusions are identified. The bony structures appear age-appropriate.     1.  No acute cardiopulmonary disease.    [id number]    Result Date: 1/27/2018 1/27/2018 2:27 AM HISTORY/REASON FOR EXAM: Pain. TECHNIQUE/EXAM DESCRIPTION: Real-time sonography of the scrotum was performed with gray-scale, color and duplex Doppler imaging. COMPARISON: November 12, 2017 FINDINGS: There is diffuse heterogeneous flow within the right testicle, which is new since prior study. Increased flow within the right testicle and epididymis is identified. The right testis measures 3.97 cm x 2.66 cm x 3.47 cm. 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 The right epididymis measures 13.3 x 17.6 mm. Large right hydrocele is seen. No varicocele is detected.     1.  Heterogeneous parenchyma of the right testicle with markedly increased flow of the right epididymis and testis, appearance favors epididymal orchitis. Recommend sonographic follow-up for reevaluation of the right testis in 7-14 days to exclude infiltrating testicular mass. 2.  Large right hydrocele. 3.  Surgical absence the left testicle    Ir-conv Perq Neph To Nephroureteral Cath (all Radiology) Left    Result Date: 1/29/2018 1/29/2018 9:28 AM Bilateral nephrostograms and bilateral nephrostomy tube removals  and left ureteral stent placement. HISTORY/REASON FOR EXAM: Ureteral obstruction. TECHNIQUE: Following informed consent patient prepped and draped in the usual fashion.  10 cc 1% lidocaine was utilized for local anesthesia. SEDATION: Conscious sedation with 150 mcg of Fentanyl and 5 mg of Versed was administered during the procedure with appropriate continuous patient monitoring by the radiology nurse. Sedation duration: 30 minutes. The procedure was performed using MAXIMAL STERILE BARRIER technique including sterile gown, mask, cap, and donning of sterile gloves following appropriate hand hygiene and/or sterile scrub. Patient skin site was prepped with 2% Chlorhexidine solution. CONTRAST: 45 mL of Omnipaque 300. FLUOROSCOPY: 4 fluoroscopic images obtained. 3.4 minutes. Omnipaque 300 was injected into the indwelling bilateral nephrostomy tubes and digital imaging was obtained over the kidneys and ureters. The right nephrostomy tube was subsequently removed. Next a guidewire was advanced into the left ureter and the indwelling left nephrostomy tube was removed.  A 5-Wallisian catheter was advanced into the bladder over the guidewire.  Next the guidewire was exchanged for an Amplatz guidewire which was coiled in  the bladder.  Next an 8-Wallisian 24-cm in length double-J ureteral stent was advanced over the guidewire and positioned with its distal loop in the bladder and proximal loop in the left renal collecting system.  The internal stiffener was removed and the guidewire was retracted and positioned in the left renal collecting system.  An 8.5-Wallisian locking loop catheter was also positioned in the left renal collecting system.   Omnipaque 300 was injected and digital imaging was obtained.  The patient tolerated procedure well and sent to the floor in good condition. FINDINGS: The initial right nephrostogram revealed excellent filling of the right renal collecting system with good positioning of the right nephrostomy  "tube. There was prompt flow of contrast down the right ureter into the bladder. There was no evidence of right-sided ureteral stricture or filling defect. The right nephrostomy tube was subsequently removed. The initial left-sided nephrostogram revealed a high-grade focal stricture at the left ureteral vesicular junction. There was delayed filling of the left ureter with no other areas of stricture formation or obstruction noted in the left ureter. Digital images demonstrate good positioning of the left-sided double-J ureteral stent in the renal collecting system and bladder.     1. Right-sided nephrostogram revealed normal-appearing right renal collecting system and right ureter with prompt flow of contrast into the bladder and no evidence of right-sided ureteral stricture or filling defect. The right-sided nephrostomy tube was subsequently removed. 2. Left-sided nephrostogram revealed a high-grade focal stricture at the left ureteral fascicular junction. The remainder of the left ureter has a normal appearance. 3. Successful percutaneous placement of left-sided double-J ureteral stent.      Micro:  Results     Procedure Component Value Units Date/Time    BLOOD CULTURE [[id number]]  (Abnormal) Collected:  02/01/18 0928    Order Status:  Completed Specimen:  Blood from Peripheral Updated:  02/07/18 1019     Significant Indicator POS (POS)     Source BLD     Site PERIPHERAL     Blood Culture Growth detected by Bactec instrument.  02/05/2018  11:21 (A)     Blood Culture -- (A)     Candida glabrata  See previous culture for sensitivity report.      Narrative:       CALL  Lisa  131 tel. [phone],  CALLED  131 tel. [phone] 02/05/2018, 11:22, RB PERF. RESULTS CALLED  TO:[id number]  Per Hospital Policy: Only change Specimen Src: to \"Line\" if  specified by physician order.    BLOOD CULTURE [[id number]] Collected:  02/06/18 1649    Order Status:  Completed Specimen:  Blood from Peripheral Updated:  02/07/18 0705     " "Significant Indicator NEG     Source BLD     Site PERIPHERAL     Blood Culture --     No Growth    Note: Blood cultures are incubated for 5 days and  are monitored continuously.Positive blood cultures  are called to the RN and reported as soon as  they are identified.      Narrative:       Per Hospital Policy: Only change Specimen Src: to \"Line\" if  specified by physician order.    BLOOD CULTURE [[id number]] Collected:  02/06/18 1649    Order Status:  Completed Specimen:  Blood from Peripheral Updated:  02/07/18 0705     Significant Indicator NEG     Source BLD     Site PERIPHERAL     Blood Culture --     No Growth    Note: Blood cultures are incubated for 5 days and  are monitored continuously.Positive blood cultures  are called to the RN and reported as soon as  they are identified.      Narrative:       Per Hospital Policy: Only change Specimen Src: to \"Line\" if  specified by physician order.    BLOOD CULTURE [[id number]] Collected:  02/01/18 1636    Order Status:  Completed Specimen:  Blood from Peripheral Updated:  02/06/18 1900     Significant Indicator NEG     Source BLD     Site PERIPHERAL     Blood Culture No growth after 5 days of incubation.    Narrative:       Per Hospital Policy: Only change Specimen Src: to \"Line\" if  specified by physician order.    BLOOD CULTURE [[id number]] Collected:  02/01/18 1636    Order Status:  Completed Specimen:  Blood from Peripheral Updated:  02/06/18 1900     Significant Indicator NEG     Source BLD     Site PERIPHERAL     Blood Culture No growth after 5 days of incubation.    Narrative:       Per Hospital Policy: Only change Specimen Src: to \"Line\" if  specified by physician order.    BLOOD CULTURE [[id number]] Collected:  02/01/18 0928    Order Status:  Completed Specimen:  Blood from Peripheral Updated:  02/06/18 1100     Significant Indicator NEG     Source BLD     Site PERIPHERAL     Blood Culture No growth after 5 days of incubation.    Narrative:       Per Hospital " "Policy: Only change Specimen Src: to \"Line\" if  specified by physician order.    FUNGAL JOSE ALFREDO INTERPRETATION [[id number]] Collected:  01/30/18 1435    Order Status:  Completed Specimen:  Blood Updated:  02/05/18 1201     Significant Indicator NEG     Source BLD     Site PERIPHERAL     Fungal JOSE ALFREDO Interp --     JOSE ALFREDO Interpretative Data:  -  Units: mcg/mL (micrograms/mL)  SDD: Susceptible-dose dependent (SDD category implies  clinical efficacy when higher than normal dosage of a drug  can be used and maximal possible blood level achieved.)  NS: Nonsusceptible (This category is used for organisms that  currently have only a susceptible interpretive category, but  not intermediate or resistant interpretive categories.)  None: No CLSI interpretive guidelines available at this time.  -  If an AMPHOTERICIN B JOSE ALFREDO of >1 mcg/mL is obtained for Candida  spp., that isolate is likely resistant to AMPHOTERICIN B.  There are no CLSI breakpoints.  -  FLUCYTOSINE JOSE ALFREDO breakpoints are based largely on historical  data and partially on the drug's pharmacokinetics.  -  For FLUCONAZOLE, the guidelines are based on extensive  experience with mucosal and invasive infections due to  Candida spp. When an isolate is identified as Candida  glabrata and the JOSE ALFREDO is <32mcg/mL, patients should receive  a maximum dosage regimen of FLUCONAZOLE.  Isolates of Candida  krusei are assumed to be intrinsically resistant to  FLUCONAZOLE and their MICs should not be interpreted using  this scale.  -  For ITRACONAZOLE, the data is based entirely on experience  with mucosal infections, and data supporting breakpoints for  invasive infections due to Candida spp. are not available.  -  For ANIDULAFUNGIN, CASPOFUNGIN, MICAFUNGIN, and VORICONZOLE  the data are based substantially on experience with  non-neutropenic patients with candidemia, and their clinical  relevance in other settings is uncertain.  -  For POSACONAZOLE the majority of isolates are inhibited by  <1 " "mcg/mL.  However, data are not yet available to indicate  a correlation between JOSE ALFREDO and outcome of treatment with this  agent.  -  Susceptible Dose Dependent (SDD) applies to FLUCONAZOLE and  ITRACONAZOLE. Susceptible is dependent on achieving the  maximum possible blood level.  -  The Sensititre YeastOne Susceptibility plates have been  validated for use at [facility]. These  plates are designed for Research Use Only. However, there are  CLSI approved documents for interpretive criteria for  5-FLUCYTOSINE, FLUCONAZOLE, ITRACONAZOLE, VORICONAZOLE, and  the ECHINOCANDINS. As with any in vitro susceptibility  testing method, the results of testing should be correlated  with the patient's clinical response to prescribed therapy.      Narrative:       CALL  Lisa  131 tel. [phone],  CALLED  131 tel. [phone] 01/31/2018, 21:30, RB PERF. RESULTS CALLED  TO:[phone].  Per Hospital Policy: Only change Specimen Src: to \"Line\" if  specified by physician order.    BLOOD CULTURE [[id number]]  (Abnormal)  (Susceptibility) Collected:  01/30/18 1435    Order Status:  Completed Specimen:  Blood from Peripheral Updated:  02/05/18 1201     Significant Indicator POS (POS)     Source BLD     Site PERIPHERAL     Blood Culture Growth detected by Bactec instrument.  01/31/2018  21:27 (A)     Blood Culture Candida glabrata (A)    Narrative:       CALL  Lisa  131 tel. [phone],  CALLED  131 tel. [phone] 01/31/2018, 21:30, RB PERF. RESULTS CALLED  TO:[phone].  Per Hospital Policy: Only change Specimen Src: to \"Line\" if  specified by physician order.    Culture & Susceptibility     CANDIDA GLABRATA     Antibiotic Sensitivity Microscan Unit Status    Amphoter B 24hr No Interpretation 0.5 mcg/mL Final    Anidulafungin 24hr Sensitive 0.06 mcg/mL Final    Caspofungin 24hr Sensitive 0.12 mcg/mL Final    Fluconazole 24hr Susceptible Dose-dependent 32 mcg/mL Final    Micafungin 24hr Sensitive 0.015 mcg/mL Final           " "            URINALYSIS [[id number]]  (Abnormal) Collected:  02/04/18 0900    Order Status:  Completed Specimen:  Urine from Urine, [facility] Cath Updated:  02/04/18 2344     Micro Urine Req Microscopic     Color Lynnette     Character Turbid (A)     Specific Gravity 1.018     Ph 5.5     Glucose 100 (A) mg/dL      Ketones Negative mg/dL      Protein 100 (A) mg/dL      Bilirubin Negative     Urobilinogen, Urine 1.0     Nitrite Negative     Leukocyte Esterase Moderate (A)     Occult Blood Large (A)    Narrative:       Collected By:[id number] DARNELL ANGELES    URINE CULTURE(NEW) [[id number]]  (Abnormal) Collected:  01/30/18 1553    Order Status:  Completed Specimen:  Urine Updated:  02/04/18 1652     Significant Indicator POS (POS)     Source UR     Site --     Urine Culture -- (A)     Growth noted after further incubation,see below for  organism identification.       Urine Culture -- (A)     Candida glabrata  >100,000 cfu/mL       Urine Culture -- (A)     Candida albicans  <10,000 cfu/mL      Narrative:       Indication for culture:->Emergency Room Patient    BLOOD CULTURE [[id number]] Collected:  01/30/18 1230    Order Status:  Completed Specimen:  Blood from Peripheral Updated:  02/04/18 1500     Significant Indicator NEG     Source BLD     Site PERIPHERAL     Blood Culture No growth after 5 days of incubation.    Narrative:       Per Hospital Policy: Only change Specimen Src: to \"Line\" if  specified by physician order.          Assessment:  Active Hospital Problems    Diagnosis   • *Sepsis due to pyelonephritis / complicated UTI (CMS-HCC) [A41.9]   • Hypokalemia [E87.6]   • Urinary retention [R33.9]   • Pyelonephritis [N12]   • Open wound of left thigh [S71.102A]   • History of DVT (deep vein thrombosis) [Z86.718]   • Anemia of chronic disease [D63.8]   • Crohn disease (CMS-HCC) [K50.90]   • Type II diabetes mellitus (CMS-HCC) [E11.9]       Plan:  Sepsis  2/2 Fungemia and pyelonephritis  Blood cultures are positive from " 2/1/2018  Repeat blood cultures  2/6/2018- if negative then can get a PICC line  Ophthalmology consultation appreciated        Fungemia -   Ucx - Calbicans  Bcx 1/30 (1/2) - Cglabrata  DC fluc  Continue micafungin   Repeat Bcx 2/1 - positive now  TTE - neg  MAX results are pending  Continue with the Diflucan and Mycamine  Ophthalmology consultation done and no ocular involvement      Pyelonephritis  Ucx yeast  Abx per above  [facility] - enterobacter cloacae (confirmed [facility]/1/18) R to cefepime, zosyn. S to meropenem, cipro and bactrim  P[place]co in place  Will likely put him on by mouth Cipro when discharged    H/o Bladder rupture  S/p bilateral PCN removal on 1/29    DM2  HgA1c 6.7  Maintain BS less than 150 to control infection    Open left thigh wound  Not grossly infected  Wound vac and care    Discussed with internal medicine/Dr Chairez

## 2018-02-08 NOTE — CARE PLAN
Problem: Safety  Goal: Will remain free from injury  Call light within reach, pt calls for assistance at all times. Bed in low position and locked, upper bedside rails up, proper mobility signs placed, personal possessions within reach, treaded socks on. Hourly rounding in place.        Problem: Infection  Goal: Will remain free from infection  On IV antibiotics

## 2018-02-08 NOTE — CARE PLAN
Problem: Venous Thromboembolism (VTW)/Deep Vein Thrombosis (DVT) Prevention:  Goal: Patient will participate in Venous Thrombosis (VTE)/Deep Vein Thrombosis (DVT)Prevention Measures  Lovenox given for DVT prophylaxis. Pt ambulating frequently in room and hallway.    Problem: Respiratory:  Goal: Respiratory status will improve  Outcome: PROGRESSING AS EXPECTED  Pt has oxygen saturation above 90% on room air.

## 2018-02-08 NOTE — PROGRESS NOTES
Assumed care of pt 1845. Pt resting in bed and ambulates in the hallways.  Reviewed POC including safety, mobility, pain management, medication administration, DVT prophylaxis, and discharge. Call light within reach. Pt calls appropriately. Hourly rounding in place. Pt has no needs or concerns at this time.

## 2018-02-08 NOTE — CONSULTS
OPHTHALMOLOGY CONSULT      Reason for Consult:  HPI: 67 year old white Male, current  inpt - primary team consult due to fungemia - r/o ocular involvement. Pt w/o visual c/o. No pain. No floaters. No flashes     Past Ocular Hx: presbyopia  Gtts: none  PMHx: / ROS: /Meds: / Allergies: see primary team notes     EXAM:  General: no apparent distress, oriented  Visual acuities: near card with reading glasses  Right: 20 / 40; Left: 20 / 20  Pupils: right: 4mm ->; 2mm, brisk, regular, no APD  left: 4mm ->; 2mm, brisk, regular, no APD  Motilities: right: left: WNL     Alignment: ortho both  Confrontation Visual Fields: full both  Color Vision: not tested     Intraocular pressures: by tonopen at 1926 pm  Right: 15  Left: 15     SLIT LAMP EXAMINATION:  Lids / lashes / adnexa:  dermatochalasis BUL;  Conjunctiva / sclera: RIGHT: white / uninjected,  LEFT: white / uninjected  Cornea: RIGHT: nl epithelium / stroma / endothelium; no abrasions;   LEFT: nl epithelium / stroma / endothelium  Anterior Chamber: RIGHT: deep and quiet LEFT: deep and quiet  Iris: RIGHT: normal LEFT: normal  Lens: RIGHT: 2+ nuclear cataract, ; LEFT: 2+ nuclear cataract  Anterior Vitreous: RIGHT: no cells LEFT: no cells     DILATED FUNDUS EXAMINATION: after 1% tropicamide and 2.5% phenylephrine at 1926 pm     Optic nerves: C:D 0.4 ou, no optic nerve edema  Maculae: WNL  Periphery: WNL, no retinitis   Vessels: WNL, no vasculitis or retinopathy       ASSESSMENT AND PLAN:     1. Fungemia - no evidence of ocular involvement   2. Cataracts OU - not visually significant  3. Dermatochalasis BUL - not visually significant  4. Presbyopia - uses OTC reading glasses  5. Diabetes 2 - no retinopathy     REC: yearly eye exams to r/o retinopathy  Re-consult prn

## 2018-02-09 LAB
GLUCOSE BLD-MCNC: 124 MG/DL (ref 65–99)
GLUCOSE BLD-MCNC: 191 MG/DL (ref 65–99)
GLUCOSE BLD-MCNC: 226 MG/DL (ref 65–99)
GLUCOSE BLD-MCNC: 280 MG/DL (ref 65–99)
LV EJECT FRACT  99904: 55

## 2018-02-09 PROCEDURE — 700102 HCHG RX REV CODE 250 W/ 637 OVERRIDE(OP): Performed by: INTERNAL MEDICINE

## 2018-02-09 PROCEDURE — 700102 HCHG RX REV CODE 250 W/ 637 OVERRIDE(OP)

## 2018-02-09 PROCEDURE — 770006 HCHG ROOM/CARE - MED/SURG/GYN SEMI*

## 2018-02-09 PROCEDURE — A9270 NON-COVERED ITEM OR SERVICE: HCPCS | Performed by: HOSPITALIST

## 2018-02-09 PROCEDURE — 700102 HCHG RX REV CODE 250 W/ 637 OVERRIDE(OP): Performed by: STUDENT IN AN ORGANIZED HEALTH CARE EDUCATION/TRAINING PROGRAM

## 2018-02-09 PROCEDURE — 700111 HCHG RX REV CODE 636 W/ 250 OVERRIDE (IP): Performed by: INTERNAL MEDICINE

## 2018-02-09 PROCEDURE — 700105 HCHG RX REV CODE 258: Performed by: INTERNAL MEDICINE

## 2018-02-09 PROCEDURE — A6209 FOAM DRSG <=16 SQ IN W/O BDR: HCPCS | Performed by: INTERNAL MEDICINE

## 2018-02-09 PROCEDURE — A9270 NON-COVERED ITEM OR SERVICE: HCPCS | Performed by: INTERNAL MEDICINE

## 2018-02-09 PROCEDURE — 700111 HCHG RX REV CODE 636 W/ 250 OVERRIDE (IP): Performed by: HOSPITALIST

## 2018-02-09 PROCEDURE — A9270 NON-COVERED ITEM OR SERVICE: HCPCS | Performed by: STUDENT IN AN ORGANIZED HEALTH CARE EDUCATION/TRAINING PROGRAM

## 2018-02-09 PROCEDURE — 99233 SBSQ HOSP IP/OBS HIGH 50: CPT | Performed by: INTERNAL MEDICINE

## 2018-02-09 PROCEDURE — A9270 NON-COVERED ITEM OR SERVICE: HCPCS | Performed by: NURSE PRACTITIONER

## 2018-02-09 PROCEDURE — 82962 GLUCOSE BLOOD TEST: CPT | Mod: 91

## 2018-02-09 PROCEDURE — 700102 HCHG RX REV CODE 250 W/ 637 OVERRIDE(OP): Performed by: HOSPITALIST

## 2018-02-09 PROCEDURE — A9270 NON-COVERED ITEM OR SERVICE: HCPCS

## 2018-02-09 PROCEDURE — 700102 HCHG RX REV CODE 250 W/ 637 OVERRIDE(OP): Performed by: NURSE PRACTITIONER

## 2018-02-09 RX ADMIN — MERCAPTOPURINE 150 MG: 50 TABLET ORAL at 08:43

## 2018-02-09 RX ADMIN — MEROPENEM 500 MG: 500 INJECTION, POWDER, FOR SOLUTION INTRAVENOUS at 17:03

## 2018-02-09 RX ADMIN — MEROPENEM 500 MG: 500 INJECTION, POWDER, FOR SOLUTION INTRAVENOUS at 23:41

## 2018-02-09 RX ADMIN — MICAFUNGIN SODIUM 100 MG: 20 INJECTION, POWDER, LYOPHILIZED, FOR SOLUTION INTRAVENOUS at 08:41

## 2018-02-09 RX ADMIN — MEROPENEM 500 MG: 500 INJECTION, POWDER, FOR SOLUTION INTRAVENOUS at 06:00

## 2018-02-09 RX ADMIN — GLIPIZIDE 5 MG: 5 TABLET ORAL at 08:43

## 2018-02-09 RX ADMIN — ACETAMINOPHEN 650 MG: 325 TABLET, FILM COATED ORAL at 00:12

## 2018-02-09 RX ADMIN — FINASTERIDE 5 MG: 5 TABLET, FILM COATED ORAL at 08:44

## 2018-02-09 RX ADMIN — POTASSIUM CHLORIDE 40 MEQ: 1500 TABLET, EXTENDED RELEASE ORAL at 08:43

## 2018-02-09 RX ADMIN — MESALAMINE 400 MG: 400 CAPSULE, DELAYED RELEASE ORAL at 20:05

## 2018-02-09 RX ADMIN — GLIPIZIDE 5 MG: 5 TABLET ORAL at 17:03

## 2018-02-09 RX ADMIN — FLUCONAZOLE 400 MG: 200 TABLET ORAL at 08:43

## 2018-02-09 RX ADMIN — INSULIN HUMAN 1 UNITS: 100 INJECTION, SOLUTION PARENTERAL at 06:04

## 2018-02-09 RX ADMIN — ENOXAPARIN SODIUM 80 MG: 100 INJECTION SUBCUTANEOUS at 18:16

## 2018-02-09 RX ADMIN — OXYCODONE HYDROCHLORIDE 5 MG: 5 TABLET ORAL at 12:07

## 2018-02-09 RX ADMIN — MEROPENEM 500 MG: 500 INJECTION, POWDER, FOR SOLUTION INTRAVENOUS at 11:18

## 2018-02-09 RX ADMIN — POTASSIUM CHLORIDE 40 MEQ: 1500 TABLET, EXTENDED RELEASE ORAL at 20:05

## 2018-02-09 RX ADMIN — TAMSULOSIN HYDROCHLORIDE 0.8 MG: 0.4 CAPSULE ORAL at 08:42

## 2018-02-09 RX ADMIN — INSULIN HUMAN 3 UNITS: 100 INJECTION, SOLUTION PARENTERAL at 10:24

## 2018-02-09 RX ADMIN — INSULIN HUMAN 2 UNITS: 100 INJECTION, SOLUTION PARENTERAL at 20:06

## 2018-02-09 RX ADMIN — MAGNESIUM GLUCONATE 500 MG ORAL TABLET 400 MG: 500 TABLET ORAL at 08:44

## 2018-02-09 RX ADMIN — MEROPENEM 500 MG: 500 INJECTION, POWDER, FOR SOLUTION INTRAVENOUS at 00:08

## 2018-02-09 RX ADMIN — MESALAMINE 400 MG: 400 CAPSULE, DELAYED RELEASE ORAL at 17:02

## 2018-02-09 RX ADMIN — MESALAMINE 400 MG: 400 CAPSULE, DELAYED RELEASE ORAL at 08:43

## 2018-02-09 ASSESSMENT — PAIN SCALES - GENERAL
PAINLEVEL_OUTOF10: 0
PAINLEVEL_OUTOF10: 0
PAINLEVEL_OUTOF10: 4
PAINLEVEL_OUTOF10: 2

## 2018-02-09 ASSESSMENT — ENCOUNTER SYMPTOMS
WEAKNESS: 1
BLURRED VISION: 0
ABDOMINAL PAIN: 0
FEVER: 0
HEARTBURN: 0
SHORTNESS OF BREATH: 0
DIZZINESS: 0
PALPITATIONS: 0
FLANK PAIN: 0
NERVOUS/ANXIOUS: 0
VOMITING: 0
COUGH: 0
MEMORY LOSS: 1
DIARRHEA: 0
FOCAL WEAKNESS: 0
DOUBLE VISION: 0
MYALGIAS: 0
BACK PAIN: 0
CHILLS: 0
NAUSEA: 0

## 2018-02-09 NOTE — PROGRESS NOTES
[facility] Hospitalist Progress Note    Date of Service: 2/9/2018    Chief Complaint  67 y.o. male with Crohn's disease, type II diabetes mellitus, history of DVT, history of bladder rupture, bilateral nephrostomy tube with removal and ureteral stent placement, was just discharged yesterday, at that time was also felt to have UTI, but urine grew mixed skin anca and yeast. ID was consulted then, and was transitioned to oral ciprofloxacin and fluconazole to complete 14 days course. Admitted 1/30/2018 with fevers, chills, nausea and vomiting, along with increasing urinary frequency and bladder spasms. Labs showed no leukocytosis. BMP was unimpressive except for hyperglycemia, with normal creatinine. CT scan showed left ureteral stent in place, moderate right hydronephrosis and dilation of the right ureter, 5 mm nonobstructing lower pole right renal stone. Urinalysis showed packed WBCs, 100-150 RBC, with moderate leukocyte esterase. Felt to have sepsis due to UTI/pyelonephritis, and started on IV fluids, and IV cefepime. Low potassium replaced, given magnesium for low normal levels.Continues to retain urine, kaur placed. Blood cultures ×1 bottle growing Candida glabrata, as well as the urine culture. [facility] growing Enterobacter cloacae resistant to cefepime and Zosyn. Antibiotic changed to meropenem and micafungin by ID. continued to spike fevers. Cardiology consulted for MAX. Repeat blood cultures negative.    Interval Problem Update  No new complaints  Plan for PICC line today  Patient is anxious for discharge to home one out once outpatient infusion arranged    Consultants/Specialty  ID  Cardiology  Urology  Ophthalmology    Disposition  PICC line placement  ID for  antifungal duration   to assist      Review of Systems   Constitutional: Negative for chills and fever.   Respiratory: Negative for shortness of breath.    Cardiovascular: Negative for chest pain, palpitations and leg  swelling.   Gastrointestinal: Negative for abdominal pain, heartburn and nausea.   Genitourinary: Negative for flank pain and urgency.   Musculoskeletal: Negative for back pain and myalgias.   Neurological: Positive for weakness. Negative for dizziness and focal weakness.   Psychiatric/Behavioral: Positive for memory loss. The patient is not nervous/anxious.         Pertinent positives/negatives as mentioned above.     A complete review of systems was done. All other systems were negative.      Physical Exam  Laboratory/Imaging   Hemodynamics  Temp (24hrs), Av.7 °C (98.1 °F), Min:36.1 °C (97 °F), Max:37.2 °C (98.9 °F)   Temperature: 36.8 °C (98.2 °F)  Pulse  Av.1  Min: 59  Max: 117    Blood Pressure : 135/82      Respiratory      Respiration: 18, Pulse Oximetry: 92 %        RUL Breath Sounds: Clear, RML Breath Sounds: Clear, RLL Breath Sounds: Clear, FLORENCE Breath Sounds: Clear, LLL Breath Sounds: Clear    Fluids    Intake/Output Summary (Last 24 hours) at 18 1406  Last data filed at 18 1810   Gross per 24 hour   Intake              200 ml   Output              500 ml   Net             -300 ml       Nutrition  Orders Placed This Encounter   Procedures   • DIET ORDER     Standing Status:   Standing     Number of Occurrences:   1     Order Specific Question:   Diet:     Answer:   Regular [1]     Physical Exam   Constitutional: He is oriented to person, place, and time. He appears well-developed. No distress.   Thin   HENT:   Head: Normocephalic and atraumatic.   Eyes: EOM are normal. Pupils are equal, round, and reactive to light.   Neck: Normal range of motion. Neck supple.   Cardiovascular: Normal rate and regular rhythm.    Pulmonary/Chest: Effort normal. No respiratory distress.   Abdominal: Soft. Bowel sounds are normal. He exhibits no distension.   Musculoskeletal: Normal range of motion. He exhibits no edema.   Neurological: He is alert and oriented to person, place, and time. No cranial nerve  deficit. Coordination normal.   Skin: Skin is warm and dry.   Psychiatric: He has a normal mood and affect. His behavior is normal. Judgment and thought content normal.   Nursing note and vitals reviewed.                                     Assessment/Plan     * Sepsis due to pyelonephritis / complicated UTI (CMS-HCC)- (present on admission)   Assessment & Plan    - This is sepsis (without associated acute organ dysfunction).  Source is pyelonephritis/complicated UTI. Rule out endocarditis. Fever has resolved.  - Await MAX.  - Continue IV antibiotics for pyelonephritis and fungemia as below.         Fungemia due to UTI- (present on admission)   Assessment & Plan    - Continue micafungin IV, duration per ID  Will need to arrange outpatient infusion,  to assist  -Patient also has an IVC filter, we'll discuss with infectious disease for further recommendations   today to rule out endocarditis.   - ophthalmology , Dr. Cobos, negative for infection.   Repeat blood cultures negative, okay to place PICC line today    ID assisting        Enterobacter cloacae and Candida glabrata UTI / Pyelonephritis- (present on admission)   Assessment & Plan    - No further fevers.   - Continue meropenem and micafungin. ID on board, guiding antibiotics. regarding final antibiotic and duration recommendations.  On Invanz ×2 weeks per infectious disease, final duration per ID, will need arrangement for outpatient antibiotic infusion,  to assist  Repeat cultures are negative, follow-up, PICC line on Friday if remains negative, discussed with ID    2/8: MAX per Dr. Renee is negative, still pending official report transcript        Urinary retention- (present on admission)   Assessment & Plan    - Likely related to UTI. Continue Flomax. urology following.  Pacheco catheter discontinued per urology  Follow up as an outpatient  - s/p:lasix renal scan to rule-out ongoing obstruction: No obstruction          Hypokalemia    Assessment & Plan    - K WNL today. Continue PO K Dur 40 mg BID. Repeat K levels in AM.        Open wound of left thigh- (present on admission)   Assessment & Plan    - Continue wound VAC, wound care and dressing changes. Wound service on board.        History of DVT (deep vein thrombosis)- (present on admission)   Assessment & Plan    - with hypercoagulable disorder, and multiple episodes of DVTs.  - Continue therapeutic Lovenox subcutaneous injections.        Anemia of chronic disease- (present on admission)   Assessment & Plan    - Continue to monitor Hgb, transfuse for hemoglobin <7.        Type II diabetes mellitus (CMS-HCC)- (present on admission)   Assessment & Plan    - Continue glipizide, continue holding metformin while in-house. Continue sliding scale insulin coverage while in-house. Continue Accu-Cheks before meals and at bedtime. On regular diet per patient's request. Monitor for compliance.        Crohn disease (CMS-HCC)- (present on admission)   Assessment & Plan    - Not in acute flare. Continue home dose mesalamine.              Reviewed items::  Labs reviewed, Medications reviewed and Radiology images reviewed  Pacheco catheter::  Urinary Tract Retention or Urinary Tract Obstruction  DVT prophylaxis pharmacological::  Enoxaparin (Lovenox)  Ulcer Prophylaxis::  Not indicated  Antibiotics:  Treating active infection/contamination beyond 24 hours perioperative coverage

## 2018-02-09 NOTE — CARE PLAN
Problem: Communication  Goal: The ability to communicate needs accurately and effectively will improve  Outcome: PROGRESSING AS EXPECTED      Problem: Psychosocial Needs:  Goal: Level of anxiety will decrease  Outcome: PROGRESSING AS EXPECTED

## 2018-02-09 NOTE — PROGRESS NOTES
Assume care for patient at 0700. Pt AA/O X4, anxious but compliant and pleasant. LS clear. On ra. No SOB. VSS. HRR. BS+ bm x1 this am per pt. Denies N/V. Voids via urinal and brp. CMS+ SCHUMACHER. Up self in room, gait steady. refused SCDS to BLE. blateral back with dressings cdi, Left posterior thigh with drsgs with old drainage noted. PIV to lfa saline lock except with iv abx. Denies need for pain meds at this time. Discussed hourly rounding and POC, pt agreeable. Refused bed alarm, pt educated re: fall risk and need of bed alarm, Pt educated and verbalized understanding of the education, pt call for assistance at all times. Pt reoriented to skylight and call light at bedside and within reach.

## 2018-02-09 NOTE — PROGRESS NOTES
Infectious Disease Progress Note    Author: Bentia Hutchison M.D. Date & Time of service: 2018  12:37 PM    Chief Complaint:  FU fungemia and pyelopnephritis    Interval History:   Tmax 103, WBC 3.2, kaur placed given retention, denies any abd pain   Tmax 103, WBC 2.6, resting comfortably but having intermittent fevers and chills  2/3 Tmax 102.5, WBC 2.9, ongoing fevers and chills, pt very concerned about his illness, plan for MAX on Monday, blurry vision improved   Tmax 102, WBC 3.5, Bcx Cglabrata, very anxious, states he may have kidney procedure today for possible R ureteral stent, MAX tmrw, no abd pain  2018-Tmax 98.5. Apparently patient had chills today as per the wife but patient says he only was cold. WBC 3.9 platelets 227 creatinine 0.39  2018-Tmax 98.4. Kaur has been taken out. WBC 3.9 platelets 227 creatinine 0.39  2018-Tmax 98.9. Feels well. No new issues overnight  2019-Tmax 98.9 WBC 3.9 platelets 227 creatinine 0.39 the MAX results from 2018 a not available  Labs Reviewed, Medications Reviewed, Radiology Reviewed and Wound Reviewed.    Review of Systems:  Review of Systems   Constitutional: Negative for chills and fever.   Eyes: Negative for blurred vision and double vision.   Respiratory: Negative for cough and shortness of breath.    Gastrointestinal: Negative for abdominal pain, diarrhea, nausea and vomiting.   Genitourinary: Negative for dysuria.        Kaur discontinued and has been urinating normally.   Musculoskeletal: Negative for myalgias.   Psychiatric/Behavioral: The patient is not nervous/anxious.        Hemodynamics:  Temp (24hrs), Av.7 °C (98.1 °F), Min:36.1 °C (97 °F), Max:37.2 °C (98.9 °F)  Temperature: 36.8 °C (98.2 °F)  Pulse  Av.2  Min: 59  Max: 117   Blood Pressure : 122/70       Physical Exam:  Physical Exam   Constitutional: He is oriented to person, place, and time. He appears well-developed.   Older than stated age  Chronically ill  appearing   HENT:   Head: Normocephalic and atraumatic.   Eyes: EOM are normal. Pupils are equal, round, and reactive to light.   Neck: Neck supple.   Cardiovascular: Normal rate, regular rhythm and normal heart sounds.    Pulmonary/Chest: Effort normal. He has no wheezes. He has no rales.   Abdominal: Soft. There is no tenderness.   Genitourinary:   Genitourinary Comments: [place]co   Musculoskeletal:   Left posterior thigh wound  Clean-refer to the pictures   Neurological: He is alert and oriented to person, place, and time.       Meds:    Current Facility-Administered Medications:   •  famotidine  •  albuterol  •  LORazepam  •  fluconazole  •  finasteride  •  potassium chloride SA  •  magnesium oxide  •  micafungin  •  meropenem (MERREM) IV  •  HYDROmorphone  •  oxyCODONE immediate-release **OR** oxyCODONE immediate-release  •  ibuprofen  •  acetaminophen  •  lidocaine  •  glipiZIDE  •  mercaptopurine  •  tamsulosin  •  Respiratory Care per Protocol  •  ondansetron  •  ondansetron  •  insulin regular **AND** Accu-Chek ACHS **AND** NOTIFY MD and PharmD **AND** glucose 4 g **AND** dextrose 50%  •  NS  •  mesalamine delayed-release  •  enoxaparin    Labs:  No results for input(s): WBC, RBC, HEMOGLOBIN, HEMATOCRIT, MCV, MCH, RDW, PLATELETCT, MPV, NEUTSPOLYS, LYMPHOCYTES, MONOCYTES, EOSINOPHILS, BASOPHILS, RBCMORPHOLO in the last 72 hours.  No results for input(s): SODIUM, POTASSIUM, CHLORIDE, CO2, GLUCOSE, BUN, CPKTOTAL in the last 72 hours.  No results for input(s): ALBUMIN, TBILIRUBIN, ALKPHOSPHAT, TOTPROTEIN, ALTSGPT, ASTSGOT, CREATININE in the last 72 hours.    Imaging:  Ct-abdomen-pelvis With    Result Date: 1/30/2018 1/30/2018 4:31 PM HISTORY/REASON FOR EXAM:  Pain. Pain and fever TECHNIQUE/EXAM DESCRIPTION:   CT scan of the abdomen and pelvis with contrast. Contrast-enhanced helical scanning was obtained from the diaphragmatic domes through the pubic symphysis following the bolus administration of nonionic  contrast without complication. 100 mL of Omnipaque 350 nonionic contrast was administered without complication. Low dose optimization technique was utilized for this CT exam including automated exposure control and adjustment of the mA and/or kV according to patient size. COMPARISON: 12/12/2017 FINDINGS: CT Abdomen: Mild lung base atelectasis. No basilar pleural effusion. Multiple hepatic cysts measuring up to 1.4 cm in diameter are again demonstrated. The spleen is large measuring up to 14.8 cm diameter. No pancreatic or adrenal gland masses. Bilateral renal cysts measuring up to 9.3 cm on the right and 3.4 cm on the left again demonstrated. Left ureteral stent is demonstrated. Mild left pelvocaliectasis. There is moderate right hydronephrosis and dilatation of the right ureter. No distal ureteral stones identified. Lower pole 5 mm right renal stone again demonstrated. The gallbladder is partially distended. No calcified gallstones identified. No evidence of bowel obstruction. The appendix is not delineated. There are diverticula colon. No evidence of diverticulitis. Mild calcified plaque aorta. Subcentimeter short axis retroperitoneal lymph nodes. An IVC filter is located below the renal veins. CT Pelvis: The bladder is partially distended. The wall of the bladder is mildly thickened. There are prostate calcifications.     Left ureteral stent is in place. Mild left pelvocaliectasis. Moderate right hydronephrosis and dilatation of the right ureter. No distal right ureteral stone is identified. 5 mm nonobstructing lower pole right renal stone. Bilateral renal cysts again demonstrated. Hepatic cysts again demonstrated. Splenomegaly. Diverticula colon. No evidence diverticulitis. The appendix is not delineated. No free fluid.    Dx-chest-portable (1 View)    Result Date: 1/30/2018 1/30/2018 1:42 PM HISTORY/REASON FOR EXAM:  Fever and chills TECHNIQUE/EXAM DESCRIPTION AND NUMBER OF VIEWS: Single portable view of the  chest. COMPARISON: 1/27/18 FINDINGS: No pulmonary infiltrates or consolidations are noted. No pleural effusion. No pneumothorax. Stable cardiopericardial silhouette.     1. No acute cardiopulmonary abnormalities are identified.    Dx-chest-portable (1 View)    Result Date: 1/27/2018 1/27/2018 12:17 AM HISTORY/REASON FOR EXAM:  Possible sepsis TECHNIQUE/EXAM DESCRIPTION:  Single AP view of the chest. COMPARISON: December 26, 2017 FINDINGS: Overlying cardiac leads are present. The cardiac silhouette appears within normal limits. The mediastinal contour appears within normal limits.  The central pulmonary vasculature appears normal. The lungs appear well expanded bilaterally.  Bilateral lungs are clear. No significant pleural effusions are identified. The bony structures appear age-appropriate.     1.  No acute cardiopulmonary disease.    [id number]    Result Date: 1/27/2018 1/27/2018 2:27 AM HISTORY/REASON FOR EXAM: Pain. TECHNIQUE/EXAM DESCRIPTION: Real-time sonography of the scrotum was performed with gray-scale, color and duplex Doppler imaging. COMPARISON: November 12, 2017 FINDINGS: There is diffuse heterogeneous flow within the right testicle, which is new since prior study. Increased flow within the right testicle and epididymis is identified. The right testis measures 3.97 cm x 2.66 cm x 3.47 cm. 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 The right epididymis measures 13.3 x 17.6 mm. Large right hydrocele is seen. No varicocele is detected.     1.  Heterogeneous parenchyma of the right testicle with markedly increased flow of the right epididymis and testis, appearance favors epididymal orchitis. Recommend sonographic follow-up for reevaluation of the right testis in 7-14 days to exclude infiltrating testicular mass. 2.  Large right hydrocele. 3.  Surgical absence the left testicle    Ir-conv Perq Neph To Nephroureteral Cath (all Radiology) Left    Result Date: 1/29/2018 1/29/2018 9:28 AM  Bilateral nephrostograms and bilateral nephrostomy tube removals and left ureteral stent placement. HISTORY/REASON FOR EXAM: Ureteral obstruction. TECHNIQUE: Following informed consent patient prepped and draped in the usual fashion.  10 cc 1% lidocaine was utilized for local anesthesia. SEDATION: Conscious sedation with 150 mcg of Fentanyl and 5 mg of Versed was administered during the procedure with appropriate continuous patient monitoring by the radiology nurse. Sedation duration: 30 minutes. The procedure was performed using MAXIMAL STERILE BARRIER technique including sterile gown, mask, cap, and donning of sterile gloves following appropriate hand hygiene and/or sterile scrub. Patient skin site was prepped with 2% Chlorhexidine solution. CONTRAST: 45 mL of Omnipaque 300. FLUOROSCOPY: 4 fluoroscopic images obtained. 3.4 minutes. Omnipaque 300 was injected into the indwelling bilateral nephrostomy tubes and digital imaging was obtained over the kidneys and ureters. The right nephrostomy tube was subsequently removed. Next a guidewire was advanced into the left ureter and the indwelling left nephrostomy tube was removed.  A 5-Kyrgyz catheter was advanced into the bladder over the guidewire.  Next the guidewire was exchanged for an Amplatz guidewire which was coiled in  the bladder.  Next an 8-Kyrgyz 24-cm in length double-J ureteral stent was advanced over the guidewire and positioned with its distal loop in the bladder and proximal loop in the left renal collecting system.  The internal stiffener was removed and the guidewire was retracted and positioned in the left renal collecting system.  An 8.5-Kyrgyz locking loop catheter was also positioned in the left renal collecting system.   Omnipaque 300 was injected and digital imaging was obtained.  The patient tolerated procedure well and sent to the floor in good condition. FINDINGS: The initial right nephrostogram revealed excellent filling of the right renal  "collecting system with good positioning of the right nephrostomy tube. There was prompt flow of contrast down the right ureter into the bladder. There was no evidence of right-sided ureteral stricture or filling defect. The right nephrostomy tube was subsequently removed. The initial left-sided nephrostogram revealed a high-grade focal stricture at the left ureteral vesicular junction. There was delayed filling of the left ureter with no other areas of stricture formation or obstruction noted in the left ureter. Digital images demonstrate good positioning of the left-sided double-J ureteral stent in the renal collecting system and bladder.     1. Right-sided nephrostogram revealed normal-appearing right renal collecting system and right ureter with prompt flow of contrast into the bladder and no evidence of right-sided ureteral stricture or filling defect. The right-sided nephrostomy tube was subsequently removed. 2. Left-sided nephrostogram revealed a high-grade focal stricture at the left ureteral fascicular junction. The remainder of the left ureter has a normal appearance. 3. Successful percutaneous placement of left-sided double-J ureteral stent.      Micro:  Results     Procedure Component Value Units Date/Time    BLOOD CULTURE [[id number]]  (Abnormal) Collected:  02/01/18 0928    Order Status:  Completed Specimen:  Blood from Peripheral Updated:  02/07/18 1019     Significant Indicator POS (POS)     Source BLD     Site PERIPHERAL     Blood Culture Growth detected by Bactec instrument.  02/05/2018  11:21 (A)     Blood Culture -- (A)     Candida glabrata  See previous culture for sensitivity report.      Narrative:       CALL  Lisa  131 tel. [phone],  CALLED  131 tel. [phone] 02/05/2018, 11:22, RB PERF. RESULTS CALLED  TO:[id number]  Per Hospital Policy: Only change Specimen Src: to \"Line\" if  specified by physician order.    BLOOD CULTURE [[id number]] Collected:  02/06/18 1649    Order Status:  " "Completed Specimen:  Blood from Peripheral Updated:  02/07/18 0705     Significant Indicator NEG     Source BLD     Site PERIPHERAL     Blood Culture --     No Growth    Note: Blood cultures are incubated for 5 days and  are monitored continuously.Positive blood cultures  are called to the RN and reported as soon as  they are identified.      Narrative:       Per Hospital Policy: Only change Specimen Src: to \"Line\" if  specified by physician order.    BLOOD CULTURE [[id number]] Collected:  02/06/18 1649    Order Status:  Completed Specimen:  Blood from Peripheral Updated:  02/07/18 0705     Significant Indicator NEG     Source BLD     Site PERIPHERAL     Blood Culture --     No Growth    Note: Blood cultures are incubated for 5 days and  are monitored continuously.Positive blood cultures  are called to the RN and reported as soon as  they are identified.      Narrative:       Per Hospital Policy: Only change Specimen Src: to \"Line\" if  specified by physician order.    BLOOD CULTURE [[id number]] Collected:  02/01/18 1636    Order Status:  Completed Specimen:  Blood from Peripheral Updated:  02/06/18 1900     Significant Indicator NEG     Source BLD     Site PERIPHERAL     Blood Culture No growth after 5 days of incubation.    Narrative:       Per Hospital Policy: Only change Specimen Src: to \"Line\" if  specified by physician order.    BLOOD CULTURE [[id number]] Collected:  02/01/18 1636    Order Status:  Completed Specimen:  Blood from Peripheral Updated:  02/06/18 1900     Significant Indicator NEG     Source BLD     Site PERIPHERAL     Blood Culture No growth after 5 days of incubation.    Narrative:       Per Hospital Policy: Only change Specimen Src: to \"Line\" if  specified by physician order.    BLOOD CULTURE [[id number]] Collected:  02/01/18 0928    Order Status:  Completed Specimen:  Blood from Peripheral Updated:  02/06/18 1100     Significant Indicator NEG     Source BLD     Site PERIPHERAL     Blood Culture No " "growth after 5 days of incubation.    Narrative:       Per Hospital Policy: Only change Specimen Src: to \"Line\" if  specified by physician order.    FUNGAL JOSE ALFREDO INTERPRETATION [[id number]] Collected:  01/30/18 1435    Order Status:  Completed Specimen:  Blood Updated:  02/05/18 1201     Significant Indicator NEG     Source BLD     Site PERIPHERAL     Fungal JOSE ALFREDO Interp --     JOSE ALFREDO Interpretative Data:  -  Units: mcg/mL (micrograms/mL)  SDD: Susceptible-dose dependent (SDD category implies  clinical efficacy when higher than normal dosage of a drug  can be used and maximal possible blood level achieved.)  NS: Nonsusceptible (This category is used for organisms that  currently have only a susceptible interpretive category, but  not intermediate or resistant interpretive categories.)  None: No CLSI interpretive guidelines available at this time.  -  If an AMPHOTERICIN B JOSE ALFREDO of >1 mcg/mL is obtained for Candida  spp., that isolate is likely resistant to AMPHOTERICIN B.  There are no CLSI breakpoints.  -  FLUCYTOSINE JOSE ALFREDO breakpoints are based largely on historical  data and partially on the drug's pharmacokinetics.  -  For FLUCONAZOLE, the guidelines are based on extensive  experience with mucosal and invasive infections due to  Candida spp. When an isolate is identified as Candida  glabrata and the JOSE ALFREDO is <32mcg/mL, patients should receive  a maximum dosage regimen of FLUCONAZOLE.  Isolates of Candida  krusei are assumed to be intrinsically resistant to  FLUCONAZOLE and their MICs should not be interpreted using  this scale.  -  For ITRACONAZOLE, the data is based entirely on experience  with mucosal infections, and data supporting breakpoints for  invasive infections due to Candida spp. are not available.  -  For ANIDULAFUNGIN, CASPOFUNGIN, MICAFUNGIN, and VORICONZOLE  the data are based substantially on experience with  non-neutropenic patients with candidemia, and their clinical  relevance in other settings is " "uncertain.  -  For POSACONAZOLE the majority of isolates are inhibited by  <1 mcg/mL.  However, data are not yet available to indicate  a correlation between JOSE ALFREDO and outcome of treatment with this  agent.  -  Susceptible Dose Dependent (SDD) applies to FLUCONAZOLE and  ITRACONAZOLE. Susceptible is dependent on achieving the  maximum possible blood level.  -  The Sensititre YeastOne Susceptibility plates have been  validated for use at [facility]. These  plates are designed for Research Use Only. However, there are  CLSI approved documents for interpretive criteria for  5-FLUCYTOSINE, FLUCONAZOLE, ITRACONAZOLE, VORICONAZOLE, and  the ECHINOCANDINS. As with any in vitro susceptibility  testing method, the results of testing should be correlated  with the patient's clinical response to prescribed therapy.      Narrative:       CALL  Lisa  131 tel. [phone],  CALLED  131 tel. [phone] 01/31/2018, 21:30, RB PERF. RESULTS CALLED  TO:[phone].  Per Hospital Policy: Only change Specimen Src: to \"Line\" if  specified by physician order.    BLOOD CULTURE [[id number]]  (Abnormal)  (Susceptibility) Collected:  01/30/18 1435    Order Status:  Completed Specimen:  Blood from Peripheral Updated:  02/05/18 1201     Significant Indicator POS (POS)     Source BLD     Site PERIPHERAL     Blood Culture Growth detected by Bactec instrument.  01/31/2018  21:27 (A)     Blood Culture Candida glabrata (A)    Narrative:       CALL  Lisa  131 tel. [phone],  CALLED  131 tel. [phone] 01/31/2018, 21:30, RB PERF. RESULTS CALLED  TO:[phone].  Per Hospital Policy: Only change Specimen Src: to \"Line\" if  specified by physician order.    Culture & Susceptibility     CANDIDA GLABRATA     Antibiotic Sensitivity Microscan Unit Status    Amphoter B 24hr No Interpretation 0.5 mcg/mL Final    Anidulafungin 24hr Sensitive 0.06 mcg/mL Final    Caspofungin 24hr Sensitive 0.12 mcg/mL Final    Fluconazole 24hr Susceptible " "Dose-dependent 32 mcg/mL Final    Micafungin 24hr Sensitive 0.015 mcg/mL Final                       URINALYSIS [[id number]]  (Abnormal) Collected:  02/04/18 0900    Order Status:  Completed Specimen:  Urine from Urine, [place] Cath Updated:  02/04/18 2344     Micro Urine Req Microscopic     Color Lynnette     Character Turbid (A)     Specific Gravity 1.018     Ph 5.5     Glucose 100 (A) mg/dL      Ketones Negative mg/dL      Protein 100 (A) mg/dL      Bilirubin Negative     Urobilinogen, Urine 1.0     Nitrite Negative     Leukocyte Esterase Moderate (A)     Occult Blood Large (A)    Narrative:       Collected By:[id number] DARNELL ANGELES    URINE CULTURE(NEW) [[id number]]  (Abnormal) Collected:  01/30/18 1553    Order Status:  Completed Specimen:  Urine Updated:  02/04/18 1652     Significant Indicator POS (POS)     Source UR     Site --     Urine Culture -- (A)     Growth noted after further incubation,see below for  organism identification.       Urine Culture -- (A)     Candida glabrata  >100,000 cfu/mL       Urine Culture -- (A)     Candida albicans  <10,000 cfu/mL      Narrative:       Indication for culture:->Emergency Room Patient    BLOOD CULTURE [[id number]] Collected:  01/30/18 1230    Order Status:  Completed Specimen:  Blood from Peripheral Updated:  02/04/18 1500     Significant Indicator NEG     Source BLD     Site PERIPHERAL     Blood Culture No growth after 5 days of incubation.    Narrative:       Per Hospital Policy: Only change Specimen Src: to \"Line\" if  specified by physician order.          Assessment:  Active Hospital Problems    Diagnosis   • *Sepsis due to pyelonephritis / complicated UTI (CMS-HCC) [A41.9]   • Hypokalemia [E87.6]   • Urinary retention [R33.9]   • Pyelonephritis [N12]   • Open wound of left thigh [S71.102A]   • History of DVT (deep vein thrombosis) [Z86.718]   • Anemia of chronic disease [D63.8]   • Crohn disease (CMS-HCC) [K50.90]   • Type II diabetes mellitus (CMS-HCC) [E11.9] "       Plan:  Sepsis  2/2 Fungemia and pyelonephritis  Blood cultures are positive from 2/1/2018  Repeat blood cultures  2/6/2018- if negative then can get a PICC line  Ophthalmology consultation appreciated        Fungemia -   Ucx - Calbicans  Bcx 1/30 (1/2) - Cglabrata  DC fluc  Continue micafungin   Repeat Bcx 2/1 - positive now  TTE - neg  MAX results are pending  Continue with the Diflucan and Mycamine  Ophthalmology consultation done and no ocular involvement  Blood cultures from 2/6/2018 are negative so far. He will get a PICC line today      Pyelonephritis  Ucx yeast  Abx per above  [facility] - enterobacter cloacae (confirmed [facility] on [facility]/1/18) R to cefepime, zosyn. S to meropenem, cipro and bactrim  P[place]co in place  We will likely put him on Bactrim catheter and Cipro on discharge. Aim for Bactrim through 2/14/2018    H/o Bladder rupture  S/p bilateral PCN removal on 1/29    DM2  HgA1c 6.7  Maintain BS less than 150 to control infection    Open left thigh wound  Not grossly infected  Wound vac and care    Discussed with internal medicine/Dr Chairez

## 2018-02-10 PROBLEM — I10 HYPERTENSION: Status: ACTIVE | Noted: 2018-02-10

## 2018-02-10 LAB
GLUCOSE BLD-MCNC: 175 MG/DL (ref 65–99)
GLUCOSE BLD-MCNC: 184 MG/DL (ref 65–99)
GLUCOSE BLD-MCNC: 193 MG/DL (ref 65–99)
GLUCOSE BLD-MCNC: 223 MG/DL (ref 65–99)

## 2018-02-10 PROCEDURE — A9270 NON-COVERED ITEM OR SERVICE: HCPCS | Performed by: HOSPITALIST

## 2018-02-10 PROCEDURE — 700102 HCHG RX REV CODE 250 W/ 637 OVERRIDE(OP): Performed by: HOSPITALIST

## 2018-02-10 PROCEDURE — A9270 NON-COVERED ITEM OR SERVICE: HCPCS | Performed by: INTERNAL MEDICINE

## 2018-02-10 PROCEDURE — A9270 NON-COVERED ITEM OR SERVICE: HCPCS | Performed by: NURSE PRACTITIONER

## 2018-02-10 PROCEDURE — 700102 HCHG RX REV CODE 250 W/ 637 OVERRIDE(OP): Performed by: INTERNAL MEDICINE

## 2018-02-10 PROCEDURE — 700102 HCHG RX REV CODE 250 W/ 637 OVERRIDE(OP): Performed by: NURSE PRACTITIONER

## 2018-02-10 PROCEDURE — 770006 HCHG ROOM/CARE - MED/SURG/GYN SEMI*

## 2018-02-10 PROCEDURE — 700105 HCHG RX REV CODE 258: Performed by: INTERNAL MEDICINE

## 2018-02-10 PROCEDURE — A9270 NON-COVERED ITEM OR SERVICE: HCPCS

## 2018-02-10 PROCEDURE — 700111 HCHG RX REV CODE 636 W/ 250 OVERRIDE (IP): Performed by: HOSPITALIST

## 2018-02-10 PROCEDURE — 82962 GLUCOSE BLOOD TEST: CPT

## 2018-02-10 PROCEDURE — 700111 HCHG RX REV CODE 636 W/ 250 OVERRIDE (IP): Performed by: INTERNAL MEDICINE

## 2018-02-10 PROCEDURE — 700102 HCHG RX REV CODE 250 W/ 637 OVERRIDE(OP)

## 2018-02-10 PROCEDURE — 99233 SBSQ HOSP IP/OBS HIGH 50: CPT | Performed by: INTERNAL MEDICINE

## 2018-02-10 RX ORDER — LISINOPRIL 10 MG/1
10 TABLET ORAL
Status: DISCONTINUED | OUTPATIENT
Start: 2018-02-10 | End: 2018-02-12 | Stop reason: HOSPADM

## 2018-02-10 RX ADMIN — MICAFUNGIN SODIUM 100 MG: 20 INJECTION, POWDER, LYOPHILIZED, FOR SOLUTION INTRAVENOUS at 08:03

## 2018-02-10 RX ADMIN — ACETAMINOPHEN 650 MG: 325 TABLET, FILM COATED ORAL at 05:46

## 2018-02-10 RX ADMIN — MAGNESIUM GLUCONATE 500 MG ORAL TABLET 400 MG: 500 TABLET ORAL at 08:03

## 2018-02-10 RX ADMIN — OXYCODONE HYDROCHLORIDE 5 MG: 5 TABLET ORAL at 20:04

## 2018-02-10 RX ADMIN — ENOXAPARIN SODIUM 80 MG: 100 INJECTION SUBCUTANEOUS at 18:13

## 2018-02-10 RX ADMIN — INSULIN HUMAN 1 UNITS: 100 INJECTION, SOLUTION PARENTERAL at 20:09

## 2018-02-10 RX ADMIN — POTASSIUM CHLORIDE 40 MEQ: 1500 TABLET, EXTENDED RELEASE ORAL at 20:03

## 2018-02-10 RX ADMIN — INSULIN HUMAN 1 UNITS: 100 INJECTION, SOLUTION PARENTERAL at 11:06

## 2018-02-10 RX ADMIN — MEROPENEM 500 MG: 500 INJECTION, POWDER, FOR SOLUTION INTRAVENOUS at 11:58

## 2018-02-10 RX ADMIN — LISINOPRIL 10 MG: 10 TABLET ORAL at 14:42

## 2018-02-10 RX ADMIN — FINASTERIDE 5 MG: 5 TABLET, FILM COATED ORAL at 08:02

## 2018-02-10 RX ADMIN — MEROPENEM 500 MG: 500 INJECTION, POWDER, FOR SOLUTION INTRAVENOUS at 05:49

## 2018-02-10 RX ADMIN — INSULIN HUMAN 1 UNITS: 100 INJECTION, SOLUTION PARENTERAL at 16:17

## 2018-02-10 RX ADMIN — MEROPENEM 500 MG: 500 INJECTION, POWDER, FOR SOLUTION INTRAVENOUS at 18:13

## 2018-02-10 RX ADMIN — INSULIN HUMAN 2 UNITS: 100 INJECTION, SOLUTION PARENTERAL at 05:55

## 2018-02-10 RX ADMIN — MESALAMINE 400 MG: 400 CAPSULE, DELAYED RELEASE ORAL at 08:02

## 2018-02-10 RX ADMIN — GLIPIZIDE 5 MG: 5 TABLET ORAL at 16:16

## 2018-02-10 RX ADMIN — MESALAMINE 400 MG: 400 CAPSULE, DELAYED RELEASE ORAL at 20:03

## 2018-02-10 RX ADMIN — TAMSULOSIN HYDROCHLORIDE 0.8 MG: 0.4 CAPSULE ORAL at 08:02

## 2018-02-10 RX ADMIN — POTASSIUM CHLORIDE 40 MEQ: 1500 TABLET, EXTENDED RELEASE ORAL at 08:03

## 2018-02-10 RX ADMIN — MERCAPTOPURINE 150 MG: 50 TABLET ORAL at 08:04

## 2018-02-10 RX ADMIN — GLIPIZIDE 5 MG: 5 TABLET ORAL at 08:02

## 2018-02-10 RX ADMIN — MESALAMINE 400 MG: 400 CAPSULE, DELAYED RELEASE ORAL at 14:42

## 2018-02-10 RX ADMIN — ENOXAPARIN SODIUM 80 MG: 100 INJECTION SUBCUTANEOUS at 05:47

## 2018-02-10 RX ADMIN — FLUCONAZOLE 400 MG: 200 TABLET ORAL at 08:03

## 2018-02-10 ASSESSMENT — COPD QUESTIONNAIRES
DURING THE PAST 4 WEEKS HOW MUCH DID YOU FEEL SHORT OF BREATH: NONE/LITTLE OF THE TIME
COPD SCREENING SCORE: 2
DO YOU EVER COUGH UP ANY MUCUS OR PHLEGM?: NO/ONLY WITH OCCASIONAL COLDS OR INFECTIONS
HAVE YOU SMOKED AT LEAST 100 CIGARETTES IN YOUR ENTIRE LIFE: NO/DON'T KNOW

## 2018-02-10 ASSESSMENT — ENCOUNTER SYMPTOMS
VOMITING: 0
ABDOMINAL PAIN: 0
BACK PAIN: 0
WEAKNESS: 1
DIZZINESS: 0
MYALGIAS: 0
CHILLS: 0
DOUBLE VISION: 0
HEARTBURN: 0
NAUSEA: 0
NERVOUS/ANXIOUS: 0
SHORTNESS OF BREATH: 0
FLANK PAIN: 0
PALPITATIONS: 0
MEMORY LOSS: 1
DIARRHEA: 0
FEVER: 0
COUGH: 0
FOCAL WEAKNESS: 0
BLURRED VISION: 0

## 2018-02-10 ASSESSMENT — PAIN SCALES - GENERAL
PAINLEVEL_OUTOF10: 2
PAINLEVEL_OUTOF10: 5
PAINLEVEL_OUTOF10: 0

## 2018-02-10 NOTE — CARE PLAN
Problem: Pain Management  Goal: Pain level will decrease to patient's comfort goal  Outcome: PROGRESSING AS EXPECTED  Denies having any pain; aware pain medication is available.     Problem: Mobility  Goal: Risk for activity intolerance will decrease  Outcome: PROGRESSING AS EXPECTED  Pt is self ambulatory without any assistive device.

## 2018-02-10 NOTE — PROGRESS NOTES
Pt is AAO x4.  Denies any pain or discomfort at this time.  VS WNL.  L hamstring wound dressing in place, CDI.  Low back dressing x2 in place due to nephrology tube removal.  L PIV patent, running fluids.  POC discussed.  All needs met at this time.  Bed in low position.  Call light within reach.  Rounding in place.

## 2018-02-10 NOTE — CARE PLAN
Problem: Knowledge Deficit  Goal: Knowledge of the prescribed therapeutic regimen will improve  POC discussed. Pt understands he will have PICC placed. Will determine time and ensure consent is signed. Will keep pt updated.     Problem: Skin Integrity  Goal: Risk for impaired skin integrity will decrease  Will continue to assess wound dressings and change PRN.

## 2018-02-10 NOTE — PROGRESS NOTES
[facility] Hospitalist Progress Note    Date of Service: 2/10/2018    Chief Complaint  67 y.o. male with Crohn's disease, type II diabetes mellitus, history of DVT, history of bladder rupture, bilateral nephrostomy tube with removal and ureteral stent placement, was just discharged yesterday, at that time was also felt to have UTI, but urine grew mixed skin anca and yeast. ID was consulted then, and was transitioned to oral ciprofloxacin and fluconazole to complete 14 days course. Admitted 1/30/2018 with fevers, chills, nausea and vomiting, along with increasing urinary frequency and bladder spasms. Labs showed no leukocytosis. BMP was unimpressive except for hyperglycemia, with normal creatinine. CT scan showed left ureteral stent in place, moderate right hydronephrosis and dilation of the right ureter, 5 mm nonobstructing lower pole right renal stone. Urinalysis showed packed WBCs, 100-150 RBC, with moderate leukocyte esterase. Felt to have sepsis due to UTI/pyelonephritis, and started on IV fluids, and IV cefepime. Low potassium replaced, given magnesium for low normal levels.Continues to retain urine, kaur placed. Blood cultures ×1 bottle growing Candida glabrata, as well as the urine culture. [facility] Enterobacter cloacae resistant to cefepime and Zosyn. Antibiotic changed to meropenem and micafungin by ID. continued to spike fevers. Cardiology consulted for MAX. Repeat blood cultures negative.    Interval Problem Update  Patient is anxious to have his PICC line placed  Otherwise no other complaints  Outpatient infusion to be arranged by ID,  to assist    Consultants/Specialty  ID  Cardiology  Urology  Ophthalmology    Disposition  PICC line placement  ID for  antifungal duration  By mouth antibiotics per ID   to assist      Review of Systems   Constitutional: Negative for chills and fever.   Respiratory: Negative for shortness of breath.    Cardiovascular:  Negative for chest pain, palpitations and leg swelling.   Gastrointestinal: Negative for abdominal pain, heartburn and nausea.   Genitourinary: Negative for flank pain and urgency.   Musculoskeletal: Negative for back pain and myalgias.   Neurological: Positive for weakness. Negative for dizziness and focal weakness.   Psychiatric/Behavioral: Positive for memory loss. The patient is not nervous/anxious.         Pertinent positives/negatives as mentioned above.     A complete review of systems was done. All other systems were negative.      Physical Exam  Laboratory/Imaging   Hemodynamics  Temp (24hrs), Av.7 °C (98 °F), Min:36.5 °C (97.7 °F), Max:36.9 °C (98.5 °F)   Temperature: 36.6 °C (97.8 °F)  Pulse  Av.3  Min: 59  Max: 117    Blood Pressure : 149/79      Respiratory      Respiration: 18, Pulse Oximetry: 96 %             Fluids    Intake/Output Summary (Last 24 hours) at 02/10/18 1242  Last data filed at 02/10/18 0353   Gross per 24 hour   Intake                0 ml   Output             1475 ml   Net            -1475 ml       Nutrition  Orders Placed This Encounter   Procedures   • DIET ORDER     Standing Status:   Standing     Number of Occurrences:   1     Order Specific Question:   Diet:     Answer:   Regular [1]     Physical Exam   Constitutional: He is oriented to person, place, and time. He appears well-developed. No distress.   Thin   HENT:   Head: Normocephalic and atraumatic.   Eyes: EOM are normal. Pupils are equal, round, and reactive to light.   Neck: Normal range of motion. Neck supple.   Cardiovascular: Normal rate and regular rhythm.    Pulmonary/Chest: Effort normal. No respiratory distress.   Abdominal: Soft. Bowel sounds are normal. He exhibits no distension.   Musculoskeletal: Normal range of motion. He exhibits no edema.   Neurological: He is alert and oriented to person, place, and time. No cranial nerve deficit. Coordination normal.   Skin: Skin is warm and dry.   Psychiatric: He  has a normal mood and affect. His behavior is normal. Judgment and thought content normal.   Nursing note and vitals reviewed.                                     Assessment/Plan     * Sepsis due to pyelonephritis / complicated UTI (CMS-HCC)- (present on admission)   Assessment & Plan    - This is sepsis (without associated acute organ dysfunction).  Source is pyelonephritis/complicated UTI. Rule out endocarditis. Fever has resolved.  - Await MAX.  - Continue IV antibiotics for pyelonephritis and fungemia as below.         Fungemia due to UTI- (present on admission)   Assessment & Plan    - Continue micafungin IV, duration per ID  Will need to arrange outpatient infusion,  to assist  -Patient also has an IVC filter, we'll discuss with infectious disease for further recommendations   today to rule out endocarditis.   - ophthalmology , Dr. Cobos, negative for infection.      ID assisting        Enterobacter cloacae and Candida glabrata UTI / Pyelonephritis- (present on admission)   Assessment & Plan    - No further fevers.   - Continue meropenem and micafungin. ID on board, guiding antibiotics. regarding final antibiotic and duration recommendations.  On Invanz ×2 weeks per infectious disease, final duration per ID, will need arrangement for outpatient antibiotic infusion,  to assist  Repeat cultures are negative    2/8: MAX per Dr. Renee is negative, still pending official report transcript    2/10: PICC ordered        Urinary retention- (present on admission)   Assessment & Plan    - Likely related to UTI. Continue Flomax. urology following.  Pacheco catheter discontinued per urology  Follow up as an outpatient  - s/p:lasix renal scan to rule-out ongoing obstruction: No obstruction          Hypokalemia   Assessment & Plan    - K WNL today. Continue PO K Dur 40 mg BID. Repeat K levels in AM.        Open wound of left thigh- (present on admission)   Assessment & Plan    - Continue wound VAC,  wound care and dressing changes. Wound service on board.        Hypertension   Assessment & Plan    Add lisinopril  Essential  Uncontrolled        History of DVT (deep vein thrombosis)- (present on admission)   Assessment & Plan    - with hypercoagulable disorder, and multiple episodes of DVTs.  - Continue therapeutic Lovenox subcutaneous injections.        Anemia of chronic disease- (present on admission)   Assessment & Plan    - Continue to monitor Hgb, transfuse for hemoglobin <7.        Type II diabetes mellitus (CMS-HCC)- (present on admission)   Assessment & Plan    - Continue glipizide, continue holding metformin while in-house. Continue sliding scale insulin coverage while in-house. Continue Accu-Cheks before meals and at bedtime. On regular diet per patient's request. Monitor for compliance.        Crohn disease (CMS-HCC)- (present on admission)   Assessment & Plan    - Not in acute flare. Continue home dose mesalamine.              Reviewed items::  Labs reviewed, Medications reviewed and Radiology images reviewed  Pacheco catheter::  Urinary Tract Retention or Urinary Tract Obstruction  DVT prophylaxis pharmacological::  Enoxaparin (Lovenox)  Ulcer Prophylaxis::  Not indicated  Antibiotics:  Treating active infection/contamination beyond 24 hours perioperative coverage

## 2018-02-11 LAB
BACTERIA BLD CULT: NORMAL
BACTERIA BLD CULT: NORMAL
GLUCOSE BLD-MCNC: 145 MG/DL (ref 65–99)
GLUCOSE BLD-MCNC: 163 MG/DL (ref 65–99)
GLUCOSE BLD-MCNC: 182 MG/DL (ref 65–99)
GLUCOSE BLD-MCNC: 206 MG/DL (ref 65–99)
SIGNIFICANT IND 70042: NORMAL
SIGNIFICANT IND 70042: NORMAL
SITE SITE: NORMAL
SITE SITE: NORMAL
SOURCE SOURCE: NORMAL
SOURCE SOURCE: NORMAL

## 2018-02-11 PROCEDURE — 82962 GLUCOSE BLOOD TEST: CPT | Mod: 91

## 2018-02-11 PROCEDURE — A9270 NON-COVERED ITEM OR SERVICE: HCPCS | Performed by: NURSE PRACTITIONER

## 2018-02-11 PROCEDURE — A9270 NON-COVERED ITEM OR SERVICE: HCPCS | Performed by: HOSPITALIST

## 2018-02-11 PROCEDURE — 700102 HCHG RX REV CODE 250 W/ 637 OVERRIDE(OP): Performed by: NURSE PRACTITIONER

## 2018-02-11 PROCEDURE — 700111 HCHG RX REV CODE 636 W/ 250 OVERRIDE (IP): Performed by: INTERNAL MEDICINE

## 2018-02-11 PROCEDURE — A9270 NON-COVERED ITEM OR SERVICE: HCPCS

## 2018-02-11 PROCEDURE — 99232 SBSQ HOSP IP/OBS MODERATE 35: CPT | Performed by: INTERNAL MEDICINE

## 2018-02-11 PROCEDURE — 700102 HCHG RX REV CODE 250 W/ 637 OVERRIDE(OP): Performed by: HOSPITALIST

## 2018-02-11 PROCEDURE — 700111 HCHG RX REV CODE 636 W/ 250 OVERRIDE (IP): Performed by: HOSPITALIST

## 2018-02-11 PROCEDURE — 700102 HCHG RX REV CODE 250 W/ 637 OVERRIDE(OP)

## 2018-02-11 PROCEDURE — A9270 NON-COVERED ITEM OR SERVICE: HCPCS | Performed by: INTERNAL MEDICINE

## 2018-02-11 PROCEDURE — 700105 HCHG RX REV CODE 258: Performed by: INTERNAL MEDICINE

## 2018-02-11 PROCEDURE — 700102 HCHG RX REV CODE 250 W/ 637 OVERRIDE(OP): Performed by: INTERNAL MEDICINE

## 2018-02-11 PROCEDURE — A6209 FOAM DRSG <=16 SQ IN W/O BDR: HCPCS | Performed by: INTERNAL MEDICINE

## 2018-02-11 PROCEDURE — 770006 HCHG ROOM/CARE - MED/SURG/GYN SEMI*

## 2018-02-11 RX ORDER — GLIPIZIDE 5 MG/1
10 TABLET ORAL 2 TIMES DAILY WITH MEALS
Status: DISCONTINUED | OUTPATIENT
Start: 2018-02-11 | End: 2018-02-12 | Stop reason: HOSPADM

## 2018-02-11 RX ADMIN — TAMSULOSIN HYDROCHLORIDE 0.8 MG: 0.4 CAPSULE ORAL at 07:35

## 2018-02-11 RX ADMIN — FLUCONAZOLE 400 MG: 200 TABLET ORAL at 07:35

## 2018-02-11 RX ADMIN — MESALAMINE 400 MG: 400 CAPSULE, DELAYED RELEASE ORAL at 20:40

## 2018-02-11 RX ADMIN — MEROPENEM 500 MG: 500 INJECTION, POWDER, FOR SOLUTION INTRAVENOUS at 06:19

## 2018-02-11 RX ADMIN — INSULIN HUMAN 1 UNITS: 100 INJECTION, SOLUTION PARENTERAL at 06:28

## 2018-02-11 RX ADMIN — MERCAPTOPURINE 150 MG: 50 TABLET ORAL at 07:35

## 2018-02-11 RX ADMIN — MAGNESIUM GLUCONATE 500 MG ORAL TABLET 400 MG: 500 TABLET ORAL at 07:35

## 2018-02-11 RX ADMIN — GLIPIZIDE 10 MG: 5 TABLET ORAL at 16:16

## 2018-02-11 RX ADMIN — MEROPENEM 500 MG: 500 INJECTION, POWDER, FOR SOLUTION INTRAVENOUS at 00:21

## 2018-02-11 RX ADMIN — FINASTERIDE 5 MG: 5 TABLET, FILM COATED ORAL at 07:35

## 2018-02-11 RX ADMIN — MICAFUNGIN SODIUM 100 MG: 20 INJECTION, POWDER, LYOPHILIZED, FOR SOLUTION INTRAVENOUS at 09:03

## 2018-02-11 RX ADMIN — MESALAMINE 400 MG: 400 CAPSULE, DELAYED RELEASE ORAL at 07:35

## 2018-02-11 RX ADMIN — POTASSIUM CHLORIDE 40 MEQ: 1500 TABLET, EXTENDED RELEASE ORAL at 20:39

## 2018-02-11 RX ADMIN — POTASSIUM CHLORIDE 40 MEQ: 1500 TABLET, EXTENDED RELEASE ORAL at 07:35

## 2018-02-11 RX ADMIN — INSULIN HUMAN 2 UNITS: 100 INJECTION, SOLUTION PARENTERAL at 11:13

## 2018-02-11 RX ADMIN — LISINOPRIL 10 MG: 10 TABLET ORAL at 07:35

## 2018-02-11 RX ADMIN — ENOXAPARIN SODIUM 80 MG: 100 INJECTION SUBCUTANEOUS at 17:36

## 2018-02-11 RX ADMIN — INSULIN HUMAN 1 UNITS: 100 INJECTION, SOLUTION PARENTERAL at 20:39

## 2018-02-11 RX ADMIN — MESALAMINE 400 MG: 400 CAPSULE, DELAYED RELEASE ORAL at 15:13

## 2018-02-11 RX ADMIN — ENOXAPARIN SODIUM 80 MG: 100 INJECTION SUBCUTANEOUS at 06:23

## 2018-02-11 RX ADMIN — OXYCODONE HYDROCHLORIDE 5 MG: 5 TABLET ORAL at 21:44

## 2018-02-11 RX ADMIN — OXYCODONE HYDROCHLORIDE 5 MG: 5 TABLET ORAL at 07:37

## 2018-02-11 RX ADMIN — GLIPIZIDE 5 MG: 5 TABLET ORAL at 07:35

## 2018-02-11 ASSESSMENT — PAIN SCALES - GENERAL
PAINLEVEL_OUTOF10: 3
PAINLEVEL_OUTOF10: 3
PAINLEVEL_OUTOF10: 2
PAINLEVEL_OUTOF10: 3
PAINLEVEL_OUTOF10: 4
PAINLEVEL_OUTOF10: 7
PAINLEVEL_OUTOF10: 6
PAINLEVEL_OUTOF10: 3

## 2018-02-11 ASSESSMENT — ENCOUNTER SYMPTOMS
BLURRED VISION: 0
DEPRESSION: 0
NAUSEA: 0
DIARRHEA: 0
DIAPHORESIS: 0
PALPITATIONS: 0
NERVOUS/ANXIOUS: 0
ABDOMINAL PAIN: 0
FEVER: 0
WEAKNESS: 1
DOUBLE VISION: 0
SHORTNESS OF BREATH: 0
MYALGIAS: 0
NERVOUS/ANXIOUS: 1
VOMITING: 0
MEMORY LOSS: 1
COUGH: 0
FOCAL WEAKNESS: 0
CHILLS: 0

## 2018-02-11 NOTE — CARE PLAN
Problem: Psychosocial Needs:  Goal: Level of anxiety will decrease  Pt anxious and frustrated regarding the time spent in hospital and lack of urgency in PICC placement. Bedside reassurance and encouragement given. This RN will continue to page PICC RN.     Problem: Skin Integrity  Goal: Risk for impaired skin integrity will decrease  Low back dressings have not been changed since 1/29. Will change today.

## 2018-02-11 NOTE — PROGRESS NOTES
A/O x 4. Reports pain 5/10, medication given per MAR. Vital signs WNL. On RA, O2 sat 92%. PIV to left forearm patent, dressing CDI. Dressings to left posterior thigh and lower back CDI. Self ambulatory without assistive devices. POC discussed. Call light and belongings within reach. Bed in the lowest position. Hourly rounding in place.

## 2018-02-11 NOTE — CARE PLAN
Problem: Fluid Volume:  Goal: Will maintain balanced intake and output  Outcome: PROGRESSING AS EXPECTED      Problem: Safety  Goal: Will remain free from falls  Outcome: PROGRESSING AS EXPECTED

## 2018-02-11 NOTE — PROGRESS NOTES
[facility] Hospitalist Progress Note    Date of Service: 2/11/2018    Chief Complaint  67 y.o. male with Crohn's disease, type II diabetes mellitus, history of DVT, history of bladder rupture, bilateral nephrostomy tube with removal and ureteral stent placement, was just discharged yesterday, at that time was also felt to have UTI, but urine grew mixed skin anca and yeast. ID was consulted then, and was transitioned to oral ciprofloxacin and fluconazole to complete 14 days course. Admitted 1/30/2018 with fevers, chills, nausea and vomiting, along with increasing urinary frequency and bladder spasms. Labs showed no leukocytosis. BMP was unimpressive except for hyperglycemia, with normal creatinine. CT scan showed left ureteral stent in place, moderate right hydronephrosis and dilation of the right ureter, 5 mm nonobstructing lower pole right renal stone. Urinalysis showed packed WBCs, 100-150 RBC, with moderate leukocyte esterase. Felt to have sepsis due to UTI/pyelonephritis, and started on IV fluids, and IV cefepime. Low potassium replaced, given magnesium for low normal levels.Continues to retain urine, kaur placed. Blood cultures ×1 bottle growing Candida glabrata, as well as the urine culture. [facility] growing Enterobacter cloacae resistant to cefepime and Zosyn. Antibiotic changed to meropenem and micafungin by ID. continued to spike fevers. Cardiology consulted for MAX. Repeat blood cultures negative.    Interval Problem Update  Still has not gone PICC line  Anxious to go home  Discussed with  regarding antifungal infusion, still pending  Ambulating without assistance    Consultants/Specialty  ID  Cardiology  Urology  Ophthalmology    Disposition  PICC line placement  ID for  antifungal duration  By mouth antibiotics per ID   to assist      Review of Systems   Constitutional: Negative for chills, diaphoresis, fever and malaise/fatigue.   Respiratory: Negative for cough  and shortness of breath.    Cardiovascular: Negative for chest pain, palpitations and leg swelling.   Gastrointestinal: Negative for abdominal pain and nausea.   Genitourinary: Negative for dysuria and urgency.   Musculoskeletal: Negative for myalgias.   Neurological: Positive for weakness. Negative for focal weakness.   Psychiatric/Behavioral: Positive for memory loss. Negative for depression. The patient is nervous/anxious.         Pertinent positives/negatives as mentioned above.     A complete review of systems was done. All other systems were negative.      Physical Exam  Laboratory/Imaging   Hemodynamics  Temp (24hrs), Av.5 °C (97.7 °F), Min:36.1 °C (97 °F), Max:37.1 °C (98.7 °F)   Temperature: 37.1 °C (98.7 °F)  Pulse  Av.6  Min: 59  Max: 117    Blood Pressure : 124/73      Respiratory      Respiration: 17, Pulse Oximetry: 95 %             Fluids    Intake/Output Summary (Last 24 hours) at 18 1819  Last data filed at 18 0700   Gross per 24 hour   Intake              240 ml   Output             1750 ml   Net            -1510 ml       Nutrition  Orders Placed This Encounter   Procedures   • DIET ORDER     Standing Status:   Standing     Number of Occurrences:   1     Order Specific Question:   Diet:     Answer:   Regular [1]     Physical Exam   Constitutional: He is oriented to person, place, and time. No distress.   Thin   HENT:   Head: Normocephalic and atraumatic.   Eyes: EOM are normal. Pupils are equal, round, and reactive to light. No scleral icterus.   Neck: Normal range of motion. Neck supple.   Cardiovascular: Normal rate, regular rhythm and intact distal pulses.    Pulmonary/Chest: Effort normal. No respiratory distress.   Abdominal: Soft. He exhibits no distension.   Musculoskeletal: Normal range of motion. He exhibits no edema or tenderness.   Neurological: He is alert and oriented to person, place, and time. No cranial nerve deficit.   Skin: Skin is warm and dry. He is not  diaphoretic.   Psychiatric: He has a normal mood and affect. His behavior is normal. Judgment and thought content normal.   Nursing note and vitals reviewed.                                     Assessment/Plan     * Sepsis due to pyelonephritis / complicated UTI (CMS-HCC)- (present on admission)   Assessment & Plan    - This is sepsis (without associated acute organ dysfunction).  Source is pyelonephritis/complicated UTI. Rule out endocarditis. Fever has resolved.  - Await MAX.  - Continue IV antibiotics for pyelonephritis and fungemia as below.         Fungemia due to UTI- (present on admission)   Assessment & Plan    - Continue micafungin IV, duration per ID  Will need to arrange outpatient infusion,  to assist  -Patient also has an IVC filter, we'll discuss with infectious disease for further recommendations   today to rule out endocarditis.   - ophthalmology , Dr. Cobos, negative for infection.      ID assisting    2/11: micagunin through 2/20/18 and diflucan 2/20/18  Bactrim through 2/14/18 per ID  SW to assist  Still awaiting picc placement        Enterobacter cloacae and Candida glabrata UTI / Pyelonephritis- (present on admission)   Assessment & Plan    - No further fevers.   - Continue meropenem and micafungin. ID on board, guiding antibiotics. regarding final antibiotic and duration recommendations.  On Invanz ×2 weeks per infectious disease, final duration per ID, will need arrangement for outpatient antibiotic infusion,  to assist  Repeat cultures are negative    2/8: MAX per Dr. Renee is negative, still pending official report transcript    2/10: PICC ordered        Urinary retention- (present on admission)   Assessment & Plan    - Likely related to UTI. Continue Flomax. urology following.  Pacheco catheter discontinued per urology  Follow up as an outpatient  - s/p:lasix renal scan to rule-out ongoing obstruction: No obstruction          Hypokalemia   Assessment & Plan    - K  WNL today. Continue PO K Dur 40 mg BID. Repeat K levels in AM.        Open wound of left thigh- (present on admission)   Assessment & Plan    - Continue wound VAC, wound care and dressing changes. Wound service on board.        Hypertension   Assessment & Plan    - lisinopril  Essential  controlled        History of DVT (deep vein thrombosis)- (present on admission)   Assessment & Plan    - with hypercoagulable disorder, and multiple episodes of DVTs.  - Continue therapeutic Lovenox subcutaneous injections.        Anemia of chronic disease- (present on admission)   Assessment & Plan    - Continue to monitor Hgb, transfuse for hemoglobin <7.        Type II diabetes mellitus (CMS-HCC)- (present on admission)   Assessment & Plan    - Continue glipizide, continue holding metformin while in-house. Continue sliding scale insulin coverage while in-house. Continue Accu-Cheks before meals and at bedtime. On regular diet per patient's request. Monitor for compliance.        Crohn disease (CMS-HCC)- (present on admission)   Assessment & Plan    - Not in acute flare. Continue home dose mesalamine.              Reviewed items::  Labs reviewed, Medications reviewed and Radiology images reviewed  Pacheco catheter::  Urinary Tract Retention or Urinary Tract Obstruction  DVT prophylaxis pharmacological::  Enoxaparin (Lovenox)  Ulcer Prophylaxis::  Not indicated  Antibiotics:  Treating active infection/contamination beyond 24 hours perioperative coverage

## 2018-02-11 NOTE — PROGRESS NOTES
Infectious Disease Progress Note    Author: Benita Hutchison M.D. Date & Time of service: 2/10/2018  4:09 PM    Chief Complaint:  FU fungemia and pyelopnephritis    Interval History:   Tmax 103, WBC 3.2, kaur placed given retention, denies any abd pain   Tmax 103, WBC 2.6, resting comfortably but having intermittent fevers and chills  2/3 Tmax 102.5, WBC 2.9, ongoing fevers and chills, pt very concerned about his illness, plan for MAX on Monday, blurry vision improved   Tmax 102, WBC 3.5, Bcx Cglabrata, very anxious, states he may have kidney procedure today for possible R ureteral stent, MAX tmrw, no abd pain  2018-Tmax 98.5. Apparently patient had chills today as per the wife but patient says he only was cold. WBC 3.9 platelets 227 creatinine 0.39  2018-Tmax 98.4. Kaur has been taken out. WBC 3.9 platelets 227 creatinine 0.39  2018-Tmax 98.9. Feels well. No new issues overnight  2019-Tmax 98.9 WBC 3.9 platelets 227 creatinine 0.39 the MAX results from 2018 a not available  2/10/2018- Tmax 98. No new issues overnight. Still awaiting the PICC line  Labs Reviewed, Medications Reviewed, Radiology Reviewed and Wound Reviewed.    Review of Systems:  Review of Systems   Constitutional: Negative for chills and fever.   Eyes: Negative for blurred vision and double vision.   Respiratory: Negative for cough and shortness of breath.    Gastrointestinal: Negative for abdominal pain, diarrhea, nausea and vomiting.   Genitourinary: Negative for dysuria.        Kaur discontinued and has been urinating normally.   Musculoskeletal: Negative for myalgias.   Psychiatric/Behavioral: The patient is not nervous/anxious.        Hemodynamics:  Temp (24hrs), Av.6 °C (97.8 °F), Min:36.5 °C (97.7 °F), Max:36.7 °C (98 °F)  Temperature: 36.6 °C (97.8 °F)  Pulse  Av.3  Min: 59  Max: 117   Blood Pressure : 149/79       Physical Exam:  Physical Exam   Constitutional: He is oriented to person, place, and  time. He appears well-developed.   Older than stated age  Chronically ill appearing   HENT:   Head: Normocephalic and atraumatic.   Eyes: EOM are normal. Pupils are equal, round, and reactive to light.   Neck: Neck supple.   Cardiovascular: Normal rate, regular rhythm and normal heart sounds.    Pulmonary/Chest: Effort normal. He has no wheezes. He has no rales.   Abdominal: Soft. There is no tenderness.   Genitourinary:   Genitourinary Comments: Pacheco   Musculoskeletal:   Left posterior thigh wound  Clean-refer to the pictures   Neurological: He is alert and oriented to person, place, and time.       Meds:    Current Facility-Administered Medications:   •  lisinopril  •  famotidine  •  albuterol  •  LORazepam  •  fluconazole  •  finasteride  •  potassium chloride SA  •  magnesium oxide  •  micafungin  •  meropenem (MERREM) IV  •  HYDROmorphone  •  oxyCODONE immediate-release **OR** oxyCODONE immediate-release  •  ibuprofen  •  acetaminophen  •  lidocaine  •  glipiZIDE  •  mercaptopurine  •  tamsulosin  •  Respiratory Care per Protocol  •  ondansetron  •  ondansetron  •  insulin regular **AND** Accu-Chek ACHS **AND** NOTIFY MD and PharmD **AND** glucose 4 g **AND** dextrose 50%  •  NS  •  mesalamine delayed-release  •  enoxaparin    Labs:  No results for input(s): WBC, RBC, HEMOGLOBIN, HEMATOCRIT, MCV, MCH, RDW, PLATELETCT, MPV, NEUTSPOLYS, LYMPHOCYTES, MONOCYTES, EOSINOPHILS, BASOPHILS, RBCMORPHOLO in the last 72 hours.  No results for input(s): SODIUM, POTASSIUM, CHLORIDE, CO2, GLUCOSE, BUN, CPKTOTAL in the last 72 hours.  No results for input(s): ALBUMIN, TBILIRUBIN, ALKPHOSPHAT, TOTPROTEIN, ALTSGPT, ASTSGOT, CREATININE in the last 72 hours.    Imaging:  Ct-abdomen-pelvis With    Result Date: 1/30/2018 1/30/2018 4:31 PM HISTORY/REASON FOR EXAM:  Pain. Pain and fever TECHNIQUE/EXAM DESCRIPTION:   CT scan of the abdomen and pelvis with contrast. Contrast-enhanced helical scanning was obtained from the  diaphragmatic domes through the pubic symphysis following the bolus administration of nonionic contrast without complication. 100 mL of Omnipaque 350 nonionic contrast was administered without complication. Low dose optimization technique was utilized for this CT exam including automated exposure control and adjustment of the mA and/or kV according to patient size. COMPARISON: 12/12/2017 FINDINGS: CT Abdomen: Mild lung base atelectasis. No basilar pleural effusion. Multiple hepatic cysts measuring up to 1.4 cm in diameter are again demonstrated. The spleen is large measuring up to 14.8 cm diameter. No pancreatic or adrenal gland masses. Bilateral renal cysts measuring up to 9.3 cm on the right and 3.4 cm on the left again demonstrated. Left ureteral stent is demonstrated. Mild left pelvocaliectasis. There is moderate right hydronephrosis and dilatation of the right ureter. No distal ureteral stones identified. Lower pole 5 mm right renal stone again demonstrated. The gallbladder is partially distended. No calcified gallstones identified. No evidence of bowel obstruction. The appendix is not delineated. There are diverticula colon. No evidence of diverticulitis. Mild calcified plaque aorta. Subcentimeter short axis retroperitoneal lymph nodes. An IVC filter is located below the renal veins. CT Pelvis: The bladder is partially distended. The wall of the bladder is mildly thickened. There are prostate calcifications.     Left ureteral stent is in place. Mild left pelvocaliectasis. Moderate right hydronephrosis and dilatation of the right ureter. No distal right ureteral stone is identified. 5 mm nonobstructing lower pole right renal stone. Bilateral renal cysts again demonstrated. Hepatic cysts again demonstrated. Splenomegaly. Diverticula colon. No evidence diverticulitis. The appendix is not delineated. No free fluid.    Dx-chest-portable (1 View)    Result Date: 1/30/2018 1/30/2018 1:42 PM HISTORY/REASON FOR EXAM:   Fever and chills TECHNIQUE/EXAM DESCRIPTION AND NUMBER OF VIEWS: Single portable view of the chest. COMPARISON: 1/27/18 FINDINGS: No pulmonary infiltrates or consolidations are noted. No pleural effusion. No pneumothorax. Stable cardiopericardial silhouette.     1. No acute cardiopulmonary abnormalities are identified.    Dx-chest-portable (1 View)    Result Date: 1/27/2018 1/27/2018 12:17 AM HISTORY/REASON FOR EXAM:  Possible sepsis TECHNIQUE/EXAM DESCRIPTION:  Single AP view of the chest. COMPARISON: December 26, 2017 FINDINGS: Overlying cardiac leads are present. The cardiac silhouette appears within normal limits. The mediastinal contour appears within normal limits.  The central pulmonary vasculature appears normal. The lungs appear well expanded bilaterally.  Bilateral lungs are clear. No significant pleural effusions are identified. The bony structures appear age-appropriate.     1.  No acute cardiopulmonary disease.    [id number]    Result Date: 1/27/2018 1/27/2018 2:27 AM HISTORY/REASON FOR EXAM: Pain. TECHNIQUE/EXAM DESCRIPTION: Real-time sonography of the scrotum was performed with gray-scale, color and duplex Doppler imaging. COMPARISON: November 12, 2017 FINDINGS: There is diffuse heterogeneous flow within the right testicle, which is new since prior study. Increased flow within the right testicle and epididymis is identified. The right testis measures 3.97 cm x 2.66 cm x 3.47 cm. 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 The right epididymis measures 13.3 x 17.6 mm. Large right hydrocele is seen. No varicocele is detected.     1.  Heterogeneous parenchyma of the right testicle with markedly increased flow of the right epididymis and testis, appearance favors epididymal orchitis. Recommend sonographic follow-up for reevaluation of the right testis in 7-14 days to exclude infiltrating testicular mass. 2.  Large right hydrocele. 3.  Surgical absence the left testicle    Ir-conv Perq  Neph To Nephroureteral Cath (all Radiology) Left    Result Date: 1/29/2018 1/29/2018 9:28 AM Bilateral nephrostograms and bilateral nephrostomy tube removals and left ureteral stent placement. HISTORY/REASON FOR EXAM: Ureteral obstruction. TECHNIQUE: Following informed consent patient prepped and draped in the usual fashion.  10 cc 1% lidocaine was utilized for local anesthesia. SEDATION: Conscious sedation with 150 mcg of Fentanyl and 5 mg of Versed was administered during the procedure with appropriate continuous patient monitoring by the radiology nurse. Sedation duration: 30 minutes. The procedure was performed using MAXIMAL STERILE BARRIER technique including sterile gown, mask, cap, and donning of sterile gloves following appropriate hand hygiene and/or sterile scrub. Patient skin site was prepped with 2% Chlorhexidine solution. CONTRAST: 45 mL of Omnipaque 300. FLUOROSCOPY: 4 fluoroscopic images obtained. 3.4 minutes. Omnipaque 300 was injected into the indwelling bilateral nephrostomy tubes and digital imaging was obtained over the kidneys and ureters. The right nephrostomy tube was subsequently removed. Next a guidewire was advanced into the left ureter and the indwelling left nephrostomy tube was removed.  A 5-Greek catheter was advanced into the bladder over the guidewire.  Next the guidewire was exchanged for an Amplatz guidewire which was coiled in  the bladder.  Next an 8-Greek 24-cm in length double-J ureteral stent was advanced over the guidewire and positioned with its distal loop in the bladder and proximal loop in the left renal collecting system.  The internal stiffener was removed and the guidewire was retracted and positioned in the left renal collecting system.  An 8.5-Greek locking loop catheter was also positioned in the left renal collecting system.   Omnipaque 300 was injected and digital imaging was obtained.  The patient tolerated procedure well and sent to the floor in good  "condition. FINDINGS: The initial right nephrostogram revealed excellent filling of the right renal collecting system with good positioning of the right nephrostomy tube. There was prompt flow of contrast down the right ureter into the bladder. There was no evidence of right-sided ureteral stricture or filling defect. The right nephrostomy tube was subsequently removed. The initial left-sided nephrostogram revealed a high-grade focal stricture at the left ureteral vesicular junction. There was delayed filling of the left ureter with no other areas of stricture formation or obstruction noted in the left ureter. Digital images demonstrate good positioning of the left-sided double-J ureteral stent in the renal collecting system and bladder.     1. Right-sided nephrostogram revealed normal-appearing right renal collecting system and right ureter with prompt flow of contrast into the bladder and no evidence of right-sided ureteral stricture or filling defect. The right-sided nephrostomy tube was subsequently removed. 2. Left-sided nephrostogram revealed a high-grade focal stricture at the left ureteral fascicular junction. The remainder of the left ureter has a normal appearance. 3. Successful percutaneous placement of left-sided double-J ureteral stent.      Micro:  Results     Procedure Component Value Units Date/Time    BLOOD CULTURE [[id number]]  (Abnormal) Collected:  02/01/18 0928    Order Status:  Completed Specimen:  Blood from Peripheral Updated:  02/07/18 1019     Significant Indicator POS (POS)     Source BLD     Site PERIPHERAL     Blood Culture Growth detected by Bactec instrument.  02/05/2018  11:21 (A)     Blood Culture -- (A)     Candida glabrata  See previous culture for sensitivity report.      Narrative:       CALL  Lisa  131 tel. [phone],  CALLED  131 tel. [phone] 02/05/2018, 11:22, RB PERF. RESULTS CALLED  TO:[id number]  Per Hospital Policy: Only change Specimen Src: to \"Line\" if  specified " "by physician order.    BLOOD CULTURE [[id number]] Collected:  02/06/18 1649    Order Status:  Completed Specimen:  Blood from Peripheral Updated:  02/07/18 0705     Significant Indicator NEG     Source BLD     Site PERIPHERAL     Blood Culture --     No Growth    Note: Blood cultures are incubated for 5 days and  are monitored continuously.Positive blood cultures  are called to the RN and reported as soon as  they are identified.      Narrative:       Per Hospital Policy: Only change Specimen Src: to \"Line\" if  specified by physician order.    BLOOD CULTURE [[id number]] Collected:  02/06/18 1649    Order Status:  Completed Specimen:  Blood from Peripheral Updated:  02/07/18 0705     Significant Indicator NEG     Source BLD     Site PERIPHERAL     Blood Culture --     No Growth    Note: Blood cultures are incubated for 5 days and  are monitored continuously.Positive blood cultures  are called to the RN and reported as soon as  they are identified.      Narrative:       Per Hospital Policy: Only change Specimen Src: to \"Line\" if  specified by physician order.    BLOOD CULTURE [[id number]] Collected:  02/01/18 1636    Order Status:  Completed Specimen:  Blood from Peripheral Updated:  02/06/18 1900     Significant Indicator NEG     Source BLD     Site PERIPHERAL     Blood Culture No growth after 5 days of incubation.    Narrative:       Per Hospital Policy: Only change Specimen Src: to \"Line\" if  specified by physician order.    BLOOD CULTURE [[id number]] Collected:  02/01/18 1636    Order Status:  Completed Specimen:  Blood from Peripheral Updated:  02/06/18 1900     Significant Indicator NEG     Source BLD     Site PERIPHERAL     Blood Culture No growth after 5 days of incubation.    Narrative:       Per Hospital Policy: Only change Specimen Src: to \"Line\" if  specified by physician order.    BLOOD CULTURE [[id number]] Collected:  02/01/18 0928    Order Status:  Completed Specimen:  Blood from Peripheral Updated:  " "02/06/18 1100     Significant Indicator NEG     Source BLD     Site PERIPHERAL     Blood Culture No growth after 5 days of incubation.    Narrative:       Per Hospital Policy: Only change Specimen Src: to \"Line\" if  specified by physician order.    FUNGAL JOSE ALFREDO INTERPRETATION [[id number]] Collected:  01/30/18 1435    Order Status:  Completed Specimen:  Blood Updated:  02/05/18 1201     Significant Indicator NEG     Source BLD     Site PERIPHERAL     Fungal JOSE ALFREDO Interp --     JOSE ALFREDO Interpretative Data:  -  Units: mcg/mL (micrograms/mL)  SDD: Susceptible-dose dependent (SDD category implies  clinical efficacy when higher than normal dosage of a drug  can be used and maximal possible blood level achieved.)  NS: Nonsusceptible (This category is used for organisms that  currently have only a susceptible interpretive category, but  not intermediate or resistant interpretive categories.)  None: No CLSI interpretive guidelines available at this time.  -  If an AMPHOTERICIN B JOSE ALFREDO of >1 mcg/mL is obtained for Candida  spp., that isolate is likely resistant to AMPHOTERICIN B.  There are no CLSI breakpoints.  -  FLUCYTOSINE JOSE ALFREDO breakpoints are based largely on historical  data and partially on the drug's pharmacokinetics.  -  For FLUCONAZOLE, the guidelines are based on extensive  experience with mucosal and invasive infections due to  Candida spp. When an isolate is identified as Candida  glabrata and the JOSE ALFREDO is <32mcg/mL, patients should receive  a maximum dosage regimen of FLUCONAZOLE.  Isolates of Candida  krusei are assumed to be intrinsically resistant to  FLUCONAZOLE and their MICs should not be interpreted using  this scale.  -  For ITRACONAZOLE, the data is based entirely on experience  with mucosal infections, and data supporting breakpoints for  invasive infections due to Candida spp. are not available.  -  For ANIDULAFUNGIN, CASPOFUNGIN, MICAFUNGIN, and VORICONZOLE  the data are based substantially on experience " "with  non-neutropenic patients with candidemia, and their clinical  relevance in other settings is uncertain.  -  For POSACONAZOLE the majority of isolates are inhibited by  <1 mcg/mL.  However, data are not yet available to indicate  a correlation between JOSE ALFREDO and outcome of treatment with this  agent.  -  Susceptible Dose Dependent (SDD) applies to FLUCONAZOLE and  ITRACONAZOLE. Susceptible is dependent on achieving the  maximum possible blood level.  -  The Sensititre YeastOne Susceptibility plates have been  validated for use at [facility]. These  plates are designed for Research Use Only. However, there are  CLSI approved documents for interpretive criteria for  5-FLUCYTOSINE, FLUCONAZOLE, ITRACONAZOLE, VORICONAZOLE, and  the ECHINOCANDINS. As with any in vitro susceptibility  testing method, the results of testing should be correlated  with the patient's clinical response to prescribed therapy.      Narrative:       CALL  Lisa  131 tel. [phone],  CALLED  131 tel. [phone] 01/31/2018, 21:30, RB PERF. RESULTS CALLED  TO:[phone].  Per Hospital Policy: Only change Specimen Src: to \"Line\" if  specified by physician order.    BLOOD CULTURE [[id number]]  (Abnormal)  (Susceptibility) Collected:  01/30/18 1435    Order Status:  Completed Specimen:  Blood from Peripheral Updated:  02/05/18 1201     Significant Indicator POS (POS)     Source BLD     Site PERIPHERAL     Blood Culture Growth detected by Bactec instrument.  01/31/2018  21:27 (A)     Blood Culture Candida glabrata (A)    Narrative:       CALL  Lisa  131 tel. [phone],  CALLED  131 tel. [phone] 01/31/2018, 21:30, RB PERF. RESULTS CALLED  TO:[phone].  Per Hospital Policy: Only change Specimen Src: to \"Line\" if  specified by physician order.    Culture & Susceptibility     CANDIDA GLABRATA     Antibiotic Sensitivity Microscan Unit Status    Amphoter B 24hr No Interpretation 0.5 mcg/mL Final    Anidulafungin 24hr Sensitive 0.06 " "mcg/mL Final    Caspofungin 24hr Sensitive 0.12 mcg/mL Final    Fluconazole 24hr Susceptible Dose-dependent 32 mcg/mL Final    Micafungin 24hr Sensitive 0.015 mcg/mL Final                       URINALYSIS [[id number]]  (Abnormal) Collected:  02/04/18 0900    Order Status:  Completed Specimen:  Urine from Urine, [place] Cath Updated:  02/04/18 2344     Micro Urine Req Microscopic     Color Lynnette     Character Turbid (A)     Specific Gravity 1.018     Ph 5.5     Glucose 100 (A) mg/dL      Ketones Negative mg/dL      Protein 100 (A) mg/dL      Bilirubin Negative     Urobilinogen, Urine 1.0     Nitrite Negative     Leukocyte Esterase Moderate (A)     Occult Blood Large (A)    Narrative:       Collected By:[id number] DARNELL ANGELES    URINE CULTURE(NEW) [[id number]]  (Abnormal) Collected:  01/30/18 1553    Order Status:  Completed Specimen:  Urine Updated:  02/04/18 1652     Significant Indicator POS (POS)     Source UR     Site --     Urine Culture -- (A)     Growth noted after further incubation,see below for  organism identification.       Urine Culture -- (A)     Candida glabrata  >100,000 cfu/mL       Urine Culture -- (A)     Candida albicans  <10,000 cfu/mL      Narrative:       Indication for culture:->Emergency Room Patient    BLOOD CULTURE [[id number]] Collected:  01/30/18 1230    Order Status:  Completed Specimen:  Blood from Peripheral Updated:  02/04/18 1500     Significant Indicator NEG     Source BLD     Site PERIPHERAL     Blood Culture No growth after 5 days of incubation.    Narrative:       Per Hospital Policy: Only change Specimen Src: to \"Line\" if  specified by physician order.          Assessment:  Active Hospital Problems    Diagnosis   • *Sepsis due to pyelonephritis / complicated UTI (CMS-HCC) [A41.9]   • Hypokalemia [E87.6]   • Urinary retention [R33.9]   • Pyelonephritis [N12]   • Open wound of left thigh [S71.102A]   • History of DVT (deep vein thrombosis) [Z86.718]   • Anemia of chronic disease " [D63.8]   • Crohn disease (CMS-HCC) [K50.90]   • Type II diabetes mellitus (CMS-HCC) [E11.9]       Plan:  Sepsis  2/2 Fungemia and pyelonephritis  Blood cultures are positive from 2/1/2018  Repeat blood cultures  2/6/2018- if negative then can get a PICC line  Ophthalmology consultation appreciated        Fungemia -   Ucx - Calbicans  Bcx 1/30 (1/2) - Cglabrata  DC fluc  Continue micafungin   Repeat Bcx 2/1 - positive now  TTE - neg  MAX shows no endocarditis per verbal report  Continue with the Diflucan and Mycamine  Ophthalmology consultation done and no ocular involvement  Blood cultures from 2/6/2018 are negative so far.   Awaiting PICC line  Continue Mycamine through 2/20/2018      Pyelonephritis  Ucx yeast  Abx per above  [facility] - enterobacter cloacae (confirmed with [facility] on 2/1/18) R to cefepime, zosyn. S to meropenem, cipro and bactrim  Pacheco in place  We will likely put him on Bactrim catheter and Cipro on discharge. Aim for Bactrim through 2/14/2018  Continue Diflucan through 2/20/2018    H/o Bladder rupture  S/p bilateral PCN removal on 1/29    DM2  HgA1c 6.7  Maintain BS less than 150 to control infection    Open left thigh wound  Not grossly infected  Wound vac and care    Discussed with internal medicine/Dr Chairez

## 2018-02-11 NOTE — PROGRESS NOTES
Pt is AAO x4.  Reports a 5/10 L leg pain level.  Medicated per MAR.  VS WNL.  L hamstring wound dressing in place, CDI.  Low back dressing x2 in place due to nephrology tube removal.  L PIV patent, saline locked.  Pt is upself.  POC discussed.  All needs met at this time.  Bed in low position.  Call light within reach.  Rounding in place.

## 2018-02-12 ENCOUNTER — APPOINTMENT (OUTPATIENT)
Dept: RADIOLOGY | Facility: MEDICAL CENTER | Age: 68
DRG: 862 | End: 2018-02-12
Attending: INTERNAL MEDICINE
Payer: MEDICARE

## 2018-02-12 VITALS
DIASTOLIC BLOOD PRESSURE: 67 MMHG | OXYGEN SATURATION: 95 % | HEIGHT: 73 IN | RESPIRATION RATE: 16 BRPM | WEIGHT: 167.77 LBS | BODY MASS INDEX: 22.24 KG/M2 | SYSTOLIC BLOOD PRESSURE: 108 MMHG | TEMPERATURE: 97.5 F | HEART RATE: 82 BPM

## 2018-02-12 LAB
ANION GAP SERPL CALC-SCNC: 9 MMOL/L (ref 0–11.9)
BASOPHILS # BLD AUTO: 0.5 % (ref 0–1.8)
BASOPHILS # BLD: 0.02 K/UL (ref 0–0.12)
BUN SERPL-MCNC: 12 MG/DL (ref 8–22)
CALCIUM SERPL-MCNC: 9.1 MG/DL (ref 8.5–10.5)
CHLORIDE SERPL-SCNC: 101 MMOL/L (ref 96–112)
CO2 SERPL-SCNC: 27 MMOL/L (ref 20–33)
CREAT SERPL-MCNC: 0.46 MG/DL (ref 0.5–1.4)
EOSINOPHIL # BLD AUTO: 0.08 K/UL (ref 0–0.51)
EOSINOPHIL NFR BLD: 2 % (ref 0–6.9)
ERYTHROCYTE [DISTWIDTH] IN BLOOD BY AUTOMATED COUNT: 62.5 FL (ref 35.9–50)
GLUCOSE BLD-MCNC: 162 MG/DL (ref 65–99)
GLUCOSE BLD-MCNC: 194 MG/DL (ref 65–99)
GLUCOSE BLD-MCNC: 236 MG/DL (ref 65–99)
GLUCOSE BLD-MCNC: 439 MG/DL (ref 65–99)
GLUCOSE SERPL-MCNC: 153 MG/DL (ref 65–99)
HCT VFR BLD AUTO: 33 % (ref 42–52)
HGB BLD-MCNC: 10.3 G/DL (ref 14–18)
IMM GRANULOCYTES # BLD AUTO: 0.02 K/UL (ref 0–0.11)
IMM GRANULOCYTES NFR BLD AUTO: 0.5 % (ref 0–0.9)
LYMPHOCYTES # BLD AUTO: 0.62 K/UL (ref 1–4.8)
LYMPHOCYTES NFR BLD: 15.5 % (ref 22–41)
MCH RBC QN AUTO: 28.4 PG (ref 27–33)
MCHC RBC AUTO-ENTMCNC: 31.2 G/DL (ref 33.7–35.3)
MCV RBC AUTO: 90.9 FL (ref 81.4–97.8)
MONOCYTES # BLD AUTO: 0.32 K/UL (ref 0–0.85)
MONOCYTES NFR BLD AUTO: 8 % (ref 0–13.4)
NEUTROPHILS # BLD AUTO: 2.95 K/UL (ref 1.82–7.42)
NEUTROPHILS NFR BLD: 73.5 % (ref 44–72)
NRBC # BLD AUTO: 0 K/UL
NRBC BLD-RTO: 0 /100 WBC
PLATELET # BLD AUTO: 283 K/UL (ref 164–446)
PMV BLD AUTO: 9.1 FL (ref 9–12.9)
POTASSIUM SERPL-SCNC: 4 MMOL/L (ref 3.6–5.5)
RBC # BLD AUTO: 3.63 M/UL (ref 4.7–6.1)
SODIUM SERPL-SCNC: 137 MMOL/L (ref 135–145)
WBC # BLD AUTO: 4 K/UL (ref 4.8–10.8)

## 2018-02-12 PROCEDURE — 700105 HCHG RX REV CODE 258: Performed by: INTERNAL MEDICINE

## 2018-02-12 PROCEDURE — A9270 NON-COVERED ITEM OR SERVICE: HCPCS | Performed by: INTERNAL MEDICINE

## 2018-02-12 PROCEDURE — A9270 NON-COVERED ITEM OR SERVICE: HCPCS | Performed by: NURSE PRACTITIONER

## 2018-02-12 PROCEDURE — 99239 HOSP IP/OBS DSCHRG MGMT >30: CPT | Performed by: INTERNAL MEDICINE

## 2018-02-12 PROCEDURE — 700102 HCHG RX REV CODE 250 W/ 637 OVERRIDE(OP): Performed by: INTERNAL MEDICINE

## 2018-02-12 PROCEDURE — 700111 HCHG RX REV CODE 636 W/ 250 OVERRIDE (IP): Performed by: INTERNAL MEDICINE

## 2018-02-12 PROCEDURE — 36415 COLL VENOUS BLD VENIPUNCTURE: CPT

## 2018-02-12 PROCEDURE — 02HV33Z INSERTION OF INFUSION DEVICE INTO SUPERIOR VENA CAVA, PERCUTANEOUS APPROACH: ICD-10-PCS | Performed by: INTERNAL MEDICINE

## 2018-02-12 PROCEDURE — 700111 HCHG RX REV CODE 636 W/ 250 OVERRIDE (IP): Performed by: HOSPITALIST

## 2018-02-12 PROCEDURE — 36569 INSJ PICC 5 YR+ W/O IMAGING: CPT

## 2018-02-12 PROCEDURE — 82962 GLUCOSE BLOOD TEST: CPT

## 2018-02-12 PROCEDURE — 700102 HCHG RX REV CODE 250 W/ 637 OVERRIDE(OP): Performed by: NURSE PRACTITIONER

## 2018-02-12 PROCEDURE — 85025 COMPLETE CBC W/AUTO DIFF WBC: CPT

## 2018-02-12 PROCEDURE — A9270 NON-COVERED ITEM OR SERVICE: HCPCS | Performed by: HOSPITALIST

## 2018-02-12 PROCEDURE — B548ZZA ULTRASONOGRAPHY OF SUPERIOR VENA CAVA, GUIDANCE: ICD-10-PCS | Performed by: INTERNAL MEDICINE

## 2018-02-12 PROCEDURE — 80048 BASIC METABOLIC PNL TOTAL CA: CPT

## 2018-02-12 PROCEDURE — 700102 HCHG RX REV CODE 250 W/ 637 OVERRIDE(OP): Performed by: HOSPITALIST

## 2018-02-12 RX ORDER — FLUCONAZOLE 200 MG/1
400 TABLET ORAL DAILY
Qty: 14 TAB | Refills: 0 | Status: SHIPPED | OUTPATIENT
Start: 2018-02-13 | End: 2018-02-20

## 2018-02-12 RX ORDER — FINASTERIDE 5 MG/1
5 TABLET, FILM COATED ORAL DAILY
Qty: 30 TAB | Refills: 1 | Status: SHIPPED | OUTPATIENT
Start: 2018-02-13

## 2018-02-12 RX ORDER — FAMOTIDINE 20 MG/1
20 TABLET, FILM COATED ORAL 2 TIMES DAILY
Qty: 60 TAB | Refills: 1 | Status: SHIPPED | OUTPATIENT
Start: 2018-02-12 | End: 2018-02-13

## 2018-02-12 RX ORDER — IBUPROFEN 400 MG/1
400 TABLET ORAL EVERY 6 HOURS PRN
Qty: 30 TAB | Refills: 1 | Status: SHIPPED | OUTPATIENT
Start: 2018-02-12

## 2018-02-12 RX ORDER — SULFAMETHOXAZOLE AND TRIMETHOPRIM 800; 160 MG/1; MG/1
1 TABLET ORAL 2 TIMES DAILY
Qty: 4 TAB | Refills: 0 | Status: SHIPPED | OUTPATIENT
Start: 2018-02-12 | End: 2018-02-14

## 2018-02-12 RX ADMIN — POTASSIUM CHLORIDE 40 MEQ: 1500 TABLET, EXTENDED RELEASE ORAL at 08:26

## 2018-02-12 RX ADMIN — GLIPIZIDE 10 MG: 5 TABLET ORAL at 01:00

## 2018-02-12 RX ADMIN — MICAFUNGIN SODIUM 100 MG: 20 INJECTION, POWDER, LYOPHILIZED, FOR SOLUTION INTRAVENOUS at 11:45

## 2018-02-12 RX ADMIN — FLUCONAZOLE 400 MG: 200 TABLET ORAL at 08:27

## 2018-02-12 RX ADMIN — GLIPIZIDE 10 MG: 5 TABLET ORAL at 08:25

## 2018-02-12 RX ADMIN — ENOXAPARIN SODIUM 80 MG: 100 INJECTION SUBCUTANEOUS at 17:31

## 2018-02-12 RX ADMIN — MICAFUNGIN SODIUM 100 MG: 20 INJECTION, POWDER, LYOPHILIZED, FOR SOLUTION INTRAVENOUS at 15:37

## 2018-02-12 RX ADMIN — LISINOPRIL 10 MG: 10 TABLET ORAL at 08:26

## 2018-02-12 RX ADMIN — MAGNESIUM GLUCONATE 500 MG ORAL TABLET 400 MG: 500 TABLET ORAL at 08:27

## 2018-02-12 RX ADMIN — INSULIN HUMAN 1 UNITS: 100 INJECTION, SOLUTION PARENTERAL at 05:32

## 2018-02-12 RX ADMIN — MESALAMINE 400 MG: 400 CAPSULE, DELAYED RELEASE ORAL at 08:29

## 2018-02-12 RX ADMIN — INSULIN HUMAN 1 UNITS: 100 INJECTION, SOLUTION PARENTERAL at 16:39

## 2018-02-12 RX ADMIN — MESALAMINE 400 MG: 400 CAPSULE, DELAYED RELEASE ORAL at 15:28

## 2018-02-12 RX ADMIN — TAMSULOSIN HYDROCHLORIDE 0.8 MG: 0.4 CAPSULE ORAL at 08:26

## 2018-02-12 RX ADMIN — FINASTERIDE 5 MG: 5 TABLET, FILM COATED ORAL at 08:26

## 2018-02-12 RX ADMIN — MERCAPTOPURINE 150 MG: 50 TABLET ORAL at 08:29

## 2018-02-12 RX ADMIN — INSULIN HUMAN 2 UNITS: 100 INJECTION, SOLUTION PARENTERAL at 10:31

## 2018-02-12 ASSESSMENT — PAIN SCALES - GENERAL
PAINLEVEL_OUTOF10: 2
PAINLEVEL_OUTOF10: 3

## 2018-02-12 ASSESSMENT — ENCOUNTER SYMPTOMS
FEVER: 0
NERVOUS/ANXIOUS: 0
DIARRHEA: 0
MYALGIAS: 0
SHORTNESS OF BREATH: 0
DOUBLE VISION: 0
BLURRED VISION: 0
ABDOMINAL PAIN: 0
VOMITING: 0
NAUSEA: 0
COUGH: 0
CHILLS: 0

## 2018-02-12 NOTE — DISCHARGE PLANNING
Pt scheduled for [facility] tomorrow 2/13 at 5:30PM. Pt is aware of time, provided with campus map with date, time, and location highlighted. Pt currently having PICC line placed. Needs RXs for PO meds, bedside RN aware.

## 2018-02-12 NOTE — PROGRESS NOTES
PICC Insertion Procedure Note    Consents confirmed, vessel patency confirmed with ultrasound, real time ultrasound image printed and placed in patient chart. Risks and benefits of procedure explained to patient/family and education regarding central line associated bloodstream infections provided. Questions answered.     PICC placed in LUE per MD order with ultrasound guidance. 4 Fr, Single lumen PICC placed in Basilic vein after 1 attempt(s). 5 cc's of 1% lidocaine injected intradermally, 21 gauge microintroducer needle and modified Seldinger technique used. 37 cm total catheter length, 0 cm external measurement inserted with good blood return. Each lumen flushed without resistance with 10 mL 0.9% normal saline. PICC line secured with Biopatch and Tegaderm.    PICC tip location in the SVC confirmed by ECG technology. Pt tolerated procedure well.  Patient condition relayed to unit RN or ordering physician via this post procedure note in the EMR.     [employer] PICC ref # [id number], Lot # [id number]

## 2018-02-12 NOTE — PROGRESS NOTES
Infectious Disease Progress Note    Author: Benita Hutchison M.D. Date & Time of service: 2018  4:49 PM    Chief Complaint:  FU fungemia and pyelopnephritis    Interval History:   Tmax 103, WBC 3.2, kaur placed given retention, denies any abd pain   Tmax 103, WBC 2.6, resting comfortably but having intermittent fevers and chills  2/3 Tmax 102.5, WBC 2.9, ongoing fevers and chills, pt very concerned about his illness, plan for MAX on Monday, blurry vision improved   Tmax 102, WBC 3.5, Bcx Cglabrata, very anxious, states he may have kidney procedure today for possible R ureteral stent, MAX tmrw, no abd pain  2018-Tmax 98.5. Apparently patient had chills today as per the wife but patient says he only was cold. WBC 3.9 platelets 227 creatinine 0.39  2018-Tmax 98.4. Kaur has been taken out. WBC 3.9 platelets 227 creatinine 0.39  2018-Tmax 98.9. Feels well. No new issues overnight  2019-Tmax 98.9 WBC 3.9 platelets 227 creatinine 0.39 the MAX results from 2018 a not available  2/10/2018- Tmax 98. No new issues overnight. Still awaiting the PICC line  - no fevers.awaiting picc line  Labs Reviewed, Medications Reviewed, Radiology Reviewed and Wound Reviewed.    Review of Systems:  Review of Systems   Constitutional: Negative for chills and fever.   Eyes: Negative for blurred vision and double vision.   Respiratory: Negative for cough and shortness of breath.    Gastrointestinal: Negative for abdominal pain, diarrhea, nausea and vomiting.   Genitourinary: Negative for dysuria.        Kaur discontinued and has been urinating normally.   Musculoskeletal: Negative for myalgias.   Psychiatric/Behavioral: The patient is not nervous/anxious.        Hemodynamics:  Temp (24hrs), Av.5 °C (97.7 °F), Min:36.1 °C (97 °F), Max:37.1 °C (98.7 °F)  Temperature: 37.1 °C (98.7 °F)  Pulse  Av.6  Min: 59  Max: 117   Blood Pressure : 124/73       Physical Exam:  Physical Exam   Constitutional: He  is oriented to person, place, and time. He appears well-developed.   Older than stated age  Chronically ill appearing   HENT:   Head: Normocephalic and atraumatic.   Eyes: EOM are normal. Pupils are equal, round, and reactive to light.   Neck: Neck supple.   Cardiovascular: Normal rate, regular rhythm and normal heart sounds.    Pulmonary/Chest: Effort normal. He has no wheezes. He has no rales.   Abdominal: Soft. There is no tenderness.   Genitourinary:   Genitourinary Comments: [place]co   Musculoskeletal:   Left posterior thigh wound  Clean-refer to the pictures   Neurological: He is alert and oriented to person, place, and time.       Meds:    Current Facility-Administered Medications:   •  glipiZIDE  •  lisinopril  •  famotidine  •  albuterol  •  LORazepam  •  fluconazole  •  finasteride  •  potassium chloride SA  •  magnesium oxide  •  micafungin  •  HYDROmorphone  •  oxyCODONE immediate-release **OR** oxyCODONE immediate-release  •  ibuprofen  •  acetaminophen  •  lidocaine  •  mercaptopurine  •  tamsulosin  •  Respiratory Care per Protocol  •  ondansetron  •  ondansetron  •  insulin regular **AND** Accu-Chek ACHS **AND** NOTIFY MD and PharmD **AND** glucose 4 g **AND** dextrose 50%  •  NS  •  mesalamine delayed-release  •  enoxaparin    Labs:  No results for input(s): WBC, RBC, HEMOGLOBIN, HEMATOCRIT, MCV, MCH, RDW, PLATELETCT, MPV, NEUTSPOLYS, LYMPHOCYTES, MONOCYTES, EOSINOPHILS, BASOPHILS, RBCMORPHOLO in the last 72 hours.  No results for input(s): SODIUM, POTASSIUM, CHLORIDE, CO2, GLUCOSE, BUN, CPKTOTAL in the last 72 hours.  No results for input(s): ALBUMIN, TBILIRUBIN, ALKPHOSPHAT, TOTPROTEIN, ALTSGPT, ASTSGOT, CREATININE in the last 72 hours.    Imaging:  Ct-abdomen-pelvis With    Result Date: 1/30/2018 1/30/2018 4:31 PM HISTORY/REASON FOR EXAM:  Pain. Pain and fever TECHNIQUE/EXAM DESCRIPTION:   CT scan of the abdomen and pelvis with contrast. Contrast-enhanced helical scanning was obtained from the  diaphragmatic domes through the pubic symphysis following the bolus administration of nonionic contrast without complication. 100 mL of Omnipaque 350 nonionic contrast was administered without complication. Low dose optimization technique was utilized for this CT exam including automated exposure control and adjustment of the mA and/or kV according to patient size. COMPARISON: 12/12/2017 FINDINGS: CT Abdomen: Mild lung base atelectasis. No basilar pleural effusion. Multiple hepatic cysts measuring up to 1.4 cm in diameter are again demonstrated. The spleen is large measuring up to 14.8 cm diameter. No pancreatic or adrenal gland masses. Bilateral renal cysts measuring up to 9.3 cm on the right and 3.4 cm on the left again demonstrated. Left ureteral stent is demonstrated. Mild left pelvocaliectasis. There is moderate right hydronephrosis and dilatation of the right ureter. No distal ureteral stones identified. Lower pole 5 mm right renal stone again demonstrated. The gallbladder is partially distended. No calcified gallstones identified. No evidence of bowel obstruction. The appendix is not delineated. There are diverticula colon. No evidence of diverticulitis. Mild calcified plaque aorta. Subcentimeter short axis retroperitoneal lymph nodes. An IVC filter is located below the renal veins. CT Pelvis: The bladder is partially distended. The wall of the bladder is mildly thickened. There are prostate calcifications.     Left ureteral stent is in place. Mild left pelvocaliectasis. Moderate right hydronephrosis and dilatation of the right ureter. No distal right ureteral stone is identified. 5 mm nonobstructing lower pole right renal stone. Bilateral renal cysts again demonstrated. Hepatic cysts again demonstrated. Splenomegaly. Diverticula colon. No evidence diverticulitis. The appendix is not delineated. No free fluid.    Dx-chest-portable (1 View)    Result Date: 1/30/2018 1/30/2018 1:42 PM HISTORY/REASON FOR EXAM:   Fever and chills TECHNIQUE/EXAM DESCRIPTION AND NUMBER OF VIEWS: Single portable view of the chest. COMPARISON: 1/27/18 FINDINGS: No pulmonary infiltrates or consolidations are noted. No pleural effusion. No pneumothorax. Stable cardiopericardial silhouette.     1. No acute cardiopulmonary abnormalities are identified.    Dx-chest-portable (1 View)    Result Date: 1/27/2018 1/27/2018 12:17 AM HISTORY/REASON FOR EXAM:  Possible sepsis TECHNIQUE/EXAM DESCRIPTION:  Single AP view of the chest. COMPARISON: December 26, 2017 FINDINGS: Overlying cardiac leads are present. The cardiac silhouette appears within normal limits. The mediastinal contour appears within normal limits.  The central pulmonary vasculature appears normal. The lungs appear well expanded bilaterally.  Bilateral lungs are clear. No significant pleural effusions are identified. The bony structures appear age-appropriate.     1.  No acute cardiopulmonary disease.    [id number]    Result Date: 1/27/2018 1/27/2018 2:27 AM HISTORY/REASON FOR EXAM: Pain. TECHNIQUE/EXAM DESCRIPTION: Real-time sonography of the scrotum was performed with gray-scale, color and duplex Doppler imaging. COMPARISON: November 12, 2017 FINDINGS: There is diffuse heterogeneous flow within the right testicle, which is new since prior study. Increased flow within the right testicle and epididymis is identified. The right testis measures 3.97 cm x 2.66 cm x 3.47 cm. 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 The right epididymis measures 13.3 x 17.6 mm. Large right hydrocele is seen. No varicocele is detected.     1.  Heterogeneous parenchyma of the right testicle with markedly increased flow of the right epididymis and testis, appearance favors epididymal orchitis. Recommend sonographic follow-up for reevaluation of the right testis in 7-14 days to exclude infiltrating testicular mass. 2.  Large right hydrocele. 3.  Surgical absence the left testicle    Ir-conv Perq  Neph To Nephroureteral Cath (all Radiology) Left    Result Date: 1/29/2018 1/29/2018 9:28 AM Bilateral nephrostograms and bilateral nephrostomy tube removals and left ureteral stent placement. HISTORY/REASON FOR EXAM: Ureteral obstruction. TECHNIQUE: Following informed consent patient prepped and draped in the usual fashion.  10 cc 1% lidocaine was utilized for local anesthesia. SEDATION: Conscious sedation with 150 mcg of Fentanyl and 5 mg of Versed was administered during the procedure with appropriate continuous patient monitoring by the radiology nurse. Sedation duration: 30 minutes. The procedure was performed using MAXIMAL STERILE BARRIER technique including sterile gown, mask, cap, and donning of sterile gloves following appropriate hand hygiene and/or sterile scrub. Patient skin site was prepped with 2% Chlorhexidine solution. CONTRAST: 45 mL of Omnipaque 300. FLUOROSCOPY: 4 fluoroscopic images obtained. 3.4 minutes. Omnipaque 300 was injected into the indwelling bilateral nephrostomy tubes and digital imaging was obtained over the kidneys and ureters. The right nephrostomy tube was subsequently removed. Next a guidewire was advanced into the left ureter and the indwelling left nephrostomy tube was removed.  A 5-Swiss catheter was advanced into the bladder over the guidewire.  Next the guidewire was exchanged for an Amplatz guidewire which was coiled in  the bladder.  Next an 8-Swiss 24-cm in length double-J ureteral stent was advanced over the guidewire and positioned with its distal loop in the bladder and proximal loop in the left renal collecting system.  The internal stiffener was removed and the guidewire was retracted and positioned in the left renal collecting system.  An 8.5-Swiss locking loop catheter was also positioned in the left renal collecting system.   Omnipaque 300 was injected and digital imaging was obtained.  The patient tolerated procedure well and sent to the floor in good  "condition. FINDINGS: The initial right nephrostogram revealed excellent filling of the right renal collecting system with good positioning of the right nephrostomy tube. There was prompt flow of contrast down the right ureter into the bladder. There was no evidence of right-sided ureteral stricture or filling defect. The right nephrostomy tube was subsequently removed. The initial left-sided nephrostogram revealed a high-grade focal stricture at the left ureteral vesicular junction. There was delayed filling of the left ureter with no other areas of stricture formation or obstruction noted in the left ureter. Digital images demonstrate good positioning of the left-sided double-J ureteral stent in the renal collecting system and bladder.     1. Right-sided nephrostogram revealed normal-appearing right renal collecting system and right ureter with prompt flow of contrast into the bladder and no evidence of right-sided ureteral stricture or filling defect. The right-sided nephrostomy tube was subsequently removed. 2. Left-sided nephrostogram revealed a high-grade focal stricture at the left ureteral fascicular junction. The remainder of the left ureter has a normal appearance. 3. Successful percutaneous placement of left-sided double-J ureteral stent.      Micro:  Results     Procedure Component Value Units Date/Time    BLOOD CULTURE [[id number]]  (Abnormal) Collected:  02/01/18 0928    Order Status:  Completed Specimen:  Blood from Peripheral Updated:  02/07/18 1019     Significant Indicator POS (POS)     Source BLD     Site PERIPHERAL     Blood Culture Growth detected by Bactec instrument.  02/05/2018  11:21 (A)     Blood Culture -- (A)     Candida glabrata  See previous culture for sensitivity report.      Narrative:       CALL  Lisa  131 tel. [phone],  CALLED  131 tel. [phone] 02/05/2018, 11:22, RB PERF. RESULTS CALLED  TO:[id number]  Per Hospital Policy: Only change Specimen Src: to \"Line\" if  specified " "by physician order.    BLOOD CULTURE [[id number]] Collected:  02/06/18 1649    Order Status:  Completed Specimen:  Blood from Peripheral Updated:  02/07/18 0705     Significant Indicator NEG     Source BLD     Site PERIPHERAL     Blood Culture --     No Growth    Note: Blood cultures are incubated for 5 days and  are monitored continuously.Positive blood cultures  are called to the RN and reported as soon as  they are identified.      Narrative:       Per Hospital Policy: Only change Specimen Src: to \"Line\" if  specified by physician order.    BLOOD CULTURE [[id number]] Collected:  02/06/18 1649    Order Status:  Completed Specimen:  Blood from Peripheral Updated:  02/07/18 0705     Significant Indicator NEG     Source BLD     Site PERIPHERAL     Blood Culture --     No Growth    Note: Blood cultures are incubated for 5 days and  are monitored continuously.Positive blood cultures  are called to the RN and reported as soon as  they are identified.      Narrative:       Per Hospital Policy: Only change Specimen Src: to \"Line\" if  specified by physician order.    BLOOD CULTURE [[id number]] Collected:  02/01/18 1636    Order Status:  Completed Specimen:  Blood from Peripheral Updated:  02/06/18 1900     Significant Indicator NEG     Source BLD     Site PERIPHERAL     Blood Culture No growth after 5 days of incubation.    Narrative:       Per Hospital Policy: Only change Specimen Src: to \"Line\" if  specified by physician order.    BLOOD CULTURE [[id number]] Collected:  02/01/18 1636    Order Status:  Completed Specimen:  Blood from Peripheral Updated:  02/06/18 1900     Significant Indicator NEG     Source BLD     Site PERIPHERAL     Blood Culture No growth after 5 days of incubation.    Narrative:       Per Hospital Policy: Only change Specimen Src: to \"Line\" if  specified by physician order.    BLOOD CULTURE [[id number]] Collected:  02/01/18 0928    Order Status:  Completed Specimen:  Blood from Peripheral Updated:  " "02/06/18 1100     Significant Indicator NEG     Source BLD     Site PERIPHERAL     Blood Culture No growth after 5 days of incubation.    Narrative:       Per Hospital Policy: Only change Specimen Src: to \"Line\" if  specified by physician order.    FUNGAL JOSE ALFREDO INTERPRETATION [[id number]] Collected:  01/30/18 1435    Order Status:  Completed Specimen:  Blood Updated:  02/05/18 1201     Significant Indicator NEG     Source BLD     Site PERIPHERAL     Fungal JOSE ALFREDO Interp --     JOSE ALFREDO Interpretative Data:  -  Units: mcg/mL (micrograms/mL)  SDD: Susceptible-dose dependent (SDD category implies  clinical efficacy when higher than normal dosage of a drug  can be used and maximal possible blood level achieved.)  NS: Nonsusceptible (This category is used for organisms that  currently have only a susceptible interpretive category, but  not intermediate or resistant interpretive categories.)  None: No CLSI interpretive guidelines available at this time.  -  If an AMPHOTERICIN B JOSE ALFREDO of >1 mcg/mL is obtained for Candida  spp., that isolate is likely resistant to AMPHOTERICIN B.  There are no CLSI breakpoints.  -  FLUCYTOSINE JOSE ALFREDO breakpoints are based largely on historical  data and partially on the drug's pharmacokinetics.  -  For FLUCONAZOLE, the guidelines are based on extensive  experience with mucosal and invasive infections due to  Candida spp. When an isolate is identified as Candida  glabrata and the JOSE ALFREDO is <32mcg/mL, patients should receive  a maximum dosage regimen of FLUCONAZOLE.  Isolates of Candida  krusei are assumed to be intrinsically resistant to  FLUCONAZOLE and their MICs should not be interpreted using  this scale.  -  For ITRACONAZOLE, the data is based entirely on experience  with mucosal infections, and data supporting breakpoints for  invasive infections due to Candida spp. are not available.  -  For ANIDULAFUNGIN, CASPOFUNGIN, MICAFUNGIN, and VORICONZOLE  the data are based substantially on experience " "with  non-neutropenic patients with candidemia, and their clinical  relevance in other settings is uncertain.  -  For POSACONAZOLE the majority of isolates are inhibited by  <1 mcg/mL.  However, data are not yet available to indicate  a correlation between JOSE ALFREDO and outcome of treatment with this  agent.  -  Susceptible Dose Dependent (SDD) applies to FLUCONAZOLE and  ITRACONAZOLE. Susceptible is dependent on achieving the  maximum possible blood level.  -  The Sensititre YeastOne Susceptibility plates have been  validated for use at [facility]. These  plates are designed for Research Use Only. However, there are  CLSI approved documents for interpretive criteria for  5-FLUCYTOSINE, FLUCONAZOLE, ITRACONAZOLE, VORICONAZOLE, and  the ECHINOCANDINS. As with any in vitro susceptibility  testing method, the results of testing should be correlated  with the patient's clinical response to prescribed therapy.      Narrative:       CALL  Lisa  131 tel. [phone],  CALLED  131 tel. [phone] 01/31/2018, 21:30, RB PERF. RESULTS CALLED  TO:[phone].  Per Hospital Policy: Only change Specimen Src: to \"Line\" if  specified by physician order.    BLOOD CULTURE [[id number]]  (Abnormal)  (Susceptibility) Collected:  01/30/18 1435    Order Status:  Completed Specimen:  Blood from Peripheral Updated:  02/05/18 1201     Significant Indicator POS (POS)     Source BLD     Site PERIPHERAL     Blood Culture Growth detected by Bactec instrument.  01/31/2018  21:27 (A)     Blood Culture Candida glabrata (A)    Narrative:       CALL  Lisa  131 tel. [phone],  CALLED  131 tel. [phone] 01/31/2018, 21:30, RB PERF. RESULTS CALLED  TO:[phone].  Per Hospital Policy: Only change Specimen Src: to \"Line\" if  specified by physician order.    Culture & Susceptibility     CANDIDA GLABRATA     Antibiotic Sensitivity Microscan Unit Status    Amphoter B 24hr No Interpretation 0.5 mcg/mL Final    Anidulafungin 24hr Sensitive 0.06 " mcg/mL Final    Caspofungin 24hr Sensitive 0.12 mcg/mL Final    Fluconazole 24hr Susceptible Dose-dependent 32 mcg/mL Final    Micafungin 24hr Sensitive 0.015 mcg/mL Final                       URINALYSIS [[id number]]  (Abnormal) Collected:  02/04/18 0900    Order Status:  Completed Specimen:  Urine from Urine, [place] Cath Updated:  02/04/18 2344     Micro Urine Req Microscopic     Color Lynnette     Character Turbid (A)     Specific Gravity 1.018     Ph 5.5     Glucose 100 (A) mg/dL      Ketones Negative mg/dL      Protein 100 (A) mg/dL      Bilirubin Negative     Urobilinogen, Urine 1.0     Nitrite Negative     Leukocyte Esterase Moderate (A)     Occult Blood Large (A)    Narrative:       Collected By:[id number] DARNELL ANGELES    URINE CULTURE(NEW) [[id number]]  (Abnormal) Collected:  01/30/18 1553    Order Status:  Completed Specimen:  Urine Updated:  02/04/18 1652     Significant Indicator POS (POS)     Source UR     Site --     Urine Culture -- (A)     Growth noted after further incubation,see below for  organism identification.       Urine Culture -- (A)     Candida glabrata  >100,000 cfu/mL       Urine Culture -- (A)     Candida albicans  <10,000 cfu/mL      Narrative:       Indication for culture:->Emergency Room Patient          Assessment:  Active Hospital Problems    Diagnosis   • *Sepsis due to pyelonephritis / complicated UTI (CMS-HCC) [A41.9]   • Hypokalemia [E87.6]   • Urinary retention [R33.9]   • Pyelonephritis [N12]   • Open wound of left thigh [S71.102A]   • History of DVT (deep vein thrombosis) [Z86.718]   • Anemia of chronic disease [D63.8]   • Crohn disease (CMS-HCC) [K50.90]   • Type II diabetes mellitus (CMS-HCC) [E11.9]       Plan:  Sepsis  2/2 Fungemia and pyelonephritis  Blood cultures are positive from 2/1/2018  Repeat blood cultures  2/6/2018- if negative then can get a PICC line  Ophthalmology consultation appreciated        Fungemia -   Ucx - Calbicans  Bcx 1/30 (1/2) - Cglabrata  RODRIGO  fluc  Continue micafungin   Repeat Bcx 2/1 - positive now  TTE - neg  MAX shows no endocarditis per verbal report  Continue with the Diflucan and Mycamine  Ophthalmology consultation done and no ocular involvement  Blood cultures from 2/6/2018 are negative so far.   Awaiting PICC line  Continue Mycamine through 2/20/2018      Pyelonephritis  Ucx yeast  Abx per above  [facility] - enterobacter cloacae (confirmed with [facility] on 2/1/18) R to cefepime, zosyn. S to meropenem, cipro and bactrim  P[place]co in place  We will likely put him on Bactrim on discharge. Aim for Bactrim through 2/14/2018  Continue Diflucan through 2/20/2018    H/o Bladder rupture  S/p bilateral PCN removal on 1/29    DM2  HgA1c 6.7  Maintain BS less than 150 to control infection    Open left thigh wound  Not grossly infected  Wound vac and care    Discussed with internal medicine/Dr Chairez

## 2018-02-12 NOTE — CARE PLAN
Problem: Pain Management  Goal: Pain level will decrease to patient's comfort goal  Outcome: PROGRESSING AS EXPECTED  Patient has been able to sleep and ambulate comfortably with current levels of pain this shift    Problem: Urinary Elimination:  Goal: Ability to reestablish a normal urinary elimination pattern will improve  Outcome: PROGRESSING AS EXPECTED  Patient has voided several times this shift    Problem: Mobility  Goal: Risk for activity intolerance will decrease  Outcome: PROGRESSING AS EXPECTED  Patient has ambulated independently multiple times this shift

## 2018-02-12 NOTE — PROGRESS NOTES
"Infectious Disease Progress Note    Author: Tina Hawk M.D. Date & Time of service: 2018  12:09 PM    Chief Complaint:  FU fungemia and pyelopnephritis    Interval History:   Tmax 103, WBC 3.2, kaur placed given retention, denies any abd pain   Tmax 103, WBC 2.6, resting comfortably but having intermittent fevers and chills  2/3 Tmax 102.5, WBC 2.9, ongoing fevers and chills, pt very concerned about his illness, plan for MAX on Monday, blurry vision improved   Tmax 102, WBC 3.5, Bcx Cglabrata, very anxious, states he may have kidney procedure today for possible R ureteral stent, MAX tmrw, no abd pain  2018-Tmax 98.5. Apparently patient had chills today as per the wife but patient says he only was cold. WBC 3.9 platelets 227 creatinine 0.39  2018-Tmax 98.4. Kaur has been taken out. WBC 3.9 platelets 227 creatinine 0.39  2018-Tmax 98.9. Feels well. No new issues overnight  2019-Tmax 98.9 WBC 3.9 platelets 227 creatinine 0.39 the MAX results from 2018 a not available  2/10/2018- Tmax 98. No new issues overnight. Still awaiting the PICC line  - no fevers.awaiting picc line   AF no CBC multiple complaints-does not like food, tim loss, \"nobody tells me anything\", NFT left in \"too long\", bandages, etc  Labs Reviewed, Medications Reviewed, Radiology Reviewed and Wound Reviewed.    Review of Systems:  Review of Systems   Constitutional: Negative for chills and fever.   Eyes: Negative for blurred vision and double vision.   Respiratory: Negative for cough and shortness of breath.    Gastrointestinal: Negative for abdominal pain, diarrhea, nausea and vomiting.   Genitourinary: Negative for dysuria.   Musculoskeletal: Negative for myalgias.   Psychiatric/Behavioral: The patient is not nervous/anxious.    All other systems reviewed and are negative.      Hemodynamics:  Temp (24hrs), Av.7 °C (98.1 °F), Min:36.4 °C (97.5 °F), Max:37.1 °C (98.7 °F)  Temperature: 36.4 °C " (97.5 °F)  Pulse  Av.7  Min: 59  Max: 117   Blood Pressure : 108/67       Physical Exam:  Physical Exam   Constitutional: He is oriented to person, place, and time. He appears well-developed. No distress.   Older than stated age  Chronically ill appearing   HENT:   Head: Normocephalic and atraumatic.   Eyes: EOM are normal. Pupils are equal, round, and reactive to light.   Neck: Neck supple.   Cardiovascular: Normal rate and regular rhythm.    No murmur heard.  Pulmonary/Chest: Effort normal. No respiratory distress. He has no rales.   Abdominal: Soft. He exhibits no distension. There is no tenderness.   Genitourinary:   Genitourinary Comments: NFT sites healed-bruising right   Musculoskeletal: He exhibits no edema.   Left posterior thigh wound  Clean-refer to the pictures   Neurological: He is alert and oriented to person, place, and time.   Skin: No rash noted.   Nursing note and vitals reviewed.      Meds:    Current Facility-Administered Medications:   •  glipiZIDE  •  lisinopril  •  famotidine  •  albuterol  •  LORazepam  •  fluconazole  •  finasteride  •  potassium chloride SA  •  magnesium oxide  •  micafungin  •  HYDROmorphone  •  oxyCODONE immediate-release **OR** oxyCODONE immediate-release  •  ibuprofen  •  acetaminophen  •  lidocaine  •  mercaptopurine  •  tamsulosin  •  Respiratory Care per Protocol  •  ondansetron  •  ondansetron  •  insulin regular **AND** Accu-Chek ACHS **AND** NOTIFY MD and PharmD **AND** glucose 4 g **AND** dextrose 50%  •  NS  •  mesalamine delayed-release  •  enoxaparin    Labs:  No results for input(s): WBC, RBC, HEMOGLOBIN, HEMATOCRIT, MCV, MCH, RDW, PLATELETCT, MPV, NEUTSPOLYS, LYMPHOCYTES, MONOCYTES, EOSINOPHILS, BASOPHILS, RBCMORPHOLO in the last 72 hours.  No results for input(s): SODIUM, POTASSIUM, CHLORIDE, CO2, GLUCOSE, BUN, CPKTOTAL in the last 72 hours.  No results for input(s): ALBUMIN, TBILIRUBIN, ALKPHOSPHAT, TOTPROTEIN, ALTSGPT, ASTSGOT, CREATININE in the last  72 hours.    Imaging:  Ct-abdomen-pelvis With    Result Date: 1/30/2018 1/30/2018 4:31 PM HISTORY/REASON FOR EXAM:  Pain. Pain and fever TECHNIQUE/EXAM DESCRIPTION:   CT scan of the abdomen and pelvis with contrast. Contrast-enhanced helical scanning was obtained from the diaphragmatic domes through the pubic symphysis following the bolus administration of nonionic contrast without complication. 100 mL of Omnipaque 350 nonionic contrast was administered without complication. Low dose optimization technique was utilized for this CT exam including automated exposure control and adjustment of the mA and/or kV according to patient size. COMPARISON: 12/12/2017 FINDINGS: CT Abdomen: Mild lung base atelectasis. No basilar pleural effusion. Multiple hepatic cysts measuring up to 1.4 cm in diameter are again demonstrated. The spleen is large measuring up to 14.8 cm diameter. No pancreatic or adrenal gland masses. Bilateral renal cysts measuring up to 9.3 cm on the right and 3.4 cm on the left again demonstrated. Left ureteral stent is demonstrated. Mild left pelvocaliectasis. There is moderate right hydronephrosis and dilatation of the right ureter. No distal ureteral stones identified. Lower pole 5 mm right renal stone again demonstrated. The gallbladder is partially distended. No calcified gallstones identified. No evidence of bowel obstruction. The appendix is not delineated. There are diverticula colon. No evidence of diverticulitis. Mild calcified plaque aorta. Subcentimeter short axis retroperitoneal lymph nodes. An IVC filter is located below the renal veins. CT Pelvis: The bladder is partially distended. The wall of the bladder is mildly thickened. There are prostate calcifications.     Left ureteral stent is in place. Mild left pelvocaliectasis. Moderate right hydronephrosis and dilatation of the right ureter. No distal right ureteral stone is identified. 5 mm nonobstructing lower pole right renal stone. Bilateral  renal cysts again demonstrated. Hepatic cysts again demonstrated. Splenomegaly. Diverticula colon. No evidence diverticulitis. The appendix is not delineated. No free fluid.    Dx-chest-portable (1 View)    Result Date: 1/30/2018 1/30/2018 1:42 PM HISTORY/REASON FOR EXAM:  Fever and chills TECHNIQUE/EXAM DESCRIPTION AND NUMBER OF VIEWS: Single portable view of the chest. COMPARISON: 1/27/18 FINDINGS: No pulmonary infiltrates or consolidations are noted. No pleural effusion. No pneumothorax. Stable cardiopericardial silhouette.     1. No acute cardiopulmonary abnormalities are identified.    Dx-chest-portable (1 View)    Result Date: 1/27/2018 1/27/2018 12:17 AM HISTORY/REASON FOR EXAM:  Possible sepsis TECHNIQUE/EXAM DESCRIPTION:  Single AP view of the chest. COMPARISON: December 26, 2017 FINDINGS: Overlying cardiac leads are present. The cardiac silhouette appears within normal limits. The mediastinal contour appears within normal limits.  The central pulmonary vasculature appears normal. The lungs appear well expanded bilaterally.  Bilateral lungs are clear. No significant pleural effusions are identified. The bony structures appear age-appropriate.     1.  No acute cardiopulmonary disease.    [id number]    Result Date: 1/27/2018 1/27/2018 2:27 AM HISTORY/REASON FOR EXAM: Pain. TECHNIQUE/EXAM DESCRIPTION: Real-time sonography of the scrotum was performed with gray-scale, color and duplex Doppler imaging. COMPARISON: November 12, 2017 FINDINGS: There is diffuse heterogeneous flow within the right testicle, which is new since prior study. Increased flow within the right testicle and epididymis is identified. The right testis measures 3.97 cm x 2.66 cm x 3.47 cm. 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 The right epididymis measures 13.3 x 17.6 mm. Large right hydrocele is seen. No varicocele is detected.     1.  Heterogeneous parenchyma of the right testicle with markedly increased flow of the  right epididymis and testis, appearance favors epididymal orchitis. Recommend sonographic follow-up for reevaluation of the right testis in 7-14 days to exclude infiltrating testicular mass. 2.  Large right hydrocele. 3.  Surgical absence the left testicle    Ir-conv Perq Neph To Nephroureteral Cath (all Radiology) Left    Result Date: 1/29/2018 1/29/2018 9:28 AM Bilateral nephrostograms and bilateral nephrostomy tube removals and left ureteral stent placement. HISTORY/REASON FOR EXAM: Ureteral obstruction. TECHNIQUE: Following informed consent patient prepped and draped in the usual fashion.  10 cc 1% lidocaine was utilized for local anesthesia. SEDATION: Conscious sedation with 150 mcg of Fentanyl and 5 mg of Versed was administered during the procedure with appropriate continuous patient monitoring by the radiology nurse. Sedation duration: 30 minutes. The procedure was performed using MAXIMAL STERILE BARRIER technique including sterile gown, mask, cap, and donning of sterile gloves following appropriate hand hygiene and/or sterile scrub. Patient skin site was prepped with 2% Chlorhexidine solution. CONTRAST: 45 mL of Omnipaque 300. FLUOROSCOPY: 4 fluoroscopic images obtained. 3.4 minutes. Omnipaque 300 was injected into the indwelling bilateral nephrostomy tubes and digital imaging was obtained over the kidneys and ureters. The right nephrostomy tube was subsequently removed. Next a guidewire was advanced into the left ureter and the indwelling left nephrostomy tube was removed.  A 5-Gabonese catheter was advanced into the bladder over the guidewire.  Next the guidewire was exchanged for an Amplatz guidewire which was coiled in  the bladder.  Next an 8-Gabonese 24-cm in length double-J ureteral stent was advanced over the guidewire and positioned with its distal loop in the bladder and proximal loop in the left renal collecting system.  The internal stiffener was removed and the guidewire was retracted and  "positioned in the left renal collecting system.  An 8.5-Mongolian locking loop catheter was also positioned in the left renal collecting system.   Omnipaque 300 was injected and digital imaging was obtained.  The patient tolerated procedure well and sent to the floor in good condition. FINDINGS: The initial right nephrostogram revealed excellent filling of the right renal collecting system with good positioning of the right nephrostomy tube. There was prompt flow of contrast down the right ureter into the bladder. There was no evidence of right-sided ureteral stricture or filling defect. The right nephrostomy tube was subsequently removed. The initial left-sided nephrostogram revealed a high-grade focal stricture at the left ureteral vesicular junction. There was delayed filling of the left ureter with no other areas of stricture formation or obstruction noted in the left ureter. Digital images demonstrate good positioning of the left-sided double-J ureteral stent in the renal collecting system and bladder.     1. Right-sided nephrostogram revealed normal-appearing right renal collecting system and right ureter with prompt flow of contrast into the bladder and no evidence of right-sided ureteral stricture or filling defect. The right-sided nephrostomy tube was subsequently removed. 2. Left-sided nephrostogram revealed a high-grade focal stricture at the left ureteral fascicular junction. The remainder of the left ureter has a normal appearance. 3. Successful percutaneous placement of left-sided double-J ureteral stent.      Micro:  Results     Procedure Component Value Units Date/Time    BLOOD CULTURE [[id number]] Collected:  02/06/18 1649    Order Status:  Completed Specimen:  Blood from Peripheral Updated:  02/11/18 1900     Significant Indicator NEG     Source BLD     Site PERIPHERAL     Blood Culture No growth after 5 days of incubation.    Narrative:       Per Hospital Policy: Only change Specimen Src: to \"Line\" " "if  specified by physician order.    BLOOD CULTURE [[id number]] Collected:  02/06/18 1649    Order Status:  Completed Specimen:  Blood from Peripheral Updated:  02/11/18 1900     Significant Indicator NEG     Source BLD     Site PERIPHERAL     Blood Culture No growth after 5 days of incubation.    Narrative:       Per Hospital Policy: Only change Specimen Src: to \"Line\" if  specified by physician order.    BLOOD CULTURE [[id number]]  (Abnormal) Collected:  02/01/18 0928    Order Status:  Completed Specimen:  Blood from Peripheral Updated:  02/07/18 1019     Significant Indicator POS (POS)     Source BLD     Site PERIPHERAL     Blood Culture Growth detected by Bactec instrument.  02/05/2018  11:21 (A)     Blood Culture -- (A)     Candida glabrata  See previous culture for sensitivity report.      Narrative:       CALL  Lisa  131 tel. [phone],  CALLED  131 tel. [phone] 02/05/2018, 11:22, RB PERF. RESULTS CALLED  TO:[id number], KATHY Shoemaker  Per Hospital Policy: Only change Specimen Src: to \"Line\" if  specified by physician order.    BLOOD CULTURE [[id number]] Collected:  02/01/18 1636    Order Status:  Completed Specimen:  Blood from Peripheral Updated:  02/06/18 1900     Significant Indicator NEG     Source BLD     Site PERIPHERAL     Blood Culture No growth after 5 days of incubation.    Narrative:       Per Hospital Policy: Only change Specimen Src: to \"Line\" if  specified by physician order.    BLOOD CULTURE [[id number]] Collected:  02/01/18 1636    Order Status:  Completed Specimen:  Blood from Peripheral Updated:  02/06/18 1900     Significant Indicator NEG     Source BLD     Site PERIPHERAL     Blood Culture No growth after 5 days of incubation.    Narrative:       Per Hospital Policy: Only change Specimen Src: to \"Line\" if  specified by physician order.    BLOOD CULTURE [[id number]] Collected:  02/01/18 0928    Order Status:  Completed Specimen:  Blood from Peripheral Updated:  02/06/18 1100     Significant Indicator " "NEG     Source BLD     Site PERIPHERAL     Blood Culture No growth after 5 days of incubation.    Narrative:       Per Hospital Policy: Only change Specimen Src: to \"Line\" if  specified by physician order.          Assessment:  Active Hospital Problems    Diagnosis   • *Sepsis due to pyelonephritis / complicated UTI (CMS-HCC) [A41.9]   • Hypokalemia [E87.6]   • Urinary retention [R33.9]   • Pyelonephritis [N12]   • Open wound of left thigh [S71.102A]   • History of DVT (deep vein thrombosis) [Z86.718]   • Anemia of chronic disease [D63.8]   • Crohn disease (CMS-HCC) [K50.90]   • Type II diabetes mellitus (CMS-HCC) [E11.9]       Plan:  Fungemia -   Afebrile  No leukocytosis  Bcx 1/30 and 2/1 positive - Cglabrata  Blood cultures from 2/6/2018 are negative  TTE - neg  MAX neg for endocarditis   Ophthalmology consultation done and no ocular involvement  Awaiting PICC line  Continue Mycamine through 2/20/2018  Orders faxed to CM    Pyelonephritis  Ucx yeast-Calbicans  Abx per above  [facility] - [facility]obacter cloacae ([facility]) R to cefepime, zosyn. S to meropenem, cipro and bactrim  Bactrim through 2/14/2018-check BMP  Continue Diflucan through 2/20/2018    Leukopenia  Recheck CBC    H/o Bladder rupture  S/p bilateral PCN removal on 1/29  Sites healing well    DM2  HgA1c 6.7  Maintain BS less than 150 to control infection    Open left thigh wound  Not grossly infected  Wound care    Discussed with internal medicine/Dr Chairez  "

## 2018-02-12 NOTE — DISCHARGE SUMMARY
CHIEF COMPLAINT ON ADMISSION  Chief Complaint   Patient presents with   • Wound Infection     pt reports that he had his nephrostomys removed yesterday and this morning woke with fever and chills, as well as vomiting.        CODE STATUS  Full Code    HPI & HOSPITAL COURSE  This is a 67 y.o. male here with Crohn's disease, type II diabetes mellitus, history of DVT, history of bladder rupture, bilateral nephrostomy tube with removal and ureteral stent placement, was just discharged for one day then readmitted for UTI, but urine grew mixed skin anca and yeast. ID was consulted then, and was transitioned to oral ciprofloxacin and fluconazole to complete 14 days course. Admitted 1/30/2018 with fevers, chills, nausea and vomiting, along with increasing urinary frequency and bladder spasms.  CT scan showed left ureteral stent in place, moderate right hydronephrosis and dilation of the right ureter, 5 mm nonobstructing lower pole right renal stone. Urinalysis showed packed WBCs, 100-150 RBC, with moderate leukocyte esterase. Felt to have sepsis due to UTI/pyelonephritis, and started on IV fluids, and IV cefepime. Low potassium replaced, given magnesium for low normal levels.Continues to retain urine, kaur placed. Blood cultures ×1 bottle growing Candida glabrata, as well as the urine culture. [facility] growing Enterobacter cloacae resistant to cefepime and Zosyn. Antibiotic changed to meropenem and micafungin by ID. continued to spike fevers. Cardiology consulted for MAX which is read as negative per Dr. Renee, on verbal report.  Repeat blood cultures negative. Pt's renal function has improved, and catheter has been removed per urology.    Due to fungemia, opthalmology has been consulted for evaluation of eye involvement.  Per opthal, exam is negative.    Pt also had wound vac placed at the surgical wound at his left posterior thigh during this admission.  Wound appears to be healing well and wound vac  removed during this admission.    With all devices removed, pt is ambulatory with no requirement for assistive devices on discharge.    He has received a picc line, and will completed course of antifungal treatment at outpatient infusion center through 2/20/18.  Antibiotics have been transitioned to oral treatment for discharge to home per ID.      The patient met 2-midnight criteria for an inpatient stay at the time of discharge.    Therefore, he is discharged in good and stable condition with close outpatient follow-up.    SPECIFIC OUTPATIENT FOLLOW-UP  Discharged to home with outpatient antifungal infusion  Continue PICC line  Follow-up with primary care provider/discharge clinic in one week  Micafungin through February 20, 2018  Bactrim through February 14, 2018  Diflucan through February 20, 2018  Follow up with ID in 2-3 weeks  Follow-up with urology as scheduled    DISCHARGE PROBLEM LIST  Principal Problem:    Sepsis due to pyelonephritis / complicated UTI (CMS-HCC) POA: Yes  Active Problems:    Enterobacter cloacae and Candida glabrata UTI / Pyelonephritis POA: Yes    Fungemia due to UTI POA: Yes    Open wound of left thigh POA: Yes      Overview: Left posterior thigh wound s/p Wound debridement, 25x20 cm area 12/17    Hypokalemia POA: No    Urinary retention POA: Yes    Crohn disease (CMS-[facility]) POA: Yes    Type II diabetes mellitus (CMS-HCC) POA: Yes    Anemia of chronic disease POA: Yes    History of DVT (deep vein thrombosis) POA: Yes    Hypertension POA: Unknown  Resolved Problems:    * No resolved hospital problems. *      FOLLOW UP  Future Appointments  Date Time Provider Department Center   2/13/2018 5:30 PM RN 4 [facility]   2/14/2018 3:30 PM RN 7 [facility]   2/15/2018 5:00 PM INFUSION QUICK INJECT [facility]   2/16/2018 3:30 PM RN 7 [facility]   2/17/2018 4:30 PM RN 4 [facility]   2/18/2018 3:30 PM RN 8 [facility]   2/19/2018 5:00 PM RN 4 [facility]   2/20/2018  5:00 PM RN 6 [facility]     Pcp Pt States None            MEDICATIONS ON DISCHARGE   Niels Lima   Home Medication Instructions ISMAEL:[id number]    Printed on:02/12/18 6593   Medication Information                      albuterol 108 (90 Base) MCG/ACT Aero Soln inhalation aerosol  Inhale 2 Puffs by mouth every four hours as needed for Shortness of Breath.             D5W 5 % SOLN 100 mL with micafungin 100 MG SOLR 100 mg  100 mg by Intravenous route every 24 hours for 7 days.             enoxaparin (LOVENOX) 80 MG/0.8ML Solution inj  Inject 80 mg as instructed every 12 hours.             famotidine (PEPCID) 20 MG Tab  Take 1 Tab by mouth 2 Times a Day.             finasteride (PROSCAR) 5 MG Tab  Take 1 Tab by mouth every day.             fluconazole (DIFLUCAN) 200 MG Tab  Take 2 Tabs by mouth every day for 7 days.             glipiZIDE (GLUCOTROL) 5 MG Tab  Take 5 mg by mouth 2 times a day.             ibuprofen (MOTRIN) 400 MG Tab  Take 1 Tab by mouth every 6 hours as needed for Fever.             mercaptopurine (PURINETHOL) 50 MG Tab  Take 150 mg by mouth every day.             Mesalamine (APRISO) 0.375 GM CAPSULE SR 24 HR  Take 4 Caps by mouth every day.             metformin (GLUCOPHAGE) 1000 MG tablet  Take 1,000 mg by mouth 2 times a day, with meals.             sulfamethoxazole-trimethoprim (BACTRIM DS) 800-160 MG tablet  Take 1 Tab by mouth 2 times a day for 2 days.             tamsulosin (FLOMAX) 0.4 MG capsule  Take 0.8 mg by mouth every day.                 DIET  Orders Placed This Encounter   Procedures   • DIET ORDER     Standing Status:   Standing     Number of Occurrences:   1     Order Specific Question:   Diet:     Answer:   Regular [1]     Comments:   patient refusing diabetic biet; states doctor says he can have a regular diet       ACTIVITY  As tolerated.  Weight bearing as tolerated      CONSULTATIONS  ID  Urology  opthalmology      PROCEDURES  none    LABORATORY  Lab Results   Component  Value Date/Time    SODIUM 137 02/12/2018 12:39 PM    POTASSIUM 4.0 02/12/2018 12:39 PM    CHLORIDE 101 02/12/2018 12:39 PM    CO2 27 02/12/2018 12:39 PM    GLUCOSE 153 (H) 02/12/2018 12:39 PM    BUN 12 02/12/2018 12:39 PM    CREATININE 0.46 (L) 02/12/2018 12:39 PM        Lab Results   Component Value Date/Time    WBC 4.0 (L) 02/12/2018 12:39 PM    HEMOGLOBIN 10.3 (L) 02/12/2018 12:39 PM    HEMATOCRIT 33.0 (L) 02/12/2018 12:39 PM    PLATELETCT 283 02/12/2018 12:39 PM        Total time of the discharge process exceeds 45 minutes

## 2018-02-12 NOTE — PROGRESS NOTES
Pharmacy contacted for patients micafungin  100 mg in D5W twice. Patient is upset that the pharmacy has not sent it up because the medication is out, awaiting shipment. Supervisor made aware.

## 2018-02-12 NOTE — CARE PLAN
Problem: Communication  Goal: The ability to communicate needs accurately and effectively will improve    Intervention: Educate patient and significant other/support system about the plan of care, procedures, treatments, medications and allow for questions  White board updated with day staff and return time.  PT notified of hourly rounding and unit routine.   Appropriate signs in place at doorway for pt.       Problem: Safety  Goal: Will remain free from falls    Intervention: Implement fall precautions  Pt calls appropriately, treaded socks in use. Personal belongings and call light with in reach and bed is locked in lowest position hourly rounding in place      Problem: Infection  Goal: Will remain free from infection    Intervention: Assess signs and symptoms of infection  No signs of infection, IV abx administered once available. IV infiltrated and pharmacist made aware

## 2018-02-13 ENCOUNTER — OUTPATIENT INFUSION SERVICES (OUTPATIENT)
Dept: ONCOLOGY | Facility: MEDICAL CENTER | Age: 68
End: 2018-02-13
Attending: INTERNAL MEDICINE
Payer: MEDICARE

## 2018-02-13 ENCOUNTER — PATIENT OUTREACH (OUTPATIENT)
Dept: HEALTH INFORMATION MANAGEMENT | Facility: OTHER | Age: 68
End: 2018-02-13

## 2018-02-13 VITALS
DIASTOLIC BLOOD PRESSURE: 81 MMHG | OXYGEN SATURATION: 95 % | TEMPERATURE: 98.1 F | HEART RATE: 95 BPM | BODY MASS INDEX: 21.59 KG/M2 | WEIGHT: 159.39 LBS | RESPIRATION RATE: 18 BRPM | SYSTOLIC BLOOD PRESSURE: 116 MMHG | HEIGHT: 72 IN

## 2018-02-13 PROCEDURE — 700111 HCHG RX REV CODE 636 W/ 250 OVERRIDE (IP): Performed by: INTERNAL MEDICINE

## 2018-02-13 PROCEDURE — 700105 HCHG RX REV CODE 258: Performed by: INTERNAL MEDICINE

## 2018-02-13 PROCEDURE — 96365 THER/PROPH/DIAG IV INF INIT: CPT

## 2018-02-13 RX ADMIN — MICAFUNGIN SODIUM 100 MG: 20 INJECTION, POWDER, LYOPHILIZED, FOR SOLUTION INTRAVENOUS at 17:46

## 2018-02-13 ASSESSMENT — PAIN SCALES - GENERAL: PAINLEVEL: NO PAIN

## 2018-02-13 NOTE — PROGRESS NOTES
Pt discharged home with wife, escorted out by staff. All personal belongings sent with the patient upon discharge. Discharge instructions and prescriptions explained with verbal understanding provided by the patient. All needs met and all questions answered. PICC in place patent.

## 2018-02-13 NOTE — DISCHARGE INSTRUCTIONS
Discharged to home with outpatient antifungal infusion   Continue PICC line   Follow-up with primary care provider/discharge clinic in one week   Micafungin through February 20, 2018   Bactrim through February 14, 2018   Diflucan through February 20, 2018   Follow up with ID in 2-3 weeks   Follow-up with urology as scheduled    Discharge Instructions    Discharged to home by car with relative. Discharged via wheelchair, hospital escort: Yes.  Special equipment needed: home    Be sure to schedule a follow-up appointment with your primary care doctor or any specialists as instructed.     Discharge Plan:   Influenza Vaccine Indication: Not indicated: Previously immunized this influenza season and > 8 years of age    I understand that a diet low in cholesterol, fat, and sodium is recommended for good health. Unless I have been given specific instructions below for another diet, I accept this instruction as my diet prescription.   Other diet: regular    Special Instructions: None    · Is patient discharged on Warfarin / Coumadin?   No     Depression / Suicide Risk    As you are discharged from this [facility] facility, it is important to learn how to keep safe from harming yourself.    Recognize the warning signs:  · Abrupt changes in personality, positive or negative- including increase in energy   · Giving away possessions  · Change in eating patterns- significant weight changes-  positive or negative  · Change in sleeping patterns- unable to sleep or sleeping all the time   · Unwillingness or inability to communicate  · Depression  · Unusual sadness, discouragement and loneliness  · Talk of wanting to die  · Neglect of personal appearance   · Rebelliousness- reckless behavior  · Withdrawal from people/activities they love  · Confusion- inability to concentrate     If you or a loved one observes any of these behaviors or has concerns about self-harm, here's what you can do:  · Talk about it- your feelings and  reasons for harming yourself  · Remove any means that you might use to hurt yourself (examples: pills, rope, extension cords, firearm)  · Get professional help from the community (Mental Health, Substance Abuse, psychological counseling)  · Do not be alone:Call your Safe Contact- someone whom you trust who will be there for you.  · Call your local CRISIS HOTLINE 975-9472 or 540-745-2293  · Call your local Children's Mobile Crisis Response Team [place] (958) 516-6328 or wwwMeta  · Call the toll free National Suicide Prevention Hotlines   · National Suicide Prevention Lifeline 863-515-PCEX (0123)  · National Hope Line Network 800-SUICIDE (176-6308)      Sepsis, Adult  Sepsis is a serious infection of your blood or tissues that affects your whole body. The infection that causes sepsis may be bacterial, viral, fungal, or parasitic. Sepsis may be life threatening. Sepsis can cause your blood pressure to drop. This may result in shock. Shock causes your central nervous system and your organs to stop working correctly.   RISK FACTORS  Sepsis can happen in anyone, but it is more likely to happen in people who have weakened immune systems.  SIGNS AND SYMPTOMS   Symptoms of sepsis can include:  · Fever or low body temperature (hypothermia).  · Rapid breathing (hyperventilation).  · Chills.  · Rapid heartbeat (tachycardia).  · Confusion or light-headedness.  · Trouble breathing.  · Urinating much less than usual.  · Cool, clammy skin or red, flushed skin.  · Other problems with the heart, kidneys, or brain.  DIAGNOSIS   Your health care provider will likely do tests to look for an infection, to see if the infection has spread to your blood, and to see how serious your condition is. Tests can include:  · Blood tests, including cultures of your blood.  · Cultures of other fluids from your body, such as:  ¨ Urine.  ¨ Pus from wounds.  ¨ Mucus coughed up from your lungs.  · Urine tests other than  cultures.  · X-ray exams or other imaging tests.  TREATMENT   Treatment will begin with elimination of the source of infection. If your sepsis is likely caused by a bacterial or fungal infection, you will be given antibiotic or antifungal medicines.  You may also receive:  · Oxygen.  · Fluids through an IV tube.  · Medicines to increase your blood pressure.  · A machine to clean your blood (dialysis) if your kidneys fail.  · A machine to help you breathe if your lungs fail.  SEEK IMMEDIATE MEDICAL CARE IF:  You get an infection or develop any of the signs and symptoms of sepsis after surgery or a hospitalization.     This information is not intended to replace advice given to you by your health care provider. Make sure you discuss any questions you have with your health care provider.     Document Released: 09/15/2004 Document Revised: 05/03/2016 Document Reviewed: 08/25/2014  [employer] Interactive Patient Education ©2016 [employer]        Urinary Tract Infection  A urinary tract infection (UTI) can occur any place along the urinary tract. The tract includes the kidneys, ureters, bladder, and urethra. A type of germ called bacteria often causes a UTI. UTIs are often helped with antibiotic medicine.   HOME CARE   · If given, take antibiotics as told by your doctor. Finish them even if you start to feel better.  · Drink enough fluids to keep your pee (urine) clear or pale yellow.  · Avoid tea, drinks with caffeine, and bubbly (carbonated) drinks.  · Pee often. Avoid holding your pee in for a long time.  · Pee before and after having sex (intercourse).  · Wipe from front to back after you poop (bowel movement) if you are a woman. Use each tissue only once.  GET HELP RIGHT AWAY IF:   · You have back pain.  · You have lower belly (abdominal) pain.  · You have chills.  · You feel sick to your stomach (nauseous).  · You throw up (vomit).  · Your burning or discomfort with peeing does not go away.  · You have a  fever.  · Your symptoms are not better in 3 days.  MAKE SURE YOU:   · Understand these instructions.  · Will watch your condition.  · Will get help right away if you are not doing well or get worse.     This information is not intended to replace advice given to you by your health care provider. Make sure you discuss any questions you have with your health care provider.     Document Released: 06/05/2009 Document Revised: 01/08/2016 Document Reviewed: 07/18/2013  [employer] Interactive Patient Education ©2016 [employer]

## 2018-02-13 NOTE — DISCHARGE PLANNING
IMM provided. Awaiting 2 po Rxs and order for Outpt Wound care. Verified pt's phone number, will call tomorrow when Wound Clinic appt scheduled.

## 2018-02-14 ENCOUNTER — OUTPATIENT INFUSION SERVICES (OUTPATIENT)
Dept: ONCOLOGY | Facility: MEDICAL CENTER | Age: 68
End: 2018-02-14
Attending: INTERNAL MEDICINE
Payer: MEDICARE

## 2018-02-14 VITALS
BODY MASS INDEX: 21.92 KG/M2 | TEMPERATURE: 98.4 F | DIASTOLIC BLOOD PRESSURE: 71 MMHG | OXYGEN SATURATION: 98 % | SYSTOLIC BLOOD PRESSURE: 127 MMHG | WEIGHT: 159.39 LBS | RESPIRATION RATE: 18 BRPM | HEART RATE: 98 BPM

## 2018-02-14 PROCEDURE — 700105 HCHG RX REV CODE 258

## 2018-02-14 PROCEDURE — 96365 THER/PROPH/DIAG IV INF INIT: CPT

## 2018-02-14 PROCEDURE — 700111 HCHG RX REV CODE 636 W/ 250 OVERRIDE (IP): Performed by: INTERNAL MEDICINE

## 2018-02-14 PROCEDURE — 700111 HCHG RX REV CODE 636 W/ 250 OVERRIDE (IP)

## 2018-02-14 PROCEDURE — 700105 HCHG RX REV CODE 258: Performed by: INTERNAL MEDICINE

## 2018-02-14 RX ADMIN — MICAFUNGIN SODIUM 100 MG: 20 INJECTION, POWDER, LYOPHILIZED, FOR SOLUTION INTRAVENOUS at 16:49

## 2018-02-14 ASSESSMENT — PAIN SCALES - GENERAL: PAINLEVEL: 3=SLIGHT PAIN

## 2018-02-14 NOTE — PROGRESS NOTES
Niels comes in for first time IVABX. Micafungin infused as ordered. Patient tolerated well and without incident. PICC line in good condition and has positive blood return. PICC line flushed with saline per protocol. Patient DC'd home in good condition. Returns daily.

## 2018-02-15 ENCOUNTER — OUTPATIENT INFUSION SERVICES (OUTPATIENT)
Dept: ONCOLOGY | Facility: MEDICAL CENTER | Age: 68
End: 2018-02-15
Attending: INTERNAL MEDICINE
Payer: MEDICARE

## 2018-02-15 VITALS
BODY MASS INDEX: 21.92 KG/M2 | SYSTOLIC BLOOD PRESSURE: 125 MMHG | WEIGHT: 159.39 LBS | HEART RATE: 95 BPM | RESPIRATION RATE: 18 BRPM | OXYGEN SATURATION: 97 % | DIASTOLIC BLOOD PRESSURE: 70 MMHG | TEMPERATURE: 97.8 F

## 2018-02-15 PROCEDURE — 700105 HCHG RX REV CODE 258: Performed by: INTERNAL MEDICINE

## 2018-02-15 PROCEDURE — 96365 THER/PROPH/DIAG IV INF INIT: CPT

## 2018-02-15 PROCEDURE — 700111 HCHG RX REV CODE 636 W/ 250 OVERRIDE (IP): Performed by: INTERNAL MEDICINE

## 2018-02-15 RX ADMIN — MICAFUNGIN SODIUM 100 MG: 20 INJECTION, POWDER, LYOPHILIZED, FOR SOLUTION INTRAVENOUS at 17:02

## 2018-02-15 ASSESSMENT — PAIN SCALES - GENERAL: PAINLEVEL: 3=SLIGHT PAIN

## 2018-02-15 NOTE — PROGRESS NOTES
Returns for Micafungin.  Just had appt at [facility] with [facility] care for f/u so somewhat late for time here.  Called to approve.  Tx infused via PICC without issue.  Saline flush post.  Reports has not heard from wound care.  Note seen in hospital record from dc planner.  Message sent to check. Also voice message to wound care to check on referral.  Pt has wound on leg from hematoma and wife doing dssg changes until set up.  DC to her care.

## 2018-02-16 ENCOUNTER — OUTPATIENT INFUSION SERVICES (OUTPATIENT)
Dept: ONCOLOGY | Facility: MEDICAL CENTER | Age: 68
End: 2018-02-16
Attending: INTERNAL MEDICINE
Payer: MEDICARE

## 2018-02-16 VITALS
HEART RATE: 90 BPM | OXYGEN SATURATION: 98 % | SYSTOLIC BLOOD PRESSURE: 119 MMHG | DIASTOLIC BLOOD PRESSURE: 74 MMHG | BODY MASS INDEX: 21.92 KG/M2 | WEIGHT: 159.39 LBS | TEMPERATURE: 98 F | RESPIRATION RATE: 18 BRPM

## 2018-02-16 PROCEDURE — 700105 HCHG RX REV CODE 258: Performed by: INTERNAL MEDICINE

## 2018-02-16 PROCEDURE — 700111 HCHG RX REV CODE 636 W/ 250 OVERRIDE (IP): Performed by: INTERNAL MEDICINE

## 2018-02-16 PROCEDURE — 96365 THER/PROPH/DIAG IV INF INIT: CPT

## 2018-02-16 RX ADMIN — MICAFUNGIN SODIUM 100 MG: 20 INJECTION, POWDER, LYOPHILIZED, FOR SOLUTION INTRAVENOUS at 15:46

## 2018-02-16 ASSESSMENT — PAIN SCALES - GENERAL: PAINLEVEL: 4=SLIGHT-MODERATE PAIN

## 2018-02-16 NOTE — PROGRESS NOTES
Pt presents ambulatory to [facility] for daily Micafungin.  Pt denies changes since yesterday's visit.  RUE PICC line in place, blood return verified.  Micafungin infused per MAR, pt tolerated well.  PICC flushed per protocol, protected with gauze and sleeve.  Next appointment confirmed.  Pt discharged from IS in NAD under self care.

## 2018-02-17 ENCOUNTER — OUTPATIENT INFUSION SERVICES (OUTPATIENT)
Dept: ONCOLOGY | Facility: MEDICAL CENTER | Age: 68
End: 2018-02-17
Attending: INTERNAL MEDICINE
Payer: MEDICARE

## 2018-02-17 VITALS
OXYGEN SATURATION: 95 % | TEMPERATURE: 98 F | SYSTOLIC BLOOD PRESSURE: 134 MMHG | DIASTOLIC BLOOD PRESSURE: 70 MMHG | RESPIRATION RATE: 16 BRPM | HEART RATE: 90 BPM

## 2018-02-17 PROCEDURE — 700111 HCHG RX REV CODE 636 W/ 250 OVERRIDE (IP): Performed by: INTERNAL MEDICINE

## 2018-02-17 PROCEDURE — 96365 THER/PROPH/DIAG IV INF INIT: CPT

## 2018-02-17 PROCEDURE — 700105 HCHG RX REV CODE 258: Performed by: INTERNAL MEDICINE

## 2018-02-17 RX ADMIN — MICAFUNGIN SODIUM 100 MG: 20 INJECTION, POWDER, LYOPHILIZED, FOR SOLUTION INTRAVENOUS at 16:30

## 2018-02-17 ASSESSMENT — PAIN SCALES - GENERAL: PAINLEVEL: 2=MINIMAL-SLIGHT

## 2018-02-17 NOTE — PROGRESS NOTES
Pt arrives ambulatory for daily micafungin infusion.  PICC line flushed with saline and blood return noted.  Medication infused per orders with no complications or adverse reactions.  Pt to return tomorrow for next appt, confirmed with patient.  PICC line flushed per protocol, blood return noted, clave changed, gauze and arm wrap applied to site.  Patient left ambulatory in no distress.

## 2018-02-18 ENCOUNTER — OUTPATIENT INFUSION SERVICES (OUTPATIENT)
Dept: ONCOLOGY | Facility: MEDICAL CENTER | Age: 68
End: 2018-02-18
Attending: INTERNAL MEDICINE
Payer: MEDICARE

## 2018-02-18 VITALS
TEMPERATURE: 97.1 F | SYSTOLIC BLOOD PRESSURE: 126 MMHG | DIASTOLIC BLOOD PRESSURE: 75 MMHG | HEART RATE: 88 BPM | RESPIRATION RATE: 16 BRPM | OXYGEN SATURATION: 95 %

## 2018-02-18 PROCEDURE — 700111 HCHG RX REV CODE 636 W/ 250 OVERRIDE (IP): Performed by: INTERNAL MEDICINE

## 2018-02-18 PROCEDURE — 96365 THER/PROPH/DIAG IV INF INIT: CPT

## 2018-02-18 PROCEDURE — 700105 HCHG RX REV CODE 258: Performed by: INTERNAL MEDICINE

## 2018-02-18 RX ADMIN — MICAFUNGIN SODIUM 100 MG: 20 INJECTION, POWDER, LYOPHILIZED, FOR SOLUTION INTRAVENOUS at 15:29

## 2018-02-18 ASSESSMENT — PAIN SCALES - GENERAL: PAINLEVEL: NO PAIN

## 2018-02-18 NOTE — PROGRESS NOTES
Returns for IVAB to tx sepsis. Also has wound on L upper leg with dssg on D&I, reports went to wound care and unable to get set up so received supplies and wife is changing site.  She is a nurse and currently not here to give report of status.  Tx infused without rx.  PICC saline flush post.  DC to self care.

## 2018-02-19 ENCOUNTER — OUTPATIENT INFUSION SERVICES (OUTPATIENT)
Dept: ONCOLOGY | Facility: MEDICAL CENTER | Age: 68
End: 2018-02-19
Attending: INTERNAL MEDICINE
Payer: MEDICARE

## 2018-02-19 VITALS
SYSTOLIC BLOOD PRESSURE: 130 MMHG | OXYGEN SATURATION: 94 % | WEIGHT: 161.38 LBS | RESPIRATION RATE: 18 BRPM | DIASTOLIC BLOOD PRESSURE: 79 MMHG | BODY MASS INDEX: 22.59 KG/M2 | HEART RATE: 83 BPM | HEIGHT: 71 IN | TEMPERATURE: 98.7 F

## 2018-02-19 DIAGNOSIS — B49 FUNGEMIA: ICD-10-CM

## 2018-02-19 LAB
ALBUMIN SERPL BCP-MCNC: 3.4 G/DL (ref 3.2–4.9)
ALP SERPL-CCNC: 62 U/L (ref 30–99)
ALT SERPL-CCNC: 10 U/L (ref 2–50)
ANISOCYTOSIS BLD QL SMEAR: ABNORMAL
AST SERPL-CCNC: 14 U/L (ref 12–45)
BASOPHILS # BLD AUTO: 0.4 % (ref 0–1.8)
BASOPHILS # BLD: 0.02 K/UL (ref 0–0.12)
BILIRUB CONJ SERPL-MCNC: 0.2 MG/DL (ref 0.1–0.5)
BILIRUB INDIRECT SERPL-MCNC: 0.1 MG/DL (ref 0–1)
BILIRUB SERPL-MCNC: 0.3 MG/DL (ref 0.1–1.5)
BUN SERPL-MCNC: 19 MG/DL (ref 8–22)
CALCIUM SERPL-MCNC: 9.3 MG/DL (ref 8.5–10.5)
CHLORIDE SERPL-SCNC: 102 MMOL/L (ref 96–112)
CK SERPL-CCNC: 17 U/L (ref 0–154)
CO2 SERPL-SCNC: 24 MMOL/L (ref 20–33)
COMMENT 1642: NORMAL
CREAT SERPL-MCNC: 0.47 MG/DL (ref 0.5–1.4)
DACRYOCYTES BLD QL SMEAR: NORMAL
EOSINOPHIL # BLD AUTO: 0.27 K/UL (ref 0–0.51)
EOSINOPHIL NFR BLD: 5.9 % (ref 0–6.9)
ERYTHROCYTE [DISTWIDTH] IN BLOOD BY AUTOMATED COUNT: 66.3 FL (ref 35.9–50)
GLUCOSE SERPL-MCNC: 126 MG/DL (ref 65–99)
HCT VFR BLD AUTO: 33.9 % (ref 42–52)
HGB BLD-MCNC: 10.2 G/DL (ref 14–18)
IMM GRANULOCYTES # BLD AUTO: 0.01 K/UL (ref 0–0.11)
IMM GRANULOCYTES NFR BLD AUTO: 0.2 % (ref 0–0.9)
LYMPHOCYTES # BLD AUTO: 0.85 K/UL (ref 1–4.8)
LYMPHOCYTES NFR BLD: 18.5 % (ref 22–41)
MCH RBC QN AUTO: 28.2 PG (ref 27–33)
MCHC RBC AUTO-ENTMCNC: 30.1 G/DL (ref 33.7–35.3)
MCV RBC AUTO: 93.6 FL (ref 81.4–97.8)
MICROCYTES BLD QL SMEAR: ABNORMAL
MONOCYTES # BLD AUTO: 0.29 K/UL (ref 0–0.85)
MONOCYTES NFR BLD AUTO: 6.3 % (ref 0–13.4)
MORPHOLOGY BLD-IMP: NORMAL
NEUTROPHILS # BLD AUTO: 3.15 K/UL (ref 1.82–7.42)
NEUTROPHILS NFR BLD: 68.7 % (ref 44–72)
NRBC # BLD AUTO: 0 K/UL
NRBC BLD-RTO: 0 /100 WBC
PHOSPHATE SERPL-MCNC: 3.6 MG/DL (ref 2.5–4.5)
PLATELET # BLD AUTO: 241 K/UL (ref 164–446)
PLATELET BLD QL SMEAR: NORMAL
PMV BLD AUTO: 8.9 FL (ref 9–12.9)
POIKILOCYTOSIS BLD QL SMEAR: NORMAL
POTASSIUM SERPL-SCNC: 4.2 MMOL/L (ref 3.6–5.5)
PROT SERPL-MCNC: 7.1 G/DL (ref 6–8.2)
RBC # BLD AUTO: 3.62 M/UL (ref 4.7–6.1)
RBC BLD AUTO: PRESENT
SODIUM SERPL-SCNC: 137 MMOL/L (ref 135–145)
WBC # BLD AUTO: 4.6 K/UL (ref 4.8–10.8)

## 2018-02-19 PROCEDURE — 700111 HCHG RX REV CODE 636 W/ 250 OVERRIDE (IP): Performed by: INTERNAL MEDICINE

## 2018-02-19 PROCEDURE — 80076 HEPATIC FUNCTION PANEL: CPT

## 2018-02-19 PROCEDURE — 700105 HCHG RX REV CODE 258: Performed by: INTERNAL MEDICINE

## 2018-02-19 PROCEDURE — 80069 RENAL FUNCTION PANEL: CPT

## 2018-02-19 PROCEDURE — 82550 ASSAY OF CK (CPK): CPT

## 2018-02-19 PROCEDURE — 36592 COLLECT BLOOD FROM PICC: CPT

## 2018-02-19 PROCEDURE — 85025 COMPLETE CBC W/AUTO DIFF WBC: CPT

## 2018-02-19 PROCEDURE — 96365 THER/PROPH/DIAG IV INF INIT: CPT

## 2018-02-19 RX ADMIN — MICAFUNGIN SODIUM 100 MG: 20 INJECTION, POWDER, LYOPHILIZED, FOR SOLUTION INTRAVENOUS at 16:24

## 2018-02-19 ASSESSMENT — PAIN SCALES - GENERAL: PAINLEVEL: NO PAIN

## 2018-02-19 NOTE — PROGRESS NOTES
Patient arrived amb[facility] to [facility] for Mic[facility]. Reviewed vital signs, labs, and physician order. Patient reports mild fatigue. Patient arrives with SL PICC to [facility] IV, visualized brisk blood return. Micafungin administered, no adverse reaction observed. PICC line  flushed per protocol, 4X4 gauze and mesh sleeve placed for protection.  Patient left the [facility] ambulatory in no sign of distress.

## 2018-02-20 ENCOUNTER — OUTPATIENT INFUSION SERVICES (OUTPATIENT)
Dept: ONCOLOGY | Facility: MEDICAL CENTER | Age: 68
End: 2018-02-20
Attending: INTERNAL MEDICINE
Payer: MEDICARE

## 2018-02-20 VITALS
DIASTOLIC BLOOD PRESSURE: 71 MMHG | HEART RATE: 85 BPM | SYSTOLIC BLOOD PRESSURE: 120 MMHG | RESPIRATION RATE: 18 BRPM | OXYGEN SATURATION: 97 % | TEMPERATURE: 98 F

## 2018-02-20 PROCEDURE — 700105 HCHG RX REV CODE 258: Performed by: INTERNAL MEDICINE

## 2018-02-20 PROCEDURE — 700105 HCHG RX REV CODE 258

## 2018-02-20 PROCEDURE — 700111 HCHG RX REV CODE 636 W/ 250 OVERRIDE (IP): Performed by: INTERNAL MEDICINE

## 2018-02-20 PROCEDURE — 96365 THER/PROPH/DIAG IV INF INIT: CPT

## 2018-02-20 PROCEDURE — 700111 HCHG RX REV CODE 636 W/ 250 OVERRIDE (IP)

## 2018-02-20 RX ADMIN — MICAFUNGIN SODIUM 100 MG: 20 INJECTION, POWDER, LYOPHILIZED, FOR SOLUTION INTRAVENOUS at 16:05

## 2018-02-20 ASSESSMENT — PAIN SCALES - GENERAL: PAINLEVEL: NO PAIN

## 2018-02-20 NOTE — PROGRESS NOTES
Pt arrives ambulatory to [facility] accompanied by self.  He is here for daily IV ABX.  Pt denies gastric upset.  IV ABX infused as ordered, pt tolerated well, no evidence of adverse effects noted or expressed.  PICC flushes well, brisk blood return noted.  Pt dc'd to self in no apparent distress.  Returns daily.  Treatment ends tomorrow, pt refused PICC care today as we will pull PICC tomorrow.

## 2018-02-20 NOTE — DOCUMENTATION QUERY
"DOCUMENTATION QUERY    PROVIDERS: Please select “Cosign w/ note”to reply to query.    To better represent the severity of illness of your patient, please review the following information and exercise your independent professional judgment in responding to this query.     \"*pt reports that he had his nephrostomys removed yesterday and this morning woke with fever and chills, as well as vomiting\" is documented in H&P and Discharge Summary. \"Sepsis due to pyelonephritis / complicated UTI\" is documented in H&P, progress notes and Discharge Summary.   Based upon the clinical findings, risk factors, and treatment, can the relationship between these two conditions be further specified?    • Pyelonephritis/complicated UTI is related to procedure/stent  • Pyelonephritis/complicated UTI is not related to procedure/stent   • Other explanation of clinical findings (please specify)  • Unable to determine         The medical record reflects the following:   Clinical Findings  Sepsis, candida UTI/pyelonephritis,  DM, Crohn's disease   Treatment  IV antibiotics   Risk Factors  Sepsis. Candida UTI/pyelonephritis   Location within medical record  H&P, progress notes and Discharge Summary     Thank you,   CHRISSY Meade        "

## 2018-02-21 NOTE — PROGRESS NOTES
Pt returns for final IV ABX.    PICC flushes easily, brisk blood return noted.  Infused over one hour as ordered.  Pt is tolerating this course of treatment without complaint.  PICC flushed with 10 ml saline.  PICC removed intact at 37cm.  Pt tolerated well.    Sterile gauze and Tegaderm to site, covered with Coban.  Instructions to remove Coban in one hour, keep Tegaderm dry for 24 hours.  Pt to report any s/s infection to MD.  Pt to be seen in ER for swelling or bleeding that saturates sterile gauze.  Pt states understanding and compliance with same.  dc'd to self care in no apparent distress.  No future appts at this time.

## 2018-07-30 NOTE — ADDENDUM NOTE
Encounter addended by: Brandon Meza, PT on: 7/30/2018  8:11 AM<BR>    Actions taken: Charge Capture section accepted

## 2018-09-05 NOTE — PROGRESS NOTES
"[facility] Hospitalist Progress Note    Date of Service: 2/5/2018    Chief Complaint  67 y.o. male with Crohn's disease, type II diabetes mellitus, history of DVT, history of bladder rupture, bilateral nephrostomy tube with removal and ureteral stent placement, was just discharged yesterday, at that time was also felt to have UTI, but urine grew mixed skin anca and yeast. ID was consulted then, and was transitioned to oral ciprofloxacin and fluconazole to complete 14 days course. Admitted 1/30/2018 with fevers, chills, nausea and vomiting, along with increasing urinary frequency and bladder spasms. Labs showed no leukocytosis. BMP was unimpressive except for hyperglycemia, with normal creatinine. CT scan showed left ureteral stent in place, moderate right hydronephrosis and dilation of the right ureter, 5 mm nonobstructing lower pole right renal stone. Urinalysis showed packed WBCs, 100-150 RBC, with moderate leukocyte esterase. Felt to have sepsis due to UTI/pyelonephritis, and started on IV fluids, and IV cefepime. Low potassium replaced, given magnesium for low normal levels.Continues to retain urine, kaur placed. Blood cultures ×1 bottle growing Candida glabrata, as well as the urine culture. [facility] Enterobacter cloacae resistant to cefepime and Zosyn. Antibiotic changed to meropenem and micafungin by ID. continued to spike fevers. Cardiology consulted for MAX. Repeat blood cultures negative.    Interval Problem Update  2/5/2018 - no overnight events. Remains hemodynamically stable and afebrile. Stable on RA.  No fevers since yesterday. WBC 3.2. 3.5% bands. Pending Lasix renal scan to evaluate for obstruction.    > Seen and examined. Anxious, asking when he can go home. Denied any fevers, chills, nausea, vomiting, abd pain. No CP, SOB. Wants his inpatient dietary restrictions relaxed, states he manages his diet well at home and \"wont indulge\" here. "         Consultants/Specialty  ID  Cardiology  Urology    Disposition  Monitor on the floors.        ROS     Pertinent positives/negatives as mentioned above.     A complete review of systems was done. All other systems were negative.      Physical Exam  Laboratory/Imaging   Hemodynamics  Temp (24hrs), Av.7 °C (98 °F), Min:36.2 °C (97.1 °F), Max:37.4 °C (99.4 °F)   Temperature: 37.4 °C (99.4 °F)  Pulse  Av.1  Min: 59  Max: 117    Blood Pressure : 117/72      Respiratory      Respiration: 16, Pulse Oximetry: 90 %        RUL Breath Sounds: Clear, RML Breath Sounds: Clear, RLL Breath Sounds: Diminished, FLORENCE Breath Sounds: Clear, LLL Breath Sounds: Diminished    Fluids    Intake/Output Summary (Last 24 hours) at 18 0659  Last data filed at 18 0400   Gross per 24 hour   Intake              480 ml   Output             1975 ml   Net            -1495 ml       Nutrition  Orders Placed This Encounter   Procedures   • DIET NPO     Standing Status:   Standing     Number of Occurrences:   1     Order Specific Question:   Restrict to:     Answer:   Sips with Medications [3]     Physical Exam   Constitutional: He is oriented to person, place, and time. He appears well-developed. No distress.   Thin   HENT:   Head: Normocephalic and atraumatic.   Mouth/Throat: Oropharynx is clear and moist. No oropharyngeal exudate.   Eyes: EOM are normal. Pupils are equal, round, and reactive to light. Right eye exhibits no discharge. Left eye exhibits no discharge. No scleral icterus.   Neck: Normal range of motion. Neck supple. No thyromegaly present.   Cardiovascular: Normal rate and regular rhythm.  Exam reveals no gallop and no friction rub.    No murmur heard.  Pulmonary/Chest: Effort normal and breath sounds normal. He has no wheezes. He has no rales. He exhibits no tenderness.   Abdominal: Soft. Bowel sounds are normal. There is no tenderness. There is no rebound and no guarding.   Genitourinary:   Genitourinary  Comments: Pacheco catheter in place, clear urine.   Musculoskeletal: Normal range of motion. He exhibits no edema or tenderness.   Lymphadenopathy:     He has no cervical adenopathy.   Neurological: He is alert and oriented to person, place, and time. No cranial nerve deficit. Coordination normal.   Skin: Skin is warm and dry. No rash noted. He is not diaphoretic. No erythema.   Wound VAC on posterior left thigh.    Psychiatric: He has a normal mood and affect. His behavior is normal. Thought content normal.   anxious, AOx2, conversant. Wife at bedside.    Vitals reviewed.            Recent Labs      02/03/18 0229 02/04/18 0303 02/05/18 0229   WBC  2.9*  3.5*  3.2*   RBC  3.12*  3.02*  2.81*   HEMOGLOBIN  9.3*  8.5*  8.2*   HEMATOCRIT  29.7*  27.9*  26.5*   MCV  95.2  92.4  94.3   MCH  29.8  28.1  29.2   MCHC  31.3*  30.5*  30.9*   RDW  62.8*  61.1*  62.4*   PLATELETCT  215  205  219   MPV  10.2  9.8  10.1     Recent Labs      02/03/18 0229 02/04/18 0303 02/05/18 0229   SODIUM  134*  133*  134*   POTASSIUM  3.9  3.3*  4.0   CHLORIDE  100  100  103   CO2  27  24  24   GLUCOSE  160*  222*  232*   BUN  9  9  10   CREATININE  0.55  0.54  0.48*   CALCIUM  7.7*  7.6*  7.7*                      Assessment/Plan     * Sepsis due to pyelonephritis / complicated UTI (CMS-[facility])- (present on admission)   Assessment & Plan    - This is sepsis (without associated acute organ dysfunction).  Source is pyelonephritis/complicated UTI. Rule out endocarditis. Fever has resolved.  - Continue gentle IV fluids with NS at 75cc/hr. Continue PRN boluses per sepsis protocol. Continue close hemodynamic monitoring. Continue to trend WBC.   - Await MAX.  - Continue IV antibiotics for pyelonephritis and fungemia as below.         Fungemia due to UTI- (present on admission)   Assessment & Plan    - Continue micafungin IV.   - For MAX today to rule out endocarditis. Will need ophthalmology examination to rule out endophthalmitis. Repeat  blood cultures negative so far, continue to follow.   - Continue to monitor for fever.        Enterobacter cloacae and Candida glabrata UTI / Pyelonephritis- (present on admission)   Assessment & Plan    - No further fevers.   - Continue meropenem and micafungin. ID on board, guiding antibiotics. Await inputs regarding final antibiotic and duration recommendations.        Urinary retention- (present on admission)   Assessment & Plan    - Likely related to UTI. Continue Flomax. Continue Pacheco catheter for now, urology following. Plan for lasix renal scan to rule-out ongoing obstruction, as may need another nephrostomy tube if still +.        Hypokalemia   Assessment & Plan    - K WNL today. Continue PO K Dur 40 mg BID. Repeat K levels in AM.        Open wound of left thigh- (present on admission)   Assessment & Plan    - Continue wound VAC, wound care and dressing changes. Wound service on board.        History of DVT (deep vein thrombosis)- (present on admission)   Assessment & Plan    - with hypercoagulable disorder, and multiple episodes of DVTs.  - Continue therapeutic Lovenox subcutaneous injections.        Anemia of chronic disease- (present on admission)   Assessment & Plan    - Continue to monitor Hgb, transfuse for hemoglobin <7.        Type II diabetes mellitus (CMS-HCC)- (present on admission)   Assessment & Plan    - Continue glipizide, continue holding metformin while in-house. Continue sliding scale insulin coverage while in-house. Continue Accu-Cheks before meals and at bedtime. On regular diet per patient's request. Monitor for compliance.        Crohn disease (CMS-HCC)- (present on admission)   Assessment & Plan    - Not in acute flare. Continue home dose mesalamine.              Reviewed items::  Labs reviewed, Medications reviewed and Radiology images reviewed  Pacheco catheter::  Urinary Tract Retention or Urinary Tract Obstruction  DVT prophylaxis pharmacological::  Enoxaparin (Lovenox)  Ulcer  Prophylaxis::  Not indicated  Antibiotics:  Treating active infection/contamination beyond 24 hours perioperative coverage         No

## 2018-10-10 NOTE — ADDENDUM NOTE
Encounter addended by: Brandon Meza PT on: 10/10/2018  8:24 AM<BR>    Actions taken: Charge Capture section accepted No Yes

## 2018-11-07 NOTE — ADDENDUM NOTE
Encounter addended by: Brandon Meza, PT on: 11/7/2018  8:12 AM<BR>    Actions taken: Charge Capture section accepted

## 2019-02-06 ENCOUNTER — APPOINTMENT (RX ONLY)
Dept: URBAN - METROPOLITAN AREA CLINIC 22 | Facility: CLINIC | Age: 69
Setting detail: DERMATOLOGY
End: 2019-02-06

## 2019-02-06 DIAGNOSIS — L81.8 OTHER SPECIFIED DISORDERS OF PIGMENTATION: ICD-10-CM

## 2019-02-06 DIAGNOSIS — L81.4 OTHER MELANIN HYPERPIGMENTATION: ICD-10-CM

## 2019-02-06 DIAGNOSIS — D18.0 HEMANGIOMA: ICD-10-CM

## 2019-02-06 DIAGNOSIS — D22 MELANOCYTIC NEVI: ICD-10-CM

## 2019-02-06 DIAGNOSIS — L82.1 OTHER SEBORRHEIC KERATOSIS: ICD-10-CM

## 2019-02-06 DIAGNOSIS — L57.0 ACTINIC KERATOSIS: ICD-10-CM

## 2019-02-06 DIAGNOSIS — D17 BENIGN LIPOMATOUS NEOPLASM: ICD-10-CM

## 2019-02-06 DIAGNOSIS — Z85.828 PERSONAL HISTORY OF OTHER MALIGNANT NEOPLASM OF SKIN: ICD-10-CM

## 2019-02-06 DIAGNOSIS — L72.0 EPIDERMAL CYST: ICD-10-CM

## 2019-02-06 DIAGNOSIS — L82.0 INFLAMED SEBORRHEIC KERATOSIS: ICD-10-CM

## 2019-02-06 PROBLEM — E13.9 OTHER SPECIFIED DIABETES MELLITUS WITHOUT COMPLICATIONS: Status: ACTIVE | Noted: 2019-02-06

## 2019-02-06 PROBLEM — D18.01 HEMANGIOMA OF SKIN AND SUBCUTANEOUS TISSUE: Status: ACTIVE | Noted: 2019-02-06

## 2019-02-06 PROBLEM — D48.5 NEOPLASM OF UNCERTAIN BEHAVIOR OF SKIN: Status: ACTIVE | Noted: 2019-02-06

## 2019-02-06 PROBLEM — D17.1 BENIGN LIPOMATOUS NEOPLASM OF SKIN AND SUBCUTANEOUS TISSUE OF TRUNK: Status: ACTIVE | Noted: 2019-02-06

## 2019-02-06 PROBLEM — D22.5 MELANOCYTIC NEVI OF TRUNK: Status: ACTIVE | Noted: 2019-02-06

## 2019-02-06 PROCEDURE — 17110 DESTRUCTION B9 LES UP TO 14: CPT

## 2019-02-06 PROCEDURE — ? BIOPSY BY SHAVE METHOD

## 2019-02-06 PROCEDURE — ? COUNSELING

## 2019-02-06 PROCEDURE — ? OBSERVATION

## 2019-02-06 PROCEDURE — 11102 TANGNTL BX SKIN SINGLE LES: CPT | Mod: 59

## 2019-02-06 PROCEDURE — ? DEFER

## 2019-02-06 PROCEDURE — ? LIQUID NITROGEN

## 2019-02-06 PROCEDURE — 17003 DESTRUCT PREMALG LES 2-14: CPT

## 2019-02-06 PROCEDURE — 99203 OFFICE O/P NEW LOW 30 MIN: CPT | Mod: 25

## 2019-02-06 PROCEDURE — 17000 DESTRUCT PREMALG LESION: CPT | Mod: 59

## 2019-02-06 ASSESSMENT — LOCATION DETAILED DESCRIPTION DERM
LOCATION DETAILED: RIGHT PROXIMAL POSTERIOR UPPER ARM
LOCATION DETAILED: RIGHT VENTRAL PROXIMAL FOREARM
LOCATION DETAILED: LEFT MEDIAL FRONTAL SCALP
LOCATION DETAILED: RIGHT MEDIAL SUPERIOR CHEST
LOCATION DETAILED: LEFT VENTRAL PROXIMAL FOREARM
LOCATION DETAILED: LEFT INFERIOR MEDIAL FOREHEAD
LOCATION DETAILED: RIGHT MEDIAL UPPER BACK
LOCATION DETAILED: POSTERIOR MID-PARIETAL SCALP
LOCATION DETAILED: LEFT PROXIMAL POSTERIOR UPPER ARM
LOCATION DETAILED: INFERIOR THORACIC SPINE
LOCATION DETAILED: RIGHT SUPERIOR FOREHEAD
LOCATION DETAILED: LEFT DISTAL POSTERIOR UPPER ARM
LOCATION DETAILED: SUPERIOR THORACIC SPINE
LOCATION DETAILED: LOWER STERNUM
LOCATION DETAILED: LEFT SUPERIOR PARIETAL SCALP
LOCATION DETAILED: RIGHT MEDIAL FRONTAL SCALP
LOCATION DETAILED: RIGHT LATERAL ZYGOMA
LOCATION DETAILED: LEFT ANTIHELIX
LOCATION DETAILED: LEFT PROXIMAL POSTERIOR UPPER ARM
LOCATION DETAILED: STERNUM
LOCATION DETAILED: RIGHT INFERIOR UPPER BACK
LOCATION DETAILED: RIGHT MEDIAL INFERIOR CHEST
LOCATION DETAILED: LEFT CENTRAL PARIETAL SCALP
LOCATION DETAILED: LEFT SUPERIOR OCCIPITAL SCALP
LOCATION DETAILED: PERIUMBILICAL SKIN
LOCATION DETAILED: STERNAL NOTCH
LOCATION DETAILED: LEFT SUPERIOR FOREHEAD

## 2019-02-06 ASSESSMENT — LOCATION SIMPLE DESCRIPTION DERM
LOCATION SIMPLE: RIGHT FOREHEAD
LOCATION SIMPLE: UPPER BACK
LOCATION SIMPLE: LEFT FOREARM
LOCATION SIMPLE: RIGHT SCALP
LOCATION SIMPLE: LEFT UPPER ARM
LOCATION SIMPLE: ABDOMEN
LOCATION SIMPLE: POSTERIOR SCALP
LOCATION SIMPLE: CHEST
LOCATION SIMPLE: RIGHT FOREARM
LOCATION SIMPLE: RIGHT UPPER ARM
LOCATION SIMPLE: LEFT FOREHEAD
LOCATION SIMPLE: LEFT EAR
LOCATION SIMPLE: RIGHT UPPER BACK
LOCATION SIMPLE: LEFT SCALP
LOCATION SIMPLE: RIGHT ZYGOMA
LOCATION SIMPLE: LEFT UPPER ARM
LOCATION SIMPLE: LEFT OCCIPITAL SCALP
LOCATION SIMPLE: SCALP

## 2019-02-06 ASSESSMENT — LOCATION ZONE DERM
LOCATION ZONE: FACE
LOCATION ZONE: TRUNK
LOCATION ZONE: ARM
LOCATION ZONE: SCALP
LOCATION ZONE: EAR
LOCATION ZONE: ARM

## 2019-02-06 NOTE — PROCEDURE: DEFER
Instructions (Optional): Pt is scheduled to have a general surgeon remove it
Detail Level: Detailed
Introduction Text (Please End With A Colon): The following procedure was deferred:

## 2019-02-06 NOTE — PROCEDURE: COUNSELING
Detail Level: Detailed
Detail Level: Zone
Detail Level: Simple
Patient Specific Counseling (Will Not Stick From Patient To Patient): Patient schedule to have removed by a general surgeon

## 2019-02-06 NOTE — PROCEDURE: BIOPSY BY SHAVE METHOD
Size Of Lesion In Cm: 0.5
Anesthesia Type: 1% lidocaine with 1:100,000 epinephrine and a 1:10 solution of 8.4% sodium bicarbonate
Wound Care: Petrolatum
Additional Anesthesia Volume In Cc (Will Not Render If 0): 0
Electrodesiccation Text: The wound bed was treated with electrodesiccation after the biopsy was performed.
Render Post-Care Instructions In Note?: yes
Depth Of Biopsy: dermis
Bill 52681 For Specimen Handling/Conveyance To Laboratory?: no
Biopsy Type: H and E
Billing Type: Third-Party Bill
Type Of Destruction Used: Curettage
Electrodesiccation And Curettage Text: The wound bed was treated with electrodesiccation and curettage after the biopsy was performed.
Anesthesia Volume In Cc: 2
Post-Care Instructions: I reviewed with the patient in detail post-care instructions. Patient is to keep the biopsy site dry overnight. Gentle cleansing daily.  Apply petroleum ointment daily until healed. Patient may apply hydrogen peroxide soaks to remove any crusting.
Dressing: pressure dressing with telfa
Silver Nitrate Text: The wound bed was treated with silver nitrate after the biopsy was performed.
Hemostasis: Drysol
Biopsy Method: Personna blade
Lab: [id number]
Curettage Text: The wound bed was treated with curettage after the biopsy was performed.
Notification Instructions: Patient will be notified of biopsy results. However, patient instructed to call the office if not contacted within 2 weeks.
Detail Level: Detailed
Consent: Written consent was obtained and risks were reviewed including but not limited to scarring, infection, bleeding, scabbing, incomplete removal, nerve damage and allergy to anesthesia.
Lab Facility: 
Cryotherapy Text: The wound bed was treated with cryotherapy after the biopsy was performed.

## 2019-03-04 ENCOUNTER — APPOINTMENT (RX ONLY)
Dept: URBAN - METROPOLITAN AREA CLINIC 4 | Facility: CLINIC | Age: 69
Setting detail: DERMATOLOGY
End: 2019-03-04

## 2019-03-04 PROBLEM — C44.311 BASAL CELL CARCINOMA OF SKIN OF NOSE: Status: ACTIVE | Noted: 2019-03-04

## 2019-03-04 PROCEDURE — ? MOHS SURGERY PHONE CONSULTATION

## 2019-03-04 NOTE — PROCEDURE: MOHS SURGERY PHONE CONSULTATION
Does The Patient Have A Living Will (Optional)?: No
Referring Provider: Gayatri
Detail Level: Detailed
Time Of Mohs Surgery: 830
Which Antibiotic Do They Take For Surgical Prophylaxis?: Amoxicillin (2 grams)
Patient Reported Location: Nasal supratip
Patient's Insurance: [facility]
Does The Patient Take Blood Thinners?: Yes
Pathology Accession #: [id number]
Office Location Of Mohs Surgery: [place] [id number]
Date Of Mohs Surgery: 04/15/2019
If Yes- What Blood Thinners Do They Take?: Warfarin

## 2019-04-15 ENCOUNTER — APPOINTMENT (RX ONLY)
Dept: URBAN - METROPOLITAN AREA CLINIC 36 | Facility: CLINIC | Age: 69
Setting detail: DERMATOLOGY
End: 2019-04-15

## 2019-04-15 PROBLEM — C44.311 BASAL CELL CARCINOMA OF SKIN OF NOSE: Status: ACTIVE | Noted: 2019-04-15

## 2019-04-15 PROCEDURE — ? MOHS SURGERY

## 2019-04-15 PROCEDURE — 17312 MOHS ADDL STAGE: CPT

## 2019-04-15 PROCEDURE — 17311 MOHS 1 STAGE H/N/HF/G: CPT

## 2019-04-15 PROCEDURE — 14060 TIS TRNFR E/N/E/L 10 SQ CM/<: CPT

## 2019-04-15 NOTE — PROCEDURE: MOHS SURGERY
O-Z Plasty Text: The defect edges were debeveled with a #15 scalpel blade.  Given the location of the defect, shape of the defect and the proximity to free margins an O-Z plasty (double transposition flap) was deemed most appropriate.  Using a sterile surgical marker, the appropriate transposition flaps were drawn incorporating the defect and placing the expected incisions within the relaxed skin tension lines where possible.    The area thus outlined was incised deep to adipose tissue with a #15 scalpel blade.  The skin margins were undermined to an appropriate distance in all directions utilizing iris scissors.  Hemostasis was achieved with electrocautery.  The flaps were then transposed into place, one clockwise and the other counterclockwise, and anchored with interrupted buried subcutaneous sutures.
Rhomboid Transposition Flap Text: The defect edges were debeveled with a #15 scalpel blade.  Given the location of the defect and the proximity to free margins a rhomboid transposition flap was deemed most appropriate.  Using a sterile surgical marker, an appropriate rhomboid flap was drawn incorporating the defect.    The area thus outlined was incised deep to adipose tissue with a #15 scalpel blade.  The skin margins were undermined to an appropriate distance in all directions utilizing iris scissors.
Crescentic Complex Repair Preamble Text (Leave Blank If You Do Not Want): Extensive wide undermining was performed at least 2 cm in all directions.
Consent 1/Introductory Paragraph: The rationale for Mohs was explained to the patient and consent was obtained. The risks, benefits and alternatives to therapy were discussed in detail. Specifically, the risks of infection, scarring, bleeding, prolonged wound healing, incomplete removal, allergy to anesthesia, nerve injury and recurrence were addressed. Prior to the procedure, the treatment site was clearly identified and confirmed by the patient. All components of Universal Protocol/PAUSE Rule completed.
Show Previous Accession Variable: Yes
Graft Donor Site Epidermal Sutures (Optional): 5-0 Ethibond
Quadrant Reporting?: no
Area H Indication Text: Tumors in this location are included in Area H (eyelids, eyebrows, nose, lips, chin, ear, pre-auricular, post-auricular, temple, genitalia, hands, feet, ankles and areola).  Tissue conservation is critical in these anatomic locations.
Provider Procedure Text (B): After obtaining clear surgical margins the defect was repaired by another provider.
Otolaryngologist Procedure Text (F): After obtaining clear surgical margins the patient was sent to otolaryngology for surgical repair.  The patient understands they will receive post-surgical care and follow-up from the referring physician's office.
Composite Graft Text: The defect edges were debeveled with a #15 scalpel blade.  Given the location of the defect, shape of the defect, the proximity to free margins and the fact the defect was full thickness a composite graft was deemed most appropriate.  The defect was outline and then transferred to the donor site.  A full thickness graft was then excised from the donor site. The graft was then placed in the primary defect, oriented appropriately and then sutured into place.  The secondary defect was then repaired using a primary closure.
Asc Procedure Text (D): After obtaining clear surgical margins the patient was sent to an ASC for surgical repair.  The patient understands they will receive post-surgical care and follow-up from the ASC physician.
Oculoplastic Surgeon Procedure Text (C): After obtaining clear surgical margins the patient was sent to oculoplastics for surgical repair.  The patient understands they will receive post-surgical care and follow-up from the referring physician's office.
Quadrants Reporting?: 0
Crescentic Advancement Flap Text: The defect edges were debeveled with a #15 scalpel blade.  Given the location of the defect and the proximity to free margins a crescentic advancement flap was deemed most appropriate.  Using a sterile surgical marker, the appropriate advancement flap was drawn incorporating the defect and placing the expected incisions within the relaxed skin tension lines where possible.    The area thus outlined was incised deep to adipose tissue with a #15 scalpel blade.  The skin margins were undermined to an appropriate distance in all directions utilizing iris scissors.
Stage 6: Additional Anesthesia Type: 1% lidocaine with epinephrine
Ear Star Wedge Flap Text: The defect edges were debeveled with a #15 blade scalpel.  Given the location of the defect and the proximity to free margins (helical rim) an ear star wedge flap was deemed most appropriate.  Using a sterile surgical marker, the appropriate flap was drawn incorporating the defect and placing the expected incisions between the helical rim and antihelix where possible.  The area thus outlined was incised through and through with a #15 scalpel blade.
Consent (Marginal Mandibular)/Introductory Paragraph: The rationale for Mohs was explained to the patient and consent was obtained. The risks, benefits and alternatives to therapy were discussed in detail. Specifically, the risks of damage to the marginal mandibular branch of the facial nerve, infection, scarring, bleeding, prolonged wound healing, incomplete removal, allergy to anesthesia, and recurrence were addressed. Prior to the procedure, the treatment site was clearly identified and confirmed by the patient. All components of Universal Protocol/PAUSE Rule completed.
Crescentic Intermediate Repair Preamble Text (Leave Blank If You Do Not Want): Undermining was performed with blunt dissection.
Graft Donor Site Bandage (Optional-Leave Blank If You Don't Want In Note): Aquaphor and telefa placed on wound. Pressure dressing applied to donor site
H Plasty Text: Given the location of the defect, shape of the defect and the proximity to free margins a H-plasty was deemed most appropriate for repair.  Using a sterile surgical marker, the appropriate advancement arms of the H-plasty were drawn incorporating the defect and placing the expected incisions within the relaxed skin tension lines where possible. The area thus outlined was incised deep to adipose tissue with a #15 scalpel blade. The skin margins were undermined to an appropriate distance in all directions utilizing iris scissors.  The opposing advancement arms were then advanced into place in opposite direction and anchored with interrupted buried subcutaneous sutures.
Initial Size Of Lesion: 0.3
V-Y Flap Text: The defect edges were debeveled with a #15 scalpel blade.  Given the location of the defect, shape of the defect and the proximity to free margins a V-Y flap was deemed most appropriate.  Using a sterile surgical marker, an appropriate advancement flap was drawn incorporating the defect and placing the expected incisions within the relaxed skin tension lines where possible.    The area thus outlined was incised deep to adipose tissue with a #15 scalpel blade.  The skin margins were undermined to an appropriate distance in all directions utilizing iris scissors.
Non-Graft Cartilage Fenestration Text: The cartilage was fenestrated with a 2mm punch biopsy to help facilitate healing.
Consent (Nose)/Introductory Paragraph: The rationale for Mohs was explained to the patient and consent was obtained. The risks, benefits and alternatives to therapy were discussed in detail. Specifically, the risks of nasal deformity, changes in the flow of air through the nose, infection, scarring, bleeding, prolonged wound healing, incomplete removal, allergy to anesthesia, nerve injury and recurrence were addressed. Prior to the procedure, the treatment site was clearly identified and confirmed by the patient. All components of Universal Protocol/PAUSE Rule completed.
Tissue Cultured Epidermal Autograft Text: The defect edges were debeveled with a #15 scalpel blade.  Given the location of the defect, shape of the defect and the proximity to free margins a tissue cultured epidermal autograft was deemed most appropriate.  The graft was then trimmed to fit the size of the defect.  The graft was then placed in the primary defect and oriented appropriately.
Mid-Level Procedure Text (C): After obtaining clear surgical margins the patient was sent to a mid-level provider for surgical repair.  The patient understands they will receive post-surgical care and follow-up from the mid-level provider.
Inflammation Suggestive Of Cancer Camouflage Histology Text: There was a dense lymphocytic infiltrate which prevented adequate histologic evaluation of adjacent structures.
Primary Defect Width In Cm (Final Defect Size - Required For Flaps/Grafts): 1
Cheek-To-Nose Interpolation Flap Text: A decision was made to reconstruct the defect utilizing an interpolation axial flap and a staged reconstruction.  A telfa template was made of the defect.  This telfa template was then used to outline the Cheek-To-Nose Interpolation flap.  The donor area for the pedicle flap was then injected with anesthesia.  The flap was excised through the skin and subcutaneous tissue down to the layer of the underlying musculature.  The interpolation flap was carefully excised within this deep plane to maintain its blood supply.  The edges of the donor site were undermined.   The donor site was closed in a primary fashion.  The pedicle was then rotated into position and sutured.  Once the tube was sutured into place, adequate blood supply was confirmed with blanching and refill.  The pedicle was then wrapped with xeroform gauze and dressed appropriately with a telfa and gauze bandage to ensure continued blood supply and protect the attached pedicle.
Trilobed Flap Text: The defect edges were debeveled with a #15 scalpel blade.  Given the location of the defect and the proximity to free margins a trilobed flap was deemed most appropriate.  Using a sterile surgical marker, an appropriate trilobed flap drawn around the defect.    The area thus outlined was incised deep to adipose tissue with a #15 scalpel blade.  The skin margins were undermined to an appropriate distance in all directions utilizing iris scissors.
Plastic Surgeon Procedure Text (A): After obtaining clear surgical margins the patient was sent to plastics for surgical repair.  The patient understands they will receive post-surgical care and follow-up from the referring physician's office.
Epidermal Closure: running cuticular
Complex Repair And Flap Additional Text (Will Appearing After The Standard Complex Repair Text): The complex repair was not sufficient to completely close the primary defect. The remaining additional defect was repaired with the flap mentioned below.
Interpolation Flap Text: A decision was made to reconstruct the defect utilizing an interpolation axial flap and a staged reconstruction.  A telfa template was made of the defect.  This telfa template was then used to outline the interpolation flap.  The donor area for the pedicle flap was then injected with anesthesia.  The flap was excised through the skin and subcutaneous tissue down to the layer of the underlying musculature.  The interpolation flap was carefully excised within this deep plane to maintain its blood supply.  The edges of the donor site were undermined.   The donor site was closed in a primary fashion.  The pedicle was then rotated into position and sutured.  Once the tube was sutured into place, adequate blood supply was confirmed with blanching and refill.  The pedicle was then wrapped with xeroform gauze and dressed appropriately with a telfa and gauze bandage to ensure continued blood supply and protect the attached pedicle.
Consent (Lip)/Introductory Paragraph: The rationale for Mohs was explained to the patient and consent was obtained. The risks, benefits and alternatives to therapy were discussed in detail. Specifically, the risks of lip deformity, changes in the oral aperture, infection, scarring, bleeding, prolonged wound healing, incomplete removal, allergy to anesthesia, nerve injury and recurrence were addressed. Prior to the procedure, the treatment site was clearly identified and confirmed by the patient. All components of Universal Protocol/PAUSE Rule completed.
Repair Type: Flap
Dorsal Nasal Flap Text: The defect edges were debeveled with a #15 scalpel blade.  Given the location of the defect and the proximity to free margins a dorsal nasal flap,based upon the glabellar folds, was deemed most appropriate.  Using a sterile surgical marker, an appropriate dorsal nasal flap was drawn around the defect.    The area thus outlined was incised deep to adipose tissue with a #15 scalpel blade.  The skin margins were undermined to an appropriate distance in all directions utilizing iris scissors.
Post-Care Instructions: I reviewed with the patient in detail post-care instructions. Patient is not to engage in any heavy lifting, exercise, or swimming for the next 14 days. Should the patient develop any fevers, chills, bleeding, severe pain patient will contact the office immediately.
Hatchet Flap Text: The defect edges were debeveled with a #15 scalpel blade.  Given the location of the defect, shape of the defect and the proximity to free margins a hatchet flap based from the glabella was deemed most appropriate.  Using a sterile surgical marker, an appropriate glabellar hatchet flap was drawn incorporating the defect and placing the expected incisions within the relaxed skin tension lines where possible.    The area thus outlined was incised deep to adipose tissue with a #15 scalpel blade.  The skin margins were undermined to an appropriate distance in all directions utilizing iris scissors.
Surgeon/Pathologist Verbiage (Will Incorporate Name Of Surgeon From Intro If Not Blank): operated in two distinct and integrated capacities as the surgeon and pathologist.
Stage 3: Additional Anesthesia Type: 1% lidocaine with 1:100,000 epinephrine and 408mcg clindamycin/ml and a 1:10 solution of 8.4% sodium bicarbonate
Partial Purse String (Intermediate) Text: Given the location of the defect and the characteristics of the surrounding skin an intermediate purse string closure was deemed most appropriate.  Undermining was performed circumfirentially around the surgical defect.  A purse string suture was then placed and tightened. Wound tension only allowed a partial closure of the circular defect.
Paramedian Forehead Flap Text: A decision was made to reconstruct the defect utilizing an interpolation axial flap and a staged reconstruction.  A telfa template was made of the defect.  This telfa template was then used to outline the paramedian forehead pedicle flap.  The donor area for the pedicle flap was then injected with anesthesia.  The flap was excised through the skin and subcutaneous tissue down to the layer of the underlying musculature.  The pedicle flap was carefully excised within this deep plane to maintain its blood supply.  The edges of the donor site were undermined.   The donor site was closed in a primary fashion.  The pedicle was then rotated into position and sutured.  Once the tube was sutured into place, adequate blood supply was confirmed with blanching and refill.  The pedicle was then wrapped with xeroform gauze and dressed appropriately with a telfa and gauze bandage to ensure continued blood supply and protect the attached pedicle.
Double Island Pedicle Flap Text: The defect edges were debeveled with a #15 scalpel blade.  Given the location of the defect, shape of the defect and the proximity to free margins a double island pedicle advancement flap was deemed most appropriate.  Using a sterile surgical marker, an appropriate advancement flap was drawn incorporating the defect, outlining the appropriate donor tissue and placing the expected incisions within the relaxed skin tension lines where possible.    The area thus outlined was incised deep to adipose tissue with a #15 scalpel blade.  The skin margins were undermined to an appropriate distance in all directions around the primary defect and laterally outward around the island pedicle utilizing iris scissors.  There was minimal undermining beneath the pedicle flap.
Dressing: dry sterile dressing
Subsequent Stages Histo Method Verbiage: Using a similar technique to that described above, a thin layer of tissue was removed from all areas where tumor was visible on the previous stage.  The tissue was again oriented, mapped, dyed, and processed as above.
Unique Flap 3 Name: Mercedes Flap
Referring Physician (Optional): Mannering
Additional Anesthesia Volume In Cc: 6
Transposition Flap Text: The defect edges were debeveled with a #15 scalpel blade.  Given the location of the defect and the proximity to free margins a transposition flap was deemed most appropriate.  Using a sterile surgical marker, an appropriate transposition flap was drawn incorporating the defect.    The area thus outlined was incised deep to adipose tissue with a #15 scalpel blade.  The skin margins were undermined to an appropriate distance in all directions utilizing iris scissors.
Unique Flap 3 Text: The defect edges were debeveled with a #15 scalpel blade.  Given the location of the defect, shape of the defect and the proximity to free margins a Mercedes (double advancement flap) was deemed most appropriate.  Using a sterile surgical marker, the appropriate transposition flaps were drawn incorporating the defect and placing the expected incisions within the relaxed skin tension lines where possible.    The area thus outlined was incised deep to adipose tissue with a #15 scalpel blade.  The skin margins were undermined to an appropriate distance in all directions utilizing iris scissors.  Hemostasis was achieved with electrocautery.  The flaps were then advanced into the defect and anchored with interrupted buried subcutaneous sutures.
Surgical Defect Length In Cm (Optional): 0.6
Full Thickness Lip Wedge Repair (Flap) Text: Given the location of the defect and the proximity to free margins a full thickness wedge repair was deemed most appropriate.  Using a sterile surgical marker, the appropriate repair was drawn incorporating the defect and placing the expected incisions perpendicular to the vermilion border.  The vermilion border was also meticulously outlined to ensure appropriate reapproximation during the repair.  The area thus outlined was incised through and through with a #15 scalpel blade.  The muscularis and dermis were reaproximated with deep sutures following hemostasis. Care was taken to realign the vermilion border before proceeding with the superficial closure.  Once the vermilion was realigned the superfical and mucosal closure was finished.
Unique Flap 4 Text: The defect edges were debeveled with a #15 scalpel blade.  Given the location of the defect and the proximity to free margins a Banner transposition flap was deemed most appropriate.  Using a sterile surgical marker, an appropriate Banner transposition flap was drawn incorporating the defect.    The area thus outlined was incised deep to adipose tissue with a #15 scalpel blade.  The skin margins were undermined to an appropriate distance in all directions utilizing iris scissors.
Graft Basting Suture (Optional): 5-0 Fast Absorbing Gut
Double O-Z Plasty Text: The defect edges were debeveled with a #15 scalpel blade.  Given the location of the defect, shape of the defect and the proximity to free margins a Double O-Z plasty (double transposition flap) was deemed most appropriate.  Using a sterile surgical marker, the appropriate transposition flaps were drawn incorporating the defect and placing the expected incisions within the relaxed skin tension lines where possible. The area thus outlined was incised deep to adipose tissue with a #15 scalpel blade.  The skin margins were undermined to an appropriate distance in all directions utilizing iris scissors.  Hemostasis was achieved with electrocautery.  The flaps were then transposed into place, one clockwise and the other counterclockwise, and anchored with interrupted buried subcutaneous sutures.
Bi-Rhombic Flap Text: The defect edges were debeveled with a #15 scalpel blade.  Given the location of the defect and the proximity to free margins a bi-rhombic flap was deemed most appropriate.  Using a sterile surgical marker, an appropriate rhombic flap was drawn incorporating the defect. The area thus outlined was incised deep to adipose tissue with a #15 scalpel blade.  The skin margins were undermined to an appropriate distance in all directions utilizing iris scissors.
Consent 2/Introductory Paragraph: Mohs surgery was explained to the patient and consent was obtained. The risks, benefits and alternatives to therapy were discussed in detail. Specifically, the risks of infection, scarring, bleeding, prolonged wound healing, incomplete removal, allergy to anesthesia, nerve injury and recurrence were addressed. Prior to the procedure, the treatment site was clearly identified and confirmed by the patient. All components of Universal Protocol/PAUSE Rule completed.
Closure 4 Information: This tab is for additional flaps and grafts above and beyond our usual structured repairs.  Please note if you enter information here it will not currently bill and you will need to add the billing information manually.
A-T Advancement Flap Text: The defect edges were debeveled with a #15 scalpel blade.  Given the location of the defect, shape of the defect and the proximity to free margins an A-T advancement flap was deemed most appropriate.  Using a sterile surgical marker, an appropriate advancement flap was drawn incorporating the defect and placing the expected incisions within the relaxed skin tension lines where possible.    The area thus outlined was incised deep to adipose tissue with a #15 scalpel blade.  The skin margins were undermined to an appropriate distance in all directions utilizing iris scissors.
Area M Indication Text: Tumors in this location are included in Area M (cheek, forehead, scalp, neck, jawline and pretibial skin).  Mohs surgery is indicated for tumors in these anatomic locations.
Epidermal Autograft Text: The defect edges were debeveled with a #15 scalpel blade.  Given the location of the defect, shape of the defect and the proximity to free margins an epidermal autograft was deemed most appropriate.  Using a sterile surgical marker, the primary defect shape was transferred to the donor site. The epidermal graft was then harvested.  The skin graft was then placed in the primary defect and oriented appropriately.
Banner Transposition Flap Text: The defect edges were debeveled with a #15 scalpel blade.  Given the location of the defect and the proximity to free margins a Banner transposition flap was deemed most appropriate.  Using a sterile surgical marker, an appropriate flap drawn around the defect. The area thus outlined was incised deep to adipose tissue with a #15 scalpel blade.  The skin margins were undermined to an appropriate distance in all directions utilizing iris scissors.
Consent (Spinal Accessory)/Introductory Paragraph: The rationale for Mohs was explained to the patient and consent was obtained. The risks, benefits and alternatives to therapy were discussed in detail. Specifically, the risks of damage to the spinal accessory nerve, infection, scarring, bleeding, prolonged wound healing, incomplete removal, allergy to anesthesia, and recurrence were addressed. Prior to the procedure, the treatment site was clearly identified and confirmed by the patient. All components of Universal Protocol/PAUSE Rule completed.
Information: Selecting Yes will display possible errors in your note based on the variables you have selected. This validation is only offered as a suggestion for you. PLEASE NOTE THAT THE VALIDATION TEXT WILL BE REMOVED WHEN YOU FINALIZE YOUR NOTE. IF YOU WANT TO FAX A PRELIMINARY NOTE YOU WILL NEED TO TOGGLE THIS TO 'NO' IF YOU DO NOT WANT IT IN YOUR FAXED NOTE.
Unna Boot Text: An Unna boot was placed to help immobilize the limb and facilitate more rapid healing.
W Plasty Text: The lesion was extirpated to the level of the fat with a #15 scalpel blade.  Given the location of the defect, shape of the defect and the proximity to free margins a W-plasty was deemed most appropriate for repair.  Using a sterile surgical marker, the appropriate transposition arms of the W-plasty were drawn incorporating the defect and placing the expected incisions within the relaxed skin tension lines where possible.    The area thus outlined was incised deep to adipose tissue with a #15 scalpel blade.  The skin margins were undermined to an appropriate distance in all directions utilizing iris scissors.  The opposing transposition arms were then transposed into place in opposite direction and anchored with interrupted buried subcutaneous sutures.
Xenograft Text: The defect edges were debeveled with a #15 scalpel blade.  Given the location of the defect, shape of the defect and the proximity to free margins a xenograft was deemed most appropriate.  The graft was then trimmed to fit the size of the defect.  The graft was then placed in the primary defect and oriented appropriately.
Advancement-Rotation Flap Text: The defect edges were debeveled with a #15 scalpel blade.  Given the location of the defect, shape of the defect and the proximity to free margins an advancement-rotation flap was deemed most appropriate.  Using a sterile surgical marker, an appropriate flap was drawn incorporating the defect and placing the expected incisions within the relaxed skin tension lines where possible. The area thus outlined was incised deep to adipose tissue with a #15 scalpel blade.  The skin margins were undermined to an appropriate distance in all directions utilizing iris scissors.
Graft Cartilage Fenestration Text: The cartilage was fenestrated with a 2mm punch biopsy to help facilitate graft survival and healing.
Location Indication Override (Is Already Calculated Based On Selected Body Location): Area H
Island Pedicle Flap Text: The defect edges were debeveled with a #15 scalpel blade.  Given the location of the defect, shape of the defect and the proximity to free margins an island pedicle advancement flap was deemed most appropriate.  Using a sterile surgical marker, an appropriate advancement flap was drawn incorporating the defect, outlining the appropriate donor tissue and placing the expected incisions within the relaxed skin tension lines where possible.    The area thus outlined was incised deep to adipose tissue with a #15 scalpel blade.  The skin margins were undermined to an appropriate distance in all directions around the primary defect and laterally outward around the island pedicle utilizing iris scissors.  There was minimal undermining beneath the pedicle flap.
Consent (Scalp)/Introductory Paragraph: The rationale for Mohs was explained to the patient and consent was obtained. The risks, benefits and alternatives to therapy were discussed in detail. Specifically, the risks of changes in hair growth pattern secondary to repair, infection, scarring, bleeding, prolonged wound healing, incomplete removal, allergy to anesthesia, nerve injury and recurrence were addressed. Prior to the procedure, the treatment site was clearly identified and confirmed by the patient. All components of Universal Protocol/PAUSE Rule completed.
Secondary Intention Text (Leave Blank If You Do Not Want): The defect will heal with secondary intention.
Complex Repair And Graft Additional Text (Will Appearing After The Standard Complex Repair Text): The complex repair was not sufficient to completely close the primary defect. The remaining additional defect was repaired with the graft mentioned below.
Melolabial Interpolation Flap Text: A decision was made to reconstruct the defect utilizing an interpolation axial flap and a staged reconstruction.  A telfa template was made of the defect.  This telfa template was then used to outline the melolabial interpolation flap.  The donor area for the pedicle flap was then injected with anesthesia.  The flap was excised through the skin and subcutaneous tissue down to the layer of the underlying musculature.  The pedicle flap was carefully excised within this deep plane to maintain its blood supply.  The edges of the donor site were undermined.   The donor site was closed in a primary fashion.  The pedicle was then rotated into position and sutured.  Once the tube was sutured into place, adequate blood supply was confirmed with blanching and refill.  The pedicle was then wrapped with xeroform gauze and dressed appropriately with a telfa and gauze bandage to ensure continued blood supply and protect the attached pedicle.
Mohs Histo Method Verbiage: Each section was then chromacoded and processed in the Mohs lab using the Mohs protocol and submitted for frozen section.
Rotation Flap Text: The defect edges were debeveled with a #15 scalpel blade.  Given the location of the defect, shape of the defect and the proximity to free margins a rotation flap was deemed most appropriate.  Using a sterile surgical marker, an appropriate rotation flap was drawn incorporating the defect and placing the expected incisions within the relaxed skin tension lines where possible.    The area thus outlined was incised deep to adipose tissue with a #15 scalpel blade.  The skin margins were undermined to an appropriate distance in all directions utilizing iris scissors.
Anesthesia Volume In Cc: 2.1
Previous Accession (Optional): [id number]
Localized Dermabrasion With Wire Brush Text: The patient was draped in routine manner.  Localized dermabrasion using 3 x 17 mm wire brush was performed in routine manner to papillary dermis. This spot dermabrasion is being performed to complete skin cancer reconstruction. It also will eliminate the other sun damaged precancerous cells that are known to be part of the regional effect of a lifetime's worth of sun exposure. This localized dermabrasion is therapeutic and should not be considered cosmetic in any regard.
Secondary Defect Length In Cm (Required For Flaps): 1.6
Bcc Histology Text: There were numerous aggregates of basaloid cells.
Cheiloplasty (Less Than 50%) Text: A decision was made to reconstruct the defect with a  cheiloplasty.  The defect was undermined extensively.  Additional obicularis oris muscle was excised with a 15 blade scalpel.  The defect was converted into a full thickness wedge, of less than 50% of the vertical height of the lip, to facilite a better cosmetic result.  Small vessels were then tied off with 5-0 monocyrl. The obicularis oris, superficial fascia, adipose and dermis were then reapproximated.  After the deeper layers were approximated the epidermis was reapproximated with particular care given to realign the vermilion border.
Unique Flap 4 Name: Banner Flap
Island Pedicle Flap-Requiring Vessel Identification Text: The defect edges were debeveled with a #15 scalpel blade.  Given the location of the defect, shape of the defect and the proximity to free margins an island pedicle advancement flap was deemed most appropriate.  Using a sterile surgical marker, an appropriate advancement flap was drawn, based on the axial vessel mentioned above, incorporating the defect, outlining the appropriate donor tissue and placing the expected incisions within the relaxed skin tension lines where possible.    The area thus outlined was incised deep to adipose tissue with a #15 scalpel blade.  The skin margins were undermined to an appropriate distance in all directions around the primary defect and laterally outward around the island pedicle utilizing iris scissors.  There was minimal undermining beneath the pedicle flap.
Mauc Instructions: By selecting yes to the question below the MAUC number will be added into the note.  This will be calculated automatically based on the diagnosis chosen, the size entered, the body zone selected (H,M,L) and the specific indications you chose. You will also have the option to override the Mohs AUC if you disagree with the automatically calculated number and this option is found in the Case Summary tab.
Mohs Rapid Report Verbiage: The area of clinically evident tumor was marked with skin marking ink and appropriately hatched.  The initial incision was made following the Mohs approach through the skin.  The specimen was taken to the lab, divided into the necessary number of pieces, chromacoded and processed according to the Mohs protocol.  This was repeated in successive stages until a tumor free defect was achieved.
Deep Sutures: 5-0 Vicryl
Consent Type: Consent 1 (Standard)
Hemostasis: Electrocautery
Muscle Hinge Flap Text: The defect edges were debeveled with a #15 scalpel blade.  Given the size, depth and location of the defect and the proximity to free margins a muscle hinge flap was deemed most appropriate.  Using a sterile surgical marker, an appropriate hinge flap was drawn incorporating the defect. The area thus outlined was incised with a #15 scalpel blade.  The skin margins were undermined to an appropriate distance in all directions utilizing iris scissors.
Postop Diagnosis: same
Ftsg Text: The defect edges were debeveled with a #15 scalpel blade.  Given the location of the defect, shape of the defect and the proximity to free margins a full thickness skin graft was deemed most appropriate.  Using a sterile surgical marker, the primary defect shape was transferred to the donor site. The area thus outlined was incised deep to adipose tissue with a #15 scalpel blade.  The harvested graft was then trimmed of adipose tissue until only dermis and epidermis was left.  The skin margins of the secondary defect were undermined to an appropriate distance in all directions utilizing iris scissors.  The secondary defect was closed with interrupted buried subcutaneous sutures.  The skin edges were then re-apposed with running  sutures.  The skin graft was then placed in the primary defect and oriented appropriately.
Advancement Flap (Single) Text: The defect edges were debeveled with a #15 scalpel blade.  Given the location of the defect and the proximity to free margins a single advancement flap was deemed most appropriate.  Using a sterile surgical marker, an appropriate advancement flap was drawn incorporating the defect and placing the expected incisions within the relaxed skin tension lines where possible.    The area thus outlined was incised deep to adipose tissue with a #15 scalpel blade.  The skin margins were undermined to an appropriate distance in all directions utilizing iris scissors.
Advancement Flap (Double) Text: The defect edges were debeveled with a #15 scalpel blade.  Given the location of the defect and the proximity to free margins a double advancement flap was deemed most appropriate.  Using a sterile surgical marker, the appropriate advancement flaps were drawn incorporating the defect and placing the expected incisions within the relaxed skin tension lines where possible.    The area thus outlined was incised deep to adipose tissue with a #15 scalpel blade.  The skin margins were undermined to an appropriate distance in all directions utilizing iris scissors.
M-Plasty Complex Repair Preamble Text (Leave Blank If You Do Not Want): Extensive wide undermining was performed.
Consent 3/Introductory Paragraph: I gave the patient a chance to ask questions they had about the procedure.  Following this I explained the Mohs procedure and consent was obtained. The risks, benefits and alternatives to therapy were discussed in detail. Specifically, the risks of infection, scarring, bleeding, prolonged wound healing, incomplete removal, allergy to anesthesia, nerve injury and recurrence were addressed. Prior to the procedure, the treatment site was clearly identified and confirmed by the patient. All components of Universal Protocol/PAUSE Rule completed.
Split-Thickness Skin Graft Text: The defect edges were debeveled with a #15 scalpel blade.  Given the location of the defect, shape of the defect and the proximity to free margins a split thickness skin graft was deemed most appropriate.  Using a sterile surgical marker, the primary defect shape was transferred to the donor site. The split thickness graft was then harvested.  The skin graft was then placed in the primary defect and oriented appropriately.
Manual Repair Warning Statement: We plan on removing the manually selected variable below in favor of our much easier automatic structured text blocks found in the previous tab. We decided to do this to help make the flow better and give you the full power of structured data. Manual selection is never going to be ideal in our platform and I would encourage you to avoid using manual selection from this point on, especially since I will be sunsetting this feature. It is important that you do one of two things with the customized text below. First, you can save all of the text in a word file so you can have it for future reference. Second, transfer the text to the appropriate area in the Library tab. Lastly, if there is a flap or graft type which we do not have you need to let us know right away so I can add it in before the variable is hidden. No need to panic, we plan to give you roughly 6 months to make the change.
S Plasty Text: Given the location and shape of the defect, and the orientation of relaxed skin tension lines, an S-plasty was deemed most appropriate for repair.  Using a sterile surgical marker, the appropriate outline of the S-plasty was drawn, incorporating the defect and placing the expected incisions within the relaxed skin tension lines where possible.  The area thus outlined was incised deep to adipose tissue with a #15 scalpel blade.  The skin margins were undermined to an appropriate distance in all directions utilizing iris scissors. The skin flaps were advanced over the defect.  The opposing margins were then approximated with interrupted buried subcutaneous sutures.
Helical Rim Advancement Flap Text: The defect edges were debeveled with a #15 blade scalpel.  Given the location of the defect and the proximity to free margins (helical rim) a double helical rim advancement flap was deemed most appropriate.  Using a sterile surgical marker, the appropriate advancement flaps were drawn incorporating the defect and placing the expected incisions between the helical rim and antihelix where possible.  The area thus outlined was incised through and through with a #15 scalpel blade.  With a skin hook and iris scissors, the flaps were gently and sharply undermined and freed up.
O-T Advancement Flap Text: The defect edges were debeveled with a #15 scalpel blade.  Given the location of the defect, shape of the defect and the proximity to free margins an O-T advancement flap was deemed most appropriate.  Using a sterile surgical marker, an appropriate advancement flap was drawn incorporating the defect and placing the expected incisions within the relaxed skin tension lines where possible.    The area thus outlined was incised deep to adipose tissue with a #15 scalpel blade.  The skin margins were undermined to an appropriate distance in all directions utilizing iris scissors.
Area L Indication Text: Tumors in this location are included in Area L (trunk and extremities).  Mohs surgery is indicated for larger tumors, or tumors with aggressive histologic features, in these anatomic locations.
Epidermal Closure Graft Donor Site (Optional): simple interrupted
Same Histology In Subsequent Stages Text: The pattern and morphology of the tumor is as described in the first stage.
Dermal Autograft Text: The defect edges were debeveled with a #15 scalpel blade.  Given the location of the defect, shape of the defect and the proximity to free margins a dermal autograft was deemed most appropriate.  Using a sterile surgical marker, the primary defect shape was transferred to the donor site. The area thus outlined was incised deep to adipose tissue with a #15 scalpel blade.  The harvested graft was then trimmed of adipose and epidermal tissue until only dermis was left.  The skin graft was then placed in the primary defect and oriented appropriately.
Z Plasty Text: The lesion was extirpated to the level of the fat with a #15 scalpel blade.  Given the location of the defect, shape of the defect and the proximity to free margins a Z-plasty was deemed most appropriate for repair.  Using a sterile surgical marker, the appropriate transposition arms of the Z-plasty were drawn incorporating the defect and placing the expected incisions within the relaxed skin tension lines where possible.    The area thus outlined was incised deep to adipose tissue with a #15 scalpel blade.  The skin margins were undermined to an appropriate distance in all directions utilizing iris scissors.  The opposing transposition arms were then transposed into place in opposite direction and anchored with interrupted buried subcutaneous sutures.
Bilobed Flap Text: The defect edges were debeveled with a #15 scalpel blade.  Given the location of the defect and the proximity to free margins a bilobe flap was deemed most appropriate.  Using a sterile surgical marker, an appropriate bilobe flap drawn around the defect.    The area thus outlined was incised deep to adipose tissue with a #15 scalpel blade.  The skin margins were undermined to an appropriate distance in all directions utilizing iris scissors.
Consent (Near Eyelid Margin)/Introductory Paragraph: The rationale for Mohs was explained to the patient and consent was obtained. The risks, benefits and alternatives to therapy were discussed in detail. Specifically, the risks of ectropion or eyelid deformity, infection, scarring, bleeding, prolonged wound healing, incomplete removal, allergy to anesthesia, nerve injury and recurrence were addressed. Prior to the procedure, the treatment site was clearly identified and confirmed by the patient. All components of Universal Protocol/PAUSE Rule completed.
Home Suture Removal Text: Patient was provided instructions on removing sutures and will remove their sutures at home.  If they have any questions or difficulties they will call the office.
Number Of Stages: 2
Purse String (Simple) Text: Given the location of the defect and the characteristics of the surrounding skin a purse string closure was deemed most appropriate.  Undermining was performed circumfirentially around the surgical defect.  A purse string suture was then placed and tightened.
Mercedes Flap Text: The defect edges were debeveled with a #15 scalpel blade.  Given the location of the defect, shape of the defect and the proximity to free margins a Mercedes flap was deemed most appropriate.  Using a sterile surgical marker, an appropriate advancement flap was drawn incorporating the defect and placing the expected incisions within the relaxed skin tension lines where possible. The area thus outlined was incised deep to adipose tissue with a #15 scalpel blade.  The skin margins were undermined to an appropriate distance in all directions utilizing iris scissors.
Purse String (Intermediate) Text: Given the location of the defect and the characteristics of the surrounding skin a purse string intermediate closure was deemed most appropriate.  Undermining was performed circumfirentially around the surgical defect.  A purse string suture was then placed and tightened.
Detail Level: Detailed
Island Pedicle Flap With Canthal Suspension Text: The defect edges were debeveled with a #15 scalpel blade.  Given the location of the defect, shape of the defect and the proximity to free margins an island pedicle advancement flap was deemed most appropriate.  Using a sterile surgical marker, an appropriate advancement flap was drawn incorporating the defect, outlining the appropriate donor tissue and placing the expected incisions within the relaxed skin tension lines where possible. The area thus outlined was incised deep to adipose tissue with a #15 scalpel blade.  The skin margins were undermined to an appropriate distance in all directions around the primary defect and laterally outward around the island pedicle utilizing iris scissors.  There was minimal undermining beneath the pedicle flap. A suspension suture was placed in the canthal tendon to prevent tension and prevent ectropion.
No Repair - Repaired With Adjacent Surgical Defect Text (Leave Blank If You Do Not Want): After obtaining clear surgical margins the defect was repaired concurrently with another surgical defect which was in close approximation.
Wound Care: Aquaphor
Mastoid Interpolation Flap Text: A decision was made to reconstruct the defect utilizing an interpolation axial flap and a staged reconstruction.  A telfa template was made of the defect.  This telfa template was then used to outline the mastoid interpolation flap.  The donor area for the pedicle flap was then injected with anesthesia.  The flap was excised through the skin and subcutaneous tissue down to the layer of the underlying musculature.  The pedicle flap was carefully excised within this deep plane to maintain its blood supply.  The edges of the donor site were undermined.   The donor site was closed in a primary fashion.  The pedicle was then rotated into position and sutured.  Once the tube was sutured into place, adequate blood supply was confirmed with blanching and refill.  The pedicle was then wrapped with xeroform gauze and dressed appropriately with a telfa and gauze bandage to ensure continued blood supply and protect the attached pedicle.
Unique Flap 1 Name: Myocutaneous Island pedicle Flap
Tarsorrhaphy Text: A tarsorrhaphy was performed using Frost sutures.
Spiral Flap Text: The defect edges were debeveled with a #15 scalpel blade.  Given the location of the defect, shape of the defect and the proximity to free margins a spiral flap was deemed most appropriate.  Using a sterile surgical marker, an appropriate rotation flap was drawn incorporating the defect and placing the expected incisions within the relaxed skin tension lines where possible. The area thus outlined was incised deep to adipose tissue with a #15 scalpel blade.  The skin margins were undermined to an appropriate distance in all directions utilizing iris scissors.
Keystone Flap Text: The defect edges were debeveled with a #15 scalpel blade.  Given the location of the defect, shape of the defect a keystone flap was deemed most appropriate.  Using a sterile surgical marker, an appropriate keystone flap was drawn incorporating the defect, outlining the appropriate donor tissue and placing the expected incisions within the relaxed skin tension lines where possible. The area thus outlined was incised deep to adipose tissue with a #15 scalpel blade.  The skin margins were undermined to an appropriate distance in all directions around the primary defect and laterally outward around the flap utilizing iris scissors.
Bcc Infiltrative Histology Text: There were numerous aggregates of basaloid cells demonstrating an infiltrative pattern.
Unique Flap 1 Text: A decision was made to reconstruct the defect utilizing a myocutaneous Island pedicle Flap based on the levator labii superioris muscle.  A telfa template was made of the defect.  This telfa template was then used to outline the myocutaneous flap, based along the meilolabial fold.  The donor area for the pedicle flap was then injected with anesthesia.  The flap was excised through the skin and subcutaneous tissue down to the layer of the underlying musculature.  The myocutaneous flap was carefully excised within this deep plane to maintain its blood supply. Based on the muscle. The edges of the donor site were undermined.   The donor site was closed in a primary fashion to the point of transposition.  The pedicle was then transposed into position and sutured.  Once the flap was sutured into place, adequate blood supply was confirmed with blanching and refill.
Donor Site Anesthesia Type: same as repair anesthesia
Cheiloplasty (Complex) Text: A decision was made to reconstruct the defect with a  cheiloplasty.  The defect was undermined extensively.  Additional obicularis oris muscle was excised with a 15 blade scalpel.  The defect was converted into a full thickness wedge to facilite a better cosmetic result.  Small vessels were then tied off with 5-0 monocyrl. The obicularis oris, superficial fascia, adipose and dermis were then reapproximated.  After the deeper layers were approximated the epidermis was reapproximated with particular care given to realign the vermilion border.
Melolabial Transposition Flap Text: The defect edges were debeveled with a #15 scalpel blade.  Given the location of the defect and the proximity to free margins a melolabial flap was deemed most appropriate.  Using a sterile surgical marker, an appropriate melolabial transposition flap was drawn incorporating the defect.    The area thus outlined was incised deep to adipose tissue with a #15 scalpel blade.  The skin margins were undermined to an appropriate distance in all directions utilizing iris scissors.
Medical Necessity Statement: Based on my medical judgement, Mohs surgery is the most appropriate treatment for this cancer compared to other treatments.
Suture Removal: 7 days
O-T Plasty Text: The defect edges were debeveled with a #15 scalpel blade.  Given the location of the defect, shape of the defect and the proximity to free margins an O-T plasty was deemed most appropriate.  Using a sterile surgical marker, an appropriate O-T plasty was drawn incorporating the defect and placing the expected incisions within the relaxed skin tension lines where possible.    The area thus outlined was incised deep to adipose tissue with a #15 scalpel blade.  The skin margins were undermined to an appropriate distance in all directions utilizing iris scissors.
Cartilage Graft Text: The defect edges were debeveled with a #15 scalpel blade.  Given the location of the defect, shape of the defect, the fact the defect involved a full thickness cartilage defect a cartilage graft was deemed most appropriate.  An appropriate donor site was identified, cleansed, and anesthetized. The cartilage graft was then harvested and transferred to the recipient site, oriented appropriately and then sutured into place.  The secondary defect was then repaired using a primary closure.
Burow's Advancement Flap Text: The defect edges were debeveled with a #15 scalpel blade.  Given the location of the defect and the proximity to free margins a Burow's advancement flap was deemed most appropriate.  Using a sterile surgical marker, the appropriate advancement flap was drawn incorporating the defect and placing the expected incisions within the relaxed skin tension lines where possible.    The area thus outlined was incised deep to adipose tissue with a #15 scalpel blade.  The skin margins were undermined to an appropriate distance in all directions utilizing iris scissors.
Consent (Temporal Branch)/Introductory Paragraph: The rationale for Mohs was explained to the patient and consent was obtained. The risks, benefits and alternatives to therapy were discussed in detail. Specifically, the risks of damage to the temporal branch of the facial nerve, infection, scarring, bleeding, prolonged wound healing, incomplete removal, allergy to anesthesia, and recurrence were addressed. Prior to the procedure, the treatment site was clearly identified and confirmed by the patient. All components of Universal Protocol/PAUSE Rule completed.
Epidermal Sutures: 5-0 Ethilon
Surgeon Performing Repair (Optional): Katherine
V-Y Plasty Text: The defect edges were debeveled with a #15 scalpel blade.  Given the location of the defect, shape of the defect and the proximity to free margins an V-Y advancement flap was deemed most appropriate.  Using a sterile surgical marker, an appropriate advancement flap was drawn incorporating the defect and placing the expected incisions within the relaxed skin tension lines where possible.    The area thus outlined was incised deep to adipose tissue with a #15 scalpel blade.  The skin margins were undermined to an appropriate distance in all directions utilizing iris scissors.
Bilateral Helical Rim Advancement Flap Text: The defect edges were debeveled with a #15 blade scalpel.  Given the location of the defect and the proximity to free margins (helical rim) a bilateral helical rim advancement flap was deemed most appropriate.  Using a sterile surgical marker, the appropriate advancement flaps were drawn incorporating the defect and placing the expected incisions between the helical rim and antihelix where possible.  The area thus outlined was incised through and through with a #15 scalpel blade.  With a skin hook and iris scissors, the flaps were gently and sharply undermined and freed up.
O-L Flap Text: The defect edges were debeveled with a #15 scalpel blade.  Given the location of the defect, shape of the defect and the proximity to free margins an O-L flap was deemed most appropriate.  Using a sterile surgical marker, an appropriate advancement flap was drawn incorporating the defect and placing the expected incisions within the relaxed skin tension lines where possible.    The area thus outlined was incised deep to adipose tissue with a #15 scalpel blade.  The skin margins were undermined to an appropriate distance in all directions utilizing iris scissors.
No Residual Tumor Seen Histology Text: There were no malignant cells seen in the sections examined.
Repair Anesthesia Method: local infiltration
Skin Substitute Text: The defect edges were debeveled with a #15 scalpel blade.  Given the location of the defect, shape of the defect and the proximity to free margins a skin substitute graft was deemed most appropriate.  The graft material was trimmed to fit the size of the defect. The graft was then placed in the primary defect and oriented appropriately.
Cheek Interpolation Flap Text: A decision was made to reconstruct the defect utilizing an interpolation axial flap and a staged reconstruction.  A telfa template was made of the defect.  This telfa template was then used to outline the Cheek Interpolation flap.  The donor area for the pedicle flap was then injected with anesthesia.  The flap was excised through the skin and subcutaneous tissue down to the layer of the underlying musculature.  The interpolation flap was carefully excised within this deep plane to maintain its blood supply.  The edges of the donor site were undermined.   The donor site was closed in a primary fashion.  The pedicle was then rotated into position and sutured.  Once the tube was sutured into place, adequate blood supply was confirmed with blanching and refill.  The pedicle was then wrapped with xeroform gauze and dressed appropriately with a telfa and gauze bandage to ensure continued blood supply and protect the attached pedicle.
Bilobed Transposition Flap Text: The defect edges were debeveled with a #15 scalpel blade.  Given the location of the defect and the proximity to free margins a bilobed transposition flap was deemed most appropriate.  Using a sterile surgical marker, an appropriate bilobe flap drawn around the defect.    The area thus outlined was incised deep to adipose tissue with a #15 scalpel blade.  The skin margins were undermined to an appropriate distance in all directions utilizing iris scissors.
Consent (Ear)/Introductory Paragraph: The rationale for Mohs was explained to the patient and consent was obtained. The risks, benefits and alternatives to therapy were discussed in detail. Specifically, the risks of ear deformity, infection, scarring, bleeding, prolonged wound healing, incomplete removal, allergy to anesthesia, nerve injury and recurrence were addressed. Prior to the procedure, the treatment site was clearly identified and confirmed by the patient. All components of Universal Protocol/PAUSE Rule completed.
Modified Advancement Flap Text: The defect edges were debeveled with a #15 scalpel blade.  Given the location of the defect, shape of the defect and the proximity to free margins a modified advancement flap was deemed most appropriate.  Using a sterile surgical marker, an appropriate advancement flap was drawn incorporating the defect and placing the expected incisions within the relaxed skin tension lines where possible.    The area thus outlined was incised deep to adipose tissue with a #15 scalpel blade.  The skin margins were undermined to an appropriate distance in all directions utilizing iris scissors.
Eye Protection Verbiage: Before proceeding with the stage, a plastic scleral shield was inserted. The globe was anesthetized with a few drops of 1% lidocaine with 1:100,000 epinephrine. Then, an appropriate sized scleral shield was chosen and coated with lacrilube ointment. The shield was gently inserted and left in place for the duration of each stage. After the stage was completed, the shield was gently removed.
Closure 2 Information: This tab is for additional flaps and grafts, including complex repair and grafts and complex repair and flaps. You can also specify a different location for the additional defect, if the location is the same you do not need to select a new one. We will insert the automated text for the repair you select below just as we do for solitary flaps and grafts. Please note that at this time if you select a location with a different insurance zone you will need to override the ICD10 and CPT if appropriate.
Anesthesia Type: 0.5% lidocaine with 1:200,000 epinephrine and a 1:10 solution of 8.4% sodium bicarbonate and 408mcg clindamycin/ml
Mucosal Advancement Flap Text: Given the location of the defect, shape of the defect and the proximity to free margins a mucosal advancement flap was deemed most appropriate. Incisions were made with a 15 blade scalpel in the appropriate fashion along the cutaneous vermilion border and the mucosal lip. The remaining actinically damaged mucosal tissue was excised.  The mucosal advancement flap was then elevated to the gingival sulcus with care taken to preserve the neurovascular structures and advanced into the primary defect. Care was taken to ensure that precise realignment of the vermilion border was achieved.
Mohs Method Verbiage: An incision at a 45 degree angle following the standard Mohs approach was done and the specimen was harvested as a microscopic controlled layer.
Flap Type: Banner Transposition Flap
Mohs Case Number: 
Partial Purse String (Simple) Text: Given the location of the defect and the characteristics of the surrounding skin a simple purse string closure was deemed most appropriate.  Undermining was performed circumfirentially around the surgical defect.  A purse string suture was then placed and tightened. Wound tension only allowed a partial closure of the circular defect.
Posterior Auricular Interpolation Flap Text: A decision was made to reconstruct the defect utilizing an interpolation axial flap and a staged reconstruction.  A telfa template was made of the defect.  This telfa template was then used to outline the posterior auricular interpolation flap.  The donor area for the pedicle flap was then injected with anesthesia.  The flap was excised through the skin and subcutaneous tissue down to the layer of the underlying musculature.  The pedicle flap was carefully excised within this deep plane to maintain its blood supply.  The edges of the donor site were undermined.   The donor site was closed in a primary fashion.  The pedicle was then rotated into position and sutured.  Once the tube was sutured into place, adequate blood supply was confirmed with blanching and refill.  The pedicle was then wrapped with xeroform gauze and dressed appropriately with a telfa and gauze bandage to ensure continued blood supply and protect the attached pedicle.
Alar Island Pedicle Flap Text: The defect edges were debeveled with a #15 scalpel blade.  Given the location of the defect, shape of the defect and the proximity to the alar rim an island pedicle advancement flap was deemed most appropriate.  Using a sterile surgical marker, an appropriate advancement flap was drawn incorporating the defect, outlining the appropriate donor tissue and placing the expected incisions within the nasal ala running parallel to the alar rim. The area thus outlined was incised with a #15 scalpel blade.  The skin margins were undermined minimally to an appropriate distance in all directions around the primary defect and laterally outward around the island pedicle utilizing iris scissors.  There was minimal undermining beneath the pedicle flap.
Unique Flap 2 Name: Peng Flap
Star Wedge Flap Text: The defect edges were debeveled with a #15 scalpel blade.  Given the location of the defect, shape of the defect and the proximity to free margins a star wedge flap was deemed most appropriate.  Using a sterile surgical marker, an appropriate rotation flap was drawn incorporating the defect and placing the expected incisions within the relaxed skin tension lines where possible. The area thus outlined was incised deep to adipose tissue with a #15 scalpel blade.  The skin margins were undermined to an appropriate distance in all directions utilizing iris scissors.
Wound Care (No Sutures): Petrolatum
Unique Flap 2 Text: A decision was made to reconstruct the defect utilizing a Peng Flap (Bilateral Advancement Rotation Flap). Given the location of the defect and the proximity to free margins, this flap was deemed most appropriate.  Using a sterile surgical marker, the appropriate rotation flaps were drawn incorporating the defect and placing the expected incisions within the relaxed skin tension lines where possible.    The area thus outlined was incised deep to adipose tissue with a #15 scalpel blade.  The skin margins were undermined to an appropriate distance in all directions utilizing iris scissors.
Ear Wedge Repair Text: A wedge excision was completed by carrying down an excision through the full thickness of the ear and cartilage with an inward facing Burow's triangle. The wound was then closed in a layered fashion.
Estimated Blood Loss (Cc): less than 5 cc
Rhombic Flap Text: The defect edges were debeveled with a #15 scalpel blade.  Given the location of the defect and the proximity to free margins a rhombic flap was deemed most appropriate.  Using a sterile surgical marker, an appropriate rhombic flap was drawn incorporating the defect.    The area thus outlined was incised deep to adipose tissue with a #15 scalpel blade.  The skin margins were undermined to an appropriate distance in all directions utilizing iris scissors.
Alternatives Discussed Intro (Do Not Add Period): I discussed alternative treatments to Mohs surgery and specifically discussed the risks and benefits of

## 2019-04-22 ENCOUNTER — APPOINTMENT (RX ONLY)
Dept: URBAN - METROPOLITAN AREA CLINIC 36 | Facility: CLINIC | Age: 69
Setting detail: DERMATOLOGY
End: 2019-04-22

## 2019-04-22 DIAGNOSIS — Z48.02 ENCOUNTER FOR REMOVAL OF SUTURES: ICD-10-CM

## 2019-04-22 PROCEDURE — 99024 POSTOP FOLLOW-UP VISIT: CPT

## 2019-04-22 PROCEDURE — ? SUTURE REMOVAL (GLOBAL PERIOD)

## 2019-04-22 ASSESSMENT — LOCATION DETAILED DESCRIPTION DERM: LOCATION DETAILED: RIGHT NASAL ALA

## 2019-04-22 ASSESSMENT — LOCATION ZONE DERM: LOCATION ZONE: NOSE

## 2019-04-22 ASSESSMENT — LOCATION SIMPLE DESCRIPTION DERM: LOCATION SIMPLE: RIGHT NOSE

## 2019-04-22 NOTE — PROCEDURE: SUTURE REMOVAL (GLOBAL PERIOD)
Detail Level: Detailed
Body Location Override (Optional - Billing Will Still Be Based On Selected Body Map Location If Applicable): right nose
Add 46634 Cpt? (Important Note: In 2017 The Use Of 83966 Is Being Tracked By Cms To Determine Future Global Period Reimbursement For Global Periods): yes

## 2019-06-25 ENCOUNTER — APPOINTMENT (RX ONLY)
Dept: URBAN - METROPOLITAN AREA CLINIC 36 | Facility: CLINIC | Age: 69
Setting detail: DERMATOLOGY
End: 2019-06-25

## 2019-06-25 DIAGNOSIS — L90.5 SCAR CONDITIONS AND FIBROSIS OF SKIN: ICD-10-CM

## 2019-06-25 PROCEDURE — 99024 POSTOP FOLLOW-UP VISIT: CPT | Mod: 79

## 2019-06-25 PROCEDURE — ? INTRALESIONAL KENALOG

## 2019-06-25 ASSESSMENT — LOCATION SIMPLE DESCRIPTION DERM: LOCATION SIMPLE: INFERIOR FOREHEAD

## 2019-06-25 ASSESSMENT — LOCATION DETAILED DESCRIPTION DERM: LOCATION DETAILED: INFERIOR MID FOREHEAD

## 2019-06-25 ASSESSMENT — LOCATION ZONE DERM: LOCATION ZONE: FACE

## 2019-06-25 NOTE — PROCEDURE: INTRALESIONAL KENALOG
Consent: The risks of atrophy were reviewed with the patient.
Include Z78.9 (Other Specified Conditions Influencing Health Status) As An Associated Diagnosis?: No
Kenalog Preparation: Kenalog
Detail Level: Generalized
Total Volume (Ccs): 0.2
X Size Of Lesion In Cm (Optional): 0
Medical Necessity Clause: This procedure was medically necessary because the lesions that were treated were:
Concentration Of Kenalog Solution Injected (Mg/Ml): 40.0

## 2019-08-02 NOTE — PROGRESS NOTES
[facility] Hospitalist Progress Note    Date of Service: 2018    Chief Complaint  67 y.o. male with Crohn's disease, type II diabetes mellitus, history of DVT, history of bladder rupture, bilateral nephrostomy tube with removal and ureteral stent placement, was just discharged yesterday, at that time was also felt to have UTI, but urine grew mixed skin anca and yeast. ID was consulted then, and was transitioned to oral ciprofloxacin and fluconazole to complete 14 days course. Admitted 2018 with fevers, chills, nausea and vomiting, along with increasing urinary frequency and bladder spasms. Labs showed no leukocytosis. BMP was unimpressive except for hyperglycemia, with normal creatinine. CT scan showed left ureteral stent in place, moderate right hydronephrosis and dilation of the right ureter, 5 mm nonobstructing lower pole right renal stone. Felt to have sepsis due to UTI/pyelonephritis, and started on IV fluids, and IV cefepime.    Interval Problem Update  2018 - uneventful night. VSS. Afebrile. Saturating well on 2L O2 NC.  WBC 4. Potassium 3.4. Hemoglobin 9.2. Sodium and creatinine normal. Procalcitonin 2.41. Blood cultures remain negative. Urinalysis showed packed WBCs, 100-150 RBC, with moderate leukocyte esterase.    > Seen and examined. Still with dysuria, and inability to completely empty bladder. (+) bladder retention, 540cc this morning. No nausea, vomiting. Denied CP, SOB.       Consultants/Specialty  ID    Disposition  Monitor on the floors        ROS     Pertinent positives/negatives as mentioned above.     A complete review of systems was done. All other systems were negative.      Physical Exam  Laboratory/Imaging   Hemodynamics  Temp (24hrs), Av.4 °C (99.4 °F), Min:36.9 °C (98.5 °F), Max:38.1 °C (100.6 °F)   Temperature: 37.2 °C (99 °F)  Pulse  Av.2  Min: 73  Max: 117 Heart Rate (Monitored): 80  Blood Pressure : 114/67, NIBP: 113/62      Respiratory      Respiration: 18, Pulse  Oximetry: 96 %        RUL Breath Sounds: Clear, RML Breath Sounds: Clear, RLL Breath Sounds: Clear, FLORENCE Breath Sounds: Clear, LLL Breath Sounds: Clear    Fluids    Intake/Output Summary (Last 24 hours) at 01/31/18 0916  Last data filed at 01/31/18 0400   Gross per 24 hour   Intake                0 ml   Output              850 ml   Net             -850 ml       Nutrition  Orders Placed This Encounter   Procedures   • Diet Order     Standing Status:   Standing     Number of Occurrences:   1     Order Specific Question:   Diet:     Answer:   Diabetic [3]     Order Specific Question:   Calorie modifications:     Answer:   1800 kcals [4]     Physical Exam   Constitutional: He is oriented to person, place, and time. He appears well-developed. No distress.   HENT:   Head: Normocephalic and atraumatic.   Mouth/Throat: Oropharynx is clear and moist. No oropharyngeal exudate.   Eyes: EOM are normal. Pupils are equal, round, and reactive to light. Right eye exhibits no discharge. Left eye exhibits no discharge. No scleral icterus.   Neck: Normal range of motion. Neck supple. No thyromegaly present.   Cardiovascular: Normal rate and regular rhythm.  Exam reveals no gallop and no friction rub.    No murmur heard.  Pulmonary/Chest: Effort normal and breath sounds normal. He has no wheezes. He has no rales. He exhibits no tenderness.   Abdominal: Soft. Bowel sounds are normal. There is no tenderness. There is no rebound and no guarding.   dressing on B/L flank. No CVA tenderness   Musculoskeletal: Normal range of motion. He exhibits no edema or tenderness.   Lymphadenopathy:     He has no cervical adenopathy.   Neurological: He is alert and oriented to person, place, and time. No cranial nerve deficit. Coordination normal.   Skin: Skin is warm and dry. No rash noted. He is not diaphoretic. No erythema.   (+) wound on the posterior left thigh, wound vac in place   Psychiatric: He has a normal mood and affect. His behavior is  normal. Thought content normal.   Vitals reviewed.       Recent Labs      01/29/18   0315  01/30/18   1230  01/31/18   0204   WBC  2.4*  5.3  4.0*   RBC  3.10*  3.72*  3.08*   HEMOGLOBIN  9.2*  10.8*  9.2*   HEMATOCRIT  29.8*  34.9*  29.4*   MCV  96.1  93.8  95.5   MCH  29.7  29.0  29.9   MCHC  30.9*  30.9*  31.3*   RDW  60.6*  61.1*  62.9*   PLATELETCT  242  317  249   MPV  9.4  9.0  8.8*     Recent Labs      01/29/18   0315  01/30/18   1230  01/31/18   0204   SODIUM  136  136  139   POTASSIUM  3.8  3.7  3.4*   CHLORIDE  106  103  105   CO2  25  22  27   GLUCOSE  197*  251*  174*   BUN  10  12  12   CREATININE  0.43*  0.53  0.40*   CALCIUM  8.6  9.0  8.2*     Recent Labs      01/30/18 2035   APTT  38.7*   INR  1.26*                  Assessment/Plan     * Sepsis due to pyelonephritis / complicated UTI (CMS-[facility])- (present on admission)   Assessment & Plan    - This is sepsis (without associated acute organ dysfunction).  Source is pyelonephritis/compensated UTI.   - Continue IV fluids, with PRN boluses per sepsis protocol. Close hemodynamic monitoring. Monitor for recurrent fevers, and trend WBC count.  - Continue IV antibiotics for pyelonephritis. Follow cultures.        Urinary retention- (present on admission)   Assessment & Plan    - Likely related to UTI. Continue Flomax. Will do bladder scan per protocol, and straight cath for residual greater than 500 cc.        Hypokalemia   Assessment & Plan    - Replace with 40 mEq of K Dur ×1. Check magnesium level. Repeat BMP in the morning.        Pyelonephritis- (present on admission)   Assessment & Plan    - With recurrence of symptoms, along with fevers.  - Continue antibiotics with IV cefepime, along with oral fluconazole given yeast in most recent urine culture. I have consulted Dr. Li of ID for further inputs regarding antibiotics.   - Follow urine cultures.        Open wound of left thigh- (present on admission)   Assessment & Plan    - Left thigh wound  displaced. Exam reveals no decreased pulses. Murmur heard. Systolic murmur is present with a grade of 2/6. Pulmonary/Chest: Effort normal. No stridor. No respiratory distress. She has wheezes. She has rales. She exhibits no tenderness. Abdominal: Soft. Bowel sounds are normal. She exhibits distension. She exhibits no mass. There is no tenderness. Musculoskeletal: She exhibits edema and tenderness. Neurological: She is alert and oriented to person, place, and time. Skin: Skin is warm and intact. Capillary refill takes less than 2 seconds. Bilateral LE swelling 2+, moist, mildly tender to squeeze. Pertinent labs and imagingwere reviewed            Assessment/Plan    Sangeeta Auguste was seen today for follow-up from hospital.    Diagnoses and all orders for this visit:    Chronic combined systolic and diastolic congestive heart failure (HCC)   - Pt has long standing heartfailure that was shown to have worsened on echo last admisison. Pt had a therapeutic paracentesis and a thoracocentesis. Thoracocentesis did not contain any malignancy at that time. It is likely in the future she may need therapeutic para/thoracocentesis for relief of CHF 3rd spacing of fluids. Pleural effusion  -     Tung Gary MD, Pulmonary, [facility]  -     XR CHEST PA INSPIRATION 1 VW; Future  - Would like to see if she would benefit from another thoracocentesis    Deep vein thrombosis (DVT) of distal vein of lower extremity, unspecified chronicity, unspecified laterality (HCC)  -     POCT INR  -     PROTIME-INR; Future  -goal of 2-3 would like to have rechecked next Friday with increas in 0.5mg over the week. Would like to have the [place] call in next week with the results and then we can indicate what she needs through a message or fax.             Maintenance     Health Maintenance Due   Topic Date Due    Annual Wellness Visit (AWV)  08/29/2008          Reviewed age and gender appropriate health looking good. Maintain wound VAC. Will get wound care to see him for continued wound inspections and dressings.         History of DVT (deep vein thrombosis)- (present on admission)   Assessment & Plan    - with hypercoagulable disorder, and multiple episodes of DVTs.  - Continue therapeutic Lovenox subcutaneous injections.        Anemia of chronic disease- (present on admission)   Assessment & Plan    - No gross bleeding. Continue to monitor. Transfuse for hemoglobin less than 7.        Type II diabetes mellitus (CMS-HCC)- (present on admission)   Assessment & Plan    - Continue glipizide, but hold metformin while in-house. Continue sliding scale insulin coverage in the hospital. Continue Accu-Cheks before meals and at bedtime, and diabetic diet.        Crohn disease (CMS-HCC)- (present on admission)   Assessment & Plan    - Not in acute flareup. Monitor. Continue home dose mesalamine.              Reviewed items::  Labs reviewed, Medications reviewed and Radiology images reviewed  Pacheco catheter::  No Pacheco  DVT prophylaxis pharmacological::  Enoxaparin (Lovenox)  Ulcer Prophylaxis::  Not indicated  Antibiotics:  Treating active infection/contamination beyond 24 hours perioperative coverage

## 2019-08-06 ENCOUNTER — APPOINTMENT (RX ONLY)
Dept: URBAN - METROPOLITAN AREA CLINIC 22 | Facility: CLINIC | Age: 69
Setting detail: DERMATOLOGY
End: 2019-08-06

## 2019-08-06 DIAGNOSIS — D22 MELANOCYTIC NEVI: ICD-10-CM

## 2019-08-06 DIAGNOSIS — Z85.828 PERSONAL HISTORY OF OTHER MALIGNANT NEOPLASM OF SKIN: ICD-10-CM

## 2019-08-06 DIAGNOSIS — L82.0 INFLAMED SEBORRHEIC KERATOSIS: ICD-10-CM

## 2019-08-06 DIAGNOSIS — L57.0 ACTINIC KERATOSIS: ICD-10-CM

## 2019-08-06 DIAGNOSIS — L81.4 OTHER MELANIN HYPERPIGMENTATION: ICD-10-CM

## 2019-08-06 DIAGNOSIS — L82.1 OTHER SEBORRHEIC KERATOSIS: ICD-10-CM

## 2019-08-06 PROBLEM — D22.5 MELANOCYTIC NEVI OF TRUNK: Status: ACTIVE | Noted: 2019-08-06

## 2019-08-06 PROBLEM — D48.5 NEOPLASM OF UNCERTAIN BEHAVIOR OF SKIN: Status: ACTIVE | Noted: 2019-08-06

## 2019-08-06 PROCEDURE — ? LIQUID NITROGEN

## 2019-08-06 PROCEDURE — 17003 DESTRUCT PREMALG LES 2-14: CPT | Mod: 59

## 2019-08-06 PROCEDURE — 17000 DESTRUCT PREMALG LESION: CPT | Mod: 59

## 2019-08-06 PROCEDURE — ? BIOPSY BY SHAVE METHOD

## 2019-08-06 PROCEDURE — 11102 TANGNTL BX SKIN SINGLE LES: CPT | Mod: 59

## 2019-08-06 PROCEDURE — 17110 DESTRUCTION B9 LES UP TO 14: CPT

## 2019-08-06 PROCEDURE — ? COUNSELING

## 2019-08-06 PROCEDURE — 99214 OFFICE O/P EST MOD 30 MIN: CPT | Mod: 25

## 2019-08-06 ASSESSMENT — LOCATION DETAILED DESCRIPTION DERM
LOCATION DETAILED: LEFT MEDIAL SUPERIOR CHEST
LOCATION DETAILED: LEFT SUPERIOR PARIETAL SCALP
LOCATION DETAILED: SUPERIOR THORACIC SPINE
LOCATION DETAILED: RIGHT CENTRAL FRONTAL SCALP
LOCATION DETAILED: RIGHT LATERAL INFERIOR CHEST
LOCATION DETAILED: POSTERIOR MID-PARIETAL SCALP
LOCATION DETAILED: RIGHT VENTRAL PROXIMAL FOREARM
LOCATION DETAILED: RIGHT NASAL SIDEWALL
LOCATION DETAILED: RIGHT MEDIAL UPPER BACK
LOCATION DETAILED: RIGHT SUPERIOR OCCIPITAL SCALP
LOCATION DETAILED: SUPERIOR THORACIC SPINE
LOCATION DETAILED: NASAL SUPRATIP
LOCATION DETAILED: RIGHT VENTRAL PROXIMAL FOREARM
LOCATION DETAILED: LEFT VENTRAL PROXIMAL FOREARM
LOCATION DETAILED: LEFT INFERIOR MEDIAL FOREHEAD
LOCATION DETAILED: STERNAL NOTCH
LOCATION DETAILED: LEFT MEDIAL SUPERIOR CHEST
LOCATION DETAILED: MID-FRONTAL SCALP
LOCATION DETAILED: LEFT CENTRAL FRONTAL SCALP
LOCATION DETAILED: RIGHT DISTAL POSTERIOR UPPER ARM

## 2019-08-06 ASSESSMENT — LOCATION SIMPLE DESCRIPTION DERM
LOCATION SIMPLE: POSTERIOR SCALP
LOCATION SIMPLE: RIGHT UPPER BACK
LOCATION SIMPLE: RIGHT OCCIPITAL SCALP
LOCATION SIMPLE: RIGHT NOSE
LOCATION SIMPLE: RIGHT SCALP
LOCATION SIMPLE: CHEST
LOCATION SIMPLE: NOSE
LOCATION SIMPLE: LEFT FOREARM
LOCATION SIMPLE: UPPER BACK
LOCATION SIMPLE: LEFT SCALP
LOCATION SIMPLE: RIGHT FOREARM
LOCATION SIMPLE: RIGHT FOREARM
LOCATION SIMPLE: UPPER BACK
LOCATION SIMPLE: CHEST
LOCATION SIMPLE: SCALP
LOCATION SIMPLE: RIGHT UPPER ARM
LOCATION SIMPLE: LEFT FOREHEAD
LOCATION SIMPLE: ANTERIOR SCALP

## 2019-08-06 ASSESSMENT — LOCATION ZONE DERM
LOCATION ZONE: SCALP
LOCATION ZONE: TRUNK
LOCATION ZONE: FACE
LOCATION ZONE: NOSE
LOCATION ZONE: ARM
LOCATION ZONE: ARM
LOCATION ZONE: NOSE
LOCATION ZONE: TRUNK

## 2019-08-06 NOTE — HPI: SKIN LESION
Is This A New Presentation, Or A Follow-Up?: Growth
What Type Of Note Output Would You Prefer (Optional)?: Standard Output
How Severe Is Your Skin Lesion?: moderate
Has Your Skin Lesion Been Treated?: been treated
When Was It Treated?: 2/2019
Has Your Skin Lesion Been Treated?: not been treated

## 2019-08-06 NOTE — PROCEDURE: BIOPSY BY SHAVE METHOD
Anesthesia Volume In Cc: 2
Silver Nitrate Text: The wound bed was treated with silver nitrate after the biopsy was performed.
Electrodesiccation And Curettage Text: The wound bed was treated with electrodesiccation and curettage after the biopsy was performed.
Dressing: pressure dressing with telfa
Render Post-Care Instructions In Note?: yes
Biopsy Method: Personna blade
Hemostasis: Drysol
Billing Type: Third-Party Bill
Lab: [id number]
Bill [id number] For Specimen Handling/Conveyance To Laboratory?: no
Detail Level: Detailed
Curettage Text: The wound bed was treated with curettage after the biopsy was performed.
Cryotherapy Text: The wound bed was treated with cryotherapy after the biopsy was performed.
Lab Facility: 
Anesthesia Type: 1% lidocaine with 1:100,000 epinephrine and a 1:10 solution of 8.4% sodium bicarbonate
Wound Care: Petrolatum
Additional Anesthesia Volume In Cc (Will Not Render If 0): 0
Size Of Lesion In Cm: 0.3
Post-Care Instructions: I reviewed with the patient in detail post-care instructions. Patient is to keep the biopsy site dry overnight. Gentle cleansing daily.  Apply petroleum ointment daily until healed. Patient may apply hydrogen peroxide soaks to remove any crusting.
Consent: Written consent was obtained and risks were reviewed including but not limited to scarring, infection, bleeding, scabbing, incomplete removal, nerve damage and allergy to anesthesia.
Depth Of Biopsy: dermis
Electrodesiccation Text: The wound bed was treated with electrodesiccation after the biopsy was performed.
Type Of Destruction Used: Curettage
Notification Instructions: Patient will be notified of biopsy results. However, patient instructed to call the office if not contacted within 2 weeks.
Biopsy Type: H and E

## 2019-08-06 NOTE — PROCEDURE: LIQUID NITROGEN
Detail Level: Detailed
Medical Necessity Information: It is in your best interest to select a reason for this procedure from the list below. All of these items fulfill various CMS LCD requirements except the new and changing color options.
Post-Care Instructions: I reviewed with the patient in detail post-care instructions. Patient is to wear sunprotection, and avoid picking at any of the treated lesions. Pt may apply Vaseline to crusted or scabbing areas.
Render Post-Care Instructions In Note?: no
Consent: The patient's consent was obtained including but not limited to risks of crusting, scabbing, blistering, scarring, darker or lighter pigmentary change, recurrence, incomplete removal and infection.
Medical Necessity Clause: This procedure was medically necessary because the lesions that were treated were:
Duration Of Freeze Thaw-Cycle (Seconds): 0

## 2021-03-03 DIAGNOSIS — Z23 NEED FOR VACCINATION: ICD-10-CM

## 2022-06-16 NOTE — PROGRESS NOTES
Bedside report received from day shift RN. Assumed care. Pt is A&Ox4. Pt is in bed. Pt denies pain at this time. Pt was updated on the plan of care for the night. All questions answered. Pt has call light within reach and bed is in lowest position.  All fall precautions in place. Pt has nonskid socks on feet, and appropriate signs are on the door, pt has no other needs at this time.   725

## 2022-09-05 NOTE — CARE PLAN
Problem: Safety  Goal: Will remain free from injury  Outcome: PROGRESSING AS EXPECTED      Problem: Pain Management  Goal: Pain level will decrease to patient's comfort goal  Outcome: PROGRESSING AS EXPECTED  Pt with adequate pain control. Encouraged pt to use non pharmacological interventions as well. Will continue to assess through out shift.        PAST MEDICAL HISTORY:  Benign prostatic hyperplasia, presence of lower urinary tract symptoms unspecified, unspecified morphology     Cerebrovascular accident (CVA), unspecified mechanism 2015    Complex tear of lateral meniscus of right knee as current injury, initial encounter     Complex tear of medial meniscus of right knee as current injury, initial encounter     Essential hypertension     Hyperlipidemia, unspecified hyperlipidemia type     Skin cancer s/p Mohs

## 2023-03-09 NOTE — ASSESSMENT & PLAN NOTE
3rd attempt - LVM. No further phone call attempts will be made.     Letter sent.    [facility]red [facility] with Acute Epidydimitis with Testicular Torsion  -US stat showed partial vs total testicular torsion.   -Dr. Mosqueda was consulted & as soon as pt arrived to [facility], pt was taken to ER & underwent Scrotal exploration, left orchiectomy, and right testicular fixation.  Plan:  - admitted to Sx floor  -s/p Scrotal exploration, left orchiectomy, and right testicular fixation on 11/12/2017  - IVF  -started levofloxacin

## 2023-12-12 ENCOUNTER — APPOINTMENT (RX ONLY)
Dept: URBAN - METROPOLITAN AREA CLINIC 6 | Facility: CLINIC | Age: 73
Setting detail: DERMATOLOGY
End: 2023-12-12

## 2023-12-12 DIAGNOSIS — L57.0 ACTINIC KERATOSIS: ICD-10-CM

## 2023-12-12 DIAGNOSIS — L98.8 OTHER SPECIFIED DISORDERS OF THE SKIN AND SUBCUTANEOUS TISSUE: ICD-10-CM

## 2023-12-12 DIAGNOSIS — D18.0 HEMANGIOMA: ICD-10-CM

## 2023-12-12 DIAGNOSIS — L81.4 OTHER MELANIN HYPERPIGMENTATION: ICD-10-CM

## 2023-12-12 DIAGNOSIS — Z71.89 OTHER SPECIFIED COUNSELING: ICD-10-CM

## 2023-12-12 DIAGNOSIS — L82.1 OTHER SEBORRHEIC KERATOSIS: ICD-10-CM

## 2023-12-12 DIAGNOSIS — L21.8 OTHER SEBORRHEIC DERMATITIS: ICD-10-CM

## 2023-12-12 DIAGNOSIS — D22 MELANOCYTIC NEVI: ICD-10-CM

## 2023-12-12 DIAGNOSIS — Z85.828 PERSONAL HISTORY OF OTHER MALIGNANT NEOPLASM OF SKIN: ICD-10-CM

## 2023-12-12 PROBLEM — D48.5 NEOPLASM OF UNCERTAIN BEHAVIOR OF SKIN: Status: ACTIVE | Noted: 2023-12-12

## 2023-12-12 PROBLEM — D22.5 MELANOCYTIC NEVI OF TRUNK: Status: ACTIVE | Noted: 2023-12-12

## 2023-12-12 PROBLEM — D22.61 MELANOCYTIC NEVI OF RIGHT UPPER LIMB, INCLUDING SHOULDER: Status: ACTIVE | Noted: 2023-12-12

## 2023-12-12 PROBLEM — D23.62 OTHER BENIGN NEOPLASM OF SKIN OF LEFT UPPER LIMB, INCLUDING SHOULDER: Status: ACTIVE | Noted: 2023-12-12

## 2023-12-12 PROBLEM — D18.01 HEMANGIOMA OF SKIN AND SUBCUTANEOUS TISSUE: Status: ACTIVE | Noted: 2023-12-12

## 2023-12-12 PROCEDURE — 17000 DESTRUCT PREMALG LESION: CPT

## 2023-12-12 PROCEDURE — ? LIQUID NITROGEN

## 2023-12-12 PROCEDURE — ? ADDITIONAL NOTES

## 2023-12-12 PROCEDURE — 17003 DESTRUCT PREMALG LES 2-14: CPT

## 2023-12-12 PROCEDURE — ? COUNSELING

## 2023-12-12 PROCEDURE — 99203 OFFICE O/P NEW LOW 30 MIN: CPT | Mod: 25

## 2023-12-12 PROCEDURE — ? SUNSCREEN RECOMMENDATIONS

## 2023-12-12 ASSESSMENT — LOCATION ZONE DERM
LOCATION ZONE: TRUNK
LOCATION ZONE: TRUNK
LOCATION ZONE: NOSE
LOCATION ZONE: NOSE
LOCATION ZONE: AXILLAE
LOCATION ZONE: ARM
LOCATION ZONE: HAND
LOCATION ZONE: FACE
LOCATION ZONE: SCALP

## 2023-12-12 ASSESSMENT — LOCATION SIMPLE DESCRIPTION DERM
LOCATION SIMPLE: LEFT FOREARM
LOCATION SIMPLE: RIGHT UPPER BACK
LOCATION SIMPLE: CHEST
LOCATION SIMPLE: CHEST
LOCATION SIMPLE: RIGHT AXILLARY VAULT
LOCATION SIMPLE: RIGHT FOREHEAD
LOCATION SIMPLE: RIGHT SCALP
LOCATION SIMPLE: RIGHT NOSE
LOCATION SIMPLE: LEFT CHEEK
LOCATION SIMPLE: UPPER BACK
LOCATION SIMPLE: LEFT FOREHEAD
LOCATION SIMPLE: POSTERIOR SCALP
LOCATION SIMPLE: NOSE
LOCATION SIMPLE: LEFT TEMPLE
LOCATION SIMPLE: SCALP
LOCATION SIMPLE: LEFT HAND

## 2023-12-12 ASSESSMENT — LOCATION DETAILED DESCRIPTION DERM
LOCATION DETAILED: LEFT RADIAL DORSAL HAND
LOCATION DETAILED: NASAL SUPRATIP
LOCATION DETAILED: LEFT MEDIAL TEMPLE
LOCATION DETAILED: LEFT SUPERIOR PARIETAL SCALP
LOCATION DETAILED: SUPERIOR THORACIC SPINE
LOCATION DETAILED: MID-OCCIPITAL SCALP
LOCATION DETAILED: LEFT MEDIAL SUPERIOR CHEST
LOCATION DETAILED: LEFT DISTAL DORSAL FOREARM
LOCATION DETAILED: LEFT SUPERIOR MEDIAL FOREHEAD
LOCATION DETAILED: RIGHT FOREHEAD
LOCATION DETAILED: LEFT MID PREAURICULAR CHEEK
LOCATION DETAILED: LEFT PROXIMAL RADIAL DORSAL FOREARM
LOCATION DETAILED: RIGHT LATERAL FOREHEAD
LOCATION DETAILED: RIGHT NASAL SIDEWALL
LOCATION DETAILED: LEFT PROXIMAL DORSAL FOREARM
LOCATION DETAILED: RIGHT SUPERIOR FOREHEAD
LOCATION DETAILED: LEFT SUPERIOR LATERAL BUCCAL CHEEK
LOCATION DETAILED: RIGHT AXILLARY VAULT
LOCATION DETAILED: LEFT MEDIAL SUPERIOR CHEST
LOCATION DETAILED: RIGHT MEDIAL FRONTAL SCALP
LOCATION DETAILED: RIGHT MEDIAL UPPER BACK

## 2023-12-12 NOTE — PROCEDURE: ADDITIONAL NOTES
Render Risk Assessment In Note?: no
Detail Level: Detailed
Additional Notes: Not currently bothersome, defers prescription therapy.

## 2023-12-12 NOTE — PROCEDURE: LIQUID NITROGEN
Number Of Freeze-Thaw Cycles: 2 freeze-thaw cycles
Duration Of Freeze Thaw-Cycle (Seconds): 8
Post-Care Instructions: I reviewed with the patient in detail post-care instructions. Patient is to wear sunprotection, and avoid picking at any of the treated lesions. Pt may apply Vaseline to crusted or scabbing areas.
Render Note In Bullet Format When Appropriate: No
Show Applicator Variable?: Yes
Consent: The patient's consent was obtained including but not limited to risks of crusting, scabbing, blistering, scarring, darker or lighter pigmentary change, recurrence, incomplete removal and infection.
Detail Level: Zone

## 2024-06-28 ENCOUNTER — APPOINTMENT (OUTPATIENT)
Dept: RADIOLOGY | Facility: MEDICAL CENTER | Age: 74
DRG: 711 | End: 2024-06-28
Attending: EMERGENCY MEDICINE
Payer: COMMERCIAL

## 2024-06-28 ENCOUNTER — HOSPITAL ENCOUNTER (INPATIENT)
Facility: MEDICAL CENTER | Age: 74
LOS: 6 days | DRG: 711 | End: 2024-07-04
Attending: EMERGENCY MEDICINE | Admitting: INTERNAL MEDICINE
Payer: COMMERCIAL

## 2024-06-28 ENCOUNTER — APPOINTMENT (OUTPATIENT)
Dept: RADIOLOGY | Facility: MEDICAL CENTER | Age: 74
DRG: 711 | End: 2024-06-28
Attending: INTERNAL MEDICINE
Payer: COMMERCIAL

## 2024-06-28 DIAGNOSIS — N50.811 PAIN IN RIGHT TESTICLE: ICD-10-CM

## 2024-06-28 DIAGNOSIS — N45.3 EPIDIDYMOORCHITIS: ICD-10-CM

## 2024-06-28 DIAGNOSIS — Z90.79 STATUS POST ORCHIECTOMY: ICD-10-CM

## 2024-06-28 PROBLEM — N50.819 TESTICLE PAIN: Status: ACTIVE | Noted: 2024-06-28

## 2024-06-28 LAB
ABO GROUP BLD: NORMAL
ANION GAP SERPL CALC-SCNC: 9 MMOL/L (ref 7–16)
APPEARANCE UR: CLEAR
BACTERIA #/AREA URNS HPF: NEGATIVE /HPF
BASOPHILS # BLD AUTO: 0.4 % (ref 0–1.8)
BASOPHILS # BLD: 0.05 K/UL (ref 0–0.12)
BILIRUB UR QL STRIP.AUTO: NEGATIVE
BLD GP AB SCN SERPL QL: NORMAL
BUN SERPL-MCNC: 14 MG/DL (ref 8–22)
CALCIUM SERPL-MCNC: 8 MG/DL (ref 8.5–10.5)
CHLORIDE SERPL-SCNC: 107 MMOL/L (ref 96–112)
CO2 SERPL-SCNC: 24 MMOL/L (ref 20–33)
COLOR UR: YELLOW
CREAT SERPL-MCNC: 0.45 MG/DL (ref 0.5–1.4)
EKG IMPRESSION: NORMAL
EOSINOPHIL # BLD AUTO: 0.67 K/UL (ref 0–0.51)
EOSINOPHIL NFR BLD: 5.1 % (ref 0–6.9)
EPI CELLS #/AREA URNS HPF: NEGATIVE /HPF
ERYTHROCYTE [DISTWIDTH] IN BLOOD BY AUTOMATED COUNT: 49.5 FL (ref 35.9–50)
GFR SERPLBLD CREATININE-BSD FMLA CKD-EPI: 112 ML/MIN/1.73 M 2
GLUCOSE BLD STRIP.AUTO-MCNC: 169 MG/DL (ref 65–99)
GLUCOSE BLD STRIP.AUTO-MCNC: 214 MG/DL (ref 65–99)
GLUCOSE BLD STRIP.AUTO-MCNC: 379 MG/DL (ref 65–99)
GLUCOSE SERPL-MCNC: 243 MG/DL (ref 65–99)
GLUCOSE UR STRIP.AUTO-MCNC: >=1000 MG/DL
HCT VFR BLD AUTO: 38.8 % (ref 42–52)
HGB BLD-MCNC: 12.7 G/DL (ref 14–18)
HYALINE CASTS #/AREA URNS LPF: ABNORMAL /LPF
IMM GRANULOCYTES # BLD AUTO: 0.08 K/UL (ref 0–0.11)
IMM GRANULOCYTES NFR BLD AUTO: 0.6 % (ref 0–0.9)
INR PPP: 2.04 (ref 0.87–1.13)
INR PPP: 2.07 (ref 0.87–1.13)
KETONES UR STRIP.AUTO-MCNC: ABNORMAL MG/DL
LEUKOCYTE ESTERASE UR QL STRIP.AUTO: NEGATIVE
LYMPHOCYTES # BLD AUTO: 0.66 K/UL (ref 1–4.8)
LYMPHOCYTES NFR BLD: 5 % (ref 22–41)
MCH RBC QN AUTO: 31.8 PG (ref 27–33)
MCHC RBC AUTO-ENTMCNC: 32.7 G/DL (ref 32.3–36.5)
MCV RBC AUTO: 97 FL (ref 81.4–97.8)
MICRO URNS: ABNORMAL
MONOCYTES # BLD AUTO: 0.64 K/UL (ref 0–0.85)
MONOCYTES NFR BLD AUTO: 4.9 % (ref 0–13.4)
NEUTROPHILS # BLD AUTO: 11.01 K/UL (ref 1.82–7.42)
NEUTROPHILS NFR BLD: 84 % (ref 44–72)
NITRITE UR QL STRIP.AUTO: NEGATIVE
NRBC # BLD AUTO: 0 K/UL
NRBC BLD-RTO: 0 /100 WBC (ref 0–0.2)
PH UR STRIP.AUTO: 5 [PH] (ref 5–8)
PLATELET # BLD AUTO: 193 K/UL (ref 164–446)
PMV BLD AUTO: 9.1 FL (ref 9–12.9)
POTASSIUM SERPL-SCNC: 3.7 MMOL/L (ref 3.6–5.5)
PROT UR QL STRIP: 30 MG/DL
PROTHROMBIN TIME: 23.3 SEC (ref 12–14.6)
PROTHROMBIN TIME: 23.6 SEC (ref 12–14.6)
RBC # BLD AUTO: 4 M/UL (ref 4.7–6.1)
RBC # URNS HPF: ABNORMAL /HPF
RBC UR QL AUTO: NEGATIVE
RH BLD: NORMAL
SODIUM SERPL-SCNC: 140 MMOL/L (ref 135–145)
SP GR UR STRIP.AUTO: 1.04
UROBILINOGEN UR STRIP.AUTO-MCNC: 0.2 MG/DL
WBC # BLD AUTO: 13.1 K/UL (ref 4.8–10.8)
WBC #/AREA URNS HPF: ABNORMAL /HPF

## 2024-06-28 PROCEDURE — A9270 NON-COVERED ITEM OR SERVICE: HCPCS | Performed by: INTERNAL MEDICINE

## 2024-06-28 PROCEDURE — 76870 US EXAM SCROTUM: CPT

## 2024-06-28 PROCEDURE — 770006 HCHG ROOM/CARE - MED/SURG/GYN SEMI*

## 2024-06-28 PROCEDURE — 82962 GLUCOSE BLOOD TEST: CPT

## 2024-06-28 PROCEDURE — 700102 HCHG RX REV CODE 250 W/ 637 OVERRIDE(OP)

## 2024-06-28 PROCEDURE — 700105 HCHG RX REV CODE 258: Performed by: INTERNAL MEDICINE

## 2024-06-28 PROCEDURE — 87086 URINE CULTURE/COLONY COUNT: CPT

## 2024-06-28 PROCEDURE — 700102 HCHG RX REV CODE 250 W/ 637 OVERRIDE(OP): Performed by: INTERNAL MEDICINE

## 2024-06-28 PROCEDURE — 86901 BLOOD TYPING SEROLOGIC RH(D): CPT

## 2024-06-28 PROCEDURE — 86900 BLOOD TYPING SEROLOGIC ABO: CPT

## 2024-06-28 PROCEDURE — 80048 BASIC METABOLIC PNL TOTAL CA: CPT

## 2024-06-28 PROCEDURE — 96376 TX/PRO/DX INJ SAME DRUG ADON: CPT

## 2024-06-28 PROCEDURE — 36415 COLL VENOUS BLD VENIPUNCTURE: CPT

## 2024-06-28 PROCEDURE — 93005 ELECTROCARDIOGRAM TRACING: CPT | Performed by: STUDENT IN AN ORGANIZED HEALTH CARE EDUCATION/TRAINING PROGRAM

## 2024-06-28 PROCEDURE — 700111 HCHG RX REV CODE 636 W/ 250 OVERRIDE (IP): Mod: JZ | Performed by: EMERGENCY MEDICINE

## 2024-06-28 PROCEDURE — 85610 PROTHROMBIN TIME: CPT

## 2024-06-28 PROCEDURE — 700102 HCHG RX REV CODE 250 W/ 637 OVERRIDE(OP): Performed by: EMERGENCY MEDICINE

## 2024-06-28 PROCEDURE — 700117 HCHG RX CONTRAST REV CODE 255: Performed by: EMERGENCY MEDICINE

## 2024-06-28 PROCEDURE — 81001 URINALYSIS AUTO W/SCOPE: CPT

## 2024-06-28 PROCEDURE — 99223 1ST HOSP IP/OBS HIGH 75: CPT | Performed by: INTERNAL MEDICINE

## 2024-06-28 PROCEDURE — 96374 THER/PROPH/DIAG INJ IV PUSH: CPT

## 2024-06-28 PROCEDURE — 86850 RBC ANTIBODY SCREEN: CPT

## 2024-06-28 PROCEDURE — 74177 CT ABD & PELVIS W/CONTRAST: CPT

## 2024-06-28 PROCEDURE — A9270 NON-COVERED ITEM OR SERVICE: HCPCS | Performed by: EMERGENCY MEDICINE

## 2024-06-28 PROCEDURE — 700111 HCHG RX REV CODE 636 W/ 250 OVERRIDE (IP): Mod: JZ

## 2024-06-28 PROCEDURE — 700101 HCHG RX REV CODE 250

## 2024-06-28 PROCEDURE — 96372 THER/PROPH/DIAG INJ SC/IM: CPT

## 2024-06-28 PROCEDURE — 96375 TX/PRO/DX INJ NEW DRUG ADDON: CPT

## 2024-06-28 PROCEDURE — 700111 HCHG RX REV CODE 636 W/ 250 OVERRIDE (IP): Mod: JZ | Performed by: INTERNAL MEDICINE

## 2024-06-28 PROCEDURE — 99285 EMERGENCY DEPT VISIT HI MDM: CPT

## 2024-06-28 PROCEDURE — 93010 ELECTROCARDIOGRAM REPORT: CPT | Performed by: INTERNAL MEDICINE

## 2024-06-28 PROCEDURE — 85025 COMPLETE CBC W/AUTO DIFF WBC: CPT

## 2024-06-28 PROCEDURE — A9270 NON-COVERED ITEM OR SERVICE: HCPCS

## 2024-06-28 RX ORDER — WARFARIN SODIUM 2.5 MG/1
5-7.5 TABLET ORAL DAILY
Status: DISCONTINUED | OUTPATIENT
Start: 2024-06-28 | End: 2024-06-28

## 2024-06-28 RX ORDER — METHYLPREDNISOLONE 4 MG/1
4 TABLET ORAL DAILY
Status: ON HOLD | COMMUNITY
End: 2024-07-04

## 2024-06-28 RX ORDER — SODIUM CHLORIDE 9 MG/ML
INJECTION, SOLUTION INTRAVENOUS CONTINUOUS
Status: DISCONTINUED | OUTPATIENT
Start: 2024-06-28 | End: 2024-06-29

## 2024-06-28 RX ORDER — VITAMIN B COMPLEX
1000 TABLET ORAL DAILY
COMMUNITY

## 2024-06-28 RX ORDER — LISINOPRIL 10 MG/1
10 TABLET ORAL DAILY
Status: DISCONTINUED | OUTPATIENT
Start: 2024-06-28 | End: 2024-07-04 | Stop reason: HOSPADM

## 2024-06-28 RX ORDER — EMPAGLIFLOZIN 25 MG/1
25 TABLET, FILM COATED ORAL DAILY
COMMUNITY

## 2024-06-28 RX ORDER — IBUPROFEN 800 MG/1
400 TABLET ORAL EVERY 6 HOURS PRN
Status: DISCONTINUED | OUTPATIENT
Start: 2024-06-28 | End: 2024-06-28

## 2024-06-28 RX ORDER — CEPHALEXIN 500 MG/1
500 CAPSULE ORAL 4 TIMES DAILY
Status: ON HOLD | COMMUNITY
End: 2024-07-04

## 2024-06-28 RX ORDER — ONDANSETRON 2 MG/ML
4 INJECTION INTRAMUSCULAR; INTRAVENOUS EVERY 4 HOURS PRN
Status: DISCONTINUED | OUTPATIENT
Start: 2024-06-28 | End: 2024-07-04 | Stop reason: HOSPADM

## 2024-06-28 RX ORDER — MORPHINE SULFATE 4 MG/ML
4 INJECTION INTRAVENOUS
Status: DISCONTINUED | OUTPATIENT
Start: 2024-06-28 | End: 2024-06-28

## 2024-06-28 RX ORDER — ALBUTEROL SULFATE 90 UG/1
2 AEROSOL, METERED RESPIRATORY (INHALATION) EVERY 4 HOURS PRN
Status: DISCONTINUED | OUTPATIENT
Start: 2024-06-28 | End: 2024-07-04 | Stop reason: HOSPADM

## 2024-06-28 RX ORDER — DIPHENOXYLATE HYDROCHLORIDE AND ATROPINE SULFATE 2.5; .025 MG/1; MG/1
1 TABLET ORAL 4 TIMES DAILY PRN
COMMUNITY

## 2024-06-28 RX ORDER — OXYCODONE HYDROCHLORIDE 5 MG/1
5 TABLET ORAL ONCE
Status: COMPLETED | OUTPATIENT
Start: 2024-06-28 | End: 2024-06-28

## 2024-06-28 RX ORDER — WARFARIN SODIUM 5 MG/1
5-7.5 TABLET ORAL DAILY
COMMUNITY

## 2024-06-28 RX ORDER — ONDANSETRON 4 MG/1
4 TABLET, ORALLY DISINTEGRATING ORAL EVERY 4 HOURS PRN
Status: DISCONTINUED | OUTPATIENT
Start: 2024-06-28 | End: 2024-07-04 | Stop reason: HOSPADM

## 2024-06-28 RX ORDER — DICYCLOMINE HYDROCHLORIDE 10 MG/1
20 CAPSULE ORAL 4 TIMES DAILY PRN
COMMUNITY

## 2024-06-28 RX ORDER — POLYETHYLENE GLYCOL 3350 17 G/17G
1 POWDER, FOR SOLUTION ORAL
Status: DISCONTINUED | OUTPATIENT
Start: 2024-06-28 | End: 2024-07-04 | Stop reason: HOSPADM

## 2024-06-28 RX ORDER — LISINOPRIL 10 MG/1
10 TABLET ORAL DAILY
COMMUNITY

## 2024-06-28 RX ORDER — FINASTERIDE 5 MG/1
5 TABLET, FILM COATED ORAL DAILY
Status: DISCONTINUED | OUTPATIENT
Start: 2024-06-28 | End: 2024-07-04 | Stop reason: HOSPADM

## 2024-06-28 RX ORDER — OXYCODONE HYDROCHLORIDE 5 MG/1
5 TABLET ORAL
Status: DISCONTINUED | OUTPATIENT
Start: 2024-06-28 | End: 2024-07-01

## 2024-06-28 RX ORDER — HYDRALAZINE HYDROCHLORIDE 20 MG/ML
10 INJECTION INTRAMUSCULAR; INTRAVENOUS EVERY 4 HOURS PRN
Status: DISCONTINUED | OUTPATIENT
Start: 2024-06-28 | End: 2024-07-04 | Stop reason: HOSPADM

## 2024-06-28 RX ORDER — LOPERAMIDE HYDROCHLORIDE 2 MG/1
2 CAPSULE ORAL 4 TIMES DAILY PRN
COMMUNITY

## 2024-06-28 RX ORDER — MESALAMINE 1.2 G/1
4.8 TABLET, DELAYED RELEASE ORAL
Status: DISCONTINUED | OUTPATIENT
Start: 2024-06-29 | End: 2024-07-04 | Stop reason: HOSPADM

## 2024-06-28 RX ORDER — ACETAMINOPHEN 325 MG/1
650 TABLET ORAL EVERY 6 HOURS PRN
Status: DISCONTINUED | OUTPATIENT
Start: 2024-06-28 | End: 2024-06-30

## 2024-06-28 RX ORDER — OXYCODONE HYDROCHLORIDE 10 MG/1
10 TABLET ORAL
Status: DISCONTINUED | OUTPATIENT
Start: 2024-06-28 | End: 2024-07-01

## 2024-06-28 RX ORDER — HYDROMORPHONE HYDROCHLORIDE 1 MG/ML
0.5 INJECTION, SOLUTION INTRAMUSCULAR; INTRAVENOUS; SUBCUTANEOUS ONCE
Status: COMPLETED | OUTPATIENT
Start: 2024-06-28 | End: 2024-06-28

## 2024-06-28 RX ORDER — TAMSULOSIN HYDROCHLORIDE 0.4 MG/1
0.4 CAPSULE ORAL DAILY
Status: DISCONTINUED | OUTPATIENT
Start: 2024-06-28 | End: 2024-07-04 | Stop reason: HOSPADM

## 2024-06-28 RX ORDER — AMOXICILLIN 250 MG
2 CAPSULE ORAL EVERY EVENING
Status: DISCONTINUED | OUTPATIENT
Start: 2024-06-28 | End: 2024-07-04 | Stop reason: HOSPADM

## 2024-06-28 RX ORDER — DEXTROSE MONOHYDRATE 25 G/50ML
25 INJECTION, SOLUTION INTRAVENOUS
Status: DISCONTINUED | OUTPATIENT
Start: 2024-06-28 | End: 2024-06-29

## 2024-06-28 RX ORDER — MESALAMINE 1.2 G/1
4.8 TABLET, DELAYED RELEASE ORAL
COMMUNITY

## 2024-06-28 RX ORDER — MORPHINE SULFATE 4 MG/ML
4 INJECTION INTRAVENOUS ONCE
Status: COMPLETED | OUTPATIENT
Start: 2024-06-28 | End: 2024-06-28

## 2024-06-28 RX ORDER — WARFARIN SODIUM 7.5 MG/1
7.5 TABLET ORAL
Status: COMPLETED | OUTPATIENT
Start: 2024-06-28 | End: 2024-06-28

## 2024-06-28 RX ORDER — MESALAMINE 0.38 G/1
4 CAPSULE, EXTENDED RELEASE ORAL DAILY
Status: DISCONTINUED | OUTPATIENT
Start: 2024-06-28 | End: 2024-06-28

## 2024-06-28 RX ORDER — KETOROLAC TROMETHAMINE 15 MG/ML
15 INJECTION, SOLUTION INTRAMUSCULAR; INTRAVENOUS EVERY 6 HOURS PRN
Status: DISCONTINUED | OUTPATIENT
Start: 2024-06-28 | End: 2024-06-30

## 2024-06-28 RX ADMIN — OXYCODONE HYDROCHLORIDE 10 MG: 10 TABLET ORAL at 11:05

## 2024-06-28 RX ADMIN — KETOROLAC TROMETHAMINE 15 MG: 15 INJECTION, SOLUTION INTRAMUSCULAR; INTRAVENOUS at 14:22

## 2024-06-28 RX ADMIN — WARFARIN SODIUM 7.5 MG: 7.5 TABLET ORAL at 18:10

## 2024-06-28 RX ADMIN — INSULIN HUMAN 5 UNITS: 100 INJECTION, SOLUTION PARENTERAL at 18:15

## 2024-06-28 RX ADMIN — SODIUM CHLORIDE: 9 INJECTION, SOLUTION INTRAVENOUS at 05:45

## 2024-06-28 RX ADMIN — CEFTRIAXONE SODIUM 1000 MG: 10 INJECTION, POWDER, FOR SOLUTION INTRAVENOUS at 12:06

## 2024-06-28 RX ADMIN — KETOROLAC TROMETHAMINE 15 MG: 15 INJECTION, SOLUTION INTRAMUSCULAR; INTRAVENOUS at 20:21

## 2024-06-28 RX ADMIN — MORPHINE SULFATE 4 MG: 4 INJECTION INTRAVENOUS at 02:40

## 2024-06-28 RX ADMIN — SODIUM CHLORIDE 1000 ML: 9 INJECTION, SOLUTION INTRAVENOUS at 06:37

## 2024-06-28 RX ADMIN — FENTANYL CITRATE 50 MCG: 50 INJECTION, SOLUTION INTRAMUSCULAR; INTRAVENOUS at 04:55

## 2024-06-28 RX ADMIN — LISINOPRIL 10 MG: 10 TABLET ORAL at 12:10

## 2024-06-28 RX ADMIN — OXYCODONE HYDROCHLORIDE 10 MG: 10 TABLET ORAL at 06:42

## 2024-06-28 RX ADMIN — INSULIN HUMAN 2 UNITS: 100 INJECTION, SOLUTION PARENTERAL at 07:50

## 2024-06-28 RX ADMIN — SODIUM CHLORIDE: 9 INJECTION, SOLUTION INTRAVENOUS at 20:29

## 2024-06-28 RX ADMIN — OXYCODONE 5 MG: 5 TABLET ORAL at 04:00

## 2024-06-28 RX ADMIN — TAMSULOSIN HYDROCHLORIDE 0.4 MG: 0.4 CAPSULE ORAL at 06:20

## 2024-06-28 RX ADMIN — INSULIN HUMAN 1 UNITS: 100 INJECTION, SOLUTION PARENTERAL at 12:04

## 2024-06-28 RX ADMIN — HYDROMORPHONE HYDROCHLORIDE 0.5 MG: 1 INJECTION, SOLUTION INTRAMUSCULAR; INTRAVENOUS; SUBCUTANEOUS at 06:20

## 2024-06-28 RX ADMIN — OXYCODONE HYDROCHLORIDE 10 MG: 10 TABLET ORAL at 21:59

## 2024-06-28 RX ADMIN — IOHEXOL 100 ML: 350 INJECTION, SOLUTION INTRAVENOUS at 10:15

## 2024-06-28 RX ADMIN — FENTANYL CITRATE 50 MCG: 50 INJECTION, SOLUTION INTRAMUSCULAR; INTRAVENOUS at 03:03

## 2024-06-28 RX ADMIN — MORPHINE SULFATE 4 MG: 4 INJECTION INTRAVENOUS at 07:55

## 2024-06-28 RX ADMIN — FINASTERIDE 5 MG: 5 TABLET, FILM COATED ORAL at 06:20

## 2024-06-28 RX ADMIN — ONDANSETRON 4 MG: 2 INJECTION INTRAMUSCULAR; INTRAVENOUS at 14:18

## 2024-06-28 SDOH — ECONOMIC STABILITY: TRANSPORTATION INSECURITY
IN THE PAST 12 MONTHS, HAS THE LACK OF TRANSPORTATION KEPT YOU FROM MEDICAL APPOINTMENTS OR FROM GETTING MEDICATIONS?: NO

## 2024-06-28 SDOH — ECONOMIC STABILITY: TRANSPORTATION INSECURITY
IN THE PAST 12 MONTHS, HAS LACK OF RELIABLE TRANSPORTATION KEPT YOU FROM MEDICAL APPOINTMENTS, MEETINGS, WORK OR FROM GETTING THINGS NEEDED FOR DAILY LIVING?: NO

## 2024-06-28 ASSESSMENT — LIFESTYLE VARIABLES
ALCOHOL_USE: NO
AVERAGE NUMBER OF DAYS PER WEEK YOU HAVE A DRINK CONTAINING ALCOHOL: 0
HAVE YOU EVER FELT YOU SHOULD CUT DOWN ON YOUR DRINKING: NO
EVER HAD A DRINK FIRST THING IN THE MORNING TO STEADY YOUR NERVES TO GET RID OF A HANGOVER: NO
TOTAL SCORE: 0
CONSUMPTION TOTAL: NEGATIVE
TOTAL SCORE: 0
ON A TYPICAL DAY WHEN YOU DRINK ALCOHOL HOW MANY DRINKS DO YOU HAVE: 0
SUBSTANCE_ABUSE: 0
TOTAL SCORE: 0
HOW MANY TIMES IN THE PAST YEAR HAVE YOU HAD 5 OR MORE DRINKS IN A DAY: 0
HAVE PEOPLE ANNOYED YOU BY CRITICIZING YOUR DRINKING: NO
EVER FELT BAD OR GUILTY ABOUT YOUR DRINKING: NO

## 2024-06-28 ASSESSMENT — COGNITIVE AND FUNCTIONAL STATUS - GENERAL
SUGGESTED CMS G CODE MODIFIER MOBILITY: CL
CLIMB 3 TO 5 STEPS WITH RAILING: A LOT
SUGGESTED CMS G CODE MODIFIER DAILY ACTIVITY: CK
WALKING IN HOSPITAL ROOM: A LOT
MOVING TO AND FROM BED TO CHAIR: A LITTLE
TOILETING: A LITTLE
HELP NEEDED FOR BATHING: A LOT
DRESSING REGULAR LOWER BODY CLOTHING: A LOT
STANDING UP FROM CHAIR USING ARMS: A LOT
TURNING FROM BACK TO SIDE WHILE IN FLAT BAD: A LITTLE
MOVING FROM LYING ON BACK TO SITTING ON SIDE OF FLAT BED: A LOT
MOBILITY SCORE: 14
DAILY ACTIVITIY SCORE: 18
DRESSING REGULAR UPPER BODY CLOTHING: A LITTLE

## 2024-06-28 ASSESSMENT — PATIENT HEALTH QUESTIONNAIRE - PHQ9
2. FEELING DOWN, DEPRESSED, IRRITABLE, OR HOPELESS: NOT AT ALL
SUM OF ALL RESPONSES TO PHQ9 QUESTIONS 1 AND 2: 0
1. LITTLE INTEREST OR PLEASURE IN DOING THINGS: NOT AT ALL

## 2024-06-28 ASSESSMENT — SOCIAL DETERMINANTS OF HEALTH (SDOH)
WITHIN THE PAST 12 MONTHS, YOU WORRIED THAT YOUR FOOD WOULD RUN OUT BEFORE YOU GOT THE MONEY TO BUY MORE: NEVER TRUE
WITHIN THE PAST 12 MONTHS, THE FOOD YOU BOUGHT JUST DIDN'T LAST AND YOU DIDN'T HAVE MONEY TO GET MORE: NEVER TRUE
WITHIN THE LAST YEAR, HAVE TO BEEN RAPED OR FORCED TO HAVE ANY KIND OF SEXUAL ACTIVITY BY YOUR PARTNER OR EX-PARTNER?: NO
WITHIN THE LAST YEAR, HAVE YOU BEEN AFRAID OF YOUR PARTNER OR EX-PARTNER?: NO
WITHIN THE LAST YEAR, HAVE YOU BEEN HUMILIATED OR EMOTIONALLY ABUSED IN OTHER WAYS BY YOUR PARTNER OR EX-PARTNER?: NO
WITHIN THE LAST YEAR, HAVE YOU BEEN KICKED, HIT, SLAPPED, OR OTHERWISE PHYSICALLY HURT BY YOUR PARTNER OR EX-PARTNER?: NO
IN THE PAST 12 MONTHS, HAS THE ELECTRIC, GAS, OIL, OR WATER COMPANY THREATENED TO SHUT OFF SERVICE IN YOUR HOME?: NO

## 2024-06-28 ASSESSMENT — ENCOUNTER SYMPTOMS
FOCAL WEAKNESS: 0
FLANK PAIN: 0
PALPITATIONS: 0
HEADACHES: 0
HEMOPTYSIS: 0
NECK PAIN: 0
SPUTUM PRODUCTION: 0
POLYDIPSIA: 0
BLURRED VISION: 0
VOMITING: 0
PHOTOPHOBIA: 0
DOUBLE VISION: 0
HALLUCINATIONS: 0
BRUISES/BLEEDS EASILY: 0
ORTHOPNEA: 0
NERVOUS/ANXIOUS: 0
CHILLS: 0
COUGH: 0
WEIGHT LOSS: 0
NAUSEA: 0
HEARTBURN: 0
FEVER: 0
TREMORS: 0
BACK PAIN: 0
SPEECH CHANGE: 0

## 2024-06-28 ASSESSMENT — PAIN DESCRIPTION - PAIN TYPE
TYPE: ACUTE PAIN

## 2024-06-29 ENCOUNTER — APPOINTMENT (OUTPATIENT)
Dept: RADIOLOGY | Facility: MEDICAL CENTER | Age: 74
DRG: 711 | End: 2024-06-29
Attending: STUDENT IN AN ORGANIZED HEALTH CARE EDUCATION/TRAINING PROGRAM
Payer: COMMERCIAL

## 2024-06-29 PROBLEM — I82.409 RECURRENT DEEP VEIN THROMBOSIS (DVT) (HCC): Status: ACTIVE | Noted: 2018-01-30

## 2024-06-29 PROBLEM — N45.3 EPIDIDYMOORCHITIS: Status: ACTIVE | Noted: 2024-06-28

## 2024-06-29 PROBLEM — E11.65 TYPE 2 DIABETES MELLITUS WITH HYPERGLYCEMIA, WITHOUT LONG-TERM CURRENT USE OF INSULIN (HCC): Status: ACTIVE | Noted: 2017-11-12

## 2024-06-29 PROBLEM — K40.90 INGUINAL HERNIA, RIGHT: Status: ACTIVE | Noted: 2024-06-29

## 2024-06-29 PROBLEM — L97.909 VENOUS STASIS ULCER (HCC): Status: ACTIVE | Noted: 2024-06-29

## 2024-06-29 PROBLEM — I83.009 VENOUS STASIS ULCER (HCC): Status: ACTIVE | Noted: 2024-06-29

## 2024-06-29 LAB
ALBUMIN SERPL BCP-MCNC: 3 G/DL (ref 3.2–4.9)
ALBUMIN/GLOB SERPL: 1.1 G/DL
ALP SERPL-CCNC: 97 U/L (ref 30–99)
ALT SERPL-CCNC: 10 U/L (ref 2–50)
ANION GAP SERPL CALC-SCNC: 7 MMOL/L (ref 7–16)
AST SERPL-CCNC: 9 U/L (ref 12–45)
BASOPHILS # BLD AUTO: 0.1 % (ref 0–1.8)
BASOPHILS # BLD: 0.01 K/UL (ref 0–0.12)
BILIRUB SERPL-MCNC: 0.3 MG/DL (ref 0.1–1.5)
BUN SERPL-MCNC: 9 MG/DL (ref 8–22)
CALCIUM ALBUM COR SERPL-MCNC: 8.2 MG/DL (ref 8.5–10.5)
CALCIUM SERPL-MCNC: 7.4 MG/DL (ref 8.5–10.5)
CHLORIDE SERPL-SCNC: 105 MMOL/L (ref 96–112)
CO2 SERPL-SCNC: 27 MMOL/L (ref 20–33)
CREAT SERPL-MCNC: 0.46 MG/DL (ref 0.5–1.4)
EOSINOPHIL # BLD AUTO: 0.73 K/UL (ref 0–0.51)
EOSINOPHIL NFR BLD: 7.7 % (ref 0–6.9)
ERYTHROCYTE [DISTWIDTH] IN BLOOD BY AUTOMATED COUNT: 50.5 FL (ref 35.9–50)
EST. AVERAGE GLUCOSE BLD GHB EST-MCNC: 177 MG/DL
GFR SERPLBLD CREATININE-BSD FMLA CKD-EPI: 111 ML/MIN/1.73 M 2
GLOBULIN SER CALC-MCNC: 2.8 G/DL (ref 1.9–3.5)
GLUCOSE BLD STRIP.AUTO-MCNC: 185 MG/DL (ref 65–99)
GLUCOSE BLD STRIP.AUTO-MCNC: 200 MG/DL (ref 65–99)
GLUCOSE BLD STRIP.AUTO-MCNC: 205 MG/DL (ref 65–99)
GLUCOSE BLD STRIP.AUTO-MCNC: 225 MG/DL (ref 65–99)
GLUCOSE BLD STRIP.AUTO-MCNC: 264 MG/DL (ref 65–99)
GLUCOSE BLD STRIP.AUTO-MCNC: 272 MG/DL (ref 65–99)
GLUCOSE SERPL-MCNC: 262 MG/DL (ref 65–99)
HBA1C MFR BLD: 7.8 % (ref 4–5.6)
HCT VFR BLD AUTO: 35.5 % (ref 42–52)
HGB BLD-MCNC: 11.5 G/DL (ref 14–18)
IMM GRANULOCYTES # BLD AUTO: 0.02 K/UL (ref 0–0.11)
IMM GRANULOCYTES NFR BLD AUTO: 0.2 % (ref 0–0.9)
INR PPP: 2.34 (ref 0.87–1.13)
LYMPHOCYTES # BLD AUTO: 0.88 K/UL (ref 1–4.8)
LYMPHOCYTES NFR BLD: 9.3 % (ref 22–41)
MCH RBC QN AUTO: 31.8 PG (ref 27–33)
MCHC RBC AUTO-ENTMCNC: 32.4 G/DL (ref 32.3–36.5)
MCV RBC AUTO: 98.1 FL (ref 81.4–97.8)
MONOCYTES # BLD AUTO: 0.84 K/UL (ref 0–0.85)
MONOCYTES NFR BLD AUTO: 8.9 % (ref 0–13.4)
NEUTROPHILS # BLD AUTO: 6.98 K/UL (ref 1.82–7.42)
NEUTROPHILS NFR BLD: 73.8 % (ref 44–72)
NRBC # BLD AUTO: 0 K/UL
NRBC BLD-RTO: 0 /100 WBC (ref 0–0.2)
PLATELET # BLD AUTO: 184 K/UL (ref 164–446)
PMV BLD AUTO: 9.1 FL (ref 9–12.9)
POTASSIUM SERPL-SCNC: 3.7 MMOL/L (ref 3.6–5.5)
PROT SERPL-MCNC: 5.8 G/DL (ref 6–8.2)
PROTHROMBIN TIME: 26 SEC (ref 12–14.6)
RBC # BLD AUTO: 3.62 M/UL (ref 4.7–6.1)
SCCMEC + MECA PNL NOSE NAA+PROBE: NEGATIVE
SODIUM SERPL-SCNC: 139 MMOL/L (ref 135–145)
TSH SERPL DL<=0.005 MIU/L-ACNC: 0.27 UIU/ML (ref 0.38–5.33)
VIT B12 SERPL-MCNC: 523 PG/ML (ref 211–911)
WBC # BLD AUTO: 9.5 K/UL (ref 4.8–10.8)

## 2024-06-29 PROCEDURE — 770006 HCHG ROOM/CARE - MED/SURG/GYN SEMI*

## 2024-06-29 PROCEDURE — A9270 NON-COVERED ITEM OR SERVICE: HCPCS

## 2024-06-29 PROCEDURE — 85610 PROTHROMBIN TIME: CPT

## 2024-06-29 PROCEDURE — 87491 CHLMYD TRACH DNA AMP PROBE: CPT

## 2024-06-29 PROCEDURE — 80053 COMPREHEN METABOLIC PANEL: CPT

## 2024-06-29 PROCEDURE — A9270 NON-COVERED ITEM OR SERVICE: HCPCS | Performed by: INTERNAL MEDICINE

## 2024-06-29 PROCEDURE — 99233 SBSQ HOSP IP/OBS HIGH 50: CPT | Performed by: FAMILY MEDICINE

## 2024-06-29 PROCEDURE — 87591 N.GONORRHOEAE DNA AMP PROB: CPT

## 2024-06-29 PROCEDURE — 700102 HCHG RX REV CODE 250 W/ 637 OVERRIDE(OP)

## 2024-06-29 PROCEDURE — 82607 VITAMIN B-12: CPT

## 2024-06-29 PROCEDURE — 87641 MR-STAPH DNA AMP PROBE: CPT

## 2024-06-29 PROCEDURE — 36415 COLL VENOUS BLD VENIPUNCTURE: CPT

## 2024-06-29 PROCEDURE — 700102 HCHG RX REV CODE 250 W/ 637 OVERRIDE(OP): Performed by: FAMILY MEDICINE

## 2024-06-29 PROCEDURE — 97162 PT EVAL MOD COMPLEX 30 MIN: CPT

## 2024-06-29 PROCEDURE — 700111 HCHG RX REV CODE 636 W/ 250 OVERRIDE (IP): Mod: JZ

## 2024-06-29 PROCEDURE — 82962 GLUCOSE BLOOD TEST: CPT | Mod: 91

## 2024-06-29 PROCEDURE — 83036 HEMOGLOBIN GLYCOSYLATED A1C: CPT

## 2024-06-29 PROCEDURE — 700111 HCHG RX REV CODE 636 W/ 250 OVERRIDE (IP): Mod: JZ | Performed by: FAMILY MEDICINE

## 2024-06-29 PROCEDURE — 76870 US EXAM SCROTUM: CPT

## 2024-06-29 PROCEDURE — 84443 ASSAY THYROID STIM HORMONE: CPT

## 2024-06-29 PROCEDURE — 85025 COMPLETE CBC W/AUTO DIFF WBC: CPT

## 2024-06-29 PROCEDURE — 700101 HCHG RX REV CODE 250

## 2024-06-29 PROCEDURE — 700105 HCHG RX REV CODE 258: Performed by: FAMILY MEDICINE

## 2024-06-29 PROCEDURE — 700102 HCHG RX REV CODE 250 W/ 637 OVERRIDE(OP): Performed by: INTERNAL MEDICINE

## 2024-06-29 PROCEDURE — 700105 HCHG RX REV CODE 258: Performed by: INTERNAL MEDICINE

## 2024-06-29 RX ORDER — HYDROMORPHONE HYDROCHLORIDE 1 MG/ML
1 INJECTION, SOLUTION INTRAMUSCULAR; INTRAVENOUS; SUBCUTANEOUS ONCE
Status: COMPLETED | OUTPATIENT
Start: 2024-06-29 | End: 2024-06-29

## 2024-06-29 RX ORDER — HYDROMORPHONE HYDROCHLORIDE 1 MG/ML
1 INJECTION, SOLUTION INTRAMUSCULAR; INTRAVENOUS; SUBCUTANEOUS
Status: DISCONTINUED | OUTPATIENT
Start: 2024-06-29 | End: 2024-06-29

## 2024-06-29 RX ORDER — HYDROMORPHONE HYDROCHLORIDE 2 MG/ML
2 INJECTION, SOLUTION INTRAMUSCULAR; INTRAVENOUS; SUBCUTANEOUS
Status: DISCONTINUED | OUTPATIENT
Start: 2024-06-29 | End: 2024-07-01

## 2024-06-29 RX ORDER — LABETALOL HYDROCHLORIDE 5 MG/ML
10 INJECTION, SOLUTION INTRAVENOUS EVERY 6 HOURS PRN
Status: DISCONTINUED | OUTPATIENT
Start: 2024-06-29 | End: 2024-07-04 | Stop reason: HOSPADM

## 2024-06-29 RX ORDER — DEXTROSE MONOHYDRATE 25 G/50ML
25 INJECTION, SOLUTION INTRAVENOUS
Status: DISCONTINUED | OUTPATIENT
Start: 2024-06-29 | End: 2024-07-03

## 2024-06-29 RX ADMIN — KETOROLAC TROMETHAMINE 15 MG: 15 INJECTION, SOLUTION INTRAMUSCULAR; INTRAVENOUS at 02:54

## 2024-06-29 RX ADMIN — INSULIN HUMAN 6 UNITS: 100 INJECTION, SOLUTION PARENTERAL at 14:04

## 2024-06-29 RX ADMIN — PIPERACILLIN AND TAZOBACTAM 3.38 G: 3; .375 INJECTION, POWDER, FOR SOLUTION INTRAVENOUS at 18:27

## 2024-06-29 RX ADMIN — TAMSULOSIN HYDROCHLORIDE 0.4 MG: 0.4 CAPSULE ORAL at 04:40

## 2024-06-29 RX ADMIN — OXYCODONE HYDROCHLORIDE 10 MG: 10 TABLET ORAL at 08:04

## 2024-06-29 RX ADMIN — INSULIN HUMAN 2 UNITS: 100 INJECTION, SOLUTION PARENTERAL at 21:17

## 2024-06-29 RX ADMIN — SODIUM CHLORIDE: 9 INJECTION, SOLUTION INTRAVENOUS at 07:55

## 2024-06-29 RX ADMIN — HYDROMORPHONE HYDROCHLORIDE 1 MG: 1 INJECTION, SOLUTION INTRAMUSCULAR; INTRAVENOUS; SUBCUTANEOUS at 16:23

## 2024-06-29 RX ADMIN — INSULIN HUMAN 2 UNITS: 100 INJECTION, SOLUTION PARENTERAL at 01:14

## 2024-06-29 RX ADMIN — LISINOPRIL 10 MG: 10 TABLET ORAL at 04:40

## 2024-06-29 RX ADMIN — HYDROMORPHONE HYDROCHLORIDE 2 MG: 2 INJECTION, SOLUTION INTRAMUSCULAR; INTRAVENOUS; SUBCUTANEOUS at 23:22

## 2024-06-29 RX ADMIN — SENNOSIDES AND DOCUSATE SODIUM 2 TABLET: 50; 8.6 TABLET ORAL at 18:27

## 2024-06-29 RX ADMIN — INSULIN HUMAN 4 UNITS: 100 INJECTION, SOLUTION PARENTERAL at 10:10

## 2024-06-29 RX ADMIN — CEFTRIAXONE SODIUM 1000 MG: 10 INJECTION, POWDER, FOR SOLUTION INTRAVENOUS at 04:41

## 2024-06-29 RX ADMIN — MESALAMINE 4.8 G: 1.2 TABLET, DELAYED RELEASE ORAL at 10:09

## 2024-06-29 RX ADMIN — FINASTERIDE 5 MG: 5 TABLET, FILM COATED ORAL at 04:40

## 2024-06-29 RX ADMIN — HYDROMORPHONE HYDROCHLORIDE 1 MG: 1 INJECTION, SOLUTION INTRAMUSCULAR; INTRAVENOUS; SUBCUTANEOUS at 15:20

## 2024-06-29 RX ADMIN — INSULIN HUMAN 4 UNITS: 100 INJECTION, SOLUTION PARENTERAL at 18:19

## 2024-06-29 RX ADMIN — OXYCODONE HYDROCHLORIDE 10 MG: 10 TABLET ORAL at 01:03

## 2024-06-29 RX ADMIN — INSULIN HUMAN 3 UNITS: 100 INJECTION, SOLUTION PARENTERAL at 04:44

## 2024-06-29 RX ADMIN — HYDROMORPHONE HYDROCHLORIDE 2 MG: 2 INJECTION, SOLUTION INTRAMUSCULAR; INTRAVENOUS; SUBCUTANEOUS at 18:25

## 2024-06-29 RX ADMIN — PIPERACILLIN AND TAZOBACTAM 3.38 G: 3; .375 INJECTION, POWDER, FOR SOLUTION INTRAVENOUS at 21:17

## 2024-06-29 ASSESSMENT — PAIN DESCRIPTION - PAIN TYPE
TYPE: ACUTE PAIN

## 2024-06-29 ASSESSMENT — ENCOUNTER SYMPTOMS
FLANK PAIN: 0
NERVOUS/ANXIOUS: 0
VOMITING: 0
MYALGIAS: 0
DIARRHEA: 0
SENSORY CHANGE: 0
FOCAL WEAKNESS: 0
HEADACHES: 0
DIZZINESS: 0
SORE THROAT: 0
NECK PAIN: 0
HEARTBURN: 0
ABDOMINAL PAIN: 0
DIAPHORESIS: 0
NAUSEA: 0
CHILLS: 0
BLURRED VISION: 0
WHEEZING: 0
BACK PAIN: 0
WEAKNESS: 1
COUGH: 0
PALPITATIONS: 0
FEVER: 0
SHORTNESS OF BREATH: 0
SPEECH CHANGE: 0

## 2024-06-29 ASSESSMENT — COGNITIVE AND FUNCTIONAL STATUS - GENERAL
SUGGESTED CMS G CODE MODIFIER MOBILITY: CH
MOBILITY SCORE: 24

## 2024-06-29 ASSESSMENT — GAIT ASSESSMENTS
GAIT LEVEL OF ASSIST: SUPERVISED
DISTANCE (FEET): 200

## 2024-06-29 ASSESSMENT — PATIENT HEALTH QUESTIONNAIRE - PHQ9
1. LITTLE INTEREST OR PLEASURE IN DOING THINGS: NOT AT ALL
2. FEELING DOWN, DEPRESSED, IRRITABLE, OR HOPELESS: NOT AT ALL
SUM OF ALL RESPONSES TO PHQ9 QUESTIONS 1 AND 2: 0

## 2024-06-30 ENCOUNTER — APPOINTMENT (OUTPATIENT)
Dept: RADIOLOGY | Facility: MEDICAL CENTER | Age: 74
DRG: 711 | End: 2024-06-30
Attending: FAMILY MEDICINE
Payer: COMMERCIAL

## 2024-06-30 PROBLEM — J96.01 ACUTE RESPIRATORY FAILURE WITH HYPOXIA (HCC): Status: ACTIVE | Noted: 2024-06-30

## 2024-06-30 PROBLEM — G93.40 ACUTE ENCEPHALOPATHY: Status: ACTIVE | Noted: 2024-06-30

## 2024-06-30 LAB
ANION GAP SERPL CALC-SCNC: 10 MMOL/L (ref 7–16)
BACTERIA UR CULT: NORMAL
BUN SERPL-MCNC: 8 MG/DL (ref 8–22)
C TRACH DNA SPEC QL NAA+PROBE: NEGATIVE
CALCIUM SERPL-MCNC: 7.8 MG/DL (ref 8.5–10.5)
CHLORIDE SERPL-SCNC: 103 MMOL/L (ref 96–112)
CO2 SERPL-SCNC: 26 MMOL/L (ref 20–33)
CREAT SERPL-MCNC: 0.48 MG/DL (ref 0.5–1.4)
ERYTHROCYTE [DISTWIDTH] IN BLOOD BY AUTOMATED COUNT: 52.6 FL (ref 35.9–50)
GFR SERPLBLD CREATININE-BSD FMLA CKD-EPI: 110 ML/MIN/1.73 M 2
GLUCOSE BLD STRIP.AUTO-MCNC: 195 MG/DL (ref 65–99)
GLUCOSE BLD STRIP.AUTO-MCNC: 234 MG/DL (ref 65–99)
GLUCOSE BLD STRIP.AUTO-MCNC: 259 MG/DL (ref 65–99)
GLUCOSE BLD STRIP.AUTO-MCNC: 275 MG/DL (ref 65–99)
GLUCOSE SERPL-MCNC: 251 MG/DL (ref 65–99)
HCT VFR BLD AUTO: 38.7 % (ref 42–52)
HGB BLD-MCNC: 12.1 G/DL (ref 14–18)
INR PPP: 1.65 (ref 0.87–1.13)
LACTATE SERPL-SCNC: 0.6 MMOL/L (ref 0.5–2)
LACTATE SERPL-SCNC: 0.6 MMOL/L (ref 0.5–2)
LACTATE SERPL-SCNC: 0.7 MMOL/L (ref 0.5–2)
LACTATE SERPL-SCNC: 1.4 MMOL/L (ref 0.5–2)
MCH RBC QN AUTO: 31.8 PG (ref 27–33)
MCHC RBC AUTO-ENTMCNC: 31.3 G/DL (ref 32.3–36.5)
MCV RBC AUTO: 101.8 FL (ref 81.4–97.8)
N GONORRHOEA DNA SPEC QL NAA+PROBE: NEGATIVE
NT-PROBNP SERPL IA-MCNC: 937 PG/ML (ref 0–125)
PLATELET # BLD AUTO: 183 K/UL (ref 164–446)
PMV BLD AUTO: 9 FL (ref 9–12.9)
POTASSIUM SERPL-SCNC: 4.1 MMOL/L (ref 3.6–5.5)
PROTHROMBIN TIME: 19.8 SEC (ref 12–14.6)
RBC # BLD AUTO: 3.8 M/UL (ref 4.7–6.1)
SIGNIFICANT IND 70042: NORMAL
SITE SITE: NORMAL
SODIUM SERPL-SCNC: 139 MMOL/L (ref 135–145)
SOURCE SOURCE: NORMAL
SPECIMEN SOURCE: NORMAL
T3FREE SERPL-MCNC: 2.06 PG/ML (ref 2–4.4)
T4 FREE SERPL-MCNC: 1.13 NG/DL (ref 0.93–1.7)
WBC # BLD AUTO: 11.1 K/UL (ref 4.8–10.8)

## 2024-06-30 PROCEDURE — 71045 X-RAY EXAM CHEST 1 VIEW: CPT

## 2024-06-30 PROCEDURE — A9270 NON-COVERED ITEM OR SERVICE: HCPCS | Performed by: INTERNAL MEDICINE

## 2024-06-30 PROCEDURE — 85610 PROTHROMBIN TIME: CPT

## 2024-06-30 PROCEDURE — 700102 HCHG RX REV CODE 250 W/ 637 OVERRIDE(OP): Performed by: FAMILY MEDICINE

## 2024-06-30 PROCEDURE — 84439 ASSAY OF FREE THYROXINE: CPT

## 2024-06-30 PROCEDURE — 80048 BASIC METABOLIC PNL TOTAL CA: CPT

## 2024-06-30 PROCEDURE — 700111 HCHG RX REV CODE 636 W/ 250 OVERRIDE (IP): Mod: JZ | Performed by: FAMILY MEDICINE

## 2024-06-30 PROCEDURE — 99223 1ST HOSP IP/OBS HIGH 75: CPT | Performed by: INTERNAL MEDICINE

## 2024-06-30 PROCEDURE — A9270 NON-COVERED ITEM OR SERVICE: HCPCS | Performed by: FAMILY MEDICINE

## 2024-06-30 PROCEDURE — 51798 US URINE CAPACITY MEASURE: CPT

## 2024-06-30 PROCEDURE — 84481 FREE ASSAY (FT-3): CPT

## 2024-06-30 PROCEDURE — 700102 HCHG RX REV CODE 250 W/ 637 OVERRIDE(OP): Performed by: INTERNAL MEDICINE

## 2024-06-30 PROCEDURE — 770006 HCHG ROOM/CARE - MED/SURG/GYN SEMI*

## 2024-06-30 PROCEDURE — 700102 HCHG RX REV CODE 250 W/ 637 OVERRIDE(OP)

## 2024-06-30 PROCEDURE — 83880 ASSAY OF NATRIURETIC PEPTIDE: CPT

## 2024-06-30 PROCEDURE — 85027 COMPLETE CBC AUTOMATED: CPT

## 2024-06-30 PROCEDURE — 83605 ASSAY OF LACTIC ACID: CPT | Mod: 91

## 2024-06-30 PROCEDURE — 97602 WOUND(S) CARE NON-SELECTIVE: CPT

## 2024-06-30 PROCEDURE — 99233 SBSQ HOSP IP/OBS HIGH 50: CPT | Performed by: FAMILY MEDICINE

## 2024-06-30 PROCEDURE — 82962 GLUCOSE BLOOD TEST: CPT | Mod: 91

## 2024-06-30 PROCEDURE — A9270 NON-COVERED ITEM OR SERVICE: HCPCS

## 2024-06-30 PROCEDURE — 700105 HCHG RX REV CODE 258: Performed by: FAMILY MEDICINE

## 2024-06-30 PROCEDURE — 36415 COLL VENOUS BLD VENIPUNCTURE: CPT

## 2024-06-30 RX ORDER — ENOXAPARIN SODIUM 100 MG/ML
1 INJECTION SUBCUTANEOUS EVERY 12 HOURS
Status: DISCONTINUED | OUTPATIENT
Start: 2024-06-30 | End: 2024-07-04 | Stop reason: HOSPADM

## 2024-06-30 RX ORDER — KETOROLAC TROMETHAMINE 15 MG/ML
15 INJECTION, SOLUTION INTRAMUSCULAR; INTRAVENOUS EVERY 6 HOURS PRN
Status: DISPENSED | OUTPATIENT
Start: 2024-06-30 | End: 2024-07-03

## 2024-06-30 RX ORDER — LIDOCAINE HYDROCHLORIDE 20 MG/ML
JELLY TOPICAL ONCE
Status: DISPENSED | OUTPATIENT
Start: 2024-06-30 | End: 2024-07-01

## 2024-06-30 RX ORDER — ACETAMINOPHEN 500 MG
1000 TABLET ORAL 3 TIMES DAILY
Status: DISCONTINUED | OUTPATIENT
Start: 2024-06-30 | End: 2024-07-04 | Stop reason: HOSPADM

## 2024-06-30 RX ADMIN — ENOXAPARIN SODIUM 100 MG: 100 INJECTION SUBCUTANEOUS at 16:42

## 2024-06-30 RX ADMIN — ACETAMINOPHEN 1000 MG: 500 TABLET ORAL at 13:45

## 2024-06-30 RX ADMIN — INSULIN HUMAN 6 UNITS: 100 INJECTION, SOLUTION PARENTERAL at 20:23

## 2024-06-30 RX ADMIN — ACETAMINOPHEN 1000 MG: 500 TABLET ORAL at 20:21

## 2024-06-30 RX ADMIN — PIPERACILLIN AND TAZOBACTAM 3.38 G: 3; .375 INJECTION, POWDER, FOR SOLUTION INTRAVENOUS at 14:24

## 2024-06-30 RX ADMIN — FINASTERIDE 5 MG: 5 TABLET, FILM COATED ORAL at 04:49

## 2024-06-30 RX ADMIN — HYDROMORPHONE HYDROCHLORIDE 2 MG: 2 INJECTION, SOLUTION INTRAMUSCULAR; INTRAVENOUS; SUBCUTANEOUS at 04:49

## 2024-06-30 RX ADMIN — LISINOPRIL 10 MG: 10 TABLET ORAL at 04:49

## 2024-06-30 RX ADMIN — INSULIN HUMAN 6 UNITS: 100 INJECTION, SOLUTION PARENTERAL at 17:50

## 2024-06-30 RX ADMIN — KETOROLAC TROMETHAMINE 15 MG: 15 INJECTION, SOLUTION INTRAMUSCULAR; INTRAVENOUS at 20:26

## 2024-06-30 RX ADMIN — PIPERACILLIN AND TAZOBACTAM 3.38 G: 3; .375 INJECTION, POWDER, FOR SOLUTION INTRAVENOUS at 04:49

## 2024-06-30 RX ADMIN — INSULIN HUMAN 4 UNITS: 100 INJECTION, SOLUTION PARENTERAL at 09:58

## 2024-06-30 RX ADMIN — PIPERACILLIN AND TAZOBACTAM 3.38 G: 3; .375 INJECTION, POWDER, FOR SOLUTION INTRAVENOUS at 20:26

## 2024-06-30 RX ADMIN — POLYETHYLENE GLYCOL 3350 1 PACKET: 17 POWDER, FOR SOLUTION ORAL at 17:56

## 2024-06-30 RX ADMIN — SENNOSIDES AND DOCUSATE SODIUM 2 TABLET: 50; 8.6 TABLET ORAL at 17:56

## 2024-06-30 RX ADMIN — INSULIN HUMAN 2 UNITS: 100 INJECTION, SOLUTION PARENTERAL at 13:49

## 2024-06-30 RX ADMIN — TAMSULOSIN HYDROCHLORIDE 0.4 MG: 0.4 CAPSULE ORAL at 04:50

## 2024-06-30 ASSESSMENT — PATIENT HEALTH QUESTIONNAIRE - PHQ9
2. FEELING DOWN, DEPRESSED, IRRITABLE, OR HOPELESS: NOT AT ALL
1. LITTLE INTEREST OR PLEASURE IN DOING THINGS: NOT AT ALL
SUM OF ALL RESPONSES TO PHQ9 QUESTIONS 1 AND 2: 0

## 2024-06-30 ASSESSMENT — ENCOUNTER SYMPTOMS
NECK PAIN: 0
FLANK PAIN: 0
DIAPHORESIS: 0
SORE THROAT: 0
DIARRHEA: 0
VOMITING: 0
WEAKNESS: 1
FEVER: 0
COUGH: 0
ABDOMINAL PAIN: 0
WHEEZING: 0
MYALGIAS: 0
HEARTBURN: 0
CHILLS: 0
SPEECH CHANGE: 0
BLURRED VISION: 0
NAUSEA: 0
NERVOUS/ANXIOUS: 0
DIZZINESS: 0
BACK PAIN: 0
HEADACHES: 0
SENSORY CHANGE: 0
SHORTNESS OF BREATH: 0
FOCAL WEAKNESS: 0
PALPITATIONS: 0

## 2024-06-30 ASSESSMENT — PAIN DESCRIPTION - PAIN TYPE: TYPE: ACUTE PAIN

## 2024-07-01 ENCOUNTER — APPOINTMENT (OUTPATIENT)
Dept: RADIOLOGY | Facility: MEDICAL CENTER | Age: 74
DRG: 711 | End: 2024-07-01
Attending: STUDENT IN AN ORGANIZED HEALTH CARE EDUCATION/TRAINING PROGRAM
Payer: COMMERCIAL

## 2024-07-01 LAB
ANION GAP SERPL CALC-SCNC: 10 MMOL/L (ref 7–16)
BUN SERPL-MCNC: 9 MG/DL (ref 8–22)
CALCIUM SERPL-MCNC: 7.8 MG/DL (ref 8.5–10.5)
CHLORIDE SERPL-SCNC: 100 MMOL/L (ref 96–112)
CO2 SERPL-SCNC: 31 MMOL/L (ref 20–33)
CREAT SERPL-MCNC: 0.45 MG/DL (ref 0.5–1.4)
ERYTHROCYTE [DISTWIDTH] IN BLOOD BY AUTOMATED COUNT: 48.9 FL (ref 35.9–50)
GFR SERPLBLD CREATININE-BSD FMLA CKD-EPI: 112 ML/MIN/1.73 M 2
GLUCOSE BLD STRIP.AUTO-MCNC: 218 MG/DL (ref 65–99)
GLUCOSE BLD STRIP.AUTO-MCNC: 238 MG/DL (ref 65–99)
GLUCOSE BLD STRIP.AUTO-MCNC: 241 MG/DL (ref 65–99)
GLUCOSE BLD STRIP.AUTO-MCNC: 253 MG/DL (ref 65–99)
GLUCOSE SERPL-MCNC: 252 MG/DL (ref 65–99)
HCT VFR BLD AUTO: 36 % (ref 42–52)
HGB BLD-MCNC: 11.5 G/DL (ref 14–18)
MCH RBC QN AUTO: 31.8 PG (ref 27–33)
MCHC RBC AUTO-ENTMCNC: 31.9 G/DL (ref 32.3–36.5)
MCV RBC AUTO: 99.4 FL (ref 81.4–97.8)
PLATELET # BLD AUTO: 162 K/UL (ref 164–446)
PMV BLD AUTO: 9 FL (ref 9–12.9)
POTASSIUM SERPL-SCNC: 3.9 MMOL/L (ref 3.6–5.5)
RBC # BLD AUTO: 3.62 M/UL (ref 4.7–6.1)
SODIUM SERPL-SCNC: 141 MMOL/L (ref 135–145)
WBC # BLD AUTO: 9.8 K/UL (ref 4.8–10.8)

## 2024-07-01 PROCEDURE — 76870 US EXAM SCROTUM: CPT

## 2024-07-01 PROCEDURE — A9270 NON-COVERED ITEM OR SERVICE: HCPCS | Performed by: FAMILY MEDICINE

## 2024-07-01 PROCEDURE — 770006 HCHG ROOM/CARE - MED/SURG/GYN SEMI*

## 2024-07-01 PROCEDURE — 700105 HCHG RX REV CODE 258: Performed by: INTERNAL MEDICINE

## 2024-07-01 PROCEDURE — 80048 BASIC METABOLIC PNL TOTAL CA: CPT

## 2024-07-01 PROCEDURE — 700102 HCHG RX REV CODE 250 W/ 637 OVERRIDE(OP)

## 2024-07-01 PROCEDURE — 700102 HCHG RX REV CODE 250 W/ 637 OVERRIDE(OP): Performed by: INTERNAL MEDICINE

## 2024-07-01 PROCEDURE — 99233 SBSQ HOSP IP/OBS HIGH 50: CPT | Performed by: INTERNAL MEDICINE

## 2024-07-01 PROCEDURE — A9270 NON-COVERED ITEM OR SERVICE: HCPCS

## 2024-07-01 PROCEDURE — 36415 COLL VENOUS BLD VENIPUNCTURE: CPT

## 2024-07-01 PROCEDURE — 700111 HCHG RX REV CODE 636 W/ 250 OVERRIDE (IP): Mod: JZ | Performed by: INTERNAL MEDICINE

## 2024-07-01 PROCEDURE — 700102 HCHG RX REV CODE 250 W/ 637 OVERRIDE(OP): Performed by: FAMILY MEDICINE

## 2024-07-01 PROCEDURE — 700105 HCHG RX REV CODE 258: Performed by: FAMILY MEDICINE

## 2024-07-01 PROCEDURE — A9270 NON-COVERED ITEM OR SERVICE: HCPCS | Performed by: INTERNAL MEDICINE

## 2024-07-01 PROCEDURE — 85027 COMPLETE CBC AUTOMATED: CPT

## 2024-07-01 PROCEDURE — 99233 SBSQ HOSP IP/OBS HIGH 50: CPT | Performed by: FAMILY MEDICINE

## 2024-07-01 PROCEDURE — 700111 HCHG RX REV CODE 636 W/ 250 OVERRIDE (IP): Performed by: FAMILY MEDICINE

## 2024-07-01 PROCEDURE — 82962 GLUCOSE BLOOD TEST: CPT

## 2024-07-01 RX ORDER — OXYCODONE HYDROCHLORIDE 5 MG/1
5 TABLET ORAL EVERY 4 HOURS PRN
Status: DISCONTINUED | OUTPATIENT
Start: 2024-07-01 | End: 2024-07-04 | Stop reason: HOSPADM

## 2024-07-01 RX ORDER — TRAMADOL HYDROCHLORIDE 50 MG/1
50 TABLET ORAL ONCE
Status: DISCONTINUED | OUTPATIENT
Start: 2024-07-01 | End: 2024-07-01

## 2024-07-01 RX ORDER — HYDROMORPHONE HYDROCHLORIDE 1 MG/ML
1 INJECTION, SOLUTION INTRAMUSCULAR; INTRAVENOUS; SUBCUTANEOUS EVERY 4 HOURS PRN
Status: DISCONTINUED | OUTPATIENT
Start: 2024-07-01 | End: 2024-07-04 | Stop reason: HOSPADM

## 2024-07-01 RX ORDER — ENOXAPARIN SODIUM 100 MG/ML
1 INJECTION SUBCUTANEOUS EVERY 12 HOURS
Status: CANCELLED | OUTPATIENT
Start: 2024-07-01

## 2024-07-01 RX ORDER — OXYCODONE HYDROCHLORIDE 10 MG/1
10 TABLET ORAL EVERY 4 HOURS PRN
Status: DISCONTINUED | OUTPATIENT
Start: 2024-07-01 | End: 2024-07-04 | Stop reason: HOSPADM

## 2024-07-01 RX ADMIN — INSULIN HUMAN 6 UNITS: 100 INJECTION, SOLUTION PARENTERAL at 08:52

## 2024-07-01 RX ADMIN — OXYCODONE HYDROCHLORIDE 10 MG: 10 TABLET ORAL at 17:25

## 2024-07-01 RX ADMIN — ENOXAPARIN SODIUM 100 MG: 100 INJECTION SUBCUTANEOUS at 04:54

## 2024-07-01 RX ADMIN — OXYCODONE HYDROCHLORIDE 10 MG: 10 TABLET ORAL at 21:31

## 2024-07-01 RX ADMIN — INSULIN HUMAN 4 UNITS: 100 INJECTION, SOLUTION PARENTERAL at 18:14

## 2024-07-01 RX ADMIN — SENNOSIDES AND DOCUSATE SODIUM 2 TABLET: 50; 8.6 TABLET ORAL at 17:26

## 2024-07-01 RX ADMIN — OXYCODONE HYDROCHLORIDE 10 MG: 10 TABLET ORAL at 07:50

## 2024-07-01 RX ADMIN — MESALAMINE 4.8 G: 1.2 TABLET, DELAYED RELEASE ORAL at 08:59

## 2024-07-01 RX ADMIN — LISINOPRIL 10 MG: 10 TABLET ORAL at 04:52

## 2024-07-01 RX ADMIN — ACETAMINOPHEN 1000 MG: 500 TABLET ORAL at 20:05

## 2024-07-01 RX ADMIN — ACETAMINOPHEN 1000 MG: 500 TABLET ORAL at 04:53

## 2024-07-01 RX ADMIN — TAMSULOSIN HYDROCHLORIDE 0.4 MG: 0.4 CAPSULE ORAL at 04:52

## 2024-07-01 RX ADMIN — PIPERACILLIN AND TAZOBACTAM 3.38 G: 3; .375 INJECTION, POWDER, FOR SOLUTION INTRAVENOUS at 13:21

## 2024-07-01 RX ADMIN — HYDROMORPHONE HYDROCHLORIDE 1 MG: 1 INJECTION, SOLUTION INTRAMUSCULAR; INTRAVENOUS; SUBCUTANEOUS at 19:04

## 2024-07-01 RX ADMIN — INSULIN HUMAN 4 UNITS: 100 INJECTION, SOLUTION PARENTERAL at 13:24

## 2024-07-01 RX ADMIN — KETOROLAC TROMETHAMINE 15 MG: 15 INJECTION, SOLUTION INTRAMUSCULAR; INTRAVENOUS at 02:24

## 2024-07-01 RX ADMIN — PIPERACILLIN AND TAZOBACTAM 3.38 G: 3; .375 INJECTION, POWDER, FOR SOLUTION INTRAVENOUS at 04:21

## 2024-07-01 RX ADMIN — INSULIN HUMAN 4 UNITS: 100 INJECTION, SOLUTION PARENTERAL at 20:07

## 2024-07-01 RX ADMIN — PIPERACILLIN AND TAZOBACTAM 3.38 G: 3; .375 INJECTION, POWDER, FOR SOLUTION INTRAVENOUS at 20:07

## 2024-07-01 RX ADMIN — HYDROMORPHONE HYDROCHLORIDE 1 MG: 1 INJECTION, SOLUTION INTRAMUSCULAR; INTRAVENOUS; SUBCUTANEOUS at 08:56

## 2024-07-01 RX ADMIN — ACETAMINOPHEN 1000 MG: 500 TABLET ORAL at 13:18

## 2024-07-01 RX ADMIN — FINASTERIDE 5 MG: 5 TABLET, FILM COATED ORAL at 04:59

## 2024-07-01 ASSESSMENT — ENCOUNTER SYMPTOMS
CHILLS: 0
NAUSEA: 0
DIARRHEA: 0
ABDOMINAL PAIN: 0
NERVOUS/ANXIOUS: 0
NECK PAIN: 0
SPEECH CHANGE: 0
SENSORY CHANGE: 0
DIZZINESS: 0
VOMITING: 0
FEVER: 0
SHORTNESS OF BREATH: 0
FOCAL WEAKNESS: 0
BACK PAIN: 0
HEARTBURN: 0
HEADACHES: 0
FLANK PAIN: 0
COUGH: 0
WHEEZING: 0
PALPITATIONS: 0
DIAPHORESIS: 0
BLURRED VISION: 0
WEAKNESS: 1
MYALGIAS: 0
SORE THROAT: 0

## 2024-07-01 ASSESSMENT — PATIENT HEALTH QUESTIONNAIRE - PHQ9
1. LITTLE INTEREST OR PLEASURE IN DOING THINGS: NOT AT ALL
SUM OF ALL RESPONSES TO PHQ9 QUESTIONS 1 AND 2: 0
2. FEELING DOWN, DEPRESSED, IRRITABLE, OR HOPELESS: NOT AT ALL

## 2024-07-01 ASSESSMENT — PAIN DESCRIPTION - PAIN TYPE
TYPE: ACUTE PAIN

## 2024-07-02 ENCOUNTER — ANESTHESIA (OUTPATIENT)
Dept: SURGERY | Facility: MEDICAL CENTER | Age: 74
DRG: 711 | End: 2024-07-02
Payer: COMMERCIAL

## 2024-07-02 ENCOUNTER — ANESTHESIA EVENT (OUTPATIENT)
Dept: SURGERY | Facility: MEDICAL CENTER | Age: 74
DRG: 711 | End: 2024-07-02
Payer: COMMERCIAL

## 2024-07-02 LAB
ANION GAP SERPL CALC-SCNC: 9 MMOL/L (ref 7–16)
BUN SERPL-MCNC: 9 MG/DL (ref 8–22)
CALCIUM SERPL-MCNC: 7.9 MG/DL (ref 8.5–10.5)
CHLORIDE SERPL-SCNC: 100 MMOL/L (ref 96–112)
CO2 SERPL-SCNC: 32 MMOL/L (ref 20–33)
CREAT SERPL-MCNC: 0.41 MG/DL (ref 0.5–1.4)
ERYTHROCYTE [DISTWIDTH] IN BLOOD BY AUTOMATED COUNT: 47.6 FL (ref 35.9–50)
GFR SERPLBLD CREATININE-BSD FMLA CKD-EPI: 115 ML/MIN/1.73 M 2
GLUCOSE BLD STRIP.AUTO-MCNC: 184 MG/DL (ref 65–99)
GLUCOSE BLD STRIP.AUTO-MCNC: 212 MG/DL (ref 65–99)
GLUCOSE BLD STRIP.AUTO-MCNC: 264 MG/DL (ref 65–99)
GLUCOSE BLD STRIP.AUTO-MCNC: 298 MG/DL (ref 65–99)
GLUCOSE SERPL-MCNC: 218 MG/DL (ref 65–99)
HCT VFR BLD AUTO: 34.9 % (ref 42–52)
HGB BLD-MCNC: 11.5 G/DL (ref 14–18)
MCH RBC QN AUTO: 32.6 PG (ref 27–33)
MCHC RBC AUTO-ENTMCNC: 33 G/DL (ref 32.3–36.5)
MCV RBC AUTO: 98.9 FL (ref 81.4–97.8)
PATHOLOGY CONSULT NOTE: NORMAL
PLATELET # BLD AUTO: 180 K/UL (ref 164–446)
PMV BLD AUTO: 9.2 FL (ref 9–12.9)
POTASSIUM SERPL-SCNC: 3.6 MMOL/L (ref 3.6–5.5)
RBC # BLD AUTO: 3.53 M/UL (ref 4.7–6.1)
SODIUM SERPL-SCNC: 141 MMOL/L (ref 135–145)
WBC # BLD AUTO: 8.4 K/UL (ref 4.8–10.8)

## 2024-07-02 PROCEDURE — 700102 HCHG RX REV CODE 250 W/ 637 OVERRIDE(OP): Performed by: FAMILY MEDICINE

## 2024-07-02 PROCEDURE — A9270 NON-COVERED ITEM OR SERVICE: HCPCS | Performed by: FAMILY MEDICINE

## 2024-07-02 PROCEDURE — 82962 GLUCOSE BLOOD TEST: CPT | Mod: 91

## 2024-07-02 PROCEDURE — 700111 HCHG RX REV CODE 636 W/ 250 OVERRIDE (IP): Mod: JZ | Performed by: FAMILY MEDICINE

## 2024-07-02 PROCEDURE — A9270 NON-COVERED ITEM OR SERVICE: HCPCS | Performed by: INTERNAL MEDICINE

## 2024-07-02 PROCEDURE — C1729 CATH, DRAINAGE: HCPCS | Performed by: UROLOGY

## 2024-07-02 PROCEDURE — A9270 NON-COVERED ITEM OR SERVICE: HCPCS | Performed by: ANESTHESIOLOGY

## 2024-07-02 PROCEDURE — 700101 HCHG RX REV CODE 250

## 2024-07-02 PROCEDURE — 700105 HCHG RX REV CODE 258: Performed by: INTERNAL MEDICINE

## 2024-07-02 PROCEDURE — 36415 COLL VENOUS BLD VENIPUNCTURE: CPT

## 2024-07-02 PROCEDURE — 0VT90ZZ RESECTION OF RIGHT TESTIS, OPEN APPROACH: ICD-10-PCS | Performed by: UROLOGY

## 2024-07-02 PROCEDURE — 99233 SBSQ HOSP IP/OBS HIGH 50: CPT | Performed by: INTERNAL MEDICINE

## 2024-07-02 PROCEDURE — 700102 HCHG RX REV CODE 250 W/ 637 OVERRIDE(OP)

## 2024-07-02 PROCEDURE — A9270 NON-COVERED ITEM OR SERVICE: HCPCS

## 2024-07-02 PROCEDURE — 160002 HCHG RECOVERY MINUTES (STAT): Performed by: UROLOGY

## 2024-07-02 PROCEDURE — 160048 HCHG OR STATISTICAL LEVEL 1-5: Performed by: UROLOGY

## 2024-07-02 PROCEDURE — 88305 TISSUE EXAM BY PATHOLOGIST: CPT

## 2024-07-02 PROCEDURE — 85027 COMPLETE CBC AUTOMATED: CPT

## 2024-07-02 PROCEDURE — 700102 HCHG RX REV CODE 250 W/ 637 OVERRIDE(OP): Performed by: INTERNAL MEDICINE

## 2024-07-02 PROCEDURE — 160009 HCHG ANES TIME/MIN: Performed by: UROLOGY

## 2024-07-02 PROCEDURE — 160035 HCHG PACU - 1ST 60 MINS PHASE I: Performed by: UROLOGY

## 2024-07-02 PROCEDURE — 700111 HCHG RX REV CODE 636 W/ 250 OVERRIDE (IP): Mod: JG | Performed by: UROLOGY

## 2024-07-02 PROCEDURE — 160036 HCHG PACU - EA ADDL 30 MINS PHASE I: Performed by: UROLOGY

## 2024-07-02 PROCEDURE — 770006 HCHG ROOM/CARE - MED/SURG/GYN SEMI*

## 2024-07-02 PROCEDURE — 700105 HCHG RX REV CODE 258: Performed by: UROLOGY

## 2024-07-02 PROCEDURE — 700101 HCHG RX REV CODE 250: Performed by: ANESTHESIOLOGY

## 2024-07-02 PROCEDURE — 700102 HCHG RX REV CODE 250 W/ 637 OVERRIDE(OP): Performed by: ANESTHESIOLOGY

## 2024-07-02 PROCEDURE — 80048 BASIC METABOLIC PNL TOTAL CA: CPT

## 2024-07-02 PROCEDURE — 700111 HCHG RX REV CODE 636 W/ 250 OVERRIDE (IP): Performed by: ANESTHESIOLOGY

## 2024-07-02 PROCEDURE — 700111 HCHG RX REV CODE 636 W/ 250 OVERRIDE (IP): Mod: JZ | Performed by: INTERNAL MEDICINE

## 2024-07-02 PROCEDURE — 160028 HCHG SURGERY MINUTES - 1ST 30 MINS LEVEL 3: Performed by: UROLOGY

## 2024-07-02 PROCEDURE — 160039 HCHG SURGERY MINUTES - EA ADDL 1 MIN LEVEL 3: Performed by: UROLOGY

## 2024-07-02 RX ORDER — HALOPERIDOL 5 MG/ML
1 INJECTION INTRAMUSCULAR
Status: DISCONTINUED | OUTPATIENT
Start: 2024-07-02 | End: 2024-07-02 | Stop reason: HOSPADM

## 2024-07-02 RX ORDER — BUPIVACAINE HYDROCHLORIDE 2.5 MG/ML
INJECTION, SOLUTION EPIDURAL; INFILTRATION; INTRACAUDAL
Status: DISCONTINUED | OUTPATIENT
Start: 2024-07-02 | End: 2024-07-02 | Stop reason: HOSPADM

## 2024-07-02 RX ORDER — SODIUM CHLORIDE, SODIUM LACTATE, POTASSIUM CHLORIDE, CALCIUM CHLORIDE 600; 310; 30; 20 MG/100ML; MG/100ML; MG/100ML; MG/100ML
INJECTION, SOLUTION INTRAVENOUS CONTINUOUS
Status: DISCONTINUED | OUTPATIENT
Start: 2024-07-02 | End: 2024-07-02 | Stop reason: HOSPADM

## 2024-07-02 RX ORDER — OXYCODONE HCL 5 MG/5 ML
5 SOLUTION, ORAL ORAL
Status: DISCONTINUED | OUTPATIENT
Start: 2024-07-02 | End: 2024-07-02 | Stop reason: HOSPADM

## 2024-07-02 RX ORDER — LABETALOL HYDROCHLORIDE 5 MG/ML
5 INJECTION, SOLUTION INTRAVENOUS
Status: DISCONTINUED | OUTPATIENT
Start: 2024-07-02 | End: 2024-07-02 | Stop reason: HOSPADM

## 2024-07-02 RX ORDER — HYDROMORPHONE HYDROCHLORIDE 1 MG/ML
0.4 INJECTION, SOLUTION INTRAMUSCULAR; INTRAVENOUS; SUBCUTANEOUS
Status: DISCONTINUED | OUTPATIENT
Start: 2024-07-02 | End: 2024-07-02 | Stop reason: HOSPADM

## 2024-07-02 RX ORDER — ONDANSETRON 2 MG/ML
INJECTION INTRAMUSCULAR; INTRAVENOUS PRN
Status: DISCONTINUED | OUTPATIENT
Start: 2024-07-02 | End: 2024-07-02 | Stop reason: SURG

## 2024-07-02 RX ORDER — LIDOCAINE HYDROCHLORIDE 20 MG/ML
INJECTION, SOLUTION EPIDURAL; INFILTRATION; INTRACAUDAL; PERINEURAL PRN
Status: DISCONTINUED | OUTPATIENT
Start: 2024-07-02 | End: 2024-07-02 | Stop reason: SURG

## 2024-07-02 RX ORDER — HYDRALAZINE HYDROCHLORIDE 20 MG/ML
5 INJECTION INTRAMUSCULAR; INTRAVENOUS
Status: DISCONTINUED | OUTPATIENT
Start: 2024-07-02 | End: 2024-07-02 | Stop reason: HOSPADM

## 2024-07-02 RX ORDER — ONDANSETRON 2 MG/ML
4 INJECTION INTRAMUSCULAR; INTRAVENOUS
Status: DISCONTINUED | OUTPATIENT
Start: 2024-07-02 | End: 2024-07-02 | Stop reason: HOSPADM

## 2024-07-02 RX ORDER — SODIUM CHLORIDE, SODIUM LACTATE, POTASSIUM CHLORIDE, CALCIUM CHLORIDE 600; 310; 30; 20 MG/100ML; MG/100ML; MG/100ML; MG/100ML
INJECTION, SOLUTION INTRAVENOUS CONTINUOUS
Status: ACTIVE | OUTPATIENT
Start: 2024-07-02 | End: 2024-07-02

## 2024-07-02 RX ORDER — OXYCODONE HCL 5 MG/5 ML
10 SOLUTION, ORAL ORAL
Status: DISCONTINUED | OUTPATIENT
Start: 2024-07-02 | End: 2024-07-02 | Stop reason: HOSPADM

## 2024-07-02 RX ORDER — OXYCODONE HCL 5 MG/5 ML
10 SOLUTION, ORAL ORAL
Status: COMPLETED | OUTPATIENT
Start: 2024-07-02 | End: 2024-07-02

## 2024-07-02 RX ORDER — BUPIVACAINE HYDROCHLORIDE 2.5 MG/ML
INJECTION, SOLUTION EPIDURAL; INFILTRATION; INTRACAUDAL
Status: DISPENSED
Start: 2024-07-02 | End: 2024-07-03

## 2024-07-02 RX ORDER — HYDROMORPHONE HYDROCHLORIDE 1 MG/ML
0.2 INJECTION, SOLUTION INTRAMUSCULAR; INTRAVENOUS; SUBCUTANEOUS
Status: DISCONTINUED | OUTPATIENT
Start: 2024-07-02 | End: 2024-07-02 | Stop reason: HOSPADM

## 2024-07-02 RX ORDER — OXYCODONE HCL 5 MG/5 ML
5 SOLUTION, ORAL ORAL
Status: COMPLETED | OUTPATIENT
Start: 2024-07-02 | End: 2024-07-02

## 2024-07-02 RX ORDER — HYDROMORPHONE HYDROCHLORIDE 1 MG/ML
0.1 INJECTION, SOLUTION INTRAMUSCULAR; INTRAVENOUS; SUBCUTANEOUS
Status: DISCONTINUED | OUTPATIENT
Start: 2024-07-02 | End: 2024-07-02 | Stop reason: HOSPADM

## 2024-07-02 RX ORDER — CEFAZOLIN SODIUM 1 G/3ML
INJECTION, POWDER, FOR SOLUTION INTRAMUSCULAR; INTRAVENOUS PRN
Status: DISCONTINUED | OUTPATIENT
Start: 2024-07-02 | End: 2024-07-02 | Stop reason: SURG

## 2024-07-02 RX ADMIN — ACETAMINOPHEN 1000 MG: 500 TABLET ORAL at 03:34

## 2024-07-02 RX ADMIN — INSULIN GLARGINE-YFGN 10 UNITS: 100 INJECTION, SOLUTION SUBCUTANEOUS at 20:24

## 2024-07-02 RX ADMIN — FENTANYL CITRATE 50 MCG: 50 INJECTION, SOLUTION INTRAMUSCULAR; INTRAVENOUS at 13:02

## 2024-07-02 RX ADMIN — FENTANYL CITRATE 50 MCG: 50 INJECTION, SOLUTION INTRAMUSCULAR; INTRAVENOUS at 12:38

## 2024-07-02 RX ADMIN — FENTANYL CITRATE 50 MCG: 50 INJECTION, SOLUTION INTRAMUSCULAR; INTRAVENOUS at 13:11

## 2024-07-02 RX ADMIN — PIPERACILLIN AND TAZOBACTAM 3.38 G: 3; .375 INJECTION, POWDER, FOR SOLUTION INTRAVENOUS at 20:24

## 2024-07-02 RX ADMIN — ALBUTEROL SULFATE 2.5 MG: 2.5 SOLUTION RESPIRATORY (INHALATION) at 14:04

## 2024-07-02 RX ADMIN — OXYCODONE HYDROCHLORIDE 10 MG: 10 TABLET ORAL at 10:37

## 2024-07-02 RX ADMIN — OXYCODONE HYDROCHLORIDE 10 MG: 5 SOLUTION ORAL at 14:53

## 2024-07-02 RX ADMIN — PIPERACILLIN AND TAZOBACTAM 3.38 G: 3; .375 INJECTION, POWDER, FOR SOLUTION INTRAVENOUS at 04:35

## 2024-07-02 RX ADMIN — CEFAZOLIN 2 G: 1 INJECTION, POWDER, FOR SOLUTION INTRAMUSCULAR; INTRAVENOUS at 12:38

## 2024-07-02 RX ADMIN — SODIUM CHLORIDE, POTASSIUM CHLORIDE, SODIUM LACTATE AND CALCIUM CHLORIDE: 600; 310; 30; 20 INJECTION, SOLUTION INTRAVENOUS at 12:30

## 2024-07-02 RX ADMIN — ONDANSETRON 4 MG: 2 INJECTION INTRAMUSCULAR; INTRAVENOUS at 13:23

## 2024-07-02 RX ADMIN — SENNOSIDES AND DOCUSATE SODIUM 2 TABLET: 50; 8.6 TABLET ORAL at 16:58

## 2024-07-02 RX ADMIN — TAMSULOSIN HYDROCHLORIDE 0.4 MG: 0.4 CAPSULE ORAL at 04:33

## 2024-07-02 RX ADMIN — OXYCODONE HYDROCHLORIDE 10 MG: 10 TABLET ORAL at 03:33

## 2024-07-02 RX ADMIN — INSULIN HUMAN 6 UNITS: 100 INJECTION, SOLUTION PARENTERAL at 16:49

## 2024-07-02 RX ADMIN — Medication 2.5 MG: at 14:04

## 2024-07-02 RX ADMIN — LIDOCAINE HYDROCHLORIDE 80 MG: 20 INJECTION, SOLUTION EPIDURAL; INFILTRATION; INTRACAUDAL at 12:38

## 2024-07-02 RX ADMIN — INSULIN HUMAN 4 UNITS: 100 INJECTION, SOLUTION PARENTERAL at 08:30

## 2024-07-02 RX ADMIN — KETOROLAC TROMETHAMINE 15 MG: 15 INJECTION, SOLUTION INTRAMUSCULAR; INTRAVENOUS at 13:23

## 2024-07-02 RX ADMIN — ACETAMINOPHEN 1000 MG: 500 TABLET ORAL at 20:20

## 2024-07-02 RX ADMIN — INSULIN HUMAN 6 UNITS: 100 INJECTION, SOLUTION PARENTERAL at 20:25

## 2024-07-02 RX ADMIN — PROPOFOL 200 MG: 10 INJECTION, EMULSION INTRAVENOUS at 12:38

## 2024-07-02 RX ADMIN — FINASTERIDE 5 MG: 5 TABLET, FILM COATED ORAL at 04:33

## 2024-07-02 RX ADMIN — MESALAMINE 4.8 G: 1.2 TABLET, DELAYED RELEASE ORAL at 08:37

## 2024-07-02 RX ADMIN — LISINOPRIL 10 MG: 10 TABLET ORAL at 04:33

## 2024-07-02 RX ADMIN — FENTANYL CITRATE 50 MCG: 50 INJECTION, SOLUTION INTRAMUSCULAR; INTRAVENOUS at 12:46

## 2024-07-02 ASSESSMENT — ENCOUNTER SYMPTOMS
BLURRED VISION: 0
COUGH: 0
FEVER: 0
WHEEZING: 0
SPEECH CHANGE: 0
WEAKNESS: 1
MYALGIAS: 0
DIAPHORESIS: 0
SHORTNESS OF BREATH: 0
NECK PAIN: 0
SENSORY CHANGE: 0
DIZZINESS: 0
PALPITATIONS: 0
DIARRHEA: 0
HEARTBURN: 0
FOCAL WEAKNESS: 0
BACK PAIN: 0
SORE THROAT: 0
VOMITING: 0
ABDOMINAL PAIN: 0
CHILLS: 0
NERVOUS/ANXIOUS: 0
HEADACHES: 0
FLANK PAIN: 0
NAUSEA: 0

## 2024-07-02 ASSESSMENT — PAIN DESCRIPTION - PAIN TYPE
TYPE: SURGICAL PAIN
TYPE: ACUTE PAIN
TYPE: ACUTE PAIN
TYPE: SURGICAL PAIN

## 2024-07-02 ASSESSMENT — PAIN SCALES - GENERAL: PAIN_LEVEL: 0

## 2024-07-03 LAB
ALBUMIN SERPL BCP-MCNC: 2.6 G/DL (ref 3.2–4.9)
BUN SERPL-MCNC: 10 MG/DL (ref 8–22)
CALCIUM ALBUM COR SERPL-MCNC: 9.2 MG/DL (ref 8.5–10.5)
CALCIUM SERPL-MCNC: 8.1 MG/DL (ref 8.5–10.5)
CHLORIDE SERPL-SCNC: 103 MMOL/L (ref 96–112)
CO2 SERPL-SCNC: 33 MMOL/L (ref 20–33)
CREAT SERPL-MCNC: 0.46 MG/DL (ref 0.5–1.4)
ERYTHROCYTE [DISTWIDTH] IN BLOOD BY AUTOMATED COUNT: 49.8 FL (ref 35.9–50)
GFR SERPLBLD CREATININE-BSD FMLA CKD-EPI: 111 ML/MIN/1.73 M 2
GLUCOSE BLD STRIP.AUTO-MCNC: 220 MG/DL (ref 65–99)
GLUCOSE BLD STRIP.AUTO-MCNC: 276 MG/DL (ref 65–99)
GLUCOSE BLD STRIP.AUTO-MCNC: 327 MG/DL (ref 65–99)
GLUCOSE BLD STRIP.AUTO-MCNC: 378 MG/DL (ref 65–99)
GLUCOSE SERPL-MCNC: 282 MG/DL (ref 65–99)
HCT VFR BLD AUTO: 37.3 % (ref 42–52)
HGB BLD-MCNC: 11.6 G/DL (ref 14–18)
MAGNESIUM SERPL-MCNC: 1.9 MG/DL (ref 1.5–2.5)
MCH RBC QN AUTO: 31.4 PG (ref 27–33)
MCHC RBC AUTO-ENTMCNC: 31.1 G/DL (ref 32.3–36.5)
MCV RBC AUTO: 100.8 FL (ref 81.4–97.8)
PHOSPHATE SERPL-MCNC: 2.5 MG/DL (ref 2.5–4.5)
PLATELET # BLD AUTO: 158 K/UL (ref 164–446)
PMV BLD AUTO: 9.2 FL (ref 9–12.9)
POTASSIUM SERPL-SCNC: 4 MMOL/L (ref 3.6–5.5)
RBC # BLD AUTO: 3.7 M/UL (ref 4.7–6.1)
SODIUM SERPL-SCNC: 142 MMOL/L (ref 135–145)
WBC # BLD AUTO: 7.1 K/UL (ref 4.8–10.8)

## 2024-07-03 PROCEDURE — 770006 HCHG ROOM/CARE - MED/SURG/GYN SEMI*

## 2024-07-03 PROCEDURE — A9270 NON-COVERED ITEM OR SERVICE: HCPCS

## 2024-07-03 PROCEDURE — 700111 HCHG RX REV CODE 636 W/ 250 OVERRIDE (IP): Mod: JZ | Performed by: INTERNAL MEDICINE

## 2024-07-03 PROCEDURE — 700102 HCHG RX REV CODE 250 W/ 637 OVERRIDE(OP)

## 2024-07-03 PROCEDURE — 80069 RENAL FUNCTION PANEL: CPT

## 2024-07-03 PROCEDURE — 97166 OT EVAL MOD COMPLEX 45 MIN: CPT

## 2024-07-03 PROCEDURE — A9270 NON-COVERED ITEM OR SERVICE: HCPCS | Performed by: FAMILY MEDICINE

## 2024-07-03 PROCEDURE — 99233 SBSQ HOSP IP/OBS HIGH 50: CPT | Performed by: INTERNAL MEDICINE

## 2024-07-03 PROCEDURE — A9270 NON-COVERED ITEM OR SERVICE: HCPCS | Performed by: INTERNAL MEDICINE

## 2024-07-03 PROCEDURE — 83735 ASSAY OF MAGNESIUM: CPT

## 2024-07-03 PROCEDURE — 82962 GLUCOSE BLOOD TEST: CPT | Mod: 91

## 2024-07-03 PROCEDURE — 85027 COMPLETE CBC AUTOMATED: CPT

## 2024-07-03 PROCEDURE — 94664 DEMO&/EVAL PT USE INHALER: CPT

## 2024-07-03 PROCEDURE — 700102 HCHG RX REV CODE 250 W/ 637 OVERRIDE(OP): Performed by: INTERNAL MEDICINE

## 2024-07-03 PROCEDURE — 36415 COLL VENOUS BLD VENIPUNCTURE: CPT

## 2024-07-03 PROCEDURE — 700102 HCHG RX REV CODE 250 W/ 637 OVERRIDE(OP): Performed by: FAMILY MEDICINE

## 2024-07-03 PROCEDURE — 700105 HCHG RX REV CODE 258: Performed by: INTERNAL MEDICINE

## 2024-07-03 RX ORDER — DEXTROSE MONOHYDRATE 25 G/50ML
25 INJECTION, SOLUTION INTRAVENOUS
Status: DISCONTINUED | OUTPATIENT
Start: 2024-07-03 | End: 2024-07-04 | Stop reason: HOSPADM

## 2024-07-03 RX ORDER — LANOLIN ALCOHOL/MO/W.PET/CERES
400 CREAM (GRAM) TOPICAL 2 TIMES DAILY
Status: COMPLETED | OUTPATIENT
Start: 2024-07-03 | End: 2024-07-03

## 2024-07-03 RX ORDER — INSULIN LISPRO 100 [IU]/ML
0.2 INJECTION, SOLUTION INTRAVENOUS; SUBCUTANEOUS
Status: DISCONTINUED | OUTPATIENT
Start: 2024-07-04 | End: 2024-07-04 | Stop reason: HOSPADM

## 2024-07-03 RX ORDER — INSULIN LISPRO 100 [IU]/ML
3-14 INJECTION, SOLUTION INTRAVENOUS; SUBCUTANEOUS
Status: DISCONTINUED | OUTPATIENT
Start: 2024-07-03 | End: 2024-07-04 | Stop reason: HOSPADM

## 2024-07-03 RX ADMIN — OXYCODONE HYDROCHLORIDE 10 MG: 10 TABLET ORAL at 14:42

## 2024-07-03 RX ADMIN — DIBASIC SODIUM PHOSPHATE, MONOBASIC POTASSIUM PHOSPHATE AND MONOBASIC SODIUM PHOSPHATE 500 MG: 852; 155; 130 TABLET ORAL at 12:33

## 2024-07-03 RX ADMIN — DIBASIC SODIUM PHOSPHATE, MONOBASIC POTASSIUM PHOSPHATE AND MONOBASIC SODIUM PHOSPHATE 500 MG: 852; 155; 130 TABLET ORAL at 08:10

## 2024-07-03 RX ADMIN — Medication 400 MG: at 16:58

## 2024-07-03 RX ADMIN — ACETAMINOPHEN 1000 MG: 500 TABLET ORAL at 12:33

## 2024-07-03 RX ADMIN — TAMSULOSIN HYDROCHLORIDE 0.4 MG: 0.4 CAPSULE ORAL at 04:08

## 2024-07-03 RX ADMIN — INSULIN GLARGINE-YFGN 10 UNITS: 100 INJECTION, SOLUTION SUBCUTANEOUS at 16:54

## 2024-07-03 RX ADMIN — ACETAMINOPHEN 1000 MG: 500 TABLET ORAL at 04:07

## 2024-07-03 RX ADMIN — METFORMIN HYDROCHLORIDE 1000 MG: 500 TABLET ORAL at 19:02

## 2024-07-03 RX ADMIN — SENNOSIDES AND DOCUSATE SODIUM 2 TABLET: 50; 8.6 TABLET ORAL at 16:58

## 2024-07-03 RX ADMIN — PIPERACILLIN AND TAZOBACTAM 3.38 G: 3; .375 INJECTION, POWDER, FOR SOLUTION INTRAVENOUS at 14:44

## 2024-07-03 RX ADMIN — Medication 400 MG: at 08:10

## 2024-07-03 RX ADMIN — OXYCODONE HYDROCHLORIDE 10 MG: 10 TABLET ORAL at 03:02

## 2024-07-03 RX ADMIN — ACETAMINOPHEN 1000 MG: 500 TABLET ORAL at 21:09

## 2024-07-03 RX ADMIN — PIPERACILLIN AND TAZOBACTAM 3.38 G: 3; .375 INJECTION, POWDER, FOR SOLUTION INTRAVENOUS at 04:10

## 2024-07-03 RX ADMIN — INSULIN LISPRO 7 UNITS: 100 INJECTION, SOLUTION INTRAVENOUS; SUBCUTANEOUS at 20:27

## 2024-07-03 RX ADMIN — INSULIN HUMAN 10 UNITS: 100 INJECTION, SOLUTION PARENTERAL at 19:02

## 2024-07-03 RX ADMIN — INSULIN HUMAN 8 UNITS: 100 INJECTION, SOLUTION PARENTERAL at 16:52

## 2024-07-03 RX ADMIN — FINASTERIDE 5 MG: 5 TABLET, FILM COATED ORAL at 04:08

## 2024-07-03 RX ADMIN — OXYCODONE HYDROCHLORIDE 10 MG: 10 TABLET ORAL at 11:14

## 2024-07-03 RX ADMIN — PIPERACILLIN AND TAZOBACTAM 3.38 G: 3; .375 INJECTION, POWDER, FOR SOLUTION INTRAVENOUS at 21:34

## 2024-07-03 RX ADMIN — INSULIN HUMAN 4 UNITS: 100 INJECTION, SOLUTION PARENTERAL at 08:15

## 2024-07-03 RX ADMIN — INSULIN HUMAN 10 UNITS: 100 INJECTION, SOLUTION PARENTERAL at 13:12

## 2024-07-03 RX ADMIN — MESALAMINE 4.8 G: 1.2 TABLET, DELAYED RELEASE ORAL at 09:15

## 2024-07-03 RX ADMIN — LISINOPRIL 10 MG: 10 TABLET ORAL at 04:08

## 2024-07-03 ASSESSMENT — PAIN DESCRIPTION - PAIN TYPE
TYPE: SURGICAL PAIN

## 2024-07-03 ASSESSMENT — COGNITIVE AND FUNCTIONAL STATUS - GENERAL
DRESSING REGULAR UPPER BODY CLOTHING: A LITTLE
DAILY ACTIVITIY SCORE: 19
DRESSING REGULAR LOWER BODY CLOTHING: A LOT
SUGGESTED CMS G CODE MODIFIER DAILY ACTIVITY: CK
HELP NEEDED FOR BATHING: A LITTLE
TOILETING: A LITTLE

## 2024-07-03 ASSESSMENT — ENCOUNTER SYMPTOMS
NECK PAIN: 0
FEVER: 0
DIAPHORESIS: 0
ABDOMINAL PAIN: 0
WHEEZING: 0
SORE THROAT: 0
SHORTNESS OF BREATH: 0
NERVOUS/ANXIOUS: 0
DIZZINESS: 0
NAUSEA: 0
BACK PAIN: 0
FLANK PAIN: 0
COUGH: 0
DIARRHEA: 0
PALPITATIONS: 0
HEARTBURN: 0
MYALGIAS: 0
WEAKNESS: 1
HEADACHES: 0
CHILLS: 0
SENSORY CHANGE: 0
FOCAL WEAKNESS: 0
SPEECH CHANGE: 0
VOMITING: 0
BLURRED VISION: 0

## 2024-07-03 ASSESSMENT — ACTIVITIES OF DAILY LIVING (ADL): TOILETING: INDEPENDENT

## 2024-07-04 ENCOUNTER — PHARMACY VISIT (OUTPATIENT)
Dept: PHARMACY | Facility: MEDICAL CENTER | Age: 74
End: 2024-07-04
Payer: COMMERCIAL

## 2024-07-04 VITALS
OXYGEN SATURATION: 97 % | HEIGHT: 73 IN | SYSTOLIC BLOOD PRESSURE: 142 MMHG | RESPIRATION RATE: 18 BRPM | WEIGHT: 232.14 LBS | BODY MASS INDEX: 30.77 KG/M2 | HEART RATE: 72 BPM | DIASTOLIC BLOOD PRESSURE: 70 MMHG | TEMPERATURE: 97.7 F

## 2024-07-04 PROBLEM — R33.9 RETENTION OF URINE: Status: RESOLVED | Noted: 2018-01-31 | Resolved: 2024-07-04

## 2024-07-04 PROBLEM — G93.40 ACUTE ENCEPHALOPATHY: Status: RESOLVED | Noted: 2024-06-30 | Resolved: 2024-07-04

## 2024-07-04 PROBLEM — K40.90 INGUINAL HERNIA, RIGHT: Status: RESOLVED | Noted: 2024-06-29 | Resolved: 2024-07-04

## 2024-07-04 LAB
ALBUMIN SERPL BCP-MCNC: 2.8 G/DL (ref 3.2–4.9)
BUN SERPL-MCNC: 9 MG/DL (ref 8–22)
CALCIUM ALBUM COR SERPL-MCNC: 9.2 MG/DL (ref 8.5–10.5)
CALCIUM SERPL-MCNC: 8.2 MG/DL (ref 8.5–10.5)
CHLORIDE SERPL-SCNC: 100 MMOL/L (ref 96–112)
CO2 SERPL-SCNC: 33 MMOL/L (ref 20–33)
CREAT SERPL-MCNC: 0.41 MG/DL (ref 0.5–1.4)
ERYTHROCYTE [DISTWIDTH] IN BLOOD BY AUTOMATED COUNT: 48.6 FL (ref 35.9–50)
GFR SERPLBLD CREATININE-BSD FMLA CKD-EPI: 115 ML/MIN/1.73 M 2
GLUCOSE BLD STRIP.AUTO-MCNC: 172 MG/DL (ref 65–99)
GLUCOSE SERPL-MCNC: 134 MG/DL (ref 65–99)
HCT VFR BLD AUTO: 35 % (ref 42–52)
HGB BLD-MCNC: 11.1 G/DL (ref 14–18)
MAGNESIUM SERPL-MCNC: 1.8 MG/DL (ref 1.5–2.5)
MCH RBC QN AUTO: 31.5 PG (ref 27–33)
MCHC RBC AUTO-ENTMCNC: 31.7 G/DL (ref 32.3–36.5)
MCV RBC AUTO: 99.4 FL (ref 81.4–97.8)
PHOSPHATE SERPL-MCNC: 2.4 MG/DL (ref 2.5–4.5)
PLATELET # BLD AUTO: 140 K/UL (ref 164–446)
PMV BLD AUTO: 9.5 FL (ref 9–12.9)
POTASSIUM SERPL-SCNC: 3.5 MMOL/L (ref 3.6–5.5)
RBC # BLD AUTO: 3.52 M/UL (ref 4.7–6.1)
SODIUM SERPL-SCNC: 143 MMOL/L (ref 135–145)
WBC # BLD AUTO: 7.3 K/UL (ref 4.8–10.8)

## 2024-07-04 PROCEDURE — 85027 COMPLETE CBC AUTOMATED: CPT

## 2024-07-04 PROCEDURE — A9270 NON-COVERED ITEM OR SERVICE: HCPCS | Performed by: INTERNAL MEDICINE

## 2024-07-04 PROCEDURE — 700102 HCHG RX REV CODE 250 W/ 637 OVERRIDE(OP)

## 2024-07-04 PROCEDURE — 80069 RENAL FUNCTION PANEL: CPT

## 2024-07-04 PROCEDURE — 36415 COLL VENOUS BLD VENIPUNCTURE: CPT

## 2024-07-04 PROCEDURE — A9270 NON-COVERED ITEM OR SERVICE: HCPCS

## 2024-07-04 PROCEDURE — 700105 HCHG RX REV CODE 258: Performed by: INTERNAL MEDICINE

## 2024-07-04 PROCEDURE — 700102 HCHG RX REV CODE 250 W/ 637 OVERRIDE(OP): Performed by: INTERNAL MEDICINE

## 2024-07-04 PROCEDURE — 700102 HCHG RX REV CODE 250 W/ 637 OVERRIDE(OP): Performed by: FAMILY MEDICINE

## 2024-07-04 PROCEDURE — 82962 GLUCOSE BLOOD TEST: CPT

## 2024-07-04 PROCEDURE — A9270 NON-COVERED ITEM OR SERVICE: HCPCS | Performed by: FAMILY MEDICINE

## 2024-07-04 PROCEDURE — 83735 ASSAY OF MAGNESIUM: CPT

## 2024-07-04 PROCEDURE — RXMED WILLOW AMBULATORY MEDICATION CHARGE: Performed by: INTERNAL MEDICINE

## 2024-07-04 PROCEDURE — 700111 HCHG RX REV CODE 636 W/ 250 OVERRIDE (IP): Mod: JZ | Performed by: INTERNAL MEDICINE

## 2024-07-04 PROCEDURE — 99239 HOSP IP/OBS DSCHRG MGMT >30: CPT | Performed by: INTERNAL MEDICINE

## 2024-07-04 RX ORDER — AMOXICILLIN AND CLAVULANATE POTASSIUM 875; 125 MG/1; MG/1
1 TABLET, FILM COATED ORAL 2 TIMES DAILY
Qty: 10 TABLET | Refills: 0 | Status: ACTIVE | OUTPATIENT
Start: 2024-07-04 | End: 2024-07-09

## 2024-07-04 RX ORDER — OXYCODONE HYDROCHLORIDE 5 MG/1
5 TABLET ORAL EVERY 4 HOURS PRN
Qty: 15 TABLET | Refills: 0 | Status: SHIPPED | OUTPATIENT
Start: 2024-07-04 | End: 2024-07-09

## 2024-07-04 RX ORDER — POTASSIUM CHLORIDE 20 MEQ/1
40 TABLET, EXTENDED RELEASE ORAL ONCE
Status: COMPLETED | OUTPATIENT
Start: 2024-07-04 | End: 2024-07-04

## 2024-07-04 RX ORDER — LANOLIN ALCOHOL/MO/W.PET/CERES
400 CREAM (GRAM) TOPICAL 2 TIMES DAILY
Status: DISCONTINUED | OUTPATIENT
Start: 2024-07-04 | End: 2024-07-04 | Stop reason: HOSPADM

## 2024-07-04 RX ADMIN — LISINOPRIL 10 MG: 10 TABLET ORAL at 04:28

## 2024-07-04 RX ADMIN — INSULIN LISPRO 3 UNITS: 100 INJECTION, SOLUTION INTRAVENOUS; SUBCUTANEOUS at 08:18

## 2024-07-04 RX ADMIN — FINASTERIDE 5 MG: 5 TABLET, FILM COATED ORAL at 04:28

## 2024-07-04 RX ADMIN — METFORMIN HYDROCHLORIDE 1000 MG: 500 TABLET ORAL at 08:09

## 2024-07-04 RX ADMIN — Medication 400 MG: at 08:15

## 2024-07-04 RX ADMIN — PIPERACILLIN AND TAZOBACTAM 3.38 G: 3; .375 INJECTION, POWDER, FOR SOLUTION INTRAVENOUS at 04:30

## 2024-07-04 RX ADMIN — DIBASIC SODIUM PHOSPHATE, MONOBASIC POTASSIUM PHOSPHATE AND MONOBASIC SODIUM PHOSPHATE 500 MG: 852; 155; 130 TABLET ORAL at 08:08

## 2024-07-04 RX ADMIN — MESALAMINE 4.8 G: 1.2 TABLET, DELAYED RELEASE ORAL at 08:08

## 2024-07-04 RX ADMIN — INSULIN LISPRO 7 UNITS: 100 INJECTION, SOLUTION INTRAVENOUS; SUBCUTANEOUS at 08:17

## 2024-07-04 RX ADMIN — POTASSIUM CHLORIDE 40 MEQ: 1500 TABLET, EXTENDED RELEASE ORAL at 08:08

## 2024-07-04 RX ADMIN — TAMSULOSIN HYDROCHLORIDE 0.4 MG: 0.4 CAPSULE ORAL at 04:28

## 2024-07-04 RX ADMIN — SITAGLIPTIN 100 MG: 100 TABLET, FILM COATED ORAL at 05:03

## 2024-07-04 RX ADMIN — OXYCODONE HYDROCHLORIDE 10 MG: 10 TABLET ORAL at 04:27

## 2024-07-04 RX ADMIN — ACETAMINOPHEN 1000 MG: 500 TABLET ORAL at 04:27

## 2024-07-04 ASSESSMENT — PATIENT HEALTH QUESTIONNAIRE - PHQ9
SUM OF ALL RESPONSES TO PHQ9 QUESTIONS 1 AND 2: 0
1. LITTLE INTEREST OR PLEASURE IN DOING THINGS: NOT AT ALL
2. FEELING DOWN, DEPRESSED, IRRITABLE, OR HOPELESS: NOT AT ALL

## 2024-08-15 NOTE — ASSESSMENT & PLAN NOTE
S/p b/l nephrostomy tubes 12/13  Cr returned to baseline of 0.4-0.5  Nephrostomy tubes draining clear fluid  Renal u/s ordered to look for improvement in the hydronephrosis- no hydronephrosis- resolved  Contacted urology- pt will be discharged on kaur and nephrostomy tubes  Internalization to be done as an outpatient  Pt to be f/u with Dr. Upton in his office within a week of discharge     actual

## 2025-02-22 NOTE — PROGRESS NOTES
Report received from day RN, and care assumed. Pt A&O x 4. Pt denies N/V, and pain at this time.Dressings to back and Lt posterior thigh are CDI. Vital signs reviewed and are WNL. IV assessed and patent, with dressing CDI.  Plan is for a bladder trial starting at 0600. POC discussed with Pt and questions answered. POC includes rest, remove Pacheco at 0600, and IV ABX.Bed is in the lowest position, rails are up, and call light is within reach. Communication board updated and hourly rounding implemented.   Tolerated 4oz juice.

## 2025-03-14 NOTE — HPI: UPPER BODY SKIN CHECK
Procedure: Ear Lavage     Irrigated bilateral ear(s) using 10 ml of  liquid laxative . Significant observations if any: none. Removal took a moderate amount of time  and was somewhat difficult.  Patient tolerated the procedure well.  
How Severe Are Your Spot(S)?: mild
What Is The Reason For Today's Visit?: Upper Body Skin Exam

## 2025-03-19 NOTE — ASSESSMENT & PLAN NOTE
"     [facility] Cardiothoracic Surgery Office Follow Up Note     Date of Encounter: 2025     Name: Jojo Turner  : 1958     Referred By: No ref. provider found  PCP: Little Pelayo APRN    Chief Complaint:    Chief Complaint   Patient presents with    Follow-up     3 MO FU with CT chest       Subjective      History of Present Illness:    Jojo Turner is a 66 y.o. female s/p CABG x 3 with bilateral EVH with Dr. Michael on 10/1/2024. PMH: hx of lung nodule (previously followed by Dr. Woodruff), COPD, HTN, HLD on statin therapy, HFrEF, and CAD s/p STEMI s/p CABG. Hospital stay was complicated by respiratory issues requiring BiPAP and supplemental O2, atrial fibrillation treated with amiodarone, diarrhea, EN, and an acute posterior circulation infarct with patient reporting right sided facial numbness. Last seen in clinic 2024 with healing incisions, lower extremity edema, and some redness with concerns of LLE cellulitis that was treated in  ED with keflex and doxycyline. She presents today for delayed follow up. She reports complete healing of bilateral EVH sites as well as resolution of lower extremity edema. She has been to the ER on multiple occasions with COPD and CHF exacerbations. She did not complete the CT chest that was ordered at her last visit.     Review of Systems:  Review of Systems   Constitutional: Positive for malaise/fatigue. Negative for chills, decreased appetite, diaphoresis, fever, night sweats, weight gain and weight loss.   HENT:  Negative for hoarse voice.    Eyes:  Negative for blurred vision, double vision and visual disturbance.   Cardiovascular:  Positive for chest pain (\"tightness\" prompted last hopitalization) and dyspnea on exertion. Negative for claudication, irregular heartbeat, leg swelling, near-syncope, orthopnea, palpitations, paroxysmal nocturnal dyspnea and syncope.   Respiratory:  Positive for cough. Negative for hemoptysis, shortness " Resolved  Baseline creat 0.7.   Bilateral nephrostomy tubes placed on 12/13 for hydronephrosis with improvement in creatinine  Creatinine now at baseline      of breath, sputum production and wheezing.    Hematologic/Lymphatic: Negative for adenopathy and bleeding problem. Bruises/bleeds easily.   Skin:  Negative for color change, nail changes, poor wound healing and rash.   Musculoskeletal:  Positive for joint pain. Negative for back pain, falls and muscle cramps.   Gastrointestinal:  Positive for dysphagia (about 1 month ago). Negative for abdominal pain and heartburn.   Genitourinary:  Negative for flank pain.   Neurological:  Positive for headaches, loss of balance and numbness (right side). Negative for brief paralysis, disturbances in coordination, dizziness, focal weakness, light-headedness, paresthesias, sensory change, vertigo and weakness.   Psychiatric/Behavioral:  Positive for depression. Negative for suicidal ideas. The patient is nervous/anxious.    Allergic/Immunologic: Negative for persistent infections.       I have reviewed the following portions of the patient's history: problem list, current medications, allergies, past surgical history, past medical history, past social history, past family history, and ROS and confirm it's accurate.    Allergies:  Allergies   Allergen Reactions    Drug Ingredient [Morphine] Other (See Comments)     Brings o2 stats down        Medications:      Current Outpatient Medications:     albuterol (PROVENTIL) (2.5 MG/3ML) 0.083% nebulizer solution, Take 2.5 mg by nebulization 4 (Four) Times a Day As Needed for Wheezing., Disp: 120 each, Rfl: 3    aspirin 81 MG EC tablet, Take 1 tablet by mouth Daily., Disp: 90 tablet, Rfl: 3    atorvastatin (LIPITOR) 80 MG tablet, Take 1 tablet by mouth Every Night., Disp: , Rfl:     bisoprolol (ZEBeta) 5 MG tablet, Take 1 tablet by mouth Daily., Disp: 30 tablet, Rfl: 3    dextromethorphan polistirex ER (DELSYM) 30 MG/5ML Suspension Extended Release oral suspension, Take 10 mL by mouth Every 12 (Twelve) Hours., Disp: 280 mL, Rfl: 0    empagliflozin (JARDIANCE) 10 MG tablet tablet, Take 1  tablet by mouth Daily., Disp: 30 tablet, Rfl: 0    nitroglycerin (NITROSTAT) 0.4 MG SL tablet, Place 1 tablet under the tongue Every 5 (Five) Minutes As Needed for Chest Pain. Take no more than 3 doses in 15 minutes., Disp: 25 tablet, Rfl: 0    O2 (OXYGEN), Inhale As Needed., Disp: , Rfl:     pantoprazole (Protonix) 40 MG EC tablet, Take 1 tablet by mouth Daily., Disp: 30 tablet, Rfl: 11    predniSONE (DELTASONE) 20 MG tablet, Take 2 tablets by mouth Daily for 4 days, THEN 1 tablet Daily for 4 days, THEN 0.5 tablets Daily for 2 days., Disp: 13 tablet, Rfl: 0    sacubitril-valsartan (ENTRESTO) 24-26 MG tablet, Take 1 tablet by mouth Every 12 (Twelve) Hours., Disp: 60 tablet, Rfl: 0    sertraline (ZOLOFT) 50 MG tablet, Take 1 tablet by mouth Daily., Disp: 30 tablet, Rfl: 0    History:   Past Medical History:   Diagnosis Date    Arthritis     hands and spin/ hips/toes    Chronic diastolic (congestive) heart failure 2022    Closed head injury 05/08/2019    Community acquired pneumonia of right lower lobe of lung 06/11/2019    COPD (chronic obstructive pulmonary disease) 2016    Coronary artery disease     Depression 2004    Diverticulosis     per colonoscopy at [facility]    Essential hypertension 2018    GERD (gastroesophageal reflux disease)     Onset approx 1 year ago    Hepatitis A 1985    Migraines     For the past 40 years-have lessened in frequency over the past several year    Mixed hyperlipidemia 2019    Neuropathy     Panlobular emphysema 2019    Peptic ulceration 1968    Sepsis due to Escherichia coli 06/11/2019    Squamous cell skin cancer     Syncope 05/08/2019    Wears dentures     ful set ( only wears upper plate )    Wears eyeglasses        Past Surgical History:   Procedure Laterality Date    BUNIONECTOMY Right 01/2018    CARDIAC CATHETERIZATION N/A 9/25/2024    Procedure: Left Heart Cath;  Surgeon: Paulina Hedrick MD;  Location: [facility] CATH INVASIVE LOCATION;  Service: Cardiovascular;  Laterality:  N/A;    CARPAL TUNNEL RELEASE      CHOLECYSTECTOMY      COLONOSCOPY  06/09/2015    Dr Leigh who recommended 1 year recall with 2-day prep    COLONOSCOPY N/A 09/03/2019    Procedure: COLONOSCOPY;  Surgeon: Bear Modi MD;  [facility];  Service: Gastroenterology    CORONARY ARTERY BYPASS GRAFT N/A 10/1/2024    Procedure: MEDIAN STERNOTOMY, CORONARY ARTERY BYPASS GRAFTING X 3,UTILIZING THE LEFT INTERNAL MAMMARY ARTERY, ENDOSCOPIC VEIN HARVESTING OF RIGHT AND LEFT SAPHENOUS VEIN, EXPLORATION OF LEFT RADIAL ARTERY, TRANSESOPHAGEAL ECHOCARDIOGRAM PER ANESTHESIA;  Surgeon: Jalen Michael MD;  Location:  [facility];  Service: Cardiothoracic;  Laterality: N/A;    CYSTECTOMY  2018    on toe    LAPAROSCOPIC ASSISTED VAGINAL HYSTERECTOMY SALPINGO OOPHORECTOMY  1992    Dr. Cory Benjamin    LAPAROSCOPIC CHOLECYSTECTOMY  2017    SALPINGO OOPHORECTOMY  1983    For torsion    SKIN BIOPSY      ULNAR NERVE DECOMPRESSION Left 01/04/2024    Procedure: ULNAR NERVE DECOMPRESSION LEFT;  Surgeon: Xu Nixon MD;  Location:  [facility];  Service: Neurosurgery;  Laterality: Left;    ULNAR NERVE DECOMPRESSION Right 02/26/2024    Procedure: ULNAR NERVE DECOMPRESSION RIGHT;  Surgeon: Xu Nixon MD;  Location:  [facility];  Service: Neurosurgery;  Laterality: Right;       Social History     Socioeconomic History    Marital status: Single    Number of children: 1   Tobacco Use    Smoking status: Every Day     Current packs/day: 1.00     Average packs/day: 0.8 packs/day for 47.2 years (35.5 ttl pk-yrs)     Types: Cigarettes     Start date: 1978    Smokeless tobacco: Never    Tobacco comments:     At max 2ppd    Vaping Use    Vaping status: Never Used   Substance and Sexual Activity    Alcohol use: Yes     Comment: occassionally     Drug use: No    Sexual activity: Not Currently        Family History   Problem Relation Age of Onset    Arthritis Mother     Cancer Mother     Hypertension Mother      "Hyperlipidemia Mother     Other Mother         Migraine Headaches    Hypertension Father     Hyperlipidemia Father     Stroke Father     Breast cancer Sister     Thyroid disease Sister     Cancer Maternal Grandmother     Other Maternal Aunt         Tuberculosis    Ovarian cancer Neg Hx        Objective     Imaging/Labs:    XR Chest 2 View (11/12/2024 09:00)   Impression:  Background emphysema and thin streaky bibasilar scarring or atelectasis. The lungs are otherwise clear; previously seen opacities of the left lung base have resolved.    Electronically Signed: Lawrence Lewis MD   11/14/2024      Duplex Carotid Ultrasound CAR (09/26/2024 14:37)   Interpretation Summary    Right internal carotid artery demonstrates a less than 50% stenosis.    Left internal carotid artery demonstrates a less than 50% stenosis.    Antegrade left vertebral flow.         Physical Exam:  Vitals:    03/19/25 1249 03/19/25 1250 03/19/25 1259   BP: (!) 190/90 160/90 (!) 190/90   BP Location: Left arm Right arm Left arm   Patient Position: Sitting Sitting Sitting   Pulse: 70     Temp: 97.9 °F (36.6 °C)     SpO2: 98%     Weight: 81.6 kg (180 lb)     Height: 165.1 cm (65\")  Comment: patient reported        Body mass index is 29.95 kg/m².  BMI is >= 25 and <30. (Overweight) The following options were offered after discussion;: information on healthy weight added to patient's after visit summary        Physical Exam  Vitals and nursing note reviewed.   Constitutional:       General: She is not in acute distress.  HENT:      Head: Normocephalic and atraumatic.   Neck:      Vascular: No carotid bruit or JVD.   Cardiovascular:      Rate and Rhythm: Normal rate and regular rhythm.      Pulses:           Radial pulses are 2+ on the right side and 2+ on the left side.        Dorsalis pedis pulses are 2+ on the right side and 2+ on the left side.        Posterior tibial pulses are 2+ on the right side and 2+ on the left side.      Heart sounds: Normal " heart sounds. No murmur heard.  Pulmonary:      Effort: Pulmonary effort is normal.      Breath sounds: Normal breath sounds.   Musculoskeletal:      Right lower leg: No edema.      Left lower leg: No edema.   Skin:     General: Skin is warm.      Comments: Fully healed sternal incision and EVH sites   Neurological:      Mental Status: She is alert.      Gait: Gait is intact.   Psychiatric:         Attention and Perception: Attention normal.         Behavior: Behavior is cooperative.         Assessment / Plan    Jojo Turner is a 66 y.o. female s/p CABG x 3 with bilateral EVH with Dr. Michael on 10/1/2024. PMH: hx of lung nodule (previously followed by Dr. Woodruff), COPD, HTN, HLD on statin therapy, HFrEF, and CAD s/p STEMI s/p CABG. Hospital stay was complicated by respiratory issues requiring BiPAP and supplemental O2, atrial fibrillation treated with amiodarone, diarrhea, EN, and an acute posterior circulation infarct with patient reporting right sided facial numbness. Last seen in clinic 11/2024 with healing incisions, lower extremity edema, and some redness with concerns of LLE cellulitis that was treated in  ED with keflex and doxycyline. She presents today for delayed follow up.     Assessment / Plan:  1. CAD S/P CABG  Complete healing of bilateral EVH sites as well as sternal incision. No s/s infection present such as tenderness, swelling, redness, warmth  No sternal instability on exam  Resolution of lower extremity edema  Pt hypertensive in office, systolic 190 and 160 on recheck. Pt reports did not take AM medications this morning and missed follow up appts with cardiology. She also has been seen in the ER with multiple CHF exacerbations. Will have office arrange for cardiology follow up  Pt reports she was told she had a CVA while hospitalized for CABG which resulted in right sided numbness/tingling and weakened  and balance difficulties. Pt was no show for neurology appt with no additional  follow up appt scheduled. Will have office call and arrange neurology follow up.       2. Lung nodule  3. Chronic obstructive pulmonary disease, unspecified COPD type  4. Panlobular emphysema  Lung nodule previously monitored by pulmonology (Dr. Woodruff) however patient missed multiple follow up appointments  CT imaging was ordered by RENETTA De La Cruz PA-C but patient did not complete prior to scheduled appointment   Recently seen in the ER on multiple occasions with COPD exacerbations and was prescribed home O2. Patient would like to resume follow up with their office. She is scheduled for CT imaging 3/25. Will have our office contact to arrange follow up appointment to resume lung CA surveillance as well as management of COPD and emphysema.    Will tentatively arrange for 3 month follow up to ensure patient is evaluated and lung nodule surveillance is resumed by pulmonology. OK to cancel appt if patient has seen pulmonology.       Patient Education:   Jojo Turner  reports that she has been smoking cigarettes. She started smoking about 47 years ago. She has a 35.5 pack-year smoking history. She has never used smokeless tobacco. I have educated her on the risk of diseases from using tobacco products such as cancer, COPD, and heart disease.  I advised her to quit and she is not willing to quit. I spent 3  minutes counseling the patient.        Follow Up:   Return in about 3 months (around 6/19/2025).   Or sooner for any further concerns or worsening sign and symptoms. If unable to reach us in the office please dial 911 or go to the nearest emergency department.      JOSÉ MIGUEL Lynn  [facility]    Time Spent: I spent 41 minutes caring for Jojo on this date of service. This time includes time spent by me in the following activities: preparing for the visit, reviewing tests, obtaining and/or reviewing a separately obtained history, performing a medically appropriate examination and/or  evaluation, counseling and educating the patient/family/caregiver, referring and communicating with other health care professionals, documenting information in the medical record, and care coordination.

## 2025-04-10 ENCOUNTER — APPOINTMENT (OUTPATIENT)
Dept: URBAN - METROPOLITAN AREA CLINIC 6 | Facility: CLINIC | Age: 75
Setting detail: DERMATOLOGY
End: 2025-04-10

## 2025-04-10 PROBLEM — D48.5 NEOPLASM OF UNCERTAIN BEHAVIOR OF SKIN: Status: ACTIVE | Noted: 2025-04-10

## 2025-04-10 PROCEDURE — ? BIOPSY BY SHAVE METHOD

## 2025-04-10 PROCEDURE — 11102 TANGNTL BX SKIN SINGLE LES: CPT

## 2025-04-21 ENCOUNTER — APPOINTMENT (OUTPATIENT)
Dept: URBAN - METROPOLITAN AREA CLINIC 6 | Facility: CLINIC | Age: 75
Setting detail: DERMATOLOGY
End: 2025-04-21

## 2025-04-21 PROBLEM — C44.629 SQUAMOUS CELL CARCINOMA OF SKIN OF LEFT UPPER LIMB, INCLUDING SHOULDER: Status: ACTIVE | Noted: 2025-04-21

## 2025-04-21 PROCEDURE — 11603 EXC TR-EXT MAL+MARG 2.1-3 CM: CPT

## 2025-04-21 PROCEDURE — ? PRESCRIPTION

## 2025-04-21 PROCEDURE — 12032 INTMD RPR S/A/T/EXT 2.6-7.5: CPT

## 2025-04-21 PROCEDURE — ? EXCISION

## 2025-04-21 RX ORDER — HYDROCODONE BITARTRATE AND ACETAMINOPHEN 5; 325 MG/1; MG/1
TABLET ORAL
Qty: 12 | Refills: 0 | Status: ERX | COMMUNITY
Start: 2025-04-21

## 2025-04-21 RX ADMIN — HYDROCODONE BITARTRATE AND ACETAMINOPHEN: 5; 325 TABLET ORAL at 00:00

## 2025-05-05 ENCOUNTER — APPOINTMENT (OUTPATIENT)
Dept: URBAN - METROPOLITAN AREA CLINIC 6 | Facility: CLINIC | Age: 75
Setting detail: DERMATOLOGY
End: 2025-05-05

## 2025-05-05 DIAGNOSIS — Z48.02 ENCOUNTER FOR REMOVAL OF SUTURES: ICD-10-CM

## 2025-05-05 PROCEDURE — ? PHOTO-DOCUMENTATION

## 2025-05-05 PROCEDURE — 99024 POSTOP FOLLOW-UP VISIT: CPT

## 2025-05-05 PROCEDURE — ? SUTURE REMOVAL (GLOBAL PERIOD)

## 2025-05-05 ASSESSMENT — LOCATION SIMPLE DESCRIPTION DERM: LOCATION SIMPLE: LEFT FOREARM

## 2025-05-05 ASSESSMENT — LOCATION ZONE DERM: LOCATION ZONE: ARM

## 2025-05-05 ASSESSMENT — LOCATION DETAILED DESCRIPTION DERM: LOCATION DETAILED: LEFT PROXIMAL LATERAL DORSAL FOREARM

## 2025-05-05 NOTE — PROCEDURE: SUTURE REMOVAL (GLOBAL PERIOD)
Detail Level: Detailed
Add 54427 Cpt? (Important Note: In 2017 The Use Of 85559 Is Being Tracked By Cms To Determine Future Global Period Reimbursement For Global Periods): yes

## 2025-07-29 ENCOUNTER — APPOINTMENT (OUTPATIENT)
Dept: URBAN - METROPOLITAN AREA CLINIC 6 | Facility: CLINIC | Age: 75
Setting detail: DERMATOLOGY
End: 2025-07-29

## 2025-07-29 PROBLEM — D48.5 NEOPLASM OF UNCERTAIN BEHAVIOR OF SKIN: Status: ACTIVE | Noted: 2025-07-29

## 2025-07-29 PROCEDURE — ? BIOPSY BY SHAVE METHOD

## (undated) DEVICE — SENSOR OXIMETER ADULT SPO2 RD SET (20EA/BX)

## (undated) DEVICE — PROTECTOR ULNA NERVE - (36PR/CA)

## (undated) DEVICE — LACTATED RINGERS INJ 1000 ML - (14EA/CA 60CA/PF)

## (undated) DEVICE — TUBE CONNECT SUCTION CLEAR 120 X 1/4" (50EA/CA)"

## (undated) DEVICE — GLOVE BIOGEL ECLIPSE  PF LATEX SIZE 6.5 (50PR/BX)

## (undated) DEVICE — SUTURE GENERAL

## (undated) DEVICE — MASK ANESTHESIA ADULT  - (100/CA)

## (undated) DEVICE — DRAPE LARGE 3 QUARTER - (20/CA)

## (undated) DEVICE — HEAD HOLDER JUNIOR/ADULT

## (undated) DEVICE — SUTURE 2-0 SILK SH (36PK/BX)

## (undated) DEVICE — SPONGE KITTNER DISSECTORS - (5EA/PK 50PK/CA)

## (undated) DEVICE — SUTURE 2-0 CHROMIC GUT SH 27 (36PK/BX)"

## (undated) DEVICE — SUCTION INSTRUMENT YANKAUER BULBOUS TIP W/O VENT (50EA/CA)

## (undated) DEVICE — NEPTUNE 4 PORT MANIFOLD - (20/PK)

## (undated) DEVICE — TUBING CLEARLINK DUO-VENT - C-FLO (48EA/CA)

## (undated) DEVICE — SUTURE 3-0 PROLENE SH 30 (36PK/BX)"

## (undated) DEVICE — PACK MINOR BASIN - (2EA/CA)

## (undated) DEVICE — WATER IRRIG. STER 3000 ML - (4/CA)

## (undated) DEVICE — ELECTRODE 850 FOAM ADHESIVE - HYDROGEL RADIOTRNSPRNT (50/PK)

## (undated) DEVICE — GLOVE BIOGEL SZ 7 SURGICAL PF LTX - (50PR/BX 4BX/CA)

## (undated) DEVICE — DRAPE UNDER BUTTOCKS FLUID - (20/CA)

## (undated) DEVICE — SPONGE GAUZESTER 4 X 4 4PLY - (128PK/CA)

## (undated) DEVICE — SUTURE 0 VICRYL PLUS CTX - 36 INCH (36/BX)

## (undated) DEVICE — ELECTRODE ROLLERBALL 24FR - (10/BX) OLD # RE-24

## (undated) DEVICE — TOWELS CLOTH SURGICAL - (4/PK 20PK/CA)

## (undated) DEVICE — SET IRRIGATION CYSTOSCOPY Y-TYPE L81 IN (20EA/CA)

## (undated) DEVICE — CONNECTOR HOSE NEPTUNE FOR CYSTO ROOM

## (undated) DEVICE — SYRINGE 10 ML CONTROL LL (25EA/BX 4BX/CA)

## (undated) DEVICE — SUTURE 3-0 VICRYL PLUS SH - 27 INCH (36/BX)

## (undated) DEVICE — STAPLER SKIN DISP - (6/BX 10BX/CA) VISISTAT

## (undated) DEVICE — DERMABOND ADVANCED - (12EA/BX)

## (undated) DEVICE — SUTURE 0 VICRYL PLUS CT-1 - 36 INCH (36/BX)

## (undated) DEVICE — SLEEVE VASO CALF MED - (10PR/CA)

## (undated) DEVICE — TIP INTPLS HFLO ML ORFC BTRY - (12/CS)  FOR SURGILAV

## (undated) DEVICE — TRAY FOLEY CATHETER STATLOCK 16FR SURESTEP  (10EA/CA)

## (undated) DEVICE — SET EXTENSION WITH 2 PORTS (48EA/CA) ***PART #2C8610 IS A SUBSTITUTE*****

## (undated) DEVICE — SODIUM CHL IRRIGATION 0.9% 1000ML (12EA/CA)

## (undated) DEVICE — BAG DRAINAGE URINARY CLOSED 2000ML (20EA/CA)

## (undated) DEVICE — GOWN WARMING STANDARD FLEX - (30/CA)

## (undated) DEVICE — ELECTRODE DUAL RETURN W/ CORD - (50/PK)

## (undated) DEVICE — DRAPE MAYO STAND - (30/CA)

## (undated) DEVICE — DRAPE LAPAROTOMY T SHEET - (12EA/CA)

## (undated) DEVICE — SUTURE 0 SILK TIES (36PK/BX)

## (undated) DEVICE — CONTAINER SPECIMEN BAG OR - STERILE 4 OZ W/LID (100EA/CA)

## (undated) DEVICE — SUTURE 3-0 CHROMIC GUT SH 27 (36PK/BX)"

## (undated) DEVICE — SUTURE 2-0 SILK 12 X 18" (36PK/BX)"

## (undated) DEVICE — BLADE SURGICAL #15 - (50/BX 3BX/CA)

## (undated) DEVICE — SLEEVE, VASO, THIGH, MED

## (undated) DEVICE — GLOVE BIOGEL PI ORTHO SZ 7 PF LF (40PR/BX)

## (undated) DEVICE — SET LEADWIRE 5 LEAD BEDSIDE DISPOSABLE ECG (1SET OF 5/EA)

## (undated) DEVICE — CLIP MED LG INTNL HRZN TI ESCP - (20/BX)

## (undated) DEVICE — PACK CYSTOSCOPY III - (8/CA)

## (undated) DEVICE — KIT ROOM DECONTAMINATION

## (undated) DEVICE — SUTURE 4-0 VICRYL PLUS SH - UNDYED 27 INCH (36PK/BX)

## (undated) DEVICE — CANISTER SUCTION 3000ML MECHANICAL FILTER AUTO SHUTOFF MEDI-VAC NONSTERILE LF DISP  (40EA/CA)

## (undated) DEVICE — GLOVE BIOGEL SZ 7.5 SURGICAL PF LTX - (50PR/BX 4BX/CA)

## (undated) DEVICE — JELLY, KY 2 0Z STERILE

## (undated) DEVICE — CLIP LG INTNL HRZN TI ESCP LGT - (20/BX)

## (undated) DEVICE — SENSOR SPO2 NEO LNCS ADHESIVE (20/BX) SEE USER NOTES

## (undated) DEVICE — SUTURE 0 SILK CT-1 (36PK/BX)

## (undated) DEVICE — SUTURE 2-0 VICRYL PLUS SH - 27 INCH (36/BX)

## (undated) DEVICE — SUTURE 4-0 MONOCRYL PLUS PS-1 - 27 INCH (36/BX)

## (undated) DEVICE — BLANKET WARMING UPPER BODY - (10/CA)

## (undated) DEVICE — TRAY SKIN SCRUB PVP WET (20EA/CA) PART #DYND70356 DISCONTINUED

## (undated) DEVICE — DRAIN PENROSE STERILE 1/4 X - 18 IN  (25EA/BX)

## (undated) DEVICE — GLOVE BIOGEL INDICATOR SZ 7.5 SURGICAL PF LTX - (50PR/BX 4BX/CA)

## (undated) DEVICE — PAD LAP STERILE 18 X 18 - (5/PK 40PK/CA)

## (undated) DEVICE — TRAY SRGPRP PVP IOD WT PRP - (20/CA)

## (undated) DEVICE — KIT ANESTHESIA W/CIRCUIT & 3/LT BAG W/FILTER (20EA/CA)

## (undated) DEVICE — SUTURE 3-0 VICRYL PLUS SH - 8X 18 INCH (12/BX)

## (undated) DEVICE — GLOVE BIOGEL INDICATOR SZ 7SURGICAL PF LTX - (50/BX 4BX/CA)

## (undated) DEVICE — GLOVE BIOGEL INDICATOR SZ 6.5 SURGICAL PF LTX - (50PR/BX 4BX/CA)

## (undated) DEVICE — SUTURE 3-0 CHROMIC GUT FS-2 27 (36PK/BX)"

## (undated) DEVICE — GOWN SURGICAL X-LARGE ULTRA - FILM-REINFORCED (20/CA)

## (undated) DEVICE — BOVIE  BLADE 6 EXTENDED - (50/PK)

## (undated) DEVICE — SPONGE XRAY 8X4 STERL. 12PL - (10EA/TY 80TY/CA)

## (undated) DEVICE — TUBE E-T HI-LO CUFF 7.5MM (10EA/PK)

## (undated) DEVICE — SUTURE 3-0 VICRYL PLUS RB-1 - (36/BX)

## (undated) DEVICE — BANDAGE ROLL STERILE BULKEE 4.5 IN X 4 YD (100EA/CA)

## (undated) DEVICE — PACK MAJOR BASIN - (2EA/CA)

## (undated) DEVICE — CATHETER URETHRAL FOLEY SILICONE 22 FR 30 ML 3-WAY

## (undated) DEVICE — BOVIE NEEDLE TIP 3CM COLORADO

## (undated) DEVICE — DETERGENT RENUZYME PLUS 10 OZ PACKET (50/BX)

## (undated) DEVICE — GOWN SURGEONS X-LARGE - DISP. (30/CA)

## (undated) DEVICE — CLIP MED INTNL HRZN TI ESCP - (25/BX)

## (undated) DEVICE — GAUZE FLUFF STERILE 2-PLY 36 X 36 (100EA/CA)

## (undated) DEVICE — CATHETER COUDE FOLEY 5CC - 14FR (12EA/CA)

## (undated) DEVICE — COVER LIGHT HANDLE ALC PLUS DISP (18EA/BX)

## (undated) DEVICE — WIRE GUIDE SENSOR DUAL FLEX - 5/BX

## (undated) DEVICE — WATER IRRIG. STER. 1500 ML - (9/CA)

## (undated) DEVICE — HANDPIECE 10FT INTPLS SCT PLS IRRIGATION HAND CONTROL SET (6/PK)

## (undated) DEVICE — LEGGING LITHOTOMY 31 X 48 IN - (2EA/PK 20PK/CA)

## (undated) DEVICE — MASK AIRWAY SIZE 4 UNIQUE SILICON (10EA/BX)